# Patient Record
Sex: MALE | Race: WHITE | NOT HISPANIC OR LATINO | Employment: OTHER | URBAN - NONMETROPOLITAN AREA
[De-identification: names, ages, dates, MRNs, and addresses within clinical notes are randomized per-mention and may not be internally consistent; named-entity substitution may affect disease eponyms.]

---

## 2021-01-05 ENCOUNTER — OFFICE VISIT (OUTPATIENT)
Dept: FAMILY MEDICINE CLINIC | Facility: CLINIC | Age: 81
End: 2021-01-05

## 2021-01-05 VITALS
SYSTOLIC BLOOD PRESSURE: 132 MMHG | DIASTOLIC BLOOD PRESSURE: 76 MMHG | HEIGHT: 69 IN | RESPIRATION RATE: 16 BRPM | TEMPERATURE: 97.4 F | BODY MASS INDEX: 33.09 KG/M2 | OXYGEN SATURATION: 97 % | WEIGHT: 223.4 LBS | HEART RATE: 73 BPM

## 2021-01-05 DIAGNOSIS — Z11.59 NEED FOR HEPATITIS C SCREENING TEST: ICD-10-CM

## 2021-01-05 DIAGNOSIS — I25.810 CORONARY ARTERY DISEASE INVOLVING CORONARY BYPASS GRAFT OF NATIVE HEART WITHOUT ANGINA PECTORIS: Primary | ICD-10-CM

## 2021-01-05 DIAGNOSIS — K51.90 ULCERATIVE COLITIS WITHOUT COMPLICATIONS, UNSPECIFIED LOCATION (HCC): ICD-10-CM

## 2021-01-05 DIAGNOSIS — J45.20 MILD INTERMITTENT ASTHMA WITHOUT COMPLICATION: ICD-10-CM

## 2021-01-05 DIAGNOSIS — Z79.4 TYPE 2 DIABETES MELLITUS WITH HYPERGLYCEMIA, WITH LONG-TERM CURRENT USE OF INSULIN (HCC): ICD-10-CM

## 2021-01-05 DIAGNOSIS — E11.65 TYPE 2 DIABETES MELLITUS WITH HYPERGLYCEMIA, WITH LONG-TERM CURRENT USE OF INSULIN (HCC): ICD-10-CM

## 2021-01-05 DIAGNOSIS — L29.9 ITCHING: ICD-10-CM

## 2021-01-05 PROCEDURE — 99204 OFFICE O/P NEW MOD 45 MIN: CPT | Performed by: FAMILY MEDICINE

## 2021-01-05 RX ORDER — LISINOPRIL 10 MG/1
10 TABLET ORAL DAILY
COMMUNITY
Start: 2020-08-10 | End: 2021-01-29 | Stop reason: SDUPTHER

## 2021-01-05 RX ORDER — CLOPIDOGREL BISULFATE 75 MG/1
75 TABLET ORAL DAILY
COMMUNITY
Start: 2020-08-10 | End: 2021-04-20 | Stop reason: SDUPTHER

## 2021-01-05 RX ORDER — HYDROXYZINE PAMOATE 25 MG/1
25 CAPSULE ORAL NIGHTLY PRN
Qty: 30 CAPSULE | Refills: 0 | Status: SHIPPED | OUTPATIENT
Start: 2021-01-05 | End: 2021-03-03

## 2021-01-05 RX ORDER — BUDESONIDE AND FORMOTEROL FUMARATE DIHYDRATE 160; 4.5 UG/1; UG/1
2 AEROSOL RESPIRATORY (INHALATION) 2 TIMES DAILY
COMMUNITY
Start: 2020-11-13 | End: 2022-05-09 | Stop reason: SDUPTHER

## 2021-01-05 RX ORDER — TRIAMCINOLONE ACETONIDE 1 MG/G
CREAM TOPICAL 2 TIMES DAILY
COMMUNITY

## 2021-01-05 RX ORDER — ROSUVASTATIN CALCIUM 20 MG/1
20 TABLET, COATED ORAL DAILY
COMMUNITY
Start: 2020-08-10 | End: 2021-03-03 | Stop reason: SDUPTHER

## 2021-01-05 RX ORDER — GLIPIZIDE 10 MG/1
10 TABLET ORAL 2 TIMES DAILY
COMMUNITY
Start: 2020-08-10 | End: 2021-06-20 | Stop reason: HOSPADM

## 2021-01-05 RX ORDER — INSULIN GLARGINE 100 [IU]/ML
20 INJECTION, SOLUTION SUBCUTANEOUS AS NEEDED
COMMUNITY
Start: 2020-08-10 | End: 2023-02-28 | Stop reason: SDUPTHER

## 2021-01-05 RX ORDER — FLUTICASONE PROPIONATE 50 MCG
1 SPRAY, SUSPENSION (ML) NASAL DAILY
COMMUNITY
Start: 2020-11-13 | End: 2021-01-19 | Stop reason: SDUPTHER

## 2021-01-05 NOTE — PROGRESS NOTES
"Subjective cc: est care for DM   Zachariah Acosta is a 80 y.o. male who presents to est care for DM.     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   Rotator cuff shaheed on right shoulder   Right hip   Back surgery x 2 - lumbar spine   Cataract removal left eye - in [facility] - has follow up for right eye in 2 weeks   UC: on stelara - last colonoscopy about 6-8 months ago, controlled on current medication, was on remicade and activo but neither worked well, he just took his 8 week shot of stelara   DM: 5 years, last ha1c was about 7 within last 3 months - on glipizide BID consistently, only takes lantus PRN, eye exam UTD. No sores on feet.   C/o itching - went ot derm in [place] - given steroid cream but still itching.   C/o SOA \"all his life\" worsening, on symbicort, c/o not having stamina. He thinks last ECHO was last spring, denies orthopnea  BREANN on CPAP     colonoscopy UTD because of UC     Never smoker   No alcohol   No illicit drug use     History of Present Illness     The following portions of the patient's history were reviewed and updated as appropriate: allergies, current medications, past family history, past medical history, past social history, past surgical history and problem list.        Review of Systems   Constitutional: Negative for activity change and fatigue.   Respiratory: Positive for shortness of breath.    Musculoskeletal: Positive for arthralgias, back pain and myalgias.   Skin:        Itching    All other systems reviewed and are negative.      Objective   Blood pressure 132/76, pulse 73, temperature 97.4 °F (36.3 °C), temperature source Infrared, resp. rate 16, height 175.3 cm (69\"), weight 101 kg (223 lb 6.4 oz), SpO2 97 %.  Physical Exam  Vitals signs and nursing note reviewed.   Constitutional:       General: He is not in acute distress.     Appearance: He is " well-developed. He is obese. He is not diaphoretic.   HENT:      Head: Normocephalic and atraumatic.      Nose: Nose normal.   Eyes:      General:         Right eye: No discharge.         Left eye: No discharge.      Conjunctiva/sclera: Conjunctivae normal.   Neck:      Musculoskeletal: Normal range of motion.      Thyroid: No thyromegaly.      Trachea: No tracheal deviation.   Cardiovascular:      Rate and Rhythm: Normal rate and regular rhythm.      Heart sounds: Normal heart sounds.   Pulmonary:      Effort: Pulmonary effort is normal. No respiratory distress.      Breath sounds: Normal breath sounds. No stridor. No wheezing.   Chest:      Chest wall: No tenderness.   Abdominal:      General: Bowel sounds are normal. There is no distension.      Palpations: Abdomen is soft.      Tenderness: There is no abdominal tenderness.   Musculoskeletal: Normal range of motion.   Lymphadenopathy:      Cervical: No cervical adenopathy.   Skin:     General: Skin is warm and dry.   Neurological:      Mental Status: He is alert and oriented to person, place, and time.      Motor: No abnormal muscle tone.      Coordination: Coordination normal.   Psychiatric:         Behavior: Behavior normal.         Thought Content: Thought content normal.         Judgment: Judgment normal.         Assessment/Plan   Problems Addressed this Visit     None      Visit Diagnoses     Coronary artery disease involving coronary bypass graft of native heart without angina pectoris    -  Primary    Relevant Orders    Ambulatory Referral to Cardiology    Lipid Panel    Ulcerative colitis without complications, unspecified location (CMS/[facility])        Relevant Orders    Ambulatory Referral to Gastroenterology    Type 2 diabetes mellitus with hyperglycemia, with long-term current use of insulin (CMS/[facility])        Relevant Medications    glipizide (GLUCOTROL) 10 MG tablet    Insulin Glargine (Lantus SoloStar) 100 UNIT/ML injection pen    Other Relevant Orders     Comprehensive Metabolic Panel    Hemoglobin A1c    Microalbumin / Creatinine Urine Ratio - Urine, Clean Catch    CBC (No Diff)    Need for hepatitis C screening test        Relevant Orders    Hepatitis C Antibody    Itching        Relevant Medications    hydrOXYzine pamoate (Vistaril) 25 MG capsule    Mild intermittent asthma without complication        Relevant Medications    budesonide-formoterol (Symbicort) 160-4.5 MCG/ACT inhaler    Other Relevant Orders    Ambulatory Referral to Pulmonology      Diagnoses       Codes Comments    Coronary artery disease involving coronary bypass graft of native heart without angina pectoris    -  Primary ICD-10-CM: I25.810  ICD-9-CM: 414.05     Ulcerative colitis without complications, unspecified location (CMS/HCC)     ICD-10-CM: K51.90  ICD-9-CM: 556.9     Type 2 diabetes mellitus with hyperglycemia, with long-term current use of insulin (CMS/HCC)     ICD-10-CM: E11.65, Z79.4  ICD-9-CM: 250.00, 790.29, V58.67     Need for hepatitis C screening test     ICD-10-CM: Z11.59  ICD-9-CM: V73.89     Itching     ICD-10-CM: L29.9  ICD-9-CM: 698.9     Mild intermittent asthma without complication     ICD-10-CM: J45.20  ICD-9-CM: 493.90                 This document has been electronically signed by Monique Carlos MD on January 19, 2021 21:45 CST

## 2021-01-07 LAB
ALBUMIN SERPL-MCNC: 3.8 G/DL (ref 3.5–5.2)
ALBUMIN/CREAT UR: 6 MG/G CREAT (ref 0–29)
ALBUMIN/GLOB SERPL: 1.2 G/DL
ALP SERPL-CCNC: 109 U/L (ref 39–117)
ALT SERPL-CCNC: 16 U/L (ref 1–41)
AST SERPL-CCNC: 23 U/L (ref 1–40)
BILIRUB SERPL-MCNC: 0.3 MG/DL (ref 0–1.2)
BUN SERPL-MCNC: 16 MG/DL (ref 8–23)
BUN/CREAT SERPL: 15.1 (ref 7–25)
CALCIUM SERPL-MCNC: 9.1 MG/DL (ref 8.6–10.5)
CHLORIDE SERPL-SCNC: 103 MMOL/L (ref 98–107)
CHOLEST SERPL-MCNC: 141 MG/DL (ref 0–200)
CO2 SERPL-SCNC: 26 MMOL/L (ref 22–29)
CREAT SERPL-MCNC: 1.06 MG/DL (ref 0.76–1.27)
CREAT UR-MCNC: 143.6 MG/DL
ERYTHROCYTE [DISTWIDTH] IN BLOOD BY AUTOMATED COUNT: 14.9 % (ref 12.3–15.4)
GLOBULIN SER CALC-MCNC: 3.3 GM/DL
GLUCOSE SERPL-MCNC: 105 MG/DL (ref 65–99)
HBA1C MFR BLD: 6.6 % (ref 4.8–5.6)
HCT VFR BLD AUTO: 39.7 % (ref 37.5–51)
HCV AB S/CO SERPL IA: <0.1 S/CO RATIO (ref 0–0.9)
HDLC SERPL-MCNC: 34 MG/DL (ref 40–60)
HGB BLD-MCNC: 12.3 G/DL (ref 13–17.7)
LDLC SERPL CALC-MCNC: 86 MG/DL (ref 0–100)
MCH RBC QN AUTO: 24.5 PG (ref 26.6–33)
MCHC RBC AUTO-ENTMCNC: 31 G/DL (ref 31.5–35.7)
MCV RBC AUTO: 79.1 FL (ref 79–97)
MICROALBUMIN UR-MCNC: 8.1 UG/ML
PLATELET # BLD AUTO: 307 10*3/MM3 (ref 140–450)
POTASSIUM SERPL-SCNC: 4.6 MMOL/L (ref 3.5–5.2)
PROT SERPL-MCNC: 7.1 G/DL (ref 6–8.5)
RBC # BLD AUTO: 5.02 10*6/MM3 (ref 4.14–5.8)
SODIUM SERPL-SCNC: 136 MMOL/L (ref 136–145)
TRIGL SERPL-MCNC: 114 MG/DL (ref 0–150)
VLDLC SERPL CALC-MCNC: 21 MG/DL (ref 5–40)
WBC # BLD AUTO: 5.66 10*3/MM3 (ref 3.4–10.8)

## 2021-01-12 NOTE — PROGRESS NOTES
Chief Complaint   Patient presents with   • Ulcerative Colitis     has uc is doing ok has moved here from [facility]       PCP: Monique Carlos MD  REFER: Monique Carlos MD    Subjective     HPI    Zachariah Acosta presents to office to establish care with GI.   He has a known diagnosis of Ulcerative Colitis (apx 1997) currently maintained on stelara and mesalamine enema every other day.  He has failed remicade and entyvio, oral mesalamine,   .  Last colonoscopy 6-8 months ago at [facility] by Dr Oliveira.  Bowels described as moving one time per day.  No abdominal pain.  No bright red blood per rectum.  Weight stable.    S/p 6 cardiac stent placment April 2020.        Past Medical History:   Diagnosis Date   • Asthma    • Diabetes mellitus (CMS/HCC)    • Hyperlipidemia    • Hypertension        Past Surgical History:   Procedure Laterality Date   • BACK SURGERY     • CATARACT EXTRACTION     • CORONARY ANGIOPLASTY WITH STENT PLACEMENT     • PARTIAL HIP ARTHROPLASTY Right        Outpatient Medications Marked as Taking for the 1/14/21 encounter (Office Visit) with Salo Nascimento APRN   Medication Sig Dispense Refill   • budesonide-formoterol (Symbicort) 160-4.5 MCG/ACT inhaler Inhale 2 puffs 2 (two) times a day.     • clopidogrel (PLAVIX) 75 MG tablet Take 75 mg by mouth Daily.     • fluticasone (FLONASE) 50 MCG/ACT nasal spray 1 spray into the nostril(s) as directed by provider Daily.     • glipizide (GLUCOTROL) 10 MG tablet Take 10 mg by mouth 2 (Two) Times a Day.     • hydrOXYzine pamoate (Vistaril) 25 MG capsule Take 1 capsule by mouth At Night As Needed for Itching. 30 capsule 0   • Insulin Glargine (Lantus SoloStar) 100 UNIT/ML injection pen Inject 20 Units under the skin into the appropriate area as directed As Needed.     • lisinopril (PRINIVIL,ZESTRIL) 10 MG tablet Take 10 mg by mouth Daily.     • Mesalamine 4 GM/60ML SF enema Insert  into the rectum Daily.     •  "metoprolol tartrate (LOPRESSOR) 25 MG tablet Take 25 mg by mouth 2 (two) times a day.     • rosuvastatin (CRESTOR) 20 MG tablet Take 20 mg by mouth Daily.     • triamcinolone (KENALOG) 0.1 % cream Apply  topically to the appropriate area as directed 2 (Two) Times a Day.     • Ustekinumab (STELARA) 90 MG/ML solution prefilled syringe Injection Inject 90 mg under the skin into the appropriate area as directed Every 2 (Two) Months.         Allergies   Allergen Reactions   • Albuterol Other (See Comments)   • Adhesive Tape Rash   • Amoxicillin Rash   • Azithromycin Rash       Social History     Socioeconomic History   • Marital status:      Spouse name: Not on file   • Number of children: Not on file   • Years of education: Not on file   • Highest education level: Not on file   Tobacco Use   • Smoking status: Never Smoker   • Smokeless tobacco: Never Used   Substance and Sexual Activity   • Alcohol use: Never     Frequency: Never   • Drug use: Never       Review of Systems   Constitutional: Negative for unexpected weight change.   Respiratory: Negative for shortness of breath.    Cardiovascular: Negative for chest pain.   Gastrointestinal: Negative for abdominal pain and anal bleeding.       Objective     Vitals:    01/14/21 0913   BP: 130/80   Pulse: 70   Temp: 96.9 °F (36.1 °C)   SpO2: 98%   Weight: 99.8 kg (220 lb)   Height: 175.3 cm (69\")     Body mass index is 32.49 kg/m².    Physical Exam  Constitutional:       Appearance: Normal appearance. He is well-developed.   Eyes:      General: No scleral icterus.  Cardiovascular:      Rate and Rhythm: Regular rhythm.      Heart sounds: Normal heart sounds. No murmur.   Pulmonary:      Effort: Pulmonary effort is normal. No accessory muscle usage.      Breath sounds: Normal breath sounds.   Abdominal:      General: Bowel sounds are normal. There is no distension.      Palpations: Abdomen is soft. There is no mass.      Tenderness: There is no abdominal tenderness. " There is no guarding or rebound.   Skin:     General: Skin is warm and dry.      Coloration: Skin is not jaundiced.   Neurological:      Mental Status: He is alert.   Psychiatric:         Behavior: Behavior is cooperative.         Imaging Results (Most Recent)     None          Body mass index is 32.49 kg/m².    Assessment/Plan     Diagnoses and all orders for this visit:    1. Ulcerative pancolitis without complication (CMS/HCC) (Primary)        * Surgery not found *    Follow up June 2020 - discuss timing of colonoscopy   Ok to decrease enema to every 2 days if he wishes  [facility]  Call office with onset of GI related symptoms     Precautions are currently being put in place due to COVID-19.  I have explained to Zachariah Acosta they will be required to undergo COVID testing prior to their procedure.  Zachariah Acosta verbalized understanding and was willing to proceed.    Patient's Body mass index is 32.49 kg/m². BMI is above normal parameters. Recommendations include: no follow up.       Salo Nascimento, BORIS  01/14/21          There are no Patient Instructions on file for this visit.

## 2021-01-14 ENCOUNTER — OFFICE VISIT (OUTPATIENT)
Dept: GASTROENTEROLOGY | Facility: CLINIC | Age: 81
End: 2021-01-14

## 2021-01-14 VITALS
TEMPERATURE: 96.9 F | WEIGHT: 220 LBS | OXYGEN SATURATION: 98 % | SYSTOLIC BLOOD PRESSURE: 130 MMHG | DIASTOLIC BLOOD PRESSURE: 80 MMHG | HEART RATE: 70 BPM | HEIGHT: 69 IN | BODY MASS INDEX: 32.58 KG/M2

## 2021-01-14 DIAGNOSIS — K51.00 ULCERATIVE PANCOLITIS WITHOUT COMPLICATION (HCC): Primary | ICD-10-CM

## 2021-01-14 PROCEDURE — 99203 OFFICE O/P NEW LOW 30 MIN: CPT | Performed by: NURSE PRACTITIONER

## 2021-01-19 ENCOUNTER — OFFICE VISIT (OUTPATIENT)
Dept: PULMONOLOGY | Facility: CLINIC | Age: 81
End: 2021-01-19

## 2021-01-19 VITALS
OXYGEN SATURATION: 95 % | DIASTOLIC BLOOD PRESSURE: 80 MMHG | WEIGHT: 225.6 LBS | BODY MASS INDEX: 33.41 KG/M2 | HEART RATE: 54 BPM | TEMPERATURE: 96.6 F | HEIGHT: 69 IN | SYSTOLIC BLOOD PRESSURE: 140 MMHG

## 2021-01-19 DIAGNOSIS — J30.89 NON-SEASONAL ALLERGIC RHINITIS, UNSPECIFIED TRIGGER: ICD-10-CM

## 2021-01-19 DIAGNOSIS — R06.02 SOB (SHORTNESS OF BREATH): ICD-10-CM

## 2021-01-19 DIAGNOSIS — J98.4 PULMONARY SCARRING: ICD-10-CM

## 2021-01-19 DIAGNOSIS — G47.33 OBSTRUCTIVE SLEEP APNEA SYNDROME: ICD-10-CM

## 2021-01-19 DIAGNOSIS — J45.20 MILD INTERMITTENT ASTHMA WITHOUT COMPLICATION: ICD-10-CM

## 2021-01-19 DIAGNOSIS — R91.1 LUNG NODULE: ICD-10-CM

## 2021-01-19 DIAGNOSIS — E66.9 OBESITY (BMI 30-39.9): Primary | ICD-10-CM

## 2021-01-19 PROBLEM — E11.9 TYPE 2 DIABETES MELLITUS WITHOUT COMPLICATION: Status: ACTIVE | Noted: 2021-01-19

## 2021-01-19 PROBLEM — K51.90 ULCERATIVE COLITIS: Status: ACTIVE | Noted: 2020-07-16

## 2021-01-19 PROBLEM — Z95.1 S/P CABG (CORONARY ARTERY BYPASS GRAFT): Status: ACTIVE | Noted: 2017-04-14

## 2021-01-19 PROBLEM — I25.10 CAD (CORONARY ARTERY DISEASE): Status: ACTIVE | Noted: 2021-01-19

## 2021-01-19 PROBLEM — I10 ESSENTIAL HYPERTENSION: Status: ACTIVE | Noted: 2021-01-19

## 2021-01-19 PROBLEM — N18.30 CHRONIC KIDNEY DISEASE (CKD), STAGE III (MODERATE) (HCC): Status: ACTIVE | Noted: 2018-10-23

## 2021-01-19 PROCEDURE — 99204 OFFICE O/P NEW MOD 45 MIN: CPT | Performed by: INTERNAL MEDICINE

## 2021-01-19 RX ORDER — LORATADINE 10 MG/1
10 TABLET ORAL DAILY
Qty: 90 TABLET | Refills: 3 | Status: SHIPPED | OUTPATIENT
Start: 2021-01-19 | End: 2021-03-03 | Stop reason: SINTOL

## 2021-01-19 RX ORDER — FLUTICASONE PROPIONATE 50 MCG
2 SPRAY, SUSPENSION (ML) NASAL DAILY
Qty: 1 BOTTLE | Refills: 5 | Status: CANCELLED | OUTPATIENT
Start: 2021-01-19

## 2021-01-19 RX ORDER — FLUTICASONE PROPIONATE 50 MCG
1 SPRAY, SUSPENSION (ML) NASAL DAILY
Qty: 1 BOTTLE | Refills: 11 | Status: SHIPPED | OUTPATIENT
Start: 2021-01-19 | End: 2022-05-09 | Stop reason: SDUPTHER

## 2021-01-19 NOTE — PROGRESS NOTES
RESPIRATORY DISEASE CLINIC NEW CONSULT NOTE     Patient: Zachariah Acosta      :  1940   Age: 80 y.o.    Date of Service: 2021      Subjective:   Requesting Physician: Monique Carlos MD     Reason for Consultation:    Diagnosis Plan   1. Obesity (BMI 30-39.9)     2. Obstructive sleep apnea syndrome  Polysomnography 4 or More Parameters With CPAP   3. Non-seasonal allergic rhinitis, unspecified trigger     4. Mild intermittent asthma without complication  Pulmonary Function Test   5. SOB (shortness of breath)  CT CHEST WITHOUT CONTRAST DIAGNOSTIC   6. Pulmonary scarring     7. Lung nodule          Chief Complaint:     Chief Complaint   Patient presents with   • Asthma   • Sleep Apnea       History of present illness: Zachariah Acosta is a 80 y.o. male who presents to the office today to be seen for    Diagnosis Plan   1. Obesity (BMI 30-39.9)     2. Obstructive sleep apnea syndrome  Polysomnography 4 or More Parameters With CPAP   3. Non-seasonal allergic rhinitis, unspecified trigger     4. Mild intermittent asthma without complication  Pulmonary Function Test   5. SOB (shortness of breath)  CT CHEST WITHOUT CONTRAST DIAGNOSTIC   6. Pulmonary scarring     7. Lung nodule        Other problems per record.    Patient is a very pleasant elderly  gentleman who lives in [place] all his life with his wife and recently moved to [place] to stay close to his daughter.  He apparently has history of asthma and was seen and followed by a pulmonologist in [place] and he is on Symbicort for last several years.  He apparently was unable to tolerate any albuterol inhaler which can cause some shakiness and some palpitations.  However he had a pulmonary function test done in the [place] which showed a normal spirometry in 2019.    In addition to the asthma patient also has morbid obesity and obstructive sleep apnea for which she is using the CPAP at night and during sleep.  He has the same  CPAP patient for last 10 years and did not have any CPAP titration done.  Reported he is sleeping well with the CPAP but sometimes feels he is not getting adequate sleep and still feels tired and sleepy during the daytime.    Patient is a lifelong non-smoker.  He is a retired [employer].  The patient reportedly failing no shortness of breath early and gets tired easily.  He is not on home oxygen.  He lives at home with his wife.  In reviewing his old records from care everywhere it appears patient had a CT scan of the chest done in the past in [place] which showed he had some interstitial changes in the lung with some scarring and also the left lower lobe lung nodule which appeared stable but no follow-up CT has been done recently.    He has attended the pulmonary clinic today to establish the new pulmonary care in [place].  He did not have any exposure to any Covid patient any sick contact or any recent travel and he did not have any recent hospital admissions and ER visit or urgent care visit.  He has some difficulty in communicating due to hearing impairment.  He had history of ulcerative colitis and also reported having some allergy symptoms.  He has known diabetes which was diagnosed about 5 years ago.      Past History  Past Medical History:   Diagnosis Date   • Asthma    • Diabetes mellitus (CMS/[facility])    • Hyperlipidemia    • Hypertension      Past Surgical History:   Procedure Laterality Date   • BACK SURGERY     • CATARACT EXTRACTION     • CORONARY ANGIOPLASTY WITH STENT PLACEMENT     • PARTIAL HIP ARTHROPLASTY Right      Allergies   Allergen Reactions   • Albuterol Other (See Comments)   • Adhesive Tape Rash   • Azithromycin Rash     Current Outpatient Medications   Medication Sig Dispense Refill   • budesonide-formoterol (Symbicort) 160-4.5 MCG/ACT inhaler Inhale 2 puffs 2 (two) times a day.     • clopidogrel (PLAVIX) 75 MG tablet Take 75 mg by mouth Daily.     • fluticasone (FLONASE) 50 MCG/ACT nasal spray  1 spray into the nostril(s) as directed by provider Daily. 1 bottle 11   • glipizide (GLUCOTROL) 10 MG tablet Take 10 mg by mouth 2 (Two) Times a Day.     • glucose blood test strip 1 each by Other route Daily. Use as instructed  One Touch Ultra     • Insulin Glargine (Lantus SoloStar) 100 UNIT/ML injection pen Inject 20 Units under the skin into the appropriate area as directed As Needed.     • lisinopril (PRINIVIL,ZESTRIL) 10 MG tablet Take 10 mg by mouth Daily.     • Mesalamine 4 GM/60ML SF enema Insert  into the rectum Daily.     • metoprolol tartrate (LOPRESSOR) 25 MG tablet Take 25 mg by mouth 2 (two) times a day.     • rosuvastatin (CRESTOR) 20 MG tablet Take 20 mg by mouth Daily.     • triamcinolone (KENALOG) 0.1 % cream Apply  topically to the appropriate area as directed 2 (Two) Times a Day.     • Ustekinumab (STELARA) 90 MG/ML solution prefilled syringe Injection Inject 90 mg under the skin into the appropriate area as directed Every 2 (Two) Months.     • hydrOXYzine pamoate (Vistaril) 25 MG capsule Take 1 capsule by mouth At Night As Needed for Itching. 30 capsule 0   • loratadine (CLARITIN) 10 MG tablet Take 1 tablet by mouth Daily. 90 tablet 3     No current facility-administered medications for this visit.      Social History     Socioeconomic History   • Marital status:      Spouse name: Not on file   • Number of children: Not on file   • Years of education: Not on file   • Highest education level: Not on file   Tobacco Use   • Smoking status: Never Smoker   • Smokeless tobacco: Never Used   Substance and Sexual Activity   • Alcohol use: Never     Frequency: Never   • Drug use: Never     Family History   Problem Relation Age of Onset   • Colon polyps Neg Hx    • Esophageal cancer Neg Hx      Immunization History   Administered Date(s) Administered   • Flu Vaccine Quad PF >36MO 11/13/2017   • Fluzone High Dose =>65 Years ([employer] ONLY) 11/01/2020   • Influenza TIV (IM) 11/16/2016, 10/26/2018  "  • Pneumococcal Conjugate 13-Valent (PCV13) 11/11/2013, 04/01/2019   • Pneumococcal Polysaccharide (PPSV23) 08/10/2020       Review of Systems  A complete review of systems is performed and all other systems were reviewed and negative except as noted above in the HPI.  Review of Systems   Constitutional: Positive for fatigue.   HENT: Positive for congestion, postnasal drip, rhinorrhea and sinus pressure.    Eyes: Negative.    Respiratory: Positive for cough, chest tightness and shortness of breath.    Cardiovascular: Negative for chest pain, palpitations and leg swelling.   Gastrointestinal: Positive for constipation.        He has ulcerative colitis and irregular bowel habits at times.   Endocrine: Negative.    Genitourinary: Negative.    Musculoskeletal: Positive for back pain.   Skin: Negative.    Neurological: Negative.    Hematological: Negative.    Psychiatric/Behavioral: Negative.          Objective:     Vital Signs:  /80   Pulse 54   Temp 96.6 °F (35.9 °C)   Ht 175.3 cm (69\")   Wt 102 kg (225 lb 9.6 oz)   SpO2 95% Comment: ra  BMI 33.32 kg/m²     Pulmonary Functions Testing Results:    No results found for this or any previous visit.      Physical Exam  General:  Patient is a 80 y.o.  male. Looks states age. Appears to be in no acute distress.  Eyes:  EOMI.  PERRLA.  Vision intact.  No scleral icterus.    Ear, Nose, Mouth and Throat:  External inspection of the ears and nose appear to be normal.  No obvious scars or lesions.  Hearing is grossly intact.  No leukoplakia, pharyngitis, stomatitis or thrush. Swollen nasal mucosa with post nasal drip.   Neck:  Range of motion of neck normal.  No thyromegaly or masses. Malanpati Class 3, no jugular venous distention, no thyroid swelling  Respiratory:  Clear to auscultation bilaterally.  No use of accessory muscles. Decreased breath sounds.      Cardiovascular:  Regularly regular rhythm without S3, S4 or murmur.  No hepatojugular reflux nor " "carotid bruits.  Pulses intact in four extremities.  No obvious signs of edema.    Gastrointestinal:  Nontender, nondistended, soft.  Bowel sounds positive in all four quadrants.  No organomegaly or masses nor bruit.    Lymphatic:  No significant adenopathy appreciated in the neck, axilla or elsewhere.    Musculoskeletal:  Gait was grossly intact.  Range of motion, strength and sensitivity of all four extremities appear to be intact.  Distal tendon reflexes intact.   Skin:  No obvious rashes, lesions, ulcers or large amount of bruising.    Neurological:  Refer to Eye, Ear, Nose and Throat and musculoskeletal exams for additional details.  Distal tendon reflexes intact.  No clonus or Babinski.  Sensation to touch is grossly intact.    Psychiatric:  Patient is alert and oriented to person, place and time.  Recent and remote memory appear to be intact.  Patient does not show outward signs of depression.      Diagnostic Findings:   Pertinent findings noted and abnormal findings also noted. Reviewed.    Last PFT done in [place] showed    PULMONARY FUNCTION STUDY  Zachariah Acosta  [id number]  1940  78 y.oClaribel Lee    Service Date:  02/21/2019      Date of Interpretation2/23/2019                         Physician: ,\"DANILO Chi      INDICATION FOR PULMONARY FUNCTION TEST: Dyspnea    TOBACCO USE: No    TEST QUALITY: Adequate      Interpretation:     This patient had spirometry without a bronchodilator, lung volumes by   body box plethysmography, airway resistance, and diffusing capacity   measured    Spirometric values reviewed and demonstrate a normal forced vital   capacity, FEV1, FEV1/FVC ratio, and mid flows.     Lung volumes:  This demonstrates a normal slow vital capacity, total lung   capacity and residual volume.      DIFFUSION CAPACITY:  Diffusion capacity  Uncorrected for hemoglobin is   within normal limits at 74% predicted at 22.62 mL/minute/mm of mercury      Chest Imaging   Last Ct scan of " chest done in [facility] showed    Impression:   1.  Stable left base nodule measuring 6 mm.   2.  No other significant pulmonary nodule is identified.   3.  Hyperinflation.   4.  Some residual interstitial fibrosis along the medial aspect of the right lower lobe.   5.  Prior CABG.   6.  Large left thyroid nodule.    Assessment      1. Obesity (BMI 30-39.9)    2. Obstructive sleep apnea syndrome    3. Non-seasonal allergic rhinitis, unspecified trigger    4. Mild intermittent asthma without complication    5. SOB (shortness of breath)    6. Pulmonary scarring    7. Lung nodule        Plan/Recommendations     1.  He has shortness of breath which is multifactorial most likely from underlying asthma and allergic rhinitis with obstructive sleep apnea on CPAP.  However his CPAP titration may need to be repeated due to ongoing tiredness fatigue and sleep disturbances.  2.  I have advised him to continue on Symbicort and I ordered a pulmonary function test before his next clinic visit.  3.  For his allergic rhinitis he was advised to use Flonase and Claritin and prescription sent to the pharmacy.  Patient is unable to use albuterol for shortness of breath which cause some side effects.  4.  He will need a CT scan of the chest because his last CT scan showed he has pulmonary scarring left lower lobe lung nodule and needs a follow-up.  5.  He will continue follow-up with primary care provider.  He already had his influenza vaccine.  He did not have any CPAP titration done for many years and he is using the same device for 10 years and I advised him to get a repeat sleep study and CPAP titration.  6.  He was advised to the pulmonary clinic in 3 months time for a follow-up visit after the pulmonary function test and CT scan of the chest or earlier if needed.  He also had for the CPAP titration and may need to change the CPAP settings.    Follow Up    3 months    Time Spent   35 minutes      I  appreciate the opportunity of participating in this patients care. I would like to thank the PCP for the referral. Please feel free to contact me with any other questions.       Jacob Mejias MD   Pulmonologist/Intensivist     11:32 CST

## 2021-01-31 RX ORDER — LISINOPRIL 10 MG/1
10 TABLET ORAL DAILY
Qty: 90 TABLET | Refills: 1 | Status: SHIPPED | OUTPATIENT
Start: 2021-01-31 | End: 2021-07-06 | Stop reason: SDUPTHER

## 2021-02-01 NOTE — TELEPHONE ENCOUNTER
PATIENT CALLED STATING [facility] DOES NOT HAVE HIS TEST STRIPS THAT WERE SUPPOSED TO BE CALLED IN .    PLEASE CALL AND ADVISE  [phone]

## 2021-03-03 ENCOUNTER — OFFICE VISIT (OUTPATIENT)
Dept: CARDIOLOGY | Facility: CLINIC | Age: 81
End: 2021-03-03

## 2021-03-03 ENCOUNTER — TELEPHONE (OUTPATIENT)
Dept: FAMILY MEDICINE CLINIC | Facility: CLINIC | Age: 81
End: 2021-03-03

## 2021-03-03 VITALS
BODY MASS INDEX: 33.03 KG/M2 | DIASTOLIC BLOOD PRESSURE: 62 MMHG | HEART RATE: 78 BPM | HEIGHT: 69 IN | WEIGHT: 223 LBS | SYSTOLIC BLOOD PRESSURE: 132 MMHG | OXYGEN SATURATION: 100 %

## 2021-03-03 DIAGNOSIS — I10 ESSENTIAL HYPERTENSION: ICD-10-CM

## 2021-03-03 DIAGNOSIS — E78.2 MIXED HYPERLIPIDEMIA: ICD-10-CM

## 2021-03-03 DIAGNOSIS — I25.10 CORONARY ARTERY DISEASE INVOLVING NATIVE CORONARY ARTERY OF NATIVE HEART WITHOUT ANGINA PECTORIS: ICD-10-CM

## 2021-03-03 DIAGNOSIS — Z95.1 S/P CABG (CORONARY ARTERY BYPASS GRAFT): Primary | ICD-10-CM

## 2021-03-03 PROCEDURE — 93000 ELECTROCARDIOGRAM COMPLETE: CPT | Performed by: INTERNAL MEDICINE

## 2021-03-03 PROCEDURE — 99204 OFFICE O/P NEW MOD 45 MIN: CPT | Performed by: INTERNAL MEDICINE

## 2021-03-03 RX ORDER — ASPIRIN 81 MG/1
81 TABLET ORAL DAILY
Qty: 30 TABLET | Refills: 11 | Status: SHIPPED | OUTPATIENT
Start: 2021-03-03

## 2021-03-03 RX ORDER — ROSUVASTATIN CALCIUM 40 MG/1
40 TABLET, COATED ORAL DAILY
Qty: 90 TABLET | Refills: 3 | Status: SHIPPED | OUTPATIENT
Start: 2021-03-03 | End: 2021-11-27 | Stop reason: SDUPTHER

## 2021-03-03 NOTE — TELEPHONE ENCOUNTER
Caller: Zachariah Acosta    Relationship: Self    Best call back number: [phone]    What orders are you requesting (i.e. lab or imaging): REFERRAL FOR URIOLOGIST     In what timeframe would the patient need to come in:ASAP  Where will you receive your lab/imaging services: DR. GRAY   Additional notes: NEEDS REFERRAL FOR PASSING BLOOD, HAS BEEN PASSING BLOOD FOR A COUPLE WEEKS AND WENT TO WALK IN [facility],

## 2021-03-04 ENCOUNTER — OFFICE VISIT (OUTPATIENT)
Dept: FAMILY MEDICINE CLINIC | Facility: CLINIC | Age: 81
End: 2021-03-04

## 2021-03-04 VITALS
DIASTOLIC BLOOD PRESSURE: 68 MMHG | HEART RATE: 60 BPM | SYSTOLIC BLOOD PRESSURE: 122 MMHG | HEIGHT: 69 IN | OXYGEN SATURATION: 95 % | BODY MASS INDEX: 33.68 KG/M2 | RESPIRATION RATE: 18 BRPM | WEIGHT: 227.4 LBS | TEMPERATURE: 98 F

## 2021-03-04 DIAGNOSIS — R31.0 GROSS HEMATURIA: Primary | ICD-10-CM

## 2021-03-04 PROCEDURE — 99213 OFFICE O/P EST LOW 20 MIN: CPT | Performed by: FAMILY MEDICINE

## 2021-03-04 RX ORDER — DOXYCYCLINE HYCLATE 100 MG/1
100 CAPSULE ORAL DAILY
COMMUNITY
End: 2021-03-05

## 2021-03-04 NOTE — PROGRESS NOTES
"Subjective cc: hematuria   Zachariah Acosta is a 80 y.o. male who presents with complaint of painless hematuria.  He noticed blood in his urine stream, went to walk in clinic - was started on doxy 100MG once daily, started abx, cleared up urine for about 2 days then it came back (4 days ago). He is now having blood in urine throughout entire stream, no clots, no pain, no other complaints. Non smoker. No h/o kidney stones. Is on ASA and plavix for CAD.      Blood in Urine  This is a new problem. The current episode started in the past 7 days. The problem has been waxing and waning since onset. He describes the hematuria as gross hematuria. The hematuria occurs throughout his entire urinary stream. He reports no clotting in his urine stream. His pain is at a severity of 0/10. He is experiencing no pain. Irritative symptoms do not include frequency, nocturia or urgency. Obstructive symptoms do not include dribbling, incomplete emptying, an intermittent stream or a slower stream. Pertinent negatives include no abdominal pain, chills, dysuria, facial swelling, fever, flank pain, genital pain, hesitancy, inability to urinate, nausea or vomiting. There is no history of  trauma, kidney stones or tobacco use.        The following portions of the patient's history were reviewed and updated as appropriate: allergies, current medications, past family history, past medical history, past social history, past surgical history and problem list.        Review of Systems   Constitutional: Negative for chills and fever.   HENT: Negative for facial swelling.    Gastrointestinal: Negative for abdominal pain, nausea and vomiting.   Genitourinary: Positive for hematuria. Negative for dysuria, flank pain, frequency, hesitancy, incomplete emptying, nocturia and urgency.   All other systems reviewed and are negative.      Objective   Blood pressure 122/68, pulse 60, temperature 98 °F (36.7 °C), resp. rate 18, height 175.3 cm (69\"), " weight 103 kg (227 lb 6.4 oz), SpO2 95 %.  Physical Exam  Vitals signs and nursing note reviewed.   Constitutional:       General: He is not in acute distress.     Appearance: Normal appearance. He is obese. He is not toxic-appearing.   HENT:      Head: Normocephalic and atraumatic.   Cardiovascular:      Rate and Rhythm: Normal rate.      Pulses: Normal pulses.   Pulmonary:      Effort: Pulmonary effort is normal. No respiratory distress.   Musculoskeletal:      Right lower leg: No edema.      Left lower leg: No edema.   Skin:     General: Skin is warm and dry.   Neurological:      Mental Status: He is alert and oriented to person, place, and time. Mental status is at baseline.   Psychiatric:         Mood and Affect: Mood normal.         Behavior: Behavior normal.         Thought Content: Thought content normal.         Judgment: Judgment normal.         Assessment/Plan   Problems Addressed this Visit     None      Visit Diagnoses     Gross hematuria    -  Primary    Relevant Orders    Ambulatory Referral to Urology      Diagnoses       Codes Comments    Gross hematuria    -  Primary ICD-10-CM: R31.0  ICD-9-CM: 599.71         PLAN:     #1 hematuria: new, needs futher eval, advised to change doxy to BID, complete course, ref to urology - will need CT and cysto most likely           This document has been electronically signed by Monique Carlos MD on March 4, 2021 09:35 CST

## 2021-03-04 NOTE — PROGRESS NOTES
[facility] - CARDIOLOGY  New Patient Initial Outpatient Evaluation    Primary Care Physician: Monique Carlos MD    Subjective     Chief Complaint: Establish care for CAD    Zachariah Acosta was kindly referred to me by Dr. Monique Carlos, with whom he just established care on 01/05/2021. Her history, obtained from the note that day, suggests the patient has a history of MI in 04/2020 that required placement of 6 stents. He also had a history of CABG in 2012 previously following with a cardiologist and [place].    Mr. Acosta reports that he was experiencing some dyspnea in 2012 and subsequently underwent an angiogram that determined the need for triple coronary artery bypass grafting. The patient reports that he remained symptom-free until 04/12/2020 when he suffered an MI that required the placement of 6 stents. The patient is uncertain if any of the 6 stents were placed in native or bypassed arteries. He reports that he has been doing fine since this last cardiac catheterization. His dyspnea is stable and he takes Symbicort for that.  He reports no chest discomfort whatsoever.  His blood pressure looks well-controlled. He denies orthopnea, palpitations,  lower extremity edema, or cramping in the calves.    The patient reports that Dr. Cardoza, his previous cardiologist, placed him on Plavix for one year approximately 11 months ago. The patient reports that he discontinued his baby aspirin when he began taking Plavix. He denies any bleeding issues. He also reports no issues with taking his Crestor.     The patient just moved to the area approximately 2 months ago.        Review of Systems   HENT: Negative for nosebleeds.    Cardiovascular: Positive for dyspnea on exertion (Chronic, stable). Negative for chest pain, claudication, irregular heartbeat, leg swelling, near-syncope, orthopnea, palpitations, paroxysmal nocturnal dyspnea and syncope.   Respiratory: Negative for cough, shortness of breath and wheezing.     Hematologic/Lymphatic: Negative for bleeding problem. Does not bruise/bleed easily.   Gastrointestinal: Negative for dysphagia, hematemesis, hematochezia and melena.   Genitourinary: Negative for hematuria and non-menstrual bleeding.        Otherwise complete ROS reviewed and negative except as mentioned in the HPI.      Past Medical History:   Past Medical History:   Diagnosis Date   • Asthma    • Diabetes mellitus (CMS/HCC)    • Hyperlipidemia    • Hypertension        Past Surgical History:  Past Surgical History:   Procedure Laterality Date   • BACK SURGERY     • CATARACT EXTRACTION     • CORONARY ANGIOPLASTY WITH STENT PLACEMENT     • PARTIAL HIP ARTHROPLASTY Right        Family History: family history is not on file.    Social History:  reports that he has never smoked. He has never used smokeless tobacco. He reports that he does not drink alcohol or use drugs.    Medications:  Prior to Admission medications    Medication Sig Start Date End Date Taking? Authorizing Provider   budesonide-formoterol (Symbicort) 160-4.5 MCG/ACT inhaler Inhale 2 puffs 2 (two) times a day. 11/13/20  Yes Ce Foy MD   clopidogrel (PLAVIX) 75 MG tablet Take 75 mg by mouth Daily. 8/10/20  Yes Ce Foy MD   fluticasone (FLONASE) 50 MCG/ACT nasal spray 1 spray into the nostril(s) as directed by provider Daily. 1/19/21  Yes Jacob Mejias MD   glipizide (GLUCOTROL) 10 MG tablet Take 10 mg by mouth 2 (Two) Times a Day. 8/10/20  Yes Ce Foy MD   glucose blood test strip 1 each by Other route Daily. Use as instructed One Touch Ultra 2/1/21  Yes Monique Carlos MD   Insulin Glargine (Lantus SoloStar) 100 UNIT/ML injection pen Inject 20 Units under the skin into the appropriate area as directed As Needed. 8/10/20  Yes Ce Foy MD   lisinopril (PRINIVIL,ZESTRIL) 10 MG tablet Take 1 tablet by mouth Daily. 1/31/21  Yes Monique Carlos MD   Mesalamine 4 GM/60ML SF enema Insert   "into the rectum Daily.   Yes Ce Foy MD   metoprolol tartrate (LOPRESSOR) 25 MG tablet Take 25 mg by mouth 2 (two) times a day. 8/10/20  Yes Ce Foy MD   rosuvastatin (CRESTOR) 40 MG tablet Take 1 tablet by mouth Daily. 3/3/21  Yes Yung Polanco MD   triamcinolone (KENALOG) 0.1 % cream Apply  topically to the appropriate area as directed 2 (Two) Times a Day.   Yes Ce Foy MD   Ustekinumab (STELARA) 90 MG/ML solution prefilled syringe Injection Inject 90 mg under the skin into the appropriate area as directed Every 2 (Two) Months.   Yes Ce Foy MD   loratadine (CLARITIN) 10 MG tablet Take 1 tablet by mouth Daily. 1/19/21 3/3/21 Yes Jacob Mejias MD   rosuvastatin (CRESTOR) 20 MG tablet Take 20 mg by mouth Daily. 8/10/20 3/3/21 Yes Ce Foy MD   aspirin (aspirin) 81 MG EC tablet Take 1 tablet by mouth Daily. 3/3/21   Yung Polanco MD   hydrOXYzine pamoate (Vistaril) 25 MG capsule Take 1 capsule by mouth At Night As Needed for Itching. 1/5/21 3/3/21  Monique Carlos MD     Allergies:  Allergies   Allergen Reactions   • Albuterol Other (See Comments)   • Adhesive Tape Rash   • Azithromycin Rash       Objective     Vital Signs: /62   Pulse 78   Ht 175.3 cm (69\")   Wt 101 kg (223 lb)   SpO2 100%   BMI 32.93 kg/m²     Vitals signs and nursing note reviewed.   Constitutional:       General: Not in acute distress.     Appearance: Not in distress.   Neck:      Vascular: No JVD or JVR. JVD normal.   Pulmonary:      Effort: Pulmonary effort is normal.      Breath sounds: Normal breath sounds.   Cardiovascular:      Normal rate. Regular rhythm.      Murmurs: There is no murmur.      No gallop. No click.   Pulses:     Intact distal pulses.   Edema:     Peripheral edema absent.   Musculoskeletal:         General: No tenderness.   Skin:     General: Skin is warm and dry.   Neurological:      Mental Status: Alert, oriented to person, " place, and time and oriented to person, place and time.         Results Reviewed:  -Labs reviewed:    rom 01/2021, hemoglobin A1c was 6.6%, so that is right where it needs to be. A cholesterol panel revealed a total cholesterol of 141, HDL of 34, and LDL of 86.       ECG 12 Lead    Date/Time: 3/3/2021 2:17 PM  Performed by: Yung Polanco MD  Authorized by: Yung Polanco MD   Comparison: not compared with previous ECG   Previous ECG: no previous ECG available  Rhythm: sinus rhythm  BPM: 70  Conduction: 1st degree AV block  Q waves: III and aVF    Other findings comments: Cannot rule out inferior infarct, age undetermined    Clinical impression: abnormal EKG              Lab Results   Component Value Date    TRIG 114 01/06/2021    HDL 34 (L) 01/06/2021    VLDL 21 01/06/2021     Lab Results   Component Value Date    HGBA1C 6.60 (H) 01/06/2021     -Old records reviewed: I reviewed her progress note by a nurse practitioner Sarah Thompson with [facility] in [place] from 10/29/2019.  From this I learned that the three-vessel CABG was performed in December 2012, and his ejection fraction was reported as being preserved at that time.       Assessment / Plan        Problem List Items Addressed This Visit        Cardiac and Vasculature    CAD (coronary artery disease)    Overview     3v CABG in '12, reportedly also with PCIx6 for MI in April '20 (records not yet available)         Relevant Orders    ECG 12 Lead    Essential hypertension    Overview     Last Assessment & Plan:   Hypertension is controlled.  Dietary sodium restriction.  Weight loss.  Regular aerobic exercise.  Continue current medications.  Blood pressure will be reassessed at the next regular appointment.         S/P CABG (coronary artery bypass graft) - Primary    Overview     -LIMA to LAD, SVG to RCA, and SVG to diagonal (Dec '12)         Relevant Orders    ECG 12 Lead    Hyperlipidemia    Relevant Medications    rosuvastatin  (CRESTOR) 40 MG tablet        1. Coronary disease status post remote CABG with MI last year requiring stenting:   -The patient is stable and doing well at this time. No records are available regarding the recent intervention so we will attempt to obtain those.  -Continue Plavix but I advised the patient that he should also be on aspirin 81 mg daily, so he will resume this. Prescription for 81 mg aspirin was sent to the patient's pharmacy.  -Intensify statin regimen as per below.   -We will have patient sign release of information to obtain records from his prior cardiologist.  -Continue to optimize risk factor modification.    2. Mixed hyperlipidemia:   -LDL is not quite a goal based upon recent labs that I did review today as his LDL was 86 with goal less than 70.  -Encouraged to increase rosuvastatin from 20 mg up to 40 mg every night at bedtime with a new prescription provided.    3. Essential hypertension:    - Well-controlled. Continue current medications.    Follow up in 6 months, or sooner with any new or worsening symptoms.     Scribed for Yung Polanco MD by Mateo Hopper.  3/5/2021  19:04 CST    I Yung Polanco MD have personally performed the services described in this document as scribed by the above individual, and it is both accurate and complete.   I have edited each component as needed.    Yung Polanco MD  3/5/2021  14:24 CST

## 2021-03-05 ENCOUNTER — APPOINTMENT (OUTPATIENT)
Dept: CT IMAGING | Facility: HOSPITAL | Age: 81
End: 2021-03-05

## 2021-03-05 ENCOUNTER — HOSPITAL ENCOUNTER (EMERGENCY)
Facility: HOSPITAL | Age: 81
Discharge: HOME | End: 2021-03-05
Attending: EMERGENCY MEDICINE | Admitting: EMERGENCY MEDICINE

## 2021-03-05 VITALS
HEIGHT: 69 IN | OXYGEN SATURATION: 99 % | RESPIRATION RATE: 18 BRPM | HEART RATE: 62 BPM | WEIGHT: 226 LBS | SYSTOLIC BLOOD PRESSURE: 156 MMHG | BODY MASS INDEX: 33.47 KG/M2 | TEMPERATURE: 97.6 F | DIASTOLIC BLOOD PRESSURE: 74 MMHG

## 2021-03-05 DIAGNOSIS — K52.9 COLITIS: Primary | ICD-10-CM

## 2021-03-05 DIAGNOSIS — N39.0 ACUTE UTI: ICD-10-CM

## 2021-03-05 LAB
ALBUMIN SERPL-MCNC: 3.7 G/DL (ref 3.5–5.2)
ALBUMIN/GLOB SERPL: 1.1 G/DL
ALP SERPL-CCNC: 104 U/L (ref 39–117)
ALT SERPL W P-5'-P-CCNC: 13 U/L (ref 1–41)
ANION GAP SERPL CALCULATED.3IONS-SCNC: 7 MMOL/L (ref 5–15)
AST SERPL-CCNC: 21 U/L (ref 1–40)
BACTERIA UR QL AUTO: ABNORMAL /HPF
BASOPHILS # BLD AUTO: 0.05 10*3/MM3 (ref 0–0.2)
BASOPHILS NFR BLD AUTO: 0.8 % (ref 0–1.5)
BILIRUB SERPL-MCNC: 0.3 MG/DL (ref 0–1.2)
BILIRUB UR QL STRIP: ABNORMAL
BUN SERPL-MCNC: 15 MG/DL (ref 8–23)
BUN/CREAT SERPL: 17.6 (ref 7–25)
CALCIUM SPEC-SCNC: 8.6 MG/DL (ref 8.6–10.5)
CHLORIDE SERPL-SCNC: 103 MMOL/L (ref 98–107)
CLARITY UR: ABNORMAL
CO2 SERPL-SCNC: 27 MMOL/L (ref 22–29)
COLOR UR: YELLOW
CREAT SERPL-MCNC: 0.85 MG/DL (ref 0.76–1.27)
DEPRECATED RDW RBC AUTO: 47.7 FL (ref 37–54)
EOSINOPHIL # BLD AUTO: 0.33 10*3/MM3 (ref 0–0.4)
EOSINOPHIL NFR BLD AUTO: 5.3 % (ref 0.3–6.2)
ERYTHROCYTE [DISTWIDTH] IN BLOOD BY AUTOMATED COUNT: 16.7 % (ref 12.3–15.4)
GFR SERPL CREATININE-BSD FRML MDRD: 87 ML/MIN/1.73
GLOBULIN UR ELPH-MCNC: 3.3 GM/DL
GLUCOSE SERPL-MCNC: 126 MG/DL (ref 65–99)
GLUCOSE UR STRIP-MCNC: NEGATIVE MG/DL
HCT VFR BLD AUTO: 36.8 % (ref 37.5–51)
HGB BLD-MCNC: 11.2 G/DL (ref 13–17.7)
HGB UR QL STRIP.AUTO: ABNORMAL
HYALINE CASTS UR QL AUTO: ABNORMAL /LPF
IMM GRANULOCYTES # BLD AUTO: 0.02 10*3/MM3 (ref 0–0.05)
IMM GRANULOCYTES NFR BLD AUTO: 0.3 % (ref 0–0.5)
KETONES UR QL STRIP: NEGATIVE
LEUKOCYTE ESTERASE UR QL STRIP.AUTO: NEGATIVE
LYMPHOCYTES # BLD AUTO: 1.19 10*3/MM3 (ref 0.7–3.1)
LYMPHOCYTES NFR BLD AUTO: 19.1 % (ref 19.6–45.3)
MCH RBC QN AUTO: 23.7 PG (ref 26.6–33)
MCHC RBC AUTO-ENTMCNC: 30.4 G/DL (ref 31.5–35.7)
MCV RBC AUTO: 78 FL (ref 79–97)
MONOCYTES # BLD AUTO: 0.63 10*3/MM3 (ref 0.1–0.9)
MONOCYTES NFR BLD AUTO: 10.1 % (ref 5–12)
NEUTROPHILS NFR BLD AUTO: 4 10*3/MM3 (ref 1.7–7)
NEUTROPHILS NFR BLD AUTO: 64.4 % (ref 42.7–76)
NITRITE UR QL STRIP: POSITIVE
NRBC BLD AUTO-RTO: 0 /100 WBC (ref 0–0.2)
PH UR STRIP.AUTO: <=5 [PH] (ref 5–8)
PLATELET # BLD AUTO: 281 10*3/MM3 (ref 140–450)
PMV BLD AUTO: 9.4 FL (ref 6–12)
POTASSIUM SERPL-SCNC: 4.4 MMOL/L (ref 3.5–5.2)
PROT SERPL-MCNC: 7 G/DL (ref 6–8.5)
PROT UR QL STRIP: ABNORMAL
RBC # BLD AUTO: 4.72 10*6/MM3 (ref 4.14–5.8)
RBC # UR: ABNORMAL /HPF
REF LAB TEST METHOD: ABNORMAL
SODIUM SERPL-SCNC: 137 MMOL/L (ref 136–145)
SP GR UR STRIP: >=1.03 (ref 1–1.03)
SQUAMOUS #/AREA URNS HPF: ABNORMAL /HPF
UROBILINOGEN UR QL STRIP: ABNORMAL
WBC # BLD AUTO: 6.22 10*3/MM3 (ref 3.4–10.8)
WBC UR QL AUTO: ABNORMAL /HPF

## 2021-03-05 PROCEDURE — 25010000002 IOPAMIDOL 61 % SOLUTION: Performed by: EMERGENCY MEDICINE

## 2021-03-05 PROCEDURE — 85025 COMPLETE CBC W/AUTO DIFF WBC: CPT | Performed by: EMERGENCY MEDICINE

## 2021-03-05 PROCEDURE — 81001 URINALYSIS AUTO W/SCOPE: CPT | Performed by: EMERGENCY MEDICINE

## 2021-03-05 PROCEDURE — 99283 EMERGENCY DEPT VISIT LOW MDM: CPT

## 2021-03-05 PROCEDURE — 80053 COMPREHEN METABOLIC PANEL: CPT | Performed by: EMERGENCY MEDICINE

## 2021-03-05 PROCEDURE — 74177 CT ABD & PELVIS W/CONTRAST: CPT

## 2021-03-05 RX ORDER — METRONIDAZOLE 500 MG/1
500 TABLET ORAL 2 TIMES DAILY
Qty: 14 TABLET | Refills: 0 | Status: SHIPPED | OUTPATIENT
Start: 2021-03-05 | End: 2021-04-20

## 2021-03-05 RX ORDER — HYDROCODONE BITARTRATE AND ACETAMINOPHEN 7.5; 325 MG/1; MG/1
1 TABLET ORAL EVERY 4 HOURS PRN
Qty: 10 TABLET | Refills: 0 | Status: SHIPPED | OUTPATIENT
Start: 2021-03-05 | End: 2021-04-20

## 2021-03-05 RX ORDER — CIPROFLOXACIN 500 MG/1
500 TABLET, FILM COATED ORAL 2 TIMES DAILY
Qty: 20 TABLET | Refills: 0 | Status: SHIPPED | OUTPATIENT
Start: 2021-03-05 | End: 2021-04-20

## 2021-03-05 RX ORDER — SODIUM CHLORIDE 0.9 % (FLUSH) 0.9 %
10 SYRINGE (ML) INJECTION AS NEEDED
Status: DISCONTINUED | OUTPATIENT
Start: 2021-03-05 | End: 2021-03-05 | Stop reason: HOSPADM

## 2021-03-05 RX ORDER — PREDNISONE 20 MG/1
20 TABLET ORAL 2 TIMES DAILY
Qty: 14 TABLET | Refills: 0 | Status: SHIPPED | OUTPATIENT
Start: 2021-03-05 | End: 2021-04-20

## 2021-03-05 RX ADMIN — IOPAMIDOL 100 ML: 612 INJECTION, SOLUTION INTRAVENOUS at 08:05

## 2021-03-05 NOTE — ED PROVIDER NOTES
Subjective   Patient says that he has been having bloody urine for the past approximately 3 weeks.  He denies any pain with urination or change in frequency of urination or difficulty urinating but is just noticed this blood.  He is never had that before.  He did not clear so he was seen in the [address] [place] [address] clinic 11 days ago.  They did a urinalysis and told him there was some infection in the urine and put him on doxycycline.  He seemed to help initially but then has stopped helping.  This morning he woke up with pain that was very severe in his left flank.  That is the first time he has ever had that.  He had seen his regular doctor yesterday to get a referral to urology and was going to see Dr. Tellez on Monday but with this pain this morning he felt like he did not want to wait through the weekend without getting it checked out to make sure nothing bad was going on so he came to get checked out.  He is not had any fever or chills or nausea vomiting or diarrhea.      History provided by:  Patient   used: No    Flank Pain  Pain location:  L flank  Pain quality: aching    Pain radiates to:  Does not radiate  Pain severity:  Severe  Onset quality:  Sudden  Timing:  Constant  Progression:  Partially resolved  Chronicity:  New  Context: not alcohol use, not awakening from sleep, not diet changes, not eating, not laxative use, not medication withdrawal, not previous surgeries, not recent illness, not recent sexual activity, not recent travel, not retching, not sick contacts, not suspicious food intake and not trauma    Relieved by:  Nothing  Worsened by:  Nothing  Ineffective treatments:  None tried  Associated symptoms: hematuria    Associated symptoms: no anorexia, no belching, no chest pain, no chills, no constipation, no cough, no diarrhea, no dysuria, no fatigue, no fever, no flatus, no hematemesis, no hematochezia, no melena, no nausea, no shortness of breath, no sore throat, no vaginal bleeding,  no vaginal discharge and no vomiting        Review of Systems   Constitutional: Negative.  Negative for chills, fatigue and fever.   HENT: Negative.  Negative for sore throat.    Respiratory: Negative.  Negative for cough and shortness of breath.    Cardiovascular: Negative.  Negative for chest pain.   Gastrointestinal: Negative.  Negative for anorexia, constipation, diarrhea, flatus, hematemesis, hematochezia, melena, nausea and vomiting.   Genitourinary: Positive for flank pain and hematuria. Negative for dysuria, vaginal bleeding and vaginal discharge.   Skin: Negative.    Psychiatric/Behavioral: Negative.    All other systems reviewed and are negative.      Past Medical History:   Diagnosis Date   • Asthma    • Diabetes mellitus (CMS/HCC)    • Hyperlipidemia    • Hypertension        Allergies   Allergen Reactions   • Albuterol Other (See Comments)   • Adhesive Tape Rash   • Azithromycin Rash       Past Surgical History:   Procedure Laterality Date   • BACK SURGERY     • CATARACT EXTRACTION     • CORONARY ANGIOPLASTY WITH STENT PLACEMENT     • PARTIAL HIP ARTHROPLASTY Right        Family History   Problem Relation Age of Onset   • Colon polyps Neg Hx    • Esophageal cancer Neg Hx        Social History     Socioeconomic History   • Marital status:      Spouse name: Not on file   • Number of children: Not on file   • Years of education: Not on file   • Highest education level: Not on file   Tobacco Use   • Smoking status: Never Smoker   • Smokeless tobacco: Never Used   Substance and Sexual Activity   • Alcohol use: Never     Frequency: Never   • Drug use: Never       Prior to Admission medications    Medication Sig Start Date End Date Taking? Authorizing Provider   aspirin (aspirin) 81 MG EC tablet Take 1 tablet by mouth Daily. 3/3/21   Yung Polanco MD   budesonide-formoterol (Symbicort) 160-4.5 MCG/ACT inhaler Inhale 2 puffs 2 (two) times a day. 11/13/20   ProviderCe MD   clopidogrel  (PLAVIX) 75 MG tablet Take 75 mg by mouth Daily. 8/10/20   Ce Foy MD   doxycycline (VIBRAMYCIN) 100 MG capsule Take 100 mg by mouth Daily. Take 1 cap daily for 14 days    Ce Foy MD   fluticasone (FLONASE) 50 MCG/ACT nasal spray 1 spray into the nostril(s) as directed by provider Daily. 1/19/21   Jacob Mejias MD   glipizide (GLUCOTROL) 10 MG tablet Take 10 mg by mouth 2 (Two) Times a Day. 8/10/20   Ce Foy MD   glucose blood test strip 1 each by Other route Daily. Use as instructed One Touch Ultra 2/1/21   Monique Carlos MD   Insulin Glargine (Lantus SoloStar) 100 UNIT/ML injection pen Inject 20 Units under the skin into the appropriate area as directed As Needed. 8/10/20   Ce Foy MD   lisinopril (PRINIVIL,ZESTRIL) 10 MG tablet Take 1 tablet by mouth Daily. 1/31/21   Monique Carlos MD   Mesalamine 4 GM/60ML SF enema Insert  into the rectum Daily.    Ce Foy MD   metoprolol tartrate (LOPRESSOR) 25 MG tablet Take 25 mg by mouth 2 (two) times a day. 8/10/20   Ce Foy MD   rosuvastatin (CRESTOR) 40 MG tablet Take 1 tablet by mouth Daily. 3/3/21   Yung Polanco MD   triamcinolone (KENALOG) 0.1 % cream Apply  topically to the appropriate area as directed 2 (Two) Times a Day.    Ce Foy MD   Ustekinumab (STELARA) 90 MG/ML solution prefilled syringe Injection Inject 90 mg under the skin into the appropriate area as directed Every 2 (Two) Months.    Ce Foy MD       Medications   sodium chloride 0.9 % flush 10 mL (has no administration in time range)   iopamidol (ISOVUE-300) 61 % injection 100 mL (100 mL Intravenous Given 3/5/21 0805)       Vitals:    03/05/21 0815   BP: 172/84   Pulse: 57   Resp:    Temp:    SpO2: 100%         Objective   Physical Exam  Vitals signs and nursing note reviewed.   Constitutional:       Appearance: Normal appearance.   HENT:      Head: Normocephalic and  atraumatic.      Nose: Nose normal.   Eyes:      Pupils: Pupils are equal, round, and reactive to light.   Neck:      Musculoskeletal: Normal range of motion and neck supple.   Cardiovascular:      Rate and Rhythm: Normal rate and regular rhythm.   Pulmonary:      Effort: Pulmonary effort is normal.      Breath sounds: Normal breath sounds.   Abdominal:      General: Abdomen is flat.      Palpations: Abdomen is soft.   Musculoskeletal: Normal range of motion.   Skin:     General: Skin is warm and dry.   Neurological:      General: No focal deficit present.      Mental Status: He is alert and oriented to person, place, and time.   Psychiatric:         Mood and Affect: Mood normal.         Behavior: Behavior normal.         Procedures         Lab Results (last 24 hours)     Procedure Component Value Units Date/Time    Urinalysis With Culture If Indicated - Urine, Clean Catch [[id number]]  (Abnormal) Collected: 03/05/21 0710    Specimen: Urine, Clean Catch Updated: 03/05/21 0732     Color, UA Yellow     Appearance, UA Cloudy     pH, UA <=5.0     Specific Gravity, UA >=1.030     Glucose, UA Negative     Ketones, UA Negative     Bilirubin, UA Small (1+)     Blood, UA Large (3+)     Protein,  mg/dL (2+)     Leuk Esterase, UA Negative     Nitrite, UA Positive     Urobilinogen, UA 0.2 E.U./dL    Urinalysis, Microscopic Only - Urine, Clean Catch [[id number]]  (Abnormal) Collected: 03/05/21 0710    Specimen: Urine, Clean Catch Updated: 03/05/21 0732     RBC, UA Too Numerous to Count /HPF      WBC, UA 3-5 /HPF      Bacteria, UA 1+ /HPF      Squamous Epithelial Cells, UA 3-6 /HPF      Hyaline Casts, UA None Seen /LPF      Methodology Manual Light Microscopy    CBC & Differential [[id number]]  (Abnormal) Collected: 03/05/21 0729    Specimen: Blood Updated: 03/05/21 0739    Narrative:      The following orders were created for panel order CBC & Differential.  Procedure                               Abnormality         Status                      ---------                               -----------         ------                     CBC Auto Differential[[id number]]        Abnormal            Final result                 Please view results for these tests on the individual orders.    Comprehensive Metabolic Panel [[id number]]  (Abnormal) Collected: 03/05/21 0729    Specimen: Blood Updated: 03/05/21 0801     Glucose 126 mg/dL      BUN 15 mg/dL      Creatinine 0.85 mg/dL      Sodium 137 mmol/L      Potassium 4.4 mmol/L      Comment: Slight hemolysis detected by analyzer. Results may be affected.        Chloride 103 mmol/L      CO2 27.0 mmol/L      Calcium 8.6 mg/dL      Total Protein 7.0 g/dL      Albumin 3.70 g/dL      ALT (SGPT) 13 U/L      AST (SGOT) 21 U/L      Alkaline Phosphatase 104 U/L      Total Bilirubin 0.3 mg/dL      eGFR Non African Amer 87 mL/min/1.73      Globulin 3.3 gm/dL      A/G Ratio 1.1 g/dL      BUN/Creatinine Ratio 17.6     Anion Gap 7.0 mmol/L     Narrative:      GFR Normal >60  Chronic Kidney Disease <60  Kidney Failure <15      CBC Auto Differential [[id number]]  (Abnormal) Collected: 03/05/21 0729    Specimen: Blood Updated: 03/05/21 0739     WBC 6.22 10*3/mm3      RBC 4.72 10*6/mm3      Hemoglobin 11.2 g/dL      Hematocrit 36.8 %      MCV 78.0 fL      MCH 23.7 pg      MCHC 30.4 g/dL      RDW 16.7 %      RDW-SD 47.7 fl      MPV 9.4 fL      Platelets 281 10*3/mm3      Neutrophil % 64.4 %      Lymphocyte % 19.1 %      Monocyte % 10.1 %      Eosinophil % 5.3 %      Basophil % 0.8 %      Immature Grans % 0.3 %      Neutrophils, Absolute 4.00 10*3/mm3      Lymphocytes, Absolute 1.19 10*3/mm3      Monocytes, Absolute 0.63 10*3/mm3      Eosinophils, Absolute 0.33 10*3/mm3      Basophils, Absolute 0.05 10*3/mm3      Immature Grans, Absolute 0.02 10*3/mm3      nRBC 0.0 /100 WBC           CT Abdomen Pelvis With Contrast   Final Result   1. Abnormal appearance of the sigmoid colon with wall thickening and   surrounding  inflammatory change. This may reflect colitis. However, this   is also concerning for primary malignancy. Adjacent small lymph nodes   may be reactive or metastatic.       2. No evidence of pyelonephritis.   3. Hepatic steatosis.       4. Atherosclerotic disease.   5. 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 No evidence of active hemorrhage.               This report was finalized on 03/05/2021 08:21 by Dr. Salo Valverde MD.          ED Course  ED Course as of Mar 05 0905   Fri Mar 05, 2021   0904 I told the patient his lab work looks okay except his urine does have too numerous to count red blood cells and some evidence of remaining infection.  A review of the culture done as an outside source shows that should be sensitive to his doxycycline but apparently has not completed it on the job.  His CT scan does not show a kidney stone but instead does show some colitis in the descending colon.  There is no mention of the possible malignancy but the patient tells me he has a history of ulcerative colitis and has had a colonoscopy within the last 8 months.  We will treat him for the colitis and change his antibiotics to some that will take care of the urine and the colitis.  He has an appoint with Dr. Tellez on Monday and I told to keep that to check about his urine.  He is discharged in stable condition.    [TR]      ED Course User Index  [TR] Jonny Soni Jr., MD          MDM  Number of Diagnoses or Management Options  Acute UTI: new and requires workup  Colitis: new and requires workup     Amount and/or Complexity of Data Reviewed  Clinical lab tests: ordered and reviewed  Tests in the radiology section of CPT®: ordered and reviewed    Risk of Complications, Morbidity, and/or Mortality  Presenting problems: moderate  Diagnostic procedures: moderate  Management options: moderate    Patient Progress  Patient progress: stable      Final diagnoses:   Colitis   Acute UTI           Jonny Soni Jr., MD  03/05/21 0912

## 2021-03-05 NOTE — PROGRESS NOTES
Subjective    Mr. Acosta is 80 y.o. male    Chief Complaint: Gross Hematuria    History of Present Illness  80-year-old male new patient on aspirin and Plavix for coronary stenting approximately a year ago referred for painless total gross hematuria which began approximately 3 weeks ago.  He was seen in the ER Friday evening for left lower back pain and concern for kidney stone.  He had a CT abdomen pelvis with contrast showing no stone, no hydronephrosis, no renal mass.  His bladder is obscured from her right prosthetic hip.  He denies dysuria or current flank pain.  He was noted to have some colitis on his CT scan was started on antibiotics by the ER.    I independently visualized and reviewed the patient's prior imaging studies today in clinic and discussed the imaging findings with the patient.      The following portions of the patient's history were reviewed and updated as appropriate: allergies, current medications, past family history, past medical history, past social history, past surgical history and problem list.    Review of Systems   Constitutional: Negative for appetite change and fever.   HENT: Negative for hearing loss and sore throat.    Eyes: Negative for pain and redness.   Respiratory: Negative for cough and shortness of breath.    Cardiovascular: Negative for chest pain and leg swelling.   Gastrointestinal: Negative for anal bleeding, nausea and vomiting.   Endocrine: Negative for cold intolerance and heat intolerance.   Genitourinary: Positive for hematuria (gross). Negative for dysuria and flank pain.   Musculoskeletal: Negative for joint swelling and myalgias.   Skin: Negative for color change and rash.   Allergic/Immunologic: Negative for immunocompromised state.   Neurological: Negative for dizziness and speech difficulty.   Hematological: Negative for adenopathy. Does not bruise/bleed easily.   Psychiatric/Behavioral: Negative for dysphoric mood and suicidal ideas.         Current  Outpatient Medications:   •  aspirin (aspirin) 81 MG EC tablet, Take 1 tablet by mouth Daily., Disp: 30 tablet, Rfl: 11  •  budesonide-formoterol (Symbicort) 160-4.5 MCG/ACT inhaler, Inhale 2 puffs 2 (two) times a day., Disp: , Rfl:   •  ciprofloxacin (CIPRO) 500 MG tablet, Take 1 tablet by mouth 2 (Two) Times a Day., Disp: 20 tablet, Rfl: 0  •  clopidogrel (PLAVIX) 75 MG tablet, Take 75 mg by mouth Daily., Disp: , Rfl:   •  fluticasone (FLONASE) 50 MCG/ACT nasal spray, 1 spray into the nostril(s) as directed by provider Daily., Disp: 1 bottle, Rfl: 11  •  glipizide (GLUCOTROL) 10 MG tablet, Take 10 mg by mouth 2 (Two) Times a Day., Disp: , Rfl:   •  glucose blood test strip, 1 each by Other route Daily. Use as instructed One Touch Ultra, Disp: 100 each, Rfl: 11  •  HYDROcodone-acetaminophen (NORCO) 7.5-325 MG per tablet, Take 1 tablet by mouth Every 4 (Four) Hours As Needed for Moderate Pain ., Disp: 10 tablet, Rfl: 0  •  Insulin Glargine (Lantus SoloStar) 100 UNIT/ML injection pen, Inject 20 Units under the skin into the appropriate area as directed As Needed., Disp: , Rfl:   •  lisinopril (PRINIVIL,ZESTRIL) 10 MG tablet, Take 1 tablet by mouth Daily., Disp: 90 tablet, Rfl: 1  •  Mesalamine 4 GM/60ML SF enema, Insert  into the rectum Daily., Disp: , Rfl:   •  metoprolol tartrate (LOPRESSOR) 25 MG tablet, Take 25 mg by mouth 2 (two) times a day., Disp: , Rfl:   •  metroNIDAZOLE (FLAGYL) 500 MG tablet, Take 1 tablet by mouth 2 (Two) Times a Day., Disp: 14 tablet, Rfl: 0  •  predniSONE (DELTASONE) 20 MG tablet, Take 1 tablet by mouth 2 (Two) Times a Day., Disp: 14 tablet, Rfl: 0  •  rosuvastatin (CRESTOR) 40 MG tablet, Take 1 tablet by mouth Daily., Disp: 90 tablet, Rfl: 3  •  triamcinolone (KENALOG) 0.1 % cream, Apply  topically to the appropriate area as directed 2 (Two) Times a Day., Disp: , Rfl:   •  Ustekinumab (STELARA) 90 MG/ML solution prefilled syringe Injection, Inject 90 mg under the skin into the  "appropriate area as directed Every 2 (Two) Months., Disp: , Rfl:     Past Medical History:   Diagnosis Date   • Asthma    • Diabetes mellitus (CMS/HCC)    • Hyperlipidemia    • Hypertension        Past Surgical History:   Procedure Laterality Date   • BACK SURGERY     • CATARACT EXTRACTION     • CORONARY ANGIOPLASTY WITH STENT PLACEMENT     • PARTIAL HIP ARTHROPLASTY Right        Social History     Socioeconomic History   • Marital status:      Spouse name: Not on file   • Number of children: Not on file   • Years of education: Not on file   • Highest education level: Not on file   Tobacco Use   • Smoking status: Never Smoker   • Smokeless tobacco: Never Used   Substance and Sexual Activity   • Alcohol use: Never   • Drug use: Never       Family History   Problem Relation Age of Onset   • Colon polyps Neg Hx    • Esophageal cancer Neg Hx        Objective    Temp 97.1 °F (36.2 °C)   Ht 175.3 cm (69\")   Wt 102 kg (225 lb 9.6 oz)   BMI 33.32 kg/m²     Physical Exam  He is in no apparent distress.    I personally performed and interpreted microscopic urinalysis in the clinic today and discussed the findings with the patient.  Urine microscopy performed by me shows too numerous to count RBCs, 0-3 WBCs, no bacteria.    Results for orders placed or performed in visit on 03/08/21   POC Urinalysis Dipstick, Multipro    Specimen: Urine   Result Value Ref Range    Color Yellow Yellow, Straw, Dark Yellow, Vicky    Clarity, UA Clear Clear    Glucose, UA Negative Negative, 1000 mg/dL (3+) mg/dL    Bilirubin Small (1+) (A) Negative    Ketones, UA Trace (A) Negative    Specific Gravity  1.030 1.005 - 1.030    Blood, UA Large (A) Negative    pH, Urine 5.5 5.0 - 8.0    Protein,  mg/dL (A) Negative mg/dL    Urobilinogen, UA Normal Normal    Nitrite, UA Negative Negative    Leukocytes Negative Negative     Assessment and Plan    Diagnoses and all orders for this visit:    1. Gross hematuria (Primary)  -     POC " Urinalysis Dipstick, Multipro    2. Type 2 diabetes mellitus without complication, unspecified whether long term insulin use (CMS/[facility])    3. Essential hypertension      Gross hematuria on medical anticoagulation with normal appearing kidneys on CT scan last week.  I recommended holding aspirin and Plavix temporarily and scheduling for cystoscopy in office with me this Thursday to evaluate for bladder and/or prostate source.      This document has been signed by ANTHONY Tellez MD on March 8, 2021 17:47 CST

## 2021-03-08 ENCOUNTER — OFFICE VISIT (OUTPATIENT)
Dept: UROLOGY | Facility: CLINIC | Age: 81
End: 2021-03-08

## 2021-03-08 VITALS — WEIGHT: 225.6 LBS | TEMPERATURE: 97.1 F | HEIGHT: 69 IN | BODY MASS INDEX: 33.41 KG/M2

## 2021-03-08 DIAGNOSIS — E11.9 TYPE 2 DIABETES MELLITUS WITHOUT COMPLICATION, UNSPECIFIED WHETHER LONG TERM INSULIN USE (HCC): ICD-10-CM

## 2021-03-08 DIAGNOSIS — I10 ESSENTIAL HYPERTENSION: ICD-10-CM

## 2021-03-08 DIAGNOSIS — R31.0 GROSS HEMATURIA: Primary | ICD-10-CM

## 2021-03-08 LAB
BILIRUB BLD-MCNC: ABNORMAL MG/DL
CLARITY, POC: CLEAR
COLOR UR: YELLOW
GLUCOSE UR STRIP-MCNC: NEGATIVE MG/DL
KETONES UR QL: ABNORMAL
LEUKOCYTE EST, POC: NEGATIVE
NITRITE UR-MCNC: NEGATIVE MG/ML
PH UR: 5.5 [PH] (ref 5–8)
PROT UR STRIP-MCNC: ABNORMAL MG/DL
RBC # UR STRIP: ABNORMAL /UL
SP GR UR: 1.03 (ref 1–1.03)
UROBILINOGEN UR QL: NORMAL

## 2021-03-08 PROCEDURE — 81003 URINALYSIS AUTO W/O SCOPE: CPT | Performed by: UROLOGY

## 2021-03-08 PROCEDURE — 99204 OFFICE O/P NEW MOD 45 MIN: CPT | Performed by: UROLOGY

## 2021-03-08 NOTE — PATIENT INSTRUCTIONS
"BMI for Adults  What is BMI?  Body mass index (BMI) is a number that is calculated from a person's weight and height. BMI can help estimate how much of a person's weight is composed of fat. BMI does not measure body fat directly. Rather, it is an alternative to procedures that directly measure body fat, which can be difficult and expensive.  BMI can help identify people who may be at higher risk for certain medical problems.  What are BMI measurements used for?  BMI is used as a screening tool to identify possible weight problems. It helps determine whether a person is obese, overweight, a healthy weight, or underweight.  BMI is useful for:  · Identifying a weight problem that may be related to a medical condition or may increase the risk for medical problems.  · Promoting changes, such as changes in diet and exercise, to help reach a healthy weight. BMI screening can be repeated to see if these changes are working.  How is BMI calculated?  BMI involves measuring your weight in relation to your height. Both height and weight are measured, and the BMI is calculated from those numbers. This can be done either in English (U.S.) or metric measurements. Note that charts and online BMI calculators are available to help you find your BMI quickly and easily without having to do these calculations yourself.  To calculate your BMI in English (U.S.) measurements:    1. Measure your weight in pounds (lb).  2. Multiply the number of pounds by 703.  ? For example, for a person who weighs 180 lb, multiply that number by 703, which equals 126,540.  3. Measure your height in inches. Then multiply that number by itself to get a measurement called \"inches squared.\"  ? For example, for a person who is 70 inches tall, the \"inches squared\" measurement is 70 inches x 70 inches, which equals 4,900 inches squared.  4. Divide the total from step 2 (number of lb x 703) by the total from step 3 (inches squared): 126,540 ÷ 4,900 = 25.8. This is " "your BMI.  To calculate your BMI in metric measurements:  1. Measure your weight in kilograms (kg).  2. Measure your height in meters (m). Then multiply that number by itself to get a measurement called \"meters squared.\"  ? For example, for a person who is 1.75 m tall, the \"meters squared\" measurement is 1.75 m x 1.75 m, which is equal to 3.1 meters squared.  3. Divide the number of kilograms (your weight) by the meters squared number. In this example: 70 ÷ 3.1 = 22.6. This is your BMI.  What do the results mean?  BMI charts are used to identify whether you are underweight, normal weight, overweight, or obese. The following guidelines will be used:  · Underweight: BMI less than 18.5.  · Normal weight: BMI between 18.5 and 24.9.  · Overweight: BMI between 25 and 29.9.  · Obese: BMI of 30 or above.  Keep these notes in mind:  · Weight includes both fat and muscle, so someone with a muscular build, such as an athlete, may have a BMI that is higher than 24.9. In cases like these, BMI is not an accurate measure of body fat.  · To determine if excess body fat is the cause of a BMI of 25 or higher, further assessments may need to be done by a health care provider.  · BMI is usually interpreted in the same way for men and women.  Where to find more information  For more information about BMI, including tools to quickly calculate your BMI, go to these websites:  · Centers for Disease Control and Prevention: www.cdc.gov  · American Heart Association: www.heart.org  · National Heart, Lung, and Blood [place]: www.nhlbi.nih.gov  Summary  · Body mass index (BMI) is a number that is calculated from a person's weight and height.  · BMI may help estimate how much of a person's weight is composed of fat. BMI can help identify those who may be at higher risk for certain medical problems.  · BMI can be measured using English measurements or metric measurements.  · BMI charts are used to identify whether you are underweight, normal " weight, overweight, or obese.  This information is not intended to replace advice given to you by your health care provider. Make sure you discuss any questions you have with your health care provider.  Document Revised: 09/09/2020 Document Reviewed: 07/17/2020  Elsevier Patient Education © 2020 [employer]

## 2021-03-11 ENCOUNTER — PROCEDURE VISIT (OUTPATIENT)
Dept: UROLOGY | Facility: CLINIC | Age: 81
End: 2021-03-11

## 2021-03-11 DIAGNOSIS — R31.0 GROSS HEMATURIA: Primary | ICD-10-CM

## 2021-03-11 DIAGNOSIS — R39.16 BENIGN PROSTATIC HYPERPLASIA (BPH) WITH STRAINING ON URINATION: ICD-10-CM

## 2021-03-11 DIAGNOSIS — N40.1 BENIGN PROSTATIC HYPERPLASIA (BPH) WITH STRAINING ON URINATION: ICD-10-CM

## 2021-03-11 LAB
BILIRUB BLD-MCNC: NEGATIVE MG/DL
CLARITY, POC: ABNORMAL
COLOR UR: ABNORMAL
GLUCOSE UR STRIP-MCNC: NEGATIVE MG/DL
KETONES UR QL: NEGATIVE
LEUKOCYTE EST, POC: NEGATIVE
NITRITE UR-MCNC: NEGATIVE MG/ML
PH UR: 5.5 [PH] (ref 5–8)
PROT UR STRIP-MCNC: ABNORMAL MG/DL
RBC # UR STRIP: ABNORMAL /UL
SP GR UR: 1.03 (ref 1–1.03)
UROBILINOGEN UR QL: NORMAL

## 2021-03-11 PROCEDURE — 52000 CYSTOURETHROSCOPY: CPT | Performed by: UROLOGY

## 2021-03-11 PROCEDURE — 81003 URINALYSIS AUTO W/O SCOPE: CPT | Performed by: UROLOGY

## 2021-03-11 RX ORDER — FINASTERIDE 5 MG/1
5 TABLET, FILM COATED ORAL DAILY
Qty: 30 TABLET | Refills: 11 | Status: SHIPPED | OUTPATIENT
Start: 2021-03-11 | End: 2022-01-27 | Stop reason: SDUPTHER

## 2021-03-12 NOTE — PROGRESS NOTES
Pre- operative diagnosis:  Hematuria.  His hematuria is improved after holding aspirin and Plavix since his visit Monday.    Post operative diagnosis:  BPH    Procedure:  The patient was prepped and draped in a normal sterile fashion.  The urethra was anesthetized with 2% lidocaine jelly.  A flexible cystoscope was introduced per urethra.      Urethra:  Normal    Bladder:  Normal mucosa, No bladder tumor and moderate trabeculation    Ureteral orifices:  Normal position bilaterally and Clear efflux bilaterally    Prostate:  lateral lobe hypertrophy and elevated bladder neck-with visually obstructing kissing lateral lobes and elevated bladder neck.  There is prostatic oozing and prominent vasculature present on the prostate and bladder neck consistent with a prostatic source of hematuria    Patient tolerated the procedure well    Complications: none    Blood loss: minimal    Follow up:    We did discuss that it appears he has a prostatic source of his hematuria.  Routine follow up-with me next month.  I recommended starting finasteride for his prostatic bleeding.  He will discuss with his cardiologist whether he would be able to hold his aspirin and Plavix for a week prior to TURP if his hematuria continues.  Patient may benefit from TURP if his prostatic bleeding does not improve on finasteride.         This document has been signed by ANTHONY Tellez MD on March 11, 2021 18:54 CST

## 2021-04-07 NOTE — PROGRESS NOTES
Subjective    Mr. Acosta is 80 y.o. male    Chief Complaint: Gross hematuria    History of Present Illness    80-year-old male established patient follow-up for enlarged prostate and history of hematuria on aspirin and Plavix for coronary stenting approximately a year ago.  Cystoscopy last visit showed lateral lobe hypertrophy and elevated bladder neck with visually obstructing lateral lobes and prostatic oozing.  He was placed on finasteride.  Patient states his hematuria had resolved for approximately 3 weeks and then he began having some light intermittent blood in the urine, no clots.  Urine microscopy with microhematuria today.  He had a previous CT abdomen pelvis with contrast showing no stone, no hydronephrosis, no renal mass.      The following portions of the patient's history were reviewed and updated as appropriate: allergies, current medications, past family history, past medical history, past social history, past surgical history and problem list.    Review of Systems   Constitutional: Negative for chills and fever.   Gastrointestinal: Negative for abdominal pain, anal bleeding and blood in stool.   Genitourinary: Positive for hematuria (gross). Negative for dysuria.         Current Outpatient Medications:   •  aspirin (aspirin) 81 MG EC tablet, Take 1 tablet by mouth Daily., Disp: 30 tablet, Rfl: 11  •  budesonide-formoterol (Symbicort) 160-4.5 MCG/ACT inhaler, Inhale 2 puffs 2 (two) times a day., Disp: , Rfl:   •  ciprofloxacin (CIPRO) 500 MG tablet, Take 1 tablet by mouth 2 (Two) Times a Day., Disp: 20 tablet, Rfl: 0  •  clopidogrel (PLAVIX) 75 MG tablet, Take 75 mg by mouth Daily., Disp: , Rfl:   •  finasteride (PROSCAR) 5 MG tablet, Take 1 tablet by mouth Daily., Disp: 30 tablet, Rfl: 11  •  fluticasone (FLONASE) 50 MCG/ACT nasal spray, 1 spray into the nostril(s) as directed by provider Daily., Disp: 1 bottle, Rfl: 11  •  glipizide (GLUCOTROL) 10 MG tablet, Take 10 mg by mouth 2 (Two) Times a  Day., Disp: , Rfl:   •  glucose blood test strip, 1 each by Other route Daily. Use as instructed One Touch Ultra, Disp: 100 each, Rfl: 11  •  HYDROcodone-acetaminophen (NORCO) 7.5-325 MG per tablet, Take 1 tablet by mouth Every 4 (Four) Hours As Needed for Moderate Pain ., Disp: 10 tablet, Rfl: 0  •  Insulin Glargine (Lantus SoloStar) 100 UNIT/ML injection pen, Inject 20 Units under the skin into the appropriate area as directed As Needed., Disp: , Rfl:   •  lisinopril (PRINIVIL,ZESTRIL) 10 MG tablet, Take 1 tablet by mouth Daily., Disp: 90 tablet, Rfl: 1  •  Mesalamine 4 GM/60ML SF enema, Insert  into the rectum Daily., Disp: , Rfl:   •  metoprolol tartrate (LOPRESSOR) 25 MG tablet, Take 25 mg by mouth 2 (two) times a day., Disp: , Rfl:   •  metroNIDAZOLE (FLAGYL) 500 MG tablet, Take 1 tablet by mouth 2 (Two) Times a Day., Disp: 14 tablet, Rfl: 0  •  predniSONE (DELTASONE) 20 MG tablet, Take 1 tablet by mouth 2 (Two) Times a Day., Disp: 14 tablet, Rfl: 0  •  rosuvastatin (CRESTOR) 40 MG tablet, Take 1 tablet by mouth Daily., Disp: 90 tablet, Rfl: 3  •  triamcinolone (KENALOG) 0.1 % cream, Apply  topically to the appropriate area as directed 2 (Two) Times a Day., Disp: , Rfl:   •  Ustekinumab (STELARA) 90 MG/ML solution prefilled syringe Injection, Inject 90 mg under the skin into the appropriate area as directed Every 2 (Two) Months., Disp: , Rfl:     Past Medical History:   Diagnosis Date   • Asthma    • Diabetes mellitus (CMS/HCC)    • Hyperlipidemia    • Hypertension        Past Surgical History:   Procedure Laterality Date   • BACK SURGERY     • CATARACT EXTRACTION     • CORONARY ANGIOPLASTY WITH STENT PLACEMENT     • PARTIAL HIP ARTHROPLASTY Right        Social History     Socioeconomic History   • Marital status:      Spouse name: Not on file   • Number of children: Not on file   • Years of education: Not on file   • Highest education level: Not on file   Tobacco Use   • Smoking status: Never Smoker  "  • Smokeless tobacco: Never Used   Substance and Sexual Activity   • Alcohol use: Never   • Drug use: Never       Family History   Problem Relation Age of Onset   • Colon polyps Neg Hx    • Esophageal cancer Neg Hx        Objective    Temp 97.8 °F (36.6 °C)   Ht 175.3 cm (69\")   Wt 103 kg (226 lb 9.6 oz)   BMI 33.46 kg/m²     Physical Exam  He is in no apparent distress.    I personally performed and interpreted microscopic urinalysis in the clinic today and discussed the findings with the patient.  Urine microscopy performed by me shows 10-20 RBCs per high-power field, no bacteria, 0-3 WBCs.    Results for orders placed or performed in visit on 04/13/21   POC Urinalysis Dipstick, Multipro    Specimen: Urine   Result Value Ref Range    Color Straw (A) Yellow, Straw, Dark Yellow, Vicky    Clarity, UA Cloudy (A) Clear    Glucose, UA Negative Negative, 1000 mg/dL (3+) mg/dL    Bilirubin Negative Negative    Ketones, UA Negative Negative    Specific Gravity  1.030 1.005 - 1.030    Blood, UA 3+ (A) Negative    pH, Urine 5.5 5.0 - 8.0    Protein, POC 2+ (A) Negative mg/dL    Urobilinogen, UA Normal Normal    Nitrite, UA Negative Negative    Leukocytes Negative Negative     Assessment and Plan    Diagnoses and all orders for this visit:    1. Gross hematuria (Primary)  -     POC Urinalysis Dipstick, Multipro    2. Benign prostatic hyperplasia (BPH) with straining on urination    3. Type 2 diabetes mellitus without complication, unspecified whether long term insulin use (CMS/[facility])    4. Essential hypertension      Microhematuria on urinalysis today with history of gross hematuria, likely prostatic source with recent otherwise normal cystoscopy and CT scan.  It is just now been a year since his coronary stenting.  We discussed options for transurethral resection of the prostate as needed as needed recurrent hematuria but that he would need to stop his aspirin and Plavix a week prior to surgery in a week after.  He would " like to continue to monitor for now.  Continue finasteride.  He will follow-up in 3 months or sooner as needed.      This document has been signed by ANTHONY Tellez MD on April 13, 2021 15:19 CDT

## 2021-04-08 ENCOUNTER — TRANSCRIBE ORDERS (OUTPATIENT)
Dept: LAB | Facility: HOSPITAL | Age: 81
End: 2021-04-08

## 2021-04-08 DIAGNOSIS — Z01.818 PREOP TESTING: Primary | ICD-10-CM

## 2021-04-12 ENCOUNTER — LAB (OUTPATIENT)
Dept: LAB | Facility: HOSPITAL | Age: 81
End: 2021-04-12

## 2021-04-12 LAB — SARS-COV-2 ORF1AB RESP QL NAA+PROBE: NOT DETECTED

## 2021-04-12 PROCEDURE — U0005 INFEC AGEN DETEC AMPLI PROBE: HCPCS | Performed by: INTERNAL MEDICINE

## 2021-04-12 PROCEDURE — U0004 COV-19 TEST NON-CDC HGH THRU: HCPCS | Performed by: INTERNAL MEDICINE

## 2021-04-12 PROCEDURE — C9803 HOPD COVID-19 SPEC COLLECT: HCPCS | Performed by: INTERNAL MEDICINE

## 2021-04-13 ENCOUNTER — OFFICE VISIT (OUTPATIENT)
Dept: UROLOGY | Facility: CLINIC | Age: 81
End: 2021-04-13

## 2021-04-13 VITALS — TEMPERATURE: 97.8 F | BODY MASS INDEX: 33.56 KG/M2 | WEIGHT: 226.6 LBS | HEIGHT: 69 IN

## 2021-04-13 DIAGNOSIS — N40.1 BENIGN PROSTATIC HYPERPLASIA (BPH) WITH STRAINING ON URINATION: ICD-10-CM

## 2021-04-13 DIAGNOSIS — R39.16 BENIGN PROSTATIC HYPERPLASIA (BPH) WITH STRAINING ON URINATION: ICD-10-CM

## 2021-04-13 DIAGNOSIS — R31.0 GROSS HEMATURIA: Primary | ICD-10-CM

## 2021-04-13 DIAGNOSIS — E11.9 TYPE 2 DIABETES MELLITUS WITHOUT COMPLICATION, UNSPECIFIED WHETHER LONG TERM INSULIN USE (HCC): ICD-10-CM

## 2021-04-13 DIAGNOSIS — I10 ESSENTIAL HYPERTENSION: ICD-10-CM

## 2021-04-13 PROCEDURE — 99214 OFFICE O/P EST MOD 30 MIN: CPT | Performed by: UROLOGY

## 2021-04-13 PROCEDURE — 81003 URINALYSIS AUTO W/O SCOPE: CPT | Performed by: UROLOGY

## 2021-04-13 NOTE — PATIENT INSTRUCTIONS
"BMI for Adults  What is BMI?  Body mass index (BMI) is a number that is calculated from a person's weight and height. BMI can help estimate how much of a person's weight is composed of fat. BMI does not measure body fat directly. Rather, it is an alternative to procedures that directly measure body fat, which can be difficult and expensive.  BMI can help identify people who may be at higher risk for certain medical problems.  What are BMI measurements used for?  BMI is used as a screening tool to identify possible weight problems. It helps determine whether a person is obese, overweight, a healthy weight, or underweight.  BMI is useful for:  · Identifying a weight problem that may be related to a medical condition or may increase the risk for medical problems.  · Promoting changes, such as changes in diet and exercise, to help reach a healthy weight. BMI screening can be repeated to see if these changes are working.  How is BMI calculated?  BMI involves measuring your weight in relation to your height. Both height and weight are measured, and the BMI is calculated from those numbers. This can be done either in English (U.S.) or metric measurements. Note that charts and online BMI calculators are available to help you find your BMI quickly and easily without having to do these calculations yourself.  To calculate your BMI in English (U.S.) measurements:    1. Measure your weight in pounds (lb).  2. Multiply the number of pounds by 703.  ? For example, for a person who weighs 180 lb, multiply that number by 703, which equals 126,540.  3. Measure your height in inches. Then multiply that number by itself to get a measurement called \"inches squared.\"  ? For example, for a person who is 70 inches tall, the \"inches squared\" measurement is 70 inches x 70 inches, which equals 4,900 inches squared.  4. Divide the total from step 2 (number of lb x 703) by the total from step 3 (inches squared): 126,540 ÷ 4,900 = 25.8. This is " "your BMI.  To calculate your BMI in metric measurements:  1. Measure your weight in kilograms (kg).  2. Measure your height in meters (m). Then multiply that number by itself to get a measurement called \"meters squared.\"  ? For example, for a person who is 1.75 m tall, the \"meters squared\" measurement is 1.75 m x 1.75 m, which is equal to 3.1 meters squared.  3. Divide the number of kilograms (your weight) by the meters squared number. In this example: 70 ÷ 3.1 = 22.6. This is your BMI.  What do the results mean?  BMI charts are used to identify whether you are underweight, normal weight, overweight, or obese. The following guidelines will be used:  · Underweight: BMI less than 18.5.  · Normal weight: BMI between 18.5 and 24.9.  · Overweight: BMI between 25 and 29.9.  · Obese: BMI of 30 or above.  Keep these notes in mind:  · Weight includes both fat and muscle, so someone with a muscular build, such as an athlete, may have a BMI that is higher than 24.9. In cases like these, BMI is not an accurate measure of body fat.  · To determine if excess body fat is the cause of a BMI of 25 or higher, further assessments may need to be done by a health care provider.  · BMI is usually interpreted in the same way for men and women.  Where to find more information  For more information about BMI, including tools to quickly calculate your BMI, go to these websites:  · Centers for Disease Control and Prevention: www.cdc.gov  · American Heart Association: www.heart.org  · National Heart, Lung, and Blood [place]: www.nhlbi.nih.gov  Summary  · Body mass index (BMI) is a number that is calculated from a person's weight and height.  · BMI may help estimate how much of a person's weight is composed of fat. BMI can help identify those who may be at higher risk for certain medical problems.  · BMI can be measured using English measurements or metric measurements.  · BMI charts are used to identify whether you are underweight, normal " weight, overweight, or obese.  This information is not intended to replace advice given to you by your health care provider. Make sure you discuss any questions you have with your health care provider.  Document Revised: 09/09/2020 Document Reviewed: 07/17/2020  Elsevier Patient Education © 2021 [employer]

## 2021-04-15 ENCOUNTER — HOSPITAL ENCOUNTER (OUTPATIENT)
Dept: CT IMAGING | Facility: HOSPITAL | Age: 81
Discharge: HOME | End: 2021-04-15

## 2021-04-15 ENCOUNTER — HOSPITAL ENCOUNTER (OUTPATIENT)
Dept: PULMONOLOGY | Facility: HOSPITAL | Age: 81
Discharge: HOME | End: 2021-04-15

## 2021-04-15 DIAGNOSIS — R06.02 SOB (SHORTNESS OF BREATH): ICD-10-CM

## 2021-04-15 DIAGNOSIS — R91.1 LUNG NODULE: Primary | ICD-10-CM

## 2021-04-15 PROCEDURE — 94726 PLETHYSMOGRAPHY LUNG VOLUMES: CPT | Performed by: INTERNAL MEDICINE

## 2021-04-15 PROCEDURE — 94729 DIFFUSING CAPACITY: CPT

## 2021-04-15 PROCEDURE — 94010 BREATHING CAPACITY TEST: CPT | Performed by: INTERNAL MEDICINE

## 2021-04-15 PROCEDURE — 94729 DIFFUSING CAPACITY: CPT | Performed by: INTERNAL MEDICINE

## 2021-04-15 PROCEDURE — 94010 BREATHING CAPACITY TEST: CPT

## 2021-04-15 PROCEDURE — 94726 PLETHYSMOGRAPHY LUNG VOLUMES: CPT

## 2021-04-15 PROCEDURE — 71250 CT THORAX DX C-: CPT

## 2021-04-20 ENCOUNTER — OFFICE VISIT (OUTPATIENT)
Dept: FAMILY MEDICINE CLINIC | Facility: CLINIC | Age: 81
End: 2021-04-20

## 2021-04-20 ENCOUNTER — TRANSCRIBE ORDERS (OUTPATIENT)
Dept: LAB | Facility: HOSPITAL | Age: 81
End: 2021-04-20

## 2021-04-20 ENCOUNTER — OFFICE VISIT (OUTPATIENT)
Dept: PULMONOLOGY | Facility: CLINIC | Age: 81
End: 2021-04-20

## 2021-04-20 VITALS
OXYGEN SATURATION: 97 % | HEART RATE: 67 BPM | WEIGHT: 225.4 LBS | TEMPERATURE: 97.6 F | RESPIRATION RATE: 22 BRPM | DIASTOLIC BLOOD PRESSURE: 70 MMHG | SYSTOLIC BLOOD PRESSURE: 118 MMHG | HEIGHT: 69 IN | BODY MASS INDEX: 33.38 KG/M2

## 2021-04-20 VITALS
HEIGHT: 69 IN | DIASTOLIC BLOOD PRESSURE: 62 MMHG | HEART RATE: 61 BPM | SYSTOLIC BLOOD PRESSURE: 108 MMHG | BODY MASS INDEX: 33.33 KG/M2 | WEIGHT: 225 LBS | OXYGEN SATURATION: 98 %

## 2021-04-20 DIAGNOSIS — Z99.89 OSA ON CPAP: ICD-10-CM

## 2021-04-20 DIAGNOSIS — Z79.4 TYPE 2 DIABETES MELLITUS WITH HYPERGLYCEMIA, WITH LONG-TERM CURRENT USE OF INSULIN (HCC): ICD-10-CM

## 2021-04-20 DIAGNOSIS — R91.1 LUNG NODULE: ICD-10-CM

## 2021-04-20 DIAGNOSIS — R06.02 SOB (SHORTNESS OF BREATH): ICD-10-CM

## 2021-04-20 DIAGNOSIS — I25.810 CORONARY ARTERY DISEASE INVOLVING CORONARY BYPASS GRAFT OF NATIVE HEART WITHOUT ANGINA PECTORIS: Primary | ICD-10-CM

## 2021-04-20 DIAGNOSIS — I10 ESSENTIAL HYPERTENSION: ICD-10-CM

## 2021-04-20 DIAGNOSIS — E11.65 TYPE 2 DIABETES MELLITUS WITH HYPERGLYCEMIA, WITH LONG-TERM CURRENT USE OF INSULIN (HCC): ICD-10-CM

## 2021-04-20 DIAGNOSIS — E66.09 CLASS 1 OBESITY DUE TO EXCESS CALORIES WITH SERIOUS COMORBIDITY AND BODY MASS INDEX (BMI) OF 33.0 TO 33.9 IN ADULT: ICD-10-CM

## 2021-04-20 DIAGNOSIS — Z78.9 NONSMOKER: ICD-10-CM

## 2021-04-20 DIAGNOSIS — J45.20 MILD INTERMITTENT ASTHMA WITHOUT COMPLICATION: ICD-10-CM

## 2021-04-20 DIAGNOSIS — J98.4 PULMONARY SCARRING: ICD-10-CM

## 2021-04-20 DIAGNOSIS — J30.89 NON-SEASONAL ALLERGIC RHINITIS, UNSPECIFIED TRIGGER: Primary | ICD-10-CM

## 2021-04-20 DIAGNOSIS — Z01.818 PREOPERATIVE TESTING: Primary | ICD-10-CM

## 2021-04-20 DIAGNOSIS — E66.9 OBESITY (BMI 30-39.9): ICD-10-CM

## 2021-04-20 DIAGNOSIS — G47.33 OSA ON CPAP: ICD-10-CM

## 2021-04-20 DIAGNOSIS — E78.2 MIXED HYPERLIPIDEMIA: ICD-10-CM

## 2021-04-20 PROCEDURE — 99214 OFFICE O/P EST MOD 30 MIN: CPT | Performed by: FAMILY MEDICINE

## 2021-04-20 PROCEDURE — 99214 OFFICE O/P EST MOD 30 MIN: CPT | Performed by: INTERNAL MEDICINE

## 2021-04-20 RX ORDER — CLOPIDOGREL BISULFATE 75 MG/1
75 TABLET ORAL DAILY
Qty: 90 TABLET | Refills: 3 | Status: SHIPPED | OUTPATIENT
Start: 2021-04-20 | End: 2021-04-21 | Stop reason: SDUPTHER

## 2021-04-20 NOTE — PROGRESS NOTES
[facility]IC OUTPATIENT PROGRESS NOTE    Patient: Zachariah Acosta  : 1940  Age: 80 y.o.  Date of Service: 2021    REASON FOR CLINIC VISIT:  Chief Complaint   Patient presents with   • Shortness of Breath   • Lung Nodule   • Asthma       Subjective:    History of Present Illness:  Zachariah Acosta is a 80 y.o. male who presents to the office today to be seen for    Diagnosis Plan   1. Non-seasonal allergic rhinitis, unspecified trigger     2. Obesity (BMI 30-39.9)     3. Mild intermittent asthma without complication     4. Pulmonary scarring     5. Lung nodule     6. BREANN on CPAP     7. SOB (shortness of breath)     8. Nonsmoker     .  Other problems per record.    Patient is a 80 years old  gentleman who returns the pulmonary clinic for a follow-up visit after his initial visit with me a few months ago.    He has history of asthma and he is a non-smoker and he also has morbid obesity and he uses CPAP for his obstructive sleep apnea.  He moved from [place] to [place] a few years ago.  He got his last CPAP machine from his only few years ago and continues to use the same device but feels tired and exhausted using CPAP in the current settings and is waiting for sleep study which has been ordered from here and is scheduled for 2021.    During his last visit his CT scan has been ordered and the CT scan showed he had a left lingular lung nodule and a follow-up was recommended and follow-up CT scan is ordered for 3 months time.  Patient had some shortness of breath on exertion but overall he is stable and he apparently received his single dose of Ajmie & Jamie's Covid vaccination a few weeks ago.    Since his last visit with me he did not have any hospital admissions and ER visit urgent care visit.  He had a pulmonary function test done which showed normal spirometry and lung volumes are within normal limits and there is mild diffusion impairment.  Patient is  requesting some home CPAP supplies which she almost ran out and supportive last supplies from [place].    For his underlying possible reactive airway disease or asthma he is using Symbicort but is unable to tolerate albuterol rescue inhaler and uses Xopenex inhaler instead.  He does not use Xopenex much but he feels Symbicort is helping him breathe better.      PFT done today:  Not done today      Results for orders placed in visit on 01/19/21    Pulmonary Function Test    [facility] - Pulmonary Function Test    [address]  [place]  [address]  [phone]    Patient : Zachariah Acosta  MRN : [id number]  CSN : [id number]  Pulmonologist : Gilberto Bennett MD  Date : 4/15/2021    ______________________________________________________________________    Interpretation :  1.  Spirometry is within normal limits.  2.  Lung volumes are within normal limits.  3.  There is a mild diffusion impairment even when corrected for alveolar volume.      Gilberto Bennett MD         Bronchodilator therapy: Symbicort, Xopenox rescue inhaler.    Smoking Status:   Social History     Tobacco Use   Smoking Status Never Smoker   Smokeless Tobacco Never Used     Pulm Rehab: no  Sleep: yes    Support System: lives with their spouse    Code Status:   There are no questions and answers to display.        Review of Systems:  A complete review of systems is performed and all other systems were reviewed and negative as note above in the HPI.  Review of Systems   Constitutional: Negative.    HENT: Positive for congestion and postnasal drip.    Eyes: Negative.    Respiratory: Positive for shortness of breath.    Cardiovascular: Negative.    Gastrointestinal: Negative.    Endocrine: Negative.    Genitourinary: Negative.    Musculoskeletal: Positive for arthralgias.   Skin: Negative.    Allergic/Immunologic: Positive for environmental allergies.   Neurological: Negative.    Hematological: Negative.     Psychiatric/Behavioral: Negative.        CAT/ACT Score:  Not done today    Medications:  Outpatient Encounter Medications as of 4/20/2021   Medication Sig Dispense Refill   • aspirin (aspirin) 81 MG EC tablet Take 1 tablet by mouth Daily. 30 tablet 11   • budesonide-formoterol (Symbicort) 160-4.5 MCG/ACT inhaler Inhale 2 puffs 2 (two) times a day.     • clopidogrel (PLAVIX) 75 MG tablet Take 1 tablet by mouth Daily. 90 tablet 3   • Dupilumab 300 MG/2ML solution prefilled syringe Inject  under the skin into the appropriate area as directed Every 14 (Fourteen) Days.     • finasteride (PROSCAR) 5 MG tablet Take 1 tablet by mouth Daily. 30 tablet 11   • fluticasone (FLONASE) 50 MCG/ACT nasal spray 1 spray into the nostril(s) as directed by provider Daily. 1 bottle 11   • glipizide (GLUCOTROL) 10 MG tablet Take 10 mg by mouth 2 (Two) Times a Day.     • glucose blood test strip 1 each by Other route Daily. Use as instructed One Touch Ultra 100 each 11   • Insulin Glargine (Lantus SoloStar) 100 UNIT/ML injection pen Inject 20 Units under the skin into the appropriate area as directed As Needed.     • lisinopril (PRINIVIL,ZESTRIL) 10 MG tablet Take 1 tablet by mouth Daily. 90 tablet 1   • Mesalamine 4 GM/60ML SF enema Insert  into the rectum Daily.     • metoprolol tartrate (LOPRESSOR) 25 MG tablet Take 25 mg by mouth 2 (two) times a day.     • rosuvastatin (CRESTOR) 40 MG tablet Take 1 tablet by mouth Daily. 90 tablet 3   • triamcinolone (KENALOG) 0.1 % cream Apply  topically to the appropriate area as directed 2 (Two) Times a Day.     • Ustekinumab (STELARA) 90 MG/ML solution prefilled syringe Injection Inject 90 mg under the skin into the appropriate area as directed Every 2 (Two) Months.     • [DISCONTINUED] ciprofloxacin (CIPRO) 500 MG tablet Take 1 tablet by mouth 2 (Two) Times a Day. 20 tablet 0   • [DISCONTINUED] clopidogrel (PLAVIX) 75 MG tablet Take 75 mg by mouth Daily.     • [DISCONTINUED]  "HYDROcodone-acetaminophen (NORCO) 7.5-325 MG per tablet Take 1 tablet by mouth Every 4 (Four) Hours As Needed for Moderate Pain . 10 tablet 0   • [DISCONTINUED] metroNIDAZOLE (FLAGYL) 500 MG tablet Take 1 tablet by mouth 2 (Two) Times a Day. 14 tablet 0   • [DISCONTINUED] predniSONE (DELTASONE) 20 MG tablet Take 1 tablet by mouth 2 (Two) Times a Day. 14 tablet 0     No facility-administered encounter medications on file as of 4/20/2021.       Allergies:  Allergies   Allergen Reactions   • Albuterol Other (See Comments)   • Adhesive Tape Rash   • Azithromycin Rash       Immunizations:  Immunization History   Administered Date(s) Administered   • COVID-19 (CLIFTON) 04/12/2021   • Flu Vaccine Quad PF >36MO 11/13/2017   • Fluzone High Dose =>65 Years ([facility] ONLY) 11/01/2020   • Influenza TIV (IM) 11/16/2016, 10/26/2018   • Pneumococcal Conjugate 13-Valent (PCV13) 11/11/2013, 04/01/2019   • Pneumococcal Polysaccharide (PPSV23) 08/10/2020       Objective:    Vitals:  /62   Pulse 61   Ht 175.3 cm (69\")   Wt 102 kg (225 lb)   SpO2 98% Comment: ra  BMI 33.23 kg/m²     Physical Exam:  General: Patient is a 80 y.o. obese elderly  male. Looks stated age. Appears to be in no acute distress.  Eyes: EOMI. PERRLA. Vision intact. No scleral icterus.  Ear, Nose, Mouth and Throat: Hearing is grossly intact. No Leukoplakia, pharyngitis, stomatitis or thrush. Swollen nasal mucosa with post nasal drop.  Neck: Range of motion of neck normal. No thyromegaly or masses. Mallampati Class 3,   Respiratory: Clear to auscultation bilaterally. No use of accessory muscles. Decreased breath sounds.  Cardiovascular: Normal heart sounds. Regularly regular rhythm without murmur.  Gastrointestinal: Non tender, non distended, soft. Bowel sounds positive in all four quadrants. No organomegaly.  Skin: No obvious rashes, lesions, ulcers or large amount of bruising. No edema.   Neurological: No new motor deficits. Cranial nerves appear " intact.  Psychiatric: Patient is alert and oriented to person, place and time.    Chest Imaging:  Last CT scan of chest showed    IMPRESSION:     1. Coronary artery disease.     2. Nodule in the lingula measuring 9 mm may reflect an area of scarring  but should be followed with CT in 3 months per Fleischner Society  guidelines.     3. Enlarged left thyroid lobe which extends into the superior  mediastinum. Follow-up thyroid ultrasound is recommended.        This report was finalized on 04/15/2021 12:19 by Dr. Salo Valverde MD.         Assessment:  1. Non-seasonal allergic rhinitis, unspecified trigger    2. Obesity (BMI 30-39.9)    3. Mild intermittent asthma without complication    4. Pulmonary scarring    5. Lung nodule    6. BREANN on CPAP    7. SOB (shortness of breath)    8. Nonsmoker        Plan/Recommendations:    1.  From now on on pulmonary function test the patient appears to have a normal spirometry and lung volumes with mild decrease in diffusion capacity.  2.  Ideally should not need the Symbicort and may use only Xopenex inhaler if needed but apparently patient is feeling better after using the Symbicort regularly may help some small airways disease which is actually well controlled with Symbicort and advised him to continue Symbicort as before and his Xopenex inhaler as needed.  He did not need any medication refills.  3.  He has a lung nodule noted in the lingula which is 9 mm in size which is most likely pulmonary scarring but a follow-up CT scan has been ordered in 3 months interval per current guidelines to rule out any malignancy.  4.  I explained the results of the pulmonary function test to the patient.  He is waiting for a repeat sleep study and CPAP titration and will need a new CPAP machine and we have to find home health care company for him because [place].  5.  He was given a prescription for the new CPAP exercise supplies which we will send to different medical company for  getting the supplies.  Continue using Flonase and Claritin for his nasal allergy continue on any prescription at this time.  6.  Patient received a single dose of Jamie & Jamie's Covid vaccination and will not need any further vaccination at this moment.  He will definitely need to take the influenza and pneumonia vaccination.  He was advised to return to pulmonary clinic in 6 months time for follow-up visit.  We will review sleep study results in the meantime and will also review the follow-up CT scan results in 3 months time.  He may return to the clinic earlier if needed.    Follow up:  6 Months    Time Spent:  30 minutes    I appreciate the opportunity of participating in this patient's care. I would like to thank the PCP for the referral.  Please feel free to contact me with any other questions.    Jacob Mejias MD   Pulmonologist/Intensivist     Electronically signed by: Jacob Mejias MD, 4/20/2021 11:24 CDT

## 2021-04-21 DIAGNOSIS — I25.810 CORONARY ARTERY DISEASE INVOLVING CORONARY BYPASS GRAFT OF NATIVE HEART WITHOUT ANGINA PECTORIS: ICD-10-CM

## 2021-04-21 LAB — HBA1C MFR BLD: 6.7 % (ref 4.8–5.6)

## 2021-04-21 RX ORDER — CLOPIDOGREL BISULFATE 75 MG/1
75 TABLET ORAL DAILY
Qty: 90 TABLET | Refills: 3 | Status: SHIPPED | OUTPATIENT
Start: 2021-04-21 | End: 2021-06-20 | Stop reason: HOSPADM

## 2021-04-21 NOTE — TELEPHONE ENCOUNTER
Patient needs a refill on Plavix. Medication was ordered on previous date but a prescription was printed. Please review.

## 2021-04-24 ENCOUNTER — LAB (OUTPATIENT)
Dept: LAB | Facility: HOSPITAL | Age: 81
End: 2021-04-24

## 2021-04-24 LAB — SARS-COV-2 ORF1AB RESP QL NAA+PROBE: NOT DETECTED

## 2021-04-24 PROCEDURE — C9803 HOPD COVID-19 SPEC COLLECT: HCPCS | Performed by: INTERNAL MEDICINE

## 2021-04-24 PROCEDURE — U0004 COV-19 TEST NON-CDC HGH THRU: HCPCS | Performed by: INTERNAL MEDICINE

## 2021-04-24 PROCEDURE — U0005 INFEC AGEN DETEC AMPLI PROBE: HCPCS | Performed by: INTERNAL MEDICINE

## 2021-04-27 ENCOUNTER — HOSPITAL ENCOUNTER (OUTPATIENT)
Dept: SLEEP MEDICINE | Facility: HOSPITAL | Age: 81
Discharge: HOME | End: 2021-04-27
Admitting: INTERNAL MEDICINE

## 2021-04-27 DIAGNOSIS — G47.33 OBSTRUCTIVE SLEEP APNEA SYNDROME: ICD-10-CM

## 2021-04-27 PROCEDURE — 95811 POLYSOM 6/>YRS CPAP 4/> PARM: CPT | Performed by: PSYCHIATRY & NEUROLOGY

## 2021-04-27 PROCEDURE — 95811 POLYSOM 6/>YRS CPAP 4/> PARM: CPT

## 2021-04-28 NOTE — PROGRESS NOTES
Test results discussed with patient.  Patient voiced understanding.  Pt has a CPAP machine at home already.  Pt states it's about 2 yrs old.  He is aware that the sleep lab will be contacting him for f/u.  He will call our office back if he doesn't hear anything in the next week.

## 2021-05-03 ENCOUNTER — TELEPHONE (OUTPATIENT)
Dept: SLEEP MEDICINE | Facility: HOSPITAL | Age: 81
End: 2021-05-03

## 2021-05-03 NOTE — TELEPHONE ENCOUNTER
Spoke to Ms. Acosta and went over the results of his retitration study performed 04/27/2021.  Orders, documentation and insurance information was sent to [facility] on 04/29/2021.  Mr. Acosta has a compliance appointment with Gentry BURGOS in the sleep clinic located in the Neurology office on 07/21/2021 at 13:30 P.M.

## 2021-05-24 NOTE — PROGRESS NOTES
Chief Complaint   Patient presents with   • Ulcerative Colitis     having flare up abd. pain diarrhea on going 4 weeks       PCP: Monique Carlos MD  REFER: No ref. provider found      Subjective   HPI    Zachariah Acosta is a 80 y.o. male who presents with a history of Ulcerative Colitis (apx 1997).    Status of disease is active and fluctuating a bit.  The severity is described as Mild.  He reports diarrhea.  Bowels have increased in frequency to 4-5 times per day.   Denies weight loss or abdominal pain, BRBPR.    Previous diagnostic test include  colonoscopy 2020.  Currently maintained on Stelara, next dose due apx 6 weeks.  He has been maintained on Stelara apx 8 months with next dose due in apx 6 months.     Colonoscopy 2020 by Dr Recinos    Past Medical History:   Diagnosis Date   • Asthma    • Diabetes mellitus (CMS/[facility])    • Hyperlipidemia    • Hypertension      Outpatient Medications Marked as Taking for the 5/25/21 encounter (Office Visit) with Salo Nascimento APRN   Medication Sig Dispense Refill   • aspirin (aspirin) 81 MG EC tablet Take 1 tablet by mouth Daily. 30 tablet 11   • budesonide-formoterol (Symbicort) 160-4.5 MCG/ACT inhaler Inhale 2 puffs 2 (two) times a day.     • clopidogrel (PLAVIX) 75 MG tablet Take 1 tablet by mouth Daily. 90 tablet 3   • Dupilumab 300 MG/2ML solution prefilled syringe Inject  under the skin into the appropriate area as directed Every 14 (Fourteen) Days.     • finasteride (PROSCAR) 5 MG tablet Take 1 tablet by mouth Daily. 30 tablet 11   • fluticasone (FLONASE) 50 MCG/ACT nasal spray 1 spray into the nostril(s) as directed by provider Daily. 1 bottle 11   • glipizide (GLUCOTROL) 10 MG tablet Take 10 mg by mouth 2 (Two) Times a Day.     • Insulin Glargine (Lantus SoloStar) 100 UNIT/ML injection pen Inject 20 Units under the skin into the appropriate area as directed As Needed.     • lisinopril (PRINIVIL,ZESTRIL) 10 MG tablet Take 1 tablet by mouth  Daily. 90 tablet 1   • Mesalamine 4 GM/60ML SF enema Insert  into the rectum Daily.     • metoprolol tartrate (LOPRESSOR) 25 MG tablet Take 25 mg by mouth 2 (two) times a day.     • rosuvastatin (CRESTOR) 40 MG tablet Take 1 tablet by mouth Daily. 90 tablet 3   • triamcinolone (KENALOG) 0.1 % cream Apply  topically to the appropriate area as directed 2 (Two) Times a Day.     • Ustekinumab (STELARA) 90 MG/ML solution prefilled syringe Injection Inject 90 mg under the skin into the appropriate area as directed Every 2 (Two) Months.       Allergies   Allergen Reactions   • Albuterol Other (See Comments)   • Adhesive Tape Rash   • Azithromycin Rash     Social History     Socioeconomic History   • Marital status:      Spouse name: Not on file   • Number of children: Not on file   • Years of education: Not on file   • Highest education level: Not on file   Tobacco Use   • Smoking status: Never Smoker   • Smokeless tobacco: Never Used   Substance and Sexual Activity   • Alcohol use: Never   • Drug use: Never     Family History   Problem Relation Age of Onset   • Colon cancer Brother    • Colon polyps Neg Hx    • Esophageal cancer Neg Hx      Review of Systems   Constitutional: Negative for unexpected weight change.   Respiratory: Negative for shortness of breath.    Cardiovascular: Negative for chest pain.   Gastrointestinal: Positive for diarrhea. Negative for abdominal pain and anal bleeding.     Objective   Vitals:    05/25/21 0847   BP: 120/70   Pulse: 58   Temp: 97 °F (36.1 °C)   SpO2: 98%     Physical Exam  Constitutional:       Appearance: Normal appearance. He is well-developed.   Eyes:      General: No scleral icterus.  Cardiovascular:      Rate and Rhythm: Regular rhythm.      Heart sounds: Normal heart sounds. No murmur heard.     Pulmonary:      Effort: Pulmonary effort is normal. No accessory muscle usage.      Breath sounds: Normal breath sounds.   Abdominal:      General: Bowel sounds are normal.  There is no distension.      Palpations: Abdomen is soft. There is no mass.      Tenderness: There is no abdominal tenderness. There is no guarding or rebound.   Skin:     General: Skin is warm and dry.      Coloration: Skin is not jaundiced.   Neurological:      Mental Status: He is alert.   Psychiatric:         Behavior: Behavior is cooperative.       Imaging Results (Most Recent)     None        Body mass index is 33.08 kg/m².  Assessment/Plan   Diagnoses and all orders for this visit:    1. Ulcerative colitis with complication, unspecified location (CMS/[facility]) (Primary)  -     Clostridium Difficile Toxin - Stool, Per Rectum  -     Case Request; Standing  -     Implement Anesthesia Orders Day of Procedure; Standing  -     Obtain Informed Consent; Standing  -     Case Request    2. Anticoagulated    Other orders  -     predniSONE (DELTASONE) 10 MG tablet; Take 1 tablet by mouth Take As Directed.  Dispense: 30 tablet; Refill: 0          COLONOSCOPY WITH ANESTHESIA (N/A)    sutab sample  If cdiff negative will administer steroid  Dr Menjivar has never performed colonoscopy, recommend colonoscopy next week to assess level of disease as he has been on stelara for less than one year    Patient is to hold their anticoagulation medication per the direction of their prescribing physician, Dr Polanco. This is to prevent any risk or complication from bleeding intra and post procedure. If they develop bleeding post procedure they are to go the emergency department for further evaluation and treatment immediately--will need to hold plavix x 5 days    Recommend patient have baseline DEXA scan, then repeated every 1-2 years.  I have encouraged patient to discuss this with PCP  It has been advised patient avoid use of NSAIDs due to risk associated with a flare  Encourage females to stay up to date with PAP Smear due to risk of cervical dysplasia associated with use of biologics and immunomodulator therapy        Salo Nascimento,  APRN  05/26/21

## 2021-05-25 ENCOUNTER — TELEPHONE (OUTPATIENT)
Dept: GASTROENTEROLOGY | Facility: CLINIC | Age: 81
End: 2021-05-25

## 2021-05-25 ENCOUNTER — OFFICE VISIT (OUTPATIENT)
Dept: GASTROENTEROLOGY | Facility: CLINIC | Age: 81
End: 2021-05-25

## 2021-05-25 ENCOUNTER — LAB (OUTPATIENT)
Dept: LAB | Facility: HOSPITAL | Age: 81
End: 2021-05-25

## 2021-05-25 VITALS
SYSTOLIC BLOOD PRESSURE: 120 MMHG | HEART RATE: 58 BPM | HEIGHT: 69 IN | TEMPERATURE: 97 F | OXYGEN SATURATION: 98 % | WEIGHT: 224 LBS | BODY MASS INDEX: 33.18 KG/M2 | DIASTOLIC BLOOD PRESSURE: 70 MMHG

## 2021-05-25 DIAGNOSIS — Z79.01 ANTICOAGULATED: ICD-10-CM

## 2021-05-25 DIAGNOSIS — K51.919 ULCERATIVE COLITIS WITH COMPLICATION, UNSPECIFIED LOCATION (HCC): Primary | ICD-10-CM

## 2021-05-25 LAB — C DIFF TOX GENS STL QL NAA+PROBE: NEGATIVE

## 2021-05-25 PROCEDURE — 99214 OFFICE O/P EST MOD 30 MIN: CPT | Performed by: NURSE PRACTITIONER

## 2021-05-25 PROCEDURE — 87493 C DIFF AMPLIFIED PROBE: CPT | Performed by: NURSE PRACTITIONER

## 2021-05-25 RX ORDER — PREDNISONE 10 MG/1
10 TABLET ORAL TAKE AS DIRECTED
Qty: 30 TABLET | Refills: 0 | Status: ON HOLD | OUTPATIENT
Start: 2021-05-25 | End: 2021-06-04 | Stop reason: SDUPTHER

## 2021-05-25 NOTE — TELEPHONE ENCOUNTER
I discussed case with Dr Menjivar  He recommends colonoscopy next week    Can you please call patient to see if he is willing to move to next Tue (June 1) or Fri (June 4)    Thank you

## 2021-05-26 NOTE — TELEPHONE ENCOUNTER
Spoke with patient this morning.  Instructed him to be here Friday, June 4 at 8am     He is to hold plavix 5 days.

## 2021-06-01 ENCOUNTER — TELEPHONE (OUTPATIENT)
Dept: GASTROENTEROLOGY | Facility: CLINIC | Age: 81
End: 2021-06-01

## 2021-06-01 NOTE — TELEPHONE ENCOUNTER
----- Message from Yung Polanco MD sent at 5/28/2021  2:34 PM CDT -----  Ok to hold plavix so long as he's taking ASA 81mg daily and does not stop taking that    Isaura - will you please call patient to verify he's taking ASA with plavix, as I'd instructed at our initial visit appt with him? If so, he can hold plavix per GI's direction. Thanks!  ----- Message -----  From: Javier Espinoza MA  Sent: 5/25/2021   9:39 AM CDT  To: Yung Polanco MD

## 2021-06-04 ENCOUNTER — ANESTHESIA EVENT (OUTPATIENT)
Dept: GASTROENTEROLOGY | Facility: HOSPITAL | Age: 81
End: 2021-06-04

## 2021-06-04 ENCOUNTER — HOSPITAL ENCOUNTER (OUTPATIENT)
Facility: HOSPITAL | Age: 81
Setting detail: HOSPITAL OUTPATIENT SURGERY
Discharge: HOME | End: 2021-06-04
Attending: INTERNAL MEDICINE | Admitting: INTERNAL MEDICINE

## 2021-06-04 ENCOUNTER — ANESTHESIA (OUTPATIENT)
Dept: GASTROENTEROLOGY | Facility: HOSPITAL | Age: 81
End: 2021-06-04

## 2021-06-04 VITALS
HEART RATE: 76 BPM | DIASTOLIC BLOOD PRESSURE: 91 MMHG | HEIGHT: 69 IN | OXYGEN SATURATION: 99 % | SYSTOLIC BLOOD PRESSURE: 146 MMHG | WEIGHT: 218 LBS | BODY MASS INDEX: 32.29 KG/M2 | RESPIRATION RATE: 15 BRPM | TEMPERATURE: 97.1 F

## 2021-06-04 DIAGNOSIS — K51.919 ULCERATIVE COLITIS WITH COMPLICATION, UNSPECIFIED LOCATION (HCC): ICD-10-CM

## 2021-06-04 LAB — GLUCOSE BLDC GLUCOMTR-MCNC: 110 MG/DL (ref 70–130)

## 2021-06-04 PROCEDURE — 45380 COLONOSCOPY AND BIOPSY: CPT | Performed by: INTERNAL MEDICINE

## 2021-06-04 PROCEDURE — 88305 TISSUE EXAM BY PATHOLOGIST: CPT | Performed by: INTERNAL MEDICINE

## 2021-06-04 PROCEDURE — 82962 GLUCOSE BLOOD TEST: CPT

## 2021-06-04 PROCEDURE — 25010000002 PROPOFOL 10 MG/ML EMULSION: Performed by: NURSE ANESTHETIST, CERTIFIED REGISTERED

## 2021-06-04 RX ORDER — SODIUM CHLORIDE 0.9 % (FLUSH) 0.9 %
10 SYRINGE (ML) INJECTION EVERY 12 HOURS SCHEDULED
Status: CANCELLED | OUTPATIENT
Start: 2021-06-04

## 2021-06-04 RX ORDER — PROPOFOL 10 MG/ML
VIAL (ML) INTRAVENOUS AS NEEDED
Status: DISCONTINUED | OUTPATIENT
Start: 2021-06-04 | End: 2021-06-04 | Stop reason: SURG

## 2021-06-04 RX ORDER — PREDNISONE 10 MG/1
10 TABLET ORAL TAKE AS DIRECTED
Qty: 30 TABLET | Refills: 0 | Status: SHIPPED | OUTPATIENT
Start: 2021-06-04 | End: 2021-06-30

## 2021-06-04 RX ORDER — LIDOCAINE HYDROCHLORIDE 20 MG/ML
INJECTION, SOLUTION EPIDURAL; INFILTRATION; INTRACAUDAL; PERINEURAL AS NEEDED
Status: DISCONTINUED | OUTPATIENT
Start: 2021-06-04 | End: 2021-06-04 | Stop reason: SURG

## 2021-06-04 RX ORDER — SODIUM CHLORIDE 0.9 % (FLUSH) 0.9 %
10 SYRINGE (ML) INJECTION AS NEEDED
Status: DISCONTINUED | OUTPATIENT
Start: 2021-06-04 | End: 2021-06-04 | Stop reason: HOSPADM

## 2021-06-04 RX ORDER — SODIUM CHLORIDE 9 MG/ML
100 INJECTION, SOLUTION INTRAVENOUS CONTINUOUS
Status: DISCONTINUED | OUTPATIENT
Start: 2021-06-04 | End: 2021-06-04 | Stop reason: HOSPADM

## 2021-06-04 RX ORDER — MIDAZOLAM HYDROCHLORIDE 1 MG/ML
0.5 INJECTION INTRAMUSCULAR; INTRAVENOUS
Status: CANCELLED | OUTPATIENT
Start: 2021-06-04

## 2021-06-04 RX ADMIN — PROPOFOL 200 MG: 10 INJECTION, EMULSION INTRAVENOUS at 10:05

## 2021-06-04 RX ADMIN — SODIUM CHLORIDE 100 ML/HR: 9 INJECTION, SOLUTION INTRAVENOUS at 09:06

## 2021-06-04 RX ADMIN — LIDOCAINE HYDROCHLORIDE 50 MG: 20 INJECTION, SOLUTION EPIDURAL; INFILTRATION; INTRACAUDAL; PERINEURAL at 10:04

## 2021-06-04 NOTE — ANESTHESIA PREPROCEDURE EVALUATION
Anesthesia Evaluation     Patient summary reviewed   no history of anesthetic complications:  NPO Solid Status: > 6 hours  NPO Liquid Status: > 6 hours           Airway   Mallampati: II  TM distance: >3 FB  Neck ROM: full  No difficulty expected  Dental - normal exam     Pulmonary    (+) asthma,sleep apnea on CPAP,   Cardiovascular   Exercise tolerance: good (4-7 METS)    (+) hypertension, CAD, CABG >6 Months, cardiac stents more than 12 months ago hyperlipidemia,   (-) dysrhythmias      Neuro/Psych  (-) seizures, CVA  GI/Hepatic/Renal/Endo    (+) obesity,   diabetes mellitus type 2 well controlled,     ROS Comment: UC    Musculoskeletal     Abdominal   (+) obese,    Substance History      OB/GYN          Other                        Anesthesia Plan    ASA 3     MAC     intravenous induction     Anesthetic plan, all risks, benefits, and alternatives have been provided, discussed and informed consent has been obtained with: patient and spouse/significant other.

## 2021-06-04 NOTE — ANESTHESIA POSTPROCEDURE EVALUATION
Patient: Zachariah Acosta    Procedure Summary     Date: 06/04/21 Room / Location: [facility] ENDOSCOPY 6 / [facility] ENDOSCOPY    Anesthesia Start: 1000 Anesthesia Stop: 1023    Procedure: COLONOSCOPY WITH ANESTHESIA (N/A ) Diagnosis:       Ulcerative colitis with complication, unspecified location (CMS/HCC)      (Ulcerative colitis with complication, unspecified location (CMS/HCC) [K51.919])    Surgeons: Zachariah Menjivar DO Provider: Zen Ny CRNA    Anesthesia Type: MAC ASA Status: 3          Anesthesia Type: MAC    Vitals  No vitals data found for the desired time range.          Post Anesthesia Care and Evaluation    Patient location during evaluation: PACU  Patient participation: complete - patient participated  Level of consciousness: awake and awake and alert  Pain score: 0  Pain management: adequate  Airway patency: patent  Anesthetic complications: No anesthetic complications    Cardiovascular status: acceptable and stable  Respiratory status: acceptable and unassisted  Hydration status: acceptable

## 2021-06-07 ENCOUNTER — TELEPHONE (OUTPATIENT)
Dept: CARDIOLOGY | Facility: CLINIC | Age: 81
End: 2021-06-07

## 2021-06-07 ENCOUNTER — TELEPHONE (OUTPATIENT)
Dept: FAMILY MEDICINE CLINIC | Facility: CLINIC | Age: 81
End: 2021-06-07

## 2021-06-07 RX ORDER — NITROGLYCERIN 0.4 MG/1
TABLET SUBLINGUAL
Qty: 25 TABLET | Refills: 1 | Status: SHIPPED | OUTPATIENT
Start: 2021-06-07 | End: 2021-10-12 | Stop reason: SDUPTHER

## 2021-06-07 NOTE — TELEPHONE ENCOUNTER
Patient had colonoscopy on 6/4 and held plavix for the procedure. He states his former cardiologist, Dr Cardoza, in [place] told him he could stop taking plavix one year after stent placement in April 2020. Is he okay to discontinue plavix? He was not instructed by gastroenterology. Please advise.  Isaura Merchant MA       Patient is also asking for a new prescription for nitroglycerin to carry. The ones he now has are disintegrated. Isaura Merchant MA

## 2021-06-07 NOTE — TELEPHONE ENCOUNTER
Caller: Zachariah Acosta    Relationship: Self    Best call back number: [phone]    What medication are you requesting: NITROGLYCERIN       Have you had these symptoms before:    [x] Yes  [] No    Have you been treated for these symptoms before:   [x] Yes  [] No    If a prescription is needed, what is your preferred pharmacy and phone number: [facility] [address] [place] [address] [address] [facility] [phone] PH - [phone]      Additional notes:  PATIENT REQUEST A CALL BACK IF HE NEEDS TO CONTACT HIS NEW CARDIOLOGIST FOR THE MEDICATION  PATIENT STATES HE HAS HAD THE MEDICATION FOR SO LONG IT HAS DISSOLVED

## 2021-06-07 NOTE — TELEPHONE ENCOUNTER
If no signs of active or major bleeding, resume Plavix 75 mg daily when okay with gastroenterology. Continue aspirin 81 mg daily without interruption. I refilled the nitroglycerin. As he is now greater than 1 year from PCI, I will discuss discontinuing one of his antiplatelets with Dr. Polanco. However, given the fact that 6 drug-eluting stents were placed in April 2020, 1 of which was in his left main coronary artery, would continue both medications for now if no active or major bleeding.

## 2021-06-10 LAB
CYTO UR: NORMAL
LAB AP CASE REPORT: NORMAL
PATH REPORT.FINAL DX SPEC: NORMAL
PATH REPORT.GROSS SPEC: NORMAL

## 2021-06-12 ENCOUNTER — HOSPITAL ENCOUNTER (EMERGENCY)
Facility: HOSPITAL | Age: 81
Discharge: HOME | End: 2021-06-12
Attending: EMERGENCY MEDICINE | Admitting: EMERGENCY MEDICINE

## 2021-06-12 ENCOUNTER — APPOINTMENT (OUTPATIENT)
Dept: CT IMAGING | Facility: HOSPITAL | Age: 81
End: 2021-06-12

## 2021-06-12 VITALS
BODY MASS INDEX: 32.58 KG/M2 | OXYGEN SATURATION: 98 % | SYSTOLIC BLOOD PRESSURE: 157 MMHG | HEART RATE: 59 BPM | DIASTOLIC BLOOD PRESSURE: 79 MMHG | RESPIRATION RATE: 22 BRPM | TEMPERATURE: 97.9 F | HEIGHT: 69 IN | WEIGHT: 220 LBS

## 2021-06-12 DIAGNOSIS — R10.32 LLQ ABDOMINAL PAIN: Primary | ICD-10-CM

## 2021-06-12 DIAGNOSIS — R11.0 NAUSEA: ICD-10-CM

## 2021-06-12 DIAGNOSIS — K52.9 COLITIS: ICD-10-CM

## 2021-06-12 LAB
ALBUMIN SERPL-MCNC: 3.8 G/DL (ref 3.5–5.2)
ALBUMIN/GLOB SERPL: 1.3 G/DL
ALP SERPL-CCNC: 76 U/L (ref 39–117)
ALT SERPL W P-5'-P-CCNC: 22 U/L (ref 1–41)
ANION GAP SERPL CALCULATED.3IONS-SCNC: 9 MMOL/L (ref 5–15)
AST SERPL-CCNC: 18 U/L (ref 1–40)
BASOPHILS # BLD AUTO: 0.04 10*3/MM3 (ref 0–0.2)
BASOPHILS NFR BLD AUTO: 0.4 % (ref 0–1.5)
BILIRUB SERPL-MCNC: 0.3 MG/DL (ref 0–1.2)
BUN SERPL-MCNC: 18 MG/DL (ref 8–23)
BUN/CREAT SERPL: 17.8 (ref 7–25)
CALCIUM SPEC-SCNC: 8.9 MG/DL (ref 8.6–10.5)
CHLORIDE SERPL-SCNC: 103 MMOL/L (ref 98–107)
CO2 SERPL-SCNC: 24 MMOL/L (ref 22–29)
CREAT SERPL-MCNC: 1.01 MG/DL (ref 0.76–1.27)
D-LACTATE SERPL-SCNC: 1.7 MMOL/L (ref 0.5–2)
DEPRECATED RDW RBC AUTO: 48.8 FL (ref 37–54)
EOSINOPHIL # BLD AUTO: 0.14 10*3/MM3 (ref 0–0.4)
EOSINOPHIL NFR BLD AUTO: 1.3 % (ref 0.3–6.2)
ERYTHROCYTE [DISTWIDTH] IN BLOOD BY AUTOMATED COUNT: 17.7 % (ref 12.3–15.4)
GFR SERPL CREATININE-BSD FRML MDRD: 71 ML/MIN/1.73
GLOBULIN UR ELPH-MCNC: 3 GM/DL
GLUCOSE SERPL-MCNC: 145 MG/DL (ref 65–99)
HCT VFR BLD AUTO: 38.2 % (ref 37.5–51)
HGB BLD-MCNC: 11.7 G/DL (ref 13–17.7)
IMM GRANULOCYTES # BLD AUTO: 0.04 10*3/MM3 (ref 0–0.05)
IMM GRANULOCYTES NFR BLD AUTO: 0.4 % (ref 0–0.5)
LIPASE SERPL-CCNC: 27 U/L (ref 13–60)
LYMPHOCYTES # BLD AUTO: 1.22 10*3/MM3 (ref 0.7–3.1)
LYMPHOCYTES NFR BLD AUTO: 11.2 % (ref 19.6–45.3)
MCH RBC QN AUTO: 23.7 PG (ref 26.6–33)
MCHC RBC AUTO-ENTMCNC: 30.6 G/DL (ref 31.5–35.7)
MCV RBC AUTO: 77.5 FL (ref 79–97)
MONOCYTES # BLD AUTO: 0.52 10*3/MM3 (ref 0.1–0.9)
MONOCYTES NFR BLD AUTO: 4.8 % (ref 5–12)
NEUTROPHILS NFR BLD AUTO: 8.92 10*3/MM3 (ref 1.7–7)
NEUTROPHILS NFR BLD AUTO: 81.9 % (ref 42.7–76)
NRBC BLD AUTO-RTO: 0 /100 WBC (ref 0–0.2)
PLATELET # BLD AUTO: 245 10*3/MM3 (ref 140–450)
PMV BLD AUTO: 9.5 FL (ref 6–12)
POTASSIUM SERPL-SCNC: 4.2 MMOL/L (ref 3.5–5.2)
PROT SERPL-MCNC: 6.8 G/DL (ref 6–8.5)
RBC # BLD AUTO: 4.93 10*6/MM3 (ref 4.14–5.8)
SODIUM SERPL-SCNC: 136 MMOL/L (ref 136–145)
WBC # BLD AUTO: 10.88 10*3/MM3 (ref 3.4–10.8)

## 2021-06-12 PROCEDURE — 83690 ASSAY OF LIPASE: CPT | Performed by: EMERGENCY MEDICINE

## 2021-06-12 PROCEDURE — 25010000002 MORPHINE PER 10 MG: Performed by: EMERGENCY MEDICINE

## 2021-06-12 PROCEDURE — 25010000002 KETOROLAC TROMETHAMINE PER 15 MG: Performed by: EMERGENCY MEDICINE

## 2021-06-12 PROCEDURE — 96374 THER/PROPH/DIAG INJ IV PUSH: CPT

## 2021-06-12 PROCEDURE — 99283 EMERGENCY DEPT VISIT LOW MDM: CPT

## 2021-06-12 PROCEDURE — 25010000002 ONDANSETRON PER 1 MG: Performed by: EMERGENCY MEDICINE

## 2021-06-12 PROCEDURE — 25010000002 IOPAMIDOL 61 % SOLUTION: Performed by: EMERGENCY MEDICINE

## 2021-06-12 PROCEDURE — 74177 CT ABD & PELVIS W/CONTRAST: CPT

## 2021-06-12 PROCEDURE — 85025 COMPLETE CBC W/AUTO DIFF WBC: CPT | Performed by: EMERGENCY MEDICINE

## 2021-06-12 PROCEDURE — 96376 TX/PRO/DX INJ SAME DRUG ADON: CPT

## 2021-06-12 PROCEDURE — 80053 COMPREHEN METABOLIC PANEL: CPT | Performed by: EMERGENCY MEDICINE

## 2021-06-12 PROCEDURE — 83605 ASSAY OF LACTIC ACID: CPT | Performed by: EMERGENCY MEDICINE

## 2021-06-12 PROCEDURE — 96375 TX/PRO/DX INJ NEW DRUG ADDON: CPT

## 2021-06-12 RX ORDER — KETOROLAC TROMETHAMINE 15 MG/ML
15 INJECTION, SOLUTION INTRAMUSCULAR; INTRAVENOUS ONCE
Status: COMPLETED | OUTPATIENT
Start: 2021-06-12 | End: 2021-06-12

## 2021-06-12 RX ORDER — ONDANSETRON 2 MG/ML
4 INJECTION INTRAMUSCULAR; INTRAVENOUS ONCE
Status: COMPLETED | OUTPATIENT
Start: 2021-06-12 | End: 2021-06-12

## 2021-06-12 RX ORDER — AMOXICILLIN AND CLAVULANATE POTASSIUM 875; 125 MG/1; MG/1
1 TABLET, FILM COATED ORAL 2 TIMES DAILY
Qty: 14 TABLET | Refills: 0 | Status: SHIPPED | OUTPATIENT
Start: 2021-06-12 | End: 2021-06-20 | Stop reason: HOSPADM

## 2021-06-12 RX ORDER — SODIUM CHLORIDE 0.9 % (FLUSH) 0.9 %
10 SYRINGE (ML) INJECTION AS NEEDED
Status: DISCONTINUED | OUTPATIENT
Start: 2021-06-12 | End: 2021-06-12 | Stop reason: HOSPADM

## 2021-06-12 RX ADMIN — ONDANSETRON HYDROCHLORIDE 4 MG: 2 SOLUTION INTRAMUSCULAR; INTRAVENOUS at 06:22

## 2021-06-12 RX ADMIN — SODIUM CHLORIDE, POTASSIUM CHLORIDE, SODIUM LACTATE AND CALCIUM CHLORIDE 1000 ML: 600; 310; 30; 20 INJECTION, SOLUTION INTRAVENOUS at 06:21

## 2021-06-12 RX ADMIN — ONDANSETRON HYDROCHLORIDE 4 MG: 2 SOLUTION INTRAMUSCULAR; INTRAVENOUS at 09:02

## 2021-06-12 RX ADMIN — KETOROLAC TROMETHAMINE 15 MG: 15 INJECTION, SOLUTION INTRAMUSCULAR; INTRAVENOUS at 08:55

## 2021-06-12 RX ADMIN — IOPAMIDOL 100 ML: 612 INJECTION, SOLUTION INTRAVENOUS at 08:13

## 2021-06-12 RX ADMIN — MORPHINE SULFATE 4 MG: 4 INJECTION, SOLUTION INTRAMUSCULAR; INTRAVENOUS at 06:22

## 2021-06-12 NOTE — DISCHARGE INSTRUCTIONS
Please return immediately to the emergency department for any new or worsening symptoms. Please see your primary care provider in 2 days for re-evaluation of your symptoms.  As we discussed, your CT findings could be seen in malignancy, please speak with your primary care provider about this and compare with your colonoscopy results.

## 2021-06-12 NOTE — ED PROVIDER NOTES
Subjective   This is a very pleasant 80-year-old gentleman with a past medical history of coronary artery disease, diabetes, hypertension, hyperlipidemia, ulcerative colitis who presents to the emergency department with chief complaint of abdominal pain.  Patient complains of sudden onset abdominal pain at 2 AM today.  It is constant.  Sore in nature.  Nonradiating.  No exacerbating relieving factors.  Located in his left lower quadrant.  Associated with nausea but no vomiting.  It is 7 out of 10 in severity.  Associated with one episode of nonbloody, diarrhea.  He denies any chest pain, shortness of breath, fevers, chills, dysuria, hematuria, numbness, weakness, or paresthesias.  Patient is currently on Stelara as well as daily prednisone for his ulcerative colitis.    Past medical history: Coronary artery disease, hypertension, hyperlipidemia, diabetes, ulcerative colitis  Social history: No tobacco use      History provided by:  Patient      Review of Systems   All other systems reviewed and are negative.      Past Medical History:   Diagnosis Date   • Asthma    • Coronary artery disease    • Diabetes mellitus (CMS/HCC)    • Hyperlipidemia    • Hypertension    • Sleep apnea     cpap at        Allergies   Allergen Reactions   • Albuterol Other (See Comments)   • Adhesive Tape Rash   • Azithromycin Rash       Past Surgical History:   Procedure Laterality Date   • BACK SURGERY     • CARDIAC CATHETERIZATION      stents x 6   • CATARACT EXTRACTION     • COLONOSCOPY     • COLONOSCOPY N/A 6/4/2021    Procedure: COLONOSCOPY WITH ANESTHESIA;  Surgeon: Zachariah Menjivar DO;  Location: [facility] ENDOSCOPY;  Service: Gastroenterology;  Laterality: N/A;  pre hx ulcerative colitis  post ulcerative colitis  dr collins gusman   • CORONARY ANGIOPLASTY WITH STENT PLACEMENT     • HIP SURGERY Right     total hip       Family History   Problem Relation Age of Onset   • Colon cancer Brother    • Colon polyps Neg Hx    • Esophageal  cancer Neg Hx        Social History     Socioeconomic History   • Marital status:      Spouse name: Not on file   • Number of children: Not on file   • Years of education: Not on file   • Highest education level: Not on file   Tobacco Use   • Smoking status: Never Smoker   • Smokeless tobacco: Never Used   Vaping Use   • Vaping Use: Never used   Substance and Sexual Activity   • Alcohol use: Never   • Drug use: Never   • Sexual activity: Defer           Objective   Physical Exam  Constitutional:       Appearance: Normal appearance.   HENT:      Head: Normocephalic and atraumatic.      Nose: Nose normal.      Mouth/Throat:      Mouth: Mucous membranes are moist.   Eyes:      Extraocular Movements: Extraocular movements intact.   Cardiovascular:      Rate and Rhythm: Normal rate and regular rhythm.      Pulses: Normal pulses.      Heart sounds: Normal heart sounds. No murmur heard.   No friction rub. No gallop.    Pulmonary:      Effort: Pulmonary effort is normal. No respiratory distress.      Breath sounds: Normal breath sounds. No stridor. No wheezing, rhonchi or rales.   Chest:      Chest wall: No tenderness.   Abdominal:      General: Abdomen is flat. Bowel sounds are normal. There is no distension.      Palpations: Abdomen is soft. There is no mass.      Tenderness: There is abdominal tenderness. There is no guarding or rebound.      Comments: Tender to palpation in the left lower quadrant no rebound, no guarding, no tenderness elsewhere.   Musculoskeletal:         General: No swelling, tenderness, deformity or signs of injury. Normal range of motion.      Cervical back: Normal range of motion.   Skin:     General: Skin is warm and dry.      Capillary Refill: Capillary refill takes less than 2 seconds.      Coloration: Skin is not jaundiced or pale.      Findings: No bruising, erythema or rash.   Neurological:      General: No focal deficit present.      Mental Status: He is alert and oriented to person,  place, and time.   Psychiatric:         Mood and Affect: Mood normal.         Behavior: Behavior normal.         Thought Content: Thought content normal.         Judgment: Judgment normal.         Procedures           ED Course                                           MDM  Number of Diagnoses or Management Options  Colitis: new and requires workup  LLQ abdominal pain: new and requires workup  Nausea: new and requires workup  Diagnosis management comments: Patient presents with left lower quadrant abdominal pain.  Upon arrival he is in no acute distress.  His vital signs are reassuring.  IV access is obtained and labs are sent.  He is given a bolus of fluids, Zofran, morphine.  He is evaluated with a CT scan of the abdomen and pelvis with IV contrast.Patient's lab work is remarkable for a slight leukocytosis, anemia consistent with baseline, lactic acid within normal limits, lipase of 27, CMP with no gross electrolyte abnormalities, no signs of kidney injury, no elevation anion gap, CT scan of the abdomen pelvis with contrast shows circumferential wall thickening involving the proximal sigmoid colon with colonic induration with associated mesenteric lymph node prominence.  Focal area of colitis or diverticulitis is considered as well as malignancy.  I have made the patient aware of this.  With his leukocytosis and his symptoms I feel this is colitis.  However, I have instructed him to follow-up about the potential malignancy.  He is verbalized understanding.  He is prescribed Augmentin.  He is given Toradol while in the ED and has significant pain relief with this.  I do long discussion how this could get worse and he needs to return if it does, he and his wife verbalized understanding.  He has no signs of mesenteric ischemia on exam, imaging, or labs.  No signs of appendicitis or abscess on physical exam or labs.  No signs of biliary tract pathology with no right upper quadrant pain, reassuring labs.  No signs of  pancreatitis on CT or lab work.  He has no dysuria or hematuria, no flank pain or suprapubic pain to suggest urinary tract infection.  No upper abdominal pain to suggest acute coronary syndrome.  He is discharged in good condition with reassuring vitals and he is given commonsense return precautions which he verbalizes understanding of.       Amount and/or Complexity of Data Reviewed  Clinical lab tests: ordered and reviewed  Tests in the radiology section of CPT®: ordered and reviewed  Decide to obtain previous medical records or to obtain history from someone other than the patient: yes    Risk of Complications, Morbidity, and/or Mortality  Presenting problems: high  Diagnostic procedures: high  Management options: high    Patient Progress  Patient progress: improved      Final diagnoses:   LLQ abdominal pain   Nausea   Colitis       ED Disposition  ED Disposition     ED Disposition Condition Comment    Discharge Stable           Monique Carlos MD  [address] [address] [phone]    Schedule an appointment as soon as possible for a visit in 2 days           Medication List      New Prescriptions    amoxicillin-clavulanate 875-125 MG per tablet  Commonly known as: AUGMENTIN  Take 1 tablet by mouth 2 (Two) Times a Day for 7 days.           Where to Get Your Medications      You can get these medications from any pharmacy    Bring a paper prescription for each of these medications  · amoxicillin-clavulanate 875-125 MG per tablet          Everton Warner MD  06/12/21 3432

## 2021-06-14 ENCOUNTER — NURSE TRIAGE (OUTPATIENT)
Dept: CALL CENTER | Facility: HOSPITAL | Age: 81
End: 2021-06-14

## 2021-06-14 NOTE — TELEPHONE ENCOUNTER
"Forwarded message to office staff.     Reason for Disposition  • Requesting regular office appointment    Additional Information  • Negative: [1] Caller is not with the adult (patient) AND [2] reporting urgent symptoms  • Negative: Lab result questions  • Negative: Medication questions  • Negative: Caller can't be reached by phone  • Negative: Caller has already spoken to PCP or another triager  • Negative: RN needs further essential information from caller in order to complete triage    Answer Assessment - Initial Assessment Questions  1. REASON FOR CALL or QUESTION: \"What is your reason for calling today?\" or \"How can I best help you?\" or \"What question do you have that I can help answer?\"      Following up with PCP after ER visit    Protocols used: INFORMATION ONLY CALL - NO TRIAGE-ADULT-      "

## 2021-06-16 ENCOUNTER — HOSPITAL ENCOUNTER (EMERGENCY)
Facility: HOSPITAL | Age: 81
Discharge: HOME | End: 2021-06-17
Attending: EMERGENCY MEDICINE | Admitting: EMERGENCY MEDICINE

## 2021-06-16 ENCOUNTER — APPOINTMENT (OUTPATIENT)
Dept: CT IMAGING | Facility: HOSPITAL | Age: 81
End: 2021-06-16

## 2021-06-16 ENCOUNTER — OFFICE VISIT (OUTPATIENT)
Dept: FAMILY MEDICINE CLINIC | Facility: CLINIC | Age: 81
End: 2021-06-16

## 2021-06-16 VITALS
TEMPERATURE: 98.2 F | WEIGHT: 226 LBS | SYSTOLIC BLOOD PRESSURE: 148 MMHG | BODY MASS INDEX: 33.47 KG/M2 | DIASTOLIC BLOOD PRESSURE: 74 MMHG | HEIGHT: 69 IN | OXYGEN SATURATION: 99 % | RESPIRATION RATE: 18 BRPM | HEART RATE: 63 BPM

## 2021-06-16 DIAGNOSIS — R31.9 HEMATURIA, UNSPECIFIED TYPE: ICD-10-CM

## 2021-06-16 DIAGNOSIS — K52.9 COLITIS: Primary | ICD-10-CM

## 2021-06-16 DIAGNOSIS — R10.9 LEFT FLANK PAIN: ICD-10-CM

## 2021-06-16 DIAGNOSIS — K57.92 DIVERTICULITIS: Primary | ICD-10-CM

## 2021-06-16 DIAGNOSIS — R10.32 LEFT LOWER QUADRANT ABDOMINAL PAIN: ICD-10-CM

## 2021-06-16 LAB
ALBUMIN SERPL-MCNC: 3.9 G/DL (ref 3.5–5.2)
ALBUMIN/GLOB SERPL: 1.3 G/DL
ALP SERPL-CCNC: 80 U/L (ref 39–117)
ALT SERPL W P-5'-P-CCNC: 22 U/L (ref 1–41)
ANION GAP SERPL CALCULATED.3IONS-SCNC: 12 MMOL/L (ref 5–15)
AST SERPL-CCNC: 24 U/L (ref 1–40)
BASOPHILS # BLD AUTO: 0.03 10*3/MM3 (ref 0–0.2)
BASOPHILS NFR BLD AUTO: 0.3 % (ref 0–1.5)
BILIRUB SERPL-MCNC: 0.2 MG/DL (ref 0–1.2)
BUN SERPL-MCNC: 17 MG/DL (ref 8–23)
BUN/CREAT SERPL: 18.3 (ref 7–25)
CALCIUM SPEC-SCNC: 8.9 MG/DL (ref 8.6–10.5)
CHLORIDE SERPL-SCNC: 103 MMOL/L (ref 98–107)
CO2 SERPL-SCNC: 21 MMOL/L (ref 22–29)
CREAT SERPL-MCNC: 0.93 MG/DL (ref 0.76–1.27)
D-LACTATE SERPL-SCNC: 1.5 MMOL/L (ref 0.5–2)
DEPRECATED RDW RBC AUTO: 47.3 FL (ref 37–54)
EOSINOPHIL # BLD AUTO: 0.06 10*3/MM3 (ref 0–0.4)
EOSINOPHIL NFR BLD AUTO: 0.6 % (ref 0.3–6.2)
ERYTHROCYTE [DISTWIDTH] IN BLOOD BY AUTOMATED COUNT: 17.4 % (ref 12.3–15.4)
GFR SERPL CREATININE-BSD FRML MDRD: 79 ML/MIN/1.73
GLOBULIN UR ELPH-MCNC: 2.9 GM/DL
GLUCOSE SERPL-MCNC: 202 MG/DL (ref 65–99)
HCT VFR BLD AUTO: 37.6 % (ref 37.5–51)
HGB BLD-MCNC: 11.8 G/DL (ref 13–17.7)
HOLD SPECIMEN: NORMAL
HOLD SPECIMEN: NORMAL
IMM GRANULOCYTES # BLD AUTO: 0.04 10*3/MM3 (ref 0–0.05)
IMM GRANULOCYTES NFR BLD AUTO: 0.4 % (ref 0–0.5)
INR PPP: 1.08 (ref 0.91–1.09)
LIPASE SERPL-CCNC: 29 U/L (ref 13–60)
LYMPHOCYTES # BLD AUTO: 0.83 10*3/MM3 (ref 0.7–3.1)
LYMPHOCYTES NFR BLD AUTO: 8.3 % (ref 19.6–45.3)
MCH RBC QN AUTO: 23.8 PG (ref 26.6–33)
MCHC RBC AUTO-ENTMCNC: 31.4 G/DL (ref 31.5–35.7)
MCV RBC AUTO: 75.8 FL (ref 79–97)
MONOCYTES # BLD AUTO: 0.5 10*3/MM3 (ref 0.1–0.9)
MONOCYTES NFR BLD AUTO: 5 % (ref 5–12)
NEUTROPHILS NFR BLD AUTO: 8.58 10*3/MM3 (ref 1.7–7)
NEUTROPHILS NFR BLD AUTO: 85.4 % (ref 42.7–76)
NRBC BLD AUTO-RTO: 0 /100 WBC (ref 0–0.2)
PLATELET # BLD AUTO: 281 10*3/MM3 (ref 140–450)
PMV BLD AUTO: 9.5 FL (ref 6–12)
POTASSIUM SERPL-SCNC: 5 MMOL/L (ref 3.5–5.2)
PROT SERPL-MCNC: 6.8 G/DL (ref 6–8.5)
PROTHROMBIN TIME: 13.2 SECONDS (ref 11.5–13.4)
RBC # BLD AUTO: 4.96 10*6/MM3 (ref 4.14–5.8)
SODIUM SERPL-SCNC: 136 MMOL/L (ref 136–145)
WBC # BLD AUTO: 10.04 10*3/MM3 (ref 3.4–10.8)
WHOLE BLOOD HOLD SPECIMEN: NORMAL

## 2021-06-16 PROCEDURE — 25010000002 LORAZEPAM PER 2 MG: Performed by: EMERGENCY MEDICINE

## 2021-06-16 PROCEDURE — 25010000002 KETOROLAC TROMETHAMINE PER 15 MG: Performed by: EMERGENCY MEDICINE

## 2021-06-16 PROCEDURE — 96374 THER/PROPH/DIAG INJ IV PUSH: CPT

## 2021-06-16 PROCEDURE — 80053 COMPREHEN METABOLIC PANEL: CPT | Performed by: EMERGENCY MEDICINE

## 2021-06-16 PROCEDURE — 25010000002 HYDROMORPHONE PER 4 MG: Performed by: EMERGENCY MEDICINE

## 2021-06-16 PROCEDURE — 83690 ASSAY OF LIPASE: CPT | Performed by: EMERGENCY MEDICINE

## 2021-06-16 PROCEDURE — 85025 COMPLETE CBC W/AUTO DIFF WBC: CPT | Performed by: EMERGENCY MEDICINE

## 2021-06-16 PROCEDURE — 99284 EMERGENCY DEPT VISIT MOD MDM: CPT

## 2021-06-16 PROCEDURE — 25010000002 MORPHINE PER 10 MG

## 2021-06-16 PROCEDURE — 81001 URINALYSIS AUTO W/SCOPE: CPT | Performed by: EMERGENCY MEDICINE

## 2021-06-16 PROCEDURE — 85610 PROTHROMBIN TIME: CPT | Performed by: EMERGENCY MEDICINE

## 2021-06-16 PROCEDURE — 25010000002 IOPAMIDOL 61 % SOLUTION: Performed by: EMERGENCY MEDICINE

## 2021-06-16 PROCEDURE — 74177 CT ABD & PELVIS W/CONTRAST: CPT

## 2021-06-16 PROCEDURE — 25010000002 ONDANSETRON PER 1 MG

## 2021-06-16 PROCEDURE — 99283 EMERGENCY DEPT VISIT LOW MDM: CPT

## 2021-06-16 PROCEDURE — 87086 URINE CULTURE/COLONY COUNT: CPT | Performed by: EMERGENCY MEDICINE

## 2021-06-16 PROCEDURE — 96375 TX/PRO/DX INJ NEW DRUG ADDON: CPT

## 2021-06-16 PROCEDURE — 83605 ASSAY OF LACTIC ACID: CPT | Performed by: EMERGENCY MEDICINE

## 2021-06-16 PROCEDURE — 99213 OFFICE O/P EST LOW 20 MIN: CPT | Performed by: FAMILY MEDICINE

## 2021-06-16 RX ORDER — HYDROMORPHONE HYDROCHLORIDE 1 MG/ML
0.5 INJECTION, SOLUTION INTRAMUSCULAR; INTRAVENOUS; SUBCUTANEOUS ONCE
Status: COMPLETED | OUTPATIENT
Start: 2021-06-16 | End: 2021-06-16

## 2021-06-16 RX ORDER — KETOROLAC TROMETHAMINE 15 MG/ML
10 INJECTION, SOLUTION INTRAMUSCULAR; INTRAVENOUS ONCE
Status: COMPLETED | OUTPATIENT
Start: 2021-06-16 | End: 2021-06-16

## 2021-06-16 RX ORDER — ONDANSETRON 2 MG/ML
4 INJECTION INTRAMUSCULAR; INTRAVENOUS ONCE
Status: COMPLETED | OUTPATIENT
Start: 2021-06-16 | End: 2021-06-16

## 2021-06-16 RX ORDER — LORAZEPAM 2 MG/ML
0.5 INJECTION INTRAMUSCULAR ONCE
Status: COMPLETED | OUTPATIENT
Start: 2021-06-16 | End: 2021-06-16

## 2021-06-16 RX ADMIN — ONDANSETRON HYDROCHLORIDE 4 MG: 2 SOLUTION INTRAMUSCULAR; INTRAVENOUS at 17:29

## 2021-06-16 RX ADMIN — SODIUM CHLORIDE, POTASSIUM CHLORIDE, SODIUM LACTATE AND CALCIUM CHLORIDE 1000 ML: 600; 310; 30; 20 INJECTION, SOLUTION INTRAVENOUS at 20:04

## 2021-06-16 RX ADMIN — KETOROLAC TROMETHAMINE 10 MG: 15 INJECTION, SOLUTION INTRAMUSCULAR; INTRAVENOUS at 23:27

## 2021-06-16 RX ADMIN — MORPHINE SULFATE 4 MG: 4 INJECTION, SOLUTION INTRAMUSCULAR; INTRAVENOUS at 17:29

## 2021-06-16 RX ADMIN — LORAZEPAM 0.5 MG: 2 INJECTION INTRAMUSCULAR; INTRAVENOUS at 23:27

## 2021-06-16 RX ADMIN — HYDROMORPHONE HYDROCHLORIDE 0.5 MG: 1 INJECTION, SOLUTION INTRAMUSCULAR; INTRAVENOUS; SUBCUTANEOUS at 17:45

## 2021-06-16 RX ADMIN — IOPAMIDOL 100 ML: 612 INJECTION, SOLUTION INTRAVENOUS at 22:15

## 2021-06-16 NOTE — ED TRIAGE NOTES
PATIENT PRESENTS WITH LEFT FLANK/ABDOMINAL PAIN. PATENT HAS HX OF ULCERATIVE COLITIS. PATIENT STARTED HAVING PAIN PT TO HIS LEFT FLANK.

## 2021-06-16 NOTE — ED PROVIDER NOTES
"Subjective   80-year-old male presents to the emergency department complaint of left lower quadrant abdominal pain, left flank pain.  He has a history of ulcerative colitis, and recent ER visit that showed some inflammation of his proximal sigmoid colon and was started on Augmentin.  He also has a history of hypertension, hyperlipidemia, coronary disease, diabetes.  Patient reports several week history of intermittent abdominal pain and nausea.  He saw his GI specialist, Dr. Menjivar near the onset of symptoms, had a colonoscopy 6/4/2021 that showed \"diffuse area of moderately friable (with contact bleeding), inflamed, nodular, pseudopolypoid, scarred  and ulcerated mucosa was found in the entire colon\".  Patient was started on prednisone 10 mg daily.  Symptoms were mild to moderate intermittently following the colonoscopy became more severe 6/12/2021 which prompted visit to the emergency department.  Had a CT scan that showed \"Circumferential wall thickening involving the proximal sigmoid colon with colonic induration with associated mesenteric lymph gregory prominence, observed similarly on the 3/5/2021 examination. Focal area of colitis/diverticulitis as well as malignancy considered.\"  He had good pain relief in the ER was started on Augmentin.  Patient states he been doing okay following discharge until this afternoon when he developed worsening left side abdominal pain was associated with numerous episodes of nonbloody nonbilious emesis.  He has had normal formed stools with no diarrhea or hematochezia.  Pain became severe today which prompted visit to the emergency department.  He denies fever, chest pain, shortness of breath.  Does endorse some hematuria but does have history of renal cysts.        History provided by:  Patient      Review of Systems   All other systems reviewed and are negative.      Past Medical History:   Diagnosis Date   • Asthma    • Coronary artery disease    • Diabetes mellitus (CMS/HCC)  "   • Hyperlipidemia    • Hypertension    • Sleep apnea     cpap at [facility]       Allergies   Allergen Reactions   • Albuterol Other (See Comments)   • Adhesive Tape Rash   • Azithromycin Rash       Past Surgical History:   Procedure Laterality Date   • BACK SURGERY     • CARDIAC CATHETERIZATION      stents x 6   • CATARACT EXTRACTION     • COLONOSCOPY     • COLONOSCOPY N/A 6/4/2021    Procedure: COLONOSCOPY WITH ANESTHESIA;  Surgeon: Zachariah Menjivar DO;  Location: [facility] ENDOSCOPY;  Service: Gastroenterology;  Laterality: N/A;  pre hx ulcerative colitis  post ulcerative colitis  dr collins gusman   • CORONARY ANGIOPLASTY WITH STENT PLACEMENT     • HIP SURGERY Right     total hip       Family History   Problem Relation Age of Onset   • Colon cancer Brother    • Colon polyps Neg Hx    • Esophageal cancer Neg Hx        Social History     Socioeconomic History   • Marital status:      Spouse name: Not on file   • Number of children: Not on file   • Years of education: Not on file   • Highest education level: Not on file   Tobacco Use   • Smoking status: Never Smoker   • Smokeless tobacco: Never Used   Vaping Use   • Vaping Use: Never used   Substance and Sexual Activity   • Alcohol use: Never   • Drug use: Never   • Sexual activity: Defer           Objective   Physical Exam  Vitals and nursing note reviewed.   Constitutional:       General: He is not in acute distress.     Appearance: He is normal weight.      Comments: Uncomfortable appearing due to pain   HENT:      Head: Normocephalic and atraumatic.      Nose: Nose normal.      Mouth/Throat:      Mouth: Mucous membranes are moist.      Pharynx: Oropharynx is clear. No oropharyngeal exudate or posterior oropharyngeal erythema.   Eyes:      Extraocular Movements: Extraocular movements intact.      Conjunctiva/sclera: Conjunctivae normal.      Pupils: Pupils are equal, round, and reactive to light.   Cardiovascular:      Rate and Rhythm: Normal rate and regular  rhythm.      Pulses: Normal pulses.      Heart sounds: Normal heart sounds.   Pulmonary:      Effort: Pulmonary effort is normal.      Breath sounds: Normal breath sounds. No wheezing, rhonchi or rales.   Abdominal:      General: Abdomen is flat. Bowel sounds are normal. There is no distension.      Palpations: Abdomen is soft.      Tenderness: There is abdominal tenderness (Left lower quadrant).   Musculoskeletal:         General: No tenderness. Normal range of motion.      Cervical back: Normal range of motion and neck supple. No rigidity. No muscular tenderness.      Right lower leg: No edema.      Left lower leg: No edema.   Skin:     General: Skin is warm and dry.      Capillary Refill: Capillary refill takes less than 2 seconds.      Findings: No rash.   Neurological:      General: No focal deficit present.      Mental Status: He is alert and oriented to person, place, and time. Mental status is at baseline.      Cranial Nerves: No cranial nerve deficit.      Sensory: No sensory deficit.      Motor: No weakness.   Psychiatric:         Mood and Affect: Mood normal.         Behavior: Behavior normal.         Thought Content: Thought content normal.         Procedures         Lab Results (last 24 hours)     Procedure Component Value Units Date/Time    CBC & Differential [[id number]]  (Abnormal) Collected: 06/16/21 1732    Specimen: Blood Updated: 06/16/21 1901    Narrative:      The following orders were created for panel order CBC & Differential.  Procedure                               Abnormality         Status                     ---------                               -----------         ------                     CBC Auto Differential[[id number]]        Abnormal            Final result                 Please view results for these tests on the individual orders.    Comprehensive Metabolic Panel [[id number]]  (Abnormal) Collected: 06/16/21 1732    Specimen: Blood Updated: 06/16/21 1912     Glucose 202 mg/dL       BUN 17 mg/dL      Creatinine 0.93 mg/dL      Sodium 136 mmol/L      Potassium 5.0 mmol/L      Comment: Slight hemolysis detected by analyzer. Results may be affected.        Chloride 103 mmol/L      CO2 21.0 mmol/L      Calcium 8.9 mg/dL      Total Protein 6.8 g/dL      Albumin 3.90 g/dL      ALT (SGPT) 22 U/L      AST (SGOT) 24 U/L      Comment: Slight hemolysis detected by analyzer. Results may be affected.        Alkaline Phosphatase 80 U/L      Total Bilirubin 0.2 mg/dL      eGFR Non African Amer 79 mL/min/1.73      Globulin 2.9 gm/dL      A/G Ratio 1.3 g/dL      BUN/Creatinine Ratio 18.3     Anion Gap 12.0 mmol/L     Narrative:      GFR Normal >60  Chronic Kidney Disease <60  Kidney Failure <15      Lipase [[id number]]  (Normal) Collected: 06/16/21 1732    Specimen: Blood Updated: 06/16/21 1907     Lipase 29 U/L     CBC Auto Differential [[id number]]  (Abnormal) Collected: 06/16/21 1732    Specimen: Blood Updated: 06/16/21 1901     WBC 10.04 10*3/mm3      RBC 4.96 10*6/mm3      Hemoglobin 11.8 g/dL      Hematocrit 37.6 %      MCV 75.8 fL      MCH 23.8 pg      MCHC 31.4 g/dL      RDW 17.4 %      RDW-SD 47.3 fl      MPV 9.5 fL      Platelets 281 10*3/mm3      Neutrophil % 85.4 %      Lymphocyte % 8.3 %      Monocyte % 5.0 %      Eosinophil % 0.6 %      Basophil % 0.3 %      Immature Grans % 0.4 %      Neutrophils, Absolute 8.58 10*3/mm3      Lymphocytes, Absolute 0.83 10*3/mm3      Monocytes, Absolute 0.50 10*3/mm3      Eosinophils, Absolute 0.06 10*3/mm3      Basophils, Absolute 0.03 10*3/mm3      Immature Grans, Absolute 0.04 10*3/mm3      nRBC 0.0 /100 WBC     Urinalysis With Culture If Indicated - Urine, Clean Catch [[id number]]  (Abnormal) Collected: 06/16/21 2005    Specimen: Urine, Clean Catch Updated: 06/16/21 2024     Color, UA Yellow     Appearance, UA Clear     pH, UA 6.0     Specific Gravity, UA 1.021     Glucose,  mg/dL (2+)     Ketones, UA Negative     Bilirubin, UA Negative     Blood, UA Large  (3+)     Protein, UA 30 mg/dL (1+)     Leuk Esterase, UA Negative     Nitrite, UA Negative     Urobilinogen, UA 1.0 E.U./dL    Urinalysis, Microscopic Only - Urine, Clean Catch [[id number]]  (Abnormal) Collected: 06/16/21 2005    Specimen: Urine, Clean Catch Updated: 06/16/21 2047     RBC, UA Too Numerous to Count /HPF      WBC, UA 3-5 /HPF      Bacteria, UA 1+ /HPF      Squamous Epithelial Cells, UA 0-2 /HPF      Yeast, UA Small/1+ Budding Yeast /HPF      Hyaline Casts, UA 0-2 /LPF      Methodology Manual Light Microscopy    Lactic Acid, Plasma [[id number]]  (Normal) Collected: 06/16/21 2010    Specimen: Blood Updated: 06/16/21 2031     Lactate 1.5 mmol/L     Protime-INR [[id number]]  (Normal) Collected: 06/16/21 2010    Specimen: Blood Updated: 06/16/21 2028     Protime 13.2 Seconds      INR 1.08        ED Course  ED Course as of Jun 17 0041   Thu Jun 17, 2021   0032 Patient feeling much better after treatment in ED.  Does have questionable urinary tract infection, 3-5 WBCs, 1+ bacteria, 2 number to count RBCs, however leukocyte esterase and nitrite negative.  He is already on Augmentin.  We will send urine for culture before changing of his medications.    Given patient's degree of pain we repeated a CT scan to make sure there were not any complications from his previous diagnosed colitis, stat rad report shows improving sigmoid diverticulitis with no abscess, free air or obstruction.  Does have some renal cysts which were seen on previous CT scan.  His renal function is normal in the ED, WBC was normal as well.  Recommend he continue his antibiotics as prescribed, will add Percocet for pain and have him return to the ED for worsening symptoms.  He has had gross hematuria in the past and was seen by Dr. Tellez little less than 2 months ago.  He should also follow-up with urology regarding his hematuria and renal cysts.    [AW]      ED Course User Index  [AW] Hector Oliveira MD                                            MDM  Number of Diagnoses or Management Options  Diverticulitis: established and improving  Hematuria, unspecified type: established and improving  Left lower quadrant abdominal pain: established and improving     Amount and/or Complexity of Data Reviewed  Clinical lab tests: reviewed  Tests in the radiology section of CPT®: reviewed    Risk of Complications, Morbidity, and/or Mortality  Presenting problems: high  Diagnostic procedures: high  Management options: high    Patient Progress  Patient progress: improved      Final diagnoses:   Left lower quadrant abdominal pain   Diverticulitis   Hematuria, unspecified type       ED Disposition  ED Disposition     ED Disposition Condition Comment    Discharge Stable           Monique Carlos MD  [address]  [place] [id number] [phone]    In 2 days      Jose Tellez MD  [address]  [place] [address]  [phone]    In 5 days      Zachariah Menjivar DO  [address] [facility]  [address]  [place] [address]  [phone]    In 2 days           Medication List      New Prescriptions    ondansetron ODT 4 MG disintegrating tablet  Commonly known as: ZOFRAN-ODT  Place 1 tablet on the tongue Every 8 (Eight) Hours As Needed for Nausea.     oxyCODONE-acetaminophen 5-325 MG per tablet  Commonly known as: PERCOCET  Take 1 tablet by mouth Every 6 (Six) Hours As Needed for Severe Pain .           Where to Get Your Medications      These medications were sent to [facility]/pharmacy #[id number] - [place] - [address] AT [address] - [phone]  - [phone]   [address] [place] [address]    Hours: 24-hours Phone: [phone]   · ondansetron ODT 4 MG disintegrating tablet  · oxyCODONE-acetaminophen 5-325 MG per tablet          Hector Oliveira MD  06/17/21 0041

## 2021-06-16 NOTE — PROGRESS NOTES
"Subjective cc: left flank pain/colitis    Zachariah Acosta is a 80 y.o. male who presents for ED follow up on colitis.  Pt was seen in ED - reviewed labs and CT - pt dx with colitis - started with augmentin - he has been taking the medication BID as directed - he does feel like his LLQ pain is improved. However he is now having left flank pain - he has been taking a lot of ibuprofen and is concerned it could be hurting his kidneys. He has nto had any ibuprofen today but has had tylenol. Denies any injury to his back. He has chronic hematuria and doesn't feel like it is any worse. He is not currently taking plavix - has not restarted it after his recent colonoscopy. He is still taking ASA. He has been having BM - no blood in stool.        History of Present Illness     The following portions of the patient's history were reviewed and updated as appropriate: allergies, current medications, past family history, past medical history, past social history, past surgical history and problem list.        Review of Systems   Constitutional: Negative for activity change, appetite change, chills and fever.   Gastrointestinal: Positive for abdominal pain. Negative for abdominal distention, blood in stool, constipation, diarrhea, nausea and vomiting.   Genitourinary: Positive for hematuria (chronic).   Musculoskeletal: Positive for back pain.        Left flank pain   All other systems reviewed and are negative.      Objective   Blood pressure 148/74, pulse 63, temperature 98.2 °F (36.8 °C), temperature source Infrared, resp. rate 18, height 175.3 cm (69\"), weight 103 kg (226 lb), SpO2 99 %.  Physical Exam  Vitals and nursing note reviewed.   Constitutional:       General: He is in acute distress (uncomfortable).      Appearance: He is not toxic-appearing or diaphoretic.   HENT:      Head: Normocephalic and atraumatic.      Right Ear: External ear normal.      Left Ear: External ear normal.   Eyes:      General:         " Right eye: No discharge.         Left eye: No discharge.      Extraocular Movements: Extraocular movements intact.      Conjunctiva/sclera: Conjunctivae normal.   Cardiovascular:      Rate and Rhythm: Normal rate and regular rhythm.      Pulses: Normal pulses.   Pulmonary:      Effort: Pulmonary effort is normal. No respiratory distress.      Breath sounds: Normal breath sounds. No wheezing.   Abdominal:      General: Bowel sounds are normal.      Palpations: Abdomen is soft.      Tenderness: There is abdominal tenderness in the left lower quadrant. There is left CVA tenderness. There is no right CVA tenderness or guarding.   Skin:     General: Skin is warm and dry.   Neurological:      Mental Status: He is alert and oriented to person, place, and time. Mental status is at baseline.      Gait: Gait abnormal.   Psychiatric:         Mood and Affect: Mood normal.         Behavior: Behavior normal.         Thought Content: Thought content normal.         Judgment: Judgment normal.         Assessment/Plan   Problems Addressed this Visit     None      Visit Diagnoses     Colitis    -  Primary    Relevant Orders    CBC (No Diff)    Comprehensive metabolic panel    Left flank pain        Relevant Orders    US Renal Bilateral      Diagnoses       Codes Comments    Colitis    -  Primary ICD-10-CM: K52.9  ICD-9-CM: 558.9     Left flank pain     ICD-10-CM: R10.9  ICD-9-CM: 789.09           PLAN:     #1 colitis: new, reviewed ED note, advised to continue on current abx, no fever or chills, abd pain is improving, considered abscess but pt seems to be clinically improving - advised on warning signs and need to go to ED if not improving, keep appt with GI tomorrow     #2 left flank pain: new, needs further eval, will check renal funciton with labs in office, will schedule for renal US tomorrow for further eval, chronic hematuria, advised to stop taking NSAIDs, go to ED if not improving            This document has been  electronically signed by Monique Carlos MD on June 16, 2021 18:22 CDT

## 2021-06-17 ENCOUNTER — TELEPHONE (OUTPATIENT)
Dept: CARDIOLOGY | Facility: CLINIC | Age: 81
End: 2021-06-17

## 2021-06-17 ENCOUNTER — OFFICE VISIT (OUTPATIENT)
Dept: GASTROENTEROLOGY | Facility: CLINIC | Age: 81
End: 2021-06-17

## 2021-06-17 ENCOUNTER — TELEPHONE (OUTPATIENT)
Dept: GASTROENTEROLOGY | Facility: CLINIC | Age: 81
End: 2021-06-17

## 2021-06-17 ENCOUNTER — TELEPHONE (OUTPATIENT)
Dept: FAMILY MEDICINE CLINIC | Facility: CLINIC | Age: 81
End: 2021-06-17

## 2021-06-17 VITALS
HEIGHT: 69 IN | SYSTOLIC BLOOD PRESSURE: 135 MMHG | DIASTOLIC BLOOD PRESSURE: 72 MMHG | WEIGHT: 226 LBS | HEART RATE: 64 BPM | RESPIRATION RATE: 20 BRPM | TEMPERATURE: 98.1 F | BODY MASS INDEX: 33.47 KG/M2 | OXYGEN SATURATION: 97 %

## 2021-06-17 VITALS
HEART RATE: 68 BPM | TEMPERATURE: 98.4 F | OXYGEN SATURATION: 98 % | HEIGHT: 69 IN | WEIGHT: 226 LBS | BODY MASS INDEX: 33.47 KG/M2 | DIASTOLIC BLOOD PRESSURE: 76 MMHG | SYSTOLIC BLOOD PRESSURE: 140 MMHG

## 2021-06-17 DIAGNOSIS — K51.219 ULCERATIVE PROCTITIS WITH COMPLICATION (HCC): Primary | ICD-10-CM

## 2021-06-17 LAB
ALBUMIN SERPL-MCNC: 4.1 G/DL (ref 3.5–5.2)
ALBUMIN/GLOB SERPL: 1.5 G/DL
ALP SERPL-CCNC: 77 U/L (ref 39–117)
ALT SERPL-CCNC: 23 U/L (ref 1–41)
AST SERPL-CCNC: 24 U/L (ref 1–40)
BACTERIA UR QL AUTO: ABNORMAL /HPF
BILIRUB SERPL-MCNC: 0.3 MG/DL (ref 0–1.2)
BILIRUB UR QL STRIP: NEGATIVE
BUN SERPL-MCNC: 15 MG/DL (ref 8–23)
BUN/CREAT SERPL: 15.8 (ref 7–25)
CALCIUM SERPL-MCNC: 9 MG/DL (ref 8.6–10.5)
CHLORIDE SERPL-SCNC: 102 MMOL/L (ref 98–107)
CLARITY UR: CLEAR
CO2 SERPL-SCNC: 24.4 MMOL/L (ref 22–29)
COLOR UR: YELLOW
CREAT SERPL-MCNC: 0.95 MG/DL (ref 0.76–1.27)
ERYTHROCYTE [DISTWIDTH] IN BLOOD BY AUTOMATED COUNT: 16.6 % (ref 12.3–15.4)
GLOBULIN SER CALC-MCNC: 2.7 GM/DL
GLUCOSE SERPL-MCNC: 245 MG/DL (ref 65–99)
GLUCOSE UR STRIP-MCNC: ABNORMAL MG/DL
HCT VFR BLD AUTO: 38.4 % (ref 37.5–51)
HGB BLD-MCNC: 11.7 G/DL (ref 13–17.7)
HGB UR QL STRIP.AUTO: ABNORMAL
HYALINE CASTS UR QL AUTO: ABNORMAL /LPF
KETONES UR QL STRIP: NEGATIVE
LEUKOCYTE ESTERASE UR QL STRIP.AUTO: NEGATIVE
MCH RBC QN AUTO: 23.9 PG (ref 26.6–33)
MCHC RBC AUTO-ENTMCNC: 30.5 G/DL (ref 31.5–35.7)
MCV RBC AUTO: 78.4 FL (ref 79–97)
NITRITE UR QL STRIP: NEGATIVE
PH UR STRIP.AUTO: 6 [PH] (ref 5–8)
PLATELET # BLD AUTO: 280 10*3/MM3 (ref 140–450)
POTASSIUM SERPL-SCNC: 5.6 MMOL/L (ref 3.5–5.2)
PROT SERPL-MCNC: 6.8 G/DL (ref 6–8.5)
PROT UR QL STRIP: ABNORMAL
RBC # BLD AUTO: 4.9 10*6/MM3 (ref 4.14–5.8)
RBC # UR: ABNORMAL /HPF
REF LAB TEST METHOD: ABNORMAL
SODIUM SERPL-SCNC: 135 MMOL/L (ref 136–145)
SP GR UR STRIP: 1.02 (ref 1–1.03)
SQUAMOUS #/AREA URNS HPF: ABNORMAL /HPF
UROBILINOGEN UR QL STRIP: ABNORMAL
WBC # BLD AUTO: 9.68 10*3/MM3 (ref 3.4–10.8)
WBC UR QL AUTO: ABNORMAL /HPF
YEAST URNS QL MICRO: ABNORMAL /HPF

## 2021-06-17 PROCEDURE — 99214 OFFICE O/P EST MOD 30 MIN: CPT | Performed by: INTERNAL MEDICINE

## 2021-06-17 RX ORDER — OXYCODONE HYDROCHLORIDE AND ACETAMINOPHEN 5; 325 MG/1; MG/1
1 TABLET ORAL EVERY 6 HOURS PRN
Qty: 12 TABLET | Refills: 0 | Status: ON HOLD | OUTPATIENT
Start: 2021-06-17 | End: 2021-12-13

## 2021-06-17 RX ORDER — ONDANSETRON 4 MG/1
4 TABLET, ORALLY DISINTEGRATING ORAL EVERY 8 HOURS PRN
Qty: 10 TABLET | Refills: 0 | Status: ON HOLD | OUTPATIENT
Start: 2021-06-17 | End: 2021-12-13

## 2021-06-17 NOTE — PROGRESS NOTES
Chief Complaint   Patient presents with   • Colonoscopy     6-4-2021 had colon was in er last night pain left side       PCP: Monique Gusman MD  REFER: No ref. provider found    Subjective     HPI           Overall  Hanging in there   Denies gi bleeding  Has been in the er for abdominal /back pain      Past Medical History:   Diagnosis Date   • Asthma    • Coronary artery disease    • Diabetes mellitus (CMS/HCC)    • Hyperlipidemia    • Hypertension    • Sleep apnea     cpap at        Past Surgical History:   Procedure Laterality Date   • BACK SURGERY     • CARDIAC CATHETERIZATION      stents x 6   • CATARACT EXTRACTION     • COLONOSCOPY     • COLONOSCOPY N/A 6/4/2021    Procedure: COLONOSCOPY WITH ANESTHESIA;  Surgeon: Zachariah Menjivar DO;  Location: [facility] ENDOSCOPY;  Service: Gastroenterology;  Laterality: N/A;  pre hx ulcerative colitis  post ulcerative colitis  dr monique gusman   • CORONARY ANGIOPLASTY WITH STENT PLACEMENT     • HIP SURGERY Right     total hip       Outpatient Medications Marked as Taking for the 6/17/21 encounter (Office Visit) with Zachariah Menjivar DO   Medication Sig Dispense Refill   • amoxicillin-clavulanate (AUGMENTIN) 875-125 MG per tablet Take 1 tablet by mouth 2 (Two) Times a Day for 7 days. 14 tablet 0   • aspirin (aspirin) 81 MG EC tablet Take 1 tablet by mouth Daily. 30 tablet 11   • budesonide-formoterol (Symbicort) 160-4.5 MCG/ACT inhaler Inhale 2 puffs 2 (two) times a day.     • finasteride (PROSCAR) 5 MG tablet Take 1 tablet by mouth Daily. 30 tablet 11   • fluticasone (FLONASE) 50 MCG/ACT nasal spray 1 spray into the nostril(s) as directed by provider Daily. 1 bottle 11   • glipizide (GLUCOTROL) 10 MG tablet Take 10 mg by mouth 2 (Two) Times a Day.     • Insulin Glargine (Lantus SoloStar) 100 UNIT/ML injection pen Inject 20 Units under the skin into the appropriate area as directed As Needed.     • lisinopril (PRINIVIL,ZESTRIL) 10 MG tablet Take 1 tablet by mouth  "Daily. 90 tablet 1   • Mesalamine 4 GM/60ML SF enema Insert  into the rectum Daily.     • metoprolol tartrate (LOPRESSOR) 25 MG tablet Take 25 mg by mouth 2 (two) times a day.     • nitroglycerin (NITROSTAT) 0.4 MG SL tablet 1 under the tongue as needed for angina, may repeat q5mins for up three doses 25 tablet 1   • ondansetron ODT (ZOFRAN-ODT) 4 MG disintegrating tablet Place 1 tablet on the tongue Every 8 (Eight) Hours As Needed for Nausea. 10 tablet 0   • oxyCODONE-acetaminophen (PERCOCET) 5-325 MG per tablet Take 1 tablet by mouth Every 6 (Six) Hours As Needed for Severe Pain . 12 tablet 0   • predniSONE (DELTASONE) 10 MG tablet Take 1 tablet by mouth Take As Directed. 30 tablet 0   • triamcinolone (KENALOG) 0.1 % cream Apply  topically to the appropriate area as directed 2 (Two) Times a Day.     • Ustekinumab (STELARA) 90 MG/ML solution prefilled syringe Injection Inject 90 mg under the skin into the appropriate area as directed Every 2 (Two) Months.         Allergies   Allergen Reactions   • Albuterol Other (See Comments)   • Adhesive Tape Rash   • Azithromycin Rash       Social History     Socioeconomic History   • Marital status:      Spouse name: Not on file   • Number of children: Not on file   • Years of education: Not on file   • Highest education level: Not on file   Tobacco Use   • Smoking status: Never Smoker   • Smokeless tobacco: Never Used   Vaping Use   • Vaping Use: Never used   Substance and Sexual Activity   • Alcohol use: Never   • Drug use: Never   • Sexual activity: Defer       Review of Systems   Constitutional: Negative for unexpected weight change.   Respiratory: Negative for shortness of breath.    Cardiovascular: Negative for chest pain.   Gastrointestinal: Negative for abdominal pain and anal bleeding.       Objective     Vitals:    06/17/21 1401   BP: 140/76   Pulse: 68   Temp: 98.4 °F (36.9 °C)   SpO2: 98%   Weight: 103 kg (226 lb)   Height: 175.3 cm (69\")     Body mass index " is 33.37 kg/m².    Physical Exam  Constitutional:       Appearance: Normal appearance. He is well-developed.   Eyes:      General: No scleral icterus.  Neck:      Thyroid: No thyroid mass or thyromegaly.      Vascular: No JVD.   Pulmonary:      Effort: Pulmonary effort is normal. No accessory muscle usage.   Abdominal:      General: There is no distension.      Tenderness: There is no abdominal tenderness. There is no guarding.   Skin:     Coloration: Skin is not jaundiced.   Neurological:      Mental Status: He is alert.   Psychiatric:         Behavior: Behavior is cooperative.         Judgment: Judgment normal.         Imaging Results (Most Recent)     None          Body mass index is 33.37 kg/m².    Assessment/Plan     Diagnoses and all orders for this visit:    1. Ulcerative proctitis with complication (CMS/HCC) (Primary)  -     USTEKINUMAB LEVEL - Miscellaneous Test; Future    take qhs mesalamine enemas  Try to taper off his steroids  Avoid asa/NSAID's for now   Draw Stelara levels prior to next injection       * Surgery not found *        Advised pt to stop use of NSAIDs, Fish Oil, and MV 5 days prior to procedure, per Dr Menjivar protocol.  Tylenol based products are ok to take.  Pt verbalized understanding.    Precautions are currently being put in place due to COVID-19.  I have explained to Zachariah Acosta they will be required to undergo COVID testing prior to their procedure.  Zachariah Acosta verbalized understanding and was willing to proceed.        Zachariah Menjivar, DO  06/17/21          There are no Patient Instructions on file for this visit.

## 2021-06-17 NOTE — TELEPHONE ENCOUNTER
Caller: Zachariah Acosta    Relationship to patient: Self    Best call back number: [phone]     Patient is needing: PATIENT WANTED TO LET DR. ANN KNOW THAT HIS PAIN GOT WORSE YESTERDAY AND HE ENDED UP GOING TO THE ER.

## 2021-06-17 NOTE — TELEPHONE ENCOUNTER
"Patient left message stating he'd just come from seeing Dr Menjivar who advised him to see Dr Sherri GROSS \"to discuss blood thinners.\" He states he has \"lots of inflammation in the colon, and Dr Menjivar said maybe I could discontinue my blood thinners.\" Patient was seen in ED yesterday for LLQ pain.    Contacted Javier Espinoza MA for Dr Menjivar. She will get further details tomorrow and let me know exactly what medication he is requesting the patient stop, and for how long. Isaura Merchant MA    "

## 2021-06-17 NOTE — TELEPHONE ENCOUNTER
Isaura from Dr. Polanco office called stating patient left our office and went to their office stating he needed to stop his blood thinnners per your request? Isaura called to get some clarification on what we are needing this patient to do ?     TY

## 2021-06-18 ENCOUNTER — APPOINTMENT (OUTPATIENT)
Dept: GENERAL RADIOLOGY | Facility: HOSPITAL | Age: 81
End: 2021-06-18

## 2021-06-18 ENCOUNTER — HOSPITAL ENCOUNTER (OUTPATIENT)
Facility: HOSPITAL | Age: 81
Setting detail: OBSERVATION
LOS: 2 days | Discharge: HOME | End: 2021-06-20
Attending: INTERNAL MEDICINE | Admitting: INTERNAL MEDICINE

## 2021-06-18 ENCOUNTER — APPOINTMENT (OUTPATIENT)
Dept: CT IMAGING | Facility: HOSPITAL | Age: 81
End: 2021-06-18

## 2021-06-18 DIAGNOSIS — R10.32 LLQ PAIN: Primary | ICD-10-CM

## 2021-06-18 DIAGNOSIS — K59.00 CONSTIPATION, UNSPECIFIED CONSTIPATION TYPE: ICD-10-CM

## 2021-06-18 DIAGNOSIS — K51.919 ULCERATIVE COLITIS WITH COMPLICATION, UNSPECIFIED LOCATION (HCC): ICD-10-CM

## 2021-06-18 PROBLEM — N17.9 AKI (ACUTE KIDNEY INJURY): Status: ACTIVE | Noted: 2021-06-18

## 2021-06-18 PROBLEM — R31.9 HEMATURIA: Status: ACTIVE | Noted: 2021-06-18

## 2021-06-18 PROBLEM — R10.9 INTRACTABLE ABDOMINAL PAIN: Status: ACTIVE | Noted: 2021-06-18

## 2021-06-18 LAB
ALBUMIN SERPL-MCNC: 3.6 G/DL (ref 3.5–5.2)
ALBUMIN/GLOB SERPL: 1 G/DL
ALP SERPL-CCNC: 74 U/L (ref 39–117)
ALT SERPL W P-5'-P-CCNC: 19 U/L (ref 1–41)
ANION GAP SERPL CALCULATED.3IONS-SCNC: 10 MMOL/L (ref 5–15)
AST SERPL-CCNC: 17 U/L (ref 1–40)
BACTERIA SPEC AEROBE CULT: NO GROWTH
BASOPHILS # BLD AUTO: 0.02 10*3/MM3 (ref 0–0.2)
BASOPHILS NFR BLD AUTO: 0.2 % (ref 0–1.5)
BILIRUB SERPL-MCNC: 0.4 MG/DL (ref 0–1.2)
BILIRUB UR QL STRIP: NEGATIVE
BUN SERPL-MCNC: 21 MG/DL (ref 8–23)
BUN/CREAT SERPL: 13.3 (ref 7–25)
CALCIUM SPEC-SCNC: 9.2 MG/DL (ref 8.6–10.5)
CHLORIDE SERPL-SCNC: 101 MMOL/L (ref 98–107)
CLARITY UR: CLEAR
CO2 SERPL-SCNC: 27 MMOL/L (ref 22–29)
COLOR UR: YELLOW
CREAT SERPL-MCNC: 1.58 MG/DL (ref 0.76–1.27)
DEPRECATED RDW RBC AUTO: 49.2 FL (ref 37–54)
EOSINOPHIL # BLD AUTO: 0.15 10*3/MM3 (ref 0–0.4)
EOSINOPHIL NFR BLD AUTO: 1.4 % (ref 0.3–6.2)
ERYTHROCYTE [DISTWIDTH] IN BLOOD BY AUTOMATED COUNT: 17.5 % (ref 12.3–15.4)
GFR SERPL CREATININE-BSD FRML MDRD: 43 ML/MIN/1.73
GLOBULIN UR ELPH-MCNC: 3.5 GM/DL
GLUCOSE BLDC GLUCOMTR-MCNC: 104 MG/DL (ref 70–130)
GLUCOSE BLDC GLUCOMTR-MCNC: 92 MG/DL (ref 70–130)
GLUCOSE SERPL-MCNC: 138 MG/DL (ref 65–99)
GLUCOSE UR STRIP-MCNC: NEGATIVE MG/DL
HBA1C MFR BLD: 7.1 % (ref 4.8–5.6)
HCT VFR BLD AUTO: 38.7 % (ref 37.5–51)
HGB BLD-MCNC: 11.7 G/DL (ref 13–17.7)
HGB UR QL STRIP.AUTO: NEGATIVE
HOLD SPECIMEN: NORMAL
IMM GRANULOCYTES # BLD AUTO: 0.05 10*3/MM3 (ref 0–0.05)
IMM GRANULOCYTES NFR BLD AUTO: 0.5 % (ref 0–0.5)
KETONES UR QL STRIP: NEGATIVE
LEUKOCYTE ESTERASE UR QL STRIP.AUTO: NEGATIVE
LIPASE SERPL-CCNC: 18 U/L (ref 13–60)
LYMPHOCYTES # BLD AUTO: 1.02 10*3/MM3 (ref 0.7–3.1)
LYMPHOCYTES NFR BLD AUTO: 9.6 % (ref 19.6–45.3)
MCH RBC QN AUTO: 23.4 PG (ref 26.6–33)
MCHC RBC AUTO-ENTMCNC: 30.2 G/DL (ref 31.5–35.7)
MCV RBC AUTO: 77.6 FL (ref 79–97)
MONOCYTES # BLD AUTO: 0.82 10*3/MM3 (ref 0.1–0.9)
MONOCYTES NFR BLD AUTO: 7.7 % (ref 5–12)
NEUTROPHILS NFR BLD AUTO: 8.58 10*3/MM3 (ref 1.7–7)
NEUTROPHILS NFR BLD AUTO: 80.6 % (ref 42.7–76)
NITRITE UR QL STRIP: NEGATIVE
NRBC BLD AUTO-RTO: 0 /100 WBC (ref 0–0.2)
PH UR STRIP.AUTO: 6.5 [PH] (ref 5–8)
PLATELET # BLD AUTO: 248 10*3/MM3 (ref 140–450)
PMV BLD AUTO: 9.4 FL (ref 6–12)
POTASSIUM SERPL-SCNC: 4 MMOL/L (ref 3.5–5.2)
PROT SERPL-MCNC: 7.1 G/DL (ref 6–8.5)
PROT UR QL STRIP: NEGATIVE
RBC # BLD AUTO: 4.99 10*6/MM3 (ref 4.14–5.8)
SARS-COV-2 RNA PNL SPEC NAA+PROBE: NOT DETECTED
SODIUM SERPL-SCNC: 138 MMOL/L (ref 136–145)
SP GR UR STRIP: 1.01 (ref 1–1.03)
UROBILINOGEN UR QL STRIP: NORMAL
WBC # BLD AUTO: 10.64 10*3/MM3 (ref 3.4–10.8)
WHOLE BLOOD HOLD SPECIMEN: NORMAL

## 2021-06-18 PROCEDURE — 96375 TX/PRO/DX INJ NEW DRUG ADDON: CPT

## 2021-06-18 PROCEDURE — 96376 TX/PRO/DX INJ SAME DRUG ADON: CPT

## 2021-06-18 PROCEDURE — 74018 RADEX ABDOMEN 1 VIEW: CPT

## 2021-06-18 PROCEDURE — 80053 COMPREHEN METABOLIC PANEL: CPT | Performed by: PHYSICIAN ASSISTANT

## 2021-06-18 PROCEDURE — 25010000002 HYDROMORPHONE PER 4 MG: Performed by: EMERGENCY MEDICINE

## 2021-06-18 PROCEDURE — 25010000003 HYDROMORPHONE 1 MG/ML SOLUTION: Performed by: EMERGENCY MEDICINE

## 2021-06-18 PROCEDURE — 74176 CT ABD & PELVIS W/O CONTRAST: CPT

## 2021-06-18 PROCEDURE — 96361 HYDRATE IV INFUSION ADD-ON: CPT

## 2021-06-18 PROCEDURE — 82962 GLUCOSE BLOOD TEST: CPT

## 2021-06-18 PROCEDURE — 96374 THER/PROPH/DIAG INJ IV PUSH: CPT

## 2021-06-18 PROCEDURE — 99284 EMERGENCY DEPT VISIT MOD MDM: CPT

## 2021-06-18 PROCEDURE — 25010000002 ONDANSETRON PER 1 MG: Performed by: EMERGENCY MEDICINE

## 2021-06-18 PROCEDURE — 83690 ASSAY OF LIPASE: CPT | Performed by: PHYSICIAN ASSISTANT

## 2021-06-18 PROCEDURE — C9803 HOPD COVID-19 SPEC COLLECT: HCPCS

## 2021-06-18 PROCEDURE — 81003 URINALYSIS AUTO W/O SCOPE: CPT | Performed by: PHYSICIAN ASSISTANT

## 2021-06-18 PROCEDURE — 83036 HEMOGLOBIN GLYCOSYLATED A1C: CPT | Performed by: INTERNAL MEDICINE

## 2021-06-18 PROCEDURE — 85025 COMPLETE CBC W/AUTO DIFF WBC: CPT | Performed by: PHYSICIAN ASSISTANT

## 2021-06-18 PROCEDURE — 87635 SARS-COV-2 COVID-19 AMP PRB: CPT | Performed by: PHYSICIAN ASSISTANT

## 2021-06-18 RX ORDER — PREDNISONE 10 MG/1
10 TABLET ORAL DAILY
Status: DISCONTINUED | OUTPATIENT
Start: 2021-06-19 | End: 2021-06-20 | Stop reason: HOSPADM

## 2021-06-18 RX ORDER — MESALAMINE 4 G/60ML
4 ENEMA RECTAL DAILY
Status: DISCONTINUED | OUTPATIENT
Start: 2021-06-19 | End: 2021-06-20 | Stop reason: HOSPADM

## 2021-06-18 RX ORDER — POLYETHYLENE GLYCOL 3350 17 G/17G
17 POWDER, FOR SOLUTION ORAL DAILY
Status: DISCONTINUED | OUTPATIENT
Start: 2021-06-19 | End: 2021-06-20 | Stop reason: HOSPADM

## 2021-06-18 RX ORDER — SODIUM CHLORIDE 9 MG/ML
50 INJECTION, SOLUTION INTRAVENOUS CONTINUOUS
Status: DISCONTINUED | OUTPATIENT
Start: 2021-06-18 | End: 2021-06-20 | Stop reason: HOSPADM

## 2021-06-18 RX ORDER — POLYETHYLENE GLYCOL 3350 17 G/17G
17 POWDER, FOR SOLUTION ORAL 2 TIMES DAILY
Qty: 14 PACKET | Refills: 0 | Status: SHIPPED | OUTPATIENT
Start: 2021-06-18 | End: 2021-06-25

## 2021-06-18 RX ORDER — DEXTROSE MONOHYDRATE 25 G/50ML
25 INJECTION, SOLUTION INTRAVENOUS
Status: DISCONTINUED | OUTPATIENT
Start: 2021-06-18 | End: 2021-06-20 | Stop reason: HOSPADM

## 2021-06-18 RX ORDER — SODIUM CHLORIDE 0.9 % (FLUSH) 0.9 %
10 SYRINGE (ML) INJECTION AS NEEDED
Status: DISCONTINUED | OUTPATIENT
Start: 2021-06-18 | End: 2021-06-20 | Stop reason: HOSPADM

## 2021-06-18 RX ORDER — ONDANSETRON 2 MG/ML
4 INJECTION INTRAMUSCULAR; INTRAVENOUS EVERY 6 HOURS PRN
Status: DISCONTINUED | OUTPATIENT
Start: 2021-06-18 | End: 2021-06-20 | Stop reason: HOSPADM

## 2021-06-18 RX ORDER — DOCUSATE SODIUM 100 MG/1
100 CAPSULE, LIQUID FILLED ORAL 2 TIMES DAILY
Status: DISCONTINUED | OUTPATIENT
Start: 2021-06-18 | End: 2021-06-20 | Stop reason: HOSPADM

## 2021-06-18 RX ORDER — ACETAMINOPHEN 325 MG/1
650 TABLET ORAL EVERY 4 HOURS PRN
Status: DISCONTINUED | OUTPATIENT
Start: 2021-06-18 | End: 2021-06-20 | Stop reason: HOSPADM

## 2021-06-18 RX ORDER — ROSUVASTATIN CALCIUM 20 MG/1
40 TABLET, COATED ORAL DAILY
Status: DISCONTINUED | OUTPATIENT
Start: 2021-06-19 | End: 2021-06-20 | Stop reason: HOSPADM

## 2021-06-18 RX ORDER — OXYCODONE HYDROCHLORIDE AND ACETAMINOPHEN 5; 325 MG/1; MG/1
1 TABLET ORAL EVERY 6 HOURS PRN
Status: DISCONTINUED | OUTPATIENT
Start: 2021-06-18 | End: 2021-06-20 | Stop reason: HOSPADM

## 2021-06-18 RX ORDER — SODIUM CHLORIDE 0.9 % (FLUSH) 0.9 %
10 SYRINGE (ML) INJECTION EVERY 12 HOURS SCHEDULED
Status: DISCONTINUED | OUTPATIENT
Start: 2021-06-18 | End: 2021-06-20 | Stop reason: HOSPADM

## 2021-06-18 RX ORDER — HYDROMORPHONE HYDROCHLORIDE 1 MG/ML
0.5 INJECTION, SOLUTION INTRAMUSCULAR; INTRAVENOUS; SUBCUTANEOUS ONCE
Status: COMPLETED | OUTPATIENT
Start: 2021-06-18 | End: 2021-06-18

## 2021-06-18 RX ORDER — NICOTINE POLACRILEX 4 MG
15 LOZENGE BUCCAL
Status: DISCONTINUED | OUTPATIENT
Start: 2021-06-18 | End: 2021-06-20 | Stop reason: HOSPADM

## 2021-06-18 RX ORDER — ASPIRIN 81 MG/1
81 TABLET ORAL DAILY
Status: DISCONTINUED | OUTPATIENT
Start: 2021-06-19 | End: 2021-06-20 | Stop reason: HOSPADM

## 2021-06-18 RX ORDER — MORPHINE SULFATE 2 MG/ML
2 INJECTION, SOLUTION INTRAMUSCULAR; INTRAVENOUS EVERY 4 HOURS PRN
Status: DISCONTINUED | OUTPATIENT
Start: 2021-06-18 | End: 2021-06-20 | Stop reason: HOSPADM

## 2021-06-18 RX ORDER — ONDANSETRON 2 MG/ML
4 INJECTION INTRAMUSCULAR; INTRAVENOUS ONCE
Status: COMPLETED | OUTPATIENT
Start: 2021-06-18 | End: 2021-06-18

## 2021-06-18 RX ORDER — FINASTERIDE 5 MG/1
5 TABLET, FILM COATED ORAL DAILY
Status: DISCONTINUED | OUTPATIENT
Start: 2021-06-19 | End: 2021-06-20 | Stop reason: HOSPADM

## 2021-06-18 RX ADMIN — HYDROMORPHONE HYDROCHLORIDE 1 MG: 1 INJECTION, SOLUTION INTRAMUSCULAR; INTRAVENOUS; SUBCUTANEOUS at 14:47

## 2021-06-18 RX ADMIN — SODIUM CHLORIDE 100 ML/HR: 9 INJECTION, SOLUTION INTRAVENOUS at 19:10

## 2021-06-18 RX ADMIN — HYDROMORPHONE HYDROCHLORIDE 0.5 MG: 1 INJECTION, SOLUTION INTRAMUSCULAR; INTRAVENOUS; SUBCUTANEOUS at 12:03

## 2021-06-18 RX ADMIN — METOPROLOL TARTRATE 25 MG: 25 TABLET, FILM COATED ORAL at 21:03

## 2021-06-18 RX ADMIN — DOCUSATE SODIUM 100 MG: 100 CAPSULE ORAL at 21:03

## 2021-06-18 RX ADMIN — ONDANSETRON HYDROCHLORIDE 4 MG: 2 SOLUTION INTRAMUSCULAR; INTRAVENOUS at 12:03

## 2021-06-18 NOTE — H&P
[facility] Medicine Services  HISTORY AND PHYSICAL    Date of Admission: 6/18/2021  Primary Care Physician: Monique Carlos MD    Subjective     Chief Complaint: abdominal pain    History of Present Illness  Patient is a 80-year-old male with history of CAD and prior stents over a year ago on aspirin and Plavix (has been off Plavix for 2+ weeks), hypertension, diabetes, hyperlipidemia, sleep apnea.  Recently diagnosed ulcerative colitis and has been having ongoing issues with abdominal pain and ulcerative colitis flare.  He has been on Augmentin and subsequent CAT scans this month of actually showed improvement in stranding along the sigmoid colon.  He was recently seen by GI and started on mesalamine enemas and attempt to try to wean off steroids and improving.  He presents into the ER today for just ongoing abdominal pain with now decreased p.o. intake but no vomiting.  Some nausea.  No chest pain.  No shortness of breath.  No focal numbness tingling or weakness.  No fevers.  He does however state that yesterday he started having black specks in his urine.  He showed me a picture of his toilet bowl and it looked like little stones.  He has also been having on-and-off blood in his urine the last few days.  Interestingly enough a urine specimen cup at bedside is clear and his urinalysis has no blood in it this morning but his urinal has red Iftikhar-Aid looking urine in it and his urinalysis 2 days ago had large blood noted and too numerous to count RBCs.  KUB here done in the ER on this visit chest shows large stool burden.  He is in acute renal failure.  No white count elevation.  H&H stable from prior.  He was given Dilaudid in the ER and we been asked to admit.        Review of Systems   Otherwise complete ROS reviewed and negative except as mentioned in the HPI.    Past Medical History:   Past Medical History:   Diagnosis Date   • Asthma    • Coronary artery disease    •  Diabetes mellitus (CMS/HCC)    • Hyperlipidemia    • Hypertension    • Sleep apnea     cpap at      Past Surgical History:  Past Surgical History:   Procedure Laterality Date   • BACK SURGERY     • CARDIAC CATHETERIZATION      stents x 6   • CATARACT EXTRACTION     • COLONOSCOPY     • COLONOSCOPY N/A 6/4/2021    Procedure: COLONOSCOPY WITH ANESTHESIA;  Surgeon: Zachariah Menjivar DO;  Location: [facility] ENDOSCOPY;  Service: Gastroenterology;  Laterality: N/A;  pre hx ulcerative colitis  post ulcerative colitis  dr collins gusman   • CORONARY ANGIOPLASTY WITH STENT PLACEMENT     • HIP SURGERY Right     total hip     Social History:  reports that he has never smoked. He has never used smokeless tobacco. He reports that he does not drink alcohol and does not use drugs.    Family History: family history includes Colon cancer in his brother.      Allergies:  Allergies   Allergen Reactions   • Albuterol Other (See Comments)   • Adhesive Tape Rash   • Azithromycin Rash       Medications:  Prior to Admission medications    Medication Sig Start Date End Date Taking? Authorizing Provider   amoxicillin-clavulanate (AUGMENTIN) 875-125 MG per tablet Take 1 tablet by mouth 2 (Two) Times a Day for 7 days. 6/12/21 6/19/21  Everton Warner MD   aspirin (aspirin) 81 MG EC tablet Take 1 tablet by mouth Daily. 3/3/21   Yung Polanco MD   budesonide-formoterol (Symbicort) 160-4.5 MCG/ACT inhaler Inhale 2 puffs 2 (two) times a day. 11/13/20   ProviderCe MD   clopidogrel (PLAVIX) 75 MG tablet Take 1 tablet by mouth Daily. 4/21/21   Ortega Alba APRN   Dupilumab 300 MG/2ML solution prefilled syringe Inject  under the skin into the appropriate area as directed Every 14 (Fourteen) Days.    Provider, MD Ce   finasteride (PROSCAR) 5 MG tablet Take 1 tablet by mouth Daily. 3/11/21   Jose Tellez MD   fluticasone (FLONASE) 50 MCG/ACT nasal spray 1 spray into the nostril(s) as directed by provider  Daily. 1/19/21   Jacob Mejias MD   glipizide (GLUCOTROL) 10 MG tablet Take 10 mg by mouth 2 (Two) Times a Day. 8/10/20   Ce Foy MD   glucose blood test strip 1 each by Other route Daily. Use as instructed One Touch Ultra 2/1/21   Monique Carlos MD   Insulin Glargine (Lantus SoloStar) 100 UNIT/ML injection pen Inject 20 Units under the skin into the appropriate area as directed As Needed. 8/10/20   Ce Foy MD   lisinopril (PRINIVIL,ZESTRIL) 10 MG tablet Take 1 tablet by mouth Daily. 1/31/21   Monique Carlos MD   magnesium citrate solution Take 296 mL by mouth 1 (One) Time for 1 dose. 6/18/21 6/18/21  Jamari Barbour PA-C   Mesalamine 4 GM/60ML SF enema Insert  into the rectum Daily.    Ce Foy MD   metoprolol tartrate (LOPRESSOR) 25 MG tablet Take 25 mg by mouth 2 (two) times a day. 8/10/20   Ce Foy MD   nitroglycerin (NITROSTAT) 0.4 MG SL tablet 1 under the tongue as needed for angina, may repeat q5mins for up three doses 6/7/21   Mari Perdue APRN   ondansetron ODT (ZOFRAN-ODT) 4 MG disintegrating tablet Place 1 tablet on the tongue Every 8 (Eight) Hours As Needed for Nausea. 6/17/21   Hector Oliveira MD   oxyCODONE-acetaminophen (PERCOCET) 5-325 MG per tablet Take 1 tablet by mouth Every 6 (Six) Hours As Needed for Severe Pain . 6/17/21   Hector Oliveira MD   polyethylene glycol (MIRALAX) 17 g packet Take 17 g by mouth 2 (Two) Times a Day for 7 days. 6/18/21 6/25/21  Jamari Barbour PA-C   predniSONE (DELTASONE) 10 MG tablet Take 1 tablet by mouth Take As Directed. 6/4/21   Zachariah Menjivar DO   rosuvastatin (CRESTOR) 40 MG tablet Take 1 tablet by mouth Daily. 3/3/21   Livingston, Yung G, MD   triamcinolone (KENALOG) 0.1 % cream Apply  topically to the appropriate area as directed 2 (Two) Times a Day.    Provider, MD Ce   Ustekinumab (STELARA) 90 MG/ML solution prefilled syringe Injection Inject 90 mg under the skin  "into the appropriate area as directed Every 2 (Two) Months.    Provider, Ce, MD BARBOUR have utilized all available immediate resources to obtain, update, and review the patient's current medications.    Objective     Vital Signs: /75   Pulse 81   Temp 98 °F (36.7 °C) (Oral)   Resp 20   Ht 175.3 cm (69\")   Wt 102 kg (224 lb)   SpO2 95%   BMI 33.08 kg/m²   Physical Exam   GEN: Awake, alert, interactive, in NAD  HEENT:  PERRLA, EOMI, Anicteric, Trachea midline  Lungs: CTAB, no wheezing/rales/rhonchi  Heart: RRR, +S1/s2, no rub  ABD: obese/distended but soft, +BS, no guarding/rebound  Extremities: atraumatic, no cyanosis, no edema  Skin: no rashes or petchiae  Neuro: AAOx3, no focal deficits  Psych: normal mood & affect        Results Reviewed:  Lab Results (last 24 hours)     Procedure Component Value Units Date/Time    COVID-19,[facility] IN-HOUSE,Nasal Swab No Transport Media 3-4 HR TAT - Swab, Nasal Cavity [[id number]] Collected: 06/18/21 1556    Specimen: Swab from Nasal Cavity Updated: 06/18/21 1604    [place] Draw [[id number]] Collected: 06/18/21 1112    Specimen: Blood Updated: 06/18/21 1515    Narrative:      The following orders were created for panel order [place] Draw.  Procedure                               Abnormality         Status                     ---------                               -----------         ------                     Green Top (Gel)[[id number]]                                  Final result               Lavender Top[[id number]]                                     Final result               Red Top[[id number]]                                          Final result               [place] Blood Culture Cristhian...[[id number]]                      Final result               [place] Top[[id number]]                                         Final result                 Please view results for these tests on the individual orders.    [place] Top [[id number]] Collected: 06/18/21 1112    Specimen: " Blood Updated: 06/18/21 1515     Extra Tube Hold for add-ons.     Comment: Auto resulted.       Urinalysis With Culture If Indicated - Urine, Clean Catch [[id number]]  (Normal) Collected: 06/18/21 1321    Specimen: Urine, Clean Catch Updated: 06/18/21 1339     Color, UA Yellow     Appearance, UA Clear     pH, UA 6.5     Specific Gravity, UA 1.015     Glucose, UA Negative     Ketones, UA Negative     Bilirubin, UA Negative     Blood, UA Negative     Protein, UA Negative     Leuk Esterase, UA Negative     Nitrite, UA Negative     Urobilinogen, UA 0.2 E.U./dL    Narrative:      Urine microscopic not indicated.    Red Top [[id number]] Collected: 06/18/21 1112    Specimen: Blood Updated: 06/18/21 1215     Extra Tube Hold for add-ons.     Comment: Auto resulted.       [place] Blood Culture Bottle Set [[id number]] Collected: 06/18/21 1112    Specimen: Blood from Arm, Right Updated: 06/18/21 1215     Extra Tube Hold for add-ons.     Comment: Auto resulted.       Green Top (Gel) [[id number]] Collected: 06/18/21 1112    Specimen: Blood Updated: 06/18/21 1215     Extra Tube Hold for add-ons.     Comment: Auto resulted.       Lavender Top [[id number]] Collected: 06/18/21 1112    Specimen: Blood Updated: 06/18/21 1215     Extra Tube hold for add-on     Comment: Auto resulted       Comprehensive Metabolic Panel [[id number]]  (Abnormal) Collected: 06/18/21 1112    Specimen: Blood Updated: 06/18/21 1155     Glucose 138 mg/dL      BUN 21 mg/dL      Creatinine 1.58 mg/dL      Sodium 138 mmol/L      Potassium 4.0 mmol/L      Chloride 101 mmol/L      CO2 27.0 mmol/L      Calcium 9.2 mg/dL      Total Protein 7.1 g/dL      Albumin 3.60 g/dL      ALT (SGPT) 19 U/L      AST (SGOT) 17 U/L      Alkaline Phosphatase 74 U/L      Total Bilirubin 0.4 mg/dL      eGFR Non African Amer 43 mL/min/1.73      Globulin 3.5 gm/dL      A/G Ratio 1.0 g/dL      BUN/Creatinine Ratio 13.3     Anion Gap 10.0 mmol/L     Narrative:      GFR Normal >60  Chronic Kidney  Disease <60  Kidney Failure <15      Lipase [[id number]]  (Normal) Collected: 06/18/21 1112    Specimen: Blood Updated: 06/18/21 1150     Lipase 18 U/L     CBC & Differential [[id number]]  (Abnormal) Collected: 06/18/21 1112    Specimen: Blood Updated: 06/18/21 1140    Narrative:      The following orders were created for panel order CBC & Differential.  Procedure                               Abnormality         Status                     ---------                               -----------         ------                     CBC Auto Differential[[id number]]        Abnormal            Final result                 Please view results for these tests on the individual orders.    CBC Auto Differential [[id number]]  (Abnormal) Collected: 06/18/21 1112    Specimen: Blood Updated: 06/18/21 1140     WBC 10.64 10*3/mm3      RBC 4.99 10*6/mm3      Hemoglobin 11.7 g/dL      Hematocrit 38.7 %      MCV 77.6 fL      MCH 23.4 pg      MCHC 30.2 g/dL      RDW 17.5 %      RDW-SD 49.2 fl      MPV 9.4 fL      Platelets 248 10*3/mm3      Neutrophil % 80.6 %      Lymphocyte % 9.6 %      Monocyte % 7.7 %      Eosinophil % 1.4 %      Basophil % 0.2 %      Immature Grans % 0.5 %      Neutrophils, Absolute 8.58 10*3/mm3      Lymphocytes, Absolute 1.02 10*3/mm3      Monocytes, Absolute 0.82 10*3/mm3      Eosinophils, Absolute 0.15 10*3/mm3      Basophils, Absolute 0.02 10*3/mm3      Immature Grans, Absolute 0.05 10*3/mm3      nRBC 0.0 /100 WBC         Imaging Results (Last 24 Hours)     Procedure Component Value Units Date/Time    XR Abdomen KUB [[id number]] Collected: 06/18/21 1227     Updated: 06/18/21 1231    Narrative:      EXAM: XR ABDOMEN KUB- - 6/18/2021 12:20 PM CDT     HISTORY: constipation       COMPARISON: No existing relevant imaging studies available.      TECHNIQUE:  1 images.  Supine view the abdomen     FINDINGS:    Limited evaluation for free air on supine imaging. No evidence of  dilated loops of bowel. Large amount of right and  transverse colonic  stool. Pelvic phleboliths. No nephrolithiasis by radiograph. Right hip  replacement. Degenerative changes in the spine and pelvis.          Impression:      No acute abdominal finding by radiograph.  This report was finalized on 06/18/2021 12:28 by Dr Ashly Rizzo MD.        I have personally reviewed and interpreted the radiology studies and ECG obtained at time of admission.     Assessment / Plan     Assessment:   Active Hospital Problems    Diagnosis    • **Intractable abdominal pain    • Constipation    • KE (acute kidney injury) (CMS/HCC)    • Hematuria    • Hyperlipidemia    • Essential hypertension    • Type 2 diabetes mellitus without complication (CMS/HCC)    • CAD (coronary artery disease)    • Ulcerative colitis (CMS/HCC)      Plan:   #1 KE -in the setting of abdominal pain, UC, poor p.o. intake recently.  Could be dehydration.  However patient states he has been urinating dark specks and having intermittent hematuria.  Given this we will get a stat CT stone protocol to rule out any kidney stones or hydronephrosis.  Although his last 2 CAT scans did not show kidney stones they were both done with contrast which could obstruct view.  We will start normal saline at 100 an hour.  Recheck labs in the morning.    #2 intractable abdominal pain -differential includes constipation seen on KUB versus UC versus potential nephrolithiasis as mentioned above.  Supportive care with fluids at this time.  Nausea meds.  Would like to give the patient a dose of Relistor but will confirm with GI if this is okay.  Supportive care.    #3 constipation -patient has received a lot of pain medications this month with his UC flare.  Last CAT scan showed no signs of obstruction and improving stranding.  X-ray here shows no signs obstruction.  We will start Colace and MiraLAX p.o.  Mesalamine enemas as previously ordered by GI.  GI to evaluate.  Again as above would like to do a dose of Relistor if  florian.    #4 essential hypertension -continue metoprolol but hold lisinopril given KE    #5 CAD -previous stents 14 months ago.  Continue to hold Plavix.  On aspirin.  Continue statin.  Continue beta-blocker.    #6 DM2 -we will give Levemir at half dose 10 units.  Sliding scale insulin.  Hypoglycemia protocol.    Code Status/Advanced Care Plan: Full code    The patient's surrogate decision maker is his wife Nannette.     I discussed my findings and recommendations with the patient and wife at bedside.    Estimated length of stay is 2+ days.     The patient was seen and examined by me on 6/18/21 at 4:40 PM.    Electronically signed by Neeraj Blunt DO, 06/18/21, 17:06 CDT.

## 2021-06-18 NOTE — ED PROVIDER NOTES
"Subjective   History of Present Illness    Patient is a 80-year-old male presenting to ED with persistent left lower quadrant abdominal pain.  PMH significant for non-insulin-dependent diabetes, hyperlipidemia, hypertension, coronary artery disease, recent diagnosis of ulcerative colitis.  Patient states 6 days ago he developed a left lower quadrant pain for which she was seen in the ED\" they found nothing.\"  Patient reports that he continued to have worsening pain in this area which then started radiating to his back at which time 2 days ago he returned to the ED and was \"once again told there was nothing wrong.\"  Patient states that while in the ED his pain was controlled however every time he goes home the pain is worsened and unrelenting.  Patient denies any improvement with [place] today.  Patient states that at this time he is only having left lower quadrant pain and over the past 2 days has developed constipation.  Patient reports yesterday he did have an episode \"of little black seeds coming out in my urine.\"  Patient denies any change for better or worse of the pain with bowel movements, urination, or position.  Patient denies fevers, chills, diaphoresis, right-sided abdominal pain, testicular pain, scrotal swelling.  Patient reports that today he did develop some nausea but has not yet had any emesis.    Records reviewed show patient was admitted on 6/4/2021 for a colonoscopy due to ulcerative colitis with complication performed by Dr. Menjivar.  Colonoscopy showed: Diffuse area of moderately friable, with contact bleeding, inflamed, nodular, pseudopolypoid, scarred, and ulcerated mucosa in the entire colon.  Patient started on prednisone 10 mg.     Patient was in seen in the ED on 6/12/2021 at which time he was diagnosed with left lower quadrant abdominal pain, nausea, colitis.  Patient had a CT of the abdomen and pelvis which showed: Circumferential wall thickening involving proximal sigmoid colon with " colonic induration with associated mesenteric lymph nodule prominence, focal area colitis/diverticulitis as well as malignancy considered.  Mild linear atelectasis in lung bases, bilateral peripelvic cyst, with nephrogenic cyst, advanced degenerative changes in the thoracolumbar spine.  Patient was sent home with a prescription for Augmentin.    Patient was then seen in the ED on 6/16/21 for diverticulitis, left lower quadrant abdominal pain, hematuria.  Patient had an additional CT of the abdomen and pelvis which showed: Similar to mildly decreased mild stranding and possible mild wall thickening of sigmoid colon with numerous diverticuli, no free air or drainable collection, mild bianka mesentery which can be seen with mesenteric panniculitis, minimal dependent atelectasis in lung bases.  Patient was given additional prescriptions for Zofran and Norco.    Patient followed up with the GI office yesterday for management of ulcerative proctitis with complication.  Patient was started on mesalamine enemas at night, tapered off his steroids, advised to avoid aspirin and NSAIDs and had a stelara level ordered.     Review of Systems   Constitutional: Negative.  Negative for chills, diaphoresis and fever.   HENT: Negative.    Eyes: Negative.    Respiratory: Negative.    Cardiovascular: Negative.    Gastrointestinal: Positive for abdominal pain (LLQ), constipation (x2 days) and nausea. Negative for vomiting.   Genitourinary: Positive for hematuria (yesterday, resolved today). Negative for dysuria, flank pain, scrotal swelling and testicular pain.   Musculoskeletal: Negative.    Skin: Negative.    Neurological: Negative.    Psychiatric/Behavioral: Negative.    All other systems reviewed and are negative.      Past Medical History:   Diagnosis Date   • Asthma    • Coronary artery disease    • Diabetes mellitus (CMS/HCC)    • Hyperlipidemia    • Hypertension    • Sleep apnea     cpap at [facility]       Allergies   Allergen Reactions    • Albuterol Other (See Comments)   • Adhesive Tape Rash   • Azithromycin Rash       Past Surgical History:   Procedure Laterality Date   • BACK SURGERY     • CARDIAC CATHETERIZATION      stents x 6   • CATARACT EXTRACTION     • COLONOSCOPY     • COLONOSCOPY N/A 6/4/2021    Procedure: COLONOSCOPY WITH ANESTHESIA;  Surgeon: Zachariah Menjivar DO;  Location: [facility] ENDOSCOPY;  Service: Gastroenterology;  Laterality: N/A;  pre hx ulcerative colitis  post ulcerative colitis  dr collins gusman   • CORONARY ANGIOPLASTY WITH STENT PLACEMENT     • HIP SURGERY Right     total hip       Family History   Problem Relation Age of Onset   • Colon cancer Brother    • Colon polyps Neg Hx    • Esophageal cancer Neg Hx        Social History     Socioeconomic History   • Marital status:      Spouse name: Not on file   • Number of children: Not on file   • Years of education: Not on file   • Highest education level: Not on file   Tobacco Use   • Smoking status: Never Smoker   • Smokeless tobacco: Never Used   Vaping Use   • Vaping Use: Never used   Substance and Sexual Activity   • Alcohol use: Never   • Drug use: Never   • Sexual activity: Defer           Objective   Physical Exam  Vitals and nursing note reviewed.   Constitutional:       General: He is in acute distress (appears uncomfortable due to pain).      Appearance: Normal appearance. He is well-developed and well-groomed. He is obese. He is not toxic-appearing or diaphoretic.   HENT:      Head: Normocephalic.      Mouth/Throat:      Mouth: Mucous membranes are moist.      Pharynx: Oropharynx is clear.   Eyes:      Conjunctiva/sclera: Conjunctivae normal.      Pupils: Pupils are equal, round, and reactive to light.   Cardiovascular:      Rate and Rhythm: Normal rate and regular rhythm.   Pulmonary:      Effort: Pulmonary effort is normal.      Breath sounds: Normal breath sounds.   Abdominal:      General: Bowel sounds are normal.      Palpations: Abdomen is soft.       Tenderness: There is abdominal tenderness in the left lower quadrant. There is no right CVA tenderness or left CVA tenderness.   Musculoskeletal:         General: Normal range of motion.      Cervical back: Normal range of motion.      Right lower leg: No edema.      Left lower leg: No edema.   Skin:     General: Skin is warm and dry.      Coloration: Skin is not jaundiced or pale.   Neurological:      Mental Status: He is alert and oriented to person, place, and time.   Psychiatric:         Attention and Perception: Attention normal.         Mood and Affect: Mood and affect normal.         Speech: Speech normal.         Behavior: Behavior normal. Behavior is cooperative.         Procedures           ED Course  ED Course as of Jun 18 1950 Fri Jun 18, 2021   [address] - miralax bid for 7 days, mag citrate first  Back off once diarrhea  Clear liquid  Low residue for 7 days  Then high fiber diest     [JS]      ED Course User Index  [JS] Jamari Barbour PA-C                                           MDM  Number of Diagnoses or Management Options     Amount and/or Complexity of Data Reviewed  Clinical lab tests: reviewed and ordered  Tests in the radiology section of CPT®: reviewed and ordered  Tests in the medicine section of CPT®: ordered and reviewed  Decide to obtain previous medical records or to obtain history from someone other than the patient: yes  Review and summarize past medical records: yes  Discuss the patient with other providers: yes (Dr. Ashly Polanco (attending))        Patient is a 80-year-old male presenting to ED with persistent left lower quadrant abdominal pain.  PMH significant for non-insulin-dependent diabetes, hyperlipidemia, hypertension, coronary artery disease, recent diagnosis of ulcerative colitis.  CBC with no acute findings including normal white blood cell count at 10.64, stable H&H.  CMP with hyperglycemia 138, creatinine 1.58, GFR 43.  Urine studies with no evidence of infection  "or hematuria.  Lipase normal at 18.  Covid not detected. X-ray of the abdomen showed: No acute abdominal findings by radiograph, large amount of colonic stool.  Case was discussed with Dr. Ashly Azul who is in agreement with GI consult.  Case discussed with Dr. Brambila who recommended that patient be given magnesium citrate today at home followed by twice daily MiraLAX for 7 days.  Requested clear liquid diet for 48 hours followed by a week of low residue and a week of high-fiber with subsequent GI follow-up.  Patient had initial but short relief from pain medications.  Discussed with patient plan and initially he was amenable to this.  However patient reported on repeat evaluations that when the pain medicine wears off his pain returns stating \"underskin have to come back until I am admitted because I cannot handle this anymore.\"  Case was discussed with GI again who reported they would kindly consult on patient during admission but continued to recommend bowel cleanout.  Discussed with patient and patient is outpatient he continues to request admission.  Case discussed with Dr. Bansal who will kindly accept patient for admission under the services of Dr. Silver.    Final diagnoses:   LLQ pain   Constipation, unspecified constipation type   Ulcerative colitis with complication, unspecified location (CMS/[facility])       ED Disposition  ED Disposition     ED Disposition Condition Comment    Decision to Admit  Level of Care: Med/Surg [1]   Diagnosis: KE (acute kidney injury) (CMS/[facility]) [[id number]]   Admitting Physician: ROBIN SILVER [[id number]]   Attending Physician: ROBIN SILVER [[id number]]   Isolate for COVID?: No [0]   Certification: I Certify That Inpatient Hospital Services Are Medically Necessary For Greater Than 2 Midnights            Zachariah Menjivar W, DO  [address] [address]  [phone]    Schedule an appointment as soon as possible for a visit in 2 days      Monique Carlos, " MD  [address] [facility] [address]  [facility] [address]  [phone]    Schedule an appointment as soon as possible for a visit in 2 days      [facility] Emergency Department  [address] [place] [id number] [phone]    As needed         Medication List      New Prescriptions    magnesium citrate solution  Take 296 mL by mouth 1 (One) Time for 1 dose.     polyethylene glycol 17 g packet  Commonly known as: MIRALAX  Take 17 g by mouth 2 (Two) Times a Day for 7 days.           Where to Get Your Medications      These medications were sent to [facility] [address] [place] [address] [address] [facility] - [phone]  - [phone] [address] [address]    Phone: [phone]   · magnesium citrate solution  · polyethylene glycol 17 g packet          Jamari Barbour PA-C  06/18/21 1951

## 2021-06-18 NOTE — TELEPHONE ENCOUNTER
Notified patient that after Dr Polanco reviewed his heart cath images he concluded that Mr Acosta may stop plavix but needs to remain on aspirin. Patient voiced understanding. Isaura Merchant MA

## 2021-06-18 NOTE — DISCHARGE INSTRUCTIONS
As we discussed please drink the magnesium citrate today and then begin using MiraLAX twice a days for 7 days.  Should you develop diarrhea you may cut back on your MiraLAX to once a day.  For the next 48 hours please use a clear liquid diet.  After that use a low residue diet for 7 days.  Please then use a high-fiber diet for 7 days.  Please make sure to call Dr. Menjivar's office for a follow-up appointment this week for further evaluation and monitoring.  Please complete your antibiotics which were previously prescribed in their entirety.      Ulcerative Colitis, Adult    Ulcerative colitis is long-lasting (chronic) inflammation of the large intestine (colon) and rectum. Sores (ulcers) may also form in these areas.  Ulcerative colitis, along with a closely related condition called Crohn's disease, is often referred to as inflammatory bowel disease (IBD).  What are the causes?  This condition may be caused by increased activity of the immune system in the intestines. The immune system is the system that protects the body against harmful bacteria, viruses, fungi, and other things that can make you sick. The cause of the increased activity of the immune system is not known.  What increases the risk?  The following factors may make you more likely to develop this condition:  · Being 15-40 years old. The risk is also increased for people who are 50-70 years old.  · Having a family history of ulcerative colitis.  · Being of Buddhist descent.  What are the signs or symptoms?  Symptoms vary depending on how severe the condition is. Common symptoms include:  · Rectal bleeding.  · Diarrhea, often with blood or pus in the stool.  Other symptoms can include:  · Pain or cramping in the abdomen.  · Fever.  · Fatigue.  · Weight loss.  · Night sweats.  · Rectal pain.  · A strong and sudden need to have a bowel movement (bowel urgency).  · Nausea.  · Loss of appetite.  · Anemia.  · Yellowing of the skin (jaundice) from liver  dysfunction.  · Joint pain or soreness.  · Eye irritation.  · Skin rashes.  Symptoms can range from mild to severe. They may come and go.  How is this diagnosed?  This condition may be diagnosed based on:  · Your symptoms and medical history.  · A physical exam.  · Tests, including:  ? Blood tests and stool tests.  ? X-ray.  ? A CT scan.  ? An MRI.  ? Colonoscopy. For this test, a flexible tube is inserted into your anus, and your colon is examined.  ? Biopsy. In this test, a tissue sample is taken from your colon and examined under a microscope.  How is this treated?  Treatment for this condition may include medicines to:  · Decrease swelling and inflammation.  · Control your immune system.  · Treat infections.  · Relieve pain.  · Control diarrhea.  Severe flare-ups may need to be treated at a hospital. Treatment in a hospital may involve:  · Resting the bowel. This involves not eating or drinking for a period of time.  · Getting medicines through an IV.  · Getting fluids and nutrition through:  ? An IV.  ? A tube that is passed through the nose and into the stomach (nasogastric tube, or NG tube).  · Surgery to remove the affected part of the colon. This may be done if other treatments are not helping.  This condition increases the risk of colon cancer. Adults with this condition will need to be watched for colon cancer throughout life.  Follow these instructions at home:  Medicines and vitamins  · Take over-the-counter and prescription medicines only as told by your health care provider. Do not take aspirin.  · If you were prescribed an antibiotic medicine, take it as told by your health care provider. Do not stop taking the antibiotic even if you start to feel better.  · Ask your health care provider if you should take any vitamins or supplements. You may need to take:  ? Calcium and vitamin D for bone health.  ? Iron to help treat anemia.  Lifestyle  · Exercise regularly.  · Work with your health care provider  to manage your condition and educate yourself about your condition.  · Do not use any products that contain nicotine or tobacco, such as cigarettes, e-cigarettes, and chewing tobacco. If you need help quitting, ask your health care provider.  · If you drink alcohol:  ? Limit how much you use to:  § 0-1 drink a day for women.  § 0-2 drinks a day for men.  ? Be aware of how much alcohol is in your drink. In the [place] one drink equals one 12 oz bottle of beer (355 mL), one 5 oz glass of wine (148 mL), or one 1½ oz glass of hard liquor (44 mL).  Eating and drinking  · Drink enough fluid to keep your urine pale yellow.  · Ask your health care provider about the best diet for you. Follow the diet as told by your health care provider. This may include:  ? Avoiding carbonated drinks.  ? Avoiding popcorn, vegetable skins, nuts, and other high-fiber foods.  ? Avoiding high-fat foods.  ? Eating smaller meals more often.  ? Limiting your intake of sugary drinks.  ? Limiting your caffeine intake.  · Follow food safety recommendations as told by your health care provider. This may include making sure you:  ? Avoid eating raw or undercooked meat, fish, or eggs.  ? Do not eat or drink spoiled or  foods and drinks.  · Keep a food diary. This may help you identify and avoid any foods that trigger your symptoms.  General instructions  · Wash your hands often with soap and water. If soap and water are not available, use hand .  · Stay up to date on your vaccinations, including a yearly (annual) flu shot. Ask your health care provider which vaccines you should get.  · Follow recommendations from your health care provider for having cancer screening tests. Ulcerative colitis may place you at increased risk for colon cancer.  · Keep all follow-up visits as told by your health care provider. This is important.  Contact a health care provider if:  · Your symptoms do not improve or they get worse with treatment.  · You  continue to lose weight.  · You have constant cramps or loose stools.  · You develop a new skin rash, skin sores, or eye problems.  · You have a fever or chills.  Get help right away if:  · You have bloody diarrhea.  · You have severe bleeding from the rectum.  · You feel that your heart is racing (tachycardia).  · You have severe pain in your abdomen.  · Your abdomen swells (abdominal distension).  · Your abdomen is tender to the touch.  · You vomit.  Summary  · Ulcerative colitis is long-lasting (chronic) inflammation of the large intestine (colon) and rectum. Sores (ulcers) may also form in these areas.  · Follow instructions from your health care provider about medicines, lifestyle changes, and eating and drinking.  · Contact your health care provider if symptoms do not improve or they get worse with treatment.  · Get help right away if you have severe abdominal pain, abdominal swelling, or severe bleeding from the rectum.  · Keep all follow-up visits as told by your health care provider. This is important.  This information is not intended to replace advice given to you by your health care provider. Make sure you discuss any questions you have with your health care provider.  Document Revised: 10/07/2019 Document Reviewed: 10/09/2019  [employer] Patient Education © 2021 [employer]

## 2021-06-19 LAB
ALBUMIN SERPL-MCNC: 3.1 G/DL (ref 3.5–5.2)
ALBUMIN/GLOB SERPL: 1 G/DL
ALP SERPL-CCNC: 63 U/L (ref 39–117)
ALT SERPL W P-5'-P-CCNC: 13 U/L (ref 1–41)
ANION GAP SERPL CALCULATED.3IONS-SCNC: 9 MMOL/L (ref 5–15)
AST SERPL-CCNC: 14 U/L (ref 1–40)
BASOPHILS # BLD AUTO: 0.05 10*3/MM3 (ref 0–0.2)
BASOPHILS NFR BLD AUTO: 0.6 % (ref 0–1.5)
BILIRUB SERPL-MCNC: 0.5 MG/DL (ref 0–1.2)
BUN SERPL-MCNC: 19 MG/DL (ref 8–23)
BUN/CREAT SERPL: 19.6 (ref 7–25)
CALCIUM SPEC-SCNC: 8.3 MG/DL (ref 8.6–10.5)
CHLORIDE SERPL-SCNC: 104 MMOL/L (ref 98–107)
CO2 SERPL-SCNC: 25 MMOL/L (ref 22–29)
CREAT SERPL-MCNC: 0.97 MG/DL (ref 0.76–1.27)
DEPRECATED RDW RBC AUTO: 49.5 FL (ref 37–54)
EOSINOPHIL # BLD AUTO: 0.22 10*3/MM3 (ref 0–0.4)
EOSINOPHIL NFR BLD AUTO: 2.6 % (ref 0.3–6.2)
ERYTHROCYTE [DISTWIDTH] IN BLOOD BY AUTOMATED COUNT: 17.5 % (ref 12.3–15.4)
GFR SERPL CREATININE-BSD FRML MDRD: 75 ML/MIN/1.73
GLOBULIN UR ELPH-MCNC: 3 GM/DL
GLUCOSE BLDC GLUCOMTR-MCNC: 124 MG/DL (ref 70–130)
GLUCOSE BLDC GLUCOMTR-MCNC: 154 MG/DL (ref 70–130)
GLUCOSE BLDC GLUCOMTR-MCNC: 161 MG/DL (ref 70–130)
GLUCOSE BLDC GLUCOMTR-MCNC: 85 MG/DL (ref 70–130)
GLUCOSE SERPL-MCNC: 92 MG/DL (ref 65–99)
HCT VFR BLD AUTO: 33.7 % (ref 37.5–51)
HGB BLD-MCNC: 10.1 G/DL (ref 13–17.7)
IMM GRANULOCYTES # BLD AUTO: 0.03 10*3/MM3 (ref 0–0.05)
IMM GRANULOCYTES NFR BLD AUTO: 0.4 % (ref 0–0.5)
LYMPHOCYTES # BLD AUTO: 1.59 10*3/MM3 (ref 0.7–3.1)
LYMPHOCYTES NFR BLD AUTO: 19.1 % (ref 19.6–45.3)
MAGNESIUM SERPL-MCNC: 2.2 MG/DL (ref 1.6–2.4)
MCH RBC QN AUTO: 23.5 PG (ref 26.6–33)
MCHC RBC AUTO-ENTMCNC: 30 G/DL (ref 31.5–35.7)
MCV RBC AUTO: 78.4 FL (ref 79–97)
MONOCYTES # BLD AUTO: 0.94 10*3/MM3 (ref 0.1–0.9)
MONOCYTES NFR BLD AUTO: 11.3 % (ref 5–12)
NEUTROPHILS NFR BLD AUTO: 5.49 10*3/MM3 (ref 1.7–7)
NEUTROPHILS NFR BLD AUTO: 66 % (ref 42.7–76)
NRBC BLD AUTO-RTO: 0 /100 WBC (ref 0–0.2)
PLATELET # BLD AUTO: 210 10*3/MM3 (ref 140–450)
PMV BLD AUTO: 10.4 FL (ref 6–12)
POTASSIUM SERPL-SCNC: 4.1 MMOL/L (ref 3.5–5.2)
PROT SERPL-MCNC: 6.1 G/DL (ref 6–8.5)
RBC # BLD AUTO: 4.3 10*6/MM3 (ref 4.14–5.8)
SODIUM SERPL-SCNC: 138 MMOL/L (ref 136–145)
WBC # BLD AUTO: 8.32 10*3/MM3 (ref 3.4–10.8)

## 2021-06-19 PROCEDURE — 63710000001 INSULIN DETEMIR PER 5 UNITS: Performed by: INTERNAL MEDICINE

## 2021-06-19 PROCEDURE — 83735 ASSAY OF MAGNESIUM: CPT | Performed by: INTERNAL MEDICINE

## 2021-06-19 PROCEDURE — 85025 COMPLETE CBC W/AUTO DIFF WBC: CPT | Performed by: INTERNAL MEDICINE

## 2021-06-19 PROCEDURE — 25010000002 METHYLNALTREXONE 12 MG/0.6ML SOLUTION: Performed by: INTERNAL MEDICINE

## 2021-06-19 PROCEDURE — 96372 THER/PROPH/DIAG INJ SC/IM: CPT

## 2021-06-19 PROCEDURE — 63710000001 PREDNISONE PER 5 MG: Performed by: INTERNAL MEDICINE

## 2021-06-19 PROCEDURE — 96361 HYDRATE IV INFUSION ADD-ON: CPT

## 2021-06-19 PROCEDURE — 63710000001 INSULIN LISPRO (HUMAN) PER 5 UNITS: Performed by: INTERNAL MEDICINE

## 2021-06-19 PROCEDURE — 82962 GLUCOSE BLOOD TEST: CPT

## 2021-06-19 PROCEDURE — 80053 COMPREHEN METABOLIC PANEL: CPT | Performed by: INTERNAL MEDICINE

## 2021-06-19 RX ADMIN — METHYLNALTREXONE BROMIDE 12 MG: 12 INJECTION, SOLUTION SUBCUTANEOUS at 10:25

## 2021-06-19 RX ADMIN — FINASTERIDE 5 MG: 5 TABLET, FILM COATED ORAL at 08:32

## 2021-06-19 RX ADMIN — ASPIRIN 81 MG: 81 TABLET, COATED ORAL at 08:31

## 2021-06-19 RX ADMIN — SODIUM CHLORIDE 50 ML/HR: 9 INJECTION, SOLUTION INTRAVENOUS at 14:47

## 2021-06-19 RX ADMIN — ROSUVASTATIN CALCIUM 40 MG: 20 TABLET, FILM COATED ORAL at 08:32

## 2021-06-19 RX ADMIN — INSULIN DETEMIR 5 UNITS: 100 INJECTION, SOLUTION SUBCUTANEOUS at 20:15

## 2021-06-19 RX ADMIN — METOPROLOL TARTRATE 25 MG: 25 TABLET, FILM COATED ORAL at 08:32

## 2021-06-19 RX ADMIN — DOCUSATE SODIUM 100 MG: 100 CAPSULE ORAL at 08:32

## 2021-06-19 RX ADMIN — PREDNISONE 10 MG: 10 TABLET ORAL at 08:32

## 2021-06-19 RX ADMIN — METOPROLOL TARTRATE 25 MG: 25 TABLET, FILM COATED ORAL at 20:15

## 2021-06-19 RX ADMIN — SODIUM CHLORIDE 100 ML/HR: 9 INJECTION, SOLUTION INTRAVENOUS at 04:51

## 2021-06-19 RX ADMIN — INSULIN LISPRO 2 UNITS: 100 INJECTION, SOLUTION INTRAVENOUS; SUBCUTANEOUS at 12:21

## 2021-06-19 RX ADMIN — MESALAMINE 4 G: 4 ENEMA RECTAL at 12:24

## 2021-06-19 RX ADMIN — POLYETHYLENE GLYCOL (3350) 17 G: 17 POWDER, FOR SOLUTION ORAL at 08:31

## 2021-06-19 RX ADMIN — DOCUSATE SODIUM 100 MG: 100 CAPSULE ORAL at 20:15

## 2021-06-19 NOTE — PROGRESS NOTES
Patient stated he has home cpap. He stated he might go home today but if not his wife would bring his in. I put in worklist

## 2021-06-19 NOTE — PROGRESS NOTES
[facility] Medicine Services  INPATIENT PROGRESS NOTE    Length of Stay: 1  Date of Admission: 6/18/2021  Primary Care Physician: Monique Carlos MD    Subjective   Chief Complaint: Follow-up abdominal pain, constipation.  HPI   History of ulcerative colitis with ongoing issues of abdominal pain and ulcerative colitis flare.  Recently on Augmentin.  Recent CT scan showed improvement of stranding along sigmoid colon.  GI started mesalamine enemas and weaning prednisone down to 10 mg.  Patient presented to ER with abdominal pain and decreased oral intake and mild nausea.  Patient ported black specks in his urine.  KUB reported large stool burden.  Creatinine 1.58.    Sitting up in chair.  Wife in room.  Patient is still not had a bowel movement.  He does have active bowel sounds.  He reports his urine is very dark and bloody.  I looked at the urine in the urinal and it appears to be very dark old blood almost tea colored.  Per urology this can be consistent with recent stone passing.  Will arrange follow-up with urology for outpatient cystoscopy and will need CT urogram.  Patient denies chest pain or palpitations.  No oxygen in use.  Wears CPAP at night.    Review of Systems   Constitutional: Positive for appetite change. Negative for chills, fatigue and fever.   HENT: Negative for congestion and trouble swallowing.    Eyes: Negative for photophobia and visual disturbance.   Respiratory: Negative for cough, shortness of breath and wheezing.    Cardiovascular: Negative for chest pain, palpitations and leg swelling.   Gastrointestinal: Positive for abdominal pain and constipation.   Endocrine: Negative for cold intolerance, heat intolerance and polyuria.   Genitourinary: Positive for hematuria. Negative for dysuria and frequency.   Musculoskeletal: Negative for back pain and gait problem.   Skin: Negative for color change, pallor, rash and wound.   Allergic/Immunologic: Negative  for immunocompromised state.   Neurological: Positive for weakness. Negative for light-headedness.   Hematological: Negative for adenopathy. Does not bruise/bleed easily.   Psychiatric/Behavioral: Negative for agitation, behavioral problems and confusion.      All pertinent negatives and positives are as above. All other systems have been reviewed and are negative unless otherwise stated.     Objective    Temp:  [98 °F (36.7 °C)-99.9 °F (37.7 °C)] 98.1 °F (36.7 °C)  Heart Rate:  [58-81] 59  Resp:  [16-18] 16  BP: (106-160)/(53-78) 106/61  Physical Exam  Vitals and nursing note reviewed.   Constitutional:       Appearance: He is obese.      Comments: Sitting up in chair.  No oxygen in use.  Wife in room.   HENT:      Head: Normocephalic and atraumatic.      Nose: No congestion.      Mouth/Throat:      Pharynx: Oropharynx is clear. No oropharyngeal exudate.   Eyes:      Extraocular Movements: Extraocular movements intact.      Pupils: Pupils are equal, round, and reactive to light.   Cardiovascular:      Rate and Rhythm: Normal rate and regular rhythm.      Comments: Normal sinus rhythm  on telemetry.  Pulmonary:      Breath sounds: No wheezing, rhonchi or rales.      Comments: No oxygen in use.  Abdominal:      Tenderness: There is abdominal tenderness.      Comments: Abdomen distended.  Bowel sounds noted.  Hyperactive.   Genitourinary:     Comments: Voiding.  Urine very dark, old bloody colored.  Denies dysuria.  Musculoskeletal:         General: No swelling or tenderness.      Cervical back: Normal range of motion and neck supple.   Skin:     General: Skin is warm and dry.   Neurological:      General: No focal deficit present.      Mental Status: He is alert and oriented to person, place, and time.   Psychiatric:         Mood and Affect: Mood normal.         Behavior: Behavior normal.         Thought Content: Thought content normal.         Judgment: Judgment normal.       Results Review:  I have reviewed  the labs, radiology results, and diagnostic studies.    Laboratory Data:      Results from last 7 days   Lab Units 06/19/21  0518 06/18/21  1112 06/16/21  1732 06/16/21  1538   WBC 10*3/mm3 8.32 10.64 10.04 9.68   HEMOGLOBIN g/dL 10.1* 11.7* 11.8* 11.7*   HEMATOCRIT % 33.7* 38.7 37.6 38.4   PLATELETS 10*3/mm3 210 248 281 280        Results from last 7 days   Lab Units 06/19/21  0518 06/18/21  1112 06/16/21  1732 06/16/21  1538   SODIUM mmol/L 138 138 136 135*   POTASSIUM mmol/L 4.1 4.0 5.0 5.6*   CHLORIDE mmol/L 104 101 103 102   TOTAL CO2 mmol/L  --   --   --  24.4   CO2 mmol/L 25.0 27.0 21.0*  --    BUN mg/dL 19 21 17 15   CREATININE mg/dL 0.97 1.58* 0.93 0.95   GLUCOSE mg/dL 92 138* 202*  --    CALCIUM mg/dL 8.3* 9.2 8.9 9.0   ALT (SGPT) U/L 13 19 22 23     Culture Data:      Urine Culture   Date Value Ref Range Status   06/16/2021 No growth  Final     Radiology Data:   Imaging Results (Last 7 Days)     Procedure Component Value Units Date/Time    CT Abdomen Pelvis Stone Protocol [[id number]] Collected: 06/18/21 1819     Updated: 06/18/21 1830    Narrative:      CT ABDOMEN PELVIS STONE PROTOCOL- 6/18/2021 6:04 PM CDT     HISTORY: hematuria, neelima, r/o stone and or obstruction/hydro; R10.32-Left  lower quadrant pain; K59.00-Constipation, unspecified;  K51.919-Ulcerative colitis, unspecified with unspecified complications      COMPARISON: CT scan dated 6/16/2021      DOSE LENGTH PRODUCT: 848 mGy cm. Automated exposure control was also  utilized to decrease patient radiation dose.     TECHNIQUE: Noncontrast enhanced images of the abdomen and pelvis  obtained without oral contrast. Multiplanar reformatted images were  provided for review.      FINDINGS:   LOWER CHEST: Mild dependent atelectasis on the right. Coronary  atheromatous calcification.      LIVER: No focal liver lesion within limits of a noncontrast study.      BILIARY SYSTEM: The gallbladder is unremarkable. No intrahepatic or  extrahepatic ductal dilatation.       PANCREAS: No focal pancreatic lesion within limits of a noncontrast  study.      SPLEEN: Unremarkable.      KIDNEYS: There is a small amount of layering blood in the left renal  pelvis. Bilateral parapelvic cysts. No urolithiasis identified.  Bilateral mild perinephric stranding. The ureters are decompressed.      ADRENALS: Unremarkable.     RETROPERITONEUM: No mass, lymphadenopathy or hemorrhage.      GI TRACT: The stomach and small bowel are unremarkable. Descending and  sigmoid colon diverticulosis. Mild stool in the ascending colon.      OTHER: There is no mesenteric mass, lymphadenopathy or fluid collection.  Advanced lumbar spondylosis. Right hip arthroplasty. No acute bony  abnormality.      PELVIS: No mass lesion, fluid collection or significant lymphadenopathy  is seen in the pelvis. The urinary bladder is normal in appearance.       Impression:      1. There is a small amount of layering blood in the left renal pelvis.  Unsure as to the exact etiology. No urolithiasis is seen. Bilateral mild  perinephric stranding which seems likely chronic. Cannot exclude upper  tract infection.  2. Colonic diverticulosis. Thickening identified along the sigmoid colon  which is less well evaluated as compared with the recent  contrast-enhanced exam. This does not appear obviously worsened over the  short interval.  This report was finalized on 06/18/2021 18:25 by Dr Tato Smith, .    XR Abdomen KUB [[id number]] Collected: 06/18/21 1227     Updated: 06/18/21 1231    Narrative:      EXAM: XR ABDOMEN KUB- - 6/18/2021 12:20 PM CDT     HISTORY: constipation       COMPARISON: No existing relevant imaging studies available.      TECHNIQUE:  1 images.  Supine view the abdomen     FINDINGS:    Limited evaluation for free air on supine imaging. No evidence of  dilated loops of bowel. Large amount of right and transverse colonic  stool. Pelvic phleboliths. No nephrolithiasis by radiograph. Right hip  replacement. Degenerative  changes in the spine and pelvis.          Impression:      No acute abdominal finding by radiograph.  This report was finalized on 06/18/2021 12:28 by Dr Ashly Rizzo MD.        Intake/Output    Intake/Output Summary (Last 24 hours) at 6/19/2021 1325  Last data filed at 6/19/2021 1123  Gross per 24 hour   Intake 1515 ml   Output 1475 ml   Net 40 ml     Scheduled Meds  aspirin, 81 mg, Oral, Daily  docusate sodium, 100 mg, Oral, BID  finasteride, 5 mg, Oral, Daily  insulin detemir, 10 Units, Subcutaneous, Nightly  insulin lispro, 2-7 Units, Subcutaneous, TID AC  mesalamine, 4 g, Rectal, Daily  metoprolol tartrate, 25 mg, Oral, Q12H  polyethylene glycol, 17 g, Oral, Daily  predniSONE, 10 mg, Oral, Daily  rosuvastatin, 40 mg, Oral, Daily  sodium chloride, 10 mL, Intravenous, Q12H      I have reviewed the patient current medications.     Assessment/Plan     Active Hospital Problems    Diagnosis    • **KE (acute kidney injury) (CMS/[facility])    • Intractable abdominal pain    • Constipation    • Hematuria    • Hyperlipidemia    • Essential hypertension      Last Assessment & Plan:   Hypertension is controlled.  Dietary sodium restriction.  Weight loss.  Regular aerobic exercise.  Continue current medications.  Blood pressure will be reassessed at the next regular appointment.     • Type 2 diabetes mellitus without complication (CMS/[facility])      Currently on 20un lantus, glipizide.    Due to patient being on insulin would recommend he check his blood sugars 4 times per day    Recent eye exam.    Jardiance, farxiga too expensive, onglyza didn't work.  Did not tolerate metformin due to GI upset.    A1c     6.2% nov 2020. Continue glipizide 10mg bid lantus 10-20un. Bases on BS at night.  6.5% august 2020. Improved. Continue current regimen Says medicare didn't cover sue. He checks BS at bedtime.  6.9% jan 2020 he is having some elevated blood sugars due to high dose of steroids for ulcerative colitis.    Requesting to have  implantable device  7.6% Oct 2019  6.8% July   7.2% April 2019  7.1% December 2018  7.7% July 2018    Last Assessment & Plan:   History of type 2 diabetes.  Currently controlled with metformin 850 mg p.o. daily.  Will hold while inpatient instead place patient on moderate sliding scale NovoLog.  Will obtain Accu-Cheks a.c. HS.  60 g carbohydrate diet.  Most recent A1c 7.0 approximately 2 months ago.     • CAD (coronary artery disease)      3v CABG in '12  Non-STEMI April 2020 diagnostic cath 4/13/2020 showed LIMA to LAD atretic, SVG to diagonal patent with significant backflow from diagonal graft into the LAD and down the LAD, SVG to PDA patent but not providing flow to PL branch because of proximal and mid native vessel (R-PDA) disease.  Left main 60-70% diffuse disease compromising flow into ungrafted circumflex territory.  Subsequently referred for consideration of redo CABG versus complex PCI.    -Return to Cath Lab 4/14/2020 with 4.0 x 8 mm drug-eluting stent placement to the left main (for purpose of improving flow into ungrafted circumflex territory), as well as for overlapping Julianne drug-eluting stents from the ostium into mid-RCA (3.5 x 12, 3.5 x 15, 3.5 x 12, 3.5 x 8) along with 3.5 x 12 mm Julianne FRANKY at the takeoff of the right-PL branch.  80-90% stenosis at the ostium of the R-PDA not treated since vein graft to PDA open, but not providing any retrograde filling beyond this lesion into the PL system.  There was PTCA alone to area in the distal RCA between stented segments for reported inability to deliver a stent to the segment.  Balloon angioplasty was performed with a 3.5 mm balloon with reported residual stenosis of less than 10%.         • Ulcerative colitis (CMS/HCC)      Plan:  1.  To ER 6/18 with abdominal pain.  History of ulcerative colitis with recurrent colitis flare.  Has been on Augmentin.  CT scan showed improvement in stranding along sigmoid colon.  GI started mesalamine enemas and  weaning off steroids prednisone 10 mg daily.  Continued abdominal pain with decreased oral intake and nausea but no vomiting.  Reported black specks in urine the day before.  Creatinine 1.58.  2.  Acute kidney injury, creatinine 1.58.  IV fluids at 100 mL/h. Creatinine 0.97 today. BMP in a.m. Decrease IV fluids to 50 mL/h.  3.  CT scan of the abdomen 6/18 similar to mildly decreased mild stranding and possible mild wall thickening of the sigmoid colon which has numerous diverticuli.  Colitis versus diverticulitis considerations.  No free air or drainable fluid.  4.  CT abdomen stone protocol small amount of layering blood in the left renal pelvis.  Unsure etiology.  No nephrolithiasis.  Bilateral mild perinephric stranding seems likely chronic.  Cannot exclude upper urinary tract infection.  Colonic diverticulosis.  Thickening along the sigmoid colon less well evaluated compared to recent exam.  5.   Constipation.  Large amount of stool noted on KUB.  GI consulted.  Colace and MiraLAX given.  Relistor given today.  Mesalamine enema tonight.  No bowel movement yet.  6.  Urine with dark hematuria.  Dr. Blunt discussed with Dr. Huggins.  Could be consistent with recent stone passing.  Also,  need to rule out malignancy.  Recommend outpatient cystoscopy.  Will need CT urogram timing to be determined as patient has multiple recent CT scans.  7.  History of hypertension.  Blood pressure 106/61.  Hold lisinopril.  8.  History of stents 14 months ago.  Plavix on hold.  Continue aspirin, beta-blocker.  ACE on old due to elevated creatinine.  9.  Hemoglobin A1c 7.1.  Sliding scale insulin coverage.  Glucose 154, 92, 104.  Levemir 10 units nightly.  Hold glipizide.    Code Status/Advanced Care Plan: Full code  The patient's surrogate decision maker is his wife, Nannette.     The above documentation resulted from a face-to-face encounter by me Gale ESCALANTE, ACNP-BC.    Discharge Planning: I expect patient to be discharged to  home in 1-2 days.    Electronically signed by Gale Crump, BORIS, 6/19/2021, 13:25 CDT.

## 2021-06-19 NOTE — PLAN OF CARE
Goal Outcome Evaluation:  Plan of Care Reviewed With: patient        Progress: no change  Outcome Summary: No c/o pain today. Creat/BUN trending down. IVF. BM today following dose of relistor and mesalamine enema. Voiding dark tea colored urine. Tele on; sinus huy. Ambulated in hallsx2. Up in chair for most of day. Tolerating clears. VSS.

## 2021-06-20 ENCOUNTER — READMISSION MANAGEMENT (OUTPATIENT)
Dept: CALL CENTER | Facility: HOSPITAL | Age: 81
End: 2021-06-20

## 2021-06-20 VITALS
SYSTOLIC BLOOD PRESSURE: 126 MMHG | OXYGEN SATURATION: 94 % | RESPIRATION RATE: 16 BRPM | BODY MASS INDEX: 33.18 KG/M2 | WEIGHT: 224 LBS | HEART RATE: 84 BPM | HEIGHT: 69 IN | DIASTOLIC BLOOD PRESSURE: 66 MMHG | TEMPERATURE: 97.9 F

## 2021-06-20 PROBLEM — R10.32 LLQ PAIN: Status: ACTIVE | Noted: 2021-06-20

## 2021-06-20 LAB
ANION GAP SERPL CALCULATED.3IONS-SCNC: 7 MMOL/L (ref 5–15)
BUN SERPL-MCNC: 15 MG/DL (ref 8–23)
BUN/CREAT SERPL: 18.1 (ref 7–25)
CALCIUM SPEC-SCNC: 8.4 MG/DL (ref 8.6–10.5)
CHLORIDE SERPL-SCNC: 104 MMOL/L (ref 98–107)
CO2 SERPL-SCNC: 27 MMOL/L (ref 22–29)
CREAT SERPL-MCNC: 0.83 MG/DL (ref 0.76–1.27)
GFR SERPL CREATININE-BSD FRML MDRD: 90 ML/MIN/1.73
GLUCOSE BLDC GLUCOMTR-MCNC: 142 MG/DL (ref 70–130)
GLUCOSE BLDC GLUCOMTR-MCNC: 89 MG/DL (ref 70–130)
GLUCOSE SERPL-MCNC: 86 MG/DL (ref 65–99)
POTASSIUM SERPL-SCNC: 4.1 MMOL/L (ref 3.5–5.2)
SODIUM SERPL-SCNC: 138 MMOL/L (ref 136–145)

## 2021-06-20 PROCEDURE — 82962 GLUCOSE BLOOD TEST: CPT

## 2021-06-20 PROCEDURE — G0378 HOSPITAL OBSERVATION PER HR: HCPCS

## 2021-06-20 PROCEDURE — 80048 BASIC METABOLIC PNL TOTAL CA: CPT | Performed by: NURSE PRACTITIONER

## 2021-06-20 PROCEDURE — 63710000001 PREDNISONE PER 5 MG: Performed by: INTERNAL MEDICINE

## 2021-06-20 RX ADMIN — MESALAMINE 4 G: 4 ENEMA RECTAL at 09:32

## 2021-06-20 RX ADMIN — ASPIRIN 81 MG: 81 TABLET, COATED ORAL at 09:28

## 2021-06-20 RX ADMIN — POLYETHYLENE GLYCOL (3350) 17 G: 17 POWDER, FOR SOLUTION ORAL at 09:28

## 2021-06-20 RX ADMIN — FINASTERIDE 5 MG: 5 TABLET, FILM COATED ORAL at 09:28

## 2021-06-20 RX ADMIN — DOCUSATE SODIUM 100 MG: 100 CAPSULE ORAL at 09:28

## 2021-06-20 RX ADMIN — PREDNISONE 10 MG: 10 TABLET ORAL at 09:29

## 2021-06-20 RX ADMIN — METOPROLOL TARTRATE 25 MG: 25 TABLET, FILM COATED ORAL at 09:28

## 2021-06-20 RX ADMIN — ROSUVASTATIN CALCIUM 40 MG: 20 TABLET, FILM COATED ORAL at 09:28

## 2021-06-20 NOTE — DISCHARGE SUMMARY
[facility] Medicine Services  DISCHARGE SUMMARY     Date of Admission: 6/18/2021  Date of Discharge:  6/20/2021  Primary Care Physician: Monique Carlos MD    Presenting Problem/History of Present Illness:  Intractable abdominal pain [R10.9]  KE (acute kidney injury) (CMS/HCC) [N17.9]  LLQ pain [R10.32]     Discharge Diagnoses:  Active Hospital Problems    Diagnosis    • **KE (acute kidney injury) (CMS/[facility])    • LLQ pain    • Intractable abdominal pain    • Constipation    • Hematuria    • Hyperlipidemia    • Essential hypertension      Last Assessment & Plan:   Hypertension is controlled.  Dietary sodium restriction.  Weight loss.  Regular aerobic exercise.  Continue current medications.  Blood pressure will be reassessed at the next regular appointment.     • Type 2 diabetes mellitus without complication (CMS/[facility])      Currently on 20un lantus, glipizide.    Due to patient being on insulin would recommend he check his blood sugars 4 times per day    Recent eye exam.    Jardiance, farxiga too expensive, onglyza didn't work.  Did not tolerate metformin due to GI upset.    A1c     6.2% nov 2020. Continue glipizide 10mg bid lantus 10-20un. Bases on BS at night.  6.5% august 2020. Improved. Continue current regimen Says medicare didn't cover sue. He checks BS at bedtime.  6.9% jan 2020 he is having some elevated blood sugars due to high dose of steroids for ulcerative colitis.    Requesting to have implantable device  7.6% Oct 2019  6.8% July   7.2% April 2019  7.1% December 2018  7.7% July 2018    Last Assessment & Plan:   History of type 2 diabetes.  Currently controlled with metformin 850 mg p.o. daily.  Will hold while inpatient instead place patient on moderate sliding scale NovoLog.  Will obtain Accu-Cheks a.c. HS.  60 g carbohydrate diet.  Most recent A1c 7.0 approximately 2 months ago.     • CAD (coronary artery disease)      3v CABG in '12  Non-STEMI April 2020  diagnostic cath 4/13/2020 showed LIMA to LAD atretic, SVG to diagonal patent with significant backflow from diagonal graft into the LAD and down the LAD, SVG to PDA patent but not providing flow to PL branch because of proximal and mid native vessel (R-PDA) disease.  Left main 60-70% diffuse disease compromising flow into ungrafted circumflex territory.  Subsequently referred for consideration of redo CABG versus complex PCI.    -Return to Cath Lab 4/14/2020 with 4.0 x 8 mm drug-eluting stent placement to the left main (for purpose of improving flow into ungrafted circumflex territory), as well as for overlapping Julianne drug-eluting stents from the ostium into mid-RCA (3.5 x 12, 3.5 x 15, 3.5 x 12, 3.5 x 8) along with 3.5 x 12 mm Julianne FRANKY at the takeoff of the right-PL branch.  80-90% stenosis at the ostium of the R-PDA not treated since vein graft to PDA open, but not providing any retrograde filling beyond this lesion into the PL system.  There was PTCA alone to area in the distal RCA between stented segments for reported inability to deliver a stent to the segment.  Balloon angioplasty was performed with a 3.5 mm balloon with reported residual stenosis of less than 10%.         • Ulcerative colitis (CMS/HCC)      Chief Complaint on Day of Discharge: Denies abdominal pain.  Had a bowel movement.  History of Present Illness on Day of Discharge:   Sitting up in bed.  He feels better.  Abdomen is soft.  He had a bowel movement after mesalamine enema yesterday.  Denies chest pain or palpitations.  Tolerating diet.  Denies nausea or vomiting.  Reports urine is clear today.  Denies chest pain or palpitations.  No oxygen in use.    Consults: Dr. Brambila, gastroenterology    Procedures Performed: None    Pertinent Test Results:   Laboratory Data Last 7 Days:    Lab Results (last 7 days)     Procedure Component Value Units Date/Time    POC Glucose Once [[id number]]  (Normal) Collected: 06/20/21 0802    Specimen: Blood  Updated: 06/20/21 0817     Glucose 89 mg/dL      Comment: : [id number] Steve Khan ID: [id number]       Basic Metabolic Panel [[id number]]  (Abnormal) Collected: 06/20/21 0530    Specimen: Blood Updated: 06/20/21 0648     Glucose 86 mg/dL      BUN 15 mg/dL      Creatinine 0.83 mg/dL      Sodium 138 mmol/L      Potassium 4.1 mmol/L      Chloride 104 mmol/L      CO2 27.0 mmol/L      Calcium 8.4 mg/dL      eGFR Non African Amer 90 mL/min/1.73      BUN/Creatinine Ratio 18.1     Anion Gap 7.0 mmol/L     Narrative:      GFR Normal >60  Chronic Kidney Disease <60  Kidney Failure <15      POC Glucose Once [[id number]]  (Abnormal) Collected: 06/19/21 2012    Specimen: Blood Updated: 06/19/21 2038     Glucose 161 mg/dL      Comment: : [id number] Brandie BeckereMetio ID: [id number]       POC Glucose Once [[id number]]  (Normal) Collected: 06/19/21 1703    Specimen: Blood Updated: 06/19/21 1714     Glucose 124 mg/dL      Comment: : [id number] Barbara AsherleMeter ID: [id number]       POC Glucose Once [[id number]]  (Abnormal) Collected: 06/19/21 1122    Specimen: Blood Updated: 06/19/21 1136     Glucose 154 mg/dL      Comment: : [id number] Arvin AnnMeter ID: [id number]       POC Glucose Once [[id number]]  (Normal) Collected: 06/19/21 0802    Specimen: Blood Updated: 06/19/21 0833     Glucose 85 mg/dL      Comment: : [id number] Arvin AnnMeter ID: [id number]       Comprehensive Metabolic Panel [[id number]]  (Abnormal) Collected: 06/19/21 0518    Specimen: Blood Updated: 06/19/21 0701     Glucose 92 mg/dL      BUN 19 mg/dL      Creatinine 0.97 mg/dL      Sodium 138 mmol/L      Potassium 4.1 mmol/L      Chloride 104 mmol/L      CO2 25.0 mmol/L      Calcium 8.3 mg/dL      Total Protein 6.1 g/dL      Albumin 3.10 g/dL      ALT (SGPT) 13 U/L      AST (SGOT) 14 U/L      Alkaline Phosphatase 63 U/L      Total Bilirubin 0.5 mg/dL      eGFR Non African Amer 75 mL/min/1.73      Globulin 3.0 gm/dL      A/G Ratio 1.0 g/dL       BUN/Creatinine Ratio 19.6     Anion Gap 9.0 mmol/L     Narrative:      GFR Normal >60  Chronic Kidney Disease <60  Kidney Failure <15      Magnesium [[id number]]  (Normal) Collected: 06/19/21 0518    Specimen: Blood Updated: 06/19/21 0701     Magnesium 2.2 mg/dL     CBC Auto Differential [[id number]]  (Abnormal) Collected: 06/19/21 0518    Specimen: Blood Updated: 06/19/21 0640     WBC 8.32 10*3/mm3      RBC 4.30 10*6/mm3      Hemoglobin 10.1 g/dL      Hematocrit 33.7 %      MCV 78.4 fL      MCH 23.5 pg      MCHC 30.0 g/dL      RDW 17.5 %      RDW-SD 49.5 fl      MPV 10.4 fL      Platelets 210 10*3/mm3      Neutrophil % 66.0 %      Lymphocyte % 19.1 %      Monocyte % 11.3 %      Eosinophil % 2.6 %      Basophil % 0.6 %      Immature Grans % 0.4 %      Neutrophils, Absolute 5.49 10*3/mm3      Lymphocytes, Absolute 1.59 10*3/mm3      Monocytes, Absolute 0.94 10*3/mm3      Eosinophils, Absolute 0.22 10*3/mm3      Basophils, Absolute 0.05 10*3/mm3      Immature Grans, Absolute 0.03 10*3/mm3      nRBC 0.0 /100 WBC     POC Glucose Once [[id number]]  (Normal) Collected: 06/18/21 2339    Specimen: Blood Updated: 06/18/21 2350     Glucose 92 mg/dL      Comment: : [id number] Shae TeresaMeter ID: [id number]       POC Glucose Once [[id number]]  (Normal) Collected: 06/18/21 1921    Specimen: Blood Updated: 06/18/21 2004     Glucose 104 mg/dL      Comment: : [id number] Arvin AnnMeter ID: [id number]       Hemoglobin A1c [[id number]]  (Abnormal) Collected: 06/18/21 1112    Specimen: Blood Updated: 06/18/21 1724     Hemoglobin A1C 7.10 %     Narrative:      Hemoglobin A1C Ranges:    Increased Risk for Diabetes  5.7% to 6.4%  Diabetes                     >= 6.5%  Diabetic Goal                < 7.0%    COVID-19,[facility] IN-HOUSE,Nasal Swab No Transport Media 3-4 HR TAT - Swab, Nasal Cavity [[id number]]  (Normal) Collected: 06/18/21 1556    Specimen: Swab from Nasal Cavity Updated: 06/18/21 3439     COVID19 Not Detected     Narrative:      Fact sheet for providers: https://www.fda.gov/media/354366/download     Fact sheet for patients: https://www.fda.gov/media/216600/download    Test performed by PCR.    Consider negative results in combination with clinical observations, patient history, and epidemiological information.    [place] Draw [[id number]] Collected: 06/18/21 1112    Specimen: Blood Updated: 06/18/21 1515    Narrative:      The following orders were created for panel order [place] Draw.  Procedure                               Abnormality         Status                     ---------                               -----------         ------                     Green Top (Gel)[[id number]]                                  Final result               Lavender Top[[id number]]                                     Final result               Red Top[[id number]]                                          Final result               [place] Blood Culture Cristhian...[[id number]]                      Final result               L[place] Top[[id number]]                                         Final result                 Please view results for these tests on the individual orders.    [place] Top [[id number]] Collected: 06/18/21 1112    Specimen: Blood Updated: 06/18/21 1515     Extra Tube Hold for add-ons.     Comment: Auto resulted.       Urinalysis With Culture If Indicated - Urine, Clean Catch [[id number]]  (Normal) Collected: 06/18/21 1321    Specimen: Urine, Clean Catch Updated: 06/18/21 1339     Color, UA Yellow     Appearance, UA Clear     pH, UA 6.5     Specific Gravity, UA 1.015     Glucose, UA Negative     Ketones, UA Negative     Bilirubin, UA Negative     Blood, UA Negative     Protein, UA Negative     Leuk Esterase, UA Negative     Nitrite, UA Negative     Urobilinogen, UA 0.2 E.U./dL    Narrative:      Urine microscopic not indicated.    Red Top [[id number]] Collected: 06/18/21 1112    Specimen: Blood Updated: 06/18/21 1215     Extra Tube Hold for  add-ons.     Comment: Auto resulted.       [place] Blood Culture Bottle Set [[id number]] Collected: 06/18/21 1112    Specimen: Blood from Arm, Right Updated: 06/18/21 1215     Extra Tube Hold for add-ons.     Comment: Auto resulted.       Green Top (Gel) [[id number]] Collected: 06/18/21 1112    Specimen: Blood Updated: 06/18/21 1215     Extra Tube Hold for add-ons.     Comment: Auto resulted.       Lavender Top [[id number]] Collected: 06/18/21 1112    Specimen: Blood Updated: 06/18/21 1215     Extra Tube hold for add-on     Comment: Auto resulted       Comprehensive Metabolic Panel [[id number]]  (Abnormal) Collected: 06/18/21 1112    Specimen: Blood Updated: 06/18/21 1155     Glucose 138 mg/dL      BUN 21 mg/dL      Creatinine 1.58 mg/dL      Sodium 138 mmol/L      Potassium 4.0 mmol/L      Chloride 101 mmol/L      CO2 27.0 mmol/L      Calcium 9.2 mg/dL      Total Protein 7.1 g/dL      Albumin 3.60 g/dL      ALT (SGPT) 19 U/L      AST (SGOT) 17 U/L      Alkaline Phosphatase 74 U/L      Total Bilirubin 0.4 mg/dL      eGFR Non African Amer 43 mL/min/1.73      Globulin 3.5 gm/dL      A/G Ratio 1.0 g/dL      BUN/Creatinine Ratio 13.3     Anion Gap 10.0 mmol/L     Narrative:      GFR Normal >60  Chronic Kidney Disease <60  Kidney Failure <15      Lipase [[id number]]  (Normal) Collected: 06/18/21 1112    Specimen: Blood Updated: 06/18/21 1150     Lipase 18 U/L     CBC & Differential [[id number]]  (Abnormal) Collected: 06/18/21 1112    Specimen: Blood Updated: 06/18/21 1140    Narrative:      The following orders were created for panel order CBC & Differential.  Procedure                               Abnormality         Status                     ---------                               -----------         ------                     CBC Auto Differential[[id number]]        Abnormal            Final result                 Please view results for these tests on the individual orders.    CBC Auto Differential [[id number]]  (Abnormal)  Collected: 06/18/21 1112    Specimen: Blood Updated: 06/18/21 1140     WBC 10.64 10*3/mm3      RBC 4.99 10*6/mm3      Hemoglobin 11.7 g/dL      Hematocrit 38.7 %      MCV 77.6 fL      MCH 23.4 pg      MCHC 30.2 g/dL      RDW 17.5 %      RDW-SD 49.2 fl      MPV 9.4 fL      Platelets 248 10*3/mm3      Neutrophil % 80.6 %      Lymphocyte % 9.6 %      Monocyte % 7.7 %      Eosinophil % 1.4 %      Basophil % 0.2 %      Immature Grans % 0.5 %      Neutrophils, Absolute 8.58 10*3/mm3      Lymphocytes, Absolute 1.02 10*3/mm3      Monocytes, Absolute 0.82 10*3/mm3      Eosinophils, Absolute 0.15 10*3/mm3      Basophils, Absolute 0.02 10*3/mm3      Immature Grans, Absolute 0.05 10*3/mm3      nRBC 0.0 /100 WBC         Culture Data:   No results found for: BLOODCX   Urine Culture   Date Value Ref Range Status   06/16/2021 No growth  Final    No results found for: WOUNDCX No results found for: MRSACX No results found for: RESPCX No results found for: STOOLCX   Imaging Results (All)     Procedure Component Value Units Date/Time    CT Abdomen Pelvis Stone Protocol [[id number]] Collected: 06/18/21 1819     Updated: 06/18/21 1830    Narrative:      CT ABDOMEN PELVIS STONE PROTOCOL- 6/18/2021 6:04 PM CDT     HISTORY: hematuria, neelima, r/o stone and or obstruction/hydro; R10.32-Left  lower quadrant pain; K59.00-Constipation, unspecified;  K51.919-Ulcerative colitis, unspecified with unspecified complications      COMPARISON: CT scan dated 6/16/2021      DOSE LENGTH PRODUCT: 848 mGy cm. Automated exposure control was also  utilized to decrease patient radiation dose.     TECHNIQUE: Noncontrast enhanced images of the abdomen and pelvis  obtained without oral contrast. Multiplanar reformatted images were  provided for review.      FINDINGS:   LOWER CHEST: Mild dependent atelectasis on the right. Coronary  atheromatous calcification.      LIVER: No focal liver lesion within limits of a noncontrast study.      BILIARY SYSTEM: The gallbladder  is unremarkable. No intrahepatic or  extrahepatic ductal dilatation.      PANCREAS: No focal pancreatic lesion within limits of a noncontrast  study.      SPLEEN: Unremarkable.      KIDNEYS: There is a small amount of layering blood in the left renal  pelvis. Bilateral parapelvic cysts. No urolithiasis identified.  Bilateral mild perinephric stranding. The ureters are decompressed.      ADRENALS: Unremarkable.     RETROPERITONEUM: No mass, lymphadenopathy or hemorrhage.      GI TRACT: The stomach and small bowel are unremarkable. Descending and  sigmoid colon diverticulosis. Mild stool in the ascending colon.      OTHER: There is no mesenteric mass, lymphadenopathy or fluid collection.  Advanced lumbar spondylosis. Right hip arthroplasty. No acute bony  abnormality.      PELVIS: No mass lesion, fluid collection or significant lymphadenopathy  is seen in the pelvis. The urinary bladder is normal in appearance.       Impression:      1. There is a small amount of layering blood in the left renal pelvis.  Unsure as to the exact etiology. No urolithiasis is seen. Bilateral mild  perinephric stranding which seems likely chronic. Cannot exclude upper  tract infection.  2. Colonic diverticulosis. Thickening identified along the sigmoid colon  which is less well evaluated as compared with the recent  contrast-enhanced exam. This does not appear obviously worsened over the  short interval.  This report was finalized on 06/18/2021 18:25 by Dr Tato Smith, .    XR Abdomen KUB [[id number]] Collected: 06/18/21 1227     Updated: 06/18/21 1231    Narrative:      EXAM: XR ABDOMEN KUB- - 6/18/2021 12:20 PM CDT     HISTORY: constipation       COMPARISON: No existing relevant imaging studies available.      TECHNIQUE:  1 images.  Supine view the abdomen     FINDINGS:    Limited evaluation for free air on supine imaging. No evidence of  dilated loops of bowel. Large amount of right and transverse colonic  stool. Pelvic phleboliths. No  nephrolithiasis by radiograph. Right hip  replacement. Degenerative changes in the spine and pelvis.          Impression:      No acute abdominal finding by radiograph.  This report was finalized on 06/18/2021 12:28 by Dr Ashly Rizzo MD.        Hospital Course  Patient is a 80 y.o. male presented to [facility] emergency room 6/18/2021 with complaints of abdominal pain, decreased oral intake without vomiting, mild nausea.  Patient also reported black specks in his urine.  He has a history of coronary artery disease with previous stents over 1 year ago and had been on aspirin and Plavix.  Plavix held for the past 2 weeks.  He has been diagnosed with ulcerative colitis and has had ongoing issues with abdominal pain and ulcerative colitis flare.  Patient was treated with Augmentin and CT scan showed improvement in stranding along the sigmoid colon.  He was seen by Dr. Menjivar and started on mesalamine enemas and attempt to wean off steroids.  He presented with decreased oral intake with nausea but no vomiting.  He denied chest pain, palpitations, shortness of breath.  Again, patient reported some specks in his urine over the last few days.  Initially urine noted to be Iftikhar-Aid in color.  Recent urinalysis reported large blood and too numerous to count RBCs.  Creatinine 1.58, sodium 138, potassium 4.0, lipase 18, WBC 10, hemoglobin 11.7, MCV 77.6.  KUB of the abdomen no acute findings.  No evidence of dilated loops of bowel.  Large amount of right and transverse colonic stool.  No nephrolithiasis.  Dilaudid and Zofran given in ER.    He was admitted to the medical floor with acute kidney injury, abdominal pain, dehydration, and constipation.  IV fluids started at 100 mL/h.  Basic metabolic panel monitored.  Creatinine 1.58 on admission and trended down to 0.97 and 0.83 with IV fluid hydration.  Patient was initially started on clear liquids and diet advanced as tolerated.  Patient also complained of  abdominal pain in the setting of constipation.  Suspect recent passage of stone.  He was offered nausea medication. Patient was started on Colace, MiraLAX, and mesalamine enemas continued.  He was given a dose of Relistor.  Dr. Blunt was able to speak with Dr. Brambila, gastroenterology and he agreed with Relistor and mesalamine.  Patient was able to have a large bowel movement after mesalamine enema.  He was passing gas.  Bowel sounds active and no abdominal distention noted.  Continue prednisone and mesalamine enemas after discharge.  Follow-up with Dr. Menjivar in 2 weeks.  CT scan of the abdomen 6/18 similar to mildly decreased mild stranding and possible mild wall thickening of the sigmoid colon which has numerous diverticuli.  Colitis versus diverticulitis considered.  No free air or drainable fluid.      CT scan of the abdomen with stone protocol noted small amount of layering blood in the left renal pelvis.  Unsure of etiology.  No nephrolithiasis.  Bilateral mild perinephric stranding seems likely chronic.  Cannot exclude upper urinary tract infection.  Chronic diverticulosis.  Thickening along the sigmoid colon less well evaluated compared to recent exam.  Patient had reported urine dark with hematuria.  He also reported dark specks in his urine intermittently for the past few days.  It is suspected that patient recently passed renal stone.  Dr. Blunt discussed with Dr. Huggins as patient has been followed by Dr. Tellez in the past.  Dr. Huggins suspected recent stone passing.  He recommended following up with Dr. Tellez to rule out malignancy, schedule outpatient cystoscopy and will need CT urogram.  We will arrange follow-up with Dr. Huggins in the next 1 to 2 weeks.    Patient has a history of hypertension.  Lisinopril held on admission and resumed at discharge.    He has a history of previous stent placement 14 months ago.  Plavix has been on hold recently.  Dr. Azul to discontinue Plavix.  Aspirin, beta-blocker,  "continued on admission.  ACE inhibitor resumed at discharge his creatinine back at normal.    Hemoglobin A1c 7.1.  He was placed on sliding scale insulin coverage and decreased dose of Levemir.  Glipizide placed on hold.  Glucoses 89, 86, 161.  Recommend discontinuing glipizide.  Patient reports he takes Levemir dosage depending on blood sugar.  Recommend to continue same regimen after discharge.    On 6/20/2021 he is stable for discharge.  Patient had bowel movement after receiving Relistor and mesalamine enema.  Creatinine down to 0.83 at discharge.  Potassium 4.1.  Tolerating diet without nausea or vomiting.  Patient has been ambulating without difficulty.  Follow-up with Dr. Carlos in 1 week with basic metabolic panel.  Follow-up with Dr. Menjivar in 2 weeks.  Follow-up with Dr. Tellez 1 to 2 weeks.  Discussed with patient and wife.    Physical Exam on Discharge:  /66 (BP Location: Right arm, Patient Position: Lying)   Pulse 84   Temp 97.9 °F (36.6 °C)   Resp 16   Ht 175.3 cm (69.02\")   Wt 102 kg (224 lb)   SpO2 94%   BMI 33.06 kg/m²   Physical Exam  Vitals and nursing note reviewed.   Constitutional:       Appearance: He is obese.      Comments: Sitting up in bed.  No oxygen in use.   HENT:      Head: Normocephalic and atraumatic.      Nose: No congestion.      Mouth/Throat:      Pharynx: Oropharynx is clear. No oropharyngeal exudate.   Eyes:      Extraocular Movements: Extraocular movements intact.      Pupils: Pupils are equal, round, and reactive to light.   Cardiovascular:      Rate and Rhythm: Normal rate and regular rhythm.      Heart sounds: No murmur heard.        Comments: Normal sinus rhythm 57-62 on telemetry  Pulmonary:      Breath sounds: No wheezing, rhonchi or rales.      Comments: No oxygen in use.  Abdominal:      General: Bowel sounds are normal. There is no distension.      Palpations: Abdomen is soft.      Tenderness: There is no abdominal tenderness.   Genitourinary:     Comments: " Voiding.  Urine clear yellow today.  Musculoskeletal:         General: No swelling or tenderness.      Cervical back: Normal range of motion and neck supple.   Skin:     General: Skin is warm and dry.   Neurological:      General: No focal deficit present.      Mental Status: He is alert and oriented to person, place, and time.   Psychiatric:         Mood and Affect: Mood normal.         Behavior: Behavior normal.         Thought Content: Thought content normal.         Judgment: Judgment normal.       Condition on Discharge: Stable    Discharge Disposition: Home with wife    Discharge Diet:   Diet Instructions     Diet: Consistent Carbohydrate      Discharge Diet: Consistent Carbohydrate      Diabetic diet    Activity at Discharge:   Activity Instructions     Activity as Tolerated        As tolerated    Discharge Care Plan / Instructions:   1.  For worsening abdominal pain, constipation, decreased oral intake seek medical attention.  2.  Continue mesalamine enemas per Dr. Menjivar.  3.  Follow-up with Dr. Menjivar 2 weeks.  4.  Follow-up with Dr. Tellez 1 week.  Suspect recent stone passing.  May need outpatient cystoscopy, CT urogram.  5.  Follow-up with Dr. Carlos 1 week  6.  Continue to hold Plavix.  Recently discontinued by Dr. Azul.  7.  Discontinue glipizide.  8.  Continue Lantus for glucose reading.    Discharge Medications:     Discharge Medications      New Medications      Instructions Start Date   polyethylene glycol 17 g packet  Commonly known as: MIRALAX   17 g, Oral, 2 Times Daily         Continue These Medications      Instructions Start Date   aspirin 81 MG EC tablet   81 mg, Oral, Daily      Dupilumab 300 MG/2ML solution prefilled syringe   Subcutaneous, Every 14 Days      finasteride 5 MG tablet  Commonly known as: PROSCAR   5 mg, Oral, Daily      fluticasone 50 MCG/ACT nasal spray  Commonly known as: FLONASE   1 spray, Nasal, Daily      glucose blood test strip   1 each, Other, Daily, Use as  instructed One Touch Ultra      Lantus SoloStar 100 UNIT/ML injection pen  Generic drug: Insulin Glargine   20 Units, Subcutaneous, As Needed      lisinopril 10 MG tablet  Commonly known as: PRINIVIL,ZESTRIL   10 mg, Oral, Daily      Mesalamine 4 GM/60ML SF enema   Rectal, Daily      metoprolol tartrate 25 MG tablet  Commonly known as: LOPRESSOR   25 mg, Oral, 2 times daily      nitroglycerin 0.4 MG SL tablet  Commonly known as: NITROSTAT   1 under the tongue as needed for angina, may repeat q5mins for up three doses      ondansetron ODT 4 MG disintegrating tablet  Commonly known as: ZOFRAN-ODT   4 mg, Translingual, Every 8 Hours PRN      oxyCODONE-acetaminophen 5-325 MG per tablet  Commonly known as: PERCOCET   1 tablet, Oral, Every 6 Hours PRN      predniSONE 10 MG tablet  Commonly known as: DELTASONE   10 mg, Oral, Take As Directed      rosuvastatin 40 MG tablet  Commonly known as: CRESTOR   40 mg, Oral, Daily      Symbicort 160-4.5 MCG/ACT inhaler  Generic drug: budesonide-formoterol   2 puffs, Inhalation, 2 times daily      triamcinolone 0.1 % cream  Commonly known as: KENALOG   Topical, 2 Times Daily      Ustekinumab 90 MG/ML solution prefilled syringe Injection  Commonly known as: STELARA   90 mg, Subcutaneous, Every 2 Months         Stop These Medications    amoxicillin-clavulanate 875-125 MG per tablet  Commonly known as: AUGMENTIN     clopidogrel 75 MG tablet  Commonly known as: PLAVIX     glipizide 10 MG tablet  Commonly known as: GLUCOTROL        ASK your doctor about these medications      Instructions Start Date   magnesium citrate solution  Ask about: Should I take this medication?   296 mL, Oral, Once           Follow-up Appointments:   Follow-up Information     Zachariah Menjivar, DO. Schedule an appointment as soon as possible for a visit in 2 week(s).    Specialty: Gastroenterology  Why: 2 weeks  Contact information:  Formerly named [facility] [address] [id number] [phone]             Terrance  Monique Radford MD. Schedule an appointment as soon as possible for a visit in 1 week(s).    Specialties: Family Medicine, Emergency Medicine, Urgent Care  Why: 1 week with basic metabolic panel  Contact information:  [address]  [facility] [address] [phone]             [facility].    Specialty: Emergency Medicine  Why: As needed  Contact information:  [address] [facility] [id number] [phone]           Jose Tellez MD Follow up.    Specialty: Urology  Why: Follow-up hematuria.  Suspect recent stone passed.  Per Dr. Huggins will likely need cystoscopy.  Needs repeat urogram.  Contact information:  [address] [facility]  [address]  [facility] [id number] [phone]                 Future Appointments:  Future Appointments   Date Time Provider Department Center   6/23/2021  7:30 AM PAD BIC US 2 BH PAD US BI PAD   7/6/2021  8:30 AM Monique Carlos MD [facility] PC DOM PAD   7/13/2021  9:00 AM Jose Tellez MD [facility] U PAD PAD   7/20/2021  9:30 AM PAD BIC CT 1 BH PAD CT BI PAD   7/21/2021  1:30 PM Gopal Dickerson APRN [facility] N PAD PAD   9/9/2021  8:45 AM Yung Polanco MD [facility] CD PAD PAD   9/16/2021  1:00 PM Zachariah Menjivar DO [facility] GE PAD PAD   10/12/2021 10:30 AM Jacob Mejias MD [facility] RD PAD PAD     Test Results Pending at Discharge: None    The above documentation resulted from a face-to-face encounter by me Gale ESCALANTE, [facility].    Electronically signed by BORIS Amato, 6/20/2021, 10:38 CDT.    Time: This discharge process required greater than 35 minutes for completion.    Plan discussed with Dr. Blunt, Dr. Brambila, patient, and wife.    Time spent in face-to-face evaluation, chart review, planning and education greater than 35 minutes.

## 2021-06-20 NOTE — PLAN OF CARE
Goal Outcome Evaluation:              PT denies pain. Bedtime glucose 161, 5 units of levemir given instead of the ordered 10 per pt request. CPAP. IVF. Pt sleeping for much of shift. No BM this shift. No distress noted.

## 2021-06-20 NOTE — OUTREACH NOTE
Prep Survey      Responses   [facility] facility patient discharged from?  [facility]   Is LACE score < 7 ?  No   Emergency Room discharge w/ pulse ox?  No   Eligibility  [facility]   Date of Admission  06/18/21   Date of Discharge  06/20/21   Discharge Disposition  Home or Self Care   Discharge diagnosis  KE, ulcerative colitis, T2DM, HTN, CAD   Does the patient have one of the following disease processes/diagnoses(primary or secondary)?  Other   Does the patient have Home health ordered?  No   Is there a DME ordered?  No   Prep survey completed?  Yes          Tori Barnett RN

## 2021-06-21 ENCOUNTER — TRANSITIONAL CARE MANAGEMENT TELEPHONE ENCOUNTER (OUTPATIENT)
Dept: CALL CENTER | Facility: HOSPITAL | Age: 81
End: 2021-06-21

## 2021-06-21 DIAGNOSIS — R31.0 GROSS HEMATURIA: Primary | ICD-10-CM

## 2021-06-21 NOTE — OUTREACH NOTE
Call Center TCM Note      Responses   [facility] patient discharged from?  [place]   Does the patient have one of the following disease processes/diagnoses(primary or secondary)?  Other   TCM attempt successful?  Yes   Call start time  1458   Call end time  1500   Discharge diagnosis  KE, ulcerative colitis, T2DM, HTN, CAD   Meds reviewed with patient/caregiver?  Yes   Is the patient having any side effects they believe may be caused by any medication additions or changes?  No   Does the patient have all medications ordered at discharge?  Yes   Is the patient taking all medications as directed (includes completed medication regime)?  Yes   Does the patient have a primary care provider?   Yes   Does the patient have an appointment with their PCP within 7 days of discharge?  Greater than 7 days   Has the patient kept scheduled appointments due by today?  N/A   Comments  States he called office for a sooner appt and waiting to hear back from them   Psychosocial issues?  No   Did the patient receive a copy of their discharge instructions?  Yes   Nursing interventions  Reviewed instructions with patient   What is the patient's perception of their health status since discharge?  Improving   Is the patient/caregiver able to teach back signs and symptoms related to disease process for when to call PCP?  Yes   Is the patient/caregiver able to teach back signs and symptoms related to disease process for when to call 911?  Yes   Is the patient/caregiver able to teach back the hierarchy of who to call/visit for symptoms/problems? PCP, Specialist, Home health nurse, Urgent Care, ED, 911  Yes   If the patient is a current smoker, are they able to teach back resources for cessation?  Not a smoker   Additional teach back comments  States he is doing well    TCM call completed?  Yes   Wrap up additional comments  Denies questions or needs at this time.  Waiting for return call from Dr. Carlos's office           Della  ANNIE Norris    6/21/2021, 15:02 EDT

## 2021-06-23 ENCOUNTER — HOSPITAL ENCOUNTER (OUTPATIENT)
Dept: ULTRASOUND IMAGING | Facility: HOSPITAL | Age: 81
Discharge: HOME | End: 2021-06-23
Admitting: FAMILY MEDICINE

## 2021-06-23 DIAGNOSIS — R10.9 LEFT FLANK PAIN: ICD-10-CM

## 2021-06-23 PROCEDURE — 76775 US EXAM ABDO BACK WALL LIM: CPT

## 2021-06-26 ENCOUNTER — HOSPITAL ENCOUNTER (EMERGENCY)
Age: 81
Discharge: HOME | End: 2021-06-26
Payer: MEDICARE

## 2021-06-26 ENCOUNTER — APPOINTMENT (OUTPATIENT)
Dept: CT IMAGING | Age: 81
End: 2021-06-26
Payer: MEDICARE

## 2021-06-26 VITALS
WEIGHT: 216 LBS | RESPIRATION RATE: 22 BRPM | TEMPERATURE: 98 F | HEIGHT: 69 IN | HEART RATE: 72 BPM | OXYGEN SATURATION: 94 % | SYSTOLIC BLOOD PRESSURE: 153 MMHG | DIASTOLIC BLOOD PRESSURE: 76 MMHG | BODY MASS INDEX: 31.99 KG/M2

## 2021-06-26 DIAGNOSIS — K57.32 DIVERTICULITIS OF COLON: Primary | ICD-10-CM

## 2021-06-26 LAB
ALBUMIN SERPL-MCNC: 3.6 G/DL (ref 3.5–5.2)
ALP BLD-CCNC: 79 U/L (ref 40–130)
ALT SERPL-CCNC: 12 U/L (ref 5–41)
ANION GAP SERPL CALCULATED.3IONS-SCNC: 10 MMOL/L (ref 7–19)
AST SERPL-CCNC: 14 U/L (ref 5–40)
BACTERIA: NEGATIVE /HPF
BASOPHILS ABSOLUTE: 0 K/UL (ref 0–0.2)
BASOPHILS RELATIVE PERCENT: 0.2 % (ref 0–1)
BILIRUB SERPL-MCNC: <0.2 MG/DL (ref 0.2–1.2)
BILIRUBIN URINE: NEGATIVE
BLOOD, URINE: ABNORMAL
BUN BLDV-MCNC: 11 MG/DL (ref 8–23)
CALCIUM SERPL-MCNC: 8.7 MG/DL (ref 8.8–10.2)
CHLORIDE BLD-SCNC: 101 MMOL/L (ref 98–111)
CLARITY: ABNORMAL
CO2: 22 MMOL/L (ref 22–29)
COLOR: ABNORMAL
CREAT SERPL-MCNC: 0.9 MG/DL (ref 0.5–1.2)
CRYSTALS, UA: ABNORMAL /HPF
EOSINOPHILS ABSOLUTE: 0.1 K/UL (ref 0–0.6)
EOSINOPHILS RELATIVE PERCENT: 1.1 % (ref 0–5)
EPITHELIAL CELLS, UA: 0 /HPF (ref 0–5)
GFR AFRICAN AMERICAN: >59
GFR NON-AFRICAN AMERICAN: >60
GLUCOSE BLD-MCNC: 196 MG/DL (ref 74–109)
GLUCOSE URINE: 500 MG/DL
HCT VFR BLD CALC: 37 % (ref 42–52)
HEMOGLOBIN: 11.6 G/DL (ref 14–18)
HYALINE CASTS: 3 /HPF (ref 0–8)
IMMATURE GRANULOCYTES #: 0 K/UL
KETONES, URINE: ABNORMAL MG/DL
LEUKOCYTE ESTERASE, URINE: ABNORMAL
LIPASE: 20 U/L (ref 13–60)
LYMPHOCYTES ABSOLUTE: 0.9 K/UL (ref 1.1–4.5)
LYMPHOCYTES RELATIVE PERCENT: 10.1 % (ref 20–40)
MCH RBC QN AUTO: 24.4 PG (ref 27–31)
MCHC RBC AUTO-ENTMCNC: 31.4 G/DL (ref 33–37)
MCV RBC AUTO: 77.9 FL (ref 80–94)
MONOCYTES ABSOLUTE: 0.7 K/UL (ref 0–0.9)
MONOCYTES RELATIVE PERCENT: 7.6 % (ref 0–10)
NEUTROPHILS ABSOLUTE: 7.3 K/UL (ref 1.5–7.5)
NEUTROPHILS RELATIVE PERCENT: 80.8 % (ref 50–65)
NITRITE, URINE: NEGATIVE
PDW BLD-RTO: 17.8 % (ref 11.5–14.5)
PH UA: 5.5 (ref 5–8)
PLATELET # BLD: 264 K/UL (ref 130–400)
PMV BLD AUTO: 9.7 FL (ref 9.4–12.4)
POTASSIUM SERPL-SCNC: 4 MMOL/L (ref 3.5–5)
PROTEIN UA: 100 MG/DL
RBC # BLD: 4.75 M/UL (ref 4.7–6.1)
RBC UA: ABNORMAL /HPF (ref 0–2)
SODIUM BLD-SCNC: 133 MMOL/L (ref 136–145)
SPECIFIC GRAVITY UA: 1.02 (ref 1–1.03)
TOTAL PROTEIN: 7 G/DL (ref 6.6–8.7)
UROBILINOGEN, URINE: 1 E.U./DL
WBC # BLD: 9 K/UL (ref 4.8–10.8)
WBC UA: 2 /HPF (ref 0–5)
YEAST: PRESENT /HPF

## 2021-06-26 PROCEDURE — 6360000002 HC RX W HCPCS: Performed by: PHYSICIAN ASSISTANT

## 2021-06-26 PROCEDURE — 99283 EMERGENCY DEPT VISIT LOW MDM: CPT

## 2021-06-26 PROCEDURE — 81001 URINALYSIS AUTO W/SCOPE: CPT

## 2021-06-26 PROCEDURE — 80053 COMPREHEN METABOLIC PANEL: CPT

## 2021-06-26 PROCEDURE — 83690 ASSAY OF LIPASE: CPT

## 2021-06-26 PROCEDURE — 96374 THER/PROPH/DIAG INJ IV PUSH: CPT

## 2021-06-26 PROCEDURE — 85025 COMPLETE CBC W/AUTO DIFF WBC: CPT

## 2021-06-26 PROCEDURE — 6360000004 HC RX CONTRAST MEDICATION: Performed by: PHYSICIAN ASSISTANT

## 2021-06-26 PROCEDURE — 96375 TX/PRO/DX INJ NEW DRUG ADDON: CPT

## 2021-06-26 PROCEDURE — 36415 COLL VENOUS BLD VENIPUNCTURE: CPT

## 2021-06-26 PROCEDURE — 74177 CT ABD & PELVIS W/CONTRAST: CPT

## 2021-06-26 RX ORDER — HYDROCODONE BITARTRATE AND ACETAMINOPHEN 5; 325 MG/1; MG/1
1 TABLET ORAL EVERY 6 HOURS PRN
Qty: 12 TABLET | Refills: 0 | Status: SHIPPED | OUTPATIENT
Start: 2021-06-26 | End: 2021-06-29

## 2021-06-26 RX ORDER — FINASTERIDE 5 MG/1
5 TABLET, FILM COATED ORAL DAILY
COMMUNITY
Start: 2021-03-11

## 2021-06-26 RX ORDER — ASPIRIN 81 MG/1
81 TABLET ORAL DAILY
COMMUNITY
Start: 2021-03-03

## 2021-06-26 RX ORDER — METRONIDAZOLE 500 MG/1
500 TABLET ORAL 3 TIMES DAILY
Qty: 30 TABLET | Refills: 0 | Status: SHIPPED | OUTPATIENT
Start: 2021-06-26 | End: 2021-07-06

## 2021-06-26 RX ORDER — LISINOPRIL 10 MG/1
10 TABLET ORAL DAILY
COMMUNITY
Start: 2021-01-31

## 2021-06-26 RX ORDER — PREDNISONE 10 MG/1
10 TABLET ORAL
COMMUNITY
Start: 2021-06-04

## 2021-06-26 RX ORDER — CIPROFLOXACIN 500 MG/1
500 TABLET, FILM COATED ORAL 2 TIMES DAILY
Qty: 20 TABLET | Refills: 0 | Status: SHIPPED | OUTPATIENT
Start: 2021-06-26 | End: 2021-07-06

## 2021-06-26 RX ORDER — BUDESONIDE AND FORMOTEROL FUMARATE DIHYDRATE 160; 4.5 UG/1; UG/1
2 AEROSOL RESPIRATORY (INHALATION) 2 TIMES DAILY
COMMUNITY
Start: 2020-11-13

## 2021-06-26 RX ORDER — INSULIN GLARGINE 100 [IU]/ML
20 INJECTION, SOLUTION SUBCUTANEOUS PRN
COMMUNITY
Start: 2020-08-10

## 2021-06-26 RX ORDER — ONDANSETRON 4 MG/1
4 TABLET, ORALLY DISINTEGRATING ORAL EVERY 8 HOURS PRN
Qty: 20 TABLET | Refills: 0 | Status: SHIPPED | OUTPATIENT
Start: 2021-06-26

## 2021-06-26 RX ORDER — ONDANSETRON 2 MG/ML
4 INJECTION INTRAMUSCULAR; INTRAVENOUS ONCE
Status: COMPLETED | OUTPATIENT
Start: 2021-06-26 | End: 2021-06-26

## 2021-06-26 RX ORDER — NITROGLYCERIN 0.4 MG/1
TABLET SUBLINGUAL
COMMUNITY
Start: 2021-06-07

## 2021-06-26 RX ORDER — TRIAMCINOLONE ACETONIDE 1 MG/G
CREAM TOPICAL 2 TIMES DAILY
COMMUNITY

## 2021-06-26 RX ORDER — MORPHINE SULFATE 4 MG/ML
4 INJECTION, SOLUTION INTRAMUSCULAR; INTRAVENOUS ONCE
Status: COMPLETED | OUTPATIENT
Start: 2021-06-26 | End: 2021-06-26

## 2021-06-26 RX ORDER — ROSUVASTATIN CALCIUM 40 MG/1
40 TABLET, COATED ORAL DAILY
COMMUNITY
Start: 2021-03-03

## 2021-06-26 RX ORDER — ONDANSETRON 4 MG/1
4 TABLET, ORALLY DISINTEGRATING ORAL EVERY 8 HOURS PRN
COMMUNITY
Start: 2021-06-17 | End: 2021-06-26

## 2021-06-26 RX ORDER — OXYCODONE HYDROCHLORIDE AND ACETAMINOPHEN 5; 325 MG/1; MG/1
1 TABLET ORAL EVERY 6 HOURS PRN
COMMUNITY
Start: 2021-06-17 | End: 2021-06-26

## 2021-06-26 RX ORDER — MESALAMINE 4 G/60ML
ENEMA RECTAL DAILY
COMMUNITY

## 2021-06-26 RX ORDER — FLUTICASONE PROPIONATE 50 MCG
1 SPRAY, SUSPENSION (ML) NASAL DAILY
COMMUNITY
Start: 2021-01-19

## 2021-06-26 RX ADMIN — ONDANSETRON 4 MG: 2 INJECTION INTRAMUSCULAR; INTRAVENOUS at 19:10

## 2021-06-26 RX ADMIN — IOPAMIDOL 90 ML: 755 INJECTION, SOLUTION INTRAVENOUS at 19:48

## 2021-06-26 RX ADMIN — MORPHINE SULFATE 4 MG: 4 INJECTION, SOLUTION INTRAMUSCULAR; INTRAVENOUS at 19:11

## 2021-06-26 ASSESSMENT — PAIN SCALES - GENERAL
PAINLEVEL_OUTOF10: 8
PAINLEVEL_OUTOF10: 3

## 2021-06-26 ASSESSMENT — ENCOUNTER SYMPTOMS
ABDOMINAL PAIN: 1
SHORTNESS OF BREATH: 0
COUGH: 0
PHOTOPHOBIA: 0
APNEA: 0
EYE DISCHARGE: 0
BACK PAIN: 0
EYE ITCHING: 0
COLOR CHANGE: 0

## 2021-06-27 NOTE — ED PROVIDER NOTES
[facility] EMERGENCY DEPT  eMERGENCYdEPARTMENT eNCOUnter      Pt Name: Maciel Odell  MRN: [id number]  [place] 1940  Date of evaluation: 6/26/2021  Provider:BALDOMERO Concepcion    CHIEF COMPLAINT       Chief Complaint   Patient presents with    Abdominal Pain         HISTORY OF PRESENT ILLNESS  (Location/Symptom, Timing/Onset, Context/Setting, Quality, Duration, Modifying Factors, Severity.)   Maciel Odell is a 68 y.o. male who presents to the emergency department with complaints of abdominal pain very small clots of blood in his urine patient has history of kidney stones followed by Dr. Diane Zhao has had recent scope uroscopy done last month everything looked normal he was it seen at [facility] 3 times last week family admitted Saturday state over the night \"they did not really do much for me\" tells me that his pain comes and goes he has a history of ulcerative colitis denies any blood in his stool denies any fever chills his left lower quadrant specific nothing into his left flank. Rates pain as an 8 out of 10. No trouble voiding. Has a history of enlarged prostate. Has also had diverticulitis in the past which will need to whirl out today. Kumar Kanosh request #[id number]    0 active cumulative morphine equivalents    HPI    Nursing Notes were reviewed and I agree. REVIEW OF SYSTEMS    (2-9 systems for level 4, 10 or more for level 5)     Review of Systems   Constitutional: Negative for activity change, appetite change, chills and fever. HENT: Negative for congestion and dental problem. Eyes: Negative for photophobia, discharge and itching. Respiratory: Negative for apnea, cough and shortness of breath. Cardiovascular: Negative for chest pain. Gastrointestinal: Positive for abdominal pain. Genitourinary: Positive for hematuria. Musculoskeletal: Negative for arthralgias, back pain, gait problem, myalgias and neck pain. Skin: Negative for color change, pallor and rash.    Neurological: Negative for dizziness, seizures and syncope. Psychiatric/Behavioral: Negative for agitation. The patient is not nervous/anxious. Except as noted above the remainder of the review of systems was reviewed and negative.        PAST MEDICAL HISTORY     Past Medical History:   Diagnosis Date    CAD (coronary artery disease)     Constipation     Diabetes [facility]     Hematuria     Hypertension     NSTEMI (non-ST elevated myocardial infarction) ([facility]     Ulcerative colitis ([facility]          SURGICAL HISTORY       Past Surgical History:   Procedure Laterality Date    BACK SURGERY      CORONARY ANGIOPLASTY WITH STENT PLACEMENT      CORONARY ARTERY BYPASS GRAFT      HIP SURGERY      SHOULDER SURGERY           CURRENT MEDICATIONS       Discharge Medication List as of 6/26/2021  9:29 PM      CONTINUE these medications which have NOT CHANGED    Details   aspirin 81 MG EC tablet Take 81 mg by mouth dailyHistorical Med      budesonide-formoterol (SYMBICORT) 160-4.5 MCG/ACT AERO Inhale 2 puffs into the lungs 2 times dailyHistorical Med      finasteride (PROSCAR) 5 MG tablet Take 5 mg by mouth dailyHistorical Med      fluticasone (FLONASE) 50 MCG/ACT nasal spray 1 spray by Nasal route dailyHistorical Med      insulin glargine (LANTUS SOLOSTAR) 100 UNIT/ML injection pen Inject 20 Units into the skin as neededHistorical Med      lisinopril (PRINIVIL;ZESTRIL) 10 MG tablet Take 10 mg by mouth dailyHistorical Med      metoprolol tartrate (LOPRESSOR) 25 MG tablet Take 25 mg by mouth 2 times dailyHistorical Med      nitroGLYCERIN (NITROSTAT) 0.4 MG SL tablet 1 under the tongue as needed for angina, may repeat q5mins for up three dosesHistorical Med      predniSONE (DELTASONE) 10 MG tablet Take 10 mg by mouthHistorical Med      rosuvastatin (CRESTOR) 40 MG tablet Take 40 mg by mouth dailyHistorical Med      dupilumab (DUPIXENT) 300 MG/2ML SOSY injection Inject into the skin every 14 daysHistorical Med      mesalamine (ROWASA) 4 g enema Place rectally dailyHistorical Med      triamcinolone (KENALOG) 0.1 % cream Apply topically 2 times daily, Topical, 2 TIMES DAILY, Historical Med      ustekinumab (STELARA) 90 MG/ML SOSY prefilled syringe Inject 90 mg into the skinHistorical Med             ALLERGIES     Albuterol    FAMILY HISTORY     History reviewed. No pertinent family history. SOCIAL HISTORY       Social History     Socioeconomic History    Marital status:      Spouse name: None    Number of children: None    Years of education: None    Highest education level: None   Occupational History    None   Tobacco Use    Smoking status: Never Smoker    Smokeless tobacco: Never Used   Vaping Use    Vaping Use: Never used   Substance and Sexual Activity    Alcohol use: Never    Drug use: Never    Sexual activity: None   Other Topics Concern    None   Social History Narrative    None     Social Determinants of Health     Financial Resource Strain:     Difficulty of Paying Living Expenses:    Food Insecurity:     Worried About Running Out of Food in the Last Year:     Ran Out of Food in the Last Year:    Transportation Needs:     Lack of Transportation (Medical):      Lack of Transportation (Non-Medical):    Physical Activity:     Days of Exercise per Week:     Minutes of Exercise per Session:    Stress:     Feeling of Stress :    Social Connections:     Frequency of Communication with Friends and Family:     Frequency of Social Gatherings with Friends and Family:     Attends Yarsani Services:     Active Member of Clubs or Organizations:     Attends Club or Organization Meetings:     Marital Status:    Intimate Partner Violence:     Fear of Current or Ex-Partner:     Emotionally Abused:     Physically Abused:     Sexually Abused:        SCREENINGS           PHYSICAL EXAM    (up to 7 forlevel 4, 8 or more for level 5)     ED Triage Vitals [06/26/21 1810]   BP Temp Temp src Pulse Resp SpO2 This involves a segment of the colon approximately 8 to   10 cm in length. 2. Bilateral renal parapelvic cysts with no evidence of   hydronephrosis. Small benign-appearing 13 mm cortical cyst at the mid   left kidney. 3. Normal appendix. No free fluid or free air. Signed by Dr Guera Bonner. [facility]          LABS:  Labs Reviewed   CBC WITH AUTO DIFFERENTIAL - Abnormal; Notable for the following components:       Result Value    Hemoglobin 11.6 (*)     Hematocrit 37.0 (*)     MCV 77.9 (*)     MCH 24.4 (*)     MCHC 31.4 (*)     RDW 17.8 (*)     Neutrophils % 80.8 (*)     Lymphocytes % 10.1 (*)     Lymphocytes Absolute 0.9 (*)     All other components within normal limits   COMPREHENSIVE METABOLIC PANEL - Abnormal; Notable for the following components:    Sodium 133 (*)     Glucose 196 (*)     Calcium 8.7 (*)     All other components within normal limits   URINE RT REFLEX TO CULTURE - Abnormal; Notable for the following components:    Color, UA ORANGE (*)     Clarity, UA CLOUDY (*)     Glucose, Ur 500 (*)     Ketones, Urine TRACE (*)     Blood, Urine LARGE (*)     Protein,  (*)     Leukocyte Esterase, Urine TRACE (*)     All other components within normal limits   MICROSCOPIC URINALYSIS - Abnormal; Notable for the following components:    RBC, UA TNTC (*)     Bacteria, UA NEGATIVE (*)     Yeast, UA Present (*)     Crystals, UA NEG (*)     All other components within normal limits   LIPASE       All other labs were within normal range or notreturned as of this dictation.     RE-ASSESSMENT        EMERGENCY DEPARTMENT COURSE and DIFFERENTIAL DIAGNOSIS/MDM:   Vitals:    Vitals:    06/26/21 1810 06/26/21 1959   BP: (!) 146/81 (!) 153/76   Pulse: 72    Resp: 22    Temp: 98 °F (36.7 °C)    SpO2: 96% 94%   Weight: 216 lb (98 kg)    Height: 5' 9\" (1.753 m)        MDM  Admission is offered patient declines he is passing p.o. challenge afebrile unremarkable white blood cell count feel he is appropriate to go home he wants to have Zofran as needed but we will start treating with Cipro Flagyl he is to follow closely with his PCP on Monday to monitor resolve and make sure he takes medicines with something on the stomach plan will be for discharge. PROCEDURES:    Procedures      FINAL IMPRESSION      1. Diverticulitis of colon          DISPOSITION/PLAN   DISPOSITION Decision To Discharge 06/26/2021 09:12:00 PM      PATIENT REFERRED TO:  [facility] EMERGENCY DEPT  Mervin Saha  [phone]    If symptoms worsen    Lorene Lagos MD  [address] [place] [address]  [phone]    Call in 2 days        DISCHARGE MEDICATIONS:  Discharge Medication List as of 6/26/2021  9:29 PM      START taking these medications    Details   ciprofloxacin (CIPRO) 500 MG tablet Take 1 tablet by mouth 2 times daily for 10 days, Disp-20 tablet, R-0Normal      metroNIDAZOLE (FLAGYL) 500 MG tablet Take 1 tablet by mouth 3 times daily for 10 days, Disp-30 tablet, R-0Normal      HYDROcodone-acetaminophen (NORCO) 5-325 MG per tablet Take 1 tablet by mouth every 6 hours as needed for Pain for up to 3 days. May cause drowsiness.   Do not take and drive., PZLS-24 tablet, R-0Normal             (Please note that portions of this note were completed with a voice recognition program.  Efforts were made to edit the dictations but occasionallywords are mis-transcribed.)    Ruel Dill [address], [place]  06/26/21 9806

## 2021-06-29 ENCOUNTER — READMISSION MANAGEMENT (OUTPATIENT)
Dept: CALL CENTER | Facility: HOSPITAL | Age: 81
End: 2021-06-29

## 2021-06-29 NOTE — OUTREACH NOTE
Medical Week 2 Survey      Responses   [facility] patient discharged from?  [place]   Does the patient have one of the following disease processes/diagnoses(primary or secondary)?  Other   Week 2 attempt successful?  Yes   Call start time  0901   Discharge diagnosis  KE, ulcerative colitis, T2DM, HTN, CAD   Call end time  0904   Is patient permission given to speak with other caregiver?  No   Meds reviewed with patient/caregiver?  Yes   Is the patient having any side effects they believe may be caused by any medication additions or changes?  No   Does the patient have all medications ordered at discharge?  Yes   Is the patient taking all medications as directed (includes completed medication regime)?  Yes   Does the patient have a primary care provider?   Yes   Does the patient have an appointment with their PCP within 7 days of discharge?  Greater than 7 days   What is preventing the patient from scheduling follow up appointments within 7 days of discharge?  Earlier appointment not available   Nursing Interventions  Verified appointment date/time/provider [07/06/2021- Terrance 830 am ]   Has the patient kept scheduled appointments due by today?  N/A   Comments  Dr. Menjivar - 06/30/2021.    Did the patient receive a copy of their discharge instructions?  Yes   Nursing interventions  Reviewed instructions with patient   What is the patient's perception of their health status since discharge?  Improving [He thinks he is getting better.]   Is the patient/caregiver able to teach back signs and symptoms related to disease process for when to call PCP?  Yes   Is the patient/caregiver able to teach back signs and symptoms related to disease process for when to call 911?  Yes   Is the patient/caregiver able to teach back the hierarchy of who to call/visit for symptoms/problems? PCP, Specialist, Home health nurse, Urgent Care, ED, 911  Yes   If the patient is a current smoker, are they able to teach back resources for  cessation?  Not a smoker   Week 2 Call Completed?  Yes          Mari Valera RN

## 2021-06-30 ENCOUNTER — OFFICE VISIT (OUTPATIENT)
Dept: GASTROENTEROLOGY | Facility: CLINIC | Age: 81
End: 2021-06-30

## 2021-06-30 ENCOUNTER — LAB (OUTPATIENT)
Dept: LAB | Facility: HOSPITAL | Age: 81
End: 2021-06-30

## 2021-06-30 VITALS
BODY MASS INDEX: 31.7 KG/M2 | TEMPERATURE: 97.7 F | OXYGEN SATURATION: 98 % | SYSTOLIC BLOOD PRESSURE: 130 MMHG | HEIGHT: 69 IN | WEIGHT: 214 LBS | DIASTOLIC BLOOD PRESSURE: 74 MMHG | HEART RATE: 82 BPM

## 2021-06-30 DIAGNOSIS — K51.219 ULCERATIVE PROCTITIS WITH COMPLICATION (HCC): ICD-10-CM

## 2021-06-30 DIAGNOSIS — K51.919 ULCERATIVE COLITIS WITH COMPLICATION, UNSPECIFIED LOCATION (HCC): Primary | ICD-10-CM

## 2021-06-30 PROCEDURE — 82397 CHEMILUMINESCENT ASSAY: CPT

## 2021-06-30 PROCEDURE — 99214 OFFICE O/P EST MOD 30 MIN: CPT | Performed by: INTERNAL MEDICINE

## 2021-06-30 PROCEDURE — 80299 QUANTITATIVE ASSAY DRUG: CPT

## 2021-06-30 PROCEDURE — 36415 COLL VENOUS BLD VENIPUNCTURE: CPT

## 2021-06-30 RX ORDER — PREDNISONE 10 MG/1
10 TABLET ORAL 3 TIMES DAILY
Qty: 100 TABLET | Refills: 0 | Status: SHIPPED | OUTPATIENT
Start: 2021-06-30 | End: 2021-07-12 | Stop reason: SDUPTHER

## 2021-06-30 NOTE — PROGRESS NOTES
Chief Complaint   Patient presents with   • Colonoscopy     6-4-2021 colon path results       PCP: Monique Gusman MD  REFER: No ref. provider found    Subjective     HPI           Still with intermittent blood/dark urine  Less blood in the stool  BM's frequent since he has been off his steroid taper  Notes is very confused about his dx      Past Medical History:   Diagnosis Date   • Asthma    • Coronary artery disease    • Diabetes mellitus (CMS/HCC)    • Hyperlipidemia    • Hypertension    • Sleep apnea     cpap at        Past Surgical History:   Procedure Laterality Date   • BACK SURGERY     • CARDIAC CATHETERIZATION      stents x 6   • CATARACT EXTRACTION     • COLONOSCOPY     • COLONOSCOPY N/A 6/4/2021    Procedure: COLONOSCOPY WITH ANESTHESIA;  Surgeon: Zachariah Menjivar DO;  [facility] ENDOSCOPY;  Service: Gastroenterology;  Laterality: N/A;  pre hx ulcerative colitis  post ulcerative colitis  dr monique gusman   • CORONARY ANGIOPLASTY WITH STENT PLACEMENT     • HIP SURGERY Right     total hip       Outpatient Medications Marked as Taking for the 6/30/21 encounter (Office Visit) with Zachariah Menjivar DO   Medication Sig Dispense Refill   • aspirin (aspirin) 81 MG EC tablet Take 1 tablet by mouth Daily. 30 tablet 11   • budesonide-formoterol (Symbicort) 160-4.5 MCG/ACT inhaler Inhale 2 puffs 2 (two) times a day.     • Dupilumab 300 MG/2ML solution prefilled syringe Inject  under the skin into the appropriate area as directed Every 14 (Fourteen) Days.     • finasteride (PROSCAR) 5 MG tablet Take 1 tablet by mouth Daily. 30 tablet 11   • fluticasone (FLONASE) 50 MCG/ACT nasal spray 1 spray into the nostril(s) as directed by provider Daily. 1 bottle 11   • Insulin Glargine (Lantus SoloStar) 100 UNIT/ML injection pen Inject 20 Units under the skin into the appropriate area as directed As Needed.     • lisinopril (PRINIVIL,ZESTRIL) 10 MG tablet Take 1 tablet by mouth Daily. 90 tablet 1   • Mesalamine  4 GM/60ML SF enema Insert  into the rectum Daily.     • metoprolol tartrate (LOPRESSOR) 25 MG tablet Take 25 mg by mouth 2 (two) times a day.     • nitroglycerin (NITROSTAT) 0.4 MG SL tablet 1 under the tongue as needed for angina, may repeat q5mins for up three doses 25 tablet 1   • ondansetron ODT (ZOFRAN-ODT) 4 MG disintegrating tablet Place 1 tablet on the tongue Every 8 (Eight) Hours As Needed for Nausea. 10 tablet 0   • oxyCODONE-acetaminophen (PERCOCET) 5-325 MG per tablet Take 1 tablet by mouth Every 6 (Six) Hours As Needed for Severe Pain . 12 tablet 0   • predniSONE (DELTASONE) 10 MG tablet Take 1 tablet by mouth 3 (Three) Times a Day. 100 tablet 0   • rosuvastatin (CRESTOR) 40 MG tablet Take 1 tablet by mouth Daily. 90 tablet 3   • triamcinolone (KENALOG) 0.1 % cream Apply  topically to the appropriate area as directed 2 (Two) Times a Day.     • Ustekinumab (STELARA) 90 MG/ML solution prefilled syringe Injection Inject 90 mg under the skin into the appropriate area as directed Every 2 (Two) Months.     • [DISCONTINUED] predniSONE (DELTASONE) 10 MG tablet Take 1 tablet by mouth Take As Directed. 30 tablet 0       Allergies   Allergen Reactions   • Albuterol Other (See Comments)   • Adhesive Tape Rash   • Azithromycin Rash       Social History     Socioeconomic History   • Marital status:      Spouse name: Not on file   • Number of children: Not on file   • Years of education: Not on file   • Highest education level: Not on file   Tobacco Use   • Smoking status: Never Smoker   • Smokeless tobacco: Never Used   Vaping Use   • Vaping Use: Never used   Substance and Sexual Activity   • Alcohol use: Never   • Drug use: Never   • Sexual activity: Defer       Review of Systems   Constitutional: Negative for unexpected weight change.   Respiratory: Negative for shortness of breath.    Cardiovascular: Negative for chest pain.   Gastrointestinal: Negative for abdominal pain and anal bleeding.       Objective  "    Vitals:    06/30/21 1259   BP: 130/74   Pulse: 82   Temp: 97.7 °F (36.5 °C)   SpO2: 98%   Weight: 97.1 kg (214 lb)   Height: 175.3 cm (69\")     Body mass index is 31.6 kg/m².    Physical Exam  Constitutional:       Appearance: Normal appearance. He is well-developed.   Eyes:      General: No scleral icterus.  Neck:      Thyroid: No thyroid mass or thyromegaly.      Vascular: No JVD.   Pulmonary:      Effort: Pulmonary effort is normal. No accessory muscle usage.   Abdominal:      General: There is no distension.      Tenderness: There is no abdominal tenderness. There is no guarding.   Skin:     Coloration: Skin is not jaundiced.   Neurological:      Mental Status: He is alert.   Psychiatric:         Behavior: Behavior is cooperative.         Judgment: Judgment normal.         Imaging Results (Most Recent)     None          Body mass index is 31.6 kg/m².    Assessment/Plan     Diagnoses and all orders for this visit:    1. Ulcerative colitis with complication, unspecified location (CMS/HCC) (Primary)    Other orders  -     predniSONE (DELTASONE) 10 MG tablet; Take 1 tablet by mouth 3 (Three) Times a Day.  Dispense: 100 tablet; Refill: 0      Ov in 10 days  If not better than back up to [facility]  pred 3 10mg tabs qam  Re start enemas  sterlara levels today     * Surgery not found *        Advised pt to stop use of NSAIDs, Fish Oil, and MV 5 days prior to procedure, per Dr Menjivar protocol.  Tylenol based products are ok to take.  Pt verbalized understanding.    Precautions are currently being put in place due to COVID-19.  I have explained to Zachariah Acosta they will be required to undergo COVID testing prior to their procedure.  Zachariah Acosta verbalized understanding and was willing to proceed.        Zachariah Menjivar, DO  06/30/21          There are no Patient Instructions on file for this visit.    "

## 2021-07-01 ENCOUNTER — OFFICE VISIT (OUTPATIENT)
Dept: UROLOGY | Facility: CLINIC | Age: 81
End: 2021-07-01

## 2021-07-01 ENCOUNTER — HOSPITAL ENCOUNTER (OUTPATIENT)
Dept: CT IMAGING | Facility: HOSPITAL | Age: 81
Discharge: HOME | End: 2021-07-01
Admitting: UROLOGY

## 2021-07-01 VITALS — BODY MASS INDEX: 31.7 KG/M2 | HEIGHT: 69 IN | WEIGHT: 214 LBS | TEMPERATURE: 97.8 F

## 2021-07-01 DIAGNOSIS — I10 ESSENTIAL HYPERTENSION: ICD-10-CM

## 2021-07-01 DIAGNOSIS — E11.9 TYPE 2 DIABETES MELLITUS WITHOUT COMPLICATION, UNSPECIFIED WHETHER LONG TERM INSULIN USE (HCC): ICD-10-CM

## 2021-07-01 DIAGNOSIS — K51.919 ULCERATIVE COLITIS WITH COMPLICATION, UNSPECIFIED LOCATION (HCC): ICD-10-CM

## 2021-07-01 DIAGNOSIS — R31.0 GROSS HEMATURIA: ICD-10-CM

## 2021-07-01 DIAGNOSIS — N40.0 BENIGN PROSTATIC HYPERPLASIA WITHOUT LOWER URINARY TRACT SYMPTOMS: Primary | ICD-10-CM

## 2021-07-01 LAB
BILIRUB BLD-MCNC: NEGATIVE MG/DL
CLARITY, POC: CLEAR
COLOR UR: YELLOW
CREAT BLDA-MCNC: 1 MG/DL (ref 0.6–1.3)
GLUCOSE UR STRIP-MCNC: ABNORMAL MG/DL
KETONES UR QL: ABNORMAL
LEUKOCYTE EST, POC: NEGATIVE
NITRITE UR-MCNC: NEGATIVE MG/ML
PH UR: 5 [PH] (ref 5–8)
PROT UR STRIP-MCNC: ABNORMAL MG/DL
RBC # UR STRIP: ABNORMAL /UL
SP GR UR: 1 (ref 1–1.03)
UROBILINOGEN UR QL: NORMAL

## 2021-07-01 PROCEDURE — 81003 URINALYSIS AUTO W/O SCOPE: CPT | Performed by: UROLOGY

## 2021-07-01 PROCEDURE — 99213 OFFICE O/P EST LOW 20 MIN: CPT | Performed by: UROLOGY

## 2021-07-01 PROCEDURE — 25010000002 IOPAMIDOL 61 % SOLUTION: Performed by: UROLOGY

## 2021-07-01 PROCEDURE — 74178 CT ABD&PLV WO CNTR FLWD CNTR: CPT

## 2021-07-01 PROCEDURE — 82565 ASSAY OF CREATININE: CPT

## 2021-07-01 RX ADMIN — IOPAMIDOL 100 ML: 612 INJECTION, SOLUTION INTRAVENOUS at 09:10

## 2021-07-01 NOTE — PROGRESS NOTES
Subjective    Mr. Acosta is 80 y.o. male    Chief Complaint: Gross Hematuria    History of Present Illness    80-year-old male established patient follow-up for history of enlarged prostate and history of hematuria on aspirin and Plavix for coronary stenting approximately a year ago.  He was started on finasteride in March for prostatic oozing noted at time of cystoscopy.  His gross hematuria has resolved.  He is having some brown tea colored urine.  He was recently hospitalized for ulcerative colitis flare.  He had repeat CT urogram done today to reevaluate some hyperdense material on the left renal pelvis that was seen during the time of his hospitalization.  CT scan reviewed by me with the patient today shows excretory imaging with normal upper tracts on my review.  I do not see a clear filling defect.  He does have a small intravesical prostate lobe.  Radiology called me out of concern for possible  bowel fistula with inflammation of his sigmoid colon.  He is under active GI evaluation at this time and states he has seen a colorectal surgeon in [place] previously.  Cystoscopy in March showed lateral lobe hypertrophy and elevated bladder neck with visually obstructing lateral lobes and prostatic oozing.  He was placed on finasteride.      I independently visualized and reviewed the patient's prior imaging studies today in clinic and discussed the imaging findings with the patient.      The following portions of the patient's history were reviewed and updated as appropriate: allergies, current medications, past family history, past medical history, past social history, past surgical history and problem list.    Review of Systems      Current Outpatient Medications:   •  aspirin (aspirin) 81 MG EC tablet, Take 1 tablet by mouth Daily., Disp: 30 tablet, Rfl: 11  •  budesonide-formoterol (Symbicort) 160-4.5 MCG/ACT inhaler, Inhale 2 puffs 2 (two) times a day., Disp: , Rfl:   •  Dupilumab 300 MG/2ML solution  prefilled syringe, Inject  under the skin into the appropriate area as directed Every 14 (Fourteen) Days., Disp: , Rfl:   •  finasteride (PROSCAR) 5 MG tablet, Take 1 tablet by mouth Daily., Disp: 30 tablet, Rfl: 11  •  fluticasone (FLONASE) 50 MCG/ACT nasal spray, 1 spray into the nostril(s) as directed by provider Daily., Disp: 1 bottle, Rfl: 11  •  glucose blood test strip, 1 each by Other route Daily. Use as instructed One Touch Ultra, Disp: 100 each, Rfl: 11  •  Insulin Glargine (Lantus SoloStar) 100 UNIT/ML injection pen, Inject 20 Units under the skin into the appropriate area as directed As Needed., Disp: , Rfl:   •  lisinopril (PRINIVIL,ZESTRIL) 10 MG tablet, Take 1 tablet by mouth Daily., Disp: 90 tablet, Rfl: 1  •  Mesalamine 4 GM/60ML SF enema, Insert  into the rectum Daily., Disp: , Rfl:   •  metoprolol tartrate (LOPRESSOR) 25 MG tablet, Take 25 mg by mouth 2 (two) times a day., Disp: , Rfl:   •  nitroglycerin (NITROSTAT) 0.4 MG SL tablet, 1 under the tongue as needed for angina, may repeat q5mins for up three doses, Disp: 25 tablet, Rfl: 1  •  ondansetron ODT (ZOFRAN-ODT) 4 MG disintegrating tablet, Place 1 tablet on the tongue Every 8 (Eight) Hours As Needed for Nausea., Disp: 10 tablet, Rfl: 0  •  oxyCODONE-acetaminophen (PERCOCET) 5-325 MG per tablet, Take 1 tablet by mouth Every 6 (Six) Hours As Needed for Severe Pain ., Disp: 12 tablet, Rfl: 0  •  predniSONE (DELTASONE) 10 MG tablet, Take 1 tablet by mouth 3 (Three) Times a Day., Disp: 100 tablet, Rfl: 0  •  rosuvastatin (CRESTOR) 40 MG tablet, Take 1 tablet by mouth Daily., Disp: 90 tablet, Rfl: 3  •  triamcinolone (KENALOG) 0.1 % cream, Apply  topically to the appropriate area as directed 2 (Two) Times a Day., Disp: , Rfl:   •  Ustekinumab (STELARA) 90 MG/ML solution prefilled syringe Injection, Inject 90 mg under the skin into the appropriate area as directed Every 2 (Two) Months., Disp: , Rfl:   No current facility-administered medications for  "this visit.    Past Medical History:   Diagnosis Date   • Asthma    • Coronary artery disease    • Diabetes mellitus (CMS/HCC)    • Hyperlipidemia    • Hypertension    • Sleep apnea     cpap at hs       Past Surgical History:   Procedure Laterality Date   • BACK SURGERY     • CARDIAC CATHETERIZATION      stents x 6   • CATARACT EXTRACTION     • COLONOSCOPY     • COLONOSCOPY N/A 6/4/2021    Procedure: COLONOSCOPY WITH ANESTHESIA;  Surgeon: Zachariah Menjivar DO;  Location: [facility] ENDOSCOPY;  Service: Gastroenterology;  Laterality: N/A;  pre hx ulcerative colitis  post ulcerative colitis  dr collins gusman   • CORONARY ANGIOPLASTY WITH STENT PLACEMENT     • HIP SURGERY Right     total hip       Social History     Socioeconomic History   • Marital status:      Spouse name: Not on file   • Number of children: Not on file   • Years of education: Not on file   • Highest education level: Not on file   Tobacco Use   • Smoking status: Never Smoker   • Smokeless tobacco: Never Used   Vaping Use   • Vaping Use: Never used   Substance and Sexual Activity   • Alcohol use: Never   • Drug use: Never   • Sexual activity: Defer       Family History   Problem Relation Age of Onset   • Colon cancer Brother    • Colon polyps Neg Hx    • Esophageal cancer Neg Hx        Objective    Temp 97.8 °F (36.6 °C)   Ht 175.3 cm (69\")   Wt 97.1 kg (214 lb)   BMI 31.60 kg/m²     Physical Exam  He is in no apparent distress.      Results for orders placed or performed in visit on 07/01/21   POC Urinalysis Dipstick, Multipro    Specimen: Urine   Result Value Ref Range    Color Yellow Yellow, Straw, Dark Yellow, Vicky    Clarity, UA Clear Clear    Glucose, UA Trace (A) Negative, 1000 mg/dL (3+) mg/dL    Bilirubin Negative Negative    Ketones, UA Trace (A) Negative    Specific Gravity  1.005 1.005 - 1.030    Blood, UA 3+ (A) Negative    pH, Urine 5.0 5.0 - 8.0    Protein, POC 2+ (A) Negative mg/dL    Urobilinogen, UA Normal Normal    Nitrite, " UA Negative Negative    Leukocytes Negative Negative     Assessment and Plan    Diagnoses and all orders for this visit:    1. Benign prostatic hyperplasia without lower urinary tract symptoms (Primary)  -     POC Urinalysis Dipstick, Multipro    2. Ulcerative colitis with complication, unspecified location (CMS/[facility])    3. Type 2 diabetes mellitus without complication, unspecified whether long term insulin use (CMS/[facility])    4. Essential hypertension      Previous normal urological evaluation for microhematuria except for likely prostatic source.  Continue finasteride.  He is now off his Plavix which should help his hematuria.  He has follow-up scheduled with gastroenterology.  He will get a copy of his CT scan burden on a CD if he chooses to go see colorectal surgery.  He will otherwise follow-up with me in January or sooner as needed.          This document has been signed by ANTHONY Tellez MD on July 1, 2021 16:25 CDT

## 2021-07-01 NOTE — PATIENT INSTRUCTIONS
"BMI for Adults  What is BMI?  Body mass index (BMI) is a number that is calculated from a person's weight and height. BMI can help estimate how much of a person's weight is composed of fat. BMI does not measure body fat directly. Rather, it is an alternative to procedures that directly measure body fat, which can be difficult and expensive.  BMI can help identify people who may be at higher risk for certain medical problems.  What are BMI measurements used for?  BMI is used as a screening tool to identify possible weight problems. It helps determine whether a person is obese, overweight, a healthy weight, or underweight.  BMI is useful for:  · Identifying a weight problem that may be related to a medical condition or may increase the risk for medical problems.  · Promoting changes, such as changes in diet and exercise, to help reach a healthy weight. BMI screening can be repeated to see if these changes are working.  How is BMI calculated?  BMI involves measuring your weight in relation to your height. Both height and weight are measured, and the BMI is calculated from those numbers. This can be done either in English ([place] or metric measurements. Note that charts and online BMI calculators are available to help you find your BMI quickly and easily without having to do these calculations yourself.  To calculate your BMI in English ([place] measurements:    1. Measure your weight in pounds (lb).  2. Multiply the number of pounds by 703.  ? For example, for a person who weighs 180 lb, multiply that number by 703, which equals 126,540.  3. Measure your height in inches. Then multiply that number by itself to get a measurement called \"inches squared.\"  ? For example, for a person who is 70 inches tall, the \"inches squared\" measurement is 70 inches x 70 inches, which equals 4,900 inches squared.  4. Divide the total from step 2 (number of lb x 703) by the total from step 3 (inches squared): 126,540 ÷ 4,900 = 25.8. This is " "your BMI.  To calculate your BMI in metric measurements:  1. Measure your weight in kilograms (kg).  2. Measure your height in meters (m). Then multiply that number by itself to get a measurement called \"meters squared.\"  ? For example, for a person who is 1.75 m tall, the \"meters squared\" measurement is 1.75 m x 1.75 m, which is equal to 3.1 meters squared.  3. Divide the number of kilograms (your weight) by the meters squared number. In this example: 70 ÷ 3.1 = 22.6. This is your BMI.  What do the results mean?  BMI charts are used to identify whether you are underweight, normal weight, overweight, or obese. The following guidelines will be used:  · Underweight: BMI less than 18.5.  · Normal weight: BMI between 18.5 and 24.9.  · Overweight: BMI between 25 and 29.9.  · Obese: BMI of 30 or above.  Keep these notes in mind:  · Weight includes both fat and muscle, so someone with a muscular build, such as an athlete, may have a BMI that is higher than 24.9. In cases like these, BMI is not an accurate measure of body fat.  · To determine if excess body fat is the cause of a BMI of 25 or higher, further assessments may need to be done by a health care provider.  · BMI is usually interpreted in the same way for men and women.  Where to find more information  For more information about BMI, including tools to quickly calculate your BMI, go to these websites:  · Centers for Disease Control and Prevention: www.cdc.gov  · American Heart Association: www.heart.org  · National Heart, Lung, and [employer] [place]: www.nhlbi.nih.gov  Summary  · Body mass index (BMI) is a number that is calculated from a person's weight and height.  · BMI may help estimate how much of a person's weight is composed of fat. BMI can help identify those who may be at higher risk for certain medical problems.  · BMI can be measured using English measurements or metric measurements.  · BMI charts are used to identify whether you are underweight, normal " weight, overweight, or obese.  This information is not intended to replace advice given to you by your health care provider. Make sure you discuss any questions you have with your health care provider.  Document Revised: 09/09/2020 Document Reviewed: 07/17/2020  [employer] Patient Education © 2021 [employer]

## 2021-07-06 ENCOUNTER — OFFICE VISIT (OUTPATIENT)
Dept: FAMILY MEDICINE CLINIC | Facility: CLINIC | Age: 81
End: 2021-07-06

## 2021-07-06 ENCOUNTER — READMISSION MANAGEMENT (OUTPATIENT)
Dept: CALL CENTER | Facility: HOSPITAL | Age: 81
End: 2021-07-06

## 2021-07-06 VITALS
RESPIRATION RATE: 16 BRPM | WEIGHT: 216.8 LBS | SYSTOLIC BLOOD PRESSURE: 133 MMHG | HEIGHT: 69 IN | OXYGEN SATURATION: 99 % | HEART RATE: 80 BPM | TEMPERATURE: 97.7 F | BODY MASS INDEX: 32.11 KG/M2 | DIASTOLIC BLOOD PRESSURE: 82 MMHG

## 2021-07-06 DIAGNOSIS — Z95.1 S/P CABG (CORONARY ARTERY BYPASS GRAFT): ICD-10-CM

## 2021-07-06 DIAGNOSIS — Z79.4 TYPE 2 DIABETES MELLITUS WITH HYPERGLYCEMIA, WITH LONG-TERM CURRENT USE OF INSULIN (HCC): Primary | ICD-10-CM

## 2021-07-06 DIAGNOSIS — E11.65 TYPE 2 DIABETES MELLITUS WITH HYPERGLYCEMIA, WITH LONG-TERM CURRENT USE OF INSULIN (HCC): Primary | ICD-10-CM

## 2021-07-06 DIAGNOSIS — E78.2 MIXED HYPERLIPIDEMIA: ICD-10-CM

## 2021-07-06 DIAGNOSIS — K51.919 ULCERATIVE COLITIS WITH COMPLICATION, UNSPECIFIED LOCATION (HCC): ICD-10-CM

## 2021-07-06 DIAGNOSIS — E66.09 CLASS 1 OBESITY DUE TO EXCESS CALORIES WITH SERIOUS COMORBIDITY AND BODY MASS INDEX (BMI) OF 32.0 TO 32.9 IN ADULT: ICD-10-CM

## 2021-07-06 DIAGNOSIS — I10 ESSENTIAL HYPERTENSION: ICD-10-CM

## 2021-07-06 PROCEDURE — 99214 OFFICE O/P EST MOD 30 MIN: CPT | Performed by: FAMILY MEDICINE

## 2021-07-06 RX ORDER — LISINOPRIL 10 MG/1
10 TABLET ORAL DAILY
Qty: 90 TABLET | Refills: 1 | Status: SHIPPED | OUTPATIENT
Start: 2021-07-06 | End: 2022-05-09 | Stop reason: SDUPTHER

## 2021-07-06 RX ORDER — GLIPIZIDE 10 MG/1
10 TABLET ORAL
COMMUNITY
End: 2021-11-09 | Stop reason: SDUPTHER

## 2021-07-06 NOTE — PATIENT INSTRUCTIONS
Type 2 Diabetes Mellitus, Diagnosis, Adult  Type 2 diabetes (type 2 diabetes mellitus) is a long-term, or chronic, disease. In type 2 diabetes, one or both of these problems may be present:  · The pancreas does not make enough of a hormone called insulin.  · Cells in the body do not respond properly to insulin that the body makes (insulin resistance).  Normally, insulin allows blood sugar (glucose) to enter cells in the body. The cells use glucose for energy. Insulin resistance or lack of insulin causes excess glucose to build up in the blood instead of going into cells. This causes high blood glucose (hyperglycemia).   What are the causes?  The exact cause of type 2 diabetes is not known.  What increases the risk?  The following factors may make you more likely to develop this condition:  · Having a family member with type 2 diabetes.  · Being overweight or obese.  · Being inactive (sedentary).  · Having been diagnosed with insulin resistance.  · Having a history of prediabetes, diabetes when you were pregnant (gestational diabetes), or polycystic ovary syndrome (PCOS).  What are the signs or symptoms?  In the early stage of this condition, you may not have symptoms. Symptoms develop slowly and may include:  · Increased thirst or hunger.  · Increased urination.  · Unexplained weight loss.  · Tiredness (fatigue) or weakness.  · Vision changes, such as blurry vision.  · Dark patches on the skin.  How is this diagnosed?  This condition is diagnosed based on your symptoms, your medical history, a physical exam, and your blood glucose level. Your blood glucose may be checked with one or more of the following blood tests:  · A fasting blood glucose (FBG) test. You will not be allowed to eat (you will fast) for 8 hours or longer before a blood sample is taken.  · A random blood glucose test. This test checks blood glucose at any time of day regardless of when you ate.  · An A1C (hemoglobin A1C) blood test. This test  provides information about blood glucose levels over the previous 2-3 months.  · An oral glucose tolerance test (OGTT). This test measures your blood glucose at two times:  ? After fasting. This is your baseline blood glucose level.  ? Two hours after drinking a beverage that contains glucose.  You may be diagnosed with type 2 diabetes if:  · Your fasting blood glucose level is 126 mg/dL (7.0 mmol/L) or higher.  · Your random blood glucose level is 200 mg/dL (11.1 mmol/L) or higher.  · Your A1C level is 6.5% or higher.  · Your oral glucose tolerance test result is higher than 200 mg/dL (11.1 mmol/L).  These blood tests may be repeated to confirm your diagnosis.  How is this treated?  Your treatment may be managed by a specialist called an endocrinologist. Type 2 diabetes may be treated by following instructions from your health care provider about:  · Making dietary and lifestyle changes. These may include:  ? Following a personalized nutrition plan that is developed by a registered dietitian.  ? Exercising regularly.  ? Finding ways to manage stress.  · Checking your blood glucose level as often as told.  · Taking diabetes medicines or insulin daily. This helps to keep your blood glucose levels in the healthy range.  · Taking medicines to help prevent complications from diabetes. Medicines may include:  ? Aspirin.  ? Medicine to lower cholesterol.  ? Medicine to control blood pressure.  Your health care provider will set treatment goals for you. Your goals will be based on your age, other medical conditions you have, and how you respond to diabetes treatment. Generally, the goal of treatment is to maintain the following blood glucose levels:  · Before meals:  mg/dL (4.4-7.2 mmol/L).  · After meals: below 180 mg/dL (10 mmol/L).  · A1C level: less than 7%.  Follow these instructions at home:  Questions to ask your health care provider  Consider asking the following questions:  · Should I meet with a certified  diabetes care and ?  · What diabetes medicines do I need, and when should I take them?  · What equipment will I need to manage my diabetes at home?  · How often do I need to check my blood glucose?  · Where can I find a support group for people with diabetes?  · What number can I call if I have questions?  · When is my next appointment?  General instructions  · Take over-the-counter and prescription medicines only as told by your health care provider.  · Keep all follow-up visits as told by your health care provider. This is important.  Where to find more information  · American Diabetes Association (ADA): www.diabetes.org  · American Association of Diabetes Care and Education Specialists (ADCES): www.diabeteseducator.org  · International Diabetes Federation (IDF): www.idf.org  Contact a health care provider if:  · Your blood glucose is at or above 240 mg/dL (13.3 mmol/L) for 2 days in a row.  · You have been sick or have had a fever for 2 days or longer, and you are not getting better.  · You have any of the following problems for more than 6 hours:  ? You cannot eat or drink.  ? You have nausea and vomiting.  ? You have diarrhea.  Get help right away if:  · You have severe hypoglycemia. This means your blood glucose is lower than 54 mg/dL (3.0 mmol/L).  · You become confused or you have trouble thinking clearly.  · You have difficulty breathing.  · You have moderate or large ketone levels in your urine.  These symptoms may represent a serious problem that is an emergency. Do not wait to see if the symptoms will go away. Get medical help right away. Call your local emergency services (911 in the [place] Do not drive yourself to the hospital.  Summary  · Type 2 diabetes (type 2 diabetes mellitus) is a long-term, or chronic, disease. In type 2 diabetes, the pancreas does not make enough of a hormone called insulin, or cells in the body do not respond properly to insulin that the body makes (insulin  resistance).  · This condition is treated by making dietary and lifestyle changes and taking diabetes medicines or insulin.  · Your health care provider will set treatment goals for you. Your goals will be based on your age, other medical conditions you have, and how you respond to diabetes treatment.  · Keep all follow-up visits as told by your health care provider. This is important.  This information is not intended to replace advice given to you by your health care provider. Make sure you discuss any questions you have with your health care provider.  Document Revised: 02/01/2021 Document Reviewed: 02/01/2021  [employer] Patient Education © 2021 [employer]      Obesity, Adult  Obesity is the condition of having too much total body fat. Being overweight or obese means that your weight is greater than what is considered healthy for your body size. Obesity is determined by a measurement called BMI. BMI is an estimate of body fat and is calculated from height and weight. For adults, a BMI of 30 or higher is considered obese.  Obesity can lead to other health concerns and major illnesses, including:  · Stroke.  · Coronary artery disease (CAD).  · Type 2 diabetes.  · Some types of cancer, including cancers of the colon, breast, uterus, and gallbladder.  · Osteoarthritis.  · High blood pressure (hypertension).  · High cholesterol.  · Sleep apnea.  · Gallbladder stones.  · Infertility problems.  What are the causes?  Common causes of this condition include:  · Eating daily meals that are high in calories, sugar, and fat.  · Being born with genes that may make you more likely to become obese.  · Having a medical condition that causes obesity, including:  ? Hypothyroidism.  ? Polycystic ovarian syndrome (PCOS).  ? Binge-eating disorder.  ? Cushing syndrome.  · Taking certain medicines, such as steroids, antidepressants, and seizure medicines.  · Not being physically active (sedentary lifestyle).  · Not getting enough  sleep.  · Drinking high amounts of sugar-sweetened beverages, such as soft drinks.  What increases the risk?  The following factors may make you more likely to develop this condition:  · Having a family history of obesity.  · Being a woman of  descent.  · Being a man of  descent.  · Living in an area with limited access to:  ? [place], recreation centers, or sidewalks.  ? Healthy food choices, such as grocery stores and [employer] markets.  What are the signs or symptoms?  The main sign of this condition is having too much body fat.  How is this diagnosed?  This condition is diagnosed based on:  · Your BMI. If you are an adult with a BMI of 30 or higher, you are considered obese.  · Your waist circumference. This measures the distance around your waistline.  · Your skinfold thickness. Your health care provider may gently pinch a fold of your skin and measure it.  You may have other tests to check for underlying conditions.  How is this treated?  Treatment for this condition often includes changing your lifestyle. Treatment may include some or all of the following:  · Dietary changes. This may include developing a healthy meal plan.  · Regular physical activity. This may include activity that causes your heart to beat faster (aerobic exercise) and strength training. Work with your health care provider to design an exercise program that works for you.  · Medicine to help you lose weight if you are unable to lose 1 pound a week after 6 weeks of healthy eating and more physical activity.  · Treating conditions that cause the obesity (underlying conditions).  · Surgery. Surgical options may include gastric banding and gastric bypass. Surgery may be done if:  ? Other treatments have not helped to improve your condition.  ? You have a BMI of 40 or higher.  ? You have life-threatening health problems related to obesity.  Follow these instructions at home:  Eating and drinking    · Follow recommendations  from your health care provider about what you eat and drink. Your health care provider may advise you to:  ? Limit fast food, sweets, and processed snack foods.  ? Choose low-fat options, such as low-fat milk instead of whole milk.  ? Eat 5 or more servings of fruits or vegetables every day.  ? Eat at home more often. This gives you more control over what you eat.  ? Choose healthy foods when you eat out.  ? Learn to read food labels. This will help you understand how much food is considered 1 serving.  ? Learn what a healthy serving size is.  ? Keep low-fat snacks available.  ? Limit sugary drinks, such as soda, fruit juice, sweetened iced tea, and flavored milk.  · Drink enough water to keep your urine pale yellow.  · Do not follow a fad diet. Fad diets can be unhealthy and even dangerous.  Physical activity  · Exercise regularly, as told by your health care provider.  ? Most adults should get up to 150 minutes of moderate-intensity exercise every week.  ? Ask your health care provider what types of exercise are safe for you and how often you should exercise.  · Warm up and stretch before being active.  · Cool down and stretch after being active.  · Rest between periods of activity.  Lifestyle  · Work with your health care provider and a dietitian to set a weight-loss goal that is healthy and reasonable for you.  · Limit your screen time.  · Find ways to reward yourself that do not involve food.  · Do not drink alcohol if:  ? Your health care provider tells you not to drink.  ? You are pregnant, may be pregnant, or are planning to become pregnant.  · If you drink alcohol:  ? Limit how much you use to:  § 0-1 drink a day for women.  § 0-2 drinks a day for men.  ? Be aware of how much alcohol is in your drink. In the [place] one drink equals one 12 oz bottle of beer (355 mL), one 5 oz glass of wine (148 mL), or one 1½ oz glass of hard liquor (44 mL).  General instructions  · Keep a weight-loss journal to keep track of  the food you eat and how much exercise you get.  · Take over-the-counter and prescription medicines only as told by your health care provider.  · Take vitamins and supplements only as told by your health care provider.  · Consider joining a support group. Your health care provider may be able to recommend a support group.  · Keep all follow-up visits as told by your health care provider. This is important.  Contact a health care provider if:  · You are unable to meet your weight loss goal after 6 weeks of dietary and lifestyle changes.  Get help right away if you are having:  · Trouble breathing.  · Suicidal thoughts or behaviors.  Summary  · Obesity is the condition of having too much total body fat.  · Being overweight or obese means that your weight is greater than what is considered healthy for your body size.  · Work with your health care provider and a dietitian to set a weight-loss goal that is healthy and reasonable for you.  · Exercise regularly, as told by your health care provider. Ask your health care provider what types of exercise are safe for you and how often you should exercise.  This information is not intended to replace advice given to you by your health care provider. Make sure you discuss any questions you have with your health care provider.  Document Revised: 08/22/2019 Document Reviewed: 08/22/2019  [employer] Patient Education © 2021 [employer]

## 2021-07-06 NOTE — OUTREACH NOTE
Medical Week 3 Survey      Responses   [facility] patient discharged from?  [place]   Does the patient have one of the following disease processes/diagnoses(primary or secondary)?  Other   Week 3 attempt successful?  Yes   Call start time  0850   Unsuccessful attempts  Attempt 1   Call end time  0851   Meds reviewed with patient/caregiver?  Yes   Is the patient taking all medications as directed (includes completed medication regime)?  Yes   Comments regarding PCP  Patient was heading to apt with PCP this morning    Has the patient kept scheduled appointments due by today?  Yes   Psychosocial issues?  No   What is the patient's perception of their health status since discharge?  Improving   Week 3 Call Completed?  Yes          Vicky Vasques RN

## 2021-07-06 NOTE — PROGRESS NOTES
Subjective cc: HTN and DM   Zachariah Acosta is a 80 y.o. male with HTN, DM, obesity, CAD status post CABG, hyperlipidemia, ulcerative colitis, and obstructive sleep apnea who presents for follow-up on his hypertension and diabetes.    Hypertension: Chronic, well controlled.  Patient compliant with blood pressure medications.  Diabetes: Chronic, uncontrolled.  Patient is currently on prednisone.  Reports his glucose has been running high.  He is monitoring it daily.  When his readings are elevated he has adjusted his insulin.  Typically taking about 20 units at night when his glucose is over 200.  If it is less than 150 he does not take any insulin as he is concerned about hypoglycemia while he sleeps.    Patient was seen multiple times in the emergency department for lower abdominal pain with flareup of his colitis.  Patient was admitted on June 18 through June 20 for evaluation of the colitis inpatient.  Patient was discharged with follow-up with gastroenterology.  Patient has been seeing Dr. Menjivar.  Plan for colonoscopy.  His Stelara levels were checked.  He was also placed on prednisone.  Advised if it did not start to improve would refer out.  Patient does feel like his lower abdominal pain is improving.    Obesity: Chronic, uncontrolled.  Patient is overall trending down.  Not currently having any chest pain, compliant with his medical management for heart disease.    He also had a CT scan of his abdomen pelvis with urology.  He saw Dr. Tellez who advised no issues with stones and 6 months follow-up scheduled.       History of Present Illness     The following portions of the patient's history were reviewed and updated as appropriate: allergies, current medications, past family history, past medical history, past social history, past surgical history and problem list.        Review of Systems   Constitutional: Negative for activity change, appetite change and unexpected weight change.   Respiratory:  "Negative for cough and shortness of breath.    Cardiovascular: Negative for chest pain and palpitations.   Gastrointestinal: Negative for abdominal pain and blood in stool.   Musculoskeletal: Positive for arthralgias.   Psychiatric/Behavioral: Negative for dysphoric mood. The patient is not nervous/anxious.    All other systems reviewed and are negative.      Objective   Blood pressure 133/82, pulse 80, temperature 97.7 °F (36.5 °C), temperature source Infrared, resp. rate 16, height 175.3 cm (69\"), weight 98.3 kg (216 lb 12.8 oz), SpO2 99 %.  Physical Exam  Vitals and nursing note reviewed.   Constitutional:       General: He is not in acute distress.     Appearance: Normal appearance. He is obese. He is not toxic-appearing.   HENT:      Head: Normocephalic and atraumatic.      Right Ear: External ear normal.      Left Ear: External ear normal.      Nose: Nose normal.   Eyes:      General:         Right eye: No discharge.         Left eye: No discharge.      Extraocular Movements: Extraocular movements intact.      Conjunctiva/sclera: Conjunctivae normal.   Cardiovascular:      Rate and Rhythm: Normal rate and regular rhythm.      Pulses: Normal pulses.   Pulmonary:      Effort: Pulmonary effort is normal. No respiratory distress.      Breath sounds: Normal breath sounds. No wheezing.   Abdominal:      General: There is no distension.      Palpations: Abdomen is soft. There is no mass.      Tenderness: There is no abdominal tenderness.   Musculoskeletal:      Cervical back: Normal range of motion and neck supple. No rigidity.      Right lower leg: No edema.      Left lower leg: No edema.   Lymphadenopathy:      Cervical: No cervical adenopathy.   Skin:     General: Skin is warm and dry.   Neurological:      Mental Status: He is alert and oriented to person, place, and time. Mental status is at baseline.   Psychiatric:         Mood and Affect: Mood normal.         Behavior: Behavior normal.         Thought Content: " Thought content normal.         Judgment: Judgment normal.         Assessment/Plan   Problems Addressed this Visit        Cardiac and Vasculature    Essential hypertension    Relevant Medications    lisinopril (PRINIVIL,ZESTRIL) 10 MG tablet    S/P CABG (coronary artery bypass graft)    Hyperlipidemia       Gastrointestinal Abdominal     Ulcerative colitis (CMS/[facility])      Other Visit Diagnoses     Type 2 diabetes mellitus with hyperglycemia, with long-term current use of insulin (CMS/HCC)    -  Primary    Relevant Medications    glipizide (GLUCOTROL) 10 MG tablet    Other Relevant Orders    Ambulatory Referral for Diabetic Eye Exam-Optometry    Class 1 obesity due to excess calories with serious comorbidity and body mass index (BMI) of 32.0 to 32.9 in adult          Diagnoses       Codes Comments    Type 2 diabetes mellitus with hyperglycemia, with long-term current use of insulin (CMS/[facility])    -  Primary ICD-10-CM: E11.65, Z79.4  ICD-9-CM: 250.00, 790.29, V58.67     Essential hypertension     ICD-10-CM: I10  ICD-9-CM: 401.9     S/P CABG (coronary artery bypass graft)     ICD-10-CM: Z95.1  ICD-9-CM: V45.81     Mixed hyperlipidemia     ICD-10-CM: E78.2  ICD-9-CM: 272.2     Ulcerative colitis with complication, unspecified location (CMS/[facility])     ICD-10-CM: K51.919  ICD-9-CM: 556.9     Class 1 obesity due to excess calories with serious comorbidity and body mass index (BMI) of 32.0 to 32.9 in adult     ICD-10-CM: E66.09, Z68.32  ICD-9-CM: 278.00, V85.32           Plan:    1.  Hypertension: Chronic, controlled.  Continue on current medications.  Refill sent.  DASH diet.    2.  Diabetes mellitus: Chronic, uncontrolled with hyperglycemia.  Reviewed most recent hemoglobin A1c.  Agree with medication adjustments that patient has been making.  Referral for eye exam.    3.  Hyperlipidemia: Chronic, stable.  Continue on statin.  Advised on diet lifestyle changes.    4.  Ulcerative colitis: Chronic, uncontrolled.  Patient  currently following with GI and managing his Stelara prescription.  Plan for repeat colonoscopy for further evaluation.    5.  Obesity: Patient's Body mass index is 32.02 kg/m². indicating that he is obese (BMI >30). Obesity-related health conditions include the following: obstructive sleep apnea, hypertension, coronary heart disease, diabetes mellitus and dyslipidemias. Obesity is improving with lifestyle modifications. BMI is is above average; BMI management plan is completed. We discussed portion control and increasing exercise.          This document has been electronically signed by Monique Carlos MD on July 6, 2021 13:05 CDT

## 2021-07-12 ENCOUNTER — OFFICE VISIT (OUTPATIENT)
Dept: GASTROENTEROLOGY | Facility: CLINIC | Age: 81
End: 2021-07-12

## 2021-07-12 VITALS
BODY MASS INDEX: 31.99 KG/M2 | WEIGHT: 216 LBS | SYSTOLIC BLOOD PRESSURE: 126 MMHG | TEMPERATURE: 98 F | OXYGEN SATURATION: 99 % | DIASTOLIC BLOOD PRESSURE: 80 MMHG | HEIGHT: 69 IN | HEART RATE: 68 BPM

## 2021-07-12 DIAGNOSIS — K51.919 ULCERATIVE COLITIS WITH COMPLICATION, UNSPECIFIED LOCATION (HCC): Primary | ICD-10-CM

## 2021-07-12 DIAGNOSIS — K62.5 RECTAL BLEEDING: ICD-10-CM

## 2021-07-12 PROCEDURE — 99213 OFFICE O/P EST LOW 20 MIN: CPT | Performed by: INTERNAL MEDICINE

## 2021-07-12 RX ORDER — PREDNISONE 10 MG/1
10 TABLET ORAL 2 TIMES DAILY
Qty: 60 TABLET | Refills: 0 | Status: SHIPPED | OUTPATIENT
Start: 2021-07-12 | End: 2021-10-20 | Stop reason: SDUPTHER

## 2021-07-12 NOTE — PROGRESS NOTES
Chief Complaint   Patient presents with   • Ulcerative Colitis     was seen 6- doing good on steriods need lab results       PCP: Monique Carlos MD  REFER: No ref. provider found      Subjective   HPI    Zachariah Acosta is a 80 y.o. male who presents with a history of Ulcerative Colitis.    Status of disease is active and fluctuating a bit. He had stelara level drawn on 6/30/21, those results have not returned.  He is maintained on mesalamine enema as well as 20 mg steroid in addition to Stelara.  Abdominal pain, diarrhea,  and bleeding has improved with initiation of prednisone.   Bowels currently moving 1-2 per day with addition of prednisone.  Colonoscopy 6/4/21    Past Medical History:   Diagnosis Date   • Asthma    • Coronary artery disease    • Diabetes mellitus (CMS/[facility])    • Hyperlipidemia    • Hypertension    • Sleep apnea     cpap at      Outpatient Medications Marked as Taking for the 7/12/21 encounter (Office Visit) with Zachariah Menjivar, DO   Medication Sig Dispense Refill   • aspirin (aspirin) 81 MG EC tablet Take 1 tablet by mouth Daily. 30 tablet 11   • budesonide-formoterol (Symbicort) 160-4.5 MCG/ACT inhaler Inhale 2 puffs 2 (two) times a day.     • Dupilumab 300 MG/2ML solution prefilled syringe Inject  under the skin into the appropriate area as directed Every 14 (Fourteen) Days.     • finasteride (PROSCAR) 5 MG tablet Take 1 tablet by mouth Daily. 30 tablet 11   • fluticasone (FLONASE) 50 MCG/ACT nasal spray 1 spray into the nostril(s) as directed by provider Daily. 1 bottle 11   • glipizide (GLUCOTROL) 10 MG tablet Take 10 mg by mouth 2 (Two) Times a Day Before Meals.     • Insulin Glargine (Lantus SoloStar) 100 UNIT/ML injection pen Inject 20 Units under the skin into the appropriate area as directed As Needed.     • lisinopril (PRINIVIL,ZESTRIL) 10 MG tablet Take 1 tablet by mouth Daily. 90 tablet 1   • Mesalamine 4 GM/60ML SF enema Insert  into the rectum Daily.     •  metoprolol tartrate (LOPRESSOR) 25 MG tablet Take 25 mg by mouth 2 (two) times a day.     • nitroglycerin (NITROSTAT) 0.4 MG SL tablet 1 under the tongue as needed for angina, may repeat q5mins for up three doses 25 tablet 1   • predniSONE (DELTASONE) 10 MG tablet Take 1 tablet by mouth 2 (two) times a day. 60 tablet 0   • rosuvastatin (CRESTOR) 40 MG tablet Take 1 tablet by mouth Daily. 90 tablet 3   • triamcinolone (KENALOG) 0.1 % cream Apply  topically to the appropriate area as directed 2 (Two) Times a Day.     • Ustekinumab (STELARA) 90 MG/ML solution prefilled syringe Injection Inject 90 mg under the skin into the appropriate area as directed Every 2 (Two) Months.     • [DISCONTINUED] predniSONE (DELTASONE) 10 MG tablet Take 1 tablet by mouth 3 (Three) Times a Day. (Patient taking differently: Take 10 mg by mouth 2 (two) times a day.) 100 tablet 0     Allergies   Allergen Reactions   • Albuterol Other (See Comments)   • Adhesive Tape Rash   • Azithromycin Rash     Social History     Socioeconomic History   • Marital status:      Spouse name: Not on file   • Number of children: Not on file   • Years of education: Not on file   • Highest education level: Not on file   Tobacco Use   • Smoking status: Never Smoker   • Smokeless tobacco: Never Used   Vaping Use   • Vaping Use: Never used   Substance and Sexual Activity   • Alcohol use: Never   • Drug use: Never   • Sexual activity: Defer     Family History   Problem Relation Age of Onset   • Colon cancer Brother    • Colon polyps Neg Hx    • Esophageal cancer Neg Hx      Review of Systems   Constitutional: Negative for unexpected weight change.   Respiratory: Negative for shortness of breath.    Cardiovascular: Negative for chest pain.   Gastrointestinal: Negative for abdominal pain and anal bleeding.     Objective   Vitals:    07/12/21 1237   BP: 126/80   Pulse: 68   Temp: 98 °F (36.7 °C)   SpO2: 99%     Physical Exam  Constitutional:       Appearance:  Normal appearance. He is well-developed.   Eyes:      General: No scleral icterus.  Cardiovascular:      Rate and Rhythm: Regular rhythm.      Heart sounds: Normal heart sounds. No murmur heard.     Pulmonary:      Effort: Pulmonary effort is normal. No accessory muscle usage.      Breath sounds: Normal breath sounds.   Abdominal:      General: Bowel sounds are normal. There is no distension.      Palpations: Abdomen is soft. There is no mass.      Tenderness: There is no abdominal tenderness. There is no guarding or rebound.   Skin:     General: Skin is warm and dry.      Coloration: Skin is not jaundiced.   Neurological:      Mental Status: He is alert.   Psychiatric:         Behavior: Behavior is cooperative.       Imaging Results (Most Recent)     None        Body mass index is 31.9 kg/m².  Assessment/Plan   Diagnoses and all orders for this visit:    1. Ulcerative colitis with complication, unspecified location (CMS/HCC) (Primary)    2. Rectal bleeding    Other orders  -     predniSONE (DELTASONE) 10 MG tablet; Take 1 tablet by mouth 2 (two) times a day.  Dispense: 60 tablet; Refill: 0          * Surgery not found *    I will call to inquire about delay in Stelara results and notify patient by phone  Continue 20 mg prednisone for now, explained prednisone not long term solution  If he needs to be referred to larger center he would prefer to go to [place] due to proximity  Follow up one month    Recommend patient have baseline DEXA scan, then repeated every 1-2 years.  I have encouraged patient to discuss this with PCP  It has been advised patient avoid use of NSAIDs due to risk associated with a flare  Encourage females to stay up to date with PAP Smear due to risk of cervical dysplasia associated with use of biologics and immunomodulator therapy            Zachariah Menjivar,   07/12/21

## 2021-07-14 ENCOUNTER — READMISSION MANAGEMENT (OUTPATIENT)
Dept: CALL CENTER | Facility: HOSPITAL | Age: 81
End: 2021-07-14

## 2021-07-14 NOTE — OUTREACH NOTE
Medical Week 4 Survey      Responses   [facility] patient discharged from?  [facility]   Does the patient have one of the following disease processes/diagnoses(primary or secondary)?  Other   Week 4 attempt successful?  Yes   Call start time  1555   Call end time  1557   Discharge diagnosis  KE, ulcerative colitis, T2DM, HTN, CAD   Meds reviewed with patient/caregiver?  Yes   Is the patient having any side effects they believe may be caused by any medication additions or changes?  No   Is the patient taking all medications as directed (includes completed medication regime)?  Yes   Has the patient kept scheduled appointments due by today?  Yes   Is the patient still receiving Home Health Services?  N/A   Psychosocial issues?  No   What is the patient's perception of their health status since discharge?  Improving   Is the patient/caregiver able to teach back signs and symptoms related to disease process for when to call PCP?  Yes   Is the patient/caregiver able to teach back signs and symptoms related to disease process for when to call 911?  Yes   If the patient is a current smoker, are they able to teach back resources for cessation?  Not a smoker   Additional teach back comments  BS up after steroids, no issues or questions, improving slowly   Week 4 Call Completed?  Yes   Would the patient like one additional call?  No   Graduated  Yes   Is the patient interested in additional calls from an ambulatory ?  NOTE:  applies to high risk patients requiring additional follow-up.  No   Did the patient feel the follow up calls were helpful during their recovery period?  Yes   Was the number of calls appropriate?  Yes   Wrap up additional comments  Improving.          Nasima Tellez RN

## 2021-07-15 ENCOUNTER — TELEPHONE (OUTPATIENT)
Dept: GASTROENTEROLOGY | Facility: CLINIC | Age: 81
End: 2021-07-15

## 2021-07-15 LAB
USTEKINUMAB AB SERPL IA-MCNC: <40 NG/ML
USTEKINUMAB SERPL IA-MCNC: 0.6 UG/ML

## 2021-07-15 NOTE — TELEPHONE ENCOUNTER
Malina could you change his Stelara script from q 8 wks to q 4wks(he is [facility])  Salo will make the epic changes  I talked to him just now about his low levels

## 2021-07-20 ENCOUNTER — HOSPITAL ENCOUNTER (OUTPATIENT)
Dept: CT IMAGING | Facility: HOSPITAL | Age: 81
Discharge: HOME | End: 2021-07-20
Admitting: INTERNAL MEDICINE

## 2021-07-20 DIAGNOSIS — R91.1 LUNG NODULE: ICD-10-CM

## 2021-07-20 PROCEDURE — 71250 CT THORAX DX C-: CPT

## 2021-07-21 ENCOUNTER — OFFICE VISIT (OUTPATIENT)
Dept: NEUROLOGY | Facility: CLINIC | Age: 81
End: 2021-07-21

## 2021-07-21 VITALS
SYSTOLIC BLOOD PRESSURE: 110 MMHG | OXYGEN SATURATION: 98 % | HEIGHT: 69 IN | BODY MASS INDEX: 32.14 KG/M2 | DIASTOLIC BLOOD PRESSURE: 62 MMHG | HEART RATE: 67 BPM | WEIGHT: 217 LBS | RESPIRATION RATE: 17 BRPM

## 2021-07-21 DIAGNOSIS — Z99.89 OBSTRUCTIVE SLEEP APNEA ON CPAP: Primary | ICD-10-CM

## 2021-07-21 DIAGNOSIS — E66.9 CLASS 1 OBESITY WITH BODY MASS INDEX (BMI) OF 32.0 TO 32.9 IN ADULT, UNSPECIFIED OBESITY TYPE, UNSPECIFIED WHETHER SERIOUS COMORBIDITY PRESENT: ICD-10-CM

## 2021-07-21 DIAGNOSIS — G47.33 OBSTRUCTIVE SLEEP APNEA ON CPAP: Primary | ICD-10-CM

## 2021-07-21 PROCEDURE — 99214 OFFICE O/P EST MOD 30 MIN: CPT | Performed by: NURSE PRACTITIONER

## 2021-07-21 NOTE — PATIENT INSTRUCTIONS
Living With Sleep Apnea  Sleep apnea is a condition in which breathing pauses or becomes shallow during sleep. Sleep apnea is most commonly caused by a collapsed or blocked airway. People with sleep apnea snore loudly and have times when they gasp and stop breathing for 10 seconds or more during sleep. This happens over and over during the night. This disrupts your sleep and keeps your body from getting the rest that it needs, which can cause tiredness and lack of energy (fatigue) during the day.  The breaks in breathing also interrupt the deep sleep that you need to feel rested. Even if you do not completely wake up from the gaps in breathing, your sleep may not be restful. You may also have a headache in the morning and low energy during the day, and you may feel anxious or depressed.  How can sleep apnea affect me?  Sleep apnea increases your chances of extreme tiredness during the day (daytime fatigue). It can also increase your risk for health conditions, such as:  · Heart attack.  · Stroke.  · Diabetes.  · Heart failure.  · Irregular heartbeat.  · High blood pressure.  If you have daytime fatigue as a result of sleep apnea, you may be more likely to:  · Perform poorly at school or work.  · Fall asleep while driving.  · Have difficulty with attention.  · Develop depression or anxiety.  · Become severely overweight (obese).  · Have sexual dysfunction.  What actions can I take to manage sleep apnea?  Sleep apnea treatment    · If you were given a device to open your airway while you sleep, use it only as told by your health care provider. You may be given:  ? An oral appliance. This is a custom-made mouthpiece that shifts your lower jaw forward.  ? A continuous positive airway pressure (CPAP) device. This device blows air through a mask when you breathe out (exhale).  ? A nasal expiratory positive airway pressure (EPAP) device. This device has valves that you put into each nostril.  ? A bi-level positive airway  pressure (BPAP) device. This device blows air through a mask when you breathe in (inhale) and breathe out (exhale).  · You may need surgery if other treatments do not work for you.  Sleep habits  · Go to sleep and wake up at the same time every day. This helps set your internal clock (circadian rhythm) for sleeping.  ? If you stay up later than usual, such as on weekends, try to get up in the morning within 2 hours of your normal wake time.  · Try to get at least 7-9 hours of sleep each night.  · Stop computer, tablet, and mobile phone use a few hours before bedtime.  · Do not take long naps during the day. If you nap, limit it to 30 minutes.  · Have a relaxing bedtime routine. Reading or listening to music may relax you and help you sleep.  · Use your bedroom only for sleep.  ? Keep your television and computer out of your bedroom.  ? Keep your bedroom cool, dark, and quiet.  ? Use a supportive mattress and pillows.  · Follow your health care provider's instructions for other changes to sleep habits.  Nutrition  · Do not eat heavy meals in the evening.  · Do not have caffeine in the later part of the day. The effects of caffeine can last for more than 5 hours.  · Follow your health care provider's or dietitian's instructions for any diet changes.  Lifestyle         · Do not drink alcohol before bedtime. Alcohol can cause you to fall asleep at first, but then it can cause you to wake up in the middle of the night and have trouble getting back to sleep.  · Do not use any products that contain nicotine or tobacco, such as cigarettes and e-cigarettes. If you need help quitting, ask your health care provider.  Medicines  · Take over-the-counter and prescription medicines only as told by your health care provider.  · Do not use over-the-counter sleep medicine. You can become dependent on this medicine, and it can make sleep apnea worse.  · Do not use medicines, such as sedatives and narcotics, unless told by your health  care provider.  Activity  · Exercise on most days, but avoid exercising in the evening. Exercising near bedtime can interfere with sleeping.  · If possible, spend time outside every day. Natural light helps regulate your circadian rhythm.  General information  · Lose weight if you need to, and maintain a healthy weight.  · Keep all follow-up visits as told by your health care provider. This is important.  · If you are having surgery, make sure to tell your health care provider that you have sleep apnea. You may need to bring your device with you.  Where to find more information  Learn more about sleep apnea and daytime fatigue from:  · American Sleep Association: sleepassociation.org  · National Sleep Foundation: sleepfoundation.org  · National Heart, Lung, and Blood [place]: nhlbi.nih.gov  Summary  · Sleep apnea can cause daytime fatigue and other serious health conditions.  · Both sleep apnea and daytime fatigue can be bad for your health and well-being.  · You may need to wear a device while sleeping to help keep your airway open.  · If you are having surgery, make sure to tell your health care provider that you have sleep apnea. You may need to bring your device with you.  · Making changes to sleep habits, diet, lifestyle, and activity can help you manage sleep apnea.  This information is not intended to replace advice given to you by your health care provider. Make sure you discuss any questions you have with your health care provider.  Document Revised: 04/10/2020 Document Reviewed: 03/14/2019  [employer] Patient Education © 2021 [employer]

## 2021-07-21 NOTE — PROGRESS NOTES
Neurology Progress Note      Chief Complaint:    Obstructive sleep apnea     Subjective     Subjective:  Zachariah Acosta is a 80 y.o. male who presents to the office today for obstructive sleep apnea.      He presents today doing well with CPAP therapy.  He has been using a CPAP for around 12 years and Due to time of use, Dr. Mejias with pulmonology recommended titration to asses for appropriate pressures.  The patient denies any new symptoms at the time of titration.  He states compliance and uses his CPAP every night.  He states he feels well rested in the mornings.  He denies sudden awakenings at night gasping for air, daytime hypersomnolence, or snoring.  He overall feels well and has no complaints today.     Past Medical History:   Diagnosis Date   • Asthma    • Coronary artery disease    • Diabetes mellitus (CMS/HCC)    • Hyperlipidemia    • Hypertension    • Sleep apnea     cpap at      Past Surgical History:   Procedure Laterality Date   • BACK SURGERY     • CARDIAC CATHETERIZATION      stents x 6   • CATARACT EXTRACTION     • COLONOSCOPY     • COLONOSCOPY N/A 6/4/2021    Procedure: COLONOSCOPY WITH ANESTHESIA;  Surgeon: Zachariah Menjivar DO;  Location: [facility] ENDOSCOPY;  Service: Gastroenterology;  Laterality: N/A;  pre hx ulcerative colitis  post ulcerative colitis  dr collins gusman   • CORONARY ANGIOPLASTY WITH STENT PLACEMENT     • HIP SURGERY Right     total hip     Family History   Problem Relation Age of Onset   • Colon cancer Brother    • Colon polyps Neg Hx    • Esophageal cancer Neg Hx      Social History     Tobacco Use   • Smoking status: Never Smoker   • Smokeless tobacco: Never Used   Vaping Use   • Vaping Use: Never used   Substance Use Topics   • Alcohol use: Never   • Drug use: Never     Medications:  Current Outpatient Medications   Medication Sig Dispense Refill   • aspirin (aspirin) 81 MG EC tablet Take 1 tablet by mouth Daily. 30 tablet 11   • budesonide-formoterol  (Symbicort) 160-4.5 MCG/ACT inhaler Inhale 2 puffs 2 (two) times a day.     • Dupilumab 300 MG/2ML solution prefilled syringe Inject  under the skin into the appropriate area as directed Every 14 (Fourteen) Days.     • finasteride (PROSCAR) 5 MG tablet Take 1 tablet by mouth Daily. 30 tablet 11   • fluticasone (FLONASE) 50 MCG/ACT nasal spray 1 spray into the nostril(s) as directed by provider Daily. 1 bottle 11   • glipizide (GLUCOTROL) 10 MG tablet Take 10 mg by mouth 2 (Two) Times a Day Before Meals.     • glucose blood test strip 1 each by Other route Daily. Use as instructed One Touch Ultra 100 each 11   • Insulin Glargine (Lantus SoloStar) 100 UNIT/ML injection pen Inject 20 Units under the skin into the appropriate area as directed As Needed.     • lisinopril (PRINIVIL,ZESTRIL) 10 MG tablet Take 1 tablet by mouth Daily. 90 tablet 1   • Mesalamine 4 GM/60ML SF enema Insert  into the rectum Daily.     • metoprolol tartrate (LOPRESSOR) 25 MG tablet Take 25 mg by mouth 2 (two) times a day.     • nitroglycerin (NITROSTAT) 0.4 MG SL tablet 1 under the tongue as needed for angina, may repeat q5mins for up three doses 25 tablet 1   • ondansetron ODT (ZOFRAN-ODT) 4 MG disintegrating tablet Place 1 tablet on the tongue Every 8 (Eight) Hours As Needed for Nausea. 10 tablet 0   • oxyCODONE-acetaminophen (PERCOCET) 5-325 MG per tablet Take 1 tablet by mouth Every 6 (Six) Hours As Needed for Severe Pain . 12 tablet 0   • predniSONE (DELTASONE) 10 MG tablet Take 1 tablet by mouth 2 (two) times a day. 60 tablet 0   • rosuvastatin (CRESTOR) 40 MG tablet Take 1 tablet by mouth Daily. 90 tablet 3   • triamcinolone (KENALOG) 0.1 % cream Apply  topically to the appropriate area as directed 2 (Two) Times a Day.     • Ustekinumab (STELARA) 90 MG/ML solution prefilled syringe Injection Inject 90 mg under the skin into the appropriate area as directed Every 28 (Twenty-Eight) Days. 1 mL 6     No current facility-administered  medications for this visit.     Current outpatient and discharge medications have been reconciled for the patient.  Reviewed by: BORIS Ross      Allergies:    Albuterol, Adhesive tape, and Azithromycin    Review of Systems:   Review of Systems   Psychiatric/Behavioral: Positive for sleep disturbance.   All other systems reviewed and are negative.      Objective      Vital Signs  Heart Rate:  [67] 67  Resp:  [17] 17  BP: (110)/(62) 110/62    Physical Exam:  Physical Exam  Constitutional:       Appearance: Normal appearance.   HENT:      Head: Normocephalic.   Eyes:      Extraocular Movements: Extraocular movements intact.      Pupils: Pupils are equal, round, and reactive to light.   Cardiovascular:      Rate and Rhythm: Normal rate and regular rhythm.      Pulses: Normal pulses.   Pulmonary:      Effort: Pulmonary effort is normal.   Musculoskeletal:         General: Normal range of motion.      Cervical back: Normal range of motion and neck supple.   Skin:     General: Skin is warm and dry.      Capillary Refill: Capillary refill takes less than 2 seconds.   Neurological:      General: No focal deficit present.      Mental Status: He is alert and oriented to person, place, and time. Mental status is at baseline.      Cranial Nerves: Cranial nerves are intact.      Sensory: Sensation is intact.      Motor: Motor function is intact.      Coordination: Coordination is intact.      Gait: Gait is intact.      Deep Tendon Reflexes: Reflexes are normal and symmetric.   Psychiatric:         Mood and Affect: Mood normal.         Neck Circumference: 19 inches  Mallampati Classification: II (hard and soft palate, upper portion of tonsils anduvula visible)       Results Review:      [place] Sleepiness Scale: 10    STOP-BANG: High risk BREANN    Compliance Report:   This compliance report is for the dates of 6/13/2021-7/12/2021.  The patient used the PAP device for 29/30 days for a 96.7% compliance.  Of those days, the  device was used for greater than 4 hours for 29 days for a 96.7% compliance.  The patient is set on APAP with a minimum pressure of 9 cmH2O and a maximum pressure of 88quS9S.  AHI is currently 4.4    Discussion     I have reviewed the current compliance download with the patient in detail.  He  understands that a certain level of compliance allows for continued insurance coverage as well as adequate treatment of underlying apnea.  He also understands the impact this has upon their overall health status and other medical comorbidities.  Risks of untreated sleep apnea were discussed to include but not limited to HTN, heart disease, stroke, cardiac arrhythmia such as AFIB, and dementia.    Assessment/Plan     Impression:  • Obstructive sleep apnea  • Appropriate compliance with CPAP  • BMI 32      Plan:  · Continue with CPAP therapy. The patient recognizes the need for adherence to the prescribed therapy.    · I have recommended regular cardiovascular exercise in the form of walking, biking or swimming 30-40 minutes at a time at least 3-4 times per week.  · Counseled on multimodal approach to treatment of sleep apnea to include but not limited to diet, exercise, sleep hygiene, compliance with pap therapy.   · Encouraged lateral sleeping position and to avoid sedatives or alcohol close to bedtime.     The plan of care was fully discussed with the patient and they are in full agreement at this time.     Follow-Up:  • Return in about 1 year (around 7/21/2022) for Obstructive sleep apnea follow-up, Annual compliance review.      Gopal Dickerson, BORIS  07/21/21  14:00 CDT

## 2021-08-07 ENCOUNTER — HOSPITAL ENCOUNTER (EMERGENCY)
Facility: HOSPITAL | Age: 81
Discharge: LEFT BEFORE DISCHARGE | End: 2021-08-08

## 2021-08-07 PROCEDURE — 99211 OFF/OP EST MAY X REQ PHY/QHP: CPT

## 2021-08-19 ENCOUNTER — OFFICE VISIT (OUTPATIENT)
Dept: GASTROENTEROLOGY | Facility: CLINIC | Age: 81
End: 2021-08-19

## 2021-08-19 VITALS
TEMPERATURE: 98.6 F | HEART RATE: 77 BPM | WEIGHT: 217 LBS | OXYGEN SATURATION: 99 % | BODY MASS INDEX: 32.14 KG/M2 | HEIGHT: 69 IN | SYSTOLIC BLOOD PRESSURE: 120 MMHG | DIASTOLIC BLOOD PRESSURE: 68 MMHG

## 2021-08-19 DIAGNOSIS — K51.919 ULCERATIVE COLITIS WITH COMPLICATION, UNSPECIFIED LOCATION (HCC): Primary | ICD-10-CM

## 2021-08-19 PROCEDURE — 99214 OFFICE O/P EST MOD 30 MIN: CPT | Performed by: INTERNAL MEDICINE

## 2021-08-19 NOTE — PROGRESS NOTES
Chief Complaint   Patient presents with   • Ulcerative Colitis     changed stelara to 4 weeks doing good       PCP: Monique Gusman MD  REFER: No ref. provider found    Subjective     HPI      Overall he continues to improve /he is down to 10 mg qaa  Denies gi bleeding  Less abdominal pain  Less bleeding  When he avoids grease type foods he improves        Past Medical History:   Diagnosis Date   • Asthma    • Coronary artery disease    • Diabetes mellitus (CMS/HCC)    • Hyperlipidemia    • Hypertension    • Sleep apnea     cpap at        Past Surgical History:   Procedure Laterality Date   • BACK SURGERY     • CARDIAC CATHETERIZATION      stents x 6   • CATARACT EXTRACTION     • COLONOSCOPY     • COLONOSCOPY N/A 6/4/2021    Procedure: COLONOSCOPY WITH ANESTHESIA;  Surgeon: Zachariah Menjivar DO;  Location: [facility] ENDOSCOPY;  Service: Gastroenterology;  Laterality: N/A;  pre hx ulcerative colitis  post ulcerative colitis  dr monique gusman   • CORONARY ANGIOPLASTY WITH STENT PLACEMENT     • HIP SURGERY Right     total hip       Outpatient Medications Marked as Taking for the 8/19/21 encounter (Office Visit) with Zachariah Menjivar DO   Medication Sig Dispense Refill   • aspirin (aspirin) 81 MG EC tablet Take 1 tablet by mouth Daily. 30 tablet 11   • budesonide-formoterol (Symbicort) 160-4.5 MCG/ACT inhaler Inhale 2 puffs 2 (two) times a day.     • Dupilumab 300 MG/2ML solution prefilled syringe Inject  under the skin into the appropriate area as directed Every 14 (Fourteen) Days.     • finasteride (PROSCAR) 5 MG tablet Take 1 tablet by mouth Daily. 30 tablet 11   • fluticasone (FLONASE) 50 MCG/ACT nasal spray 1 spray into the nostril(s) as directed by provider Daily. 1 bottle 11   • glipizide (GLUCOTROL) 10 MG tablet Take 10 mg by mouth 2 (Two) Times a Day Before Meals.     • Insulin Glargine (Lantus SoloStar) 100 UNIT/ML injection pen Inject 20 Units under the skin into the appropriate area as directed As  Needed.     • lisinopril (PRINIVIL,ZESTRIL) 10 MG tablet Take 1 tablet by mouth Daily. 90 tablet 1   • Mesalamine 4 GM/60ML SF enema Insert  into the rectum Daily.     • metoprolol tartrate (LOPRESSOR) 25 MG tablet Take 25 mg by mouth 2 (two) times a day.     • nitroglycerin (NITROSTAT) 0.4 MG SL tablet 1 under the tongue as needed for angina, may repeat q5mins for up three doses 25 tablet 1   • ondansetron ODT (ZOFRAN-ODT) 4 MG disintegrating tablet Place 1 tablet on the tongue Every 8 (Eight) Hours As Needed for Nausea. 10 tablet 0   • oxyCODONE-acetaminophen (PERCOCET) 5-325 MG per tablet Take 1 tablet by mouth Every 6 (Six) Hours As Needed for Severe Pain . 12 tablet 0   • predniSONE (DELTASONE) 10 MG tablet Take 1 tablet by mouth 2 (two) times a day. 60 tablet 0   • rosuvastatin (CRESTOR) 40 MG tablet Take 1 tablet by mouth Daily. 90 tablet 3   • triamcinolone (KENALOG) 0.1 % cream Apply  topically to the appropriate area as directed 2 (Two) Times a Day.     • Ustekinumab (STELARA) 90 MG/ML solution prefilled syringe Injection Inject 90 mg under the skin into the appropriate area as directed Every 28 (Twenty-Eight) Days. 1 mL 6       Allergies   Allergen Reactions   • Albuterol Other (See Comments)   • Adhesive Tape Rash   • Azithromycin Rash       Social History     Socioeconomic History   • Marital status:      Spouse name: Not on file   • Number of children: Not on file   • Years of education: Not on file   • Highest education level: Not on file   Tobacco Use   • Smoking status: Never Smoker   • Smokeless tobacco: Never Used   Vaping Use   • Vaping Use: Never used   Substance and Sexual Activity   • Alcohol use: Never   • Drug use: Never   • Sexual activity: Defer       Review of Systems   Constitutional: Negative for unexpected weight change.   Respiratory: Negative for shortness of breath.    Cardiovascular: Negative for chest pain.   Gastrointestinal: Negative for abdominal pain and anal bleeding.  "      Objective     Vitals:    08/19/21 1251   BP: 120/68   Pulse: 77   Temp: 98.6 °F (37 °C)   SpO2: 99%   Weight: 98.4 kg (217 lb)   Height: 175.3 cm (69\")     Body mass index is 32.05 kg/m².    Physical Exam  Constitutional:       Appearance: Normal appearance. He is well-developed.   Eyes:      General: No scleral icterus.  Neck:      Thyroid: No thyroid mass or thyromegaly.      Vascular: No JVD.   Pulmonary:      Effort: Pulmonary effort is normal. No accessory muscle usage.   Abdominal:      General: There is no distension.      Tenderness: There is no abdominal tenderness. There is no guarding.   Skin:     Coloration: Skin is not jaundiced.   Neurological:      Mental Status: He is alert.   Psychiatric:         Behavior: Behavior is cooperative.         Judgment: Judgment normal.         Imaging Results (Most Recent)     None          Body mass index is 32.05 kg/m².    Assessment/Plan     Diagnoses and all orders for this visit:    1. Ulcerative colitis with complication, unspecified location (CMS/HCC) (Primary)    continue his stelara q wks  Pt will try to wean himself off the pred further  Ov in 2 months    * Surgery not found *        Advised pt to stop use of NSAIDs, Fish Oil, and MV 5 days prior to procedure, per Dr Menjivar protocol.  Tylenol based products are ok to take.  Pt verbalized understanding.    Precautions are currently being put in place due to COVID-19.  I have explained to Zachariah Acosta they will be required to undergo COVID testing prior to their procedure.  Zachariah Acosta verbalized understanding and was willing to proceed.        Zachariah Menjivar, DO  08/19/21          There are no Patient Instructions on file for this visit.    "

## 2021-09-15 ENCOUNTER — DOCUMENTATION (OUTPATIENT)
Dept: CT IMAGING | Facility: HOSPITAL | Age: 81
End: 2021-09-15

## 2021-09-15 NOTE — PROGRESS NOTES
I spoke with the patient regarding an incidental finding seen on a recent imaging exam.The patient verbalized understanding the Radiologist's recommendations for follow-up. Per the patient request, the report was routed to the PCP.

## 2021-10-07 ENCOUNTER — LAB (OUTPATIENT)
Dept: LAB | Facility: HOSPITAL | Age: 81
End: 2021-10-07

## 2021-10-07 ENCOUNTER — OFFICE VISIT (OUTPATIENT)
Dept: CARDIOLOGY | Facility: CLINIC | Age: 81
End: 2021-10-07

## 2021-10-07 VITALS
SYSTOLIC BLOOD PRESSURE: 122 MMHG | OXYGEN SATURATION: 98 % | WEIGHT: 224 LBS | HEIGHT: 68 IN | HEART RATE: 76 BPM | BODY MASS INDEX: 33.95 KG/M2 | DIASTOLIC BLOOD PRESSURE: 70 MMHG

## 2021-10-07 DIAGNOSIS — I10 ESSENTIAL HYPERTENSION: ICD-10-CM

## 2021-10-07 DIAGNOSIS — R06.02 SHORTNESS OF BREATH: ICD-10-CM

## 2021-10-07 DIAGNOSIS — I25.810 CORONARY ARTERY DISEASE INVOLVING CORONARY BYPASS GRAFT OF NATIVE HEART, UNSPECIFIED WHETHER ANGINA PRESENT: Primary | ICD-10-CM

## 2021-10-07 DIAGNOSIS — Z95.1 S/P CABG (CORONARY ARTERY BYPASS GRAFT): ICD-10-CM

## 2021-10-07 DIAGNOSIS — E78.2 MIXED HYPERLIPIDEMIA: ICD-10-CM

## 2021-10-07 LAB
ALBUMIN SERPL-MCNC: 3.9 G/DL (ref 3.5–5.2)
ALBUMIN/GLOB SERPL: 1.1 G/DL
ALP SERPL-CCNC: 75 U/L (ref 39–117)
ALT SERPL W P-5'-P-CCNC: 11 U/L (ref 1–41)
ANION GAP SERPL CALCULATED.3IONS-SCNC: 8 MMOL/L (ref 5–15)
AST SERPL-CCNC: 18 U/L (ref 1–40)
BASOPHILS # BLD AUTO: 0.11 10*3/MM3 (ref 0–0.2)
BASOPHILS NFR BLD AUTO: 0.7 % (ref 0–1.5)
BILIRUB SERPL-MCNC: 0.2 MG/DL (ref 0–1.2)
BUN SERPL-MCNC: 19 MG/DL (ref 8–23)
BUN/CREAT SERPL: 19 (ref 7–25)
CALCIUM SPEC-SCNC: 8.8 MG/DL (ref 8.6–10.5)
CHLORIDE SERPL-SCNC: 105 MMOL/L (ref 98–107)
CO2 SERPL-SCNC: 24 MMOL/L (ref 22–29)
CREAT SERPL-MCNC: 1 MG/DL (ref 0.76–1.27)
DEPRECATED RDW RBC AUTO: 49.5 FL (ref 37–54)
EOSINOPHIL # BLD AUTO: 2.21 10*3/MM3 (ref 0–0.4)
EOSINOPHIL NFR BLD AUTO: 13.6 % (ref 0.3–6.2)
ERYTHROCYTE [DISTWIDTH] IN BLOOD BY AUTOMATED COUNT: 16.7 % (ref 12.3–15.4)
GFR SERPL CREATININE-BSD FRML MDRD: 72 ML/MIN/1.73
GLOBULIN UR ELPH-MCNC: 3.6 GM/DL
GLUCOSE SERPL-MCNC: 209 MG/DL (ref 65–99)
HCT VFR BLD AUTO: 37.9 % (ref 37.5–51)
HGB BLD-MCNC: 11.3 G/DL (ref 13–17.7)
IMM GRANULOCYTES # BLD AUTO: 0.17 10*3/MM3 (ref 0–0.05)
IMM GRANULOCYTES NFR BLD AUTO: 1 % (ref 0–0.5)
LYMPHOCYTES # BLD AUTO: 0.9 10*3/MM3 (ref 0.7–3.1)
LYMPHOCYTES NFR BLD AUTO: 5.6 % (ref 19.6–45.3)
MCH RBC QN AUTO: 24 PG (ref 26.6–33)
MCHC RBC AUTO-ENTMCNC: 29.8 G/DL (ref 31.5–35.7)
MCV RBC AUTO: 80.6 FL (ref 79–97)
MONOCYTES # BLD AUTO: 0.52 10*3/MM3 (ref 0.1–0.9)
MONOCYTES NFR BLD AUTO: 3.2 % (ref 5–12)
NEUTROPHILS NFR BLD AUTO: 12.3 10*3/MM3 (ref 1.7–7)
NEUTROPHILS NFR BLD AUTO: 75.9 % (ref 42.7–76)
NRBC BLD AUTO-RTO: 0 /100 WBC (ref 0–0.2)
NT-PROBNP SERPL-MCNC: 95.9 PG/ML (ref 0–1800)
PLATELET # BLD AUTO: 356 10*3/MM3 (ref 140–450)
PMV BLD AUTO: 9.2 FL (ref 6–12)
POTASSIUM SERPL-SCNC: 4.6 MMOL/L (ref 3.5–5.2)
PROT SERPL-MCNC: 7.5 G/DL (ref 6–8.5)
RBC # BLD AUTO: 4.7 10*6/MM3 (ref 4.14–5.8)
SODIUM SERPL-SCNC: 137 MMOL/L (ref 136–145)
WBC # BLD AUTO: 16.21 10*3/MM3 (ref 3.4–10.8)

## 2021-10-07 PROCEDURE — 36415 COLL VENOUS BLD VENIPUNCTURE: CPT | Performed by: NURSE PRACTITIONER

## 2021-10-07 PROCEDURE — 99214 OFFICE O/P EST MOD 30 MIN: CPT | Performed by: NURSE PRACTITIONER

## 2021-10-07 PROCEDURE — 80053 COMPREHEN METABOLIC PANEL: CPT | Performed by: NURSE PRACTITIONER

## 2021-10-07 PROCEDURE — 83880 ASSAY OF NATRIURETIC PEPTIDE: CPT | Performed by: NURSE PRACTITIONER

## 2021-10-07 PROCEDURE — 85025 COMPLETE CBC W/AUTO DIFF WBC: CPT | Performed by: NURSE PRACTITIONER

## 2021-10-07 RX ORDER — ISOSORBIDE MONONITRATE 30 MG/1
30 TABLET, EXTENDED RELEASE ORAL DAILY
Qty: 30 TABLET | Refills: 11 | Status: SHIPPED | OUTPATIENT
Start: 2021-10-07 | End: 2021-11-11 | Stop reason: ALTCHOICE

## 2021-10-08 PROCEDURE — 93000 ELECTROCARDIOGRAM COMPLETE: CPT | Performed by: NURSE PRACTITIONER

## 2021-10-08 NOTE — PROGRESS NOTES
Subjective:     Encounter Date:10/07/2021      Patient ID: Zachariah Acosta is a 80 y.o. male.    Chief Complaint: shortness of breath     History of Present Illness     Patient was referred to Dr. Polanco by Dr. Carlos.  The patient established care with her in January 2021.  It was noted in the history that day that he had had a prior MI in April 2020 that required 6 stents, and he also has a history of CABG in 2012 previously following with a cardiologist in [place].    The patient reported to Dr. Polanco, when he established care with him in March that he was experiencing some shortness of breath in 2012 and subsequently underwent angiogram that determine the need for three-vessel CABG.  The patient was symptom-free until April 2020 when he suffered an MI that required 6 stents.  He reported he was doing fine after cardiac catheterization.  He reported his shortness of breath was stable and he was taking Symbicort for that.  He denied any chest discomfort whatsoever.  Blood pressure was controlled.  He reported his previous cardiologist Dr. Cardoza placed him on Plavix for 1 year, approximately 11 months prior to that visit.  The patient reported he had discontinued his baby aspirin when he began taking Plavix.  He denied bleeding issues.  He had just moved to the area 2 months prior.  At that visit, aspirin was resumed and Plavix was continued.  Rosuvastatin was increased to 40 mg from 20 mg due to LDL of 86.    Since that time, the patient has had some issues with both bright red blood per rectum with ulcerative colitis and hematuria followed by gastroenterology and urology, respectively.  He had a colonoscopy on 6/4 which revealed a friable ulcerated mucosa with contact bleeding in the entire examined colon.  Our office was contacted regarding these issues, requesting that he be taken off of Plavix.  Dr. Polanco did review the cardiac catheterization films from April 2020 and did recommend discontinuation  of Plavix given the circumstances, and the fact that he was greater than 1 year from PCI.  He was instructed to continue aspirin 81 mg daily without interruption.    The patient presents today for follow-up and states he has noticed a drastic decline in his stamina and worsening shortness of breath with exertion over the past 2 months (he admits dyspnea on exertion is a chronic problem for him, but again has worsened significantly in recent weeks).  Sometimes he has associated wheezing, but not always.  Alleviating factors are inhalers, CPAP, and cough drops.  Has not taken nitroglycerin.  He denies any fever, chills, chest pain, orthopnea, PND, productive cough.  He reports mild lower extremity edema.  He states he follows with pulmonology for asthma and has a follow-up with Dr. Mejias next week.  He states his oxygen saturations are always good-around 98% despite significant shortness of breath.  He reports the symptoms (decline in stamina and worsening shortness of breath with exertion) are similar to what he experienced prior to having bypass surgery.  He states symptoms are different compared to his more recent MI-at the time of the MI he had severe chest pain.      The following portions of the patient's history were reviewed and updated as appropriate: allergies, current medications, past family history, past medical history, past social history, past surgical history and problem list.    Review of Systems   Constitutional: Positive for malaise/fatigue.   Cardiovascular: Positive for dyspnea on exertion and leg swelling. Negative for chest pain, claudication, near-syncope, orthopnea, palpitations, paroxysmal nocturnal dyspnea and syncope.   Respiratory: Positive for shortness of breath and wheezing (occasional ). Negative for cough.    Hematologic/Lymphatic: Does not bruise/bleed easily.   Musculoskeletal: Negative for falls.   Gastrointestinal: Negative for bloating.   Neurological: Negative for dizziness,  light-headedness and weakness.       Allergies   Allergen Reactions   • Albuterol Other (See Comments)   • Adhesive Tape Rash   • Azithromycin Rash       Current Outpatient Medications:   •  aspirin (aspirin) 81 MG EC tablet, Take 1 tablet by mouth Daily., Disp: 30 tablet, Rfl: 11  •  budesonide-formoterol (Symbicort) 160-4.5 MCG/ACT inhaler, Inhale 2 puffs 2 (two) times a day., Disp: , Rfl:   •  Dupilumab 300 MG/2ML solution prefilled syringe, Inject  under the skin into the appropriate area as directed Every 14 (Fourteen) Days., Disp: , Rfl:   •  finasteride (PROSCAR) 5 MG tablet, Take 1 tablet by mouth Daily., Disp: 30 tablet, Rfl: 11  •  fluticasone (FLONASE) 50 MCG/ACT nasal spray, 1 spray into the nostril(s) as directed by provider Daily., Disp: 1 bottle, Rfl: 11  •  glipizide (GLUCOTROL) 10 MG tablet, Take 10 mg by mouth 2 (Two) Times a Day Before Meals., Disp: , Rfl:   •  Insulin Glargine (Lantus SoloStar) 100 UNIT/ML injection pen, Inject 20 Units under the skin into the appropriate area as directed As Needed., Disp: , Rfl:   •  lisinopril (PRINIVIL,ZESTRIL) 10 MG tablet, Take 1 tablet by mouth Daily., Disp: 90 tablet, Rfl: 1  •  Mesalamine 4 GM/60ML SF enema, Insert  into the rectum Daily., Disp: , Rfl:   •  metoprolol tartrate (LOPRESSOR) 25 MG tablet, Take 25 mg by mouth 2 (two) times a day., Disp: , Rfl:   •  nitroglycerin (NITROSTAT) 0.4 MG SL tablet, 1 under the tongue as needed for angina, may repeat q5mins for up three doses, Disp: 25 tablet, Rfl: 1  •  ondansetron ODT (ZOFRAN-ODT) 4 MG disintegrating tablet, Place 1 tablet on the tongue Every 8 (Eight) Hours As Needed for Nausea., Disp: 10 tablet, Rfl: 0  •  oxyCODONE-acetaminophen (PERCOCET) 5-325 MG per tablet, Take 1 tablet by mouth Every 6 (Six) Hours As Needed for Severe Pain ., Disp: 12 tablet, Rfl: 0  •  predniSONE (DELTASONE) 10 MG tablet, Take 1 tablet by mouth 2 (two) times a day., Disp: 60 tablet, Rfl: 0  •  rosuvastatin (CRESTOR) 40 MG  "tablet, Take 1 tablet by mouth Daily., Disp: 90 tablet, Rfl: 3  •  triamcinolone (KENALOG) 0.1 % cream, Apply  topically to the appropriate area as directed 2 (Two) Times a Day., Disp: , Rfl:   •  Ustekinumab (STELARA) 90 MG/ML solution prefilled syringe Injection, Inject 90 mg under the skin into the appropriate area as directed Every 28 (Twenty-Eight) Days., Disp: 1 mL, Rfl: 6  •  glucose blood test strip, 1 each by Other route Daily. Use as instructed One Touch Ultra, Disp: 100 each, Rfl: 11  •  isosorbide mononitrate (IMDUR) 30 MG 24 hr tablet, Take 1 tablet by mouth Daily., Disp: 30 tablet, Rfl: 11         Objective:    /70   Pulse 76   Ht 172.7 cm (68\")   Wt 102 kg (224 lb)   SpO2 98%   BMI 34.06 kg/m²        Vitals and nursing note reviewed.   Constitutional:       General: Not in acute distress.     Appearance: Well-developed and not in distress. Not diaphoretic.   Neck:      Vascular: No JVD.   Pulmonary:      Effort: Pulmonary effort is normal. No respiratory distress.      Breath sounds: Normal breath sounds.   Cardiovascular:      Normal rate. Regular rhythm.      Murmurs: There is no murmur.   Edema:     Peripheral edema (trace BLE edema ) present.  Abdominal:      Tenderness: There is no abdominal tenderness.   Skin:     General: Skin is warm and dry.   Neurological:      Mental Status: Alert and oriented to person, place, and time.         Lab Review:   Lab Results   Component Value Date    GLUCOSE 209 (H) 10/07/2021    BUN 19 10/07/2021    CREATININE 1.00 10/07/2021    EGFRIFNONA 72 10/07/2021    EGFRIFAFRI >59 06/26/2021    BCR 19.0 10/07/2021    K 4.6 10/07/2021    CO2 24.0 10/07/2021    CALCIUM 8.8 10/07/2021    PROTENTOTREF 6.8 06/16/2021    ALBUMIN 3.90 10/07/2021    LABIL2 1.5 06/16/2021    AST 18 10/07/2021    ALT 11 10/07/2021     Lab Results   Component Value Date    WBC 16.21 (H) 10/07/2021    HGB 11.3 (L) 10/07/2021    HCT 37.9 10/07/2021    MCV 80.6 10/07/2021     " 10/07/2021     Lab Results   Component Value Date    CHLPL 141 01/06/2021    TRIG 114 01/06/2021    HDL 34 (L) 01/06/2021    LDL 86 01/06/2021     10/7/2021 [id number] normal limits          ECG 12 Lead    Date/Time: 10/8/2021 1:19 PM  Performed by: Mari Perdue APRN  Authorized by: Mari Perdue APRN   Comparison: compared with previous ECG from 3/3/2021  Similar to previous ECG  Rhythm: sinus rhythm  BPM: 77  Conduction: 1st degree AV block                Assessment:      Problem List Items Addressed This Visit        Cardiac and Vasculature    CAD (coronary artery disease) - Primary    Overview     3v CABG in '12  Non-STEMI April 2020 diagnostic cath 4/13/2020 showed LIMA to LAD atretic, SVG to diagonal patent with significant backflow from diagonal graft into the LAD and down the LAD, SVG to PDA patent but not providing flow to PL branch because of proximal and mid native vessel (R-PDA) disease.  Left main 60-70% diffuse disease compromising flow into ungrafted circumflex territory.  Subsequently referred for consideration of redo CABG versus complex PCI.    -Return to Cath Lab 4/14/2020 with 4.0 x 8 mm drug-eluting stent placement to the left main (for purpose of improving flow into ungrafted circumflex territory), as well as for overlapping Julianne drug-eluting stents from the ostium into mid-RCA (3.5 x 12, 3.5 x 15, 3.5 x 12, 3.5 x 8) along with 3.5 x 12 mm Julianne FRANKY at the takeoff of the right-PL branch.  80-90% stenosis at the ostium of the R-PDA not treated since vein graft to PDA open, but not providing any retrograde filling beyond this lesion into the PL system.  There was PTCA alone to area in the distal RCA between stented segments for reported inability to deliver a stent to the segment.  Balloon angioplasty was performed with a 3.5 mm balloon with reported residual stenosis of less than 10%.             Relevant Medications    isosorbide mononitrate (IMDUR) 30 MG 24 hr tablet    Other  Relevant Orders    Stress Test With Myocardial Perfusion (1 Day)    CBC & Differential (Completed)    Comprehensive Metabolic Panel (Completed)    proBNP (Completed)    Essential hypertension    Overview     Last Assessment & Plan:   Hypertension is controlled.  Dietary sodium restriction.  Weight loss.  Regular aerobic exercise.  Continue current medications.  Blood pressure will be reassessed at the next regular appointment.         S/P CABG (coronary artery bypass graft)    Overview     -LIMA to LAD, SVG to RCA, and SVG to diagonal (Dec '12)         Hyperlipidemia      Other Visit Diagnoses     Shortness of breath        Relevant Orders    Stress Test With Myocardial Perfusion (1 Day)    CBC & Differential (Completed)    Comprehensive Metabolic Panel (Completed)    proBNP (Completed)          Plan:     1. Coronary disease status post remote CABG with MI in April 2020 requiring stenting (total of 6-1 to left main, 4 to RCA, 1 to R-PL): See notes above.  He does report a recent drastic decline in stamina and sudden worsening in dyspnea on exertion, potential anginal equivalents given the fact that he states he had similar symptoms prior to CABG in the past.  However, he denies any chest pain, which is what he had with his more recent MI.    -Check CBC, CMP, BNP-based on symptoms and exam, do not suspect CHF  -will investigate further with a Lexiscan  -Add Imdur 30 mg daily to see if he gets symptomatic improvement with this  -Pulmonary source is considered as well as he does follow with Dr. Mejias for asthma and he reports some alleviating factors are inhalers, CPAP, cough drops.  He admits wheezing at times, but not consistently.  He reports good oxygen saturations despite his symptoms.  Keep follow-up with pulmonology this week.  -Continue aspirin 81 mg daily indefinitely  -No longer on Plavix due to being greater than 1 year out from PCI and recent issues with hematuria and GI bleeding due to diverticulitis  and ulcerative colitis (do not see that these were transfusion requiring)  -Continue high intensity statin, beta-blocker, ACE inhibitor, as needed sublingual nitroglycerin    2. Mixed hyperlipidemia: Continue rosuvastatin 40 mg nightly.  Dose was increased by Dr. Polanco in March due to LDL of 86/not at goal of less than 70.  I do not see that there has been a recheck since then.  Recommend repeat lipid panel per PCP.  If not at goal, consider addition of Zetia or PCSK9 inhibitor depending on the result.     3. Essential hypertension:    - Well-controlled. Continue current medications.     Follow up in 1 month, sooner with new or worsening symptoms     Addendum: See lab results above.  BNP normal.  Normal renal function and electrolytes.  Anemia stable.  WBCs are elevated but he denies any signs or symptoms of infection and is on prednisone for his ulcerative colitis (likely the reason for his elevated WBCs)- will defer further work-up of this to his PCP.  Patient notified of results.  Continue with plan as outlined above.

## 2021-10-12 ENCOUNTER — OFFICE VISIT (OUTPATIENT)
Dept: FAMILY MEDICINE CLINIC | Facility: CLINIC | Age: 81
End: 2021-10-12

## 2021-10-12 VITALS
HEART RATE: 68 BPM | BODY MASS INDEX: 34.04 KG/M2 | SYSTOLIC BLOOD PRESSURE: 122 MMHG | WEIGHT: 224.6 LBS | OXYGEN SATURATION: 98 % | TEMPERATURE: 96.5 F | RESPIRATION RATE: 16 BRPM | HEIGHT: 68 IN | DIASTOLIC BLOOD PRESSURE: 74 MMHG

## 2021-10-12 DIAGNOSIS — E11.65 TYPE 2 DIABETES MELLITUS WITH HYPERGLYCEMIA, WITH LONG-TERM CURRENT USE OF INSULIN (HCC): Primary | ICD-10-CM

## 2021-10-12 DIAGNOSIS — Z00.00 MEDICARE ANNUAL WELLNESS VISIT, SUBSEQUENT: ICD-10-CM

## 2021-10-12 DIAGNOSIS — J45.20 MILD INTERMITTENT ASTHMA WITHOUT COMPLICATION: ICD-10-CM

## 2021-10-12 DIAGNOSIS — K51.919 ULCERATIVE COLITIS WITH COMPLICATION, UNSPECIFIED LOCATION (HCC): ICD-10-CM

## 2021-10-12 DIAGNOSIS — E66.09 CLASS 1 OBESITY DUE TO EXCESS CALORIES WITH SERIOUS COMORBIDITY AND BODY MASS INDEX (BMI) OF 34.0 TO 34.9 IN ADULT: ICD-10-CM

## 2021-10-12 DIAGNOSIS — H69.81 DYSFUNCTION OF RIGHT EUSTACHIAN TUBE: ICD-10-CM

## 2021-10-12 DIAGNOSIS — Z79.4 TYPE 2 DIABETES MELLITUS WITH HYPERGLYCEMIA, WITH LONG-TERM CURRENT USE OF INSULIN (HCC): Primary | ICD-10-CM

## 2021-10-12 DIAGNOSIS — I10 ESSENTIAL HYPERTENSION: ICD-10-CM

## 2021-10-12 DIAGNOSIS — I25.10 CORONARY ARTERY DISEASE INVOLVING NATIVE CORONARY ARTERY OF NATIVE HEART WITHOUT ANGINA PECTORIS: ICD-10-CM

## 2021-10-12 LAB
EXPIRATION DATE: ABNORMAL
HBA1C MFR BLD: 7.4 %
Lab: ABNORMAL

## 2021-10-12 PROCEDURE — 99214 OFFICE O/P EST MOD 30 MIN: CPT | Performed by: FAMILY MEDICINE

## 2021-10-12 PROCEDURE — 83036 HEMOGLOBIN GLYCOSYLATED A1C: CPT | Performed by: FAMILY MEDICINE

## 2021-10-12 PROCEDURE — G0439 PPPS, SUBSEQ VISIT: HCPCS | Performed by: FAMILY MEDICINE

## 2021-10-12 RX ORDER — INSULIN ASPART 100 [IU]/ML
5 INJECTION, SOLUTION INTRAVENOUS; SUBCUTANEOUS
Qty: 5 PEN | Refills: 2 | Status: SHIPPED | OUTPATIENT
Start: 2021-10-12 | End: 2021-10-21 | Stop reason: SDUPTHER

## 2021-10-12 RX ORDER — AZELASTINE 1 MG/ML
2 SPRAY, METERED NASAL 2 TIMES DAILY
Qty: 30 ML | Refills: 2 | Status: SHIPPED | OUTPATIENT
Start: 2021-10-12 | End: 2022-05-02

## 2021-10-12 RX ORDER — LEVALBUTEROL TARTRATE 45 UG/1
1-2 AEROSOL, METERED ORAL EVERY 4 HOURS PRN
Qty: 15 G | Refills: 11 | Status: SHIPPED | OUTPATIENT
Start: 2021-10-12 | End: 2022-10-24 | Stop reason: SDUPTHER

## 2021-10-12 RX ORDER — NITROGLYCERIN 0.4 MG/1
TABLET SUBLINGUAL
Qty: 25 TABLET | Refills: 1 | Status: SHIPPED | OUTPATIENT
Start: 2021-10-12 | End: 2022-10-24 | Stop reason: SDUPTHER

## 2021-10-12 NOTE — PROGRESS NOTES
The ABCs of the Annual Wellness Visit  Subsequent Medicare Wellness Visit    Chief Complaint   Patient presents with   • Diabetes     Follow up      Subjective    History of Present Illness:  Zachariah Acosta is a 80 y.o. male who presents for a Subsequent Medicare Wellness Visit.    The following portions of the patient's history were reviewed and   updated as appropriate: allergies, current medications, past family history, past medical history, past social history, past surgical history and problem list.    Compared to one year ago, the patient feels his physical   health is worse.    Compared to one year ago, the patient feels his mental   health is the same.    Recent Hospitalizations:  This patient has had a [facility] admission record on file within the last 365 days.    Current Medical Providers:  Patient Care Team:  Monique Carlos MD as PCP - General (Family Medicine)  Yung Polanco MD as Cardiologist (Cardiology)  Monique Carlos MD as Referring Physician (Family Medicine)  Zachariah Menjivar DO as Consulting Physician (Gastroenterology)  Oren Brambila MD as Consulting Physician (Internal Medicine)    Outpatient Medications Prior to Visit   Medication Sig Dispense Refill   • aspirin (aspirin) 81 MG EC tablet Take 1 tablet by mouth Daily. 30 tablet 11   • budesonide-formoterol (Symbicort) 160-4.5 MCG/ACT inhaler Inhale 2 puffs 2 (two) times a day.     • Dupilumab 300 MG/2ML solution prefilled syringe Inject  under the skin into the appropriate area as directed Every 14 (Fourteen) Days.     • finasteride (PROSCAR) 5 MG tablet Take 1 tablet by mouth Daily. 30 tablet 11   • fluticasone (FLONASE) 50 MCG/ACT nasal spray 1 spray into the nostril(s) as directed by provider Daily. 1 bottle 11   • glipizide (GLUCOTROL) 10 MG tablet Take 10 mg by mouth 2 (Two) Times a Day Before Meals.     • Insulin Glargine (Lantus SoloStar) 100 UNIT/ML injection pen Inject 20 Units under the skin into  the appropriate area as directed As Needed.     • isosorbide mononitrate (IMDUR) 30 MG 24 hr tablet Take 1 tablet by mouth Daily. 30 tablet 11   • lisinopril (PRINIVIL,ZESTRIL) 10 MG tablet Take 1 tablet by mouth Daily. 90 tablet 1   • Mesalamine 4 GM/60ML SF enema Insert  into the rectum Daily.     • metoprolol tartrate (LOPRESSOR) 25 MG tablet Take 25 mg by mouth 2 (two) times a day.     • ondansetron ODT (ZOFRAN-ODT) 4 MG disintegrating tablet Place 1 tablet on the tongue Every 8 (Eight) Hours As Needed for Nausea. 10 tablet 0   • oxyCODONE-acetaminophen (PERCOCET) 5-325 MG per tablet Take 1 tablet by mouth Every 6 (Six) Hours As Needed for Severe Pain . 12 tablet 0   • predniSONE (DELTASONE) 10 MG tablet Take 1 tablet by mouth 2 (two) times a day. 60 tablet 0   • rosuvastatin (CRESTOR) 40 MG tablet Take 1 tablet by mouth Daily. 90 tablet 3   • triamcinolone (KENALOG) 0.1 % cream Apply  topically to the appropriate area as directed 2 (Two) Times a Day.     • Ustekinumab (STELARA) 90 MG/ML solution prefilled syringe Injection Inject 90 mg under the skin into the appropriate area as directed Every 28 (Twenty-Eight) Days. 1 mL 6   • glucose blood test strip 1 each by Other route Daily. Use as instructed One Touch Ultra 100 each 11   • nitroglycerin (NITROSTAT) 0.4 MG SL tablet 1 under the tongue as needed for angina, may repeat q5mins for up three doses 25 tablet 1     No facility-administered medications prior to visit.       Opioid medication/s are on active medication list.  and I have evaluated his active treatment plan and pain score trends (see table).  Vitals:    10/12/21 0923   PainSc: 0-No pain     I have reviewed the chart for potential of high risk medication and harmful drug interactions in the elderly.            Aspirin is on active medication list. Aspirin use is indicated based on review of current medical condition/s. Pros and cons of this therapy have been discussed today. Benefits of this  "medication outweigh potential harm.  Patient has been encouraged to continue taking this medication.  .      Patient Active Problem List   Diagnosis   • BREANN on CPAP   • CAD (coronary artery disease)   • Chronic kidney disease (CKD), stage III (moderate) ([facility])   • Disorder of lung   • Essential hypertension   • Mild intermittent asthma without complication   • S/P CABG (coronary artery bypass graft)   • Type 2 diabetes mellitus without complication ([facility])   • Ulcerative colitis ([facility])   • Obesity (BMI 30-39.9)   • Non-seasonal allergic rhinitis   • SOB (shortness of breath)   • Pulmonary scarring   • Lung nodule   • Hyperlipidemia   • Nonsmoker   • Ulcerative colitis with complication ([facility])   • Intractable abdominal pain   • Constipation   • KE (acute kidney injury) ([facility])   • Hematuria   • LLQ pain     Advance Care Planning  Advance Directive is not on file.  ACP discussion was held with the patient during this visit. Patient has an advance directive (not in EMR), copy requested.          Objective    Vitals:    10/12/21 0923   BP: 122/74   BP Location: Left arm   Patient Position: Sitting   Cuff Size: Adult   Pulse: 68   Resp: 16   Temp: 96.5 °F (35.8 °C)   TempSrc: Infrared   SpO2: 98%   Weight: 102 kg (224 lb 9.6 oz)   Height: 172.7 cm (68\")  Comment: Per patient   PainSc: 0-No pain     BMI Readings from Last 1 Encounters:   10/12/21 34.15 kg/m²   BMI is above normal parameters. Recommendations include: educational material, exercise counseling and nutrition counseling    Does the patient have evidence of cognitive impairment? No    Physical Exam  Lab Results   Component Value Date    HGBA1C 7.4 10/12/2021            HEALTH RISK ASSESSMENT    Smoking Status:  Social History     Tobacco Use   Smoking Status Never Smoker   Smokeless Tobacco Never Used     Alcohol Consumption:  Social History     Substance and Sexual Activity   Alcohol Use Never     Fall Risk Screen:    STEADI Fall Risk Assessment was completed, and " patient is at LOW risk for falls.Assessment completed on:10/12/2021    Depression Screening:  PHQ-2/PHQ-9 Depression Screening 10/12/2021   Little interest or pleasure in doing things 0   Feeling down, depressed, or hopeless 0   Total Score 0       Health Habits and Functional and Cognitive Screening:  Functional & Cognitive Status 10/12/2021   Do you have difficulty preparing food and eating? No   Do you have difficulty bathing yourself, getting dressed or grooming yourself? No   Do you have difficulty using the toilet? No   Do you have difficulty moving around from place to place? No   Do you have trouble with steps or getting out of a bed or a chair? No   Current Diet Well Balanced Diet   Dental Exam Up to date   Eye Exam Up to date   Exercise (times per week) 2 times per week   Current Exercises Include Yard Work   Do you need help using the phone?  No   Are you deaf or do you have serious difficulty hearing?  No   Do you need help with transportation? No   Do you need help shopping? No   Do you need help preparing meals?  No   Do you need help with housework?  No   Do you need help with laundry? No   Do you need help taking your medications? No   Do you need help managing money? No   Do you ever drive or ride in a car without wearing a seat belt? No   Have you felt unusual stress, anger or loneliness in the last month? No   Who do you live with? Spouse   If you need help, do you have trouble finding someone available to you? No   Have you been bothered in the last four weeks by sexual problems? No   Do you have difficulty concentrating, remembering or making decisions? No       Age-appropriate Screening Schedule:  Refer to the list below for future screening recommendations based on patient's age, sex and/or medical conditions. Orders for these recommended tests are listed in the plan section. The patient has been provided with a written plan.    Health Maintenance   Topic Date Due   • TDAP/TD VACCINES (1 -  Tdap) Never done   • ZOSTER VACCINE (1 of 2) Never done   • DIABETIC EYE EXAM  Never done   • INFLUENZA VACCINE  08/01/2021   • LIPID PANEL  01/06/2022   • URINE MICROALBUMIN  01/06/2022   • HEMOGLOBIN A1C  04/12/2022              Assessment/Plan   CMS Preventative Services Quick Reference  Risk Factors Identified During Encounter  Cardiovascular Disease  Hearing Problem  Immunizations Discussed/Encouraged (specific Immunizations; Tdap, Influenza and Shingrix  Obesity/Overweight   Polypharmacy  The above risks/problems have been discussed with the patient.  Follow up actions/plans if indicated are seen below in the Assessment/Plan Section.  Pertinent information has been shared with the patient in the After Visit Summary.    Diagnoses and all orders for this visit:    1. Type 2 diabetes mellitus with hyperglycemia, with long-term current use of insulin (HCC) (Primary)  -     POC Glycosylated Hemoglobin (Hb A1C)  -     glucose blood test strip; 1 each by Other route 3 (Three) Times a Day As Needed (hypoglycemia). Use as instructed One Touch Ultra  Dispense: 100 each; Refill: 11  -     insulin aspart (NovoLOG FlexPen) 100 UNIT/ML solution pen-injector sc pen; Inject 5 Units under the skin into the appropriate area as directed 3 (Three) Times a Day With Meals.  Dispense: 5 pen; Refill: 2    2. Mild intermittent asthma without complication  -     levalbuterol (Xopenex HFA) 45 MCG/ACT inhaler; Inhale 1-2 puffs Every 4 (Four) Hours As Needed for Wheezing or Shortness of Air.  Dispense: 15 g; Refill: 11    3. Dysfunction of right eustachian tube  -     azelastine (ASTELIN) 0.1 % nasal spray; 2 sprays into the nostril(s) as directed by provider 2 (Two) Times a Day. Use in each nostril as directed  Dispense: 30 mL; Refill: 2    4. Medicare annual wellness visit, subsequent    5. Ulcerative colitis with complication, unspecified location ([facility])    6. Essential hypertension    7. Coronary artery disease involving native  coronary artery of native heart without angina pectoris    8. Class 1 obesity due to excess calories with serious comorbidity and body mass index (BMI) of 34.0 to 34.9 in adult    Other orders  -     nitroglycerin (NITROSTAT) 0.4 MG SL tablet; 1 under the tongue as needed for angina, may repeat q5mins for up three doses  Dispense: 25 tablet; Refill: 1        Follow Up:   Return in about 3 months (around 1/12/2022), or if symptoms worsen or fail to improve, for Recheck DM, 1 year AWV.     An After Visit Summary and PPPS were made available to the patient.        I spent 25 minutes caring for Zachariah on this date of service. This time includes time spent by me in the following activities:preparing for the visit, reviewing tests, performing a medically appropriate examination and/or evaluation , counseling and educating the patient/family/caregiver, ordering medications, tests, or procedures, documenting information in the medical record and independently interpreting results and communicating that information with the patient/family/caregiver               This document has been electronically signed by Monique Carlos MD on October 12, 2021 10:47 CDT

## 2021-10-12 NOTE — PATIENT INSTRUCTIONS
Medicare Wellness  Personal Prevention Plan of Service     Date of Office Visit:  10/12/2021  Encounter Provider:  Monique Carlos MD  Place of Service:  [facility]  Patient Name: Zachariah Acosta  :  1940    As part of the Medicare Wellness portion of your visit today, we are providing you with this personalized preventive plan of services (PPPS). This plan is based upon recommendations of the [place] Preventive Services Task Force (USPSTF) and the Advisory Committee on Immunization Practices (ACIP).    This lists the preventive care services that should be considered, and provides dates of when you are due. Items listed as completed are up-to-date and do not require any further intervention.    Health Maintenance   Topic Date Due   • TDAP/TD VACCINES (1 - Tdap) Never done   • ZOSTER VACCINE (1 of 2) Never done   • DIABETIC EYE EXAM  Never done   • INFLUENZA VACCINE  2021   • LIPID PANEL  2022   • URINE MICROALBUMIN  2022   • HEMOGLOBIN A1C  2022   • ANNUAL WELLNESS VISIT  10/12/2022   • HEPATITIS C SCREENING  Completed   • COVID-19 Vaccine  Completed   • Pneumococcal Vaccine 65+  Completed       Orders Placed This Encounter   Procedures   • POC Glycosylated Hemoglobin (Hb A1C)     Order Specific Question:   Release to patient     Answer:   Immediate       Return in about 3 months (around 2022), or if symptoms worsen or fail to improve, for Recheck DM, 1 year AWV.

## 2021-10-12 NOTE — PROGRESS NOTES
Subjective cc: HTN and DM   Zachariah Acosta is a 80 y.o. male with HTN, DM, obesity, CAD status post CABG, hyperlipidemia, ulcerative colitis, and obstructive sleep apnea who presents for follow-up on his hypertension and diabetes.    He is having some pain in his right ear  He is seeing his pulmonologist next week - wants a rx for his xopenex   He is still taking prednisone - they have him cutting back to 5 mg daily, today he took 10 mg so he could tolerate coming up here - he is seeing dr rivera next week for follow up on his UC   DM: has been uncontrolled due to being on steroids recently - hopefully will start to improve as he tapers off steroids -he has been monitoring his glucose.  It does get high some during the day.  He recently had a reading at 295.  He checks his glucose prior to going to bed.  If it is more than 200 he will take 20 units of Lantus.  If it is less than 200 he takes 10 units of Lantus.  He has had some episodes where he wakes up sweaty with current signs of hypoglycemia and will eat a piece of candy.  He has not actually checked his glucose when this occurs so is not sure what actual numbers he is seeing.  Obesity: gradually worsening - again likely due to increased appetite with steroid use   Has follow up with cardio and urology     ===========================================================  Hypertension: Chronic, well controlled.  Patient compliant with blood pressure medications.  Diabetes: Chronic, uncontrolled.  Patient is currently on prednisone.  Reports his glucose has been running high.  He is monitoring it daily.  When his readings are elevated he has adjusted his insulin.  Typically taking about 20 units at night when his glucose is over 200.  If it is less than 150 he does not take any insulin as he is concerned about hypoglycemia while he sleeps.    Patient was seen multiple times in the emergency department for lower abdominal pain with flareup of his colitis.   Patient was admitted on June 18 through June 20 for evaluation of the colitis inpatient.  Patient was discharged with follow-up with gastroenterology.  Patient has been seeing Dr. Menjivar.  Plan for colonoscopy.  His Stelara levels were checked.  He was also placed on prednisone.  Advised if it did not start to improve would refer out.  Patient does feel like his lower abdominal pain is improving.    Obesity: Chronic, uncontrolled.  Patient is overall trending down.  Not currently having any chest pain, compliant with his medical management for heart disease.    He also had a CT scan of his abdomen pelvis with urology.  He saw Dr. Tellez who advised no issues with stones and 6 months follow-up scheduled.       Diabetes  He presents for his follow-up diabetic visit. He has type 2 diabetes mellitus. His disease course has been worsening. Hypoglycemia symptoms include sweats. Pertinent negatives for hypoglycemia include no nervousness/anxiousness. Pertinent negatives for diabetes include no chest pain. There are no hypoglycemic complications. Symptoms are stable. Diabetic complications include heart disease and nephropathy. Risk factors for coronary artery disease include diabetes mellitus, dyslipidemia, hypertension, male sex and obesity. Current diabetic treatment includes insulin injections and oral agent (monotherapy). He is compliant with treatment all of the time. His weight is increasing steadily. He is following a generally unhealthy diet. Meal planning includes avoidance of concentrated sweets. An ACE inhibitor/angiotensin II receptor blocker is being taken. Eye exam is not current.   Hypertension  This is a chronic problem. The current episode started more than 1 year ago. The problem is unchanged. The problem is controlled. Associated symptoms include sweats. Pertinent negatives include no chest pain, palpitations or shortness of breath. Risk factors for coronary artery disease include diabetes mellitus,  "dyslipidemia, obesity, male gender and sedentary lifestyle. Current antihypertension treatment includes ACE inhibitors and beta blockers. The current treatment provides significant improvement. Compliance problems include exercise.  Hypertensive end-organ damage includes CAD/MI. Identifiable causes of hypertension include sleep apnea.        The following portions of the patient's history were reviewed and updated as appropriate: allergies, current medications, past family history, past medical history, past social history, past surgical history and problem list.        Review of Systems   Constitutional: Positive for appetite change and unexpected weight change. Negative for activity change.   HENT: Positive for ear pain.    Respiratory: Negative for cough and shortness of breath.    Cardiovascular: Negative for chest pain and palpitations.   Gastrointestinal: Negative for abdominal pain and blood in stool.   Musculoskeletal: Positive for arthralgias.   Psychiatric/Behavioral: Negative for dysphoric mood. The patient is not nervous/anxious.    All other systems reviewed and are negative.      Objective   Blood pressure 122/74, pulse 68, temperature 96.5 °F (35.8 °C), temperature source Infrared, resp. rate 16, height 172.7 cm (68\"), weight 102 kg (224 lb 9.6 oz), SpO2 98 %.  Physical Exam  Vitals and nursing note reviewed.   Constitutional:       General: He is not in acute distress.     Appearance: Normal appearance. He is obese. He is not toxic-appearing.   HENT:      Head: Normocephalic and atraumatic.      Right Ear: Ear canal and external ear normal. A middle ear effusion is present.      Left Ear: External ear normal.      Nose: Nose normal.   Eyes:      General:         Right eye: No discharge.         Left eye: No discharge.      Extraocular Movements: Extraocular movements intact.      Conjunctiva/sclera: Conjunctivae normal.   Cardiovascular:      Rate and Rhythm: Normal rate and regular rhythm.      " Pulses: Normal pulses.   Pulmonary:      Effort: Pulmonary effort is normal. No respiratory distress.      Breath sounds: Normal breath sounds. No wheezing.   Abdominal:      General: There is no distension.      Palpations: Abdomen is soft. There is no mass.      Tenderness: There is no abdominal tenderness.   Musculoskeletal:      Cervical back: Normal range of motion and neck supple. No rigidity.      Right lower leg: No edema.      Left lower leg: No edema.   Lymphadenopathy:      Cervical: No cervical adenopathy.   Skin:     General: Skin is warm and dry.   Neurological:      Mental Status: He is alert and oriented to person, place, and time. Mental status is at baseline.   Psychiatric:         Mood and Affect: Mood normal.         Behavior: Behavior normal.         Thought Content: Thought content normal.         Judgment: Judgment normal.       Lab Results (last 24 hours)     Procedure Component Value Units Date/Time    POC Glycosylated Hemoglobin (Hb A1C) [[id number]]  (Abnormal) Collected: 10/12/21 0952    Specimen: Blood Updated: 10/12/21 0953     Hemoglobin A1C 7.4 %      Lot Number [id number]     Expiration Date 05/11/2023            Assessment/Plan   Problems Addressed this Visit        Cardiac and Vasculature    CAD (coronary artery disease)    Relevant Medications    nitroglycerin (NITROSTAT) 0.4 MG SL tablet    Essential hypertension       Gastrointestinal Abdominal     Ulcerative colitis (HCC)       Pulmonary and Pneumonias    Mild intermittent asthma without complication    Relevant Medications    levalbuterol (Xopenex HFA) 45 MCG/ACT inhaler      Other Visit Diagnoses     Type 2 diabetes mellitus with hyperglycemia, with long-term current use of insulin (HCC)    -  Primary    Relevant Medications    glucose blood test strip    insulin aspart (NovoLOG FlexPen) 100 UNIT/ML solution pen-injector sc pen    Other Relevant Orders    POC Glycosylated Hemoglobin (Hb A1C) (Completed)    Dysfunction of right  eustachian tube        Relevant Medications    azelastine (ASTELIN) 0.1 % nasal spray    Medicare annual wellness visit, subsequent        Class 1 obesity due to excess calories with serious comorbidity and body mass index (BMI) of 34.0 to 34.9 in adult          Diagnoses       Codes Comments    Type 2 diabetes mellitus with hyperglycemia, with long-term current use of insulin (HCC)    -  Primary ICD-10-CM: E11.65, Z79.4  ICD-9-CM: 250.00, 790.29, V58.67     Mild intermittent asthma without complication     ICD-10-CM: J45.20  ICD-9-CM: 493.90     Dysfunction of right eustachian tube     ICD-10-CM: H69.81  ICD-9-CM: 381.81     Medicare annual wellness visit, subsequent     ICD-10-CM: Z00.00  ICD-9-CM: V70.0     Ulcerative colitis with complication, unspecified location (HCC)     ICD-10-CM: K51.919  ICD-9-CM: 556.9     Essential hypertension     ICD-10-CM: I10  ICD-9-CM: 401.9     Coronary artery disease involving native coronary artery of native heart without angina pectoris     ICD-10-CM: I25.10  ICD-9-CM: 414.01     Class 1 obesity due to excess calories with serious comorbidity and body mass index (BMI) of 34.0 to 34.9 in adult     ICD-10-CM: E66.09, Z68.34  ICD-9-CM: 278.00, V85.34           Plan:    #1 hypertension: Chronic, controlled.  Continue on current medications.  DASH diet.  Advised on diet lifestyle changes.    2.  Diabetes mellitus: Chronic, uncontrolled with hyperglycemia.  Reviewed hemoglobin A1c in office today.  It has increased from 7.1-7.4.  Advised patient this is most likely due to his steroid use for his ulcerative colitis.  Discussed various medication options with patient and his wife.  He is currently taking glipizide p.o. and using 10 to 20 units of Lantus at bedtime.  Given option of adding oral Januvia versus mealtime insulin.  Patient prefers mealtime insulin.  I feel this is actually a better option due to the severity of his hyperglycemia at times as well as his use of steroids.   Advised on risk and benefits regarding insulin.  We will increase his prescription to get more test strips so that he can test around his mealtime.  Advised to start at 5 units 3 times daily as needed depending on his glucose level.  Advised on what to do in the case of hypoglycemia.  Reevaluate in 3 months.  Advised that as he is tapered off of prednisone his glucose should start to trend down and we may be able to take him off of some of the diabetes medications.  [facility]    3.  Eustachian tube dysfunction: New, will start on nasal spray.  Return if not improving.    4.  CAD: Chronic, stable.  Patient on medication management with aspirin, beta-blocker, statin, Imdur, ACE, following with cardiology.    5.  Ulcerative colitis: Chronic, stable.  Currently on steroids.  Has follow-up scheduled with GI.  Continue on current medications.    6.  Obesity: Patient's Body mass index is 34.15 kg/m². indicating that he is obese (BMI >30). Obesity-related health conditions include the following: obstructive sleep apnea, hypertension, coronary heart disease and diabetes mellitus. Obesity is worsening. BMI is is above average; BMI management plan is completed. We discussed portion control and increasing exercise.            This document has been electronically signed by Monique Carlos MD on October 12, 2021 10:49 CDT

## 2021-10-19 ENCOUNTER — OFFICE VISIT (OUTPATIENT)
Dept: PULMONOLOGY | Facility: CLINIC | Age: 81
End: 2021-10-19

## 2021-10-19 VITALS
DIASTOLIC BLOOD PRESSURE: 82 MMHG | SYSTOLIC BLOOD PRESSURE: 136 MMHG | HEIGHT: 68 IN | WEIGHT: 222 LBS | OXYGEN SATURATION: 99 % | HEART RATE: 81 BPM | BODY MASS INDEX: 33.65 KG/M2

## 2021-10-19 DIAGNOSIS — E66.9 OBESITY (BMI 30-39.9): ICD-10-CM

## 2021-10-19 DIAGNOSIS — G47.33 OSA ON CPAP: Primary | ICD-10-CM

## 2021-10-19 DIAGNOSIS — J98.4 PULMONARY SCARRING: ICD-10-CM

## 2021-10-19 DIAGNOSIS — Z99.89 OSA ON CPAP: Primary | ICD-10-CM

## 2021-10-19 DIAGNOSIS — R91.1 LUNG NODULE: ICD-10-CM

## 2021-10-19 DIAGNOSIS — J30.89 NON-SEASONAL ALLERGIC RHINITIS, UNSPECIFIED TRIGGER: ICD-10-CM

## 2021-10-19 DIAGNOSIS — Z78.9 NONSMOKER: ICD-10-CM

## 2021-10-19 DIAGNOSIS — J45.20 MILD INTERMITTENT ASTHMA WITHOUT COMPLICATION: ICD-10-CM

## 2021-10-19 PROCEDURE — 99214 OFFICE O/P EST MOD 30 MIN: CPT | Performed by: INTERNAL MEDICINE

## 2021-10-19 NOTE — PROGRESS NOTES
RESPIRATORY DISEASE CLINIC OUTPATIENT PROGRESS NOTE    Patient: Zachariah Acosta  : 1940  Age: 80 y.o.  Date of Service: 2021    REASON FOR CLINIC VISIT:  Chief Complaint   Patient presents with   • Non-seasonal allergic rhinitis, unspecified trig     CT done ; no new issues per pt   • Obesity (BMI 30-39.9)       Subjective:    History of Present Illness:  Zachariah Acosta is a 80 y.o. male who presents to the office today to be seen for    Diagnosis Plan   1. BREANN on CPAP     2. Lung nodule     3. Pulmonary scarring     4. Mild intermittent asthma without complication     5. Non-seasonal allergic rhinitis, unspecified trigger     6. Nonsmoker     7. Obesity (BMI 30-39.9)     .  Other problems per record.  Patient is a very pleasant elderly  gentleman who was seen in the pulmonary clinic for a follow-up visit after his last visit with me in 2021.    He has mild asthma with allergic rhinosinusitis although he has normal spirometry.  He is currently using Symbicort and also uses Xopenex inhaler as needed but not requiring it much.  He gets his treatment from the [facility].  He also has known diabetes mellitus and also suffers from ulcerative colitis for which she is following up with gastroenterologist.  He is already vaccinated for COVID-19 with Jamie & Jamie single dose vaccine and is waiting for the booster dose.  Since his last visit with me he did not have any respiratory issues and actually doing little better.  He also has obstructive sleep apnea obesity for which he uses CPAP and is compliant with the CPAP and is feeling better.  He gained some weight in the last few months.    No other new complaints and is waiting for pneumonia and influenza vaccine.  He gets his medications through the [facility] and also using Astelin nasal spray and fluticasone nasal spray but did not use any oral antihistamine.  No other new complaints.  He lives at home with his  wife.        PFT done today:  Not done today      Results for orders placed in visit on 01/19/21    Pulmonary Function Test    [facility] - Pulmonary Function Test    [facility]  [id number]  [phone]    Patient : Zachariah Acosta  MRN : [id number]  CSN : [id number]  Pulmonologist : Gilberto Bennett MD  Date : 4/15/2021    ______________________________________________________________________    Interpretation :  1.  Spirometry is within normal limits.  2.  Lung volumes are within normal limits.  3.  There is a mild diffusion impairment even when corrected for alveolar volume.      Gilberto Bennett MD         Bronchodilator therapy: Symbicort and Xopenox rescue inhaler.     Smoking Status:   Social History     Tobacco Use   Smoking Status Never Smoker   Smokeless Tobacco Never Used     Pulm Rehab: no  Sleep: yes    Support System: lives with their spouse    Code Status:   There are no questions and answers to display.        Review of Systems:  A complete review of systems is performed and all other systems were reviewed and negative as note above in the HPI.  Review of Systems   Constitutional: Positive for fatigue and unexpected weight gain.   HENT: Positive for congestion and postnasal drip.    Eyes: Negative.    Respiratory: Positive for cough and shortness of breath.    Gastrointestinal: Negative.    Endocrine: Negative.    Musculoskeletal: Negative.    Skin: Negative.    Allergic/Immunologic: Positive for environmental allergies.   Neurological: Negative.    Hematological: Negative.    Psychiatric/Behavioral: Negative.        CAT/ACT Score:  Not done today    Medications:  Outpatient Encounter Medications as of 10/19/2021   Medication Sig Dispense Refill   • aspirin (aspirin) 81 MG EC tablet Take 1 tablet by mouth Daily. 30 tablet 11   • azelastine (ASTELIN) 0.1 % nasal spray 2 sprays into the nostril(s) as directed by provider 2 (Two) Times a Day. Use in each  nostril as directed 30 mL 2   • budesonide-formoterol (Symbicort) 160-4.5 MCG/ACT inhaler Inhale 2 puffs 2 (two) times a day.     • Dupilumab 300 MG/2ML solution prefilled syringe Inject  under the skin into the appropriate area as directed Every 14 (Fourteen) Days.     • finasteride (PROSCAR) 5 MG tablet Take 1 tablet by mouth Daily. 30 tablet 11   • fluticasone (FLONASE) 50 MCG/ACT nasal spray 1 spray into the nostril(s) as directed by provider Daily. 1 bottle 11   • glipizide (GLUCOTROL) 10 MG tablet Take 10 mg by mouth 2 (Two) Times a Day Before Meals.     • glucose blood test strip 1 each by Other route 3 (Three) Times a Day As Needed (hypoglycemia). Use as instructed One Touch Ultra 100 each 11   • insulin aspart (NovoLOG FlexPen) 100 UNIT/ML solution pen-injector sc pen Inject 5 Units under the skin into the appropriate area as directed 3 (Three) Times a Day With Meals. 5 pen 2   • Insulin Glargine (Lantus SoloStar) 100 UNIT/ML injection pen Inject 20 Units under the skin into the appropriate area as directed As Needed.     • isosorbide mononitrate (IMDUR) 30 MG 24 hr tablet Take 1 tablet by mouth Daily. 30 tablet 11   • levalbuterol (Xopenex HFA) 45 MCG/ACT inhaler Inhale 1-2 puffs Every 4 (Four) Hours As Needed for Wheezing or Shortness of Air. 15 g 11   • lisinopril (PRINIVIL,ZESTRIL) 10 MG tablet Take 1 tablet by mouth Daily. 90 tablet 1   • Mesalamine 4 GM/60ML SF enema Insert  into the rectum Daily.     • metoprolol tartrate (LOPRESSOR) 25 MG tablet Take 25 mg by mouth 2 (two) times a day.     • nitroglycerin (NITROSTAT) 0.4 MG SL tablet 1 under the tongue as needed for angina, may repeat q5mins for up three doses 25 tablet 1   • ondansetron ODT (ZOFRAN-ODT) 4 MG disintegrating tablet Place 1 tablet on the tongue Every 8 (Eight) Hours As Needed for Nausea. 10 tablet 0   • oxyCODONE-acetaminophen (PERCOCET) 5-325 MG per tablet Take 1 tablet by mouth Every 6 (Six) Hours As Needed for Severe Pain . 12  "tablet 0   • predniSONE (DELTASONE) 10 MG tablet Take 1 tablet by mouth 2 (two) times a day. 60 tablet 0   • rosuvastatin (CRESTOR) 40 MG tablet Take 1 tablet by mouth Daily. 90 tablet 3   • triamcinolone (KENALOG) 0.1 % cream Apply  topically to the appropriate area as directed 2 (Two) Times a Day.     • Ustekinumab (STELARA) 90 MG/ML solution prefilled syringe Injection Inject 90 mg under the skin into the appropriate area as directed Every 28 (Twenty-Eight) Days. 1 mL 6     No facility-administered encounter medications on file as of 10/19/2021.       Allergies:  Allergies   Allergen Reactions   • Albuterol Other (See Comments)   • Adhesive Tape Rash   • Azithromycin Rash       Immunizations:  Immunization History   Administered Date(s) Administered   • COVID-19 (Dubizzle) 04/12/2021   • Flu Vaccine Quad PF >36MO 11/13/2017   • Fluzone High Dose =>65 Years ([employer] ONLY) 11/01/2020   • Influenza TIV (IM) 11/16/2016, 10/26/2018   • Pneumococcal Conjugate 13-Valent (PCV13) 11/11/2013, 04/01/2019   • Pneumococcal Polysaccharide (PPSV23) 08/10/2020       Objective:    Vitals:  /82   Pulse 81   Ht 172.7 cm (68\")   Wt 101 kg (222 lb)   SpO2 99%   BMI 33.75 kg/m²     Physical Exam:  General: Patient is a 80 y.o. pleasant elderly obese  male. Looks stated age. Appears to be in no acute distress.  Eyes: EOMI. PERRLA. Vision intact. No scleral icterus.  Ear, Nose, Mouth and Throat: Hearing is grossly intact. No Leukoplakia, pharyngitis, stomatitis or thrush. Swollen nasal mucosa with post nasal drop.  Neck: Range of motion of neck normal. No thyromegaly or masses. Mallampati Class 3, No JVD  Respiratory: Clear to auscultation bilaterally. No use of accessory muscles. Decreased breath sounds.  Cardiovascular: Normal heart sounds. Regularly regular rhythm without murmur.  Gastrointestinal: Non tender, non distended, soft. Bowel sounds positive in all four quadrants. No organomegaly.  Skin: No obvious " rashes, lesions, ulcers or large amount of bruising. No edema.   Neurological: No new motor deficits. Cranial nerves appear intact.  Psychiatric: Patient is alert and oriented to person, place and time.    Chest Imaging:      CT chest done in July 2021 showed     IMPRESSION:  1. Stable 9 mm lingula pulmonary nodule versus scarring/atelectasis  compared to most remote available prior 3/5/2021.  2. Large left thyroid nodule measuring 3.8 cm. If not previously  performed an outside facility, recommend thyroid ultrasound per ACR  incidental findings criteria.     This report was finalized on 07/20/2021 09:54 by Dr Ashly Rizzo MD.    Assessment:  1. BREANN on CPAP    2. Lung nodule    3. Pulmonary scarring    4. Mild intermittent asthma without complication    5. Non-seasonal allergic rhinitis, unspecified trigger    6. Nonsmoker    7. Obesity (BMI 30-39.9)        Plan/Recommendations:    1.  Continue Symbicort and Xopenex HFA for the asthma and asthma symptoms well controlled  2.  I explained the results of the CT scan to the patient left lung lingular nodule or scarring is stable and a follow-up CT is ordered in a year time before his next clinic visit.  3.  For his nasal allergy his symptoms are well controlled with Astelin nasal spray and Flonase nasal spray but he can use over-the-counter Claritin or loratadine if needed.  4.  He is already using CPAP for his sleep apnea and tolerating it well and will keep the CPAP on at night as before.  He can use CPAP during sleep as well.  5.  Patient will need a booster dose of Covid vaccination with Pfizer or moderna and he was already vaccinated with a Jamie & Jamie single dose Covid vaccination a few months ago.  He did not have any Covid infection so far.  6.  Wait for pneumonia and influenza vaccination soon and also follow-up with the gastroenterologist for his ulcerative colitis.  [facility] and return to  pulmonary clinic in a year time for follow-up visit or earlier if needed.    Follow up:  12 Months    Time Spent:  30 minutes    I appreciate the opportunity of participating in this patient's care. I would like to thank the PCP for the referral.  Please feel free to contact me with any other questions.    Jacob Mejias MD   Pulmonologist/Intensivist     Electronically signed by: Jacob Mejias MD, 10/19/2021 10:01 CDT

## 2021-10-20 ENCOUNTER — OFFICE VISIT (OUTPATIENT)
Dept: GASTROENTEROLOGY | Facility: CLINIC | Age: 81
End: 2021-10-20

## 2021-10-20 VITALS
OXYGEN SATURATION: 98 % | SYSTOLIC BLOOD PRESSURE: 124 MMHG | DIASTOLIC BLOOD PRESSURE: 78 MMHG | WEIGHT: 226 LBS | TEMPERATURE: 97.6 F | BODY MASS INDEX: 34.25 KG/M2 | HEIGHT: 68 IN | HEART RATE: 74 BPM

## 2021-10-20 DIAGNOSIS — K51.019 ULCERATIVE PANCOLITIS WITH COMPLICATION (HCC): Primary | ICD-10-CM

## 2021-10-20 PROCEDURE — 99214 OFFICE O/P EST MOD 30 MIN: CPT | Performed by: INTERNAL MEDICINE

## 2021-10-20 RX ORDER — PREDNISONE 10 MG/1
10 TABLET ORAL 2 TIMES DAILY
Qty: 60 TABLET | Refills: 0 | Status: ON HOLD | OUTPATIENT
Start: 2021-10-20 | End: 2021-12-13

## 2021-10-20 NOTE — PROGRESS NOTES
Chief Complaint   Patient presents with   • Ulcerative Colitis     8-3-2021 was seen no change       PCP: Monique Gusman MD  REFER: No ref. provider found    Subjective     HPI     Has has worsening diarrhea blanche in the am   Denies blood   Denies abdominal pain   Denies weight loss        Past Medical History:   Diagnosis Date   • Asthma    • Coronary artery disease    • Diabetes mellitus (HCC)    • Hyperlipidemia    • Hypertension    • Sleep apnea     cpap at hs       Past Surgical History:   Procedure Laterality Date   • BACK SURGERY     • CARDIAC CATHETERIZATION      stents x 6   • CATARACT EXTRACTION     • COLONOSCOPY     • COLONOSCOPY N/A 6/4/2021    Procedure: COLONOSCOPY WITH ANESTHESIA;  Surgeon: Zachariah Menjivar DO;  Location: [facility] ENDOSCOPY;  Service: Gastroenterology;  Laterality: N/A;  pre hx ulcerative colitis  post ulcerative colitis  dr monique gusman   • CORONARY ANGIOPLASTY WITH STENT PLACEMENT     • HIP SURGERY Right     total hip       Outpatient Medications Marked as Taking for the 10/20/21 encounter (Office Visit) with Zachariah Menjivar DO   Medication Sig Dispense Refill   • aspirin (aspirin) 81 MG EC tablet Take 1 tablet by mouth Daily. 30 tablet 11   • azelastine (ASTELIN) 0.1 % nasal spray 2 sprays into the nostril(s) as directed by provider 2 (Two) Times a Day. Use in each nostril as directed 30 mL 2   • budesonide-formoterol (Symbicort) 160-4.5 MCG/ACT inhaler Inhale 2 puffs 2 (two) times a day.     • Dupilumab 300 MG/2ML solution prefilled syringe Inject  under the skin into the appropriate area as directed Every 14 (Fourteen) Days.     • finasteride (PROSCAR) 5 MG tablet Take 1 tablet by mouth Daily. 30 tablet 11   • fluticasone (FLONASE) 50 MCG/ACT nasal spray 1 spray into the nostril(s) as directed by provider Daily. 1 bottle 11   • glipizide (GLUCOTROL) 10 MG tablet Take 10 mg by mouth 2 (Two) Times a Day Before Meals.     • insulin aspart (NovoLOG FlexPen) 100 UNIT/ML  solution pen-injector sc pen Inject 5 Units under the skin into the appropriate area as directed 3 (Three) Times a Day With Meals. 5 pen 2   • Insulin Glargine (Lantus SoloStar) 100 UNIT/ML injection pen Inject 20 Units under the skin into the appropriate area as directed As Needed.     • isosorbide mononitrate (IMDUR) 30 MG 24 hr tablet Take 1 tablet by mouth Daily. 30 tablet 11   • levalbuterol (Xopenex HFA) 45 MCG/ACT inhaler Inhale 1-2 puffs Every 4 (Four) Hours As Needed for Wheezing or Shortness of Air. 15 g 11   • lisinopril (PRINIVIL,ZESTRIL) 10 MG tablet Take 1 tablet by mouth Daily. 90 tablet 1   • Mesalamine 4 GM/60ML SF enema Insert  into the rectum Daily.     • metoprolol tartrate (LOPRESSOR) 25 MG tablet Take 25 mg by mouth 2 (two) times a day.     • nitroglycerin (NITROSTAT) 0.4 MG SL tablet 1 under the tongue as needed for angina, may repeat q5mins for up three doses 25 tablet 1   • ondansetron ODT (ZOFRAN-ODT) 4 MG disintegrating tablet Place 1 tablet on the tongue Every 8 (Eight) Hours As Needed for Nausea. 10 tablet 0   • oxyCODONE-acetaminophen (PERCOCET) 5-325 MG per tablet Take 1 tablet by mouth Every 6 (Six) Hours As Needed for Severe Pain . 12 tablet 0   • predniSONE (DELTASONE) 10 MG tablet Take 1 tablet by mouth 2 (two) times a day. 60 tablet 0   • rosuvastatin (CRESTOR) 40 MG tablet Take 1 tablet by mouth Daily. 90 tablet 3   • triamcinolone (KENALOG) 0.1 % cream Apply  topically to the appropriate area as directed 2 (Two) Times a Day.     • Ustekinumab (STELARA) 90 MG/ML solution prefilled syringe Injection Inject 90 mg under the skin into the appropriate area as directed Every 28 (Twenty-Eight) Days. 1 mL 6       Allergies   Allergen Reactions   • Albuterol Other (See Comments)   • Adhesive Tape Rash   • Azithromycin Rash       Social History     Socioeconomic History   • Marital status:    Tobacco Use   • Smoking status: Never Smoker   • Smokeless tobacco: Never Used   Vaping Use  "  • Vaping Use: Never used   Substance and Sexual Activity   • Alcohol use: Never   • Drug use: Never   • Sexual activity: Defer       Review of Systems   Constitutional: Negative for unexpected weight change.   Respiratory: Negative for shortness of breath.    Cardiovascular: Negative for chest pain.   Gastrointestinal: Negative for abdominal pain and anal bleeding.       Objective     Vitals:    10/20/21 1253   BP: 124/78   Pulse: 74   Temp: 97.6 °F (36.4 °C)   SpO2: 98%   Weight: 103 kg (226 lb)   Height: 172.7 cm (68\")     Body mass index is 34.36 kg/m².    Physical Exam  Constitutional:       Appearance: Normal appearance. He is well-developed.   Eyes:      General: No scleral icterus.  Neck:      Thyroid: No thyroid mass or thyromegaly.      Vascular: No JVD.   Pulmonary:      Effort: Pulmonary effort is normal. No accessory muscle usage.   Abdominal:      General: There is no distension.      Tenderness: There is no abdominal tenderness. There is no guarding.   Skin:     Coloration: Skin is not jaundiced.   Neurological:      Mental Status: He is alert.   Psychiatric:         Behavior: Behavior is cooperative.         Judgment: Judgment normal.         Imaging Results (Most Recent)     None          Body mass index is 34.36 kg/m².    Assessment/Plan     Diagnoses and all orders for this visit:    1. Ulcerative pancolitis with complication (HCC) (Primary)    worsening symptoms despite \"biologics\" and steroid dependence  Suggest he gets back to an IBD Clinic /stephanie    * Surgery not found *        Advised pt to stop use of NSAIDs, Fish Oil, and MV 5 days prior to procedure, per Dr Menjivar protocol.  Tylenol based products are ok to take.  Pt verbalized understanding.    Precautions are currently being put in place due to COVID-19.  I have explained to Zachariah Acosta they will be required to undergo COVID testing prior to their procedure.  Zachariah Acosta verbalized understanding and was willing to " proceed.        Zachariah Menjivar, DO  10/20/21          There are no Patient Instructions on file for this visit.

## 2021-10-21 ENCOUNTER — TELEPHONE (OUTPATIENT)
Dept: FAMILY MEDICINE CLINIC | Facility: CLINIC | Age: 81
End: 2021-10-21

## 2021-10-21 ENCOUNTER — TELEPHONE (OUTPATIENT)
Dept: GASTROENTEROLOGY | Facility: CLINIC | Age: 81
End: 2021-10-21

## 2021-10-21 DIAGNOSIS — Z79.4 TYPE 2 DIABETES MELLITUS WITH HYPERGLYCEMIA, WITH LONG-TERM CURRENT USE OF INSULIN (HCC): ICD-10-CM

## 2021-10-21 DIAGNOSIS — E04.1 THYROID NODULE: Primary | ICD-10-CM

## 2021-10-21 DIAGNOSIS — E11.65 TYPE 2 DIABETES MELLITUS WITH HYPERGLYCEMIA, WITH LONG-TERM CURRENT USE OF INSULIN (HCC): ICD-10-CM

## 2021-10-21 NOTE — TELEPHONE ENCOUNTER
Caller: Zachariah Acosta    Relationship: Self    Best call back number: [phone]    What is the best time to reach you: ANY    Who are you requesting to speak with (clinical staff, provider,  specific staff member): CLINICAL        What was the call regarding: PATIENT STATES insulin aspart (NovoLOG FlexPen) 100 UNIT/ML solution pen-injector sc pen NEEDS TO BE RESENT TO [facility]. THE [place] [facility] TOLD HIM TO CALL BACK AND HAVE IT SENT TO THE [facility]    Do you require a callback: YES

## 2021-10-21 NOTE — TELEPHONE ENCOUNTER
Spoke with IBD - Referral office - had to fax referral to reviewer - they will contact patient with appointment date and time.     TY

## 2021-10-25 ENCOUNTER — HOSPITAL ENCOUNTER (OUTPATIENT)
Dept: CARDIOLOGY | Facility: HOSPITAL | Age: 81
Discharge: HOME | End: 2021-10-25

## 2021-10-25 VITALS
DIASTOLIC BLOOD PRESSURE: 78 MMHG | WEIGHT: 227.07 LBS | SYSTOLIC BLOOD PRESSURE: 152 MMHG | HEART RATE: 75 BPM | BODY MASS INDEX: 34.41 KG/M2 | HEIGHT: 68 IN

## 2021-10-25 DIAGNOSIS — R06.02 SHORTNESS OF BREATH: ICD-10-CM

## 2021-10-25 DIAGNOSIS — I25.810 CORONARY ARTERY DISEASE INVOLVING CORONARY BYPASS GRAFT OF NATIVE HEART, UNSPECIFIED WHETHER ANGINA PRESENT: ICD-10-CM

## 2021-10-25 PROCEDURE — A9500 TC99M SESTAMIBI: HCPCS | Performed by: NURSE PRACTITIONER

## 2021-10-25 PROCEDURE — 78452 HT MUSCLE IMAGE SPECT MULT: CPT

## 2021-10-25 PROCEDURE — 0 TECHNETIUM SESTAMIBI: Performed by: NURSE PRACTITIONER

## 2021-10-25 PROCEDURE — 93018 CV STRESS TEST I&R ONLY: CPT | Performed by: INTERNAL MEDICINE

## 2021-10-25 PROCEDURE — 93017 CV STRESS TEST TRACING ONLY: CPT

## 2021-10-25 PROCEDURE — 25010000002 REGADENOSON 0.4 MG/5ML SOLUTION: Performed by: INTERNAL MEDICINE

## 2021-10-25 PROCEDURE — 78452 HT MUSCLE IMAGE SPECT MULT: CPT | Performed by: INTERNAL MEDICINE

## 2021-10-25 RX ORDER — INSULIN ASPART 100 [IU]/ML
5 INJECTION, SOLUTION INTRAVENOUS; SUBCUTANEOUS
Qty: 5 PEN | Refills: 2 | Status: SHIPPED | OUTPATIENT
Start: 2021-10-25

## 2021-10-25 RX ADMIN — TECHNETIUM TC 99M SESTAMIBI 1 DOSE: 1 INJECTION INTRAVENOUS at 08:40

## 2021-10-25 RX ADMIN — REGADENOSON 0.4 MG: 0.08 INJECTION, SOLUTION INTRAVENOUS at 08:44

## 2021-10-25 RX ADMIN — TECHNETIUM TC 99M SESTAMIBI 1 DOSE: 1 INJECTION INTRAVENOUS at 07:41

## 2021-10-26 LAB
BH CV NUCLEAR PRIOR STUDY: 2
BH CV REST NUCLEAR ISOTOPE DOSE: 10.3 MCI
BH CV STRESS BP STAGE 1: NORMAL
BH CV STRESS COMMENTS STAGE 1: NORMAL
BH CV STRESS DOSE REGADENOSON STAGE 1: 0.4
BH CV STRESS DURATION MIN STAGE 1: 0
BH CV STRESS DURATION SEC STAGE 1: 10
BH CV STRESS HR STAGE 1: 78
BH CV STRESS NUCLEAR ISOTOPE DOSE: 33 MCI
BH CV STRESS PROTOCOL 1: NORMAL
BH CV STRESS RECOVERY BP: NORMAL MMHG
BH CV STRESS RECOVERY HR: 70 BPM
BH CV STRESS STAGE 1: 1
MAXIMAL PREDICTED HEART RATE: 142 BPM
PERCENT MAX PREDICTED HR: 54.93 %
STRESS BASELINE BP: NORMAL MMHG
STRESS BASELINE HR: 75 BPM
STRESS PERCENT HR: 65 %
STRESS POST EXERCISE DUR SEC: 10 SEC
STRESS POST PEAK BP: NORMAL MMHG
STRESS POST PEAK HR: 78 BPM
STRESS TARGET HR: 121 BPM

## 2021-10-27 ENCOUNTER — TELEPHONE (OUTPATIENT)
Dept: CARDIOLOGY | Facility: CLINIC | Age: 81
End: 2021-10-27

## 2021-10-28 ENCOUNTER — HOSPITAL ENCOUNTER (OUTPATIENT)
Dept: CT IMAGING | Facility: HOSPITAL | Age: 81
Discharge: HOME | End: 2021-10-28
Admitting: INTERNAL MEDICINE

## 2021-10-28 DIAGNOSIS — R91.1 LUNG NODULE: ICD-10-CM

## 2021-10-28 DIAGNOSIS — J98.4 PULMONARY SCARRING: ICD-10-CM

## 2021-10-28 PROCEDURE — 71250 CT THORAX DX C-: CPT

## 2021-11-09 DIAGNOSIS — E04.1 THYROID NODULE: Primary | ICD-10-CM

## 2021-11-09 NOTE — TELEPHONE ENCOUNTER
Caller: Zachariah Acotsa    Relationship: Self    Best call back number: [phone]      Requested Prescriptions     Pending Prescriptions Disp Refills   • glipizide (GLUCOTROL) 10 MG tablet       Sig: Take 1 tablet by mouth 2 (Two) Times a Day Before Meals.      metoprolol tartrate (LOPRESSOR) 25 MG tablet  25 mg, 2 times daily       NEEDING PA    Pharmacy where request should be sent:       [facility] [place] [place] - [address] AT [address] [facility] - [phone]  - [phone]     Additional details provided by patient:    90 DAYS    Does the patient have less than a 3 day supply:  [] Yes  [x] No    Horacio Marsh Rep   11/09/21 15:45 CST

## 2021-11-11 ENCOUNTER — TELEPHONE (OUTPATIENT)
Dept: FAMILY MEDICINE CLINIC | Facility: CLINIC | Age: 81
End: 2021-11-11

## 2021-11-11 ENCOUNTER — OFFICE VISIT (OUTPATIENT)
Dept: CARDIOLOGY | Facility: CLINIC | Age: 81
End: 2021-11-11

## 2021-11-11 VITALS
DIASTOLIC BLOOD PRESSURE: 78 MMHG | BODY MASS INDEX: 34.37 KG/M2 | HEIGHT: 68 IN | HEART RATE: 81 BPM | OXYGEN SATURATION: 100 % | WEIGHT: 226.8 LBS | SYSTOLIC BLOOD PRESSURE: 128 MMHG

## 2021-11-11 DIAGNOSIS — R06.02 SOB (SHORTNESS OF BREATH): ICD-10-CM

## 2021-11-11 DIAGNOSIS — E78.2 MIXED HYPERLIPIDEMIA: ICD-10-CM

## 2021-11-11 DIAGNOSIS — I25.10 CORONARY ARTERY DISEASE INVOLVING NATIVE CORONARY ARTERY OF NATIVE HEART WITHOUT ANGINA PECTORIS: Primary | ICD-10-CM

## 2021-11-11 DIAGNOSIS — Z95.1 S/P CABG (CORONARY ARTERY BYPASS GRAFT): ICD-10-CM

## 2021-11-11 DIAGNOSIS — I10 ESSENTIAL HYPERTENSION: ICD-10-CM

## 2021-11-11 PROCEDURE — 99214 OFFICE O/P EST MOD 30 MIN: CPT | Performed by: NURSE PRACTITIONER

## 2021-11-11 RX ORDER — GLIPIZIDE 10 MG/1
10 TABLET ORAL
Qty: 180 TABLET | Refills: 0 | Status: SHIPPED | OUTPATIENT
Start: 2021-11-11 | End: 2022-05-09 | Stop reason: SDUPTHER

## 2021-11-11 NOTE — PROGRESS NOTES
Subjective:     Encounter Date:11/11/2021      Patient ID: Zachariah Acosta is a 81 y.o. male.    Chief Complaint: shortness of breath     History of Present Illness     Patient was referred to Dr. Polanco by Dr. Carlos.  The patient established care with her in January 2021.  It was noted in the history that day that he had had a prior MI in April 2020 that required 6 stents, and he also has a history of CABG in 2012 previously following with a cardiologist in [place].    The patient reported to Dr. Polanco, when he established care with him in March that he was experiencing some shortness of breath in 2012 and subsequently underwent angiogram that determine the need for three-vessel CABG.  The patient was symptom-free until April 2020 when he suffered an MI that required 6 stents.  He reported he was doing fine after cardiac catheterization.  He reported his shortness of breath was stable and he was taking Symbicort for that.  He denied any chest discomfort whatsoever.  Blood pressure was controlled.  He reported his previous cardiologist Dr. Cardoza placed him on Plavix for 1 year, approximately 11 months prior to that visit.  The patient reported he had discontinued his baby aspirin when he began taking Plavix.  He denied bleeding issues.  He had just moved to the area 2 months prior.  At that visit, aspirin was resumed and Plavix was continued.  Rosuvastatin was increased to 40 mg from 20 mg due to LDL of 86.    Since that time, the patient has had some issues with both bright red blood per rectum with ulcerative colitis and hematuria followed by gastroenterology and urology, respectively.  He had a colonoscopy on 6/4 which revealed a friable ulcerated mucosa with contact bleeding in the entire examined colon.  Our office was contacted regarding these issues, requesting that he be taken off of Plavix.  Dr. Polanco did review the cardiac catheterization films from April 2020 and did recommend discontinuation  of Plavix given the circumstances, and the fact that he was greater than 1 year from PCI.  He was instructed to continue aspirin 81 mg daily without interruption.    I saw the patient on 10/7/2021 and he reported a significant decline in stamina and worsening shortness of breath over the preceding 2 months: He admitted dyspnea on exertion was chronic for him but had worsened significantly in that time period).  Sometimes he would have associated wheezing with it.  Alleviating factors were inhalers, CPAP and cough drops.  He had not taken nitroglycerin.  He denied any chest pain, fever, chills, orthopnea, PND, productive cough.  He reported mild lower extremity edema.  He was following with pulmonology for his asthma and had a follow-up with them the next week.  He reported good oxygen saturations despite his shortness of breath.  He reported his symptoms were similar to what he had experienced prior to bypass surgery, but different compared to the severe chest pain he had leading up to his MI.  At that visit, CMP, CBC and BNP, as well as a Lexiscan, were ordered.  Additionally, Imdur 30 mg daily was added to see if he would get symptomatic improvement with that.  See results below.  Labs were unremarkable and Lexiscan was low risk.    Today the patient states the Imdur made him dizzy, so he cut it back to half a tablet and did a little bit better with this, and then got back on the full tablet and the dizziness eventually went away.  When questioned about whether or not this improved his dyspnea or stamina, he states he may have had very slight improvement if any at all.  He denies any significant changes since his last visit-dyspnea on exertion and stamina have not necessarily worsened.  Although he did not mention this last visit, he tells me that he was short of breath prior to his CABG, but in retrospect his bypass surgery never really improved his chronic dyspnea.  He continues to deny any chest pain.  He tells  me he is going to see ENT about a thyroid nodule in the near future.     The following portions of the patient's history were reviewed and updated as appropriate: allergies, current medications, past family history, past medical history, past social history, past surgical history and problem list.    Review of Systems   Constitutional: Positive for malaise/fatigue.   Cardiovascular: Positive for dyspnea on exertion. Negative for claudication, near-syncope, orthopnea, paroxysmal nocturnal dyspnea and syncope.   Respiratory: Positive for wheezing (occasional ). Negative for cough.    Hematologic/Lymphatic: Does not bruise/bleed easily.   Musculoskeletal: Negative for falls.   Gastrointestinal: Negative for bloating.   Neurological: Negative for light-headedness and weakness.       Allergies   Allergen Reactions   • Albuterol Other (See Comments)   • Adhesive Tape Rash   • Azithromycin Rash       Current Outpatient Medications:   •  aspirin (aspirin) 81 MG EC tablet, Take 1 tablet by mouth Daily., Disp: 30 tablet, Rfl: 11  •  azelastine (ASTELIN) 0.1 % nasal spray, 2 sprays into the nostril(s) as directed by provider 2 (Two) Times a Day. Use in each nostril as directed, Disp: 30 mL, Rfl: 2  •  budesonide-formoterol (Symbicort) 160-4.5 MCG/ACT inhaler, Inhale 2 puffs 2 (two) times a day., Disp: , Rfl:   •  Dupilumab 300 MG/2ML solution prefilled syringe, Inject  under the skin into the appropriate area as directed Every 14 (Fourteen) Days., Disp: , Rfl:   •  finasteride (PROSCAR) 5 MG tablet, Take 1 tablet by mouth Daily., Disp: 30 tablet, Rfl: 11  •  fluticasone (FLONASE) 50 MCG/ACT nasal spray, 1 spray into the nostril(s) as directed by provider Daily., Disp: 1 bottle, Rfl: 11  •  glucose blood test strip, 1 each by Other route 3 (Three) Times a Day As Needed (hypoglycemia). Use as instructed One Touch Ultra, Disp: 100 each, Rfl: 11  •  insulin aspart (NovoLOG FlexPen) 100 UNIT/ML solution pen-injector sc pen, Inject  "5 Units under the skin into the appropriate area as directed 3 (Three) Times a Day With Meals., Disp: 5 pen, Rfl: 2  •  Insulin Glargine (Lantus SoloStar) 100 UNIT/ML injection pen, Inject 20 Units under the skin into the appropriate area as directed As Needed., Disp: , Rfl:   •  levalbuterol (Xopenex HFA) 45 MCG/ACT inhaler, Inhale 1-2 puffs Every 4 (Four) Hours As Needed for Wheezing or Shortness of Air., Disp: 15 g, Rfl: 11  •  lisinopril (PRINIVIL,ZESTRIL) 10 MG tablet, Take 1 tablet by mouth Daily., Disp: 90 tablet, Rfl: 1  •  Mesalamine 4 GM/60ML SF enema, Insert  into the rectum Daily., Disp: , Rfl:   •  nitroglycerin (NITROSTAT) 0.4 MG SL tablet, 1 under the tongue as needed for angina, may repeat q5mins for up three doses, Disp: 25 tablet, Rfl: 1  •  ondansetron ODT (ZOFRAN-ODT) 4 MG disintegrating tablet, Place 1 tablet on the tongue Every 8 (Eight) Hours As Needed for Nausea., Disp: 10 tablet, Rfl: 0  •  oxyCODONE-acetaminophen (PERCOCET) 5-325 MG per tablet, Take 1 tablet by mouth Every 6 (Six) Hours As Needed for Severe Pain ., Disp: 12 tablet, Rfl: 0  •  predniSONE (DELTASONE) 10 MG tablet, Take 1 tablet by mouth 2 (two) times a day., Disp: 60 tablet, Rfl: 0  •  rosuvastatin (CRESTOR) 40 MG tablet, Take 1 tablet by mouth Daily., Disp: 90 tablet, Rfl: 3  •  triamcinolone (KENALOG) 0.1 % cream, Apply  topically to the appropriate area as directed 2 (Two) Times a Day., Disp: , Rfl:   •  Ustekinumab (STELARA) 90 MG/ML solution prefilled syringe Injection, Inject 90 mg under the skin into the appropriate area as directed Every 28 (Twenty-Eight) Days., Disp: 1 mL, Rfl: 6  •  glipizide (GLUCOTROL) 10 MG tablet, Take 1 tablet by mouth 2 (Two) Times a Day Before Meals., Disp: 180 tablet, Rfl: 0  •  metoprolol tartrate (LOPRESSOR) 25 MG tablet, Take 1 tablet by mouth 2 (Two) Times a Day., Disp: 180 tablet, Rfl: 0         Objective:    /78   Pulse 81   Ht 172.7 cm (68\")   Wt 103 kg (226 lb 12.8 oz)   " SpO2 100%   BMI 34.48 kg/m²        Vitals and nursing note reviewed.   Constitutional:       General: Not in acute distress.     Appearance: Well-developed and not in distress. Not diaphoretic.   Neck:      Vascular: No JVD.   Pulmonary:      Effort: Pulmonary effort is normal. No respiratory distress.      Breath sounds: Normal breath sounds.   Cardiovascular:      Normal rate. Regular rhythm.      Murmurs: There is no murmur.   Edema:     Peripheral edema (trace BLE edema ) present.  Abdominal:      Tenderness: There is no abdominal tenderness.   Skin:     General: Skin is warm and dry.   Neurological:      Mental Status: Alert and oriented to person, place, and time.         Lab Review:   Lab Results   Component Value Date    GLUCOSE 209 (H) 10/07/2021    BUN 19 10/07/2021    CREATININE 1.00 10/07/2021    EGFRIFNONA 72 10/07/2021    EGFRIFAFRI >59 06/26/2021    BCR 19.0 10/07/2021    K 4.6 10/07/2021    CO2 24.0 10/07/2021    CALCIUM 8.8 10/07/2021    PROTENTOTREF 6.8 06/16/2021    ALBUMIN 3.90 10/07/2021    LABIL2 1.5 06/16/2021    AST 18 10/07/2021    ALT 11 10/07/2021     WBCs up while on prednisone for UC   Lab Results   Component Value Date    WBC 16.21 (H) 10/07/2021    HGB 11.3 (L) 10/07/2021    HCT 37.9 10/07/2021    MCV 80.6 10/07/2021     10/07/2021     Lab Results   Component Value Date    CHLPL 141 01/06/2021    TRIG 114 01/06/2021    HDL 34 (L) 01/06/2021    LDL 86 01/06/2021             Procedures    10/25/2021 Lexiscan:    · Impressions are consistent with a low risk study.  · Myocardial perfusion imaging indicates a normal myocardial perfusion study with no evidence of ischemia.  · Left ventricular ejection fraction is normal.  · There is no prior study available for comparison.  · Findings consistent with a normal ECG stress test.        Assessment:      Problem List Items Addressed This Visit        Cardiac and Vasculature    CAD (coronary artery disease) - Primary    Overview     3v  CABG in '12  Non-STEMI April 2020 diagnostic cath 4/13/2020 showed LIMA to LAD atretic, SVG to diagonal patent with significant backflow from diagonal graft into the LAD and down the LAD, SVG to PDA patent but not providing flow to PL branch because of proximal and mid native vessel (R-PDA) disease.  Left main 60-70% diffuse disease compromising flow into ungrafted circumflex territory.  Subsequently referred for consideration of redo CABG versus complex PCI.    -Return to Cath Lab 4/14/2020 with 4.0 x 8 mm drug-eluting stent placement to the left main (for purpose of improving flow into ungrafted circumflex territory), as well as for overlapping Julianne drug-eluting stents from the ostium into mid-RCA (3.5 x 12, 3.5 x 15, 3.5 x 12, 3.5 x 8) along with 3.5 x 12 mm Julianne FRANKY at the takeoff of the right-PL branch.  80-90% stenosis at the ostium of the R-PDA not treated since vein graft to PDA open, but not providing any retrograde filling beyond this lesion into the PL system.  There was PTCA alone to area in the distal RCA between stented segments for reported inability to deliver a stent to the segment.  Balloon angioplasty was performed with a 3.5 mm balloon with reported residual stenosis of less than 10%.             Essential hypertension    Overview     Last Assessment & Plan:   Hypertension is controlled.  Dietary sodium restriction.  Weight loss.  Regular aerobic exercise.  Continue current medications.  Blood pressure will be reassessed at the next regular appointment.         S/P CABG (coronary artery bypass graft)    Overview     -LIMA to LAD, SVG to RCA, and SVG to diagonal (Dec '12)         Hyperlipidemia       Pulmonary and Pneumonias    SOB (shortness of breath)          Plan:     1. Coronary disease status post remote CABG with MI in April 2020 requiring stenting (total of 6-1 to left main, 4 to RCA, 1 to R-PL): See notes above regarding his fatigue and dyspnea on exertion.  Lexiscan was low risk for  ischemia with a normal LVEF.  BNP was lower end of normal.  He is not exhibiting any signs of volume overload.  He is not having any chest pain or symptoms similar to what led up to his recent MI and PCI.  He tells me he did have dyspnea on exertion leading up to his CABG, but in retrospect CABG did not improve his breathing.  -Okay to discontinue Imdur as he did not have any significant improvement with this  -Based on work-up thus far, I favor noncardiac source of his gradually worsening dyspnea on exertion  -No longer on Plavix due to being greater than 1 year out from PCI and issues with hematuria and GI bleeding due to diverticulitis and ulcerative colitis  -Continue aspirin, high intensity statin, beta-blocker, ACE inhibitor, as needed sublingual nitroglycerin (has not had to use)  -No further work-up at this time, but he will call prior to his next appointment with new or worsening symptoms -> may consider 2D echocardiogram at that time.  -The patient voices understanding and is in agreement with the plan of care.    2. Mixed hyperlipidemia: Continue rosuvastatin 40 mg nightly.  Dose was increased by Dr. Polanco in March 2021 due to LDL of 86/not at goal of less than 70.  I do not see that there has been a recheck since then.  Recommend repeat lipid panel per PCP.  If not at goal, consider addition of Zetia or PCSK9 inhibitor depending on the result.     3. Essential hypertension:    - Well-controlled. Continue current medications.     Follow-up in 6 months with Dr. Polanco, sooner with new or worsening symptoms.

## 2021-11-11 NOTE — TELEPHONE ENCOUNTER
Rx Refill Note  Requested Prescriptions     Pending Prescriptions Disp Refills   • glipizide (GLUCOTROL) 10 MG tablet       Sig: Take 1 tablet by mouth 2 (Two) Times a Day Before Meals.   • metoprolol tartrate (LOPRESSOR) 25 MG tablet       Sig: Take 1 tablet by mouth.      Last office visit with prescribing clinician: 10/12/2021      Next office visit with prescribing clinician: 1/7/2022     LRX:glipizide-hx provider   LRX:8/10/2020-hx provider       No PA has been received for either medication, no prior PA approvals/denials for meds in chart.    Consuelo Ocasio MA  11/11/21, 12:25 CST

## 2021-11-16 ENCOUNTER — TELEPHONE (OUTPATIENT)
Dept: OTOLARYNGOLOGY | Facility: CLINIC | Age: 81
End: 2021-11-16

## 2021-11-16 NOTE — PROGRESS NOTES
Subjective    Mr. Acosta is 81 y.o. male    Chief Complaint: Erectile Dysfunction    History of Present Illness    81-year-old male established patient with history of coronary artery disease, diabetes, hyperlipidemia, hypertension and history of enlarged prostate found to have prostatic oozing is likely source of prior hematuria here for new problem of worsening erectile dysfunction refractory to all PDE inhibitor therapy, vacuum pump, etc.  He is now off his Plavix.  Previous cystoscopy showed lateral lobe hypertrophy and elevated bladder neck with visually obstructing lateral lobes and prostatic oozing.  He was placed on finasteride.   He has watched my webinar's and erectile dysfunction and decided he would like to proceed with Coloplast Titan touch three-piece inflatable penile implant.        The following portions of the patient's history were reviewed and updated as appropriate: allergies, current medications, past family history, past medical history, past social history, past surgical history and problem list.    Review of Systems      Current Outpatient Medications:   •  aspirin (aspirin) 81 MG EC tablet, Take 1 tablet by mouth Daily., Disp: 30 tablet, Rfl: 11  •  azelastine (ASTELIN) 0.1 % nasal spray, 2 sprays into the nostril(s) as directed by provider 2 (Two) Times a Day. Use in each nostril as directed, Disp: 30 mL, Rfl: 2  •  budesonide-formoterol (Symbicort) 160-4.5 MCG/ACT inhaler, Inhale 2 puffs 2 (two) times a day., Disp: , Rfl:   •  Dupilumab 300 MG/2ML solution prefilled syringe, Inject  under the skin into the appropriate area as directed Every 14 (Fourteen) Days., Disp: , Rfl:   •  finasteride (PROSCAR) 5 MG tablet, Take 1 tablet by mouth Daily., Disp: 30 tablet, Rfl: 11  •  fluticasone (FLONASE) 50 MCG/ACT nasal spray, 1 spray into the nostril(s) as directed by provider Daily., Disp: 1 bottle, Rfl: 11  •  glipizide (GLUCOTROL) 10 MG tablet, Take 1 tablet by mouth 2 (Two) Times a Day  Before Meals., Disp: 180 tablet, Rfl: 0  •  glucose blood test strip, 1 each by Other route 3 (Three) Times a Day As Needed (hypoglycemia). Use as instructed One Touch Ultra, Disp: 100 each, Rfl: 11  •  insulin aspart (NovoLOG FlexPen) 100 UNIT/ML solution pen-injector sc pen, Inject 5 Units under the skin into the appropriate area as directed 3 (Three) Times a Day With Meals., Disp: 5 pen, Rfl: 2  •  Insulin Glargine (Lantus SoloStar) 100 UNIT/ML injection pen, Inject 20 Units under the skin into the appropriate area as directed As Needed., Disp: , Rfl:   •  levalbuterol (Xopenex HFA) 45 MCG/ACT inhaler, Inhale 1-2 puffs Every 4 (Four) Hours As Needed for Wheezing or Shortness of Air., Disp: 15 g, Rfl: 11  •  lisinopril (PRINIVIL,ZESTRIL) 10 MG tablet, Take 1 tablet by mouth Daily., Disp: 90 tablet, Rfl: 1  •  Mesalamine 4 GM/60ML SF enema, Insert  into the rectum Daily., Disp: , Rfl:   •  metoprolol tartrate (LOPRESSOR) 25 MG tablet, Take 1 tablet by mouth 2 (Two) Times a Day., Disp: 180 tablet, Rfl: 0  •  nitroglycerin (NITROSTAT) 0.4 MG SL tablet, 1 under the tongue as needed for angina, may repeat q5mins for up three doses, Disp: 25 tablet, Rfl: 1  •  ondansetron ODT (ZOFRAN-ODT) 4 MG disintegrating tablet, Place 1 tablet on the tongue Every 8 (Eight) Hours As Needed for Nausea., Disp: 10 tablet, Rfl: 0  •  oxyCODONE-acetaminophen (PERCOCET) 5-325 MG per tablet, Take 1 tablet by mouth Every 6 (Six) Hours As Needed for Severe Pain ., Disp: 12 tablet, Rfl: 0  •  predniSONE (DELTASONE) 10 MG tablet, Take 1 tablet by mouth 2 (two) times a day., Disp: 60 tablet, Rfl: 0  •  rosuvastatin (CRESTOR) 40 MG tablet, Take 1 tablet by mouth Daily., Disp: 90 tablet, Rfl: 3  •  triamcinolone (KENALOG) 0.1 % cream, Apply  topically to the appropriate area as directed 2 (Two) Times a Day., Disp: , Rfl:   •  Ustekinumab (STELARA) 90 MG/ML solution prefilled syringe Injection, Inject 90 mg under the skin into the appropriate area  "as directed Every 28 (Twenty-Eight) Days., Disp: 1 mL, Rfl: 6    Past Medical History:   Diagnosis Date   • Asthma    • Coronary artery disease    • Diabetes mellitus (HCC)    • Hyperlipidemia    • Hypertension    • Sleep apnea     cpap at hs       Past Surgical History:   Procedure Laterality Date   • BACK SURGERY     • CARDIAC CATHETERIZATION      stents x 6   • CATARACT EXTRACTION     • COLONOSCOPY     • COLONOSCOPY N/A 6/4/2021    Procedure: COLONOSCOPY WITH ANESTHESIA;  Surgeon: Zachariah Menjivar DO;  Location: [facility] ENDOSCOPY;  Service: Gastroenterology;  Laterality: N/A;  pre hx ulcerative colitis  post ulcerative colitis  dr collins gusman   • CORONARY ANGIOPLASTY WITH STENT PLACEMENT     • HIP SURGERY Right     total hip       Social History     Socioeconomic History   • Marital status:    Tobacco Use   • Smoking status: Never Smoker   • Smokeless tobacco: Never Used   Vaping Use   • Vaping Use: Never used   Substance and Sexual Activity   • Alcohol use: Never   • Drug use: Never   • Sexual activity: Defer       Family History   Problem Relation Age of Onset   • Colon cancer Brother    • Colon polyps Neg Hx    • Esophageal cancer Neg Hx        Objective    Temp 97.6 °F (36.4 °C)   Ht 172.7 cm (68\")   Wt 102 kg (225 lb 9.6 oz)   BMI 34.30 kg/m²     Physical Exam  Penis and testicles are normal.      Results for orders placed or performed in visit on 11/22/21   POC Urinalysis Dipstick, Multipro    Specimen: Urine   Result Value Ref Range    Color Yellow Yellow, Straw, Dark Yellow, Vicky    Clarity, UA Clear Clear    Glucose,  mg/dL (A) Negative, 1000 mg/dL (3+) mg/dL    Bilirubin Small (1+) (A) Negative    Ketones, UA 15 mg/dL (A) Negative    Specific Gravity  1.030 1.005 - 1.030    Blood, UA Large (A) Negative    pH, Urine 5.0 5.0 - 8.0    Protein,  mg/dL (A) Negative mg/dL    Urobilinogen, UA Normal Normal    Nitrite, UA Negative Negative    Leukocytes Negative Negative "     Assessment and Plan    Diagnoses and all orders for this visit:    1. Erectile dysfunction, unspecified erectile dysfunction type (Primary)  -     POC Urinalysis Dipstick, Multipro  -     Case Request; Standing  -     COVID PRE-OP / PRE-PROCEDURE SCREENING ORDER (NO ISOLATION) - Swab, Nasopharynx; Future  -     CBC (No Diff); Future  -     Basic Metabolic Panel; Future  -     Case Request    2. Gross hematuria    3. Benign prostatic hyperplasia (BPH) with straining on urination    4. Essential hypertension    5. Type 2 diabetes mellitus without complication, unspecified whether long term insulin use (HCC)    Other orders  -     Follow Anesthesia Guidelines / Standing Orders; Future  -     Obtain Informed Consent; Future  -     Provide NPO Instructions to Patient; Future  -     Chlorhexidine Skin Prep; Future      Worsening organic erectile dysfunction refractory to oral PDE inhibitor therapy.  We discussed other treatment options including penile injections, intraurethral medications, and penile implant.  Patient does not want to do penile injections.  He would like to schedule three-piece inflatable.  He will check with his cardiologist to see if he can hold his aspirin preoperatively.  We discussed risk of penile implant including but not limited to infection, bleeding, need for additional procedures, injury to urethra and/or adjacent structures, chronic pain, device malfunction and/or device migration, complications of anesthesia.  Patient voices understanding and provided informed consent to proceed with three-piece inflatable penile implant 12/13/2021.      This document has been signed by ANTHONY Tellez MD on November 22, 2021 18:26 CST

## 2021-11-19 ENCOUNTER — TELEPHONE (OUTPATIENT)
Dept: GASTROENTEROLOGY | Facility: CLINIC | Age: 81
End: 2021-11-19

## 2021-11-19 NOTE — TELEPHONE ENCOUNTER
Office visit dated 11/16/21-Dr Jeremiah Acosta diagnosis of Ulcerative Colitis 1997        Current Assessment & Plan   1. IBD Management: He is doing okay with mild complaints of diarrhea and hematochezia when off of Prednisone. I suspect that his level is still low given that dose adjustment was not possible earlier this year. Continue Stelara q8 weeks for now, will plan to repeat Stelara infusion pending insurance approval and will attempt to increase interval to q6 weeks. Will also change to budesonide to see if we can get him on a bowel-specific steroid. If insurance will not approve stelara dose optimization, consider restarting Entyvio. If insurance will not approve budesonide, ok to stay on 5mg prednisone as this is very low dose. He is not an ideal candidate for Xeljanz given age and cardiac disease.  2. Objective monitoring:  a. Endoscopy: Last 7/2021, repeat pending clinical course. If >8y of disease then repeat q2 years. If has concurrent PSC, then repeat every year.   b. Labs/stool testing: CBC, CMP, CRP, Ferritin, Hepatitis B Pnl, Quant Gold TB, Sed rate, TIBC, Vit B12, and Vit D. Stelara level.   i. Imaging: None  3. Diet: Per dieticians   4. PMH/other issues: Diabetes, Cardiac MI 4/2020.       Initiation of budesonide

## 2021-11-22 ENCOUNTER — OFFICE VISIT (OUTPATIENT)
Dept: UROLOGY | Facility: CLINIC | Age: 81
End: 2021-11-22

## 2021-11-22 VITALS — HEIGHT: 68 IN | TEMPERATURE: 97.6 F | BODY MASS INDEX: 34.19 KG/M2 | WEIGHT: 225.6 LBS

## 2021-11-22 DIAGNOSIS — E11.9 TYPE 2 DIABETES MELLITUS WITHOUT COMPLICATION, UNSPECIFIED WHETHER LONG TERM INSULIN USE (HCC): ICD-10-CM

## 2021-11-22 DIAGNOSIS — N52.9 ERECTILE DYSFUNCTION, UNSPECIFIED ERECTILE DYSFUNCTION TYPE: Primary | ICD-10-CM

## 2021-11-22 DIAGNOSIS — I10 ESSENTIAL HYPERTENSION: ICD-10-CM

## 2021-11-22 DIAGNOSIS — N40.1 BENIGN PROSTATIC HYPERPLASIA (BPH) WITH STRAINING ON URINATION: ICD-10-CM

## 2021-11-22 DIAGNOSIS — R39.16 BENIGN PROSTATIC HYPERPLASIA (BPH) WITH STRAINING ON URINATION: ICD-10-CM

## 2021-11-22 DIAGNOSIS — R31.0 GROSS HEMATURIA: ICD-10-CM

## 2021-11-22 LAB
BILIRUB BLD-MCNC: ABNORMAL MG/DL
CLARITY, POC: CLEAR
COLOR UR: YELLOW
GLUCOSE UR STRIP-MCNC: ABNORMAL MG/DL
KETONES UR QL: ABNORMAL
LEUKOCYTE EST, POC: NEGATIVE
NITRITE UR-MCNC: NEGATIVE MG/ML
PH UR: 5 [PH] (ref 5–8)
PROT UR STRIP-MCNC: ABNORMAL MG/DL
RBC # UR STRIP: ABNORMAL /UL
SP GR UR: 1.03 (ref 1–1.03)
UROBILINOGEN UR QL: NORMAL

## 2021-11-22 PROCEDURE — 99214 OFFICE O/P EST MOD 30 MIN: CPT | Performed by: UROLOGY

## 2021-11-22 PROCEDURE — 81001 URINALYSIS AUTO W/SCOPE: CPT | Performed by: UROLOGY

## 2021-11-22 NOTE — PATIENT INSTRUCTIONS
"BMI for Adults  What is BMI?  Body mass index (BMI) is a number that is calculated from a person's weight and height. BMI can help estimate how much of a person's weight is composed of fat. BMI does not measure body fat directly. Rather, it is an alternative to procedures that directly measure body fat, which can be difficult and expensive.  BMI can help identify people who may be at higher risk for certain medical problems.  What are BMI measurements used for?  BMI is used as a screening tool to identify possible weight problems. It helps determine whether a person is obese, overweight, a healthy weight, or underweight.  BMI is useful for:  · Identifying a weight problem that may be related to a medical condition or may increase the risk for medical problems.  · Promoting changes, such as changes in diet and exercise, to help reach a healthy weight. BMI screening can be repeated to see if these changes are working.  How is BMI calculated?  BMI involves measuring your weight in relation to your height. Both height and weight are measured, and the BMI is calculated from those numbers. This can be done either in English ([place] or metric measurements. Note that charts and online BMI calculators are available to help you find your BMI quickly and easily without having to do these calculations yourself.  To calculate your BMI in English ([place] measurements:    1. Measure your weight in pounds (lb).  2. Multiply the number of pounds by 703.  ? For example, for a person who weighs 180 lb, multiply that number by 703, which equals 126,540.  3. Measure your height in inches. Then multiply that number by itself to get a measurement called \"inches squared.\"  ? For example, for a person who is 70 inches tall, the \"inches squared\" measurement is 70 inches x 70 inches, which equals 4,900 inches squared.  4. Divide the total from step 2 (number of lb x 703) by the total from step 3 (inches squared): 126,540 ÷ 4,900 = 25.8. This is " "your BMI.    To calculate your BMI in metric measurements:  1. Measure your weight in kilograms (kg).  2. Measure your height in meters (m). Then multiply that number by itself to get a measurement called \"meters squared.\"  ? For example, for a person who is 1.75 m tall, the \"meters squared\" measurement is 1.75 m x 1.75 m, which is equal to 3.1 meters squared.  3. Divide the number of kilograms (your weight) by the meters squared number. In this example: 70 ÷ 3.1 = 22.6. This is your BMI.  What do the results mean?  BMI charts are used to identify whether you are underweight, normal weight, overweight, or obese. The following guidelines will be used:  · Underweight: BMI less than 18.5.  · Normal weight: BMI between 18.5 and 24.9.  · Overweight: BMI between 25 and 29.9.  · Obese: BMI of 30 or above.  Keep these notes in mind:  · Weight includes both fat and muscle, so someone with a muscular build, such as an athlete, may have a BMI that is higher than 24.9. In cases like these, BMI is not an accurate measure of body fat.  · To determine if excess body fat is the cause of a BMI of 25 or higher, further assessments may need to be done by a health care provider.  · BMI is usually interpreted in the same way for men and women.  Where to find more information  For more information about BMI, including tools to quickly calculate your BMI, go to these websites:  · Centers for Disease Control and Prevention: www.cdc.gov  · American Heart Association: www.heart.org  · National Heart, Lung, and Blood [place]: www.nhlbi.nih.gov  Summary  · Body mass index (BMI) is a number that is calculated from a person's weight and height.  · BMI may help estimate how much of a person's weight is composed of fat. BMI can help identify those who may be at higher risk for certain medical problems.  · BMI can be measured using English measurements or metric measurements.  · BMI charts are used to identify whether you are underweight, normal " weight, overweight, or obese.  This information is not intended to replace advice given to you by your health care provider. Make sure you discuss any questions you have with your health care provider.  Document Revised: 09/09/2020 Document Reviewed: 07/17/2020  Elsevier Patient Education © 2021 [employer]

## 2021-11-23 ENCOUNTER — TELEPHONE (OUTPATIENT)
Dept: CARDIOLOGY | Facility: CLINIC | Age: 81
End: 2021-11-23

## 2021-11-29 RX ORDER — ROSUVASTATIN CALCIUM 40 MG/1
40 TABLET, COATED ORAL DAILY
Qty: 90 TABLET | Refills: 3 | Status: SHIPPED | OUTPATIENT
Start: 2021-11-29 | End: 2022-10-24 | Stop reason: SDUPTHER

## 2021-12-10 ENCOUNTER — LAB (OUTPATIENT)
Dept: LAB | Facility: HOSPITAL | Age: 81
End: 2021-12-10

## 2021-12-10 ENCOUNTER — PRE-ADMISSION TESTING (OUTPATIENT)
Dept: PREADMISSION TESTING | Facility: HOSPITAL | Age: 81
End: 2021-12-10

## 2021-12-10 VITALS
HEART RATE: 79 BPM | RESPIRATION RATE: 20 BRPM | HEIGHT: 69 IN | WEIGHT: 230.16 LBS | OXYGEN SATURATION: 98 % | DIASTOLIC BLOOD PRESSURE: 65 MMHG | SYSTOLIC BLOOD PRESSURE: 140 MMHG | BODY MASS INDEX: 34.09 KG/M2

## 2021-12-10 DIAGNOSIS — N52.9 ERECTILE DYSFUNCTION, UNSPECIFIED ERECTILE DYSFUNCTION TYPE: ICD-10-CM

## 2021-12-10 LAB — SARS-COV-2 ORF1AB RESP QL NAA+PROBE: NOT DETECTED

## 2021-12-10 PROCEDURE — 93005 ELECTROCARDIOGRAM TRACING: CPT | Performed by: UROLOGY

## 2021-12-10 PROCEDURE — U0005 INFEC AGEN DETEC AMPLI PROBE: HCPCS

## 2021-12-10 PROCEDURE — 80048 BASIC METABOLIC PNL TOTAL CA: CPT | Performed by: UROLOGY

## 2021-12-10 PROCEDURE — C9803 HOPD COVID-19 SPEC COLLECT: HCPCS

## 2021-12-10 PROCEDURE — U0004 COV-19 TEST NON-CDC HGH THRU: HCPCS

## 2021-12-10 PROCEDURE — 85027 COMPLETE CBC AUTOMATED: CPT | Performed by: UROLOGY

## 2021-12-10 PROCEDURE — 93010 ELECTROCARDIOGRAM REPORT: CPT | Performed by: INTERNAL MEDICINE

## 2021-12-10 NOTE — DISCHARGE INSTRUCTIONS
DAY OF SURGERY INSTRUCTIONS          ARRIVAL TIME: AS DIRECTED BY OFFICE    YOU MAY TAKE THE FOLLOWING MEDICATION(S) THE MORNING OF SURGERY WITH A SIP OF WATER: METOPROLOL, PERCOCET    DO NOT TAKE LISINOPRIL 24 HOURS PRIOR TO SURGERY    ALL OTHER HOME MEDICATIONS CHECK WITH YOUR DOCTOR    DO NOT TAKE ANY ERECTILE DYSFUNCTION MEDICATIONS (EX:  CIALIS, VIAGRA) 24 HOURS PRIOR TO SURGERY              MANAGING PAIN AFTER SURGERY    We know you are probably wondering what your pain will be like after surgery.  Following surgery it is unrealistic to expect you will not have pain.   Pain is how our bodies let us know that something is wrong or cautions us to be careful.  That said, our goal is to make your pain tolerable.    Methods we may use to treat your pain include (oral or IV medications, PCAs, epidurals, nerve blocks, etc.)   While some procedures require IV pain medications for a short time after surgery, transitioning to pain medications by mouth allows for better management of pain.   Your nurse will encourage you to take oral pain medications whenever possible.  IV medications work almost immediately, but only last a short while.  Taking medications by mouth allows for a more constant level of medication in your blood stream for a longer period of time.      Once your pain is out of control it is harder to get back under control.  It is important you are aware when your next dose of pain medication is due.  If you are admitted, your nurse may write the time of your next dose on the white board in your room to help you remember.      We are interested in your pain and encourage you to inform us about aggravating factors during your visit.   Many times a simple repositioning every few hours can make a big difference.    If your physician says it is okay, do not let your pain prevent you from getting out of bed. Be sure to call your nurse for assistance prior to getting up so you do not fall.      Before surgery,  please decide your tolerable pain goal.  These faces help describe the pain ratings we use on a 0-10 scale.   Be prepared to tell us your goal and whether or not you take pain or anxiety medications at home.      BEFORE YOU COME TO THE HOSPITAL  (Pre-op instructions)  • Do not eat, drink, smoke or chew gum after midnight the night before surgery.  This also includes no mints.  • Morning of surgery take only the medicines you have been instructed with a sip of water unless otherwise instructed  by your physician.  • Do not shave, wear makeup or dark nail polish.  • Remove all jewelry including rings.  • Leave anything you consider valuable at home.  • Leave your suitcase in the car until after your surgery.  • Bring the following with you if applicable:  o Picture ID and insurance, Medicare or Medicaid cards  o Co-pay/deductible required by insurance (cash, check, credit card)  o Copy of advance directive, living will or power-of- documents if not brought to Pre-work  o CPAP or BIPAP mask and tubing  o Relaxation aids ( book, magazine), etc.  o Hearing aids                                 ON THE DAY OF SURGERY  · On the day of surgery check in at registration located at the main entrance of the hospital. Only one family member or friend are allowed per patient.  ? You will be registered and given a beeper with instructions where to wait in the main lobby.  ? When your beeper lights up and vibrates a member of the Outpatient Surgery staff will meet you at the double doors under the stair steps and escort you to your preoperative room.   · You may have cloth compression devices placed on your legs. These help to prevent blood clots and reduce swelling in your legs.  · An IV may be inserted into one of your veins.  · In the operating room, you may be given one or more of the following:  ? A medicine to help you relax (sedative).  ? A medicine to numb the area (local anesthetic).  ? A medicine to make you fall  "asleep (general anesthetic).  ? A medicine that is injected into an area of your body to numb everything below the injection site (regional anesthetic).  · Your surgical site will be marked or identified.  · You may be given an antibiotic through your IV to help prevent infection.  Contact a health care provider if you:  · Develop a fever of more than 100.4°F (38°C) or other feelings of illness during the 48 hours before your surgery.  · Have symptoms that get worse.  Have questions or concerns about your surgery    General Anesthesia/Surgery, Adult  General anesthesia is the use of medicines to make a person \"go to sleep\" (unconscious) for a medical procedure. General anesthesia must be used for certain procedures, and is often recommended for procedures that:  · Last a long time.  · Require you to be still or in an unusual position.  · Are major and can cause blood loss.  The medicines used for general anesthesia are called general anesthetics. As well as making you unconscious for a certain amount of time, these medicines:  · Prevent pain.  · Control your blood pressure.  · Relax your muscles.  Tell a health care provider about:  · Any allergies you have.  · All medicines you are taking, including vitamins, herbs, eye drops, creams, and over-the-counter medicines.  · Any problems you or family members have had with anesthetic medicines.  · Types of anesthetics you have had in the past.  · Any blood disorders you have.  · Any surgeries you have had.  · Any medical conditions you have.  · Any recent upper respiratory, chest, or ear infections.  · Any history of:  ? Heart or lung conditions, such as heart failure, sleep apnea, asthma, or chronic obstructive pulmonary disease (COPD).  ?  service.  ? Depression or anxiety.  · Any tobacco or drug use, including marijuana or alcohol use.  · Whether you are pregnant or may be pregnant.  What are the risks?  Generally, this is a safe procedure. However, problems " may occur, including:  · Allergic reaction.  · Lung and heart problems.  · Inhaling food or liquid from the stomach into the lungs (aspiration).  · Nerve injury.  · Air in the bloodstream, which can lead to stroke.  · Extreme agitation or confusion (delirium) when you wake up from the anesthetic.  · Waking up during your procedure and being unable to move. This is rare.  These problems are more likely to develop if you are having a major surgery or if you have an advanced or serious medical condition. You can prevent some of these complications by answering all of your health care provider's questions thoroughly and by following all instructions before your procedure.  General anesthesia can cause side effects, including:  · Nausea or vomiting.  · A sore throat from the breathing tube.  · Hoarseness.  · Wheezing or coughing.  · Shaking chills.  · Tiredness.  · Body aches.  · Anxiety.  · Sleepiness or drowsiness.  · Confusion or agitation.  RISKS AND COMPLICATIONS OF SURGERY  Your health care provider will discuss possible risks and complications with you before surgery. Common risks and complications include:    · Problems due to the use of anesthetics.  · Blood loss and replacement (does not apply to minor surgical procedures).  · Temporary increase in pain due to surgery.  · Uncorrected pain or problems that the surgery was meant to correct.  · Infection.  · New damage.    What happens before the procedure?    Medicines  Ask your health care provider about:  · Changing or stopping your regular medicines. This is especially important if you are taking diabetes medicines or blood thinners.  · Taking medicines such as aspirin and ibuprofen. These medicines can thin your blood. Do not take these medicines unless your health care provider tells you to take them.  · Taking over-the-counter medicines, vitamins, herbs, and supplements. Do not take these during the week before your procedure unless your health care  provider approves them.  General instructions  · Starting 3-6 weeks before the procedure, do not use any products that contain nicotine or tobacco, such as cigarettes and e-cigarettes. If you need help quitting, ask your health care provider.  · If you brush your teeth on the morning of the procedure, make sure to spit out all of the toothpaste.  · Tell your health care provider if you become ill or develop a cold, cough, or fever.  · If instructed by your health care provider, bring your sleep apnea device with you on the day of your surgery (if applicable).  · Ask your health care provider if you will be going home the same day, the following day, or after a longer hospital stay.  ? Plan to have someone take you home from the hospital or clinic.  ? Plan to have a responsible adult care for you for at least 24 hours after you leave the hospital or clinic. This is important.  What happens during the procedure?  · You will be given anesthetics through both of the following:  ? A mask placed over your nose and mouth.  ? An IV in one of your veins.  · You may receive a medicine to help you relax (sedative).  · After you are unconscious, a breathing tube may be inserted down your throat to help you breathe. This will be removed before you wake up.  · An anesthesia specialist will stay with you throughout your procedure. He or she will:  ? Keep you comfortable and safe by continuing to give you medicines and adjusting the amount of medicine that you get.  ? Monitor your blood pressure, pulse, and oxygen levels to make sure that the anesthetics do not cause any problems.  The procedure may vary among health care providers and hospitals.  What happens after the procedure?  · Your blood pressure, temperature, heart rate, breathing rate, and blood oxygen level will be monitored until the medicines you were given have worn off.  · You will wake up in a recovery area. You may wake up slowly.  · If you feel anxious or agitated,  you may be given medicine to help you calm down.  · If you will be going home the same day, your health care provider may check to make sure you can walk, drink, and urinate.  · Your health care provider will treat any pain or side effects you have before you go home.  · Do not drive for 24 hours if you were given a sedative.  Summary  · General anesthesia is used to keep you still and prevent pain during a procedure.  · It is important to tell your healthcare provider about your medical history and any surgeries you have had, and previous experience with anesthesia.  · Follow your healthcare provider’s instructions about when to stop eating, drinking, or taking certain medicines before your procedure.  · Plan to have someone take you home from the hospital or clinic.  This information is not intended to replace advice given to you by your health care provider. Make sure you discuss any questions you have with your health care provider.  Document Released: 03/26/2009 Document Revised: 08/03/2018 Document Reviewed: 08/03/2018  [employer] Interactive Patient Education © 2019 [employer]      Fall Prevention in Hospitals, Adult  As a hospital patient, your condition and the treatments you receive can increase your risk for falls. Some additional risk factors for falls in a hospital include:  · Being in an unfamiliar environment.  · Being on bed rest.  · Your surgery.  · Taking certain medicines.  · Your tubing requirements, such as intravenous (IV) therapy or catheters.  It is important that you learn how to decrease fall risks while at the hospital. Below are important tips that can help prevent falls.  SAFETY TIPS FOR PREVENTING FALLS  Talk about your risk of falling.  · Ask your health care provider why you are at risk for falling. Is it your medicine, illness, tubing placement, or something else?  · Make a plan with your health care provider to keep you safe from falls.  · Ask your health care provider or  pharmacist about side effects of your medicines. Some medicines can make you dizzy or affect your coordination.  Ask for help.  · Ask for help before getting out of bed. You may need to press your call button.  · Ask for assistance in getting safely to the toilet.  · Ask for a walker or cane to be put at your bedside. Ask that most of the side rails on your bed be placed up before your health care provider leaves the room.  · Ask family or friends to sit with you.  · Ask for things that are out of your reach, such as your glasses, hearing aids, telephone, bedside table, or call button.  Follow these tips to avoid falling:  · Stay lying or seated, rather than standing, while waiting for help.  · Wear rubber-soled slippers or shoes whenever you walk in the hospital.  · Avoid quick, sudden movements.  ¨ Change positions slowly.  ¨ Sit on the side of your bed before standing.  ¨ Stand up slowly and wait before you start to walk.  · Let your health care provider know if there is a spill on the floor.  · Pay careful attention to the medical equipment, electrical cords, and tubes around you.  · When you need help, use your call button by your bed or in the bathroom. Wait for one of your health care providers to help you.  · If you feel dizzy or unsure of your footing, return to bed and wait for assistance.  · Avoid being distracted by the TV, telephone, or another person in your room.  · Do not lean or support yourself on rolling objects, such as IV poles or bedside tables.     This information is not intended to replace advice given to you by your health care provider. Make sure you discuss any questions you have with your health care provider.     Document Released: 12/15/2001 Document Revised: 01/08/2016 Document Reviewed: 08/25/2013  [employer] Interactive Patient Education ©2016 [employer]   PATIENT/FAMILY/RESPONSIBLE PARTY VERBALIZES UNDERSTANDING OF ABOVE EDUCATION.  COPY OF PAIN SCALE GIVEN AND REVIEWED WITH  VERBALIZED UNDERSTANDING.

## 2021-12-11 LAB
QT INTERVAL: 412 MS
QTC INTERVAL: 428 MS

## 2021-12-13 ENCOUNTER — HOSPITAL ENCOUNTER (OUTPATIENT)
Facility: HOSPITAL | Age: 81
Setting detail: HOSPITAL OUTPATIENT SURGERY
Discharge: HOME | End: 2021-12-13
Attending: UROLOGY | Admitting: UROLOGY

## 2021-12-13 ENCOUNTER — ANESTHESIA (OUTPATIENT)
Dept: PERIOP | Facility: HOSPITAL | Age: 81
End: 2021-12-13

## 2021-12-13 ENCOUNTER — ANESTHESIA EVENT (OUTPATIENT)
Dept: PERIOP | Facility: HOSPITAL | Age: 81
End: 2021-12-13

## 2021-12-13 VITALS
DIASTOLIC BLOOD PRESSURE: 84 MMHG | HEART RATE: 55 BPM | RESPIRATION RATE: 16 BRPM | SYSTOLIC BLOOD PRESSURE: 150 MMHG | TEMPERATURE: 97 F | OXYGEN SATURATION: 99 %

## 2021-12-13 DIAGNOSIS — N52.9 ERECTILE DYSFUNCTION, UNSPECIFIED ERECTILE DYSFUNCTION TYPE: ICD-10-CM

## 2021-12-13 LAB
GLUCOSE BLDC GLUCOMTR-MCNC: 79 MG/DL (ref 70–130)
GLUCOSE BLDC GLUCOMTR-MCNC: 91 MG/DL (ref 70–130)

## 2021-12-13 PROCEDURE — 25010000002 FENTANYL CITRATE (PF) 100 MCG/2ML SOLUTION: Performed by: NURSE ANESTHETIST, CERTIFIED REGISTERED

## 2021-12-13 PROCEDURE — C1813 PROSTHESIS, PENILE, INFLATAB: HCPCS | Performed by: UROLOGY

## 2021-12-13 PROCEDURE — 25010000002 GENTAMICIN PER 80 MG: Performed by: UROLOGY

## 2021-12-13 PROCEDURE — 25010000002 ONDANSETRON PER 1 MG: Performed by: NURSE ANESTHETIST, CERTIFIED REGISTERED

## 2021-12-13 PROCEDURE — 54405 INSERT MULTI-COMP PENIS PROS: CPT | Performed by: UROLOGY

## 2021-12-13 PROCEDURE — C1889 IMPLANT/INSERT DEVICE, NOC: HCPCS | Performed by: UROLOGY

## 2021-12-13 PROCEDURE — 0 CEFAZOLIN PER 500 MG: Performed by: NURSE ANESTHETIST, CERTIFIED REGISTERED

## 2021-12-13 PROCEDURE — 25010000002 VANCOMYCIN 1 G RECONSTITUTED SOLUTION 1 EACH VIAL: Performed by: UROLOGY

## 2021-12-13 PROCEDURE — 82962 GLUCOSE BLOOD TEST: CPT

## 2021-12-13 PROCEDURE — 25010000002 PROPOFOL 10 MG/ML EMULSION: Performed by: NURSE ANESTHETIST, CERTIFIED REGISTERED

## 2021-12-13 DEVICE — CL RESERVOIR
Type: IMPLANTABLE DEVICE | Site: PENIS | Status: FUNCTIONAL
Brand: TITAN

## 2021-12-13 DEVICE — TITAN® TOUCH SCROTAL ZERO DEGREE ANGLE CYLINDER SET WITH PUMP
Type: IMPLANTABLE DEVICE | Site: PENIS | Status: FUNCTIONAL
Brand: TITAN

## 2021-12-13 DEVICE — ASSEMBLY KIT
Type: IMPLANTABLE DEVICE | Site: PENIS | Status: FUNCTIONAL
Brand: TITAN

## 2021-12-13 RX ORDER — FENTANYL CITRATE 50 UG/ML
25 INJECTION, SOLUTION INTRAMUSCULAR; INTRAVENOUS
Status: DISCONTINUED | OUTPATIENT
Start: 2021-12-13 | End: 2021-12-13 | Stop reason: HOSPADM

## 2021-12-13 RX ORDER — ONDANSETRON 4 MG/1
4 TABLET, ORALLY DISINTEGRATING ORAL EVERY 6 HOURS PRN
Qty: 6 TABLET | Refills: 1 | Status: SHIPPED | OUTPATIENT
Start: 2021-12-13 | End: 2021-12-27

## 2021-12-13 RX ORDER — FENTANYL CITRATE 50 UG/ML
INJECTION, SOLUTION INTRAMUSCULAR; INTRAVENOUS AS NEEDED
Status: DISCONTINUED | OUTPATIENT
Start: 2021-12-13 | End: 2021-12-13 | Stop reason: SURG

## 2021-12-13 RX ORDER — NALOXONE HCL 0.4 MG/ML
0.4 VIAL (ML) INJECTION AS NEEDED
Status: DISCONTINUED | OUTPATIENT
Start: 2021-12-13 | End: 2021-12-13 | Stop reason: HOSPADM

## 2021-12-13 RX ORDER — LIDOCAINE HYDROCHLORIDE 10 MG/ML
0.5 INJECTION, SOLUTION EPIDURAL; INFILTRATION; INTRACAUDAL; PERINEURAL ONCE AS NEEDED
Status: DISCONTINUED | OUTPATIENT
Start: 2021-12-13 | End: 2021-12-13 | Stop reason: HOSPADM

## 2021-12-13 RX ORDER — OXYCODONE AND ACETAMINOPHEN 10; 325 MG/1; MG/1
1 TABLET ORAL ONCE AS NEEDED
Status: COMPLETED | OUTPATIENT
Start: 2021-12-13 | End: 2021-12-13

## 2021-12-13 RX ORDER — OXYCODONE AND ACETAMINOPHEN 7.5; 325 MG/1; MG/1
2 TABLET ORAL EVERY 4 HOURS PRN
Status: DISCONTINUED | OUTPATIENT
Start: 2021-12-13 | End: 2021-12-13 | Stop reason: HOSPADM

## 2021-12-13 RX ORDER — SODIUM CHLORIDE 0.9 % (FLUSH) 0.9 %
3 SYRINGE (ML) INJECTION AS NEEDED
Status: DISCONTINUED | OUTPATIENT
Start: 2021-12-13 | End: 2021-12-13 | Stop reason: HOSPADM

## 2021-12-13 RX ORDER — SODIUM CHLORIDE 0.9 % (FLUSH) 0.9 %
3-10 SYRINGE (ML) INJECTION AS NEEDED
Status: DISCONTINUED | OUTPATIENT
Start: 2021-12-13 | End: 2021-12-13 | Stop reason: HOSPADM

## 2021-12-13 RX ORDER — BUDESONIDE 9 MG/1
9 TABLET, FILM COATED, EXTENDED RELEASE ORAL DAILY
COMMUNITY
End: 2022-01-07 | Stop reason: SDUPTHER

## 2021-12-13 RX ORDER — MAGNESIUM HYDROXIDE 1200 MG/15ML
LIQUID ORAL AS NEEDED
Status: DISCONTINUED | OUTPATIENT
Start: 2021-12-13 | End: 2021-12-13 | Stop reason: HOSPADM

## 2021-12-13 RX ORDER — DOCUSATE SODIUM 100 MG/1
100 CAPSULE, LIQUID FILLED ORAL 2 TIMES DAILY
Qty: 60 CAPSULE | Refills: 0 | Status: SHIPPED | OUTPATIENT
Start: 2021-12-13 | End: 2022-05-09

## 2021-12-13 RX ORDER — HYDROCODONE BITARTRATE AND ACETAMINOPHEN 7.5; 325 MG/1; MG/1
1 TABLET ORAL EVERY 4 HOURS PRN
Qty: 18 TABLET | Refills: 0 | Status: SHIPPED | OUTPATIENT
Start: 2021-12-13 | End: 2022-05-09

## 2021-12-13 RX ORDER — ACETAMINOPHEN 500 MG
1000 TABLET ORAL ONCE
Status: COMPLETED | OUTPATIENT
Start: 2021-12-13 | End: 2021-12-13

## 2021-12-13 RX ORDER — SODIUM CHLORIDE, SODIUM LACTATE, POTASSIUM CHLORIDE, CALCIUM CHLORIDE 600; 310; 30; 20 MG/100ML; MG/100ML; MG/100ML; MG/100ML
100 INJECTION, SOLUTION INTRAVENOUS CONTINUOUS
Status: DISCONTINUED | OUTPATIENT
Start: 2021-12-13 | End: 2021-12-13 | Stop reason: HOSPADM

## 2021-12-13 RX ORDER — LIDOCAINE HYDROCHLORIDE 20 MG/ML
INJECTION, SOLUTION EPIDURAL; INFILTRATION; INTRACAUDAL; PERINEURAL AS NEEDED
Status: DISCONTINUED | OUTPATIENT
Start: 2021-12-13 | End: 2021-12-13 | Stop reason: SURG

## 2021-12-13 RX ORDER — NAPROXEN 250 MG/1
250 TABLET ORAL 2 TIMES DAILY WITH MEALS
Qty: 60 TABLET | Refills: 0 | Status: SHIPPED | OUTPATIENT
Start: 2021-12-13 | End: 2022-11-09 | Stop reason: ALTCHOICE

## 2021-12-13 RX ORDER — HYDROCODONE BITARTRATE AND ACETAMINOPHEN 7.5; 325 MG/1; MG/1
1 TABLET ORAL ONCE AS NEEDED
Status: DISCONTINUED | OUTPATIENT
Start: 2021-12-13 | End: 2021-12-13 | Stop reason: HOSPADM

## 2021-12-13 RX ORDER — SUCCINYLCHOLINE/SOD CL,ISO/PF 200MG/10ML
SYRINGE (ML) INTRAVENOUS AS NEEDED
Status: DISCONTINUED | OUTPATIENT
Start: 2021-12-13 | End: 2021-12-13 | Stop reason: SURG

## 2021-12-13 RX ORDER — LABETALOL HYDROCHLORIDE 5 MG/ML
5 INJECTION, SOLUTION INTRAVENOUS
Status: DISCONTINUED | OUTPATIENT
Start: 2021-12-13 | End: 2021-12-13 | Stop reason: HOSPADM

## 2021-12-13 RX ORDER — SODIUM CHLORIDE 0.9 % (FLUSH) 0.9 %
3 SYRINGE (ML) INJECTION EVERY 12 HOURS SCHEDULED
Status: DISCONTINUED | OUTPATIENT
Start: 2021-12-13 | End: 2021-12-13 | Stop reason: HOSPADM

## 2021-12-13 RX ORDER — BUPIVACAINE HYDROCHLORIDE 5 MG/ML
INJECTION, SOLUTION PERINEURAL AS NEEDED
Status: DISCONTINUED | OUTPATIENT
Start: 2021-12-13 | End: 2021-12-13 | Stop reason: HOSPADM

## 2021-12-13 RX ORDER — FLUMAZENIL 0.1 MG/ML
0.2 INJECTION INTRAVENOUS AS NEEDED
Status: DISCONTINUED | OUTPATIENT
Start: 2021-12-13 | End: 2021-12-13 | Stop reason: HOSPADM

## 2021-12-13 RX ORDER — CEFAZOLIN SODIUM 1 G/3ML
INJECTION, POWDER, FOR SOLUTION INTRAMUSCULAR; INTRAVENOUS AS NEEDED
Status: DISCONTINUED | OUTPATIENT
Start: 2021-12-13 | End: 2021-12-13 | Stop reason: SURG

## 2021-12-13 RX ORDER — ONDANSETRON 2 MG/ML
4 INJECTION INTRAMUSCULAR; INTRAVENOUS ONCE AS NEEDED
Status: DISCONTINUED | OUTPATIENT
Start: 2021-12-13 | End: 2021-12-13 | Stop reason: HOSPADM

## 2021-12-13 RX ORDER — BACITRACIN ZINC 500 [USP'U]/G
OINTMENT TOPICAL AS NEEDED
Status: DISCONTINUED | OUTPATIENT
Start: 2021-12-13 | End: 2021-12-13 | Stop reason: HOSPADM

## 2021-12-13 RX ORDER — PROPOFOL 10 MG/ML
VIAL (ML) INTRAVENOUS AS NEEDED
Status: DISCONTINUED | OUTPATIENT
Start: 2021-12-13 | End: 2021-12-13 | Stop reason: SURG

## 2021-12-13 RX ORDER — SODIUM CHLORIDE, SODIUM LACTATE, POTASSIUM CHLORIDE, CALCIUM CHLORIDE 600; 310; 30; 20 MG/100ML; MG/100ML; MG/100ML; MG/100ML
1000 INJECTION, SOLUTION INTRAVENOUS CONTINUOUS
Status: DISCONTINUED | OUTPATIENT
Start: 2021-12-13 | End: 2021-12-13 | Stop reason: HOSPADM

## 2021-12-13 RX ORDER — SULFAMETHOXAZOLE AND TRIMETHOPRIM 800; 160 MG/1; MG/1
1 TABLET ORAL 2 TIMES DAILY
Qty: 28 TABLET | Refills: 0 | Status: SHIPPED | OUTPATIENT
Start: 2021-12-13 | End: 2021-12-27

## 2021-12-13 RX ORDER — ROCURONIUM BROMIDE 10 MG/ML
INJECTION, SOLUTION INTRAVENOUS AS NEEDED
Status: DISCONTINUED | OUTPATIENT
Start: 2021-12-13 | End: 2021-12-13 | Stop reason: SURG

## 2021-12-13 RX ORDER — ONDANSETRON 2 MG/ML
INJECTION INTRAMUSCULAR; INTRAVENOUS AS NEEDED
Status: DISCONTINUED | OUTPATIENT
Start: 2021-12-13 | End: 2021-12-13 | Stop reason: SURG

## 2021-12-13 RX ORDER — PHENYLEPHRINE HCL IN 0.9% NACL 1 MG/10 ML
SYRINGE (ML) INTRAVENOUS AS NEEDED
Status: DISCONTINUED | OUTPATIENT
Start: 2021-12-13 | End: 2021-12-13 | Stop reason: SURG

## 2021-12-13 RX ADMIN — PROPOFOL 170 MG: 10 INJECTION, EMULSION INTRAVENOUS at 12:45

## 2021-12-13 RX ADMIN — Medication 200 MCG: at 13:26

## 2021-12-13 RX ADMIN — LIDOCAINE HYDROCHLORIDE 100 MG: 20 INJECTION, SOLUTION EPIDURAL; INFILTRATION; INTRACAUDAL; PERINEURAL at 12:45

## 2021-12-13 RX ADMIN — SODIUM CHLORIDE, POTASSIUM CHLORIDE, SODIUM LACTATE AND CALCIUM CHLORIDE 1000 ML: 600; 310; 30; 20 INJECTION, SOLUTION INTRAVENOUS at 10:44

## 2021-12-13 RX ADMIN — ONDANSETRON 4 MG: 2 INJECTION INTRAMUSCULAR; INTRAVENOUS at 13:53

## 2021-12-13 RX ADMIN — ROCURONIUM BROMIDE 5 MG: 10 INJECTION INTRAVENOUS at 12:45

## 2021-12-13 RX ADMIN — ACETAMINOPHEN 1000 MG: 500 TABLET, FILM COATED ORAL at 12:23

## 2021-12-13 RX ADMIN — SODIUM CHLORIDE, POTASSIUM CHLORIDE, SODIUM LACTATE AND CALCIUM CHLORIDE: 600; 310; 30; 20 INJECTION, SOLUTION INTRAVENOUS at 12:29

## 2021-12-13 RX ADMIN — PROPOFOL 30 MG: 10 INJECTION, EMULSION INTRAVENOUS at 13:07

## 2021-12-13 RX ADMIN — FENTANYL CITRATE 100 MCG: 50 INJECTION, SOLUTION INTRAMUSCULAR; INTRAVENOUS at 13:07

## 2021-12-13 RX ADMIN — GENTAMICIN SULFATE 480 MG: 40 INJECTION, SOLUTION INTRAMUSCULAR; INTRAVENOUS at 12:09

## 2021-12-13 RX ADMIN — CEFAZOLIN 2 G: 330 INJECTION, POWDER, FOR SOLUTION INTRAMUSCULAR; INTRAVENOUS at 12:50

## 2021-12-13 RX ADMIN — Medication 200 MCG: at 13:36

## 2021-12-13 RX ADMIN — Medication 200 MCG: at 13:47

## 2021-12-13 RX ADMIN — SODIUM CHLORIDE, POTASSIUM CHLORIDE, SODIUM LACTATE AND CALCIUM CHLORIDE: 600; 310; 30; 20 INJECTION, SOLUTION INTRAVENOUS at 13:50

## 2021-12-13 RX ADMIN — OXYCODONE AND ACETAMINOPHEN 1 TABLET: 325; 10 TABLET ORAL at 14:25

## 2021-12-13 RX ADMIN — Medication 160 MG: at 12:45

## 2021-12-13 NOTE — DISCHARGE INSTRUCTIONS
PATIENT/FAMILY/RESPONSIBLE PARTY VERBALIZES UNDERSTANDING OF ABOVE EDUCATION.  COPY OF PAIN SCALE GIVEN AND REVIEWED WITH VERBALIZED UNDERSTANDING.

## 2021-12-13 NOTE — ANESTHESIA PREPROCEDURE EVALUATION
Anesthesia Evaluation     Patient summary reviewed   no history of anesthetic complications:  NPO Solid Status: > 6 hours  NPO Liquid Status: > 6 hours           Airway   Mallampati: II  TM distance: >3 FB  Neck ROM: full  No difficulty expected  Dental - normal exam     Pulmonary    (+) asthma,sleep apnea on CPAP,   Cardiovascular   Exercise tolerance: good (4-7 METS)    (+) hypertension, past MI , CAD, CABG >6 Months, cardiac stents more than 12 months ago hyperlipidemia,   (-) dysrhythmias      Neuro/Psych  (-) seizures, CVA  GI/Hepatic/Renal/Endo    (+) obesity,   diabetes mellitus type 2 well controlled,     ROS Comment: UC    Musculoskeletal     Abdominal   (+) obese,    Substance History      OB/GYN          Other                          Anesthesia Plan    ASA 3     general     intravenous induction     Anesthetic plan, all risks, benefits, and alternatives have been provided, discussed and informed consent has been obtained with: patient.

## 2021-12-13 NOTE — ANESTHESIA PROCEDURE NOTES
Airway  Urgency: elective    Date/Time: 12/13/2021 12:46 PM  Airway not difficult    General Information and Staff    Patient location during procedure: OR  CRNA: Isaías Dye CRNA    Indications and Patient Condition  Indications for airway management: airway protection    Preoxygenated: yes  Mask difficulty assessment: 1 - vent by mask    Final Airway Details  Final airway type: endotracheal airway      Successful airway: ETT  Cuffed: yes   Successful intubation technique: direct laryngoscopy  Endotracheal tube insertion site: oral  Blade: Maximiliano  Blade size: 3.5  ETT size (mm): 7.5  Cormack-Lehane Classification: grade I - full view of glottis  Placement verified by: chest auscultation, capnometry and palpation of cuff   Cuff volume (mL): 7  Measured from: lips  ETT/EBT  to lips (cm): 21  Number of attempts at approach: 1  Assessment: lips, teeth, and gum same as pre-op and atraumatic intubation

## 2021-12-13 NOTE — OP NOTE
PENILE PROSTHESIS PLACEMENT  Procedure Note    Zachariah Acosta  12/13/2021    Pre-op Diagnosis:   Erectile dysfunction, unspecified erectile dysfunction type [N52.9]    Post-op Diagnosis:     Post-Op Diagnosis Codes:     * Erectile dysfunction, unspecified erectile dysfunction type [N52.9]    Procedure/CPT® Codes:  3 piece inflatable penile prosthesis placement    Procedure(s):  3-PIECE INFLATABLE PENILE PROSTHESIS PLACEMENT (COLOPLAST TITAN TOUCH)    Surgeon(s):  Jose Tellez MD    Anesthesia: General    Staff:   Circulator: Julieth Lopez RN  Scrub Person: Breanna Guadarrama  Assistant: Gerald Escudero    Indications for procedure:  81-year-old male with erectile dysfunction refractory medical therapy and history of Peyronie's disease presenting for three-piece inflatable penile implant    Procedure details:  After the patient was correct identified, he was given IV antibiotics and brought to the operating placed on the operative table.  After adequate induction of general anesthesia, he is placed into the supine frog-leg position and his genitalia were sterilely prepped and draped.  A final timeout was performed.  I placed a Haines catheter.  I made a high transverse scrotal incision and a retractor was placed.  Corpora cavernosa were identified bilaterally.  Stay sutures of 2-0 PDS were placed vertically between which corporotomies were made with a knife and sequentially dilated starting with Metzenbaum scissors followed by Alfredo dilators 8 mm to 12 mm.  Measurements were taken both in the left and right side.  The left corpora measured 19 cm and the right corpora measured 20 cm.  Corpora were irrigated and there was no evidence of urethral injury.  I then used the 125 cc reservoir.  This was prepared in the standard fashion dipped in antibiotic solution.  I placed the reservoir in the left retropubic space via the penoscrotal incision on the left.  Transversalis fascia was pierced and a space  behind the pubic bone was created bluntly.  The reservoir was placed in the space and filled with 100 cc of filling solution.  The Coloplast Titan touch penoscrotal preconnect 18 cm cylinders were opened on the field.  A 1 cm rear-tip extender was used on the left and a 2 cm rear-tip extender on the right.  Components were prepared in the standard fashion all air bubbles were removed.  The components were dipped in antibiotic solution.  I placed the cylinders in the left corpora followed by the right corpora in the standard fashion using the Charles insertion tool.  A dartos pouch was created for the scrotal pump in the anterior scrotum.  The cylinders were seated properly and a test inflation was performed showing excellent positioning and good straight rigidity.  Cylinders were deflated and corporotomies were closed using the previously placed stay sutures of 2-0 PDS.  Tubing connections were made using the tubing connectors.  The pump was placed in the scrotal dartos pouch.  The wound was copiously irrigated.  Dartos was closed in 2 layers using running 3-0 Vicryl.  Skin was closed using interrupted 3-0 Monocryl.  The bladder is drained.  Sterile dressing consisting of bacitracin, Telfa, and Kerlix mummy wrap dressing with compressive tape was applied.  All sponge and needle counts were correct.  The Haines catheter was removed and the patient was taken to the recovery room in stable condition.      Estimated Blood Loss: 100ml    Specimens:                None      Drains: * No LDAs found *    Complications: none

## 2021-12-14 NOTE — ANESTHESIA POSTPROCEDURE EVALUATION
Patient: Zachariah Acosta    Procedure Summary     Date: 12/13/21 Room / Location:  [facility] OR  /  [facility]    Anesthesia Start: 1241 Anesthesia Stop: 1404    Procedure: 3-PIECE INFLATABLE PENILE PROSTHESIS PLACEMENT (N/A Penis) Diagnosis:       Erectile dysfunction, unspecified erectile dysfunction type      (Erectile dysfunction, unspecified erectile dysfunction type [N52.9])    Surgeons: Jose Tellez MD Provider: Isaías Dye CRNA    Anesthesia Type: general ASA Status: 3          Anesthesia Type: general    Vitals  Vitals Value Taken Time   /83 12/13/21 1434   Temp 97 °F (36.1 °C) 12/13/21 1402   Pulse 60 12/13/21 1433   Resp 16 12/13/21 1433   SpO2 97 % 12/13/21 1433   Vitals shown include unvalidated device data.        Post Anesthesia Care and Evaluation    Patient location during evaluation: PACU  Patient participation: complete - patient participated  Level of consciousness: awake and alert  Pain management: adequate  Airway patency: patent  Anesthetic complications: No anesthetic complications    Cardiovascular status: acceptable  Respiratory status: acceptable  Hydration status: acceptable    Comments: Blood pressure 150/84, pulse 55, temperature 97 °F (36.1 °C), temperature source Temporal, resp. rate 16, SpO2 99 %.    Pt discharged from PACU based on tiffanie score >8

## 2021-12-16 NOTE — PROGRESS NOTES
Subjective    Mr. Acosta is 81 y.o. male    Chief Complaint: Erectile Dysfunction    History of Present Illness    81-year-old male established patient with history of coronary artery disease, diabetes, hyperlipidemia, hypertension and history of enlarged prostate found to have prostatic oozing is likely source of prior hematuria follow-up status post Titan touch 3 piece inflatable penile implant 12/13/2021.  He has completed a course of antibiotics and is feeling better.      The following portions of the patient's history were reviewed and updated as appropriate: allergies, current medications, past family history, past medical history, past social history, past surgical history and problem list.    Review of Systems      Current Outpatient Medications:   •  aspirin (aspirin) 81 MG EC tablet, Take 1 tablet by mouth Daily., Disp: 30 tablet, Rfl: 11  •  azelastine (ASTELIN) 0.1 % nasal spray, 2 sprays into the nostril(s) as directed by provider 2 (Two) Times a Day. Use in each nostril as directed, Disp: 30 mL, Rfl: 2  •  Budesonide ER 9 MG tablet sustained-release 24 hour, Take 9 mg by mouth Daily., Disp: , Rfl:   •  budesonide-formoterol (Symbicort) 160-4.5 MCG/ACT inhaler, Inhale 2 puffs 2 (two) times a day., Disp: , Rfl:   •  docusate sodium (Colace) 100 MG capsule, Take 1 capsule by mouth 2 (Two) Times a Day., Disp: 60 capsule, Rfl: 0  •  Dupilumab 300 MG/2ML solution prefilled syringe, Inject  under the skin into the appropriate area as directed Every 14 (Fourteen) Days., Disp: , Rfl:   •  finasteride (PROSCAR) 5 MG tablet, Take 1 tablet by mouth Daily., Disp: 30 tablet, Rfl: 11  •  fluticasone (CUTIVATE) 0.005 % ointment, Apply 1 application topically to the appropriate area as directed 2 (Two) Times a Day., Disp: 60 g, Rfl: 2  •  fluticasone (FLONASE) 50 MCG/ACT nasal spray, 1 spray into the nostril(s) as directed by provider Daily., Disp: 1 bottle, Rfl: 11  •  glipizide (GLUCOTROL) 10 MG tablet, Take 1  tablet by mouth 2 (Two) Times a Day Before Meals., Disp: 180 tablet, Rfl: 0  •  glucose blood test strip, 1 each by Other route 3 (Three) Times a Day As Needed (hypoglycemia). Use as instructed One Touch Ultra, Disp: 100 each, Rfl: 11  •  HYDROcodone-acetaminophen (NORCO) 7.5-325 MG per tablet, Take 1 tablet by mouth Every 4 (Four) Hours As Needed for Severe Pain  (postop pain)., Disp: 18 tablet, Rfl: 0  •  insulin aspart (NovoLOG FlexPen) 100 UNIT/ML solution pen-injector sc pen, Inject 5 Units under the skin into the appropriate area as directed 3 (Three) Times a Day With Meals., Disp: 5 pen, Rfl: 2  •  Insulin Glargine (Lantus SoloStar) 100 UNIT/ML injection pen, Inject 20 Units under the skin into the appropriate area as directed As Needed., Disp: , Rfl:   •  levalbuterol (Xopenex HFA) 45 MCG/ACT inhaler, Inhale 1-2 puffs Every 4 (Four) Hours As Needed for Wheezing or Shortness of Air., Disp: 15 g, Rfl: 11  •  lisinopril (PRINIVIL,ZESTRIL) 10 MG tablet, Take 1 tablet by mouth Daily., Disp: 90 tablet, Rfl: 1  •  Mesalamine 4 GM/60ML SF enema, Insert  into the rectum Daily., Disp: , Rfl:   •  metoprolol tartrate (LOPRESSOR) 25 MG tablet, Take 1 tablet by mouth 2 (Two) Times a Day., Disp: 180 tablet, Rfl: 0  •  naproxen (NAPROSYN) 250 MG tablet, Take 1 tablet by mouth 2 (Two) Times a Day With Meals., Disp: 60 tablet, Rfl: 0  •  nitroglycerin (NITROSTAT) 0.4 MG SL tablet, 1 under the tongue as needed for angina, may repeat q5mins for up three doses, Disp: 25 tablet, Rfl: 1  •  rosuvastatin (CRESTOR) 40 MG tablet, Take 1 tablet by mouth Daily., Disp: 90 tablet, Rfl: 3  •  triamcinolone (KENALOG) 0.1 % cream, Apply  topically to the appropriate area as directed 2 (Two) Times a Day., Disp: , Rfl:   •  Ustekinumab (STELARA) 90 MG/ML solution prefilled syringe Injection, Inject 90 mg under the skin into the appropriate area as directed Every 28 (Twenty-Eight) Days. (Patient taking differently: Inject 90 mg under the skin  "into the appropriate area as directed Take As Directed. EVERY 6 WEEKS), Disp: 1 mL, Rfl: 6    Past Medical History:   Diagnosis Date   • Asthma    • Coronary artery disease    • Diabetes mellitus (HCC)    • Elevated cholesterol    • Hyperlipidemia    • Hypertension    • Myocardial infarct (HCC)     E[facility]ER 2020   • Sleep apnea     cpap at hs       Past Surgical History:   Procedure Laterality Date   • BACK SURGERY     • CARDIAC CATHETERIZATION      stents x 6   • CARDIAC SURGERY      CABG TRIPLE BYPASS 2012   • CATARACT EXTRACTION     • COLONOSCOPY     • COLONOSCOPY N/A 6/4/2021    Procedure: COLONOSCOPY WITH ANESTHESIA;  Surgeon: Zachariah Menjivar DO;  Location:  [facility];  Service: Gastroenterology;  Laterality: N/A;  pre hx ulcerative colitis  post ulcerative colitis  dr collins gusman   • CORONARY ANGIOPLASTY WITH STENT PLACEMENT     • HIP SURGERY Right     total hip   • PENILE PROSTHESIS IMPLANT N/A 12/13/2021    Procedure: 3-PIECE INFLATABLE PENILE PROSTHESIS PLACEMENT;  Surgeon: Jose Tellez MD;  Location: [facility] OR;  Service: Urology;  Laterality: N/A;       Social History     Socioeconomic History   • Marital status:    Tobacco Use   • Smoking status: Never Smoker   • Smokeless tobacco: Never Used   Vaping Use   • Vaping Use: Never used   Substance and Sexual Activity   • Alcohol use: Never   • Drug use: Never   • Sexual activity: Defer       Family History   Problem Relation Age of Onset   • Colon cancer Brother    • Colon polyps Neg Hx    • Esophageal cancer Neg Hx        Objective    Temp 97.5 °F (36.4 °C)   Ht 172.7 cm (68\")   Wt 102 kg (224 lb 9.6 oz)   BMI 34.15 kg/m²     Physical Exam  Penoscrotal incision clean dry and intact, no signs of infection.  His implant is in appropriate position with cylinders at mid glans.  His implant was completely deflated today.      Results for orders placed or performed in visit on 12/27/21   POC Urinalysis Dipstick, Multipro    Specimen: Urine "   Result Value Ref Range    Color Yellow Yellow, Straw, Dark Yellow, Vicky    Clarity, UA Clear Clear    Glucose,  mg/dL (A) Negative, 1000 mg/dL (3+) mg/dL    Bilirubin Small (1+) (A) Negative    Ketones, UA Trace (A) Negative    Specific Gravity  1.030 1.005 - 1.030    Blood, UA Large (A) Negative    pH, Urine 5.0 5.0 - 8.0    Protein,  mg/dL (A) Negative mg/dL    Urobilinogen, UA Normal Normal    Nitrite, UA Negative Negative    Leukocytes Negative Negative     Assessment and Plan    Diagnoses and all orders for this visit:    1. Erectile dysfunction, unspecified erectile dysfunction type (Primary)  -     POC Urinalysis Dipstick, Multipro        Healing well after 3 piece inflatable penile implant.  He will follow-up with me next month for device teaching.  Regarding his history of hematuria, we will recheck his urine at next visit.

## 2021-12-17 DIAGNOSIS — K51.019 ULCERATIVE COLITIS, UNIVERSAL, UNSPECIFIED COMPLICATION (HCC): ICD-10-CM

## 2021-12-17 RX ORDER — ACETAMINOPHEN 500 MG
500 TABLET ORAL ONCE
Status: CANCELLED
Start: 2021-12-17 | End: 2021-12-17

## 2021-12-17 RX ORDER — DIPHENHYDRAMINE HYDROCHLORIDE 50 MG/ML
25 INJECTION INTRAMUSCULAR; INTRAVENOUS PRN
Status: CANCELLED
Start: 2021-12-17

## 2021-12-17 RX ORDER — SODIUM CHLORIDE 9 MG/ML
INJECTION, SOLUTION INTRAVENOUS CONTINUOUS
Status: CANCELLED | OUTPATIENT
Start: 2021-12-17

## 2021-12-17 RX ORDER — ONDANSETRON 2 MG/ML
4 INJECTION INTRAMUSCULAR; INTRAVENOUS PRN
Status: CANCELLED
Start: 2021-12-17

## 2021-12-17 RX ORDER — SODIUM CHLORIDE 9 MG/ML
INJECTION, SOLUTION INTRAVENOUS CONTINUOUS
Status: CANCELLED
Start: 2021-12-17

## 2021-12-17 RX ORDER — HEPARIN SODIUM (PORCINE) LOCK FLUSH IV SOLN 100 UNIT/ML 100 UNIT/ML
500 SOLUTION INTRAVENOUS PRN
Status: CANCELLED | OUTPATIENT
Start: 2021-12-17

## 2021-12-21 ENCOUNTER — OFFICE VISIT (OUTPATIENT)
Dept: UROLOGY | Facility: CLINIC | Age: 81
End: 2021-12-21

## 2021-12-21 VITALS — BODY MASS INDEX: 33.47 KG/M2 | HEIGHT: 69 IN | WEIGHT: 226 LBS | TEMPERATURE: 97.9 F

## 2021-12-21 DIAGNOSIS — T83.61XA: Primary | ICD-10-CM

## 2021-12-21 DIAGNOSIS — L29.9 ITCHING: ICD-10-CM

## 2021-12-21 LAB
BILIRUB BLD-MCNC: ABNORMAL MG/DL
CLARITY, POC: ABNORMAL
COLOR UR: ABNORMAL
GLUCOSE UR STRIP-MCNC: NEGATIVE MG/DL
KETONES UR QL: ABNORMAL
LEUKOCYTE EST, POC: NEGATIVE
NITRITE UR-MCNC: POSITIVE MG/ML
PH UR: 5 [PH] (ref 5–8)
PROT UR STRIP-MCNC: ABNORMAL MG/DL
RBC # UR STRIP: ABNORMAL /UL
SP GR UR: 1.03 (ref 1–1.03)
UROBILINOGEN UR QL: NORMAL

## 2021-12-21 PROCEDURE — 81001 URINALYSIS AUTO W/SCOPE: CPT | Performed by: PHYSICIAN ASSISTANT

## 2021-12-21 PROCEDURE — 99024 POSTOP FOLLOW-UP VISIT: CPT | Performed by: PHYSICIAN ASSISTANT

## 2021-12-21 RX ORDER — HYDROXYZINE 50 MG/1
50 TABLET, FILM COATED ORAL EVERY 8 HOURS PRN
Qty: 15 TABLET | Refills: 0 | Status: SHIPPED | OUTPATIENT
Start: 2021-12-21 | End: 2021-12-26

## 2021-12-21 RX ORDER — FLUTICASONE PROPIONATE 0.05 MG/G
1 OINTMENT TOPICAL 2 TIMES DAILY
Qty: 60 G | Refills: 2 | Status: SHIPPED | OUTPATIENT
Start: 2021-12-21

## 2021-12-21 NOTE — PROGRESS NOTES
Subjective    Mr. Acosta is 81 y.o. male    Chief Complaint: Post op itching and burning.    History of Present Illness  Patient presents for follow-up he is a 81-year-old gentleman who underwent penile prosthesis placement by Dr. Tellez 12/13/2021.  He had surgery to Coloplast Titan touch.  Postoperatively he has developed itching involving the scrotum and perineum and into his anus.  Denies any fever or chills.  Has pain but not described as acute.  There is been no pus or drainage or any redness or swelling that is abnormal.  He does have bruising involving the scrotum and perineum.    The following portions of the patient's history were reviewed and updated as appropriate: allergies, current medications, past family history, past medical history, past social history, past surgical history and problem list.    Review of Systems   Constitutional: Negative for chills and fever.   Gastrointestinal: Negative for abdominal pain, anal bleeding and blood in stool.   Genitourinary: Positive for hematuria. Negative for decreased urine volume, difficulty urinating, dysuria, enuresis, flank pain, frequency, genital sores, penile discharge, penile pain, penile swelling, scrotal swelling, testicular pain and urgency.         Current Outpatient Medications:   •  aspirin (aspirin) 81 MG EC tablet, Take 1 tablet by mouth Daily., Disp: 30 tablet, Rfl: 11  •  azelastine (ASTELIN) 0.1 % nasal spray, 2 sprays into the nostril(s) as directed by provider 2 (Two) Times a Day. Use in each nostril as directed, Disp: 30 mL, Rfl: 2  •  Budesonide ER 9 MG tablet sustained-release 24 hour, Take 9 mg by mouth Daily., Disp: , Rfl:   •  budesonide-formoterol (Symbicort) 160-4.5 MCG/ACT inhaler, Inhale 2 puffs 2 (two) times a day., Disp: , Rfl:   •  Dupilumab 300 MG/2ML solution prefilled syringe, Inject  under the skin into the appropriate area as directed Every 14 (Fourteen) Days., Disp: , Rfl:   •  finasteride (PROSCAR) 5 MG tablet, Take 1  tablet by mouth Daily., Disp: 30 tablet, Rfl: 11  •  fluticasone (CUTIVATE) 0.005 % ointment, Apply 1 application topically to the appropriate area as directed 2 (Two) Times a Day., Disp: 60 g, Rfl: 2  •  fluticasone (FLONASE) 50 MCG/ACT nasal spray, 1 spray into the nostril(s) as directed by provider Daily., Disp: 1 bottle, Rfl: 11  •  glipizide (GLUCOTROL) 10 MG tablet, Take 1 tablet by mouth 2 (Two) Times a Day Before Meals., Disp: 180 tablet, Rfl: 0  •  glucose blood test strip, 1 each by Other route 3 (Three) Times a Day As Needed (hypoglycemia). Use as instructed One Touch Ultra, Disp: 100 each, Rfl: 11  •  insulin aspart (NovoLOG FlexPen) 100 UNIT/ML solution pen-injector sc pen, Inject 5 Units under the skin into the appropriate area as directed 3 (Three) Times a Day With Meals., Disp: 5 pen, Rfl: 2  •  Insulin Glargine (Lantus SoloStar) 100 UNIT/ML injection pen, Inject 20 Units under the skin into the appropriate area as directed As Needed., Disp: , Rfl:   •  levalbuterol (Xopenex HFA) 45 MCG/ACT inhaler, Inhale 1-2 puffs Every 4 (Four) Hours As Needed for Wheezing or Shortness of Air., Disp: 15 g, Rfl: 11  •  lisinopril (PRINIVIL,ZESTRIL) 10 MG tablet, Take 1 tablet by mouth Daily., Disp: 90 tablet, Rfl: 1  •  Mesalamine 4 GM/60ML SF enema, Insert  into the rectum Daily., Disp: , Rfl:   •  metoprolol tartrate (LOPRESSOR) 25 MG tablet, Take 1 tablet by mouth 2 (Two) Times a Day., Disp: 180 tablet, Rfl: 0  •  nitroglycerin (NITROSTAT) 0.4 MG SL tablet, 1 under the tongue as needed for angina, may repeat q5mins for up three doses, Disp: 25 tablet, Rfl: 1  •  ondansetron ODT (Zofran ODT) 4 MG disintegrating tablet, Place 1 tablet on the tongue Every 6 (Six) Hours As Needed for Nausea., Disp: 6 tablet, Rfl: 1  •  rosuvastatin (CRESTOR) 40 MG tablet, Take 1 tablet by mouth Daily., Disp: 90 tablet, Rfl: 3  •  sulfamethoxazole-trimethoprim (BACTRIM DS,SEPTRA DS) 800-160 MG per tablet, Take 1 tablet by mouth 2  (Two) Times a Day for 14 days., Disp: 28 tablet, Rfl: 0  •  triamcinolone (KENALOG) 0.1 % cream, Apply  topically to the appropriate area as directed 2 (Two) Times a Day., Disp: , Rfl:   •  Ustekinumab (STELARA) 90 MG/ML solution prefilled syringe Injection, Inject 90 mg under the skin into the appropriate area as directed Every 28 (Twenty-Eight) Days. (Patient taking differently: Inject 90 mg under the skin into the appropriate area as directed Take As Directed. EVERY 6 WEEKS), Disp: 1 mL, Rfl: 6  •  docusate sodium (Colace) 100 MG capsule, Take 1 capsule by mouth 2 (Two) Times a Day., Disp: 60 capsule, Rfl: 0  •  HYDROcodone-acetaminophen (NORCO) 7.5-325 MG per tablet, Take 1 tablet by mouth Every 4 (Four) Hours As Needed for Severe Pain  (postop pain)., Disp: 18 tablet, Rfl: 0  •  hydrOXYzine (ATARAX) 50 MG tablet, Take 1 tablet by mouth Every 8 (Eight) Hours As Needed for Itching for up to 5 days., Disp: 15 tablet, Rfl: 0  •  naproxen (NAPROSYN) 250 MG tablet, Take 1 tablet by mouth 2 (Two) Times a Day With Meals., Disp: 60 tablet, Rfl: 0    Past Medical History:   Diagnosis Date   • Asthma    • Coronary artery disease    • Diabetes mellitus (HCC)    • Elevated cholesterol    • Hyperlipidemia    • Hypertension    • Myocardial infarct (HCC)     EASTER 2020   • Sleep apnea     cpap at hs       Past Surgical History:   Procedure Laterality Date   • BACK SURGERY     • CARDIAC CATHETERIZATION      stents x 6   • CARDIAC SURGERY      CABG TRIPLE BYPASS 2012   • CATARACT EXTRACTION     • COLONOSCOPY     • COLONOSCOPY N/A 6/4/2021    Procedure: COLONOSCOPY WITH ANESTHESIA;  Surgeon: Zachariah Menjivar DO;  Location: [facility] ENDOSCOPY;  Service: Gastroenterology;  Laterality: N/A;  pre hx ulcerative colitis  post ulcerative colitis  dr collins gusman   • CORONARY ANGIOPLASTY WITH STENT PLACEMENT     • HIP SURGERY Right     total hip   • PENILE PROSTHESIS IMPLANT N/A 12/13/2021    Procedure: 3-PIECE INFLATABLE PENILE  "PROSTHESIS PLACEMENT;  Surgeon: Jose Tellez MD;  Location: [facility] OR;  Service: Urology;  Laterality: N/A;       Social History     Socioeconomic History   • Marital status:    Tobacco Use   • Smoking status: Never Smoker   • Smokeless tobacco: Never Used   Vaping Use   • Vaping Use: Never used   Substance and Sexual Activity   • Alcohol use: Never   • Drug use: Never   • Sexual activity: Defer       Family History   Problem Relation Age of Onset   • Colon cancer Brother    • Colon polyps Neg Hx    • Esophageal cancer Neg Hx        Objective    Temp 97.9 °F (36.6 °C)   Ht 175 cm (68.9\")   Wt 103 kg (226 lb)   BMI 33.47 kg/m²     Physical Exam  Vitals reviewed.   Constitutional:       Appearance: Normal appearance.   HENT:      Head: Normocephalic and atraumatic.   Pulmonary:      Effort: Pulmonary effort is normal.   Genitourinary:     Comments: The penis shaft itself is nonswollen unremarkable it is semirigid due to the recent prosthesis surgery.  No cellulitis or abnormal swelling.  He has bruising involving the scrotum and perineum.  Again I do not see anything that looks or feels like any fluctuance cellulitis or any infection.    Neurological:      Mental Status: He is alert and oriented to person, place, and time.   Psychiatric:         Mood and Affect: Mood normal.         Behavior: Behavior normal.             Results for orders placed or performed in visit on 12/21/21   POC Urinalysis Dipstick, Multipro    Specimen: Urine   Result Value Ref Range    Color Red (A) Yellow, Straw, Dark Yellow, Vicky    Clarity, UA Cloudy (A) Clear    Glucose, UA Negative Negative, 1000 mg/dL (3+) mg/dL    Bilirubin Small (1+) (A) Negative    Ketones, UA Trace (A) Negative    Specific Gravity  1.030 1.005 - 1.030    Blood, UA Large (A) Negative    pH, Urine 5.0 5.0 - 8.0    Protein,  mg/dL (A) Negative mg/dL    Urobilinogen, UA Normal Normal    Nitrite, UA Positive (A) Negative    Leukocytes Negative " Negative     Assessment and Plan    Diagnoses and all orders for this visit:    1. Inflammatory reaction due to implanted penile prosthesis, initial encounter ([facility]) (Primary)  -     POC Urinalysis Dipstick, Multipro  -     hydrOXYzine (ATARAX) 50 MG tablet; Take 1 tablet by mouth Every 8 (Eight) Hours As Needed for Itching for up to 5 days.  Dispense: 15 tablet; Refill: 0    2. Itching  -     hydrOXYzine (ATARAX) 50 MG tablet; Take 1 tablet by mouth Every 8 (Eight) Hours As Needed for Itching for up to 5 days.  Dispense: 15 tablet; Refill: 0    Overall his incision and genitalia look good this close to surgery and I see no evidence of cellulitis infection.  There is no abnormal swelling he does have bruising involving the scrotum and perineum.  Nothing that looks like a allergic rash.  I think this could be some inflammatory response after the surgery I also explained that sometimes bruising under the skin can cause an itching sensation however I will treat his symptoms with Atarax but I see no indication for antibiotic or any emergent treatment at this time he does have a follow-up coming up with Dr. Tellez.

## 2021-12-27 ENCOUNTER — OFFICE VISIT (OUTPATIENT)
Dept: UROLOGY | Facility: CLINIC | Age: 81
End: 2021-12-27

## 2021-12-27 VITALS — HEIGHT: 68 IN | TEMPERATURE: 97.5 F | BODY MASS INDEX: 34.04 KG/M2 | WEIGHT: 224.6 LBS

## 2021-12-27 DIAGNOSIS — N52.9 ERECTILE DYSFUNCTION, UNSPECIFIED ERECTILE DYSFUNCTION TYPE: Primary | ICD-10-CM

## 2021-12-27 PROCEDURE — 81001 URINALYSIS AUTO W/SCOPE: CPT | Performed by: UROLOGY

## 2021-12-27 PROCEDURE — 99024 POSTOP FOLLOW-UP VISIT: CPT | Performed by: UROLOGY

## 2021-12-27 NOTE — PATIENT INSTRUCTIONS
"BMI for Adults  What is BMI?  Body mass index (BMI) is a number that is calculated from a person's weight and height. BMI can help estimate how much of a person's weight is composed of fat. BMI does not measure body fat directly. Rather, it is an alternative to procedures that directly measure body fat, which can be difficult and expensive.  BMI can help identify people who may be at higher risk for certain medical problems.  What are BMI measurements used for?  BMI is used as a screening tool to identify possible weight problems. It helps determine whether a person is obese, overweight, a healthy weight, or underweight.  BMI is useful for:  · Identifying a weight problem that may be related to a medical condition or may increase the risk for medical problems.  · Promoting changes, such as changes in diet and exercise, to help reach a healthy weight. BMI screening can be repeated to see if these changes are working.  How is BMI calculated?  BMI involves measuring your weight in relation to your height. Both height and weight are measured, and the BMI is calculated from those numbers. This can be done either in English (U[place] or metric measurements. Note that charts and online BMI calculators are available to help you find your BMI quickly and easily without having to do these calculations yourself.  To calculate your BMI in English (U.S[place] measurements:    1. Measure your weight in pounds (lb).  2. Multiply the number of pounds by 703.  ? For example, for a person who weighs 180 lb, multiply that number by 703, which equals 126,540.  3. Measure your height in inches. Then multiply that number by itself to get a measurement called \"inches squared.\"  ? For example, for a person who is 70 inches tall, the \"inches squared\" measurement is 70 inches x 70 inches, which equals 4,900 inches squared.  4. Divide the total from step 2 (number of lb x 703) by the total from step 3 (inches squared): 126,540 ÷ 4,900 = 25.8. This is " "your BMI.    To calculate your BMI in metric measurements:  1. Measure your weight in kilograms (kg).  2. Measure your height in meters (m). Then multiply that number by itself to get a measurement called \"meters squared.\"  ? For example, for a person who is 1.75 m tall, the \"meters squared\" measurement is 1.75 m x 1.75 m, which is equal to 3.1 meters squared.  3. Divide the number of kilograms (your weight) by the meters squared number. In this example: 70 ÷ 3.1 = 22.6. This is your BMI.  What do the results mean?  BMI charts are used to identify whether you are underweight, normal weight, overweight, or obese. The following guidelines will be used:  · Underweight: BMI less than 18.5.  · Normal weight: BMI between 18.5 and 24.9.  · Overweight: BMI between 25 and 29.9.  · Obese: BMI of 30 or above.  Keep these notes in mind:  · Weight includes both fat and muscle, so someone with a muscular build, such as an athlete, may have a BMI that is higher than 24.9. In cases like these, BMI is not an accurate measure of body fat.  · To determine if excess body fat is the cause of a BMI of 25 or higher, further assessments may need to be done by a health care provider.  · BMI is usually interpreted in the same way for men and women.  Where to find more information  For more information about BMI, including tools to quickly calculate your BMI, go to these websites:  · Centers for Disease Control and Prevention: www.cdc.gov  · American Heart Association: www.heart.org  · National Heart, Lung, and Blood [place]: www.nhlbi.nih.gov  Summary  · Body mass index (BMI) is a number that is calculated from a person's weight and height.  · BMI may help estimate how much of a person's weight is composed of fat. BMI can help identify those who may be at higher risk for certain medical problems.  · BMI can be measured using English measurements or metric measurements.  · BMI charts are used to identify whether you are underweight, normal " weight, overweight, or obese.  This information is not intended to replace advice given to you by your health care provider. Make sure you discuss any questions you have with your health care provider.  Document Revised: 09/09/2020 Document Reviewed: 07/17/2020  [employer]vier Patient Education © 2021 [employer]

## 2021-12-28 ENCOUNTER — OFFICE VISIT (OUTPATIENT)
Dept: OTOLARYNGOLOGY | Facility: CLINIC | Age: 81
End: 2021-12-28

## 2021-12-28 VITALS
HEIGHT: 68 IN | BODY MASS INDEX: 34.4 KG/M2 | HEART RATE: 78 BPM | DIASTOLIC BLOOD PRESSURE: 75 MMHG | WEIGHT: 227 LBS | SYSTOLIC BLOOD PRESSURE: 140 MMHG

## 2021-12-28 DIAGNOSIS — G47.33 OSA ON CPAP: ICD-10-CM

## 2021-12-28 DIAGNOSIS — Q38.2 MACROGLOSSIA: ICD-10-CM

## 2021-12-28 DIAGNOSIS — E04.1 THYROID NODULE: Primary | ICD-10-CM

## 2021-12-28 DIAGNOSIS — Z99.89 OSA ON CPAP: ICD-10-CM

## 2021-12-28 DIAGNOSIS — E07.89 THYROID MASS OF UNCLEAR ETIOLOGY: ICD-10-CM

## 2021-12-28 DIAGNOSIS — K11.7 XEROSTOMIA: ICD-10-CM

## 2021-12-28 PROCEDURE — 99204 OFFICE O/P NEW MOD 45 MIN: CPT | Performed by: OTOLARYNGOLOGY

## 2021-12-28 NOTE — PATIENT INSTRUCTIONS
PREOPERATIVE SURGERY/PROCEDURE INSTRUCTIONS:  • Do not eat or drink ANYTHING after midnight, unless instructed   • Clean the operative site by showering with an antibacterial soap (like Dial, Dove, Ivory, etc) and shampooing hair  • Preoperative scrub for Surgery:   Skin: Antibacterial soap (Dial, Ivory, Dove) shower daily, including hair.  Be careful not to get into eyes  Do this daily for 5 days  • Mouth: Betadine solution 3 times daily for 5 days  • Do NOT pluck, shave hair on skin the night prior to operation  • If you are diabetic, take your blood sugar the night before and in the morning prior to coming to hospital and give results to nurse and the anesthesiologist    ENT PREOPERATIVE PROTOCOL FOR SURGERY: Begin after COVID test has been performed  After test performed, PLEASE self-quarantine to prevent possible infection!! And start below  Betadine rinses:  • Use Betadine rinse in the nose  • Spray twice in each nostril 3 times a day beginning 3 days before surgery  • Spray nose the morning of surgery, bring with you to the operating room  • Use Betadine rinse in the mouth  • Gargle, swish and spit 15 ml in mouth and rinse around teeth 3 times daily beginning 3 days prior to surgery  • Repeat morning of surgery before arriving at hospital  Betadine rinse can be prescribed at the [facility]    Betadine Iodine mixture Recipe:  • Neilmed bottle from pharmacy  • Use Manish Med packet that comes with the bottle  • Add Betadine/Povidone 10 ml (2 tsp) to the bottle  • Add Jamie baby shampoo 2.5 ml (½ tsp) to the bottle  • Fill bottle with distilled water (from grocery store)    DO NOT USE IF IODINE/BETADINE ALLERGIC, OR HISTORY OF THYROID PROBLEMS/THYROID CANCER    Non Betadine rinses:  • Nasal rinses:  • Use rinse in the nose  • Spray twice in each nostril 3 times a day beginning 3 days before surgery  • Spray nose the morning of surgery, bring with you to the operating room  • Use Same rinse in  the mouth  • Gargle, swish and spit 15 ml in mouth and rinse around teeth 3 times daily beginning 3 days prior to surgery  • Repeat morning of surgery before arriving at hospital    Nasal mixture Recipe:  • Neilmed bottle from pharmacy  • Use Manish Med packet that comes with the bottle  • Add Jamie baby shampoo 2.5 ml (½ tsp) to the bottle  • Fill bottle with distilled water (from grocery store)    Remove any metallic piercings prior to surgery. You may wear plastic spacers if needed.    Do NOT apply eye makeup Morning of surgery    Please remove fingernail polish prior to surgery    STOP:  -   All natural/homeopathic medications 2 weeks prior to surgery, Ask about over the counter medications  -   Smoking 2 weeks prior to surgery  -   Blood thinners- 3-5 days prior to surgery (or as instructed by doctor)  Bring with you the morning of surgery:  -   Preoperative paperwork  -   Insurance card  -   Identification with photo  -   Home medications or up to date list      Isaías Galvez Jr, MD has explained the risks, benefits and alternatives to the patient/patient’s representative, in clear and simple language.  Time was allowed for questions.  Risks of procedure include but are not limited to:    As a result of this procedure being performed, the material risks generally recognized are INFECTION, ALLERGIC REACTION, SEVERE LOSS OF BLOOD, LOSS OR LOSS OF FUNCTION OF ANY LIMB OR ORGAN, PARALYSIS OR PARTIAL PARALYSIS, PARAPLEGIA OR QUADRIPLEGIA, DISFIGURING SCAR, BRAIN DAMAGE, CARDIAC ARREST OR DEATH, BLOOD LOSS NECESSITATING TRANSFUSION WHICH CARRIES THE RISK OF EXPOSURE TO AIDS, HEPATITIS OR OTHER INFECTIOUS DISEASES.      Procedure: Thyroid surgery Lobectomy possible total  Left    Risks specific for procedure: bleeding, infection, skin scarring, persistent and/or recurrent disease, risks of the general anesthesia, pain, recurrent/superior laryngeal nerve injury with hoarseness, loss of singing voice, vocal cord  paralysis (One or both sides) and airway loss, tracheal injury, esophageal injury, neck abscess parathyroid injury and immediate or prolonged hypocalcemia. Operative possibilities including hemithyroidectomy, total thyroidectomy and gregory dissections were discussed. Possibilities for delayed need for total thyroidectomy pending pathologic diagnosis    No guarantees of outcome given or implied  Patient demonstrate understanding    Patient does wish to  consider proposed procedure      CONTACT INFORMATION:  The main office phone number is [phone]. For emergencies after hours and on weekends, this number will convert over to our answering service and the on call provider will answer. Please try to keep non emergent phone calls/ questions to office hours 9am-5pm Monday through Friday.     [employer]  As an alternative, you can sign up and use the Epic MyChart system for more direct and quicker access for non emergent questions/ problems.  [employer] allows you to send messages to your doctor, view your test results, renew your prescriptions, schedule appointments, and more. To sign up, go to [employer] and click on the Sign Up Now link in the New User? box. Enter your [employer] Activation Code exactly as it appears below along with the last four digits of your Social Security Number and your Date of Birth () to complete the sign-up process. If you do not sign up before the expiration date, you must request a new code.    [employer] Activation Code: Activation code not generated  Current [employer] Status: Active    If you have questions, you can email [email or web address] or call [phone] to talk to our [employer] staff. Remember, [employer] is NOT to be used for urgent needs. For medical emergencies, dial 911.

## 2021-12-28 NOTE — PROGRESS NOTES
Isaías Galvez Jr, MD  [facility] [place] [facility]  [address], [address]  [place] [address]  Fax [phone]  Phone [phone]      Visit Type: NEW PATIENT   Chief Complaint   Patient presents with   • Thyroid Nodule     left        HPI   Accompanied by:  No one    No surgery found, No surgery found   He complains of L thyroid nodule. He has known nodule. He has had biopsy 6-7 yrs ago, reportedly negative.  No problems breathing or swallowing. Short of breath and uses Symbicort.  Smoke- none  Drink- none.   Radiation exposure- none  Family history- none  Voice-no real issues with the voice  Breathing-the patient does have breathing issues and is on multiple inhalers.  Swallowing-the patient is not have any major swallowing difficulties at this point in time.    History     Last Reviewed by Isaías Galvez Jr., MD on 12/28/2021 at  1:59 PM    Sections Reviewed    Medical, Family, Tobacco, Surgical      Problem list reviewed by Isaías Galvez Jr., MD on 12/28/2021 at  1:59 PM  Medicines reviewed by Isaías Galvez Jr., MD on 12/28/2021 at  1:59 PM  Allergies reviewed by Isaías Galvez Jr., MD on 12/28/2021 at  1:59 PM        Vital Signs:   Heart Rate:  [78] 78  BP: (140)/(75) 140/75  ENT Physical Exam  Constitutional  Appearance: patient appears well-developed and well-nourished,  Communication/Voice: communication appropriate for developmental age; vocal quality normal;  Head and Face  Appearance: head appears normal, face appears normal and face appears atraumatic;  Palpation: facial palpation normal;  Salivary: glands normal;  Ear  Hearing: intact;  Auricles: right auricle normal; left auricle normal;  External Mastoids: right external mastoid normal; left external mastoid normal;  Nose  External Nose: nares patent bilaterally; external nose normal;  Oral Cavity/Oropharynx  Lips: normal;  Neck  Neck: neck  normal;  Respiratory  Inspection: breathing unlabored; normal breathing rate;  Cardiovascular  Inspection: extremities are warm and well perfused; no peripheral edema present;  Neurovestibular  Mental Status: alert and oriented;  Psychiatric: mood normal; affect is appropriate;         Result Review    RESULTS REVIEW    I have reviewed the patients old records in the chart.   I have reviewed the patients old records in the chart.  The following results/records were reviewed:  I reviewed the patient's new imaging results and agree with the interpretation.   Referral to Otolaryngology for Thyroid nodule (11/09/2021)  US Thyroid (11/08/2021 09:41)  CT Chest Without Contrast Diagnostic (10/28/2021 15:43)          Assessment/Plan    Diagnoses and all orders for this visit:    1. Thyroid nodule (Primary)  Comments:  Left, large    2. Thyroid mass of unclear etiology  Comments:  TIRADS 2    3. Xerostomia  Comments:  possible medication etiology    4. BREANN on CPAP  Comments:  No workuo required    5. 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 Risks, benefits and alternatives were discussed and questions were answered. After considering the options, the patient decided to proceed with surgery.     -----SURGERY SCHEDULING:-----  Schedule * Surgery not found *    ---INFORMED CONSENT DISCUSSION:---  THYROIDECTOMY: A thyroidectomy was recommended. The risks and benefits were explained including but not limited to bleeding, infection, persistent and/or recurrent disease, risks of the general anesthesia, pain, recurrent laryngeal nerve injury with hoarseness and airway loss, parathyroid injury and hypocalcemia. Operative possibilities including hemithyroidectomy, total thyroidectomy and gregory dissections were discussed. Possibilities for delayed need for total thyroidectomy pending pathologic diagnosis were also discussed.  Alternatives were discussed. No guarantees were made or implied. Questions were asked appropriately answered.      ---PREOPERATIVE WORKUP:---  labs/ workup per anesthesia  Preoperative Medicine evaluation for clearance- Dr Carlos       Patient has several considerations at this point for his thyroid nodule.  He does have a large thyroid nodule.  He is not sure this is causing swallowing or breathing issues.  He has had multiple other comorbidities to consider for thyroid surgery, making him slightly increased risk for general anesthesia.  Patient has had a prior biopsy that is reportedly negative.  He has been followed by ENT in the past.  He has not had evaluation in 6 to 7 years.  I have no way of knowing if this mass is any larger.  I have discussed several options with the patient.  I have discussed doing nothing.  I have also discussed fine-needle aspiration to reevaluate this left thyroid mass.  I have also discussed removing this mass so that no further evaluation is required.  I have discussed risk, benefits, alternative therapies and options.  He is to consider surgery versus fine-needle aspiration at this point time.  He will call back once he has discussed this with his wife and family.  Consider thyroid surgery to remove left lobe    Patient is using My Chart    Patient understand(s) and agree(s) with the treatment plan as described.        Return Call to schedule surgery, for Recheck Thyroid.      Isaías Galvez Jr, MD  12/28/21  14:02 CST

## 2022-01-03 ENCOUNTER — HOSPITAL ENCOUNTER (OUTPATIENT)
Dept: INFUSION THERAPY | Age: 82
Setting detail: INFUSION SERIES
Discharge: HOME | End: 2022-01-03
Payer: MEDICARE

## 2022-01-03 VITALS
DIASTOLIC BLOOD PRESSURE: 68 MMHG | SYSTOLIC BLOOD PRESSURE: 126 MMHG | TEMPERATURE: 97.7 F | HEART RATE: 76 BPM | RESPIRATION RATE: 18 BRPM

## 2022-01-03 DIAGNOSIS — K51.019 ULCERATIVE COLITIS, UNIVERSAL, UNSPECIFIED COMPLICATION (HCC): Primary | ICD-10-CM

## 2022-01-03 LAB
ALBUMIN SERPL-MCNC: 3.7 G/DL (ref 3.5–5.2)
ALP BLD-CCNC: 77 U/L (ref 40–130)
ALT SERPL-CCNC: 28 U/L (ref 5–41)
ANION GAP SERPL CALCULATED.3IONS-SCNC: 12 MMOL/L (ref 7–19)
ANISOCYTOSIS: ABNORMAL
AST SERPL-CCNC: 24 U/L (ref 5–40)
BASOPHILS ABSOLUTE: 0.1 K/UL (ref 0–0.2)
BASOPHILS RELATIVE PERCENT: 0.9 % (ref 0–1)
BILIRUB SERPL-MCNC: <0.2 MG/DL (ref 0.2–1.2)
BUN BLDV-MCNC: 20 MG/DL (ref 8–23)
C-REACTIVE PROTEIN: <0.3 MG/DL (ref 0–0.5)
CALCIUM SERPL-MCNC: 8.7 MG/DL (ref 8.8–10.2)
CHLORIDE BLD-SCNC: 105 MMOL/L (ref 98–111)
CO2: 24 MMOL/L (ref 22–29)
CREAT SERPL-MCNC: 1 MG/DL (ref 0.5–1.2)
EOSINOPHILS ABSOLUTE: 0.6 K/UL (ref 0–0.6)
EOSINOPHILS RELATIVE PERCENT: 6.4 % (ref 0–5)
GFR AFRICAN AMERICAN: >59
GFR NON-AFRICAN AMERICAN: >60
GLUCOSE BLD-MCNC: 137 MG/DL (ref 74–109)
HCT VFR BLD CALC: 38.1 % (ref 42–52)
HEMOGLOBIN: 11 G/DL (ref 14–18)
HYPOCHROMIA: ABNORMAL
IMMATURE GRANULOCYTES #: 0 K/UL
LYMPHOCYTES ABSOLUTE: 1.5 K/UL (ref 1.1–4.5)
LYMPHOCYTES RELATIVE PERCENT: 15.3 % (ref 20–40)
MCH RBC QN AUTO: 22.7 PG (ref 27–31)
MCHC RBC AUTO-ENTMCNC: 28.9 G/DL (ref 33–37)
MCV RBC AUTO: 78.7 FL (ref 80–94)
MICROCYTES: ABNORMAL
MONOCYTES ABSOLUTE: 0.7 K/UL (ref 0–0.9)
MONOCYTES RELATIVE PERCENT: 7.3 % (ref 0–10)
NEUTROPHILS ABSOLUTE: 6.7 K/UL (ref 1.5–7.5)
NEUTROPHILS RELATIVE PERCENT: 69.8 % (ref 50–65)
OVALOCYTES: ABNORMAL
PDW BLD-RTO: 17.5 % (ref 11.5–14.5)
PLATELET # BLD: 340 K/UL (ref 130–400)
PLATELET SLIDE REVIEW: ADEQUATE
PMV BLD AUTO: 9.1 FL (ref 9.4–12.4)
POLYCHROMASIA: ABNORMAL
POTASSIUM SERPL-SCNC: 3.9 MMOL/L (ref 3.5–5)
RBC # BLD: 4.84 M/UL (ref 4.7–6.1)
SEDIMENTATION RATE, ERYTHROCYTE: 28 MM/HR (ref 0–15)
SODIUM BLD-SCNC: 141 MMOL/L (ref 136–145)
TEAR DROP CELLS: ABNORMAL
TOTAL PROTEIN: 7.1 G/DL (ref 6.6–8.7)
WBC # BLD: 9.7 K/UL (ref 4.8–10.8)

## 2022-01-03 PROCEDURE — 85652 RBC SED RATE AUTOMATED: CPT

## 2022-01-03 PROCEDURE — 86140 C-REACTIVE PROTEIN: CPT

## 2022-01-03 PROCEDURE — 85025 COMPLETE CBC W/AUTO DIFF WBC: CPT

## 2022-01-03 PROCEDURE — 80053 COMPREHEN METABOLIC PANEL: CPT

## 2022-01-03 PROCEDURE — 6360000002 HC RX W HCPCS: Performed by: INTERNAL MEDICINE

## 2022-01-03 PROCEDURE — 96365 THER/PROPH/DIAG IV INF INIT: CPT

## 2022-01-03 PROCEDURE — 2580000003 HC RX 258: Performed by: INTERNAL MEDICINE

## 2022-01-03 RX ORDER — SODIUM CHLORIDE 9 MG/ML
INJECTION, SOLUTION INTRAVENOUS CONTINUOUS
Status: DISCONTINUED | OUTPATIENT
Start: 2022-01-03 | End: 2022-01-05 | Stop reason: HOSPADM

## 2022-01-03 RX ORDER — ACETAMINOPHEN 500 MG
500 TABLET ORAL ONCE
Status: CANCELLED
Start: 2022-01-03 | End: 2022-01-03

## 2022-01-03 RX ORDER — DIPHENHYDRAMINE HYDROCHLORIDE 50 MG/ML
25 INJECTION INTRAMUSCULAR; INTRAVENOUS PRN
Status: CANCELLED
Start: 2022-01-03

## 2022-01-03 RX ORDER — SODIUM CHLORIDE 9 MG/ML
INJECTION, SOLUTION INTRAVENOUS CONTINUOUS
Status: CANCELLED
Start: 2022-01-03

## 2022-01-03 RX ORDER — SODIUM CHLORIDE 9 MG/ML
INJECTION, SOLUTION INTRAVENOUS CONTINUOUS
Status: CANCELLED | OUTPATIENT
Start: 2022-01-03

## 2022-01-03 RX ORDER — HEPARIN SODIUM (PORCINE) LOCK FLUSH IV SOLN 100 UNIT/ML 100 UNIT/ML
500 SOLUTION INTRAVENOUS PRN
Status: CANCELLED | OUTPATIENT
Start: 2022-01-03

## 2022-01-03 RX ORDER — ONDANSETRON 2 MG/ML
4 INJECTION INTRAMUSCULAR; INTRAVENOUS PRN
Status: CANCELLED
Start: 2022-01-03

## 2022-01-03 RX ORDER — SODIUM CHLORIDE 9 MG/ML
INJECTION, SOLUTION INTRAVENOUS CONTINUOUS
Status: ACTIVE | OUTPATIENT
Start: 2022-01-03 | End: 2022-01-03

## 2022-01-03 RX ADMIN — HYDROCORTISONE SODIUM SUCCINATE 100 MG: 100 INJECTION, POWDER, FOR SOLUTION INTRAMUSCULAR; INTRAVENOUS at 13:53

## 2022-01-03 RX ADMIN — USTEKINUMAB 520 MG: 130 SOLUTION INTRAVENOUS at 14:10

## 2022-01-03 RX ADMIN — SODIUM CHLORIDE: 9 INJECTION, SOLUTION INTRAVENOUS at 14:09

## 2022-01-07 ENCOUNTER — OFFICE VISIT (OUTPATIENT)
Dept: FAMILY MEDICINE CLINIC | Facility: CLINIC | Age: 82
End: 2022-01-07

## 2022-01-07 VITALS
DIASTOLIC BLOOD PRESSURE: 70 MMHG | HEART RATE: 59 BPM | OXYGEN SATURATION: 97 % | WEIGHT: 229.4 LBS | BODY MASS INDEX: 34.77 KG/M2 | TEMPERATURE: 97.9 F | RESPIRATION RATE: 16 BRPM | SYSTOLIC BLOOD PRESSURE: 142 MMHG | HEIGHT: 68 IN

## 2022-01-07 DIAGNOSIS — Z95.1 S/P CABG (CORONARY ARTERY BYPASS GRAFT): ICD-10-CM

## 2022-01-07 DIAGNOSIS — I10 ESSENTIAL HYPERTENSION: ICD-10-CM

## 2022-01-07 DIAGNOSIS — Z99.89 OSA ON CPAP: ICD-10-CM

## 2022-01-07 DIAGNOSIS — D50.9 MICROCYTIC ANEMIA: ICD-10-CM

## 2022-01-07 DIAGNOSIS — E66.09 CLASS 1 OBESITY DUE TO EXCESS CALORIES WITH SERIOUS COMORBIDITY AND BODY MASS INDEX (BMI) OF 34.0 TO 34.9 IN ADULT: ICD-10-CM

## 2022-01-07 DIAGNOSIS — I25.10 CORONARY ARTERY DISEASE INVOLVING NATIVE CORONARY ARTERY OF NATIVE HEART WITHOUT ANGINA PECTORIS: ICD-10-CM

## 2022-01-07 DIAGNOSIS — J45.20 MILD INTERMITTENT ASTHMA WITHOUT COMPLICATION: ICD-10-CM

## 2022-01-07 DIAGNOSIS — R42 VERTIGO: ICD-10-CM

## 2022-01-07 DIAGNOSIS — Z79.4 TYPE 2 DIABETES MELLITUS WITH HYPERGLYCEMIA, WITH LONG-TERM CURRENT USE OF INSULIN: Primary | ICD-10-CM

## 2022-01-07 DIAGNOSIS — Z01.818 PRE-OPERATIVE CLEARANCE: ICD-10-CM

## 2022-01-07 DIAGNOSIS — K51.019 ULCERATIVE PANCOLITIS WITH COMPLICATION: ICD-10-CM

## 2022-01-07 DIAGNOSIS — E11.65 TYPE 2 DIABETES MELLITUS WITH HYPERGLYCEMIA, WITH LONG-TERM CURRENT USE OF INSULIN: Primary | ICD-10-CM

## 2022-01-07 DIAGNOSIS — G47.33 OSA ON CPAP: ICD-10-CM

## 2022-01-07 DIAGNOSIS — E04.1 THYROID NODULE: ICD-10-CM

## 2022-01-07 DIAGNOSIS — N52.9 ERECTILE DYSFUNCTION, UNSPECIFIED ERECTILE DYSFUNCTION TYPE: ICD-10-CM

## 2022-01-07 DIAGNOSIS — E78.2 MIXED HYPERLIPIDEMIA: ICD-10-CM

## 2022-01-07 PROBLEM — E66.811 CLASS 1 OBESITY DUE TO EXCESS CALORIES WITH SERIOUS COMORBIDITY AND BODY MASS INDEX (BMI) OF 34.0 TO 34.9 IN ADULT: Status: ACTIVE | Noted: 2022-01-07

## 2022-01-07 PROCEDURE — 99215 OFFICE O/P EST HI 40 MIN: CPT | Performed by: FAMILY MEDICINE

## 2022-01-07 RX ORDER — MECLIZINE HCL 12.5 MG/1
12.5 TABLET ORAL 3 TIMES DAILY PRN
Qty: 30 TABLET | Refills: 0 | Status: SHIPPED | OUTPATIENT
Start: 2022-01-07

## 2022-01-07 NOTE — PROGRESS NOTES
Subjective Chief complaint: Diabetes  Zachariah Acosta is a 81 y.o. male with CAD status post CABG, hypertension, hyperlipidemia, diabetes, obesity, ulcerative colitis, CKD, ED, obstructive sleep apnea and asthma who presents for follow-up.     ED: Patient has been following with urology for history of enlarged prostate.  Patient underwent penile implant in December 2021.  Patient has had follow-up scheduled with urology.    Thyroid nodule: Patient has seen ENT -discussion was had at that time in regards to his thyroid nodule.  Patient reportedly had a negative thyroid biopsy in the past.  However due to lack of records we are unable to assess if there have been any changes.  Per ENT note recommendation was made for biopsy versus thyroidectomy. Pt wanted to discuss further today - he thinks he would like to pursue thyroidectomy     Ulcerative colitis: Following with gastroenterology at [place] - on stelara, stopped prednisone, on budesonide for 2 months, have now weaned to every other day, taking b12 inj, it is controlling his symptoms     CAD status post CABG: Patient is on aspirin, ACE, beta-blocker, statin.  Has follow-up scheduled with cardiology.  Review of past cardiology note from November 2021.  It appears patient had been complaining of some worsening symptoms including dyspnea on exertion and shortness of breath.  A Lexiscan was performed in October 2021 which showed low risk.  Patient was trialed on Imdur without significant improvement.  At this time it appears cardiology is monitoring his symptoms as they appear to be noncardiac in origin.  He does have follow-up scheduled and at his upcoming follow-up they are going to consider repeating echocardiogram if his symptoms are unchanged.  He was taken off Plavix due to bleeding risk.  It has been recommended to continue on aspirin without cessation.    Hypertension: Patient is currently on lisinopril and metoprolol.  Blood pressure is trending up.  "He has not been taking his lisinopril consistently - taking it PRN based on daily BP readings. Advised against this     Hyperlipidemia: Patient is on statin.  Reviewed lipid panel.    Diabetes mellitus: Patient is currently taking Lantus 20U, NovoLog 5U, glipizide for diabetic control. Due for repeat labs     Obesity: Unchanged    Obstructive sleep apnea on CPAP    Asthma: Following with pulmonology    Reports when he turns his head quickly or when watching TV sometimes he will get vertigo - would like something to take PRN     History of Present Illness     The following portions of the patient's history were reviewed and updated as appropriate: allergies, current medications, past family history, past medical history, past social history, past surgical history and problem list.        Review of Systems   HENT: Negative for trouble swallowing and voice change.    Respiratory: Positive for cough, shortness of breath and wheezing. Negative for choking.    Genitourinary:        ED   Musculoskeletal: Positive for arthralgias, back pain, gait problem and myalgias.   Neurological: Positive for dizziness.   All other systems reviewed and are negative.      Objective   Blood pressure 142/70, pulse 59, temperature 97.9 °F (36.6 °C), temperature source Infrared, resp. rate 16, height 172.7 cm (68\"), weight 104 kg (229 lb 6.4 oz), SpO2 97 %.  Physical Exam  Vitals and nursing note reviewed.   Constitutional:       General: He is not in acute distress.     Appearance: He is well-developed. He is obese. He is not diaphoretic.   HENT:      Head: Normocephalic and atraumatic.      Right Ear: External ear normal.      Left Ear: External ear normal.      Nose: Nose normal.   Eyes:      General:         Right eye: No discharge.         Left eye: No discharge.      Conjunctiva/sclera: Conjunctivae normal.   Neck:      Thyroid: No thyromegaly.      Trachea: No tracheal deviation.   Cardiovascular:      Rate and Rhythm: Normal rate and " regular rhythm.      Heart sounds: Normal heart sounds.   Pulmonary:      Effort: Pulmonary effort is normal. No respiratory distress.      Breath sounds: Normal breath sounds. No stridor. No wheezing.   Chest:      Chest wall: No tenderness.   Abdominal:      General: There is no distension.      Palpations: Abdomen is soft.      Tenderness: There is no abdominal tenderness.   Musculoskeletal:      Cervical back: Normal range of motion.      Right lower leg: No edema.      Left lower leg: No edema.   Lymphadenopathy:      Cervical: No cervical adenopathy.   Skin:     General: Skin is warm and dry.   Neurological:      Mental Status: He is alert and oriented to person, place, and time.      Motor: No abnormal muscle tone.      Coordination: Coordination normal.   Psychiatric:         Behavior: Behavior normal.         Thought Content: Thought content normal.         Judgment: Judgment normal.         Assessment/Plan   Problems Addressed this Visit        Cardiac and Vasculature    CAD (coronary artery disease)    Relevant Orders    Ambulatory Referral to Cardiology    Essential hypertension    Relevant Orders    CBC (No Diff)    Comprehensive Metabolic Panel    S/P CABG (coronary artery bypass graft)    Hyperlipidemia    Relevant Orders    Lipid Panel       Endocrine and Metabolic    Class 1 obesity due to excess calories with serious comorbidity and body mass index (BMI) of 34.0 to 34.9 in adult    Thyroid nodule       Gastrointestinal Abdominal     Ulcerative colitis (HCC)       Genitourinary and Reproductive     Erectile dysfunction       Pulmonary and Pneumonias    Mild intermittent asthma without complication       Sleep    BREANN on CPAP      Other Visit Diagnoses     Type 2 diabetes mellitus with hyperglycemia, with long-term current use of insulin ([facility])    -  Primary    Relevant Medications    Continuous Blood Gluc Sensor    Continuous Blood Gluc Transmit misc    Continuous Blood Gluc  device    Other  Relevant Orders    Hemoglobin A1c    Microalbumin / Creatinine Urine Ratio - Urine, Clean Catch    Microcytic anemia        Relevant Orders    Iron Profile    Vertigo        Relevant Medications    meclizine (ANTIVERT) 12.5 MG tablet    Pre-operative clearance        Relevant Orders    Ambulatory Referral to Cardiology      Diagnoses       Codes Comments    Type 2 diabetes mellitus with hyperglycemia, with long-term current use of insulin (HCC)    -  Primary ICD-10-CM: E11.65, Z79.4  ICD-9-CM: 250.00, 790.29, V58.67     Coronary artery disease involving native coronary artery of native heart without angina pectoris     ICD-10-CM: I25.10  ICD-9-CM: 414.01     S/P CABG (coronary artery bypass graft)     ICD-10-CM: Z95.1  ICD-9-CM: V45.81     Essential hypertension     ICD-10-CM: I10  ICD-9-CM: 401.9     Mixed hyperlipidemia     ICD-10-CM: E78.2  ICD-9-CM: 272.2     Ulcerative pancolitis with complication (HCC)     ICD-10-CM: K51.019  ICD-9-CM: 556.6     Erectile dysfunction, unspecified erectile dysfunction type     ICD-10-CM: N52.9  ICD-9-CM: 607.84     Mild intermittent asthma without complication     ICD-10-CM: J45.20  ICD-9-CM: 493.90     BREANN on CPAP     ICD-10-CM: G47.33, Z99.89  ICD-9-CM: 327.23, V46.8     Class 1 obesity due to excess calories with serious comorbidity and body mass index (BMI) of 34.0 to 34.9 in adult     ICD-10-CM: E66.09, Z68.34  ICD-9-CM: 278.00, V85.34     Thyroid nodule     ICD-10-CM: E04.1  ICD-9-CM: 241.0     Microcytic anemia     ICD-10-CM: D50.9  ICD-9-CM: 280.9     Vertigo     ICD-10-CM: R42  ICD-9-CM: 780.4     Pre-operative clearance     ICD-10-CM: Z01.818  ICD-9-CM: V72.84         Plan:    1.  Hypertension: Chronic, uncontrolled.  Worsening. Advised not to take lisinopril PRN - take 5mg daily and work up to 10mg daily if still above goal - pt will moniotr     2.  Hyperlipidemia: Chronic, stable.  Reviewed lipid panel.  Continue on statin.    3.  Diabetes mellitus: Chronic,  uncontrolled with hyperglycemia, will check labs and adjust dosing of short acting insulin if still uncontrolled     4.  Obesity: Patient's Body mass index is 34.88 kg/m². indicating that he is obese (BMI >30). Obesity-related health conditions include the following: obstructive sleep apnea, hypertension, coronary heart disease, diabetes mellitus and dyslipidemias. Obesity is worsening. BMI is is above average; BMI management plan is completed. We discussed portion control and increasing exercise..    5.  Obstructive sleep apnea: Chronic, stable.  Continue on CPAP.    6.  Asthma: Chronic, stable.  Continue on current inhalers.  Keep follow-up as scheduled with pulmonology.    7.  Thyroid nodule: Following with ENT. Pt would like to move forward with thyroidectomy - message sent to dr Galvez, ref to cardio for clearance     8.  ED: Chronic, stable.  Following with urology.  Had penile pump implanted.    #9 CAD status post CABG: Chronic, stable.  Following with cardiology.  Currently on aspirin, statin, beta-blocker, ACE.  Advised on risk factor reduction.  Keep follow-up as scheduled.    10.  Ulcerative colitis: improving, continue with GI as scheduled     Greater than 60 minutes spent in review of records, discussion with pt, care coordination and documentation on day of procedure         This document has been electronically signed by Monique Carlos MD on January 7, 2022 11:42 CST

## 2022-01-07 NOTE — PATIENT INSTRUCTIONS
Type 2 Diabetes Mellitus, Diagnosis, Adult  Type 2 diabetes (type 2 diabetes mellitus) is a long-term, or chronic, disease. In type 2 diabetes, one or both of these problems may be present:  · The pancreas does not make enough of a hormone called insulin.  · Cells in the body do not respond properly to insulin that the body makes (insulin resistance).  Normally, insulin allows blood sugar (glucose) to enter cells in the body. The cells use glucose for energy. Insulin resistance or lack of insulin causes excess glucose to build up in the blood instead of going into cells. This causes high blood glucose (hyperglycemia).   What are the causes?  The exact cause of type 2 diabetes is not known.  What increases the risk?  The following factors may make you more likely to develop this condition:  · Having a family member with type 2 diabetes.  · Being overweight or obese.  · Being inactive (sedentary).  · Having been diagnosed with insulin resistance.  · Having a history of prediabetes, diabetes when you were pregnant (gestational diabetes), or polycystic ovary syndrome (PCOS).  What are the signs or symptoms?  In the early stage of this condition, you may not have symptoms. Symptoms develop slowly and may include:  · Increased thirst or hunger.  · Increased urination.  · Unexplained weight loss.  · Tiredness (fatigue) or weakness.  · Vision changes, such as blurry vision.  · Dark patches on the skin.  How is this diagnosed?  This condition is diagnosed based on your symptoms, your medical history, a physical exam, and your blood glucose level. Your blood glucose may be checked with one or more of the following blood tests:  · A fasting blood glucose (FBG) test. You will not be allowed to eat (you will fast) for 8 hours or longer before a blood sample is taken.  · A random blood glucose test. This test checks blood glucose at any time of day regardless of when you ate.  · An A1C (hemoglobin A1C) blood test. This test  provides information about blood glucose levels over the previous 2-3 months.  · An oral glucose tolerance test (OGTT). This test measures your blood glucose at two times:  ? After fasting. This is your baseline blood glucose level.  ? Two hours after drinking a beverage that contains glucose.  You may be diagnosed with type 2 diabetes if:  · Your fasting blood glucose level is 126 mg/dL (7.0 mmol/L) or higher.  · Your random blood glucose level is 200 mg/dL (11.1 mmol/L) or higher.  · Your A1C level is 6.5% or higher.  · Your oral glucose tolerance test result is higher than 200 mg/dL (11.1 mmol/L).  These blood tests may be repeated to confirm your diagnosis.  How is this treated?  Your treatment may be managed by a specialist called an endocrinologist. Type 2 diabetes may be treated by following instructions from your health care provider about:  · Making dietary and lifestyle changes. These may include:  ? Following a personalized nutrition plan that is developed by a registered dietitian.  ? Exercising regularly.  ? Finding ways to manage stress.  · Checking your blood glucose level as often as told.  · Taking diabetes medicines or insulin daily. This helps to keep your blood glucose levels in the healthy range.  · Taking medicines to help prevent complications from diabetes. Medicines may include:  ? Aspirin.  ? Medicine to lower cholesterol.  ? Medicine to control blood pressure.  Your health care provider will set treatment goals for you. Your goals will be based on your age, other medical conditions you have, and how you respond to diabetes treatment. Generally, the goal of treatment is to maintain the following blood glucose levels:  · Before meals:  mg/dL (4.4-7.2 mmol/L).  · After meals: below 180 mg/dL (10 mmol/L).  · A1C level: less than 7%.  Follow these instructions at home:  Questions to ask your health care provider  Consider asking the following questions:  · Should I meet with a certified  diabetes care and ?  · What diabetes medicines do I need, and when should I take them?  · What equipment will I need to manage my diabetes at home?  · How often do I need to check my blood glucose?  · Where can I find a support group for people with diabetes?  · What number can I call if I have questions?  · When is my next appointment?  General instructions  · Take over-the-counter and prescription medicines only as told by your health care provider.  · Keep all follow-up visits as told by your health care provider. This is important.  Where to find more information  · American Diabetes Association (ADA): www.diabetes.org  · American Association of Diabetes Care and Education Specialists (ADCES): www.diabeteseducator.org  · International Diabetes Federation (IDF): www.idf.org  Contact a health care provider if:  · Your blood glucose is at or above 240 mg/dL (13.3 mmol/L) for 2 days in a row.  · You have been sick or have had a fever for 2 days or longer, and you are not getting better.  · You have any of the following problems for more than 6 hours:  ? You cannot eat or drink.  ? You have nausea and vomiting.  ? You have diarrhea.  Get help right away if:  · You have severe hypoglycemia. This means your blood glucose is lower than 54 mg/dL (3.0 mmol/L).  · You become confused or you have trouble thinking clearly.  · You have difficulty breathing.  · You have moderate or large ketone levels in your urine.  These symptoms may represent a serious problem that is an emergency. Do not wait to see if the symptoms will go away. Get medical help right away. Call your local emergency services (911 in the [place] Do not drive yourself to the hospital.  Summary  · Type 2 diabetes (type 2 diabetes mellitus) is a long-term, or chronic, disease. In type 2 diabetes, the pancreas does not make enough of a hormone called insulin, or cells in the body do not respond properly to insulin that the body makes (insulin  resistance).  · This condition is treated by making dietary and lifestyle changes and taking diabetes medicines or insulin.  · Your health care provider will set treatment goals for you. Your goals will be based on your age, other medical conditions you have, and how you respond to diabetes treatment.  · Keep all follow-up visits as told by your health care provider. This is important.  This information is not intended to replace advice given to you by your health care provider. Make sure you discuss any questions you have with your health care provider.  Document Revised: 07/14/2021 Document Reviewed: 07/14/2021  [employer] Patient Education © 2021 [employer]      Obesity, Adult  Obesity is the condition of having too much total body fat. Being overweight or obese means that your weight is greater than what is considered healthy for your body size. Obesity is determined by a measurement called BMI. BMI is an estimate of body fat and is calculated from height and weight. For adults, a BMI of 30 or higher is considered obese.  Obesity can lead to other health concerns and major illnesses, including:  · Stroke.  · Coronary artery disease (CAD).  · Type 2 diabetes.  · Some types of cancer, including cancers of the colon, breast, uterus, and gallbladder.  · Osteoarthritis.  · High blood pressure (hypertension).  · High cholesterol.  · Sleep apnea.  · Gallbladder stones.  · Infertility problems.  What are the causes?  Common causes of this condition include:  · Eating daily meals that are high in calories, sugar, and fat.  · Being born with genes that may make you more likely to become obese.  · Having a medical condition that causes obesity, including:  ? Hypothyroidism.  ? Polycystic ovarian syndrome (PCOS).  ? Binge-eating disorder.  ? Cushing syndrome.  · Taking certain medicines, such as steroids, antidepressants, and seizure medicines.  · Not being physically active (sedentary lifestyle).  · Not getting enough  sleep.  · Drinking high amounts of sugar-sweetened beverages, such as soft drinks.  What increases the risk?  The following factors may make you more likely to develop this condition:  · Having a family history of obesity.  · Being a woman of  descent.  · Being a man of  descent.  · Living in an area with limited access to:  ? [place], recreation centers, or sidewalks.  ? Healthy food choices, such as grocery stores and [employer] markets.  What are the signs or symptoms?  The main sign of this condition is having too much body fat.  How is this diagnosed?  This condition is diagnosed based on:  · Your BMI. If you are an adult with a BMI of 30 or higher, you are considered obese.  · Your waist circumference. This measures the distance around your waistline.  · Your skinfold thickness. Your health care provider may gently pinch a fold of your skin and measure it.  You may have other tests to check for underlying conditions.  How is this treated?  Treatment for this condition often includes changing your lifestyle. Treatment may include some or all of the following:  · Dietary changes. This may include developing a healthy meal plan.  · Regular physical activity. This may include activity that causes your heart to beat faster (aerobic exercise) and strength training. Work with your health care provider to design an exercise program that works for you.  · Medicine to help you lose weight if you are unable to lose 1 pound a week after 6 weeks of healthy eating and more physical activity.  · Treating conditions that cause the obesity (underlying conditions).  · Surgery. Surgical options may include gastric banding and gastric bypass. Surgery may be done if:  ? Other treatments have not helped to improve your condition.  ? You have a BMI of 40 or higher.  ? You have life-threatening health problems related to obesity.  Follow these instructions at home:  Eating and drinking    · Follow recommendations  from your health care provider about what you eat and drink. Your health care provider may advise you to:  ? Limit fast food, sweets, and processed snack foods.  ? Choose low-fat options, such as low-fat milk instead of whole milk.  ? Eat 5 or more servings of fruits or vegetables every day.  ? Eat at home more often. This gives you more control over what you eat.  ? Choose healthy foods when you eat out.  ? Learn to read food labels. This will help you understand how much food is considered 1 serving.  ? Learn what a healthy serving size is.  ? Keep low-fat snacks available.  ? Limit sugary drinks, such as soda, fruit juice, sweetened iced tea, and flavored milk.  · Drink enough water to keep your urine pale yellow.  · Do not follow a fad diet. Fad diets can be unhealthy and even dangerous.    Physical activity  · Exercise regularly, as told by your health care provider.  ? Most adults should get up to 150 minutes of moderate-intensity exercise every week.  ? Ask your health care provider what types of exercise are safe for you and how often you should exercise.  · Warm up and stretch before being active.  · Cool down and stretch after being active.  · Rest between periods of activity.  Lifestyle  · Work with your health care provider and a dietitian to set a weight-loss goal that is healthy and reasonable for you.  · Limit your screen time.  · Find ways to reward yourself that do not involve food.  · Do not drink alcohol if:  ? Your health care provider tells you not to drink.  ? You are pregnant, may be pregnant, or are planning to become pregnant.  · If you drink alcohol:  ? Limit how much you use to:  § 0-1 drink a day for women.  § 0-2 drinks a day for men.  ? Be aware of how much alcohol is in your drink. In the [place] one drink equals one 12 oz bottle of beer (355 mL), one 5 oz glass of wine (148 mL), or one 1½ oz glass of hard liquor (44 mL).  General instructions  · Keep a weight-loss journal to keep track  "of the food you eat and how much exercise you get.  · Take over-the-counter and prescription medicines only as told by your health care provider.  · Take vitamins and supplements only as told by your health care provider.  · Consider joining a support group. Your health care provider may be able to recommend a support group.  · Keep all follow-up visits as told by your health care provider. This is important.  Contact a health care provider if:  · You are unable to meet your weight loss goal after 6 weeks of dietary and lifestyle changes.  Get help right away if you are having:  · Trouble breathing.  · Suicidal thoughts or behaviors.  Summary  · Obesity is the condition of having too much total body fat.  · Being overweight or obese means that your weight is greater than what is considered healthy for your body size.  · Work with your health care provider and a dietitian to set a weight-loss goal that is healthy and reasonable for you.  · Exercise regularly, as told by your health care provider. Ask your health care provider what types of exercise are safe for you and how often you should exercise.  This information is not intended to replace advice given to you by your health care provider. Make sure you discuss any questions you have with your health care provider.  Document Revised: 08/22/2019 Document Reviewed: 08/22/2019  [employer] Patient Education © 2021 [employer]      https://www.nhlbi.nih.gov/files/docs/public/heart/dash_brief.pdf\">   DASH Eating Plan  DASH stands for Dietary Approaches to Stop Hypertension. The DASH eating plan is a healthy eating plan that has been shown to:  · Reduce high blood pressure (hypertension).  · Reduce your risk for type 2 diabetes, heart disease, and stroke.  · Help with weight loss.  What are tips for following this plan?  Reading food labels  · Check food labels for the amount of salt (sodium) per serving. Choose foods with less than 5 percent of the Daily Value of " "sodium. Generally, foods with less than 300 milligrams (mg) of sodium per serving fit into this eating plan.  · To find whole grains, look for the word \"whole\" as the first word in the ingredient list.  Shopping  · Buy products labeled as \"low-sodium\" or \"no salt added.\"  · Buy fresh foods. Avoid canned foods and pre-made or frozen meals.  Cooking  · Avoid adding salt when cooking. Use salt-free seasonings or herbs instead of table salt or sea salt. Check with your health care provider or pharmacist before using salt substitutes.  · Do not morfin foods. Cook foods using healthy methods such as baking, boiling, grilling, roasting, and broiling instead.  · Cook with heart-healthy oils, such as olive, canola, avocado, soybean, or sunflower oil.  Meal planning    · Eat a balanced diet that includes:  ? 4 or more servings of fruits and 4 or more servings of vegetables each day. Try to fill one-half of your plate with fruits and vegetables.  ? 6-8 servings of whole grains each day.  ? Less than 6 oz (170 g) of lean meat, poultry, or fish each day. A 3-oz (85-g) serving of meat is about the same size as a deck of cards. One egg equals 1 oz (28 g).  ? 2-3 servings of low-fat dairy each day. One serving is 1 cup (237 mL).  ? 1 serving of nuts, seeds, or beans 5 times each week.  ? 2-3 servings of heart-healthy fats. Healthy fats called omega-3 fatty acids are found in foods such as walnuts, flaxseeds, fortified milks, and eggs. These fats are also found in cold-water fish, such as sardines, salmon, and mackerel.  · Limit how much you eat of:  ? Canned or prepackaged foods.  ? Food that is high in trans fat, such as some fried foods.  ? Food that is high in saturated fat, such as fatty meat.  ? Desserts and other sweets, sugary drinks, and other foods with added sugar.  ? Full-fat dairy products.  · Do not salt foods before eating.  · Do not eat more than 4 egg yolks a week.  · Try to eat at least 2 vegetarian meals a " week.  · Eat more home-cooked food and less restaurant, buffet, and fast food.    Lifestyle  · When eating at a restaurant, ask that your food be prepared with less salt or no salt, if possible.  · If you drink alcohol:  ? Limit how much you use to:  § 0-1 drink a day for women who are not pregnant.  § 0-2 drinks a day for men.  ? Be aware of how much alcohol is in your drink. In the [place] one drink equals one 12 oz bottle of beer (355 mL), one 5 oz glass of wine (148 mL), or one 1½ oz glass of hard liquor (44 mL).  General information  · Avoid eating more than 2,300 mg of salt a day. If you have hypertension, you may need to reduce your sodium intake to 1,500 mg a day.  · Work with your health care provider to maintain a healthy body weight or to lose weight. Ask what an ideal weight is for you.  · Get at least 30 minutes of exercise that causes your heart to beat faster (aerobic exercise) most days of the week. Activities may include walking, swimming, or biking.  · Work with your health care provider or dietitian to adjust your eating plan to your individual calorie needs.  What foods should I eat?  Fruits  All fresh, dried, or frozen fruit. Canned fruit in natural juice (without added sugar).  Vegetables  Fresh or frozen vegetables (raw, steamed, roasted, or grilled). Low-sodium or reduced-sodium tomato and vegetable juice. Low-sodium or reduced-sodium tomato sauce and tomato paste. Low-sodium or reduced-sodium canned vegetables.  Grains  Whole-grain or whole-wheat bread. Whole-grain or whole-wheat pasta. Brown rice. Oatmeal. Quinoa. Bulgur. Whole-grain and low-sodium cereals. Sarah bread. Low-fat, low-sodium crackers. Whole-wheat flour tortillas.  Meats and other proteins  Skinless chicken or turkey. Ground chicken or turkey. Pork with fat trimmed off. Fish and seafood. Egg whites. Dried beans, peas, or lentils. Unsalted nuts, nut butters, and seeds. Unsalted canned beans. Lean cuts of beef with fat trimmed off.  Low-sodium, lean precooked or cured meat, such as sausages or meat loaves.  Dairy  Low-fat (1%) or fat-free (skim) milk. Reduced-fat, low-fat, or fat-free cheeses. Nonfat, low-sodium ricotta or cottage cheese. Low-fat or nonfat yogurt. Low-fat, low-sodium cheese.  Fats and oils  Soft margarine without trans fats. Vegetable oil. Reduced-fat, low-fat, or light mayonnaise and salad dressings (reduced-sodium). Canola, safflower, olive, avocado, soybean, and sunflower oils. Avocado.  Seasonings and condiments  Herbs. Spices. Seasoning mixes without salt.  Other foods  Unsalted popcorn and pretzels. Fat-free sweets.  The items listed above may not be a complete list of foods and beverages you can eat. Contact a dietitian for more information.  What foods should I avoid?  Fruits  Canned fruit in a light or heavy syrup. Fried fruit. Fruit in cream or butter sauce.  Vegetables  Creamed or fried vegetables. Vegetables in a cheese sauce. Regular canned vegetables (not low-sodium or reduced-sodium). Regular canned tomato sauce and paste (not low-sodium or reduced-sodium). Regular tomato and vegetable juice (not low-sodium or reduced-sodium). Pickles. Olives.  Grains  Baked goods made with fat, such as croissants, muffins, or some breads. Dry pasta or rice meal packs.  Meats and other proteins  Fatty cuts of meat. Ribs. Fried meat. Ricks. Bologna, salami, and other precooked or cured meats, such as sausages or meat loaves. Fat from the back of a pig (fatback). Bratwurst. Salted nuts and seeds. Canned beans with added salt. Canned or smoked fish. Whole eggs or egg yolks. Chicken or turkey with skin.  Dairy  Whole or 2% milk, cream, and half-and-half. Whole or full-fat cream cheese. Whole-fat or sweetened yogurt. Full-fat cheese. Nondairy creamers. Whipped toppings. Processed cheese and cheese spreads.  Fats and oils  Butter. Stick margarine. Lard. Shortening. Ghee. Ricks fat. Tropical oils, such as coconut, palm kernel, or palm  oil.  Seasonings and condiments  Onion salt, garlic salt, seasoned salt, table salt, and sea salt. Worcestershire sauce. Tartar sauce. Barbecue sauce. Teriyaki sauce. Soy sauce, including reduced-sodium. Steak sauce. Canned and packaged gravies. Fish sauce. Oyster sauce. Cocktail sauce. Store-bought horseradish. Ketchup. Mustard. Meat flavorings and tenderizers. Bouillon cubes. Hot sauces. Pre-made or packaged marinades. Pre-made or packaged taco seasonings. Relishes. Regular salad dressings.  Other foods  Salted popcorn and pretzels.  The items listed above may not be a complete list of foods and beverages you should avoid. Contact a dietitian for more information.  Where to find more information  · National Heart, Lung, and Blood [place]: www.nhlbi.nih.gov  · American Heart Association: www.heart.org  · Academy of Nutrition and Dietetics: www.eatright.org  · National Kidney Foundation: www.kidney.org  Summary  · The DASH eating plan is a healthy eating plan that has been shown to reduce high blood pressure (hypertension). It may also reduce your risk for type 2 diabetes, heart disease, and stroke.  · When on the DASH eating plan, aim to eat more fresh fruits and vegetables, whole grains, lean proteins, low-fat dairy, and heart-healthy fats.  · With the DASH eating plan, you should limit salt (sodium) intake to 2,300 mg a day. If you have hypertension, you may need to reduce your sodium intake to 1,500 mg a day.  · Work with your health care provider or dietitian to adjust your eating plan to your individual calorie needs.  This information is not intended to replace advice given to you by your health care provider. Make sure you discuss any questions you have with your health care provider.  Document Revised: 11/20/2020 Document Reviewed: 11/20/2020  [employer] Patient Education © 2021 [employer]      Hypertension, Adult  High blood pressure (hypertension) is when the force of blood pumping through the arteries  "is too strong. The arteries are the blood vessels that carry blood from the heart throughout the body. Hypertension forces the heart to work harder to pump blood and may cause arteries to become narrow or stiff. Untreated or uncontrolled hypertension can cause a heart attack, heart failure, a stroke, kidney disease, and other problems.  A blood pressure reading consists of a higher number over a lower number. Ideally, your blood pressure should be below 120/80. The first (\"top\") number is called the systolic pressure. It is a measure of the pressure in your arteries as your heart beats. The second (\"bottom\") number is called the diastolic pressure. It is a measure of the pressure in your arteries as the heart relaxes.  What are the causes?  The exact cause of this condition is not known. There are some conditions that result in or are related to high blood pressure.  What increases the risk?  Some risk factors for high blood pressure are under your control. The following factors may make you more likely to develop this condition:  · Smoking.  · Having type 2 diabetes mellitus, high cholesterol, or both.  · Not getting enough exercise or physical activity.  · Being overweight.  · Having too much fat, sugar, calories, or salt (sodium) in your diet.  · Drinking too much alcohol.  Some risk factors for high blood pressure may be difficult or impossible to change. Some of these factors include:  · Having chronic kidney disease.  · Having a family history of high blood pressure.  · Age. Risk increases with age.  · Race. You may be at higher risk if you are .  · Gender. Men are at higher risk than women before age 45. After age 65, women are at higher risk than men.  · Having obstructive sleep apnea.  · Stress.  What are the signs or symptoms?  High blood pressure may not cause symptoms. Very high blood pressure (hypertensive crisis) may cause:  · Headache.  · Anxiety.  · Shortness of " breath.  · Nosebleed.  · Nausea and vomiting.  · Vision changes.  · Severe chest pain.  · Seizures.  How is this diagnosed?  This condition is diagnosed by measuring your blood pressure while you are seated, with your arm resting on a flat surface, your legs uncrossed, and your feet flat on the floor. The cuff of the blood pressure monitor will be placed directly against the skin of your upper arm at the level of your heart. It should be measured at least twice using the same arm. Certain conditions can cause a difference in blood pressure between your right and left arms.  Certain factors can cause blood pressure readings to be lower or higher than normal for a short period of time:  · When your blood pressure is higher when you are in a health care provider's office than when you are at home, this is called white coat hypertension. Most people with this condition do not need medicines.  · When your blood pressure is higher at home than when you are in a health care provider's office, this is called masked hypertension. Most people with this condition may need medicines to control blood pressure.  If you have a high blood pressure reading during one visit or you have normal blood pressure with other risk factors, you may be asked to:  · Return on a different day to have your blood pressure checked again.  · Monitor your blood pressure at home for 1 week or longer.  If you are diagnosed with hypertension, you may have other blood or imaging tests to help your health care provider understand your overall risk for other conditions.  How is this treated?  This condition is treated by making healthy lifestyle changes, such as eating healthy foods, exercising more, and reducing your alcohol intake. Your health care provider may prescribe medicine if lifestyle changes are not enough to get your blood pressure under control, and if:  · Your systolic blood pressure is above 130.  · Your diastolic blood pressure is above  80.  Your personal target blood pressure may vary depending on your medical conditions, your age, and other factors.  Follow these instructions at home:  Eating and drinking    · Eat a diet that is high in fiber and potassium, and low in sodium, added sugar, and fat. An example eating plan is called the DASH (Dietary Approaches to Stop Hypertension) diet. To eat this way:  ? Eat plenty of fresh fruits and vegetables. Try to fill one half of your plate at each meal with fruits and vegetables.  ? Eat whole grains, such as whole-wheat pasta, brown rice, or whole-grain bread. Fill about one fourth of your plate with whole grains.  ? Eat or drink low-fat dairy products, such as skim milk or low-fat yogurt.  ? Avoid fatty cuts of meat, processed or cured meats, and poultry with skin. Fill about one fourth of your plate with lean proteins, such as fish, chicken without skin, beans, eggs, or tofu.  ? Avoid pre-made and processed foods. These tend to be higher in sodium, added sugar, and fat.  · Reduce your daily sodium intake. Most people with hypertension should eat less than 1,500 mg of sodium a day.  · Do not drink alcohol if:  ? Your health care provider tells you not to drink.  ? You are pregnant, may be pregnant, or are planning to become pregnant.  · If you drink alcohol:  ? Limit how much you use to:  § 0-1 drink a day for women.  § 0-2 drinks a day for men.  ? Be aware of how much alcohol is in your drink. In the [place] one drink equals one 12 oz bottle of beer (355 mL), one 5 oz glass of wine (148 mL), or one 1½ oz glass of hard liquor (44 mL).    Lifestyle    · Work with your health care provider to maintain a healthy body weight or to lose weight. Ask what an ideal weight is for you.  · Get at least 30 minutes of exercise most days of the week. Activities may include walking, swimming, or biking.  · Include exercise to strengthen your muscles (resistance exercise), such as Pilates or lifting weights, as part of  your weekly exercise routine. Try to do these types of exercises for 30 minutes at least 3 days a week.  · Do not use any products that contain nicotine or tobacco, such as cigarettes, e-cigarettes, and chewing tobacco. If you need help quitting, ask your health care provider.  · Monitor your blood pressure at home as told by your health care provider.  · Keep all follow-up visits as told by your health care provider. This is important.    Medicines  · Take over-the-counter and prescription medicines only as told by your health care provider. Follow directions carefully. Blood pressure medicines must be taken as prescribed.  · Do not skip doses of blood pressure medicine. Doing this puts you at risk for problems and can make the medicine less effective.  · Ask your health care provider about side effects or reactions to medicines that you should watch for.  Contact a health care provider if you:  · Think you are having a reaction to a medicine you are taking.  · Have headaches that keep coming back (recurring).  · Feel dizzy.  · Have swelling in your ankles.  · Have trouble with your vision.  Get help right away if you:  · Develop a severe headache or confusion.  · Have unusual weakness or numbness.  · Feel faint.  · Have severe pain in your chest or abdomen.  · Vomit repeatedly.  · Have trouble breathing.  Summary  · Hypertension is when the force of blood pumping through your arteries is too strong. If this condition is not controlled, it may put you at risk for serious complications.  · Your personal target blood pressure may vary depending on your medical conditions, your age, and other factors. For most people, a normal blood pressure is less than 120/80.  · Hypertension is treated with lifestyle changes, medicines, or a combination of both. Lifestyle changes include losing weight, eating a healthy, low-sodium diet, exercising more, and limiting alcohol.  This information is not intended to replace advice given  to you by your health care provider. Make sure you discuss any questions you have with your health care provider.  Document Revised: 08/28/2019 Document Reviewed: 08/28/2019  Elsevier Patient Education © 2021 [employer]

## 2022-01-14 ENCOUNTER — LAB (OUTPATIENT)
Dept: LAB | Facility: HOSPITAL | Age: 82
End: 2022-01-14

## 2022-01-14 LAB
ALBUMIN SERPL-MCNC: 3.9 G/DL (ref 3.5–5.2)
ALBUMIN UR-MCNC: 4.7 MG/DL
ALBUMIN/GLOB SERPL: 1.1 G/DL
ALP SERPL-CCNC: 75 U/L (ref 39–117)
ALT SERPL W P-5'-P-CCNC: 17 U/L (ref 1–41)
ANION GAP SERPL CALCULATED.3IONS-SCNC: 8 MMOL/L (ref 5–15)
AST SERPL-CCNC: 31 U/L (ref 1–40)
BILIRUB SERPL-MCNC: 0.2 MG/DL (ref 0–1.2)
BUN SERPL-MCNC: 19 MG/DL (ref 8–23)
BUN/CREAT SERPL: 20.2 (ref 7–25)
CALCIUM SPEC-SCNC: 8.7 MG/DL (ref 8.6–10.5)
CHLORIDE SERPL-SCNC: 105 MMOL/L (ref 98–107)
CHOLEST SERPL-MCNC: 111 MG/DL (ref 0–200)
CO2 SERPL-SCNC: 27 MMOL/L (ref 22–29)
CREAT SERPL-MCNC: 0.94 MG/DL (ref 0.76–1.27)
CREAT UR-MCNC: 202.3 MG/DL
DEPRECATED RDW RBC AUTO: 51.2 FL (ref 37–54)
ERYTHROCYTE [DISTWIDTH] IN BLOOD BY AUTOMATED COUNT: 18.1 % (ref 12.3–15.4)
GFR SERPL CREATININE-BSD FRML MDRD: 77 ML/MIN/1.73
GLOBULIN UR ELPH-MCNC: 3.7 GM/DL
GLUCOSE SERPL-MCNC: 124 MG/DL (ref 65–99)
HBA1C MFR BLD: 7 % (ref 4.8–5.6)
HCT VFR BLD AUTO: 37.9 % (ref 37.5–51)
HDLC SERPL-MCNC: 52 MG/DL (ref 40–60)
HGB BLD-MCNC: 10.8 G/DL (ref 13–17.7)
IRON 24H UR-MRATE: 22 MCG/DL (ref 59–158)
IRON SATN MFR SERPL: 5 % (ref 20–50)
LDLC SERPL CALC-MCNC: 43 MG/DL (ref 0–100)
LDLC/HDLC SERPL: 0.82 {RATIO}
MCH RBC QN AUTO: 22.5 PG (ref 26.6–33)
MCHC RBC AUTO-ENTMCNC: 28.5 G/DL (ref 31.5–35.7)
MCV RBC AUTO: 78.8 FL (ref 79–97)
MICROALBUMIN/CREAT UR: 23.2 MG/G
PLATELET # BLD AUTO: 300 10*3/MM3 (ref 140–450)
PMV BLD AUTO: 9.9 FL (ref 6–12)
POTASSIUM SERPL-SCNC: 4.3 MMOL/L (ref 3.5–5.2)
PROT SERPL-MCNC: 7.6 G/DL (ref 6–8.5)
RBC # BLD AUTO: 4.81 10*6/MM3 (ref 4.14–5.8)
SODIUM SERPL-SCNC: 140 MMOL/L (ref 136–145)
TIBC SERPL-MCNC: 450 MCG/DL (ref 298–536)
TRANSFERRIN SERPL-MCNC: 302 MG/DL (ref 200–360)
TRIGL SERPL-MCNC: 82 MG/DL (ref 0–150)
VLDLC SERPL-MCNC: 16 MG/DL (ref 5–40)
WBC NRBC COR # BLD: 9.64 10*3/MM3 (ref 3.4–10.8)

## 2022-01-14 PROCEDURE — 80061 LIPID PANEL: CPT | Performed by: FAMILY MEDICINE

## 2022-01-14 PROCEDURE — 83036 HEMOGLOBIN GLYCOSYLATED A1C: CPT | Performed by: FAMILY MEDICINE

## 2022-01-14 PROCEDURE — 80053 COMPREHEN METABOLIC PANEL: CPT | Performed by: FAMILY MEDICINE

## 2022-01-14 PROCEDURE — 82043 UR ALBUMIN QUANTITATIVE: CPT | Performed by: FAMILY MEDICINE

## 2022-01-14 PROCEDURE — 83540 ASSAY OF IRON: CPT | Performed by: FAMILY MEDICINE

## 2022-01-14 PROCEDURE — 84466 ASSAY OF TRANSFERRIN: CPT | Performed by: FAMILY MEDICINE

## 2022-01-14 PROCEDURE — 82570 ASSAY OF URINE CREATININE: CPT | Performed by: FAMILY MEDICINE

## 2022-01-14 PROCEDURE — 36415 COLL VENOUS BLD VENIPUNCTURE: CPT | Performed by: FAMILY MEDICINE

## 2022-01-14 PROCEDURE — 85027 COMPLETE CBC AUTOMATED: CPT | Performed by: FAMILY MEDICINE

## 2022-01-20 NOTE — PROGRESS NOTES
Subjective    Mr. Acosta is 81 y.o. male    Chief Complaint: Erectile Dysfunction    History of Present Illness    81-year-old male established patient with history of coronary artery disease, diabetes, hyperlipidemia, hypertension and history of enlarged prostate found to have prostatic oozing is likely source of prior hematuria follow-up status post Titan touch 3 piece inflatable penile implant 12/13/2021.      The following portions of the patient's history were reviewed and updated as appropriate: allergies, current medications, past family history, past medical history, past social history, past surgical history and problem list.    Review of Systems      Current Outpatient Medications:   •  aspirin (aspirin) 81 MG EC tablet, Take 1 tablet by mouth Daily., Disp: 30 tablet, Rfl: 11  •  azelastine (ASTELIN) 0.1 % nasal spray, 2 sprays into the nostril(s) as directed by provider 2 (Two) Times a Day. Use in each nostril as directed, Disp: 30 mL, Rfl: 2  •  budesonide-formoterol (Symbicort) 160-4.5 MCG/ACT inhaler, Inhale 2 puffs 2 (two) times a day., Disp: , Rfl:   •  Continuous Blood Gluc  device, 1 application Continuous., Disp: 1 each, Rfl: 0  •  Continuous Blood Gluc Sensor, Every 10 (Ten) Days., Disp: 3 each, Rfl: 11  •  Continuous Blood Gluc Transmit misc, 1 application Continuous., Disp: 1 each, Rfl: 0  •  docusate sodium (Colace) 100 MG capsule, Take 1 capsule by mouth 2 (Two) Times a Day., Disp: 60 capsule, Rfl: 0  •  Dupilumab 300 MG/2ML solution prefilled syringe, Inject  under the skin into the appropriate area as directed Every 14 (Fourteen) Days., Disp: , Rfl:   •  finasteride (PROSCAR) 5 MG tablet, Take 1 tablet by mouth Daily., Disp: 30 tablet, Rfl: 11  •  fluticasone (CUTIVATE) 0.005 % ointment, Apply 1 application topically to the appropriate area as directed 2 (Two) Times a Day., Disp: 60 g, Rfl: 2  •  fluticasone (FLONASE) 50 MCG/ACT nasal spray, 1 spray into the nostril(s) as  directed by provider Daily., Disp: 1 bottle, Rfl: 11  •  glipizide (GLUCOTROL) 10 MG tablet, Take 1 tablet by mouth 2 (Two) Times a Day Before Meals., Disp: 180 tablet, Rfl: 0  •  glucose blood test strip, 1 each by Other route 3 (Three) Times a Day As Needed (hypoglycemia). Use as instructed One Touch Ultra, Disp: 100 each, Rfl: 11  •  HYDROcodone-acetaminophen (NORCO) 7.5-325 MG per tablet, Take 1 tablet by mouth Every 4 (Four) Hours As Needed for Severe Pain  (postop pain)., Disp: 18 tablet, Rfl: 0  •  insulin aspart (NovoLOG FlexPen) 100 UNIT/ML solution pen-injector sc pen, Inject 5 Units under the skin into the appropriate area as directed 3 (Three) Times a Day With Meals., Disp: 5 pen, Rfl: 2  •  Insulin Glargine (Lantus SoloStar) 100 UNIT/ML injection pen, Inject 20 Units under the skin into the appropriate area as directed As Needed., Disp: , Rfl:   •  levalbuterol (Xopenex HFA) 45 MCG/ACT inhaler, Inhale 1-2 puffs Every 4 (Four) Hours As Needed for Wheezing or Shortness of Air., Disp: 15 g, Rfl: 11  •  lisinopril (PRINIVIL,ZESTRIL) 10 MG tablet, Take 1 tablet by mouth Daily., Disp: 90 tablet, Rfl: 1  •  meclizine (ANTIVERT) 12.5 MG tablet, Take 1 tablet by mouth 3 (Three) Times a Day As Needed for Dizziness., Disp: 30 tablet, Rfl: 0  •  Mesalamine 4 GM/60ML SF enema, Insert  into the rectum Daily., Disp: , Rfl:   •  metoprolol tartrate (LOPRESSOR) 25 MG tablet, Take 1 tablet by mouth 2 (Two) Times a Day., Disp: 180 tablet, Rfl: 0  •  naproxen (NAPROSYN) 250 MG tablet, Take 1 tablet by mouth 2 (Two) Times a Day With Meals., Disp: 60 tablet, Rfl: 0  •  nitroglycerin (NITROSTAT) 0.4 MG SL tablet, 1 under the tongue as needed for angina, may repeat q5mins for up three doses, Disp: 25 tablet, Rfl: 1  •  rosuvastatin (CRESTOR) 40 MG tablet, Take 1 tablet by mouth Daily., Disp: 90 tablet, Rfl: 3  •  triamcinolone (KENALOG) 0.1 % cream, Apply  topically to the appropriate area as directed 2 (Two) Times a Day.,  Disp: , Rfl:   •  Ustekinumab (STELARA) 90 MG/ML solution prefilled syringe Injection, Inject 90 mg under the skin into the appropriate area as directed Every 28 (Twenty-Eight) Days. (Patient taking differently: Inject 90 mg under the skin into the appropriate area as directed Take As Directed. EVERY 6 WEEKS), Disp: 1 mL, Rfl: 6    Past Medical History:   Diagnosis Date   • Asthma    • Coronary artery disease    • Diabetes mellitus (HCC)    • Elevated cholesterol    • Hyperlipidemia    • Hypertension    • Myocardial infarct (HCC)     [facility] 2020   • Sleep apnea     cpap at        Past Surgical History:   Procedure Laterality Date   • BACK SURGERY     • CARDIAC CATHETERIZATION      stents x 6   • CARDIAC SURGERY      CABG TRIPLE BYPASS 2012   • CATARACT EXTRACTION     • COLONOSCOPY     • COLONOSCOPY N/A 6/4/2021    Procedure: COLONOSCOPY WITH ANESTHESIA;  Surgeon: Zachariah Menjivar DO;  Location: [facility] ENDOSCOPY;  Service: Gastroenterology;  Laterality: N/A;  pre hx ulcerative colitis  post ulcerative colitis  dr collins gusman   • CORONARY ANGIOPLASTY WITH STENT PLACEMENT     • HIP SURGERY Right     total hip   • PENILE PROSTHESIS IMPLANT N/A 12/13/2021    Procedure: 3-PIECE INFLATABLE PENILE PROSTHESIS PLACEMENT;  Surgeon: Jose Tellez MD;  Location: [facility] OR;  Service: Urology;  Laterality: N/A;       Social History     Socioeconomic History   • Marital status:    Tobacco Use   • Smoking status: Never Smoker   • Smokeless tobacco: Never Used   Vaping Use   • Vaping Use: Never used   Substance and Sexual Activity   • Alcohol use: Never   • Drug use: Never   • Sexual activity: Defer       Family History   Problem Relation Age of Onset   • Colon cancer Brother    • Colon polyps Neg Hx    • Esophageal cancer Neg Hx        Objective    There were no vitals taken for this visit.    Physical Exam  Scrotal incision well-healed, no signs of infection.  Penile implant is in appropriate position with  cylinder tips at mid glans.  His implant inflates and deflates normally.  He was instructed on device use today.      Results for orders placed or performed in visit on 01/07/22   Iron Profile    Specimen: Blood   Result Value Ref Range    Iron 22 (L) 59 - 158 mcg/dL    Iron Saturation 5 (L) 20 - 50 %    Transferrin 302 200 - 360 mg/dL    TIBC 450 298 - 536 mcg/dL   CBC (No Diff)    Specimen: Blood   Result Value Ref Range    WBC 9.64 3.40 - 10.80 10*3/mm3    RBC 4.81 4.14 - 5.80 10*6/mm3    Hemoglobin 10.8 (L) 13.0 - 17.7 g/dL    Hematocrit 37.9 37.5 - 51.0 %    MCV 78.8 (L) 79.0 - 97.0 fL    MCH 22.5 (L) 26.6 - 33.0 pg    MCHC 28.5 (L) 31.5 - 35.7 g/dL    RDW 18.1 (H) 12.3 - 15.4 %    RDW-SD 51.2 37.0 - 54.0 fl    MPV 9.9 6.0 - 12.0 fL    Platelets 300 140 - 450 10*3/mm3   Hemoglobin A1c    Specimen: Blood   Result Value Ref Range    Hemoglobin A1C 7.00 (H) 4.80 - 5.60 %   Comprehensive Metabolic Panel    Specimen: Blood   Result Value Ref Range    Glucose 124 (H) 65 - 99 mg/dL    BUN 19 8 - 23 mg/dL    Creatinine 0.94 0.76 - 1.27 mg/dL    Sodium 140 136 - 145 mmol/L    Potassium 4.3 3.5 - 5.2 mmol/L    Chloride 105 98 - 107 mmol/L    CO2 27.0 22.0 - 29.0 mmol/L    Calcium 8.7 8.6 - 10.5 mg/dL    Total Protein 7.6 6.0 - 8.5 g/dL    Albumin 3.90 3.50 - 5.20 g/dL    ALT (SGPT) 17 1 - 41 U/L    AST (SGOT) 31 1 - 40 U/L    Alkaline Phosphatase 75 39 - 117 U/L    Total Bilirubin 0.2 0.0 - 1.2 mg/dL    eGFR Non African Amer 77 >60 mL/min/1.73    Globulin 3.7 gm/dL    A/G Ratio 1.1 g/dL    BUN/Creatinine Ratio 20.2 7.0 - 25.0    Anion Gap 8.0 5.0 - 15.0 mmol/L   Lipid Panel    Specimen: Blood   Result Value Ref Range    Total Cholesterol 111 0 - 200 mg/dL    Triglycerides 82 0 - 150 mg/dL    HDL Cholesterol 52 40 - 60 mg/dL    LDL Cholesterol  43 0 - 100 mg/dL    VLDL Cholesterol 16 5 - 40 mg/dL    LDL/HDL Ratio 0.82    Microalbumin / Creatinine Urine Ratio - Urine, Clean Catch    Specimen: Urine, Clean Catch   Result  Value Ref Range    Microalbumin/Creatinine Ratio 23.2 mg/g    Creatinine, Urine 202.3 mg/dL    Microalbumin, Urine 4.7 mg/dL     Assessment and Plan    Diagnoses and all orders for this visit:    1. Erectile dysfunction, unspecified erectile dysfunction type (Primary)  -     Cancel: POC Urinalysis Dipstick, Multipro    2. Gross hematuria  -     finasteride (PROSCAR) 5 MG tablet; Take 1 tablet by mouth Daily.  Dispense: 90 tablet; Refill: 3      Well-healed after three-piece inflatable penile implant.  Continue finasteride for his history of prostate bleeding.  Follow-up with me in 1 year or sooner as needed.      This document has been signed by ANTHONY Tellez MD on January 28, 2022 15:28 CST

## 2022-01-24 DIAGNOSIS — D50.9 MICROCYTIC ANEMIA: Primary | ICD-10-CM

## 2022-01-27 ENCOUNTER — OFFICE VISIT (OUTPATIENT)
Dept: UROLOGY | Facility: CLINIC | Age: 82
End: 2022-01-27

## 2022-01-27 DIAGNOSIS — R31.0 GROSS HEMATURIA: ICD-10-CM

## 2022-01-27 DIAGNOSIS — N52.9 ERECTILE DYSFUNCTION, UNSPECIFIED ERECTILE DYSFUNCTION TYPE: Primary | ICD-10-CM

## 2022-01-27 PROCEDURE — 99024 POSTOP FOLLOW-UP VISIT: CPT | Performed by: UROLOGY

## 2022-01-27 RX ORDER — FINASTERIDE 5 MG/1
5 TABLET, FILM COATED ORAL DAILY
Qty: 90 TABLET | Refills: 3 | Status: SHIPPED | OUTPATIENT
Start: 2022-01-27 | End: 2023-01-09

## 2022-03-21 DIAGNOSIS — D50.0 IRON DEFICIENCY ANEMIA DUE TO CHRONIC BLOOD LOSS: ICD-10-CM

## 2022-03-21 RX ORDER — SODIUM CHLORIDE 9 MG/ML
INJECTION, SOLUTION INTRAVENOUS CONTINUOUS
Status: CANCELLED | OUTPATIENT
Start: 2022-03-21

## 2022-03-21 RX ORDER — ACETAMINOPHEN 325 MG/1
650 TABLET ORAL
Status: CANCELLED | OUTPATIENT
Start: 2022-03-21

## 2022-03-21 RX ORDER — SODIUM CHLORIDE 0.9 % (FLUSH) 0.9 %
10 SYRINGE (ML) INJECTION PRN
Status: CANCELLED | OUTPATIENT
Start: 2022-03-21

## 2022-03-21 RX ORDER — DIPHENHYDRAMINE HYDROCHLORIDE 50 MG/ML
50 INJECTION INTRAMUSCULAR; INTRAVENOUS
Status: CANCELLED | OUTPATIENT
Start: 2022-03-21

## 2022-03-21 RX ORDER — HEPARIN SODIUM (PORCINE) LOCK FLUSH IV SOLN 100 UNIT/ML 100 UNIT/ML
500 SOLUTION INTRAVENOUS PRN
Status: CANCELLED | OUTPATIENT
Start: 2022-03-21

## 2022-03-21 RX ORDER — ONDANSETRON 2 MG/ML
8 INJECTION INTRAMUSCULAR; INTRAVENOUS
Status: CANCELLED | OUTPATIENT
Start: 2022-03-21

## 2022-03-24 ENCOUNTER — PATIENT MESSAGE (OUTPATIENT)
Dept: FAMILY MEDICINE CLINIC | Facility: CLINIC | Age: 82
End: 2022-03-24

## 2022-03-24 NOTE — TELEPHONE ENCOUNTER
From: Zachariah Acosta  To: Monique Carlos MD  Sent: 3/24/2022 1:53 PM CDT  Subject: Hematologist     Does [facility] have a Hematologist that you can refer Nannette and me to?  You said you wanted me to see a Hematologist.  Nannette would like to see one at [facility] also.  Zachariah Acosta

## 2022-03-24 NOTE — TELEPHONE ENCOUNTER
Yes they have had issues with scheduling at [facility] he wanted to wait until April and be seen at [facility].

## 2022-03-25 ENCOUNTER — TELEPHONE (OUTPATIENT)
Dept: FAMILY MEDICINE CLINIC | Facility: CLINIC | Age: 82
End: 2022-03-25

## 2022-03-25 NOTE — TELEPHONE ENCOUNTER
Regarding: Hematologist   ----- Message from Consuelo Ocasio MA sent at 3/24/2022  2:25 PM CDT -----  Pt was referred in Jan 2022- referral states that he wanted to wait until he could be seen at [facility]- any update on this?     ----- Message from Zachariah Acosta to Monique Carlos MD sent at 3/24/2022  2:53 PM -----   Does [facility] have a Hematologist that you can refer Nannette and me to?  You said you wanted me to see a Hematologist.  Nannette would like to see one at [facility] also.  Zachariah Acosta

## 2022-03-25 NOTE — TELEPHONE ENCOUNTER
I have spoke with patient regarding this message he will be waiting for referral to hematology at [facility].

## 2022-03-25 NOTE — TELEPHONE ENCOUNTER
Yes they had a provider leave and the new one will be starting in April this is per the  she advised that they will be calling to schedule these patients starting in April. I had reached out to this patient back a few weeks ago and provided update to see if he wanted to be referred elsewhere he declined.

## 2022-04-07 ENCOUNTER — PATIENT MESSAGE (OUTPATIENT)
Dept: FAMILY MEDICINE CLINIC | Facility: CLINIC | Age: 82
End: 2022-04-07

## 2022-04-07 ENCOUNTER — HOSPITAL ENCOUNTER (OUTPATIENT)
Dept: INFUSION THERAPY | Age: 82
Setting detail: INFUSION SERIES
Discharge: HOME | End: 2022-04-07
Payer: MEDICARE

## 2022-04-07 VITALS
SYSTOLIC BLOOD PRESSURE: 123 MMHG | TEMPERATURE: 98 F | RESPIRATION RATE: 18 BRPM | DIASTOLIC BLOOD PRESSURE: 69 MMHG | OXYGEN SATURATION: 98 % | HEART RATE: 66 BPM

## 2022-04-07 DIAGNOSIS — D50.0 IRON DEFICIENCY ANEMIA DUE TO CHRONIC BLOOD LOSS: Primary | ICD-10-CM

## 2022-04-07 DIAGNOSIS — K51.019 ULCERATIVE COLITIS, UNIVERSAL, UNSPECIFIED COMPLICATION (HCC): ICD-10-CM

## 2022-04-07 PROCEDURE — 96365 THER/PROPH/DIAG IV INF INIT: CPT

## 2022-04-07 PROCEDURE — 6360000002 HC RX W HCPCS: Performed by: NURSE PRACTITIONER

## 2022-04-07 PROCEDURE — 2580000003 HC RX 258: Performed by: NURSE PRACTITIONER

## 2022-04-07 RX ORDER — SODIUM CHLORIDE 9 MG/ML
INJECTION, SOLUTION INTRAVENOUS CONTINUOUS
Status: CANCELLED | OUTPATIENT
Start: 2022-04-14

## 2022-04-07 RX ORDER — ACETAMINOPHEN 325 MG/1
650 TABLET ORAL
Status: CANCELLED | OUTPATIENT
Start: 2022-04-14

## 2022-04-07 RX ORDER — DIPHENHYDRAMINE HYDROCHLORIDE 50 MG/ML
50 INJECTION INTRAMUSCULAR; INTRAVENOUS
Status: CANCELLED | OUTPATIENT
Start: 2022-04-14

## 2022-04-07 RX ORDER — ONDANSETRON 2 MG/ML
8 INJECTION INTRAMUSCULAR; INTRAVENOUS
Status: CANCELLED | OUTPATIENT
Start: 2022-04-14

## 2022-04-07 RX ORDER — SODIUM CHLORIDE 9 MG/ML
INJECTION, SOLUTION INTRAVENOUS CONTINUOUS
Status: ACTIVE | OUTPATIENT
Start: 2022-04-07 | End: 2022-04-07

## 2022-04-07 RX ORDER — SODIUM CHLORIDE 0.9 % (FLUSH) 0.9 %
10 SYRINGE (ML) INJECTION PRN
Status: DISCONTINUED | OUTPATIENT
Start: 2022-04-07 | End: 2022-04-09 | Stop reason: HOSPADM

## 2022-04-07 RX ORDER — HEPARIN SODIUM (PORCINE) LOCK FLUSH IV SOLN 100 UNIT/ML 100 UNIT/ML
500 SOLUTION INTRAVENOUS PRN
Status: CANCELLED | OUTPATIENT
Start: 2022-04-14

## 2022-04-07 RX ORDER — SODIUM CHLORIDE 0.9 % (FLUSH) 0.9 %
10 SYRINGE (ML) INJECTION PRN
Status: CANCELLED | OUTPATIENT
Start: 2022-04-14

## 2022-04-07 RX ADMIN — SODIUM CHLORIDE: 9 INJECTION, SOLUTION INTRAVENOUS at 09:56

## 2022-04-07 RX ADMIN — FERRIC CARBOXYMALTOSE INJECTION 750 MG: 50 INJECTION, SOLUTION INTRAVENOUS at 09:58

## 2022-04-07 NOTE — PROGRESS NOTES
FIRST INJECTAFER INFUSION COMPLETED WITH NO DIFFICULTIES. VSS.  DISCHARGE PAPERWORK WITH EDUCATION AND NEXT WEEKS APPOINTMENT GIVEN TO PT. PT VERBALIZED UNDERSTANDING

## 2022-04-14 ENCOUNTER — HOSPITAL ENCOUNTER (OUTPATIENT)
Dept: INFUSION THERAPY | Age: 82
Setting detail: INFUSION SERIES
Discharge: HOME | End: 2022-04-14
Payer: MEDICARE

## 2022-04-14 VITALS
RESPIRATION RATE: 16 BRPM | SYSTOLIC BLOOD PRESSURE: 124 MMHG | DIASTOLIC BLOOD PRESSURE: 63 MMHG | OXYGEN SATURATION: 95 % | TEMPERATURE: 97.9 F | HEART RATE: 68 BPM

## 2022-04-14 DIAGNOSIS — D50.0 IRON DEFICIENCY ANEMIA DUE TO CHRONIC BLOOD LOSS: Primary | ICD-10-CM

## 2022-04-14 DIAGNOSIS — K51.019 ULCERATIVE COLITIS, UNIVERSAL, UNSPECIFIED COMPLICATION (HCC): ICD-10-CM

## 2022-04-14 PROCEDURE — 96365 THER/PROPH/DIAG IV INF INIT: CPT

## 2022-04-14 PROCEDURE — 2580000003 HC RX 258: Performed by: NURSE PRACTITIONER

## 2022-04-14 PROCEDURE — 6360000002 HC RX W HCPCS: Performed by: NURSE PRACTITIONER

## 2022-04-14 RX ORDER — SODIUM CHLORIDE 9 MG/ML
INJECTION, SOLUTION INTRAVENOUS CONTINUOUS
Status: CANCELLED | OUTPATIENT
Start: 2022-04-14

## 2022-04-14 RX ORDER — ONDANSETRON 2 MG/ML
8 INJECTION INTRAMUSCULAR; INTRAVENOUS
Status: CANCELLED | OUTPATIENT
Start: 2022-04-14

## 2022-04-14 RX ORDER — DIPHENHYDRAMINE HYDROCHLORIDE 50 MG/ML
50 INJECTION INTRAMUSCULAR; INTRAVENOUS
Status: CANCELLED | OUTPATIENT
Start: 2022-04-14

## 2022-04-14 RX ORDER — SODIUM CHLORIDE 0.9 % (FLUSH) 0.9 %
10 SYRINGE (ML) INJECTION PRN
Status: CANCELLED | OUTPATIENT
Start: 2022-04-14

## 2022-04-14 RX ORDER — ACETAMINOPHEN 325 MG/1
650 TABLET ORAL
Status: CANCELLED | OUTPATIENT
Start: 2022-04-14

## 2022-04-14 RX ORDER — HEPARIN SODIUM (PORCINE) LOCK FLUSH IV SOLN 100 UNIT/ML 100 UNIT/ML
500 SOLUTION INTRAVENOUS PRN
Status: CANCELLED | OUTPATIENT
Start: 2022-04-14

## 2022-04-14 RX ORDER — SODIUM CHLORIDE 0.9 % (FLUSH) 0.9 %
10 SYRINGE (ML) INJECTION PRN
Status: DISCONTINUED | OUTPATIENT
Start: 2022-04-14 | End: 2022-04-16 | Stop reason: HOSPADM

## 2022-04-14 RX ORDER — SODIUM CHLORIDE 9 MG/ML
INJECTION, SOLUTION INTRAVENOUS CONTINUOUS
Status: ACTIVE | OUTPATIENT
Start: 2022-04-14 | End: 2022-04-14

## 2022-04-14 RX ADMIN — Medication 10 ML: at 10:10

## 2022-04-14 RX ADMIN — FERRIC CARBOXYMALTOSE INJECTION 750 MG: 50 INJECTION, SOLUTION INTRAVENOUS at 09:15

## 2022-04-14 NOTE — TELEPHONE ENCOUNTER
From: Zachariah Acosta  To: Monique Carlos MD  Sent: 4/7/2022 11:34 AM CDT  Subject: Iron infusion     Dr Carlos  For my record:  My GI doctor in [place] wanted me to have 2 iron infusions.  [facility] and they scheduled my second one for next Thursday morning at 9 am.

## 2022-04-18 ENCOUNTER — CONSULT (OUTPATIENT)
Dept: ONCOLOGY | Facility: CLINIC | Age: 82
End: 2022-04-18

## 2022-04-18 ENCOUNTER — LAB (OUTPATIENT)
Dept: LAB | Facility: HOSPITAL | Age: 82
End: 2022-04-18

## 2022-04-18 VITALS
BODY MASS INDEX: 34.63 KG/M2 | HEIGHT: 68 IN | OXYGEN SATURATION: 98 % | RESPIRATION RATE: 16 BRPM | DIASTOLIC BLOOD PRESSURE: 78 MMHG | SYSTOLIC BLOOD PRESSURE: 116 MMHG | TEMPERATURE: 97.8 F | WEIGHT: 228.5 LBS | HEART RATE: 68 BPM

## 2022-04-18 DIAGNOSIS — D50.9 IRON DEFICIENCY ANEMIA, UNSPECIFIED IRON DEFICIENCY ANEMIA TYPE: Primary | ICD-10-CM

## 2022-04-18 DIAGNOSIS — D50.9 IRON DEFICIENCY ANEMIA, UNSPECIFIED IRON DEFICIENCY ANEMIA TYPE: ICD-10-CM

## 2022-04-18 LAB
ALBUMIN SERPL-MCNC: 4.2 G/DL (ref 3.5–5.2)
ALBUMIN/GLOB SERPL: 1.4 G/DL
ALP SERPL-CCNC: 90 U/L (ref 39–117)
ALT SERPL W P-5'-P-CCNC: 20 U/L (ref 1–41)
ANION GAP SERPL CALCULATED.3IONS-SCNC: 10 MMOL/L (ref 5–15)
AST SERPL-CCNC: 25 U/L (ref 1–40)
BASOPHILS # BLD AUTO: 0.11 10*3/MM3 (ref 0–0.2)
BASOPHILS NFR BLD AUTO: 1 % (ref 0–1.5)
BILIRUB SERPL-MCNC: 0.3 MG/DL (ref 0–1.2)
BUN SERPL-MCNC: 15 MG/DL (ref 8–23)
BUN/CREAT SERPL: 14.6 (ref 7–25)
CALCIUM SPEC-SCNC: 8.9 MG/DL (ref 8.6–10.5)
CHLORIDE SERPL-SCNC: 108 MMOL/L (ref 98–107)
CO2 SERPL-SCNC: 25 MMOL/L (ref 22–29)
CREAT SERPL-MCNC: 1.03 MG/DL (ref 0.76–1.27)
DEPRECATED RDW RBC AUTO: 72.5 FL (ref 37–54)
EGFRCR SERPLBLD CKD-EPI 2021: 73.9 ML/MIN/1.73
EOSINOPHIL # BLD AUTO: 2.49 10*3/MM3 (ref 0–0.4)
EOSINOPHIL NFR BLD AUTO: 23.2 % (ref 0.3–6.2)
ERYTHROCYTE [DISTWIDTH] IN BLOOD BY AUTOMATED COUNT: 23 % (ref 12.3–15.4)
FERRITIN SERPL-MCNC: 1322 NG/ML (ref 30–400)
FOLATE SERPL-MCNC: 15.2 NG/ML (ref 4.78–24.2)
GLOBULIN UR ELPH-MCNC: 3 GM/DL
GLUCOSE SERPL-MCNC: 99 MG/DL (ref 65–99)
HCT VFR BLD AUTO: 45.1 % (ref 37.5–51)
HGB BLD-MCNC: 14 G/DL (ref 13–17.7)
IMM GRANULOCYTES # BLD AUTO: 0.1 10*3/MM3 (ref 0–0.05)
IMM GRANULOCYTES NFR BLD AUTO: 0.9 % (ref 0–0.5)
IRON 24H UR-MRATE: 177 MCG/DL (ref 59–158)
IRON SATN MFR SERPL: 53 % (ref 20–50)
LYMPHOCYTES # BLD AUTO: 1.22 10*3/MM3 (ref 0.7–3.1)
LYMPHOCYTES NFR BLD AUTO: 11.3 % (ref 19.6–45.3)
MCH RBC QN AUTO: 27.1 PG (ref 26.6–33)
MCHC RBC AUTO-ENTMCNC: 31 G/DL (ref 31.5–35.7)
MCV RBC AUTO: 87.4 FL (ref 79–97)
MONOCYTES # BLD AUTO: 0.78 10*3/MM3 (ref 0.1–0.9)
MONOCYTES NFR BLD AUTO: 7.3 % (ref 5–12)
NEUTROPHILS NFR BLD AUTO: 56.3 % (ref 42.7–76)
NEUTROPHILS NFR BLD AUTO: 6.05 10*3/MM3 (ref 1.7–7)
NRBC BLD AUTO-RTO: 0 /100 WBC (ref 0–0.2)
PLATELET # BLD AUTO: 295 10*3/MM3 (ref 140–450)
PMV BLD AUTO: 9.7 FL (ref 6–12)
POTASSIUM SERPL-SCNC: 4.3 MMOL/L (ref 3.5–5.2)
PROT SERPL-MCNC: 7.2 G/DL (ref 6–8.5)
RBC # BLD AUTO: 5.16 10*6/MM3 (ref 4.14–5.8)
SODIUM SERPL-SCNC: 143 MMOL/L (ref 136–145)
TIBC SERPL-MCNC: 332 MCG/DL (ref 298–536)
TRANSFERRIN SERPL-MCNC: 223 MG/DL (ref 200–360)
VIT B12 BLD-MCNC: 333 PG/ML (ref 211–946)
WBC NRBC COR # BLD: 10.75 10*3/MM3 (ref 3.4–10.8)

## 2022-04-18 PROCEDURE — 82746 ASSAY OF FOLIC ACID SERUM: CPT

## 2022-04-18 PROCEDURE — 85025 COMPLETE CBC W/AUTO DIFF WBC: CPT

## 2022-04-18 PROCEDURE — 84466 ASSAY OF TRANSFERRIN: CPT

## 2022-04-18 PROCEDURE — 82728 ASSAY OF FERRITIN: CPT

## 2022-04-18 PROCEDURE — 82607 VITAMIN B-12: CPT

## 2022-04-18 PROCEDURE — 99215 OFFICE O/P EST HI 40 MIN: CPT | Performed by: NURSE PRACTITIONER

## 2022-04-18 PROCEDURE — 36415 COLL VENOUS BLD VENIPUNCTURE: CPT

## 2022-04-18 PROCEDURE — 83540 ASSAY OF IRON: CPT

## 2022-04-18 PROCEDURE — 80053 COMPREHEN METABOLIC PANEL: CPT

## 2022-04-18 NOTE — PROGRESS NOTES
"[facility] GROUP HEMATOLOGY & ONCOLOGY  [address]  [place] [address] [phone]    Patient Name: Zachariah Acosta  Encounter Date: 04/18/2022  YOB: 1940  Patient Number: [id number]    Initial Note    REASON FOR CONSULTATION: Patient states \" My iron is low \".    HISTORY OF PRESENT ILLNESS: Zachariah Acosta is a 81 y.o. male referred by Monique Cralos MD for diagnostic and management recommendations for iron deficiency anemia.  History is obtained from patient and spouse. History is considered to be accurate.    He has history of  IBS and sees GI at [place] where he was found to have iron deficiency.  He was taking oral iron x 2 months without any improvement.  He was started on IV iron.  He has had them weekly for the past two weeks at [facility] here in [place]. Pt would like to have his anemia followed here.     He reports colonoscopy 2 week  ago in [place] and states it was normal.       LABS    Lab Results - Last 18 Months   Lab Units 01/14/22  0816 12/10/21  1145 10/07/21  1007 06/26/21  1900 06/19/21  0518 06/18/21  1112 06/16/21  1732 06/16/21  1538 06/12/21  0621 03/05/21  0729   HEMOGLOBIN g/dL 10.8* 10.0* 11.3* 11.6* 10.1* 11.7* 11.8*   < > 11.7* 11.2*   HEMATOCRIT % 37.9 35.7* 37.9 37.0* 33.7* 38.7 37.6   < > 38.2 36.8*   MCV fL 78.8* 80.0 80.6 77.9* 78.4* 77.6* 75.8*   < > 77.5* 78.0*   WBC 10*3/mm3 9.64 10.52 16.21* 9.0 8.32 10.64 10.04   < > 10.88* 6.22   RDW % 18.1* 17.1* 16.7* 17.8* 17.5* 17.5* 17.4*   < > 17.7* 16.7*   MPV fL 9.9 9.8 9.2 9.7 10.4 9.4 9.5  --  9.5 9.4   PLATELETS 10*3/mm3 300 344 356 264 210 248 281   < > 245 281   IMM GRAN % %  --   --  1.0*  --  0.4 0.5 0.4  --  0.4 0.3   NEUTROS ABS 10*3/mm3  --   --  12.30* 7.3 5.49 8.58* 8.58*  --  8.92* 4.00   LYMPHS ABS 10*3/mm3  --   --  0.90 0.9* 1.59 1.02 0.83  --  1.22 1.19   MONOS ABS 10*3/mm3  --   --  0.52 0.70 0.94* 0.82 0.50  " --  0.52 0.63   EOS ABS 10*3/mm3  --   --  2.21* 0.10 0.22 0.15 0.06  --  0.14 0.33   BASOS ABS 10*3/mm3  --   --  0.11 0.00 0.05 0.02 0.03  --  0.04 0.05   IMMATURE GRANS (ABS) 10*3/mm3  --   --  0.17* 0.0 0.03 0.05 0.04  --  0.04 0.02   NRBC /100 WBC  --   --  0.0  --  0.0 0.0 0.0  --  0.0 0.0    < > = values in this interval not displayed.       Lab Results - Last 18 Months   Lab Units 01/14/22  0816 01/03/22  1350 12/10/21  1145 10/07/21  1007 07/01/21  0837 06/26/21  1900 06/20/21  0530 06/19/21  0518 06/18/21  1112 06/16/21  1732 06/16/21  1538 06/12/21  0621   GLUCOSE mg/dL 124* 137* 115* 209*  --  196* 86 92 138* 202*   < > 145*   SODIUM mmol/L 140 141 139 137  --  133* 138 138 138 136   < > 136   POTASSIUM mmol/L 4.3 3.9 4.1 4.6  --  4.0 4.1 4.1 4.0 5.0   < > 4.2   TOTAL CO2 mmol/L  --  24  --   --   --  22  --   --   --   --    < >  --    CO2 mmol/L 27.0  --  24.0 24.0  --   --  27.0 25.0 27.0 21.0*  --  24.0   CHLORIDE mmol/L 105 105 104 105  --  101 104 104 101 103   < > 103   ANION GAP mmol/L 8.0 12 11.0 8.0  --  10 7.0 9.0 10.0 12.0  --  9.0   CREATININE mg/dL 0.94 1 0.92 1.00 1.00 0.9 0.83 0.97 1.58* 0.93   < > 1.01   BUN mg/dL 19 20 22 19  --  11 15 19 21 17   < > 18   BUN / CREAT RATIO  20.2  --  23.9 19.0  --   --  18.1 19.6 13.3 18.3   < > 17.8   CALCIUM mg/dL 8.7 8.7* 8.9 8.8  --  8.7* 8.4* 8.3* 9.2 8.9   < > 8.9   EGFR IF NONAFRICN AM mL/min/1.73 77 >60 79 72  --  >60 90 75 43* 79   < > 71   ALK PHOS U/L 75 77  --  75  --  79  --  63 74 80   < > 76   TOTAL PROTEIN g/dL 7.6 7.1  --  7.5  --  7.0  --  6.1 7.1 6.8  --  6.8   ALT (SGPT) U/L 17 28  --  11  --  12  --  13 19 22   < > 22   AST (SGOT) U/L 31 24  --  18  --  14  --  14 17 24   < > 18   BILIRUBIN mg/dL 0.2 <0.2  --  0.2  --  <0.2  --  0.5 0.4 0.2   < > 0.3   ALBUMIN g/dL 3.90 3.7  --  3.90  --  3.6  --  3.10* 3.60 3.90   < > 3.80   GLOBULIN gm/dL 3.7  --   --  3.6  --   --   --  3.0 3.5 2.9  --  3.0    < > = values in this interval not  displayed.       No results for input(s): MSPIKE, KAPPALAMB, IGLFLC, URICACID, FREEKAPPAL, CEA, LDH, REFLABREPO in the last 86489 hours.    Lab Results - Last 18 Months   Lab Units 01/14/22  0816 11/16/21  1316 11/11/21  1315   IRON mcg/dL 22* 18*  --    TIBC mcg/dL 450 347  --    IRON SATURATION % 5* 5  --    FERRITIN ng/mL  --  11*  --    TSH uIU/mL  --   --  1.500         PAST MEDICAL HISTORY:  ALLERGIES:  Allergies   Allergen Reactions   • Albuterol Other (See Comments)   • Adhesive Tape Rash   • Azithromycin Rash     CURRENT MEDICATIONS:  Outpatient Encounter Medications as of 4/18/2022   Medication Sig Dispense Refill   • aspirin (aspirin) 81 MG EC tablet Take 1 tablet by mouth Daily. 30 tablet 11   • budesonide-formoterol (SYMBICORT) 160-4.5 MCG/ACT inhaler Inhale 2 puffs 2 (two) times a day.     • docusate sodium (Colace) 100 MG capsule Take 1 capsule by mouth 2 (Two) Times a Day. 60 capsule 0   • Dupilumab 300 MG/2ML solution prefilled syringe Inject  under the skin into the appropriate area as directed Every 14 (Fourteen) Days.     • finasteride (PROSCAR) 5 MG tablet Take 1 tablet by mouth Daily. 90 tablet 3   • fluticasone (CUTIVATE) 0.005 % ointment Apply 1 application topically to the appropriate area as directed 2 (Two) Times a Day. 60 g 2   • fluticasone (FLONASE) 50 MCG/ACT nasal spray 1 spray into the nostril(s) as directed by provider Daily. 1 bottle 11   • glipizide (GLUCOTROL) 10 MG tablet Take 1 tablet by mouth 2 (Two) Times a Day Before Meals. 180 tablet 0   • glucose blood test strip 1 each by Other route 3 (Three) Times a Day As Needed (hypoglycemia). Use as instructed One Touch Ultra 100 each 11   • HYDROcodone-acetaminophen (NORCO) 7.5-325 MG per tablet Take 1 tablet by mouth Every 4 (Four) Hours As Needed for Severe Pain  (postop pain). 18 tablet 0   • insulin aspart (NovoLOG FlexPen) 100 UNIT/ML solution pen-injector sc pen Inject 5 Units under the skin into the appropriate area as  directed 3 (Three) Times a Day With Meals. 5 pen 2   • Insulin Glargine (Lantus SoloStar) 100 UNIT/ML injection pen Inject 20 Units under the skin into the appropriate area as directed As Needed.     • levalbuterol (Xopenex HFA) 45 MCG/ACT inhaler Inhale 1-2 puffs Every 4 (Four) Hours As Needed for Wheezing or Shortness of Air. 15 g 11   • lisinopril (PRINIVIL,ZESTRIL) 10 MG tablet Take 1 tablet by mouth Daily. 90 tablet 1   • meclizine (ANTIVERT) 12.5 MG tablet Take 1 tablet by mouth 3 (Three) Times a Day As Needed for Dizziness. 30 tablet 0   • Mesalamine 4 GM/60ML SF enema Insert  into the rectum Daily.     • metoprolol tartrate (LOPRESSOR) 25 MG tablet Take 1 tablet by mouth 2 (Two) Times a Day. 180 tablet 0   • naproxen (NAPROSYN) 250 MG tablet Take 1 tablet by mouth 2 (Two) Times a Day With Meals. 60 tablet 0   • nitroglycerin (NITROSTAT) 0.4 MG SL tablet 1 under the tongue as needed for angina, may repeat q5mins for up three doses 25 tablet 1   • rosuvastatin (CRESTOR) 40 MG tablet Take 1 tablet by mouth Daily. 90 tablet 3   • triamcinolone (KENALOG) 0.1 % cream Apply  topically to the appropriate area as directed 2 (Two) Times a Day.     • Ustekinumab (STELARA) 90 MG/ML solution prefilled syringe Injection Inject 90 mg under the skin into the appropriate area as directed Every 28 (Twenty-Eight) Days. (Patient taking differently: Inject 90 mg under the skin into the appropriate area as directed Take As Directed. EVERY 6 WEEKS) 1 mL 6   • azelastine (ASTELIN) 0.1 % nasal spray 2 sprays into the nostril(s) as directed by provider 2 (Two) Times a Day. Use in each nostril as directed 30 mL 2   • Continuous Blood Gluc  device 1 application Continuous. 1 each 0   • Continuous Blood Gluc Sensor Every 10 (Ten) Days. 3 each 11   • Continuous Blood Gluc Transmit misc 1 application Continuous. 1 each 0     No facility-administered encounter medications on file as of 4/18/2022.     ADULT ILLNESSES:  Patient  Active Problem List   Diagnosis Code   • BREANN on CPAP G47.33, Z99.89   • CAD (coronary artery disease) I25.10   • Chronic kidney disease (CKD), stage III (moderate) ([facility]) N18.30   • Disorder of lung J98.4   • Essential hypertension I10   • Mild intermittent asthma without complication J45.20   • S/P CABG (coronary artery bypass graft) Z95.1   • Type 2 diabetes mellitus without complication ([facility]) E11.9   • Ulcerative colitis ([facility]) K51.90   • Obesity (BMI 30-39.9) E66.9   • Non-seasonal allergic rhinitis J30.89   • SOB (shortness of breath) R06.02   • 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   • Lung nodule R91.1   • Hyperlipidemia E78.5   • Nonsmoker Z78.9   • Ulcerative colitis with complication ([facility]) K51.919   • Intractable abdominal pain R10.9   • Constipation K59.00   • KE (acute kidney injury) ([facility]) N17.9   • Hematuria R31.9   • LLQ pain R10.32   • Erectile dysfunction N52.9   • Class 1 obesity due to excess calories with serious comorbidity and body mass index (BMI) of 34.0 to 34.9 in adult E66.09, Z68.34   • Thyroid nodule E04.1     SURGERIES:  Past Surgical History:   Procedure Laterality Date   • BACK SURGERY     • CARDIAC CATHETERIZATION      stents x 6   • CARDIAC SURGERY      CABG TRIPLE BYPASS 2012   • CATARACT EXTRACTION     • COLONOSCOPY     • COLONOSCOPY N/A 6/4/2021    Procedure: COLONOSCOPY WITH ANESTHESIA;  Surgeon: Zachariah Menjivar DO;  Location: [facility] ENDOSCOPY;  Service: Gastroenterology;  Laterality: N/A;  pre hx ulcerative colitis  post ulcerative colitis  dr collins gusman   • CORONARY ANGIOPLASTY WITH STENT PLACEMENT     • HIP SURGERY Right     total hip   • PENILE PROSTHESIS IMPLANT N/A 12/13/2021    Procedure: 3-PIECE INFLATABLE PENILE PROSTHESIS PLACEMENT;  Surgeon: Jose Tellez MD;  Location: [facility] OR;  Service: Urology;  Laterality: N/A;     HEALTH MAINTENANCE ITEMS:  Health Maintenance Due   Topic Date Due   • TDAP/TD VACCINES (1 - Tdap) Never done   • ZOSTER VACCINE (1 of 2) Never done   •  "DIABETIC EYE EXAM  Never done   • COVID-19 Vaccine (2 - Booster for [place] series) 06/07/2021       <no information>  Last Completed Colonoscopy     This patient has no relevant Health Maintenance data.        Immunization History   Administered Date(s) Administered   • COVID-19 ([facility]) 04/12/2021   • Flu Vaccine Quad PF >36MO 11/13/2017   • Fluzone High Dose =>65 Years ([employer] ONLY) 11/01/2020   • Influenza TIV (IM) 11/16/2016, 10/26/2018   • Pneumococcal Conjugate 13-Valent (PCV13) 11/11/2013, 04/01/2019   • Pneumococcal Polysaccharide (PPSV23) 08/10/2020     Last Completed Mammogram     This patient has no relevant Health Maintenance data.            FAMILY HISTORY:  Family History   Problem Relation Age of Onset   • Colon cancer Brother    • Colon polyps Neg Hx    • Esophageal cancer Neg Hx      SOCIAL HISTORY:  Social History     Socioeconomic History   • Marital status:    Tobacco Use   • Smoking status: Never Smoker   • Smokeless tobacco: Never Used   Vaping Use   • Vaping Use: Never used   Substance and Sexual Activity   • Alcohol use: Never   • Drug use: Never   • Sexual activity: Defer     H  REVIEW OF SYSTEMS:  Review of Systems    /78   Pulse 68   Temp 97.8 °F (36.6 °C) (Temporal)   Resp 16   Ht 172.7 cm (68\")   Wt 104 kg (228 lb 8 oz)   SpO2 98%   BMI 34.74 kg/m²  Body surface area is 2.17 meters squared.  Pain Score    04/18/22 0945   PainSc: 0-No pain       Physical Exam:  Physical Exam    Zachariah Garcia Dave reports a pain score of 0.  No intervention indicated.    Diagnoses and all orders for this visit:    1. Iron deficiency anemia, unspecified iron deficiency anemia type (Primary)  -     CBC & Differential; Future  -     Comprehensive Metabolic Panel; Future  -     Ferritin; Future  -     Iron Profile; Future  -     Vitamin B12; Future  -     Folate; Future         ASSESSMENT / PLAN:    ANEMIA CKD    ASSESSMENT:  1.  Iron Deficiency Anemia                  PLAN:   1.  "  Re: Apprise of why she is here to see this examiner.   2.   Re: Anemia and the other differential considerations.   3.  Blood as previously charted    4.  Blood for CMP, ferritin, TIBC, % saturation, and iron every 4 weeks.   5.  Continue currently identified medications.   6.  Continue ongoing management per primary care physician and other specialists.   7. Pt has received iron infusions for the past two weeks at [facility].  Advised that anemia profile may be falsely elevated because of this.  We will recheck labs in 4 weeks and proceed with infusions as indicated.  He will RTC next week so that we can discuss current status of anemia.  Plan of care discussed with patient.  Understanding expressed.  Patient agreeable to proceed.      Thank you for the referral.    I spent 40 minutes caring for Zachariah on this date of service. This time includes time spent by me in the following activities: reviewing tests, obtaining and/or reviewing a separately obtained history, performing a medically appropriate examination and/or evaluation, counseling and educating the patient/family/caregiver, ordering medications, tests, or procedures and documenting information in the medical record.

## 2022-04-21 ENCOUNTER — PATIENT ROUNDING (BHMG ONLY) (OUTPATIENT)
Dept: ONCOLOGY | Facility: CLINIC | Age: 82
End: 2022-04-21

## 2022-04-21 NOTE — PROGRESS NOTES
April 21, 2022    Hello, may I speak with Zachariah Acosta?    My name is BALDO    I am  with [facility] HEMATOLOGY & ONCOLOGY  [address]  [place] [facility] [address] [phone].    Before we get started may I verify your date of birth? 1940    I am calling to officially welcome you to our practice and ask about your recent visit. Is this a good time to talk? YES     Tell me about your visit with us. What things went well?  EVERYTHING WAS GREAT, WE REALLY LIKED NOREEN       We're always looking for ways to make our patients' experiences even better. Do you have recommendations on ways we may improve? NO I DON'T, EVERYTHING WAS VERY GOOD    Overall were you satisfied with your first visit to our practice? YES I WAS       I appreciate you taking the time to speak with me today. Is there anything else I can do for you? NO CHEPE      Thank you, and have a great day.

## 2022-04-22 ENCOUNTER — TELEPHONE (OUTPATIENT)
Dept: ONCOLOGY | Facility: CLINIC | Age: 82
End: 2022-04-22

## 2022-04-22 NOTE — TELEPHONE ENCOUNTER
CALLER: RUT LINARES    RELATIONSHIP:[facility]    CALL REGARDING: RETURNING MISSED CALL FROM OFFICE VOICEMAIL STATED ABOUT APPT WITH NOREEN CALHOUN.     WARM TRANSFERRED TO BALDO AT THE  TO FURTHER ASSIST.

## 2022-04-28 ENCOUNTER — OFFICE VISIT (OUTPATIENT)
Dept: ONCOLOGY | Facility: CLINIC | Age: 82
End: 2022-04-28

## 2022-04-28 VITALS
HEIGHT: 68 IN | OXYGEN SATURATION: 98 % | DIASTOLIC BLOOD PRESSURE: 62 MMHG | WEIGHT: 231 LBS | RESPIRATION RATE: 16 BRPM | SYSTOLIC BLOOD PRESSURE: 118 MMHG | HEART RATE: 78 BPM | TEMPERATURE: 97.7 F | BODY MASS INDEX: 35.01 KG/M2

## 2022-04-28 DIAGNOSIS — D50.9 IRON DEFICIENCY ANEMIA, UNSPECIFIED IRON DEFICIENCY ANEMIA TYPE: Primary | ICD-10-CM

## 2022-04-28 PROCEDURE — 99213 OFFICE O/P EST LOW 20 MIN: CPT | Performed by: NURSE PRACTITIONER

## 2022-05-09 ENCOUNTER — OFFICE VISIT (OUTPATIENT)
Dept: FAMILY MEDICINE CLINIC | Facility: CLINIC | Age: 82
End: 2022-05-09

## 2022-05-09 VITALS
BODY MASS INDEX: 34.65 KG/M2 | SYSTOLIC BLOOD PRESSURE: 132 MMHG | TEMPERATURE: 97.5 F | HEIGHT: 68 IN | HEART RATE: 62 BPM | OXYGEN SATURATION: 98 % | DIASTOLIC BLOOD PRESSURE: 76 MMHG | WEIGHT: 228.6 LBS | RESPIRATION RATE: 20 BRPM

## 2022-05-09 DIAGNOSIS — G47.33 OSA ON CPAP: ICD-10-CM

## 2022-05-09 DIAGNOSIS — I10 ESSENTIAL HYPERTENSION: ICD-10-CM

## 2022-05-09 DIAGNOSIS — Z99.89 OSA ON CPAP: ICD-10-CM

## 2022-05-09 DIAGNOSIS — Z79.4 TYPE 2 DIABETES MELLITUS WITH HYPERGLYCEMIA, WITH LONG-TERM CURRENT USE OF INSULIN: Primary | ICD-10-CM

## 2022-05-09 DIAGNOSIS — E04.1 THYROID NODULE: ICD-10-CM

## 2022-05-09 DIAGNOSIS — E11.65 TYPE 2 DIABETES MELLITUS WITH HYPERGLYCEMIA, WITH LONG-TERM CURRENT USE OF INSULIN: Primary | ICD-10-CM

## 2022-05-09 DIAGNOSIS — J45.20 MILD INTERMITTENT ASTHMA WITHOUT COMPLICATION: ICD-10-CM

## 2022-05-09 DIAGNOSIS — J30.1 NON-SEASONAL ALLERGIC RHINITIS DUE TO POLLEN: ICD-10-CM

## 2022-05-09 LAB
EXPIRATION DATE: NORMAL
HBA1C MFR BLD: 5.9 %
Lab: NORMAL

## 2022-05-09 PROCEDURE — 3044F HG A1C LEVEL LT 7.0%: CPT | Performed by: FAMILY MEDICINE

## 2022-05-09 PROCEDURE — 83036 HEMOGLOBIN GLYCOSYLATED A1C: CPT | Performed by: FAMILY MEDICINE

## 2022-05-09 PROCEDURE — 99214 OFFICE O/P EST MOD 30 MIN: CPT | Performed by: FAMILY MEDICINE

## 2022-05-09 RX ORDER — LISINOPRIL 10 MG/1
10 TABLET ORAL DAILY
Qty: 90 TABLET | Refills: 3 | Status: SHIPPED | OUTPATIENT
Start: 2022-05-09 | End: 2022-12-14

## 2022-05-09 RX ORDER — GLIPIZIDE 10 MG/1
10 TABLET ORAL
Qty: 180 TABLET | Refills: 3 | Status: SHIPPED | OUTPATIENT
Start: 2022-05-09 | End: 2023-02-28

## 2022-05-09 RX ORDER — FLUTICASONE PROPIONATE 50 MCG
1 SPRAY, SUSPENSION (ML) NASAL DAILY
Qty: 16 G | Refills: 5 | Status: SHIPPED | OUTPATIENT
Start: 2022-05-09 | End: 2022-11-09 | Stop reason: SDUPTHER

## 2022-05-09 RX ORDER — BUDESONIDE AND FORMOTEROL FUMARATE DIHYDRATE 160; 4.5 UG/1; UG/1
2 AEROSOL RESPIRATORY (INHALATION)
Qty: 10.2 G | Refills: 5 | Status: SHIPPED | OUTPATIENT
Start: 2022-05-09 | End: 2022-06-07

## 2022-05-09 NOTE — PROGRESS NOTES
"Subjective cc: DM   Zachariah Acosta is a 81 y.o. male.     History of Present Illness     The following portions of the patient's history were reviewed and updated as appropriate: allergies, current medications, past family history, past medical history, past social history, past surgical history and problem list.    The patient is taking metoprolol twice daily and lisinopril 10 mg daily for hypertension. He is taking glipizide for diabetes. The patient [facility] He reports he has been using Symbicort for about  years, he has tolerated this medication well, however, [facility] Wixela, they made this switch   2 months ago. He complains the Wixela irritates his throat and causes soreness in his throat. He states he discontinued the medication 3 days ago and his throat has improved. The patient was told he needs a \"non formulary prescription\" from his doctor to resume the Symbicort.     The patient reports he has been sleeping well with his CPAP machine. He notes he has started using melatonin every night and it has been helpful.     The patient's A1c in 01/2022 was 7.0. He is using glipizide as well as NovoLog 8 units three times a day, the patient increased the units on his own and states this has been helpful. The patient uses the Lantus pen 20 units once daily. He reports his blood glucose was 154 last night so he ate some candy and only used 10 units of the Lantus.       The patient was recently evaluated by the hematologist, he had his lab work drawn. He has a follow up on 06/28/2022. He states he has had 2 iron infusions. The patient states he has a history of low iron and B12. He notes he has been taking B12 injections and his B12 is now back in a normal range.     The patient has a nodule on his thyroid, he states he was never contacted for an appointment, it has been 4 months. He denies any current problems.     Review of Systems  A " "review of systems was performed, and the pertinent positives are noted in the HPI.    Objective   Blood pressure 132/76, pulse 62, temperature 97.5 °F (36.4 °C), temperature source Infrared, resp. rate 20, height 172.7 cm (68\"), weight 104 kg (228 lb 9.6 oz), SpO2 98 %.  Physical Exam  Vitals reviewed.   Constitutional:       Appearance: Normal appearance. He is well-developed. He is obese.   HENT:      Head: Normocephalic and atraumatic.      Right Ear: External ear normal. No tenderness.      Left Ear: External ear normal. No tenderness.      Nose: Nose normal. No congestion.      Mouth/Throat:      Mouth: No oral lesions.   Eyes:      General: No scleral icterus.     Conjunctiva/sclera: Conjunctivae normal.      Pupils: Pupils are equal, round, and reactive to light.   Neck:      Thyroid: No thyromegaly.      Vascular: No carotid bruit.   Cardiovascular:      Rate and Rhythm: Normal rate and regular rhythm.      Pulses: Normal pulses.           Radial pulses are 2+ on the right side and 2+ on the left side.        Dorsalis pedis pulses are 2+ on the right side and 2+ on the left side.      Heart sounds: Normal heart sounds. No murmur heard.    No gallop.   Pulmonary:      Effort: Pulmonary effort is normal.      Breath sounds: Normal breath sounds. No wheezing or rales.   Chest:      Chest wall: No deformity.   Abdominal:      Palpations: Abdomen is soft. There is no mass.      Tenderness: There is no abdominal tenderness. Negative signs include Lopes's sign.   Genitourinary:     Penis: No discharge.    Musculoskeletal:         General: No tenderness or deformity. Normal range of motion.      Cervical back: Normal range of motion and neck supple.   Lymphadenopathy:      Cervical: No cervical adenopathy.   Skin:     General: Skin is warm and dry.      Findings: No erythema or rash.      Comments: No atypical nevi.    Neurological:      Mental Status: He is alert and oriented to person, place, and time. "   Psychiatric:         Mood and Affect: Mood normal.         Behavior: Behavior normal.         Thought Content: Thought content normal.         Judgment: Judgment normal.         Assessment/Plan      Answers for HPI/ROS submitted by the patient on 5/2/2022  What is the primary reason for your visit?: Other  Please describe your symptoms.: Regular 3 month appointment.  Have you had these symptoms before?: Yes  How long have you been having these symptoms?: Greater than 2 weeks      Problems Addressed this Visit        Allergies and Adverse Reactions    Non-seasonal allergic rhinitis    Relevant Medications    fluticasone (FLONASE) 50 MCG/ACT nasal spray       Cardiac and Vasculature    Essential hypertension    Relevant Medications    metoprolol tartrate (LOPRESSOR) 25 MG tablet    lisinopril (PRINIVIL,ZESTRIL) 10 MG tablet       Endocrine and Metabolic    Thyroid nodule    Relevant Medications    metoprolol tartrate (LOPRESSOR) 25 MG tablet    Other Relevant Orders    US Thyroid       Pulmonary and Pneumonias    Mild intermittent asthma without complication    Relevant Medications    budesonide-formoterol (SYMBICORT) 160-4.5 MCG/ACT inhaler       Sleep    BREANN on CPAP      Other Visit Diagnoses     Type 2 diabetes mellitus with hyperglycemia, with long-term current use of insulin (HCC)    -  Primary    Relevant Medications    glipizide (GLUCOTROL) 10 MG tablet    Other Relevant Orders    POC Glycosylated Hemoglobin (Hb A1C)      Diagnoses       Codes Comments    Type 2 diabetes mellitus with hyperglycemia, with long-term current use of insulin (HCC)    -  Primary ICD-10-CM: E11.65, Z79.4  ICD-9-CM: 250.00, 790.29, V58.67     Essential hypertension     ICD-10-CM: I10  ICD-9-CM: 401.9     Non-seasonal allergic rhinitis due to pollen     ICD-10-CM: J30.1  ICD-9-CM: 477.0     BREANN on CPAP     ICD-10-CM: G47.33, Z99.89  ICD-9-CM: 327.23, V46.8     Mild intermittent asthma without complication     ICD-10-CM:  J45.20  ICD-9-CM: 493.90     Thyroid nodule     ICD-10-CM: E04.1  ICD-9-CM: 241.0         1. Thyroid nodule: Chronic, stable  - Reviewed lab testing. We will monitor with ultrasound. We will plan to repeat ultrasound in 3 months when patient presents for reevaluation.     2. Hypertension: Chronic, controlled  - Continue on current medications    3. Diabetes mellitus: Chronic, stable  - Continue on current medications. Refill given. We will check hemoglobin A1c today.     4. Obstructive sleep apnea: Chronic, stable  - Continue use of CPAP    5. Obesity:   Body mass index is 34.76 kg/m².     6. Hyperlipidemia: Chronic, stable  - Reviewed lipid panel. Continue on cholesterol medication    7. Asthma: Chronic, uncontrolled  - Patient does not like the Wixela that he is o currently. We will discontinue Wixela and change back to Symbicort which he tolerated better in the past.         Transcribed from ambient dictation for Monique Carlos MD by Hanna Lechuga.  05/09/22   10:48 CDT    Patient verbalized consent to the visit recording.          This document has been electronically signed by Monique Carlos MD on May 9, 2022 13:25 CDT

## 2022-05-11 ENCOUNTER — PATIENT MESSAGE (OUTPATIENT)
Dept: FAMILY MEDICINE CLINIC | Facility: CLINIC | Age: 82
End: 2022-05-11

## 2022-05-11 NOTE — TELEPHONE ENCOUNTER
From: Zachariah Acosta  To: Monique Carlos MD  Sent: 5/11/2022 10:23 AM CDT  Subject: Symbicort    Dr Carlos  I talked to the pharmacist at the [facility] in [facility] this morning and they said they didn't get your request for the Symbicort inhaler instead of the Wixela they have switched me to.  They told me to have you send it again.  Can you resend that request for me?  Thank you.  Zachariah Acosta

## 2022-06-01 ENCOUNTER — OFFICE VISIT (OUTPATIENT)
Dept: CARDIOLOGY | Facility: CLINIC | Age: 82
End: 2022-06-01

## 2022-06-01 VITALS
HEIGHT: 68 IN | DIASTOLIC BLOOD PRESSURE: 70 MMHG | SYSTOLIC BLOOD PRESSURE: 125 MMHG | WEIGHT: 228 LBS | HEART RATE: 63 BPM | OXYGEN SATURATION: 96 % | BODY MASS INDEX: 34.56 KG/M2

## 2022-06-01 DIAGNOSIS — I10 ESSENTIAL HYPERTENSION: ICD-10-CM

## 2022-06-01 DIAGNOSIS — E78.2 MIXED HYPERLIPIDEMIA: ICD-10-CM

## 2022-06-01 DIAGNOSIS — I25.810 CORONARY ARTERY DISEASE INVOLVING CORONARY BYPASS GRAFT OF NATIVE HEART WITHOUT ANGINA PECTORIS: Primary | ICD-10-CM

## 2022-06-01 PROCEDURE — 99214 OFFICE O/P EST MOD 30 MIN: CPT | Performed by: INTERNAL MEDICINE

## 2022-06-01 PROCEDURE — 93000 ELECTROCARDIOGRAM COMPLETE: CPT | Performed by: INTERNAL MEDICINE

## 2022-06-01 NOTE — PROGRESS NOTES
Subjective:     Encounter Date: 06/01/22      Patient ID: Zachariah Acosta is a 81 y.o. male.    Chief Complaint: shortness of breath , follow-up cad    History of Present Illness     81-year-old returns today for 6-month follow-up of coronary artery disease.  He had a CABG in 2012 and followed with a cardiologist in [place] thereafter, then had MI in April 2020 requiring PCI x6.  After establishing care with us, there was some concern regarding gastrointestinal bleeding, so I reviewed the cardiac catheterization films from April 2020 and recommended discontinuation of Plavix given the circumstances.  He was instructed to continue aspirin 81 mg daily without interruption.    In 10/7/2021 he reported a significant decline in stamina and worsening shortness of breath over the preceding 2 months.  Though he reported his symptoms before CABG in 2012 were those of exertional dyspnea, he also admitted his breathing never really improved after CABG.  He was empirically tried on Imdur, but this only made him dizzy and did not necessarily improve his symptoms.  Lexiscan was low risk for ischemia.  At his last visit here, it was concluded his symptoms were likely noncardiac in nature, and it was recommended that he stop taking Imdur.  Returns today for routine follow-up.    Today he says he is still having SOA - no change since [facility] issues resulted in not having his symbicort.  Uses his rescue inhaler with relief. Reports he gets SOA with moderate activity (uses a motorized cart at [employer], but can walk into our office from parking garage without stopping or getting soa).  Has not needed SL NTG wince last visit here.  No associated orthopnea, PND, leg swelling, or rapid weight changes.    The following portions of the patient's history were reviewed and updated as appropriate: allergies, current medications, past family history, past medical history, past social history, past surgical history and problem  list.    Review of Systems   Constitutional: Positive for malaise/fatigue.   Cardiovascular: Positive for dyspnea on exertion. Negative for claudication, near-syncope, orthopnea, paroxysmal nocturnal dyspnea and syncope.   Respiratory: Positive for wheezing (occasional ). Negative for cough.    Hematologic/Lymphatic: Does not bruise/bleed easily.   Musculoskeletal: Negative for falls.   Gastrointestinal: Negative for bloating.   Neurological: Negative for light-headedness and weakness.       Allergies   Allergen Reactions   • Albuterol Other (See Comments)   • Adhesive Tape Rash   • Azithromycin Rash   • Fentanyl Nausea And Vomiting       Current Outpatient Medications:   •  aspirin (aspirin) 81 MG EC tablet, Take 1 tablet by mouth Daily., Disp: 30 tablet, Rfl: 11  •  Dupilumab 300 MG/2ML solution prefilled syringe, Inject  under the skin into the appropriate area as directed Every 14 (Fourteen) Days., Disp: , Rfl:   •  finasteride (PROSCAR) 5 MG tablet, Take 1 tablet by mouth Daily., Disp: 90 tablet, Rfl: 3  •  fluticasone (CUTIVATE) 0.005 % ointment, Apply 1 application topically to the appropriate area as directed 2 (Two) Times a Day., Disp: 60 g, Rfl: 2  •  fluticasone (FLONASE) 50 MCG/ACT nasal spray, 1 spray into the nostril(s) as directed by provider Daily., Disp: 16 g, Rfl: 5  •  glipizide (GLUCOTROL) 10 MG tablet, Take 1 tablet by mouth 2 (Two) Times a Day Before Meals., Disp: 180 tablet, Rfl: 3  •  glucose blood test strip, 1 each by Other route 3 (Three) Times a Day As Needed (hypoglycemia). Use as instructed One Touch Ultra, Disp: 100 each, Rfl: 11  •  insulin aspart (NovoLOG FlexPen) 100 UNIT/ML solution pen-injector sc pen, Inject 5 Units under the skin into the appropriate area as directed 3 (Three) Times a Day With Meals., Disp: 5 pen, Rfl: 2  •  Insulin Glargine (Lantus SoloStar) 100 UNIT/ML injection pen, Inject 20 Units under the skin into the appropriate area as directed As Needed., Disp: , Rfl:  "  •  levalbuterol (Xopenex HFA) 45 MCG/ACT inhaler, Inhale 1-2 puffs Every 4 (Four) Hours As Needed for Wheezing or Shortness of Air., Disp: 15 g, Rfl: 11  •  lisinopril (PRINIVIL,ZESTRIL) 10 MG tablet, Take 1 tablet by mouth Daily., Disp: 90 tablet, Rfl: 3  •  Mesalamine 4 GM/60ML SF enema, Insert  into the rectum Daily., Disp: , Rfl:   •  metoprolol tartrate (LOPRESSOR) 25 MG tablet, Take 1 tablet by mouth 2 (Two) Times a Day., Disp: 180 tablet, Rfl: 3  •  nitroglycerin (NITROSTAT) 0.4 MG SL tablet, 1 under the tongue as needed for angina, may repeat q5mins for up three doses, Disp: 25 tablet, Rfl: 1  •  rosuvastatin (CRESTOR) 40 MG tablet, Take 1 tablet by mouth Daily., Disp: 90 tablet, Rfl: 3  •  Ustekinumab (STELARA) 90 MG/ML solution prefilled syringe Injection, Inject 90 mg under the skin into the appropriate area as directed Every 28 (Twenty-Eight) Days. (Patient taking differently: Inject 90 mg under the skin into the appropriate area as directed Take As Directed. EVERY 4 WEEKS), Disp: 1 mL, Rfl: 6  •  budesonide-formoterol (SYMBICORT) 160-4.5 MCG/ACT inhaler, Inhale 2 puffs 2 (Two) Times a Day., Disp: 10.2 g, Rfl: 5  •  Continuous Blood Gluc Sensor, Every 10 (Ten) Days., Disp: 3 each, Rfl: 11  •  meclizine (ANTIVERT) 12.5 MG tablet, Take 1 tablet by mouth 3 (Three) Times a Day As Needed for Dizziness., Disp: 30 tablet, Rfl: 0  •  naproxen (NAPROSYN) 250 MG tablet, Take 1 tablet by mouth 2 (Two) Times a Day With Meals., Disp: 60 tablet, Rfl: 0  •  triamcinolone (KENALOG) 0.1 % cream, Apply  topically to the appropriate area as directed 2 (Two) Times a Day., Disp: , Rfl:          Objective:    /70   Pulse 63   Ht 172.7 cm (68\")   Wt 103 kg (228 lb)   SpO2 96%   BMI 34.67 kg/m²        Vitals and nursing note reviewed.   Constitutional:       General: Not in acute distress.     Appearance: Well-developed and not in distress. Not diaphoretic.   Neck:      Vascular: No JVD.   Pulmonary:      Effort: " Pulmonary effort is normal. No respiratory distress.      Breath sounds: Normal breath sounds.   Cardiovascular:      Normal rate. Regular rhythm.      Murmurs: There is no murmur.   Edema:     Peripheral edema (trace BLE edema ) present.  Abdominal:      Tenderness: There is no abdominal tenderness.   Skin:     General: Skin is warm and dry.   Neurological:      Mental Status: Alert and oriented to person, place, and time.         Lab Review:   Lab Results   Component Value Date    GLUCOSE 99 04/18/2022    BUN 15 04/18/2022    CREATININE 1.03 04/18/2022    EGFRIFNONA 77 01/14/2022    EGFRIFAFRI >59 01/03/2022    BCR 14.6 04/18/2022    K 4.3 04/18/2022    CO2 25.0 04/18/2022    CALCIUM 8.9 04/18/2022    PROTENTOTREF 6.8 06/16/2021    ALBUMIN 4.20 04/18/2022    LABIL2 1.5 06/16/2021    AST 25 04/18/2022    ALT 20 04/18/2022     Lab Results   Component Value Date    WBC 10.75 04/18/2022    HGB 14.0 04/18/2022    HCT 45.1 04/18/2022    MCV 87.4 04/18/2022     04/18/2022     Lab Results   Component Value Date    CHOL 111 01/14/2022    CHLPL 141 01/06/2021    TRIG 82 01/14/2022    HDL 52 01/14/2022    LDL 43 01/14/2022               ECG 12 Lead    Date/Time: 6/1/2022 2:10 PM  Performed by: Yung Polanco MD  Authorized by: Yung Polanco MD   Comparison: compared with previous ECG from 12/10/2021  Similar to previous ECG  Rhythm: sinus rhythm  Rate: normal  Conduction: 1st degree AV block  Conduction comments: rSR or qR in V1 suggests RV conduction delay  QRS axis: normal  Clinical impression comment: borderline ecg              10/25/2021 Lexiscan:    · Impressions are consistent with a low risk study.  · Myocardial perfusion imaging indicates a normal myocardial perfusion study with no evidence of ischemia.  · Left ventricular ejection fraction is normal.  · There is no prior study available for comparison.  · Findings consistent with a normal ECG stress test.        Assessment:      Problem List Items  Addressed This Visit        Cardiac and Vasculature    CAD (coronary artery disease) - Primary    Overview     3v CABG in '12  Non-STEMI April 2020 diagnostic cath 4/13/2020 showed LIMA to LAD atretic, SVG to diagonal patent with significant backflow from diagonal graft into the LAD and down the LAD, SVG to PDA patent but not providing flow to PL branch because of proximal and mid native vessel (R-PDA) disease.  Left main 60-70% diffuse disease compromising flow into ungrafted circumflex territory.  Subsequently referred for consideration of redo CABG versus complex PCI.    -Return to Cath Lab 4/14/2020 with 4.0 x 8 mm drug-eluting stent placement to the left main (for purpose of improving flow into ungrafted circumflex territory), as well as for overlapping Julianne drug-eluting stents from the ostium into mid-RCA (3.5 x 12, 3.5 x 15, 3.5 x 12, 3.5 x 8) along with 3.5 x 12 mm Julianne FRANKY at the takeoff of the right-PL branch.  80-90% stenosis at the ostium of the R-PDA not treated since vein graft to PDA open, but not providing any retrograde filling beyond this lesion into the PL system.  There was PTCA alone to area in the distal RCA between stented segments for reported inability to deliver a stent to the segment.  Balloon angioplasty was performed with a 3.5 mm balloon with reported residual stenosis of less than 10%.               Essential hypertension    Overview     Last Assessment & Plan:   Hypertension is controlled.  Dietary sodium restriction.  Weight loss.  Regular aerobic exercise.  Continue current medications.  Blood pressure will be reassessed at the next regular appointment.           Hyperlipidemia          Plan:     1. Coronary disease status post remote CABG with MI in April 2020 requiring stenting (total of 6; one to left main, 4 to RCA, one to R-PL): stable. Lexiscan was low risk for ischemia with a normal LVEF.  His dyspnea is suspected to be of noncardiac origin.  -Continue aspirin, high  intensity statin, beta-blocker, ACE inhibitor, as needed sublingual nitroglycerin (has not had to use)    2. Mixed hyperlipidemia: most recent LDL at goal after increasing statin dose (cut it by 50%)  - Continue rosuvastatin 40 mg nightly.       3. Essential hypertension:    - Well-controlled. Continue current medications.    F/u 6 months, or sooner if new/worsening symptoms     Yung Polanco MD  14:33 CDT  06/01/22

## 2022-06-07 ENCOUNTER — TELEPHONE (OUTPATIENT)
Dept: FAMILY MEDICINE CLINIC | Facility: CLINIC | Age: 82
End: 2022-06-07

## 2022-06-07 DIAGNOSIS — J45.20 MILD INTERMITTENT ASTHMA WITHOUT COMPLICATION: Primary | ICD-10-CM

## 2022-06-07 RX ORDER — BUDESONIDE, GLYCOPYRROLATE, AND FORMOTEROL FUMARATE 160; 9; 4.8 UG/1; UG/1; UG/1
2 AEROSOL, METERED RESPIRATORY (INHALATION) 2 TIMES DAILY
Qty: 10.7 G | Refills: 2 | Status: SHIPPED | OUTPATIENT
Start: 2022-06-07 | End: 2022-10-24 | Stop reason: SDUPTHER

## 2022-06-09 ENCOUNTER — OFFICE VISIT (OUTPATIENT)
Dept: ONCOLOGY | Facility: CLINIC | Age: 82
End: 2022-06-09

## 2022-06-09 ENCOUNTER — LAB (OUTPATIENT)
Dept: LAB | Facility: HOSPITAL | Age: 82
End: 2022-06-09

## 2022-06-09 VITALS
BODY MASS INDEX: 34.68 KG/M2 | TEMPERATURE: 98 F | HEART RATE: 68 BPM | DIASTOLIC BLOOD PRESSURE: 68 MMHG | RESPIRATION RATE: 16 BRPM | WEIGHT: 228.8 LBS | SYSTOLIC BLOOD PRESSURE: 136 MMHG | OXYGEN SATURATION: 98 % | HEIGHT: 68 IN

## 2022-06-09 DIAGNOSIS — D50.9 IRON DEFICIENCY ANEMIA, UNSPECIFIED IRON DEFICIENCY ANEMIA TYPE: Primary | ICD-10-CM

## 2022-06-09 LAB
ALBUMIN SERPL-MCNC: 3.9 G/DL (ref 3.5–5.2)
ALBUMIN/GLOB SERPL: 1.1 G/DL
ALP SERPL-CCNC: 104 U/L (ref 39–117)
ALT SERPL W P-5'-P-CCNC: 18 U/L (ref 1–41)
ANION GAP SERPL CALCULATED.3IONS-SCNC: 4 MMOL/L (ref 5–15)
AST SERPL-CCNC: 22 U/L (ref 1–40)
BILIRUB SERPL-MCNC: 0.4 MG/DL (ref 0–1.2)
BUN SERPL-MCNC: 11 MG/DL (ref 8–23)
BUN/CREAT SERPL: 10.6 (ref 7–25)
CALCIUM SPEC-SCNC: 8.9 MG/DL (ref 8.6–10.5)
CHLORIDE SERPL-SCNC: 108 MMOL/L (ref 98–107)
CO2 SERPL-SCNC: 29 MMOL/L (ref 22–29)
CREAT SERPL-MCNC: 1.04 MG/DL (ref 0.76–1.27)
CYTOLOGIST CVX/VAG CYTO: NORMAL
DEPRECATED RDW RBC AUTO: 60 FL (ref 37–54)
EGFRCR SERPLBLD CKD-EPI 2021: 73 ML/MIN/1.73
EOSINOPHIL # BLD MANUAL: 3.75 10*3/MM3 (ref 0–0.4)
EOSINOPHIL NFR BLD MANUAL: 39.6 % (ref 0.3–6.2)
ERYTHROCYTE [DISTWIDTH] IN BLOOD BY AUTOMATED COUNT: 17.3 % (ref 12.3–15.4)
FERRITIN SERPL-MCNC: 307.9 NG/ML (ref 30–400)
GLOBULIN UR ELPH-MCNC: 3.6 GM/DL
GLUCOSE SERPL-MCNC: 137 MG/DL (ref 65–99)
HCT VFR BLD AUTO: 47.3 % (ref 37.5–51)
HGB BLD-MCNC: 15.7 G/DL (ref 13–17.7)
HOLD SPECIMEN: NORMAL
IRON 24H UR-MRATE: 113 MCG/DL (ref 59–158)
IRON SATN MFR SERPL: 34 % (ref 20–50)
LYMPHOCYTES # BLD MANUAL: 0.99 10*3/MM3 (ref 0.7–3.1)
LYMPHOCYTES NFR BLD MANUAL: 3.1 % (ref 5–12)
MCH RBC QN AUTO: 31 PG (ref 26.6–33)
MCHC RBC AUTO-ENTMCNC: 33.2 G/DL (ref 31.5–35.7)
MCV RBC AUTO: 93.5 FL (ref 79–97)
MONOCYTES # BLD: 0.29 10*3/MM3 (ref 0.1–0.9)
NEUTROPHILS # BLD AUTO: 4.45 10*3/MM3 (ref 1.7–7)
NEUTROPHILS NFR BLD MANUAL: 46.9 % (ref 42.7–76)
PATH INTERP BLD-IMP: NORMAL
PLAT MORPH BLD: NORMAL
PLATELET # BLD AUTO: 220 10*3/MM3 (ref 140–450)
PMV BLD AUTO: 9.7 FL (ref 6–12)
POLYCHROMASIA BLD QL SMEAR: ABNORMAL
POTASSIUM SERPL-SCNC: 4.6 MMOL/L (ref 3.5–5.2)
PROT SERPL-MCNC: 7.5 G/DL (ref 6–8.5)
RBC # BLD AUTO: 5.06 10*6/MM3 (ref 4.14–5.8)
SODIUM SERPL-SCNC: 141 MMOL/L (ref 136–145)
TIBC SERPL-MCNC: 328 MCG/DL (ref 298–536)
TRANSFERRIN SERPL-MCNC: 220 MG/DL (ref 200–360)
VARIANT LYMPHS NFR BLD MANUAL: 2.1 % (ref 0–5)
VARIANT LYMPHS NFR BLD MANUAL: 8.3 % (ref 19.6–45.3)
WBC MORPH BLD: NORMAL
WBC NRBC COR # BLD: 9.48 10*3/MM3 (ref 3.4–10.8)

## 2022-06-09 PROCEDURE — 85060 BLOOD SMEAR INTERPRETATION: CPT

## 2022-06-09 PROCEDURE — 99213 OFFICE O/P EST LOW 20 MIN: CPT | Performed by: NURSE PRACTITIONER

## 2022-06-09 PROCEDURE — 36415 COLL VENOUS BLD VENIPUNCTURE: CPT

## 2022-06-09 PROCEDURE — 80053 COMPREHEN METABOLIC PANEL: CPT

## 2022-06-09 PROCEDURE — 85025 COMPLETE CBC W/AUTO DIFF WBC: CPT

## 2022-06-09 PROCEDURE — 83540 ASSAY OF IRON: CPT

## 2022-06-09 PROCEDURE — 84466 ASSAY OF TRANSFERRIN: CPT

## 2022-06-09 PROCEDURE — 82728 ASSAY OF FERRITIN: CPT

## 2022-06-09 PROCEDURE — 85007 BL SMEAR W/DIFF WBC COUNT: CPT

## 2022-06-09 NOTE — PROGRESS NOTES
[facility] GROUP HEMATOLOGY & ONCOLOGY  [address]  [place] [address] [phone]    Patient Name: Zachariah Acosta  Encounter Date: 06/09/2022  YOB: 1940  Patient Number: [id number]    Hematology / Oncology Progress Note    CHIEF COMPLAINT:   Review Labs    HPI / REASON FOR VISIT: Zachariah Acosta is a 81 y.o. male who is followed by this office for iron deficiency    He has history of  IBS and sees GI at [facility] where he was found to have iron deficiency.  He was taking oral iron x 2 months without any improvement.  He was started on IV iron.  He has had them weekly for the past two weeks at [facility] here in [place]. Pt would like to have his anemia followed here.      He reports colonoscopy 2 week  ago in [place] and states it was normal.     He was seen in our office on 04/18 for consultation.  Labs were drawn and he presents to day with his wife to discuss results. Anemia profile has improved.  Serum iron 177, Ferritin 1322, Iron saturation 52, TIBC 332.   Pt has recently had iron infusions.  He states he is already feeling better.  He has been working out in the yard and is tolerating exertion better.     06/09/22  Pt presents to clinic for continued follow up.  He has no acute complaint today.  He is taking one iron pill daily and is tolerating well. He has no acute complaint today.     Labs were drawn and reviewed with patient.   CBC with WBC 9.48, Hgb 15.7, Hct 47.3, Plt 220  Anemia profile with Iron 113, Ferritin 307, Iron Sat 34, TIBC 328     LABS    Lab Results - Last 18 Months   Lab Units 06/09/22  1016 04/18/22  1023 01/14/22  0816 12/10/21  1145 10/07/21  1007 06/26/21  1900 06/19/21  0518 06/18/21  1112 06/16/21  1732 06/16/21  1538 06/12/21  0621   HEMOGLOBIN g/dL 15.7 14.0 10.8* 10.0* 11.3* 11.6* 10.1* 11.7* 11.8*   < > 11.7*   HEMATOCRIT % 47.3 45.1 37.9 35.7* 37.9 37.0* 33.7* 38.7 37.6   < >  38.2   MCV fL 93.5 87.4 78.8* 80.0 80.6 77.9* 78.4* 77.6* 75.8*   < > 77.5*   WBC 10*3/mm3 9.48 10.75 9.64 10.52 16.21* 9.0 8.32 10.64 10.04   < > 10.88*   RDW % 17.3* 23.0* 18.1* 17.1* 16.7* 17.8* 17.5* 17.5* 17.4*   < > 17.7*   MPV fL 9.7 9.7 9.9 9.8 9.2 9.7 10.4 9.4 9.5  --  9.5   PLATELETS 10*3/mm3 220 295 300 344 356 264 210 248 281   < > 245   IMM GRAN % %  --  0.9*  --   --  1.0*  --  0.4 0.5 0.4  --  0.4   NEUTROS ABS 10*3/mm3 4.45 6.05  --   --  12.30* 7.3 5.49 8.58* 8.58*  --  8.92*   LYMPHS ABS 10*3/mm3  --  1.22  --   --  0.90 0.9* 1.59 1.02 0.83  --  1.22   MONOS ABS 10*3/mm3  --  0.78  --   --  0.52 0.70 0.94* 0.82 0.50  --  0.52   EOS ABS 10*3/mm3 3.75* 2.49*  --   --  2.21* 0.10 0.22 0.15 0.06  --  0.14   BASOS ABS 10*3/mm3  --  0.11  --   --  0.11 0.00 0.05 0.02 0.03  --  0.04   IMMATURE GRANS (ABS) 10*3/mm3  --  0.10*  --   --  0.17* 0.0 0.03 0.05 0.04  --  0.04   NRBC /100 WBC  --  0.0  --   --  0.0  --  0.0 0.0 0.0  --  0.0   NEUTROPHIL % % 46.9  --   --   --   --   --   --   --   --   --   --    MONOCYTES % % 3.1*  --   --   --   --   --   --   --   --   --   --    ATYP LYMPH % % 2.1  --   --   --   --   --   --   --   --   --   --     < > = values in this interval not displayed.       Lab Results - Last 18 Months   Lab Units 06/09/22  1016 04/18/22  1023 01/14/22  0816 01/03/22  1350 12/10/21  1145 10/07/21  1007 07/01/21  0837 06/26/21  1900 06/20/21  0530 06/19/21  0518 06/18/21  1112   GLUCOSE mg/dL 137* 99 124* 137* 115* 209*  --  196* 86 92 138*   SODIUM mmol/L 141 143 140 141 139 137  --  133* 138 138 138   POTASSIUM mmol/L 4.6 4.3 4.3 3.9 4.1 4.6  --  4.0 4.1 4.1 4.0   TOTAL CO2 mmol/L  --   --   --  24  --   --   --  22  --   --   --    CO2 mmol/L 29.0 25.0 27.0  --  24.0 24.0  --   --  27.0 25.0 27.0   CHLORIDE mmol/L 108* 108* 105 105 104 105  --  101 104 104 101   ANION GAP mmol/L 4.0* 10.0 8.0 12 11.0 8.0  --  10 7.0 9.0 10.0   CREATININE mg/dL 1.04 1.03 0.94 1 0.92 1.00   < > 0.9  0.83 0.97 1.58*   BUN mg/dL 11 15 19 20 22 19  --  11 15 19 21   BUN / CREAT RATIO  10.6 14.6 20.2  --  23.9 19.0  --   --  18.1 19.6 13.3   CALCIUM mg/dL 8.9 8.9 8.7 8.7* 8.9 8.8  --  8.7* 8.4* 8.3* 9.2   EGFR IF NONAFRICN AM mL/min/1.73  --   --  77 >60 79 72  --  >60 90 75 43*   ALK PHOS U/L 104 90 75 77  --  75  --  79  --  63 74   TOTAL PROTEIN g/dL 7.5 7.2 7.6 7.1  --  7.5  --  7.0  --  6.1 7.1   ALT (SGPT) U/L 18 20 17 28  --  11  --  12  --  13 19   AST (SGOT) U/L 22 25 31 24  --  18  --  14  --  14 17   BILIRUBIN mg/dL 0.4 0.3 0.2 <0.2  --  0.2  --  <0.2  --  0.5 0.4   ALBUMIN g/dL 3.90 4.20 3.90 3.7  --  3.90  --  3.6  --  3.10* 3.60   GLOBULIN gm/dL 3.6 3.0 3.7  --   --  3.6  --   --   --  3.0 3.5    < > = values in this interval not displayed.       No results for input(s): MSPIKE, KAPPALAMB, IGLFLC, URICACID, FREEKAPPAL, CEA, LDH, REFLABREPO in the last 29526 hours.    Lab Results - Last 18 Months   Lab Units 06/09/22  1016 04/18/22  1023 01/14/22  0816 11/16/21  1316 11/11/21  1315   IRON mcg/dL 113 177* 22* 18*  --    TIBC mcg/dL 328 332 450 347  --    IRON SATURATION % 34 53* 5* 5  --    FERRITIN ng/mL 307.90 1,322.00*  --  11*  --    TSH uIU/mL  --   --   --   --  1.500   FOLATE ng/mL  --  15.20  --   --   --          PAST MEDICAL HISTORY:  ALLERGIES:  Allergies   Allergen Reactions   • Albuterol Other (See Comments)   • Adhesive Tape Rash   • Azithromycin Rash   • Fentanyl Nausea And Vomiting     CURRENT MEDICATIONS:  Outpatient Encounter Medications as of 6/9/2022   Medication Sig Dispense Refill   • aspirin (aspirin) 81 MG EC tablet Take 1 tablet by mouth Daily. 30 tablet 11   • Budeson-Glycopyrrol-Formoterol (Breztri Aerosphere) 160-9-4.8 MCG/ACT aerosol inhaler Inhale 2 puffs 2 (Two) Times a Day. 10.7 g 2   • Dupilumab 300 MG/2ML solution prefilled syringe Inject  under the skin into the appropriate area as directed Every 14 (Fourteen) Days.     • finasteride (PROSCAR) 5 MG tablet Take 1 tablet  by mouth Daily. 90 tablet 3   • fluticasone (CUTIVATE) 0.005 % ointment Apply 1 application topically to the appropriate area as directed 2 (Two) Times a Day. 60 g 2   • fluticasone (FLONASE) 50 MCG/ACT nasal spray 1 spray into the nostril(s) as directed by provider Daily. 16 g 5   • glipizide (GLUCOTROL) 10 MG tablet Take 1 tablet by mouth 2 (Two) Times a Day Before Meals. 180 tablet 3   • glucose blood test strip 1 each by Other route 3 (Three) Times a Day As Needed (hypoglycemia). Use as instructed One Touch Ultra 100 each 11   • insulin aspart (NovoLOG FlexPen) 100 UNIT/ML solution pen-injector sc pen Inject 5 Units under the skin into the appropriate area as directed 3 (Three) Times a Day With Meals. 5 pen 2   • Insulin Glargine (Lantus SoloStar) 100 UNIT/ML injection pen Inject 20 Units under the skin into the appropriate area as directed As Needed.     • levalbuterol (Xopenex HFA) 45 MCG/ACT inhaler Inhale 1-2 puffs Every 4 (Four) Hours As Needed for Wheezing or Shortness of Air. 15 g 11   • lisinopril (PRINIVIL,ZESTRIL) 10 MG tablet Take 1 tablet by mouth Daily. 90 tablet 3   • meclizine (ANTIVERT) 12.5 MG tablet Take 1 tablet by mouth 3 (Three) Times a Day As Needed for Dizziness. 30 tablet 0   • Mesalamine 4 GM/60ML SF enema Insert  into the rectum Daily.     • metoprolol tartrate (LOPRESSOR) 25 MG tablet Take 1 tablet by mouth 2 (Two) Times a Day. 180 tablet 3   • naproxen (NAPROSYN) 250 MG tablet Take 1 tablet by mouth 2 (Two) Times a Day With Meals. 60 tablet 0   • nitroglycerin (NITROSTAT) 0.4 MG SL tablet 1 under the tongue as needed for angina, may repeat q5mins for up three doses 25 tablet 1   • rosuvastatin (CRESTOR) 40 MG tablet Take 1 tablet by mouth Daily. 90 tablet 3   • triamcinolone (KENALOG) 0.1 % cream Apply  topically to the appropriate area as directed 2 (Two) Times a Day.     • Ustekinumab (STELARA) 90 MG/ML solution prefilled syringe Injection Inject 90 mg under the skin into the  appropriate area as directed Every 28 (Twenty-Eight) Days. (Patient taking differently: Inject 90 mg under the skin into the appropriate area as directed Take As Directed. EVERY 4 WEEKS) 1 mL 6     No facility-administered encounter medications on file as of 6/9/2022.     ADULT ILLNESSES:  Patient Active Problem List   Diagnosis Code   • BREANN on CPAP G47.33, Z99.89   • CAD (coronary artery disease) I25.10   • Chronic kidney disease (CKD), stage III (moderate) ([facility]) N18.30   • Disorder of lung J98.4   • Essential hypertension I10   • Mild intermittent asthma without complication J45.20   • S/P CABG (coronary artery bypass graft) Z95.1   • Type 2 diabetes mellitus without complication ([facility]) E11.9   • Ulcerative colitis ([facility]) K51.90   • Obesity (BMI 30-39.9) E66.9   • Non-seasonal allergic rhinitis J30.89   • SOB (shortness of breath) R06.02   • 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   • Lung nodule R91.1   • Hyperlipidemia E78.5   • Nonsmoker Z78.9   • Ulcerative colitis with complication ([facility]) K51.919   • Intractable abdominal pain R10.9   • Constipation K59.00   • KE (acute kidney injury) ([facility]) N17.9   • Hematuria R31.9   • LLQ pain R10.32   • Erectile dysfunction N52.9   • Class 1 obesity due to excess calories with serious comorbidity and body mass index (BMI) of 34.0 to 34.9 in adult E66.09, Z68.34   • Thyroid nodule E04.1     SURGERIES:  Past Surgical History:   Procedure Laterality Date   • ADENOIDECTOMY  1947   • BACK SURGERY     • CARDIAC CATHETERIZATION      stents x 6   • CARDIAC SURGERY      CABG TRIPLE BYPASS 2012   • CATARACT EXTRACTION     • COLONOSCOPY     • COLONOSCOPY N/A 06/04/2021    Procedure: COLONOSCOPY WITH ANESTHESIA;  Surgeon: Zachariah Menjivar DO;  Location: [facility] ENDOSCOPY;  Service: Gastroenterology;  Laterality: N/A;  pre hx ulcerative colitis  post ulcerative colitis  dr collins gusman   • CORONARY ANGIOPLASTY WITH STENT PLACEMENT     • HIP SURGERY Right     total hip   • PENILE PROSTHESIS IMPLANT  N/A 12/13/2021    Procedure: 3-PIECE INFLATABLE PENILE PROSTHESIS PLACEMENT;  Surgeon: Jose Tellez MD;  Location: [facility] OR;  Service: Urology;  Laterality: N/A;   • TONSILLECTOMY  1947     HEALTH MAINTENANCE ITEMS:  Health Maintenance Due   Topic Date Due   • TDAP/TD VACCINES (1 - Tdap) Never done   • DIABETIC EYE EXAM  Never done   • COVID-19 Vaccine (2 - Booster for [place] series) 06/07/2021       <no information>  Last Completed Colonoscopy     This patient has no relevant Health Maintenance data.        Immunization History   Administered Date(s) Administered   • COVID-19 ([place]) 04/12/2021   • Flu Vaccine Quad PF >36MO 11/13/2017   • Fluzone High Dose =>65 Years ([employer] ONLY) 11/01/2020   • Influenza TIV (IM) 11/16/2016, 10/26/2018   • Pneumococcal Conjugate 13-Valent (PCV13) 11/11/2013, 04/01/2019   • Pneumococcal Polysaccharide (PPSV23) 08/10/2020     Last Completed Mammogram     This patient has no relevant Health Maintenance data.            FAMILY HISTORY:  Family History   Problem Relation Age of Onset   • Colon cancer Brother    • Colon polyps Neg Hx    • Esophageal cancer Neg Hx      SOCIAL HISTORY:  Social History     Socioeconomic History   • Marital status:    Tobacco Use   • Smoking status: Never Smoker   • Smokeless tobacco: Never Used   Vaping Use   • Vaping Use: Never used   Substance and Sexual Activity   • Alcohol use: Never   • Drug use: Never   • Sexual activity: Defer       REVIEW OF SYSTEMS:  Review of Systems   Constitutional: Negative for fatigue and unexpected weight loss.   HENT: Positive for trouble swallowing.    Respiratory: Negative for shortness of breath.    Gastrointestinal: Negative for abdominal pain, blood in stool, nausea and vomiting.   Genitourinary: Negative for hematuria.   Musculoskeletal: Positive for arthralgias and back pain.   Neurological: Negative for headache and confusion.   Hematological: Does not bruise/bleed easily.       /68   Pulse  "68   Temp 98 °F (36.7 °C) (Temporal)   Resp 16   Ht 172.7 cm (68\")   Wt 104 kg (228 lb 12.8 oz)   SpO2 98%   BMI 34.79 kg/m²  Body surface area is 2.17 meters squared.    Pain Score    06/09/22 1028   PainSc: 0-No pain       Physical Exam:  Physical Exam  Vitals reviewed.   Constitutional:       Appearance: Normal appearance.   HENT:      Head: Normocephalic and atraumatic.   Eyes:      Extraocular Movements: Extraocular movements intact.   Cardiovascular:      Rate and Rhythm: Normal rate and regular rhythm.   Pulmonary:      Effort: Pulmonary effort is normal.   Neurological:      General: No focal deficit present.      Mental Status: He is alert and oriented to person, place, and time.   Psychiatric:         Mood and Affect: Mood normal.         Behavior: Behavior normal.         Zachariah Acosta reports a pain score of 0.  No intervention is indicated.         ASSESSMENT / PLAN    1. Iron deficiency anemia, unspecified iron deficiency anemia type       Iron Deficiency   -Pt was found to have iron deficiency and was given two iron infusions at a different facility.    -He is currently taking oral iron once daily   - Iron has improved and patient is feeling better.    -Labs today Iron 113, Ferritin 307, Iron Sat 34, TIBC 328     PLAN:  1.  Pt will continue oral iron as prescribed.    2.  He will RTC in 3 months for labs and continued follow up.    3.  Patient will have preoffice labs for CBC, CMP, iron profile   4. Continue current medications, treatment plans and follow up with PCP and any other providers.   5.  Care discussed with patient.  Understanding expressed.  Patient agreeable with plan.    I spent 20 minutes caring for Zachariah on this date of service. This time includes time spent by me in the following activities: preparing for the visit, reviewing tests, performing a medically appropriate examination and/or evaluation, counseling and educating the patient/family/caregiver, ordering " medications, tests, or procedures and documenting information in the medical record.     Akanksha Quinones, APRN  June 9, 2022

## 2022-07-20 ENCOUNTER — OFFICE VISIT (OUTPATIENT)
Dept: FAMILY MEDICINE CLINIC | Facility: CLINIC | Age: 82
End: 2022-07-20

## 2022-07-20 VITALS
HEART RATE: 63 BPM | SYSTOLIC BLOOD PRESSURE: 122 MMHG | HEIGHT: 68 IN | OXYGEN SATURATION: 98 % | TEMPERATURE: 97.2 F | BODY MASS INDEX: 34.95 KG/M2 | DIASTOLIC BLOOD PRESSURE: 80 MMHG | WEIGHT: 230.6 LBS

## 2022-07-20 DIAGNOSIS — K51.919 ULCERATIVE COLITIS WITH COMPLICATION, UNSPECIFIED LOCATION: ICD-10-CM

## 2022-07-20 DIAGNOSIS — I25.10 CORONARY ARTERY DISEASE INVOLVING NATIVE CORONARY ARTERY OF NATIVE HEART WITHOUT ANGINA PECTORIS: ICD-10-CM

## 2022-07-20 DIAGNOSIS — E11.9 TYPE 2 DIABETES MELLITUS TREATED WITH INSULIN: Primary | ICD-10-CM

## 2022-07-20 DIAGNOSIS — E53.8 LOW SERUM VITAMIN B12: ICD-10-CM

## 2022-07-20 DIAGNOSIS — E04.1 THYROID NODULE: ICD-10-CM

## 2022-07-20 DIAGNOSIS — I10 ESSENTIAL HYPERTENSION: ICD-10-CM

## 2022-07-20 DIAGNOSIS — H53.2 DOUBLE VISION: ICD-10-CM

## 2022-07-20 DIAGNOSIS — Z79.4 TYPE 2 DIABETES MELLITUS TREATED WITH INSULIN: Primary | ICD-10-CM

## 2022-07-20 PROCEDURE — 99204 OFFICE O/P NEW MOD 45 MIN: CPT | Performed by: FAMILY MEDICINE

## 2022-07-20 PROCEDURE — 96372 THER/PROPH/DIAG INJ SC/IM: CPT | Performed by: FAMILY MEDICINE

## 2022-07-20 RX ORDER — CYANOCOBALAMIN 1000 UG/ML
1000 INJECTION, SOLUTION INTRAMUSCULAR; SUBCUTANEOUS
Status: DISCONTINUED | OUTPATIENT
Start: 2022-07-20 | End: 2022-11-09 | Stop reason: ALTCHOICE

## 2022-07-20 RX ORDER — LANOLIN ALCOHOL/MO/W.PET/CERES
1000 CREAM (GRAM) TOPICAL DAILY
COMMUNITY

## 2022-07-20 RX ORDER — FAMOTIDINE 20 MG
TABLET ORAL
COMMUNITY

## 2022-07-20 RX ADMIN — CYANOCOBALAMIN 1000 MCG: 1000 INJECTION, SOLUTION INTRAMUSCULAR; SUBCUTANEOUS at 16:57

## 2022-07-21 ENCOUNTER — TELEPHONE (OUTPATIENT)
Dept: FAMILY MEDICINE CLINIC | Facility: CLINIC | Age: 82
End: 2022-07-21

## 2022-07-21 NOTE — TELEPHONE ENCOUNTER
Caller: Zachariah Acosta    Relationship to patient: Self    Best call back number:  [phone]    Patient is needing:  Pt is asking for the order for Thyroid US to be sent to  imaging in [place]

## 2022-07-21 NOTE — TELEPHONE ENCOUNTER
Infant (no name yet) meeting expected outcomes, VSS. Bonding well with mother, FOB in room, sleeping.  Feeding: Breast and bottle feeding with some assistance from staff.    I&O:  Voiding adequately for age; awaiting first stool in life (<24 hours).  Concerns:   -Mother positive for Spiritism(b) antibody  -Mother needs encouragement to keep track of feeds and wake infant for feeds.     appt in [place] cancelled and referral updated. Scheduling will call pt to schedule ultrasound at the [facility]

## 2022-07-22 ENCOUNTER — TELEPHONE (OUTPATIENT)
Dept: FAMILY MEDICINE CLINIC | Facility: CLINIC | Age: 82
End: 2022-07-22

## 2022-07-22 ENCOUNTER — DOCUMENTATION (OUTPATIENT)
Dept: FAMILY MEDICINE CLINIC | Facility: CLINIC | Age: 82
End: 2022-07-22

## 2022-07-22 DIAGNOSIS — E04.1 THYROID NODULE: Primary | ICD-10-CM

## 2022-08-14 DIAGNOSIS — E11.9 TYPE 2 DIABETES MELLITUS WITHOUT COMPLICATION, UNSPECIFIED WHETHER LONG TERM INSULIN USE: Primary | ICD-10-CM

## 2022-08-15 ENCOUNTER — HOSPITAL ENCOUNTER (OUTPATIENT)
Dept: ULTRASOUND IMAGING | Facility: HOSPITAL | Age: 82
Discharge: HOME | End: 2022-08-15
Admitting: FAMILY MEDICINE

## 2022-08-15 DIAGNOSIS — E04.1 THYROID NODULE: ICD-10-CM

## 2022-08-15 PROCEDURE — 76536 US EXAM OF HEAD AND NECK: CPT

## 2022-09-09 ENCOUNTER — OFFICE VISIT (OUTPATIENT)
Dept: ONCOLOGY | Facility: CLINIC | Age: 82
End: 2022-09-09

## 2022-09-09 ENCOUNTER — LAB (OUTPATIENT)
Dept: LAB | Facility: HOSPITAL | Age: 82
End: 2022-09-09

## 2022-09-09 VITALS
WEIGHT: 230.8 LBS | SYSTOLIC BLOOD PRESSURE: 128 MMHG | OXYGEN SATURATION: 98 % | DIASTOLIC BLOOD PRESSURE: 84 MMHG | BODY MASS INDEX: 34.98 KG/M2 | HEIGHT: 68 IN | RESPIRATION RATE: 16 BRPM | HEART RATE: 61 BPM | TEMPERATURE: 97.3 F

## 2022-09-09 DIAGNOSIS — E11.9 TYPE 2 DIABETES MELLITUS TREATED WITH INSULIN: ICD-10-CM

## 2022-09-09 DIAGNOSIS — D50.9 IRON DEFICIENCY ANEMIA, UNSPECIFIED IRON DEFICIENCY ANEMIA TYPE: Primary | ICD-10-CM

## 2022-09-09 DIAGNOSIS — Z79.4 TYPE 2 DIABETES MELLITUS TREATED WITH INSULIN: ICD-10-CM

## 2022-09-09 DIAGNOSIS — D50.9 IRON DEFICIENCY ANEMIA, UNSPECIFIED IRON DEFICIENCY ANEMIA TYPE: ICD-10-CM

## 2022-09-09 DIAGNOSIS — E11.9 TYPE 2 DIABETES MELLITUS WITHOUT COMPLICATION, UNSPECIFIED WHETHER LONG TERM INSULIN USE: ICD-10-CM

## 2022-09-09 DIAGNOSIS — E53.8 LOW SERUM VITAMIN B12: ICD-10-CM

## 2022-09-09 LAB
ALBUMIN SERPL-MCNC: 3.9 G/DL (ref 3.5–5.2)
ALBUMIN/GLOB SERPL: 1.2 G/DL
ALP SERPL-CCNC: 87 U/L (ref 39–117)
ALT SERPL W P-5'-P-CCNC: 17 U/L (ref 1–41)
ANION GAP SERPL CALCULATED.3IONS-SCNC: 6 MMOL/L (ref 5–15)
AST SERPL-CCNC: 19 U/L (ref 1–40)
BASOPHILS # BLD AUTO: 0.08 10*3/MM3 (ref 0–0.2)
BASOPHILS NFR BLD AUTO: 1 % (ref 0–1.5)
BILIRUB SERPL-MCNC: 0.4 MG/DL (ref 0–1.2)
BUN SERPL-MCNC: 13 MG/DL (ref 8–23)
BUN/CREAT SERPL: 12.6 (ref 7–25)
CALCIUM SPEC-SCNC: 8.9 MG/DL (ref 8.6–10.5)
CHLORIDE SERPL-SCNC: 104 MMOL/L (ref 98–107)
CO2 SERPL-SCNC: 29 MMOL/L (ref 22–29)
CREAT SERPL-MCNC: 1.03 MG/DL (ref 0.76–1.27)
DEPRECATED RDW RBC AUTO: 45.9 FL (ref 37–54)
EGFRCR SERPLBLD CKD-EPI 2021: 73.9 ML/MIN/1.73
EOSINOPHIL # BLD AUTO: 1.58 10*3/MM3 (ref 0–0.4)
EOSINOPHIL NFR BLD AUTO: 18.8 % (ref 0.3–6.2)
ERYTHROCYTE [DISTWIDTH] IN BLOOD BY AUTOMATED COUNT: 12.9 % (ref 12.3–15.4)
FERRITIN SERPL-MCNC: 272.2 NG/ML (ref 30–400)
GLOBULIN UR ELPH-MCNC: 3.3 GM/DL
GLUCOSE SERPL-MCNC: 156 MG/DL (ref 65–99)
HBA1C MFR BLD: 6.1 % (ref 4.8–5.6)
HCT VFR BLD AUTO: 44.4 % (ref 37.5–51)
HGB BLD-MCNC: 14.4 G/DL (ref 13–17.7)
IMM GRANULOCYTES # BLD AUTO: 0.03 10*3/MM3 (ref 0–0.05)
IMM GRANULOCYTES NFR BLD AUTO: 0.4 % (ref 0–0.5)
IRON 24H UR-MRATE: 103 MCG/DL (ref 59–158)
IRON SATN MFR SERPL: 34 % (ref 20–50)
LYMPHOCYTES # BLD AUTO: 1.22 10*3/MM3 (ref 0.7–3.1)
LYMPHOCYTES NFR BLD AUTO: 14.5 % (ref 19.6–45.3)
MCH RBC QN AUTO: 31.5 PG (ref 26.6–33)
MCHC RBC AUTO-ENTMCNC: 32.4 G/DL (ref 31.5–35.7)
MCV RBC AUTO: 97.2 FL (ref 79–97)
MONOCYTES # BLD AUTO: 0.6 10*3/MM3 (ref 0.1–0.9)
MONOCYTES NFR BLD AUTO: 7.2 % (ref 5–12)
NEUTROPHILS NFR BLD AUTO: 4.88 10*3/MM3 (ref 1.7–7)
NEUTROPHILS NFR BLD AUTO: 58.1 % (ref 42.7–76)
NRBC BLD AUTO-RTO: 0 /100 WBC (ref 0–0.2)
PLATELET # BLD AUTO: 229 10*3/MM3 (ref 140–450)
PMV BLD AUTO: 9.6 FL (ref 6–12)
POTASSIUM SERPL-SCNC: 4 MMOL/L (ref 3.5–5.2)
PROT SERPL-MCNC: 7.2 G/DL (ref 6–8.5)
RBC # BLD AUTO: 4.57 10*6/MM3 (ref 4.14–5.8)
SODIUM SERPL-SCNC: 139 MMOL/L (ref 136–145)
TIBC SERPL-MCNC: 305 MCG/DL (ref 298–536)
TRANSFERRIN SERPL-MCNC: 205 MG/DL (ref 200–360)
VIT B12 BLD-MCNC: 830 PG/ML (ref 211–946)
WBC NRBC COR # BLD: 8.39 10*3/MM3 (ref 3.4–10.8)

## 2022-09-09 PROCEDURE — 82607 VITAMIN B-12: CPT

## 2022-09-09 PROCEDURE — 83036 HEMOGLOBIN GLYCOSYLATED A1C: CPT

## 2022-09-09 PROCEDURE — 99213 OFFICE O/P EST LOW 20 MIN: CPT | Performed by: NURSE PRACTITIONER

## 2022-09-09 PROCEDURE — 85025 COMPLETE CBC W/AUTO DIFF WBC: CPT

## 2022-09-09 PROCEDURE — 83540 ASSAY OF IRON: CPT

## 2022-09-09 PROCEDURE — 84466 ASSAY OF TRANSFERRIN: CPT

## 2022-09-09 PROCEDURE — 36415 COLL VENOUS BLD VENIPUNCTURE: CPT

## 2022-09-09 PROCEDURE — 80053 COMPREHEN METABOLIC PANEL: CPT

## 2022-09-09 PROCEDURE — 82728 ASSAY OF FERRITIN: CPT

## 2022-09-09 NOTE — PROGRESS NOTES
[facility] HEMATOLOGY & ONCOLOGY  [address]  [place] [address] [phone]    Patient Name: Zachariah Acosta  Encounter Date: 09/09/2022  YOB: 1940  Patient Number: [id number]    Hematology / Oncology Progress Note    HPI / REASON FOR VISIT: Zachariah Acosta is a 81 y.o. male who is followed by this office for iron deficiency    He has history of  IBS and sees GI at [place] where he was found to have iron deficiency.  He was taking oral iron x 2 months without any improvement.  He was started on IV iron at [facility] in [place].  He is currently on oral iron once daily.     He presents to clinic today for continued follow-up.  He has no acute complaints.  Labs were drawn today and reviewed with patient.   CBC with WBC 9.48, Hgb 14.4, Hct 44.4, Plt 220  Anemia profile with Iron 103, Ferritin 272, Iron Sat 34, TIBC 305.      LABS    Lab Results - Last 18 Months   Lab Units 09/09/22  1039 06/09/22  1016 04/18/22  1023 01/14/22  0816 12/10/21  1145 10/07/21  1007 06/26/21  1900 06/19/21  0518 06/18/21  1112 06/16/21  1732   HEMOGLOBIN g/dL 14.4 15.7 14.0 10.8* 10.0* 11.3* 11.6* 10.1* 11.7* 11.8*   HEMATOCRIT % 44.4 47.3 45.1 37.9 35.7* 37.9 37.0* 33.7* 38.7 37.6   MCV fL 97.2* 93.5 87.4 78.8* 80.0 80.6 77.9* 78.4* 77.6* 75.8*   WBC 10*3/mm3 8.39 9.48 10.75 9.64 10.52 16.21* 9.0 8.32 10.64 10.04   RDW % 12.9 17.3* 23.0* 18.1* 17.1* 16.7* 17.8* 17.5* 17.5* 17.4*   MPV fL 9.6 9.7 9.7 9.9 9.8 9.2 9.7 10.4 9.4 9.5   PLATELETS 10*3/mm3 229 220 295 300 344 356 264 210 248 281   IMM GRAN % % 0.4  --  0.9*  --   --  1.0*  --  0.4 0.5 0.4   NEUTROS ABS 10*3/mm3 4.88 4.45 6.05  --   --  12.30* 7.3 5.49 8.58* 8.58*   LYMPHS ABS 10*3/mm3 1.22  --  1.22  --   --  0.90 0.9* 1.59 1.02 0.83   MONOS ABS 10*3/mm3 0.60  --  0.78  --   --  0.52 0.70 0.94* 0.82 0.50   EOS ABS 10*3/mm3 1.58* 3.75* 2.49*  --   --  2.21* 0.10 0.22  0.15 0.06   BASOS ABS 10*3/mm3 0.08  --  0.11  --   --  0.11 0.00 0.05 0.02 0.03   IMMATURE GRANS (ABS) 10*3/mm3 0.03  --  0.10*  --   --  0.17* 0.0 0.03 0.05 0.04   NRBC /100 WBC 0.0  --  0.0  --   --  0.0  --  0.0 0.0 0.0   NEUTROPHIL % %  --  46.9  --   --   --   --   --   --   --   --    MONOCYTES % %  --  3.1*  --   --   --   --   --   --   --   --    ATYP LYMPH % %  --  2.1  --   --   --   --   --   --   --   --        Lab Results - Last 18 Months   Lab Units 09/09/22  1039 06/09/22  1016 04/18/22  1023 01/14/22  0816 01/03/22  1350 12/10/21  1145 10/07/21  1007 07/01/21  0837 06/26/21  1900 06/20/21  0530 06/19/21  0518   GLUCOSE mg/dL 156* 137* 99 124* 137* 115* 209*  --  196* 86 92   SODIUM mmol/L 139 141 143 140 141 139 137  --  133* 138 138   POTASSIUM mmol/L 4.0 4.6 4.3 4.3 3.9 4.1 4.6  --  4.0 4.1 4.1   TOTAL CO2 mmol/L  --   --   --   --  24  --   --   --  22  --   --    CO2 mmol/L 29.0 29.0 25.0 27.0  --  24.0 24.0  --   --  27.0 25.0   CHLORIDE mmol/L 104 108* 108* 105 105 104 105  --  101 104 104   ANION GAP mmol/L 6.0 4.0* 10.0 8.0 12 11.0 8.0  --  10 7.0 9.0   CREATININE mg/dL 1.03 1.04 1.03 0.94 1 0.92 1.00   < > 0.9 0.83 0.97   BUN mg/dL 13 11 15 19 20 22 19  --  11 15 19   BUN / CREAT RATIO  12.6 10.6 14.6 20.2  --  23.9 19.0  --   --  18.1 19.6   CALCIUM mg/dL 8.9 8.9 8.9 8.7 8.7* 8.9 8.8  --  8.7* 8.4* 8.3*   EGFR IF NONAFRICN AM mL/min/1.73  --   --   --  77 >60 79 72  --  >60 90 75   ALK PHOS U/L 87 104 90 75 77  --  75  --  79  --  63   TOTAL PROTEIN g/dL 7.2 7.5 7.2 7.6 7.1  --  7.5  --  7.0  --  6.1   ALT (SGPT) U/L 17 18 20 17 28  --  11  --  12  --  13   AST (SGOT) U/L 19 22 25 31 24  --  18  --  14  --  14   BILIRUBIN mg/dL 0.4 0.4 0.3 0.2 <0.2  --  0.2  --  <0.2  --  0.5   ALBUMIN g/dL 3.90 3.90 4.20 3.90 3.7  --  3.90  --  3.6  --  3.10*   GLOBULIN gm/dL 3.3 3.6 3.0 3.7  --   --  3.6  --   --   --  3.0    < > = values in this interval not displayed.       No results for input(s):  MSPIKE, KAPPALAMB, IGLFLC, URICACID, FREEKAPPAL, CEA, LDH, REFLABREPO in the last 82428 hours.    Lab Results - Last 18 Months   Lab Units 09/09/22  1039 06/09/22  1016 04/18/22  1023 01/14/22  0816 11/16/21  1316 11/11/21  1315   IRON mcg/dL 103 113 177* 22* 18*  --    TIBC mcg/dL 305 328 332 450 347  --    IRON SATURATION % 34 34 53* 5* 5  --    FERRITIN ng/mL 272.20 307.90 1,322.00*  --  11*  --    TSH uIU/mL  --   --   --   --   --  1.500   FOLATE ng/mL  --   --  15.20  --   --   --          PAST MEDICAL HISTORY:  ALLERGIES:  Allergies   Allergen Reactions   • Albuterol Other (See Comments)   • Adhesive Tape Rash   • Azithromycin Rash   • Fentanyl Nausea And Vomiting     CURRENT MEDICATIONS:  Outpatient Encounter Medications as of 9/9/2022   Medication Sig Dispense Refill   • aspirin (aspirin) 81 MG EC tablet Take 1 tablet by mouth Daily. 30 tablet 11   • Budeson-Glycopyrrol-Formoterol (Breztri Aerosphere) 160-9-4.8 MCG/ACT aerosol inhaler Inhale 2 puffs 2 (Two) Times a Day. 10.7 g 2   • Dupilumab 300 MG/2ML solution prefilled syringe Inject  under the skin into the appropriate area as directed Every 14 (Fourteen) Days.     • Ferrous Sulfate (IRON PO) Take 65 mg by mouth Daily.     • finasteride (PROSCAR) 5 MG tablet Take 1 tablet by mouth Daily. 90 tablet 3   • fluticasone (CUTIVATE) 0.005 % ointment Apply 1 application topically to the appropriate area as directed 2 (Two) Times a Day. 60 g 2   • fluticasone (FLONASE) 50 MCG/ACT nasal spray 1 spray into the nostril(s) as directed by provider Daily. 16 g 5   • Garlic 10 MG capsule Take 3,600 mg by mouth.     • glipizide (GLUCOTROL) 10 MG tablet Take 1 tablet by mouth 2 (Two) Times a Day Before Meals. 180 tablet 3   • glucose blood test strip 1 each by Other route 3 (Three) Times a Day As Needed (hypoglycemia). Use as instructed One Touch Ultra 100 each 11   • insulin aspart (NovoLOG FlexPen) 100 UNIT/ML solution pen-injector sc pen Inject 5 Units under the skin  into the appropriate area as directed 3 (Three) Times a Day With Meals. 5 pen 2   • Insulin Glargine (Lantus SoloStar) 100 UNIT/ML injection pen Inject 20 Units under the skin into the appropriate area as directed As Needed.     • levalbuterol (Xopenex HFA) 45 MCG/ACT inhaler Inhale 1-2 puffs Every 4 (Four) Hours As Needed for Wheezing or Shortness of Air. 15 g 11   • lisinopril (PRINIVIL,ZESTRIL) 10 MG tablet Take 1 tablet by mouth Daily. 90 tablet 3   • meclizine (ANTIVERT) 12.5 MG tablet Take 1 tablet by mouth 3 (Three) Times a Day As Needed for Dizziness. 30 tablet 0   • Mesalamine 4 GM/60ML SF enema Insert  into the rectum Daily.     • metoprolol tartrate (LOPRESSOR) 25 MG tablet Take 1 tablet by mouth 2 (Two) Times a Day. 180 tablet 3   • naproxen (NAPROSYN) 250 MG tablet Take 1 tablet by mouth 2 (Two) Times a Day With Meals. 60 tablet 0   • nitroglycerin (NITROSTAT) 0.4 MG SL tablet 1 under the tongue as needed for angina, may repeat q5mins for up three doses 25 tablet 1   • Pediatric Multiple Vit-C-FA (CHILDRENS CHEWABLE MULTI VITS PO) Take  by mouth.     • rosuvastatin (CRESTOR) 40 MG tablet Take 1 tablet by mouth Daily. 90 tablet 3   • triamcinolone (KENALOG) 0.1 % cream Apply  topically to the appropriate area as directed 2 (Two) Times a Day.     • Ustekinumab (STELARA) 90 MG/ML solution prefilled syringe Injection Inject 90 mg under the skin into the appropriate area as directed Every 28 (Twenty-Eight) Days. (Patient taking differently: Inject 90 mg under the skin into the appropriate area as directed Take As Directed. EVERY 4 WEEKS) 1 mL 6   • vitamin B-12 (CYANOCOBALAMIN) 1000 MCG tablet Take 1,000 mcg by mouth Daily.     • Vitamin D, Cholecalciferol, 25 MCG (1000 UT) capsule Take  by mouth.       Facility-Administered Encounter Medications as of 9/9/2022   Medication Dose Route Frequency Provider Last Rate Last Admin   • cyanocobalamin injection 1,000 mcg  1,000 mcg Intramuscular Q28 Days Glass,  Rozina ANAYA MD   1,000 mcg at 07/20/22 1657     ADULT ILLNESSES:  Patient Active Problem List   Diagnosis Code   • BREANN on CPAP G47.33, Z99.89   • CAD (coronary artery disease) I25.10   • Chronic kidney disease (CKD), stage III (moderate) ([facility]) N18.30   • Disorder of lung J98.4   • Essential hypertension I10   • Mild intermittent asthma without complication J45.20   • S/P CABG (coronary artery bypass graft) Z95.1   • Type 2 diabetes mellitus without complication ([facility]) E11.9   • Ulcerative colitis ([facility]) K51.90   • Obesity (BMI 30-39.9) E66.9   • Non-seasonal allergic rhinitis J30.89   • SOB (shortness of breath) R06.02   • 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   • Lung nodule R91.1   • Hyperlipidemia E78.5   • Nonsmoker Z78.9   • Ulcerative colitis with complication ([facility]) K51.919   • Intractable abdominal pain R10.9   • Constipation K59.00   • KE (acute kidney injury) ([facility]) N17.9   • Hematuria R31.9   • LLQ pain R10.32   • Erectile dysfunction N52.9   • Class 1 obesity due to excess calories with serious comorbidity and body mass index (BMI) of 34.0 to 34.9 in adult E66.09, Z68.34   • Thyroid nodule E04.1     SURGERIES:  Past Surgical History:   Procedure Laterality Date   • ADENOIDECTOMY  1947   • BACK SURGERY     • CARDIAC CATHETERIZATION      stents x 6   • CARDIAC SURGERY      CABG TRIPLE BYPASS 2012   • CATARACT EXTRACTION     • COLONOSCOPY     • COLONOSCOPY N/A 06/04/2021    Procedure: COLONOSCOPY WITH ANESTHESIA;  Surgeon: Zachariah Menjivar DO;  Location: [facility] ENDOSCOPY;  Service: Gastroenterology;  Laterality: N/A;  pre hx ulcerative colitis  post ulcerative colitis  dr collins gusman   • CORONARY ANGIOPLASTY WITH STENT PLACEMENT     • HIP SURGERY Right     total hip   • PENILE PROSTHESIS IMPLANT N/A 12/13/2021    Procedure: 3-PIECE INFLATABLE PENILE PROSTHESIS PLACEMENT;  Surgeon: Jose Tellez MD;  Location: [facility] OR;  Service: Urology;  Laterality: N/A;   • TONSILLECTOMY  1947     HEALTH MAINTENANCE  "ITEMS:  Health Maintenance Due   Topic Date Due   • TDAP/TD VACCINES (1 - Tdap) Never done   • DIABETIC EYE EXAM  Never done   • COVID-19 Vaccine (2 - Booster for [place] series) 06/07/2021       <no information>  Last Completed Colonoscopy     This patient has no relevant Health Maintenance data.        Immunization History   Administered Date(s) Administered   • COVID-19 ([facility]) 04/12/2021   • Flu Vaccine Quad PF >36MO 11/13/2017   • Fluzone High Dose =>65 Years ([employer] ONLY) 11/01/2020   • Influenza TIV (IM) 11/16/2016, 10/26/2018   • Influenza, Unspecified 10/21/2021   • Pneumococcal Conjugate 13-Valent (PCV13) 11/11/2013, 04/01/2019   • Pneumococcal Polysaccharide (PPSV23) 08/10/2020     Last Completed Mammogram     This patient has no relevant Health Maintenance data.            FAMILY HISTORY:  Family History   Problem Relation Age of Onset   • Colon cancer Brother    • Colon polyps Neg Hx    • Esophageal cancer Neg Hx      SOCIAL HISTORY:  Social History     Socioeconomic History   • Marital status:    Tobacco Use   • Smoking status: Never Smoker   • Smokeless tobacco: Never Used   Vaping Use   • Vaping Use: Never used   Substance and Sexual Activity   • Alcohol use: Never   • Drug use: Never   • Sexual activity: Defer       REVIEW OF SYSTEMS:  Review of Systems   Constitutional: Negative for fatigue and unexpected weight loss.   HENT: Positive for trouble swallowing.    Respiratory: Negative for shortness of breath.    Gastrointestinal: Negative for abdominal pain, blood in stool, nausea and vomiting.   Genitourinary: Negative for hematuria.   Musculoskeletal: Positive for arthralgias and back pain.   Neurological: Negative for headache and confusion.   Hematological: Does not bruise/bleed easily.       /84   Pulse 61   Temp 97.3 °F (36.3 °C) (Temporal)   Resp 16   Ht 172.7 cm (68\")   Wt 105 kg (230 lb 12.8 oz)   SpO2 98%   BMI 35.09 kg/m²  Body surface area is 2.17 meters " squared.    Pain Score    09/09/22 1051   PainSc: 0-No pain       Physical Exam:  Physical Exam  Vitals reviewed.   Constitutional:       Appearance: Normal appearance.   HENT:      Head: Normocephalic and atraumatic.   Eyes:      Extraocular Movements: Extraocular movements intact.   Cardiovascular:      Rate and Rhythm: Normal rate and regular rhythm.   Pulmonary:      Effort: Pulmonary effort is normal.   Neurological:      General: No focal deficit present.      Mental Status: He is alert and oriented to person, place, and time.   Psychiatric:         Mood and Affect: Mood normal.         Behavior: Behavior normal.         Zachariah Acosta reports a pain score of 0.  No intervention is indicated.         ASSESSMENT / PLAN    1. Iron deficiency anemia, unspecified iron deficiency anemia type       Iron Deficiency   -Pt was found to have iron deficiency and was given two iron infusions at a different facility.    -He is currently taking oral iron once daily   - Iron has improved and patient is feeling better.    CBC with WBC 9.48, Hgb 14.4, Hct 44.4, Plt 220  Anemia profile with Iron 103, Ferritin 272, Iron Sat 34, TIBC 305.    PLAN:  1.  Pt will continue oral iron once daily as prescribed.    2.  He will RTC in 3 months for labs and continued follow up.    3.  Patient will have preoffice labs for CBC, CMP, iron profile   4. Continue current medications, treatment plans and follow up with PCP and any other providers.   5.  Care discussed with patient.  Understanding expressed.  Patient agreeable with plan.        Akanksha Quinones, BORIS  09/09/2022

## 2022-09-14 ENCOUNTER — OFFICE VISIT (OUTPATIENT)
Dept: NEUROLOGY | Facility: CLINIC | Age: 82
End: 2022-09-14

## 2022-09-14 VITALS
BODY MASS INDEX: 35.09 KG/M2 | HEART RATE: 62 BPM | HEIGHT: 68 IN | SYSTOLIC BLOOD PRESSURE: 150 MMHG | OXYGEN SATURATION: 98 % | DIASTOLIC BLOOD PRESSURE: 78 MMHG

## 2022-09-14 DIAGNOSIS — Z99.89 OBSTRUCTIVE SLEEP APNEA ON CPAP: Primary | ICD-10-CM

## 2022-09-14 DIAGNOSIS — G47.33 OBSTRUCTIVE SLEEP APNEA ON CPAP: Primary | ICD-10-CM

## 2022-09-14 PROCEDURE — 99213 OFFICE O/P EST LOW 20 MIN: CPT | Performed by: NURSE PRACTITIONER

## 2022-09-14 NOTE — PROGRESS NOTES
"  Neurology Progress Note    Chief Complaint:    Obstructive Sleep Apnea    Subjective   History of Present Illness:  Zachariah Acosta is a 81 y.o. male who presents today for obstructive sleep apnea.  He is routinely followed by Rozina Hanson MD for primary care.     Obstructive Sleep Apnea  He continues to report that he is doing well with the use of his CPAP.  He states he cannot \"sleep without it\".  He states he gets restorative sleep and denies any snoring, multiple wakings at night, or apneic episodes.  He reports use of his CPAP every night with no questions or concerns today.  He does have complete understanding of risks associated to untreated underlying sleep apnea.     Allergies:    Albuterol, Adhesive tape, Azithromycin, and Fentanyl    Medications:  Current Outpatient Medications   Medication Sig Dispense Refill   • aspirin (aspirin) 81 MG EC tablet Take 1 tablet by mouth Daily. 30 tablet 11   • Budeson-Glycopyrrol-Formoterol (Breztri Aerosphere) 160-9-4.8 MCG/ACT aerosol inhaler Inhale 2 puffs 2 (Two) Times a Day. 10.7 g 2   • Dupilumab 300 MG/2ML solution prefilled syringe Inject  under the skin into the appropriate area as directed Every 14 (Fourteen) Days.     • Ferrous Sulfate (IRON PO) Take 65 mg by mouth Daily.     • finasteride (PROSCAR) 5 MG tablet Take 1 tablet by mouth Daily. 90 tablet 3   • fluticasone (CUTIVATE) 0.005 % ointment Apply 1 application topically to the appropriate area as directed 2 (Two) Times a Day. 60 g 2   • fluticasone (FLONASE) 50 MCG/ACT nasal spray 1 spray into the nostril(s) as directed by provider Daily. 16 g 5   • Garlic 10 MG capsule Take 3,600 mg by mouth.     • glipizide (GLUCOTROL) 10 MG tablet Take 1 tablet by mouth 2 (Two) Times a Day Before Meals. 180 tablet 3   • glucose blood test strip 1 each by Other route 3 (Three) Times a Day As Needed (hypoglycemia). Use as instructed One Touch Ultra 100 each 11   • insulin aspart (NovoLOG FlexPen) 100 UNIT/ML " solution pen-injector sc pen Inject 5 Units under the skin into the appropriate area as directed 3 (Three) Times a Day With Meals. 5 pen 2   • Insulin Glargine (Lantus SoloStar) 100 UNIT/ML injection pen Inject 20 Units under the skin into the appropriate area as directed As Needed.     • levalbuterol (Xopenex HFA) 45 MCG/ACT inhaler Inhale 1-2 puffs Every 4 (Four) Hours As Needed for Wheezing or Shortness of Air. 15 g 11   • lisinopril (PRINIVIL,ZESTRIL) 10 MG tablet Take 1 tablet by mouth Daily. 90 tablet 3   • meclizine (ANTIVERT) 12.5 MG tablet Take 1 tablet by mouth 3 (Three) Times a Day As Needed for Dizziness. 30 tablet 0   • Mesalamine 4 GM/60ML SF enema Insert  into the rectum Daily.     • metoprolol tartrate (LOPRESSOR) 25 MG tablet Take 1 tablet by mouth 2 (Two) Times a Day. 180 tablet 3   • naproxen (NAPROSYN) 250 MG tablet Take 1 tablet by mouth 2 (Two) Times a Day With Meals. 60 tablet 0   • nitroglycerin (NITROSTAT) 0.4 MG SL tablet 1 under the tongue as needed for angina, may repeat q5mins for up three doses 25 tablet 1   • Pediatric Multiple Vit-C-FA (CHILDRENS CHEWABLE MULTI VITS PO) Take  by mouth.     • rosuvastatin (CRESTOR) 40 MG tablet Take 1 tablet by mouth Daily. 90 tablet 3   • triamcinolone (KENALOG) 0.1 % cream Apply  topically to the appropriate area as directed 2 (Two) Times a Day.     • Ustekinumab (STELARA) 90 MG/ML solution prefilled syringe Injection Inject 90 mg under the skin into the appropriate area as directed Every 28 (Twenty-Eight) Days. (Patient taking differently: Inject 90 mg under the skin into the appropriate area as directed Take As Directed. EVERY 4 WEEKS) 1 mL 6   • vitamin B-12 (CYANOCOBALAMIN) 1000 MCG tablet Take 1,000 mcg by mouth Daily.     • Vitamin D, Cholecalciferol, 25 MCG (1000 UT) capsule Take  by mouth.       Current Facility-Administered Medications   Medication Dose Route Frequency Provider Last Rate Last Admin   • cyanocobalamin injection 1,000 mcg   "1,000 mcg Intramuscular Q28 Days Rozina Hanson MD   1,000 mcg at 07/20/22 1657     Current outpatient and discharge medications have been reconciled for the patient.  Reviewed by: BORIS Ross    Past Medical History:  Past Medical History:   Diagnosis Date   • Asthma    • Coronary artery disease    • Diabetes mellitus (HCC)    • Elevated cholesterol    • HL (hearing loss) 2012   • Hyperlipidemia    • Hypertension    • Myocardial infarct (HCC)     [facility] 2020   • Sleep apnea     cpap at      Past Surgical History:   Procedure Laterality Date   • ADENOIDECTOMY  1947   • BACK SURGERY     • CARDIAC CATHETERIZATION      stents x 6   • CARDIAC SURGERY      CABG TRIPLE BYPASS 2012   • CATARACT EXTRACTION     • COLONOSCOPY     • COLONOSCOPY N/A 06/04/2021    Procedure: COLONOSCOPY WITH ANESTHESIA;  Surgeon: Zachariah Menjivar DO;  Location: [facility] ENDOSCOPY;  Service: Gastroenterology;  Laterality: N/A;  pre hx ulcerative colitis  post ulcerative colitis  dr collins gusman   • CORONARY ANGIOPLASTY WITH STENT PLACEMENT     • HIP SURGERY Right     total hip   • PENILE PROSTHESIS IMPLANT N/A 12/13/2021    Procedure: 3-PIECE INFLATABLE PENILE PROSTHESIS PLACEMENT;  Surgeon: Jose Tellez MD;  Location: [facility] OR;  Service: Urology;  Laterality: N/A;   • TONSILLECTOMY  1947     Family History   Problem Relation Age of Onset   • Colon cancer Brother    • Colon polyps Neg Hx    • Esophageal cancer Neg Hx      Social History     Tobacco Use   • Smoking status: Never Smoker   • Smokeless tobacco: Never Used   Vaping Use   • Vaping Use: Never used   Substance Use Topics   • Alcohol use: Never   • Drug use: Never     Review of Systems   Psychiatric/Behavioral: Positive for sleep disturbance.   All other systems reviewed and are negative.        Objective   Vital Signs:  Heart Rate:  [62] 62  BP: (150)/(78) 150/78  There were no vitals filed for this visit.  172.7 cm (68\")  Body mass index is 35.09 " kg/m².    Physical Exam  Constitutional:       Appearance: Normal appearance.   HENT:      Head: Normocephalic.   Eyes:      Extraocular Movements: Extraocular movements intact.      Pupils: Pupils are equal, round, and reactive to light.   Cardiovascular:      Rate and Rhythm: Normal rate and regular rhythm.      Pulses: Normal pulses.   Pulmonary:      Effort: Pulmonary effort is normal.   Musculoskeletal:         General: Normal range of motion.      Cervical back: Normal range of motion and neck supple.   Skin:     General: Skin is warm and dry.      Capillary Refill: Capillary refill takes less than 2 seconds.   Neurological:      Gait: Gait is intact.   Psychiatric:         Mood and Affect: Mood normal.       Neurologic Exam:  Mental Status:    -Awake. Alert. Oriented to person, place & time.  -No word finding difficulties.  -No aphasia.  -No dysarthria.  -Follows simple commands.     CN II:  Full visual fields with confrontation.  Pupils equally reactive to light.  CN III, IV, VI:  Extraocular muscles function intact with no nystagmus.  CN V:  Facial sensory is symmetric.  CN VII:  Facial motor symmetric.  CN VIII:  Gross hearing intact bilaterally.  CN IX/X:  Palate elevates symmetrically.  CN XI:  Shoulder shrug symmetric.  CN XII:  Tongue is midline on protrusion.     Motor: (strength out of 5:  1= minimal movement, 2 = movement in plane of gravity, 3 = movement against gravity, 4 = movement against some resistance, 5 = full strength)     -5/5 in bilateral biceps, triceps, brachioradialis, wrist extensors and intrinsic muscles of the hand.    -5/5 in bilateral hip flexors, quadriceps, hamstrings, gastrocsoleus complex, anterior tibialis and extensor hallucis longus.       Deep Tendon Reflexes:  -Right              Biceps: 2+         Triceps: 2+      Brachioradialis: 2+              Patella: 2+       Ankle: 2+         Babinski:  negative  -Left              Biceps: 2+         Triceps: 2+       Brachioradialis: 2+              Patella: 2+       Ankle: 2+         Babinski:  negative    Tone (Modified Sonam Scale):  No appreciable increase in tone or rigidity noted.     Sensory:  -Intact to light touch, pinprick BUE (C5-T1) and BLE (L2-S1).     Coordination:  -Finger to nose intact BUEs  -Heel to shin intact BLEs  -No ataxia     Gait  -No signs of ataxia  -ambulates unassisted     Neck Circumference: 19 inches  Mallampati Score: 3    [place] Sleepiness Scale: 9  STOP-BANG: Intermediate risk BREANN    Compliance Report:  No compliance report available as he has to take his card in for reading.     Results Review:    Lab Results   Component Value Date    GLUCOSE 156 (H) 09/09/2022    BUN 13 09/09/2022    CREATININE 1.03 09/09/2022    EGFRIFNONA 77 01/14/2022    EGFRIFAFRI >59 01/03/2022    BCR 12.6 09/09/2022    K 4.0 09/09/2022    CO2 29.0 09/09/2022    CALCIUM 8.9 09/09/2022    PROTENTOTREF 6.8 06/16/2021    ALBUMIN 3.90 09/09/2022    LABIL2 1.5 06/16/2021    AST 19 09/09/2022    ALT 17 09/09/2022     Lab Results   Component Value Date    WBC 8.39 09/09/2022    HGB 14.4 09/09/2022    HCT 44.4 09/09/2022    MCV 97.2 (H) 09/09/2022     09/09/2022     Lab Results   Component Value Date    CHOL 111 01/14/2022    CHLPL 141 01/06/2021    TRIG 82 01/14/2022    HDL 52 01/14/2022    LDL 43 01/14/2022     Lab Results   Component Value Date    TSH 1.500 11/11/2021     Lab Results   Component Value Date    HGBA1C 6.10 (H) 09/09/2022     Lab Results   Component Value Date    FOLATE 15.20 04/18/2022     Lab Results   Component Value Date    CTITKLDS84 830 09/09/2022     Polysomnography 4 or More Parameters With CPAP (04/27/2021 20:00)  SCANNED - SLEEP STUDY EXTERNAL (03/16/2010 00:00)     Plan .  Assessment:  Zachariah Acosta is a 81 y.o. male who presents with obstructive sleep apnea.  He presents today with reported compliance related to have compliance port available for verification at this time.  He is  DME company is located in [facility] and he now lives in [facility].  He states it would be very cumbersome for him to go to [place] to simply get his heart rate.  He is interested in changing his DME companies to med care here in [place].  Otherwise he is compliant and continues to do well with the use of his CPAP.    Plan:  • Continue CPAP.  Encouraged Compliance  • Send orders to Medicare to establish care here in [place] with [employer].  • Recommend a regular sleep schedule and sleep hygiene  • Discussed risks of untreated sleep apnea  • Recommend regular physical activity and a balanced diet  • Call me with any questions or concerns.    The patient and I have discussed the plan of care and he is in full agreement at this time.     Follow-Up:  Return in about 1 year (around 9/14/2023) for Annual compliance review, Obstructive sleep apnea.       Gopal Dickerson, BORIS  09/14/22  16:04 CDT

## 2022-10-24 ENCOUNTER — OFFICE VISIT (OUTPATIENT)
Dept: FAMILY MEDICINE CLINIC | Facility: CLINIC | Age: 82
End: 2022-10-24

## 2022-10-24 VITALS
OXYGEN SATURATION: 98 % | HEIGHT: 68 IN | SYSTOLIC BLOOD PRESSURE: 120 MMHG | DIASTOLIC BLOOD PRESSURE: 74 MMHG | HEART RATE: 65 BPM | BODY MASS INDEX: 35.46 KG/M2 | WEIGHT: 234 LBS

## 2022-10-24 DIAGNOSIS — Z95.1 S/P CABG (CORONARY ARTERY BYPASS GRAFT): ICD-10-CM

## 2022-10-24 DIAGNOSIS — Z00.00 ENCOUNTER FOR SUBSEQUENT ANNUAL WELLNESS VISIT (AWV) IN MEDICARE PATIENT: Primary | ICD-10-CM

## 2022-10-24 DIAGNOSIS — I25.10 CORONARY ARTERY DISEASE DUE TO CALCIFIED CORONARY LESION: ICD-10-CM

## 2022-10-24 DIAGNOSIS — I25.84 CORONARY ARTERY DISEASE DUE TO CALCIFIED CORONARY LESION: ICD-10-CM

## 2022-10-24 DIAGNOSIS — E11.9 TYPE 2 DIABETES MELLITUS TREATED WITH INSULIN: ICD-10-CM

## 2022-10-24 DIAGNOSIS — J45.20 MILD INTERMITTENT ASTHMA WITHOUT COMPLICATION: ICD-10-CM

## 2022-10-24 DIAGNOSIS — Z23 NEED FOR INFLUENZA VACCINATION: ICD-10-CM

## 2022-10-24 DIAGNOSIS — E04.1 THYROID NODULE: ICD-10-CM

## 2022-10-24 DIAGNOSIS — I10 ESSENTIAL HYPERTENSION: ICD-10-CM

## 2022-10-24 DIAGNOSIS — E66.9 OBESITY (BMI 30-39.9): ICD-10-CM

## 2022-10-24 DIAGNOSIS — Z79.4 TYPE 2 DIABETES MELLITUS TREATED WITH INSULIN: ICD-10-CM

## 2022-10-24 PROBLEM — H53.2 DOUBLE VISION: Status: ACTIVE | Noted: 2019-04-01

## 2022-10-24 PROBLEM — Z79.60 LONG-TERM USE OF IMMUNOSUPPRESSANT MEDICATION: Status: ACTIVE | Noted: 2022-06-08

## 2022-10-24 PROBLEM — R97.20 ELEVATED PSA: Status: ACTIVE | Noted: 2017-08-16

## 2022-10-24 PROBLEM — R91.1 PULMONARY NODULE SEEN ON IMAGING STUDY: Status: ACTIVE | Noted: 2018-10-26

## 2022-10-24 PROBLEM — M75.122 NONTRAUMATIC COMPLETE TEAR OF LEFT ROTATOR CUFF: Status: ACTIVE | Noted: 2019-09-13

## 2022-10-24 PROBLEM — D50.0 IRON DEFICIENCY ANEMIA DUE TO CHRONIC BLOOD LOSS: Status: ACTIVE | Noted: 2021-11-17

## 2022-10-24 PROBLEM — L71.9 ROSACEA: Status: ACTIVE | Noted: 2017-05-16

## 2022-10-24 PROCEDURE — 90662 IIV NO PRSV INCREASED AG IM: CPT | Performed by: FAMILY MEDICINE

## 2022-10-24 PROCEDURE — 1170F FXNL STATUS ASSESSED: CPT | Performed by: FAMILY MEDICINE

## 2022-10-24 PROCEDURE — 1160F RVW MEDS BY RX/DR IN RCRD: CPT | Performed by: FAMILY MEDICINE

## 2022-10-24 PROCEDURE — G0008 ADMIN INFLUENZA VIRUS VAC: HCPCS | Performed by: FAMILY MEDICINE

## 2022-10-24 PROCEDURE — G0439 PPPS, SUBSEQ VISIT: HCPCS | Performed by: FAMILY MEDICINE

## 2022-10-24 PROCEDURE — 99214 OFFICE O/P EST MOD 30 MIN: CPT | Performed by: FAMILY MEDICINE

## 2022-10-24 PROCEDURE — 1159F MED LIST DOCD IN RCRD: CPT | Performed by: FAMILY MEDICINE

## 2022-10-24 PROCEDURE — 1126F AMNT PAIN NOTED NONE PRSNT: CPT | Performed by: FAMILY MEDICINE

## 2022-10-24 RX ORDER — NITROGLYCERIN 0.4 MG/1
TABLET SUBLINGUAL
Qty: 25 TABLET | Refills: 1 | Status: SHIPPED | OUTPATIENT
Start: 2022-10-24 | End: 2023-01-12 | Stop reason: SDUPTHER

## 2022-10-24 RX ORDER — BUDESONIDE, GLYCOPYRROLATE, AND FORMOTEROL FUMARATE 160; 9; 4.8 UG/1; UG/1; UG/1
2 AEROSOL, METERED RESPIRATORY (INHALATION) 2 TIMES DAILY
Qty: 10.7 G | Refills: 11 | Status: SHIPPED | OUTPATIENT
Start: 2022-10-24 | End: 2022-11-09

## 2022-10-24 RX ORDER — LEVALBUTEROL TARTRATE 45 UG/1
1-2 AEROSOL, METERED ORAL EVERY 4 HOURS PRN
Qty: 15 G | Refills: 11 | Status: SHIPPED | OUTPATIENT
Start: 2022-10-24

## 2022-10-24 RX ORDER — PROCHLORPERAZINE 25 MG/1
SUPPOSITORY RECTAL
Qty: 3 EACH | Refills: 11 | Status: SHIPPED | OUTPATIENT
Start: 2022-10-24

## 2022-10-24 RX ORDER — ROSUVASTATIN CALCIUM 40 MG/1
20 TABLET, COATED ORAL DAILY
Qty: 90 TABLET | Refills: 3 | Status: SHIPPED | OUTPATIENT
Start: 2022-10-24 | End: 2023-01-12 | Stop reason: DRUGHIGH

## 2022-10-24 RX ORDER — PROCHLORPERAZINE 25 MG/1
1 SUPPOSITORY RECTAL
Qty: 1 EACH | Refills: 3 | Status: SHIPPED | OUTPATIENT
Start: 2022-10-24

## 2022-10-24 NOTE — PROGRESS NOTES
The ABCs of the Annual Wellness Visit  Subsequent Medicare Wellness Visit    Chief Complaint   Patient presents with   • Medicare Wellness-subsequent      Subjective    History of Present Illness:  Zachariah Acosta is a 81 y.o. male who presents for a Subsequent Medicare Wellness Visit.    The following portions of the patient's history were reviewed and   updated as appropriate: allergies, current medications, past family history, past medical history, past social history, past surgical history and problem list.    Compared to one year ago, the patient feels his physical   health is worse.    Compared to one year ago, the patient feels his mental   health is the same.    Recent Hospitalizations:  He was not admitted to the hospital during the last year.         Lipid trends reviewed with pt, no check since Jan 2022, will need check soon.  Component Ref Range & Units 9 mo ago 1 yr ago   Total Cholesterol 0 - 200 mg/dL 111  141 CM    Triglycerides 0 - 150 mg/dL 82  114    HDL Cholesterol 40 - 60 mg/dL 52  34 Low     LDL Cholesterol  0 - 100 mg/dL 43  86    VLDL Cholesterol 5 - 40 mg/dL 16     LDL/HDL Ratio  0.82     Resulting Agency  [facility]          Has not had to use any NTG.  Saw Dr. Polanco in cardiology earlier this year.  Next check is with APRN.      Current Medical Providers:  Patient Care Team:  Rozina Hanson MD as PCP - General (Family Medicine)  Yung Polanco MD as Cardiologist (Cardiology)  Monique Carlos MD as Referring Physician (Family Medicine)  Zachariah Menjivar DO as Consulting Physician (Gastroenterology)  Oren Brambila MD as Consulting Physician (Internal Medicine)    Outpatient Medications Prior to Visit   Medication Sig Dispense Refill   • aspirin (aspirin) 81 MG EC tablet Take 1 tablet by mouth Daily. 30 tablet 11   • Dupilumab 300 MG/2ML solution prefilled syringe Inject  under the skin into the appropriate area as directed Every 14 (Fourteen) Days.     •  Ferrous Sulfate (IRON PO) Take 65 mg by mouth Daily.     • finasteride (PROSCAR) 5 MG tablet Take 1 tablet by mouth Daily. 90 tablet 3   • fluticasone (CUTIVATE) 0.005 % ointment Apply 1 application topically to the appropriate area as directed 2 (Two) Times a Day. 60 g 2   • fluticasone (FLONASE) 50 MCG/ACT nasal spray 1 spray into the nostril(s) as directed by provider Daily. 16 g 5   • Garlic 10 MG capsule Take 3,600 mg by mouth.     • glipizide (GLUCOTROL) 10 MG tablet Take 1 tablet by mouth 2 (Two) Times a Day Before Meals. 180 tablet 3   • glucose blood test strip 1 each by Other route 3 (Three) Times a Day As Needed (hypoglycemia). Use as instructed One Touch Ultra 100 each 11   • insulin aspart (NovoLOG FlexPen) 100 UNIT/ML solution pen-injector sc pen Inject 5 Units under the skin into the appropriate area as directed 3 (Three) Times a Day With Meals. 5 pen 2   • Insulin Glargine (Lantus SoloStar) 100 UNIT/ML injection pen Inject 20 Units under the skin into the appropriate area as directed As Needed.     • lisinopril (PRINIVIL,ZESTRIL) 10 MG tablet Take 1 tablet by mouth Daily. 90 tablet 3   • meclizine (ANTIVERT) 12.5 MG tablet Take 1 tablet by mouth 3 (Three) Times a Day As Needed for Dizziness. 30 tablet 0   • Mesalamine 4 GM/60ML SF enema Insert  into the rectum Daily.     • metoprolol tartrate (LOPRESSOR) 25 MG tablet Take 1 tablet by mouth 2 (Two) Times a Day. 180 tablet 3   • naproxen (NAPROSYN) 250 MG tablet Take 1 tablet by mouth 2 (Two) Times a Day With Meals. 60 tablet 0   • Pediatric Multiple Vit-C-FA (CHILDRENS CHEWABLE MULTI VITS PO) Take  by mouth.     • triamcinolone (KENALOG) 0.1 % cream Apply  topically to the appropriate area as directed 2 (Two) Times a Day.     • Ustekinumab (STELARA) 90 MG/ML solution prefilled syringe Injection Inject 90 mg under the skin into the appropriate area as directed Every 28 (Twenty-Eight) Days. (Patient taking differently: Inject 90 mg under the skin into  the appropriate area as directed Take As Directed. EVERY 4 WEEKS) 1 mL 6   • vitamin B-12 (CYANOCOBALAMIN) 1000 MCG tablet Take 1,000 mcg by mouth Daily.     • Vitamin D, Cholecalciferol, 25 MCG (1000 UT) capsule Take  by mouth.     • Budeson-Glycopyrrol-Formoterol (Breztri Aerosphere) 160-9-4.8 MCG/ACT aerosol inhaler Inhale 2 puffs 2 (Two) Times a Day. 10.7 g 2   • levalbuterol (Xopenex HFA) 45 MCG/ACT inhaler Inhale 1-2 puffs Every 4 (Four) Hours As Needed for Wheezing or Shortness of Air. 15 g 11   • nitroglycerin (NITROSTAT) 0.4 MG SL tablet 1 under the tongue as needed for angina, may repeat q5mins for up three doses 25 tablet 1   • rosuvastatin (CRESTOR) 40 MG tablet Take 1 tablet by mouth Daily. 90 tablet 3     Facility-Administered Medications Prior to Visit   Medication Dose Route Frequency Provider Last Rate Last Admin   • cyanocobalamin injection 1,000 mcg  1,000 mcg Intramuscular Q28 Days Rozina Hanson MD   1,000 mcg at 07/20/22 1657       No opioid medication identified on active medication list. I have reviewed chart for other potential  high risk medication/s and harmful drug interactions in the elderly.          Aspirin is on active medication list. Aspirin use is indicated based on review of current medical condition/s. Pros and cons of this therapy have been discussed today. Benefits of this medication outweigh potential harm.  Patient has been encouraged to continue taking this medication.  .      Patient Active Problem List   Diagnosis   • BREANN on CPAP   • CAD (coronary artery disease)   • Chronic kidney disease (CKD), stage III (moderate) ([facility])   • Disorder of lung   • Essential hypertension   • Mild intermittent asthma without complication   • S/P CABG (coronary artery bypass graft)   • Type 2 diabetes mellitus without complication ([facility])   • Ulcerative colitis ([facility])   • Obesity (BMI 30-39.9)   • Non-seasonal allergic rhinitis   • SOB (shortness of breath)   • Pulmonary scarring   •  "Pulmonary nodule seen on imaging study   • Mixed hyperlipidemia   • Nonsmoker   • Ulcerative colitis with complication (HCC)   • Intractable abdominal pain   • Constipation   • KE (acute kidney injury) (HCC)   • Hematuria   • LLQ pain   • Erectile dysfunction   • Class 1 obesity due to excess calories with serious comorbidity and body mass index (BMI) of 34.0 to 34.9 in adult   • Thyroid nodule   • Double vision   • Elevated PSA   • Iron deficiency anemia due to chronic blood loss   • Long-term use of immunosuppressant medication   • Nontraumatic complete tear of left rotator cuff   • Rosacea     Advance Care Planning  Advance Directive is not on file.  ACP discussion was held with the patient during this visit. Patient does not have an advance directive, information provided.          Objective    Vitals:    10/24/22 1535   BP: 120/74   BP Location: Left arm   Patient Position: Sitting   Cuff Size: Adult   Pulse: 65   SpO2: 98%   Weight: 106 kg (234 lb)   Height: 172.7 cm (68\")   PainSc: 0-No pain     Estimated body mass index is 35.58 kg/m² as calculated from the following:    Height as of this encounter: 172.7 cm (68\").    Weight as of this encounter: 106 kg (234 lb).    Class 2 Severe Obesity (BMI >=35 and <=39.9). Obesity-related health conditions include the following: obstructive sleep apnea, hypertension, coronary heart disease, diabetes mellitus, dyslipidemias and osteoarthritis. Obesity is unchanged. BMI is is above average; BMI management plan is completed. We discussed low calorie, low carb based diet program.  He is eating ice cream some nights.      Does the patient have evidence of cognitive impairment? No    Physical Exam  Vitals reviewed.   Constitutional:       General: He is not in acute distress.     Appearance: He is obese. He is not ill-appearing.   Cardiovascular:      Rate and Rhythm: Normal rate.   Pulmonary:      Effort: Pulmonary effort is normal.   Musculoskeletal:      Comments: " Ambulatory   Neurological:      Mental Status: He is alert and oriented to person, place, and time.      Comments: Normal speech   Psychiatric:         Behavior: Behavior normal.                Lab Results   Component Value Date    HGBA1C 6.10 (H) 09/09/2022            HEALTH RISK ASSESSMENT    Smoking Status:  Social History     Tobacco Use   Smoking Status Never   Smokeless Tobacco Never     Alcohol Consumption:  Social History     Substance and Sexual Activity   Alcohol Use Never     Fall Risk Screen:    LUX Fall Risk Assessment was completed, and patient is at LOW risk for falls.Assessment completed on:10/24/2022    Depression Screening:  PHQ-2/PHQ-9 Depression Screening 10/24/2022   Retired PHQ-9 Total Score -   Retired Total Score -   Little Interest or Pleasure in Doing Things 0-->not at all   Feeling Down, Depressed or Hopeless 0-->not at all   PHQ-9: Brief Depression Severity Measure Score 0       Health Habits and Functional and Cognitive Screening:  Functional & Cognitive Status 10/24/2022   Do you have difficulty preparing food and eating? No   Do you have difficulty bathing yourself, getting dressed or grooming yourself? No   Do you have difficulty using the toilet? No   Do you have difficulty moving around from place to place? No   Do you have trouble with steps or getting out of a bed or a chair? No   Current Diet Well Balanced Diet   Dental Exam Up to date   Eye Exam Up to date   Exercise (times per week) 3 times per week   Current Exercises Include Light Weights   Do you need help using the phone?  No   Are you deaf or do you have serious difficulty hearing?  Yes   Do you need help with transportation? No   Do you need help shopping? No   Do you need help preparing meals?  No   Do you need help with housework?  No   Do you need help with laundry? No   Do you need help taking your medications? No   Do you need help managing money? No   Do you ever drive or ride in a car without wearing a seat  belt? No   Have you felt unusual stress, anger or loneliness in the last month? No   Who do you live with? Spouse   If you need help, do you have trouble finding someone available to you? No   Have you been bothered in the last four weeks by sexual problems? No   Do you have difficulty concentrating, remembering or making decisions? No       Age-appropriate Screening Schedule:  Refer to the list below for future screening recommendations based on patient's age, sex and/or medical conditions. Orders for these recommended tests are listed in the plan section. The patient has been provided with a written plan.    Health Maintenance   Topic Date Due   • TDAP/TD VACCINES (1 - Tdap) Never done   • DIABETIC EYE EXAM  Never done   • ZOSTER VACCINE (1 of 2) 05/09/2023 (Originally 11/1/1990)   • LIPID PANEL  01/14/2023   • URINE MICROALBUMIN  01/14/2023   • HEMOGLOBIN A1C  03/09/2023   • INFLUENZA VACCINE  Completed              Assessment & Plan   CMS Preventative Services Quick Reference  Risk Factors Identified During Encounter  Immunizations Discussed/Encouraged (specific Immunizations; Influenza, Prevnar 20 (Pneumococcal 20-valent conjugate) and COVID19  Obesity/Overweight   The above risks/problems have been discussed with the patient.  Follow up actions/plans if indicated are seen below in the Assessment/Plan Section.  Pertinent information has been shared with the patient in the After Visit Summary.    Diagnoses and all orders for this visit:    1. Encounter for subsequent annual wellness visit (AWV) in Medicare patient (Primary)    2. Need for influenza vaccination  -     Fluzone High-Dose 65+yrs (6022-2833)            Follow Up:   Return in about 7 weeks (around 12/14/2022) for lab results, recheck BP and DM.     An After Visit Summary and PPPS were made available to the patient.                  Rozina Hanson M.D.  Family Physician  [facility]

## 2022-10-24 NOTE — PROGRESS NOTES
Answers for HPI/ROS submitted by the patient on 10/24/2022  What is the primary reason for your visit?: Other  Please describe your symptoms.: Follow up  Have you had these symptoms before?: No  How long have you been having these symptoms?: Greater than 2 weeks  Please list any medications you are currently taking for this condition.: Medications are listed in my chart.  Please describe any probable cause for these symptoms. : Don't know.    Chief Complaint  Variable BP and BG management at home  Subjective        History of Present Illness  Zachariah Acosta is a 81 y.o. male who presents to [facility]     Discussed his chronic med conditions and updated problem list.  Pt is adjusting his BP meds on his own at home and occ holds the lisinopril if he feels his AM BP reading on his wrist cuff is too low or even well-controlled.  He is consistent with the metoprolol.  Sometimes he takes a 1/2 of the lisinopril.  I explained this could be affecting his control ( up or down) into the next day and the reason it may be higher one AM is that he may have held the dose the previous day.    He has apparently done this in the past too when under Dr. Carlos's care and she advised him not to; notes reviewed:    Office Visit with Monique Carlos MD (01/07/2022)    Pt also is varying his Lantus dose and if his BG at hs is around 175 he will only take 10 units of Lantus and if it's over 200 he will take 20 units.  He is fearful that his BG will drop overnight if his hs BG is only 175 and he takes 20 units.  I explained Lantus is a 24 hour insulin and that it doesn't cause rapid drops and he should dose it regularly at the same dose and if he adjusts it down one day, that may be why his sugar runs high sometime the next day even.  He has to take humalog occ and will take 5 units of rapid acting with lunch at times.  Hasn't had a working One Touch monitor and insurance apparently told him  it was too early to get another one the last time he checked so he bought the [employer] one and has been using that checking sugar multiple times in the day but not at HS, mostly toward later in the day and always at hs.  Eating ice cream some nights and is more likely to take the 20 units of Lantus those nights.    Even after our discussion today and his V/U he reiterated that he planned to adjust the medications himself as he judged fit and I reinforced management at home shouldn't be varying the dose but finding a dose that works and sticking with it so we can reassess it accurately.  Wife V/U and I wrote out directions for them.    Result Review :   The following data was reviewed by: Rozina Hanson MD on 10/24/2022:  CMP    CMP 4/18/22 6/9/22 9/9/22   Glucose 99 137 (A) 156 (A)   BUN 15 11 13   Creatinine 1.03 1.04 1.03   Sodium 143 141 139   Potassium 4.3 4.6 4.0   Chloride 108 (A) 108 (A) 104   Calcium 8.9 8.9 8.9   Albumin 4.20 3.90 3.90   Total Bilirubin 0.3 0.4 0.4   Alkaline Phosphatase 90 104 87   AST (SGOT) 25 22 19   ALT (SGPT) 20 18 17   (A) Abnormal value            CBC    CBC 4/18/22 6/9/22 9/9/22   WBC 10.75 9.48 8.39   RBC 5.16 5.06 4.57   Hemoglobin 14.0 15.7 14.4   Hematocrit 45.1 47.3 44.4   MCV 87.4 93.5 97.2 (A)   MCH 27.1 31.0 31.5   MCHC 31.0 (A) 33.2 32.4   RDW 23.0 (A) 17.3 (A) 12.9   Platelets 295 220 229   (A) Abnormal value            Lipid Panel    Lipid Panel 1/14/22   Total Cholesterol 111   Triglycerides 82   HDL Cholesterol 52   VLDL Cholesterol 16   LDL Cholesterol  43   LDL/HDL Ratio 0.82           TSH    TSH 11/11/21   TSH 1.500           A1C Last 3 Results    HGBA1C Last 3 Results 1/14/22 5/9/22 9/9/22   Hemoglobin A1C 7.00 (A) 5.9 6.10 (A)   (A) Abnormal value            Microalbumin    Microalbumin 1/14/22   Microalbumin, Urine 4.7           UA    Urinalysis 11/22/21 12/21/21 12/27/21   Ketones, UA 15 mg/dL (A) Trace (A) Trace (A)   Leukocytes, UA Negative Negative Negative    (A) Abnormal value              Vitamin B12 (09/09/2022 10:39)  Component Ref Range & Units 1 mo ago   Pinky/Hecker 6 mo ago   Pinky/[place]cker 11 mo ago   Pinky/[place] 4 yr ago   Pinky/Hecker   Vitamin B-12 211 - 946 pg/mL 830  333  165 Low  R  161 Low  R    Resulting Agency   [facility]  [facility] . [facility]          Iron Profile (09/09/2022 10:39)  Component Ref Range & Units 1 mo ago   Pinky/Hecker 4 mo ago   Pinky/Hecker 6 mo ago   Pinky/Hecker 9 mo ago   Pinky/Hecker 11 mo ago   Pinky/[place]   Iron 59 - 158 mcg/dL 103  113  177 High   22 Low   18 Low  R    Iron Saturation 20 - 50 % 34  34  53 High   5 Low   5 R    Transferrin 200 - 360 mg/dL 205  220  223  302          Ferritin (09/09/2022 10:39)  Component Ref Range & Units 1 mo ago   Pinky/Hecker 4 mo ago   Pinky/Hecker 6 mo ago   Pinky/Hecker 11 mo ago   Pinky/[place]   Ferritin 30.00 - 400.00 ng/mL 272.20  307.90  1,322.00 High   11 Low  R    Resulting Agency   [facility]  [facility]         He has been seeing Akanksha in Hem/Onc but feels he is doing better and would just like his labs checked and f/u here rather than having to have separate appt with hematology.  His next labs are due in Dec and he wants to cancel his hem Dec 9th appt.           US Thyroid (08/15/2022 09:17)      IMPRESSION:  1. There are 2 small TR 4 nodules not previously demonstrated one within  each lobe of the thyroid. These are not of sufficient size at this time  for FNA. The larger mixed cystic and solid nodule in the left lobe has  shown some interval increase in size and has irregular margins. Based on  morphology this is a TR 2 lesions but FNA could be considered given the  increase in size and irregularity of the margins. Otherwise,  surveillance imaging is suggested.       He is seeing specialists for derm and GI too.        [facility] [place]; has no double  vision when looking up.  Then has double vision and tries to keep his R eye open and leave the L eye closed so he can see only monovision.  He does try to alternate to keep L eye strong.  Fast moving things on screen make him dizzy and nauseated.    No DR on eye exam per pt report.    Review of Systems     Past Medical History:   Diagnosis Date   • Asthma    • Coronary artery disease    • Diabetes mellitus (HCC)    • Elevated cholesterol    • HL (hearing loss) 2012   • Hyperlipidemia    • Hypertension    • Myocardial infarct (HCC)     [facility] 2020   • Sleep apnea     cpap at        Outpatient Medications Prior to Visit   Medication Sig Dispense Refill   • aspirin (aspirin) 81 MG EC tablet Take 1 tablet by mouth Daily. 30 tablet 11   • Dupilumab 300 MG/2ML solution prefilled syringe Inject  under the skin into the appropriate area as directed Every 14 (Fourteen) Days.     • Ferrous Sulfate (IRON PO) Take 65 mg by mouth Daily.     • finasteride (PROSCAR) 5 MG tablet Take 1 tablet by mouth Daily. 90 tablet 3   • fluticasone (CUTIVATE) 0.005 % ointment Apply 1 application topically to the appropriate area as directed 2 (Two) Times a Day. 60 g 2   • fluticasone (FLONASE) 50 MCG/ACT nasal spray 1 spray into the nostril(s) as directed by provider Daily. 16 g 5   • Garlic 10 MG capsule Take 3,600 mg by mouth.     • glipizide (GLUCOTROL) 10 MG tablet Take 1 tablet by mouth 2 (Two) Times a Day Before Meals. 180 tablet 3   • glucose blood test strip 1 each by Other route 3 (Three) Times a Day As Needed (hypoglycemia). Use as instructed One Touch Ultra 100 each 11   • insulin aspart (NovoLOG FlexPen) 100 UNIT/ML solution pen-injector sc pen Inject 5 Units under the skin into the appropriate area as directed 3 (Three) Times a Day With Meals. 5 pen 2   • Insulin Glargine (Lantus SoloStar) 100 UNIT/ML injection pen Inject 20 Units under the skin into the appropriate area as directed As Needed.     • lisinopril (PRINIVIL,ZESTRIL)  10 MG tablet Take 1 tablet by mouth Daily. 90 tablet 3   • meclizine (ANTIVERT) 12.5 MG tablet Take 1 tablet by mouth 3 (Three) Times a Day As Needed for Dizziness. 30 tablet 0   • Mesalamine 4 GM/60ML SF enema Insert  into the rectum Daily.     • metoprolol tartrate (LOPRESSOR) 25 MG tablet Take 1 tablet by mouth 2 (Two) Times a Day. 180 tablet 3   • naproxen (NAPROSYN) 250 MG tablet Take 1 tablet by mouth 2 (Two) Times a Day With Meals. 60 tablet 0   • Pediatric Multiple Vit-C-FA (CHILDRENS CHEWABLE MULTI VITS PO) Take  by mouth.     • triamcinolone (KENALOG) 0.1 % cream Apply  topically to the appropriate area as directed 2 (Two) Times a Day.     • Ustekinumab (STELARA) 90 MG/ML solution prefilled syringe Injection Inject 90 mg under the skin into the appropriate area as directed Every 28 (Twenty-Eight) Days. (Patient taking differently: Inject 90 mg under the skin into the appropriate area as directed Take As Directed. EVERY 4 WEEKS) 1 mL 6   • vitamin B-12 (CYANOCOBALAMIN) 1000 MCG tablet Take 1,000 mcg by mouth Daily.     • Vitamin D, Cholecalciferol, 25 MCG (1000 UT) capsule Take  by mouth.     • Budeson-Glycopyrrol-Formoterol (Breztri Aerosphere) 160-9-4.8 MCG/ACT aerosol inhaler Inhale 2 puffs 2 (Two) Times a Day. 10.7 g 2   • levalbuterol (Xopenex HFA) 45 MCG/ACT inhaler Inhale 1-2 puffs Every 4 (Four) Hours As Needed for Wheezing or Shortness of Air. 15 g 11   • nitroglycerin (NITROSTAT) 0.4 MG SL tablet 1 under the tongue as needed for angina, may repeat q5mins for up three doses 25 tablet 1   • rosuvastatin (CRESTOR) 40 MG tablet Take 1 tablet by mouth Daily. 90 tablet 3     Facility-Administered Medications Prior to Visit   Medication Dose Route Frequency Provider Last Rate Last Admin   • cyanocobalamin injection 1,000 mcg  1,000 mcg Intramuscular Q28 Days Rozina Hanson MD   1,000 mcg at 07/20/22 1657        Objective   Vital Signs:   /74 (BP Location: Left arm, Patient Position: Sitting,  "Cuff Size: Adult)   Pulse 65   Ht 172.7 cm (68\")   Wt 106 kg (234 lb)   SpO2 98%   BMI 35.58 kg/m²       Physical Exam  HENT:      Ears:      Comments: [place]     Mouth/Throat:      Mouth: Mucous membranes are moist.      Pharynx: Oropharynx is clear.   Eyes:      Comments: Squints one eye shut alternatively to compensate for his double vision.  Double vision goes away as soon as he looks up but not if he looks down.    Cardiovascular:      Rate and Rhythm: Regular rhythm.      Heart sounds: No murmur heard.  Abdominal:      General: Bowel sounds are normal.      Comments: Densely adipose/protuberant   Musculoskeletal:      Right lower leg: No edema.      Left lower leg: No edema.   Skin:     General: Skin is warm.      Findings: Rash present.      Comments: Pink papular rash on back and upper arms, scattered.   Neurological:      Mental Status: He is alert.      Comments: Not obviously dizzy appearing while in the office today   Psychiatric:         Mood and Affect: Mood normal.         Behavior: Behavior normal.      Comments: ? Impaired processing/reasoning v. Limitations on understanding                 Assessment and Plan    Diagnoses and all orders for this visit:                    1. Type 2 diabetes mellitus treated with insulin (HCC)  -     Continuous Blood Gluc Sensor (Dexcom G6 Sensor); Every 10 (Ten) Days. This should be changed out  Dispense: 3 each; Refill: 11  -     Continuous Blood Gluc Transmit (Dexcom G6 Transmitter) misc; 1 Device Every 3 (Three) Months. As directed  Dispense: 1 each; Refill: 3      2. Essential hypertension    3. Mild intermittent asthma without complication  -     Budeson-Glycopyrrol-Formoterol (Breztri Aerosphere) 160-9-4.8 MCG/ACT aerosol inhaler; Inhale 2 puffs 2 (Two) Times a Day. For breathing  Dispense: 10.7 g; Refill: 11  -     levalbuterol (Xopenex HFA) 45 MCG/ACT inhaler; Inhale 1-2 puffs Every 4 (Four) Hours As Needed for Wheezing or Shortness of Air.  Dispense: 15 " g; Refill: 11    4. S/P CABG (coronary artery bypass graft)  -     rosuvastatin (CRESTOR) 40 MG tablet; Take 0.5 tablets by mouth Daily. To prevent heart attack and stroke  Dispense: 90 tablet; Refill: 3  -     Lipid Panel; Future    5. Thyroid nodule    6. Coronary artery disease due to calcified coronary lesion  -     rosuvastatin (CRESTOR) 40 MG tablet; Take 0.5 tablets by mouth Daily. To prevent heart attack and stroke  Dispense: 90 tablet; Refill: 3  -     nitroglycerin (NITROSTAT) 0.4 MG SL tablet; 1 under the tongue as needed for angina, may repeat q5mins for up three doses  Dispense: 25 tablet; Refill: 1  -     Lipid Panel; Future          Will request eye doctor records for review and update his Care Gaps to know when he is next due.    Pt declined to consider updated booster on COVID.  Follow Up   Return in about 7 weeks (around 12/14/2022) for lab results, recheck BP and DM.    Patient was given instructions and counseling regarding his condition or for health maintenance advice.   Patient Instructions   - Take 10 units of lantus every night at 9 PM for the next week and then everyday check the sugar before you eat.  The goal is 100 to 120 on the AM fasting sugar.  - Blood pressure goal is under 130/80.   Too low is 110/65 or less.    - Start with the half of a lisinopril 10 mg daily for a week and see how the blood pressure checks go.  If not low enough, then increase to a full 10 mg on the lisinopril and continue the same dose on the metoprolol.    - Continue specialty follow ups with cardiology, GI, ENT, eye doctor and derm  - Plan to see Dr. Hanson back in office in Dec rather than hematology and review those labs at that visit along with the DM and HTN management    - Transition to the Dex Com monitor for the diabetes so you can have more frequent checks on the blood sugar and better ability to manage the insulin at meals and maximize the dosing on the lantus.         Please see specific  information/handouts pulled into the AVS if appropriate.       Rozina Hanson M.D.  [facility]

## 2022-10-24 NOTE — PATIENT INSTRUCTIONS
- Take 10 units of lantus every night at 9 PM for the next week and then everyday check the sugar before you eat.  The goal is 100 to 120 on the AM fasting sugar.  - Blood pressure goal is under 130/80.   Too low is 110/65 or less.    - Start with the half of a lisinopril 10 mg daily for a week and see how the blood pressure checks go.  If not low enough, then increase to a full 10 mg on the lisinopril and continue the same dose on the metoprolol.

## 2022-10-25 DIAGNOSIS — E04.2 MULTIPLE THYROID NODULES: Primary | ICD-10-CM

## 2022-11-09 ENCOUNTER — OFFICE VISIT (OUTPATIENT)
Dept: PULMONOLOGY | Facility: CLINIC | Age: 82
End: 2022-11-09

## 2022-11-09 VITALS
SYSTOLIC BLOOD PRESSURE: 130 MMHG | BODY MASS INDEX: 35.28 KG/M2 | DIASTOLIC BLOOD PRESSURE: 74 MMHG | WEIGHT: 232.8 LBS | HEIGHT: 68 IN | OXYGEN SATURATION: 98 % | HEART RATE: 82 BPM

## 2022-11-09 DIAGNOSIS — J98.4 PULMONARY SCARRING: ICD-10-CM

## 2022-11-09 DIAGNOSIS — G47.33 OSA ON CPAP: ICD-10-CM

## 2022-11-09 DIAGNOSIS — J30.1 NON-SEASONAL ALLERGIC RHINITIS DUE TO POLLEN: ICD-10-CM

## 2022-11-09 DIAGNOSIS — Z99.89 OSA ON CPAP: ICD-10-CM

## 2022-11-09 DIAGNOSIS — R06.02 SOB (SHORTNESS OF BREATH): ICD-10-CM

## 2022-11-09 DIAGNOSIS — E66.09 CLASS 1 OBESITY DUE TO EXCESS CALORIES WITH SERIOUS COMORBIDITY AND BODY MASS INDEX (BMI) OF 34.0 TO 34.9 IN ADULT: ICD-10-CM

## 2022-11-09 DIAGNOSIS — R91.1 PULMONARY NODULE SEEN ON IMAGING STUDY: ICD-10-CM

## 2022-11-09 DIAGNOSIS — Z78.9 NONSMOKER: ICD-10-CM

## 2022-11-09 DIAGNOSIS — J45.20 MILD INTERMITTENT ASTHMA WITHOUT COMPLICATION: Primary | ICD-10-CM

## 2022-11-09 DIAGNOSIS — J30.89 NON-SEASONAL ALLERGIC RHINITIS, UNSPECIFIED TRIGGER: ICD-10-CM

## 2022-11-09 PROCEDURE — 99214 OFFICE O/P EST MOD 30 MIN: CPT | Performed by: INTERNAL MEDICINE

## 2022-11-09 RX ORDER — FLUTICASONE PROPIONATE 50 MCG
1 SPRAY, SUSPENSION (ML) NASAL DAILY
Qty: 16 G | Refills: 5 | Status: SHIPPED | OUTPATIENT
Start: 2022-11-09

## 2022-11-09 RX ORDER — FLUTICASONE PROPIONATE 50 MCG
1 SPRAY, SUSPENSION (ML) NASAL DAILY
Qty: 16 G | Refills: 5 | Status: SHIPPED | OUTPATIENT
Start: 2022-11-09 | End: 2022-11-09

## 2022-11-09 NOTE — PROGRESS NOTES
[facility] OUTPATIENT PROGRESS NOTE    Patient: Zachariah Acosta  : 1940  Age: 82 y.o.  Date of Service: 2022    REASON FOR CLINIC VISIT:  Chief Complaint   Patient presents with   • BREANN on CPAP       Subjective:    History of Present Illness:  Zachariah Acosta is a 82 y.o. male who presents to the office today to be seen for    Diagnosis Plan   1. Mild intermittent asthma without complication        2. SOB (shortness of breath)        3. Pulmonary scarring  CT Chest Without Contrast Diagnostic      4. Pulmonary nodule seen on imaging study        5. BREANN on CPAP        6. Class 1 obesity due to excess calories with serious comorbidity and body mass index (BMI) of 34.0 to 34.9 in adult        7. Non-seasonal allergic rhinitis, unspecified trigger        8. Non-seasonal allergic rhinitis due to pollen  fluticasone (FLONASE) 50 MCG/ACT nasal spray      9. Nonsmoker        .  Other problems per record.  Patient is a very pleasant elderly  female who was seen in the pulmonary clinic for a follow-up visit.  He attended a clinic with his wife.    Patient is a non-smoker and has history of asthma and is currently using only Xopenex rescue inhaler and used Breztri in the past which has been discontinued.  His lung function shows normal spirometry.  He has diabetes which is difficult to control and he is on multiple diabetic medications.  He also has nasal allergy and uses fluticasone nasal spray and loratadine.  He gets his medications when he hospital and is requesting a refill for the fluticasone.  He is overall doing well.  He has sleep apnea and he is on CPAP which he is using every night and sleeps well with the CPAP on.    He did not have any COVID infection and he did not have any recent hospitalizations and ER visit or urgent care visit.  He is vaccinated for COVID but will need a booster dose.  He had no other new complaints.  Already had his influenza vaccine  from the primary care provider.      PFT done today:  Not done today      Results for orders placed in visit on 01/19/21    Pulmonary Function Test    [facility] - Pulmonary Function Test    [facility]  [id number]  [phone]    Patient : Zachariah Acosta  MRN : [id number]  CSN : [id number]  Pulmonologist : Gilberto Bennett MD  Date : 4/15/2021    ______________________________________________________________________    Interpretation :  1.  Spirometry is within normal limits.  2.  Lung volumes are within normal limits.  3.  There is a mild diffusion impairment even when corrected for alveolar volume.      Gilberto Bennett MD         Bronchodilator therapy: Using only Xopenox HFA rescue inhaler. Stopped Breztri.     Smoking Status:   Social History     Tobacco Use   Smoking Status Never   Smokeless Tobacco Never     Pulm Rehab: no  Sleep: yes    Support System: lives with their spouse    Code Status:   There are no questions and answers to display.        Review of Systems:  A complete review of systems is performed and all other systems were reviewed and negative as note above in the HPI.  Review of Systems   Constitutional: Negative.    HENT: Positive for congestion, postnasal drip and sinus pressure.    Respiratory: Positive for cough, chest tightness and shortness of breath.    Cardiovascular: Negative.    Gastrointestinal: Negative.    Endocrine: Negative.    Genitourinary: Negative.    Musculoskeletal: Positive for arthralgias and back pain.   Skin: Negative.    Allergic/Immunologic: Positive for environmental allergies.   Neurological: Negative.    Hematological: Negative.    Psychiatric/Behavioral: Negative.        CAT/ACT Score:  Not done today    Medications:  Outpatient Encounter Medications as of 11/9/2022   Medication Sig Dispense Refill   • aspirin (aspirin) 81 MG EC tablet Take 1 tablet by mouth Daily. 30 tablet 11   • Continuous Blood Gluc Sensor  (Dexcom G6 Sensor) Every 10 (Ten) Days. This should be changed out 3 each 11   • Continuous Blood Gluc Transmit (Dexcom G6 Transmitter) misc 1 Device Every 3 (Three) Months. As directed 1 each 3   • Dupilumab 300 MG/2ML solution prefilled syringe Inject  under the skin into the appropriate area as directed Every 14 (Fourteen) Days.     • Ferrous Sulfate (IRON PO) Take 65 mg by mouth Daily.     • finasteride (PROSCAR) 5 MG tablet Take 1 tablet by mouth Daily. 90 tablet 3   • fluticasone (CUTIVATE) 0.005 % ointment Apply 1 application topically to the appropriate area as directed 2 (Two) Times a Day. 60 g 2   • fluticasone (FLONASE) 50 MCG/ACT nasal spray 1 spray into the nostril(s) as directed by provider Daily. 16 g 5   • Garlic 10 MG capsule Take 3,600 mg by mouth.     • glipizide (GLUCOTROL) 10 MG tablet Take 1 tablet by mouth 2 (Two) Times a Day Before Meals. 180 tablet 3   • glucose blood test strip 1 each by Other route 3 (Three) Times a Day As Needed (hypoglycemia). Use as instructed One Touch Ultra 100 each 11   • insulin aspart (NovoLOG FlexPen) 100 UNIT/ML solution pen-injector sc pen Inject 5 Units under the skin into the appropriate area as directed 3 (Three) Times a Day With Meals. 5 pen 2   • Insulin Glargine (Lantus SoloStar) 100 UNIT/ML injection pen Inject 20 Units under the skin into the appropriate area as directed As Needed.     • levalbuterol (Xopenex HFA) 45 MCG/ACT inhaler Inhale 1-2 puffs Every 4 (Four) Hours As Needed for Wheezing or Shortness of Air. 15 g 11   • lisinopril (PRINIVIL,ZESTRIL) 10 MG tablet Take 1 tablet by mouth Daily. 90 tablet 3   • meclizine (ANTIVERT) 12.5 MG tablet Take 1 tablet by mouth 3 (Three) Times a Day As Needed for Dizziness. 30 tablet 0   • Mesalamine 4 GM/60ML SF enema Insert  into the rectum Daily.     • metoprolol tartrate (LOPRESSOR) 25 MG tablet Take 1 tablet by mouth 2 (Two) Times a Day. 180 tablet 3   • nitroglycerin (NITROSTAT) 0.4 MG SL tablet 1 under  the tongue as needed for angina, may repeat q5mins for up three doses 25 tablet 1   • Pediatric Multiple Vit-C-FA (CHILDRENS CHEWABLE MULTI VITS PO) Take  by mouth.     • rosuvastatin (CRESTOR) 40 MG tablet Take 0.5 tablets by mouth Daily. To prevent heart attack and stroke 90 tablet 3   • triamcinolone (KENALOG) 0.1 % cream Apply  topically to the appropriate area as directed 2 (Two) Times a Day.     • Ustekinumab (STELARA) 90 MG/ML solution prefilled syringe Injection Inject 90 mg under the skin into the appropriate area as directed Every 28 (Twenty-Eight) Days. (Patient taking differently: Inject 90 mg under the skin into the appropriate area as directed Take As Directed. EVERY 4 WEEKS) 1 mL 6   • vitamin B-12 (CYANOCOBALAMIN) 1000 MCG tablet Take 1,000 mcg by mouth Daily.     • Vitamin D, Cholecalciferol, 25 MCG (1000 UT) capsule Take  by mouth.     • [DISCONTINUED] Budeson-Glycopyrrol-Formoterol (Breztri Aerosphere) 160-9-4.8 MCG/ACT aerosol inhaler Inhale 2 puffs 2 (Two) Times a Day. For breathing 10.7 g 11   • [DISCONTINUED] fluticasone (FLONASE) 50 MCG/ACT nasal spray 1 spray into the nostril(s) as directed by provider Daily. 16 g 5   • [DISCONTINUED] fluticasone (FLONASE) 50 MCG/ACT nasal spray 1 spray into the nostril(s) as directed by provider Daily. 16 g 5   • [DISCONTINUED] naproxen (NAPROSYN) 250 MG tablet Take 1 tablet by mouth 2 (Two) Times a Day With Meals. 60 tablet 0     Facility-Administered Encounter Medications as of 11/9/2022   Medication Dose Route Frequency Provider Last Rate Last Admin   • [DISCONTINUED] cyanocobalamin injection 1,000 mcg  1,000 mcg Intramuscular Q28 Days Rozina Hanson MD   1,000 mcg at 07/20/22 5864       Allergies:  Allergies   Allergen Reactions   • Albuterol Other (See Comments)   • Adhesive Tape Rash   • Azithromycin Rash   • Fentanyl Nausea And Vomiting       Immunizations:  Immunization History   Administered Date(s) Administered   • COVID-19 ([facility]) 04/12/2021  "  • Flu Vaccine Quad PF >36MO 11/13/2017   • Fluzone High Dose =>65 Years ([employer] ONLY) 11/01/2020   • Fluzone High-Dose 65+yrs 10/24/2022   • Influenza TIV (IM) 11/16/2016, 10/26/2018   • Influenza, Unspecified 10/21/2021   • Pneumococcal Conjugate 13-Valent (PCV13) 11/11/2013, 04/01/2019   • Pneumococcal Polysaccharide (PPSV23) 08/10/2020       Objective:    Vitals:  /74   Pulse 82   Ht 172.7 cm (68\")   Wt 106 kg (232 lb 12.8 oz)   SpO2 98%   BMI 35.40 kg/m²     Physical Exam:  General: Patient is a 82 y.o. pleasant elderly  male. Looks stated age. Appears to be in no acute distress.  Eyes: EOMI. PERRLA. Vision intact. No scleral icterus.  Ear, Nose, Mouth and Throat: Hearing is grossly intact. No Leukoplakia, pharyngitis, stomatitis or thrush. Swollen nasal mucosa with post nasal drop.  Neck: Range of motion of neck normal. No thyromegaly or masses. Mallampati Class 3  Respiratory: Clear to auscultation bilaterally. No use of accessory muscles. Decreased breath sounds.  Cardiovascular: Normal heart sounds. Regularly regular rhythm without murmur.  Gastrointestinal: Non tender, non distended, soft. Bowel sounds positive in all four quadrants. No organomegaly.  Skin: No obvious rashes, lesions, ulcers or large amount of bruising. No edema.   Neurological: No new motor deficits. Cranial nerves appear intact.  Psychiatric: Patient is alert and oriented to person, place and time.    Chest Imaging:  No recent chest imaging studies done last CT scan done in October 2021 reviewed.  Assessment:  1. Mild intermittent asthma without complication    2. SOB (shortness of breath)    3. Pulmonary scarring    4. Pulmonary nodule seen on imaging study    5. BREANN on CPAP    6. Class 1 obesity due to excess calories with serious comorbidity and body mass index (BMI) of 34.0 to 34.9 in adult    7. Non-seasonal allergic rhinitis, unspecified trigger    8. Non-seasonal allergic rhinitis due to pollen    9. " Nonsmoker        Plan/Recommendations:    1.  Patient is doing very well from the respiratory standpoint his pulmonary function test is electively normal and he is doing well on Xopenex inhaler only and he uses it occasionally as needed.  No refill was needed.  2.  For his nasal allergy he is using only fluticasone nasal spray which is working very well and occasionally takes over-the-counter loratadine but normally does not need it.  I refilled his fluticasone as requested.  3.  For his obstructive sleep apnea should continue using CPAP and the current settings and he is tolerating it well and sleeps well with the CPAP on.  4.  For his diabetic management continue follow-up with the primary care provider and he will also need his vaccinations updated he will need a booster dose for COVID-vaccine and he is already vaccinated for influenza but will need a pneumonia vaccine as well.  Return to pulmonary clinic for follow-up visit in 6 months time and a CT scan of the chest ordered before the next visit.  He can return to the pulmonary clinic earlier if needed.    Follow up:  6 Months    Time Spent:  30 minutes    I appreciate the opportunity of participating in this patient's care. I would like to thank the PCP for the referral.  Please feel free to contact me with any other questions.    Jacob Mejias MD   Pulmonologist/Intensivist     Electronically signed by: Jacob Mejias MD, 11/9/2022 17:31 CST

## 2022-12-09 ENCOUNTER — LAB (OUTPATIENT)
Dept: LAB | Facility: HOSPITAL | Age: 82
End: 2022-12-09

## 2022-12-09 DIAGNOSIS — I25.10 CORONARY ARTERY DISEASE DUE TO CALCIFIED CORONARY LESION: ICD-10-CM

## 2022-12-09 DIAGNOSIS — I25.84 CORONARY ARTERY DISEASE DUE TO CALCIFIED CORONARY LESION: ICD-10-CM

## 2022-12-09 DIAGNOSIS — E11.65 TYPE 2 DIABETES MELLITUS WITH HYPERGLYCEMIA, WITH LONG-TERM CURRENT USE OF INSULIN: ICD-10-CM

## 2022-12-09 DIAGNOSIS — Z95.1 S/P CABG (CORONARY ARTERY BYPASS GRAFT): ICD-10-CM

## 2022-12-09 DIAGNOSIS — I10 ESSENTIAL HYPERTENSION: ICD-10-CM

## 2022-12-09 DIAGNOSIS — Z79.4 TYPE 2 DIABETES MELLITUS WITH HYPERGLYCEMIA, WITH LONG-TERM CURRENT USE OF INSULIN: ICD-10-CM

## 2022-12-09 DIAGNOSIS — E11.9 TYPE 2 DIABETES MELLITUS TREATED WITH INSULIN: ICD-10-CM

## 2022-12-09 DIAGNOSIS — Z79.4 TYPE 2 DIABETES MELLITUS TREATED WITH INSULIN: ICD-10-CM

## 2022-12-09 LAB
ALBUMIN UR-MCNC: 3.6 MG/DL
CHOLEST SERPL-MCNC: 101 MG/DL (ref 130–200)
HDLC SERPL-MCNC: 32 MG/DL
LDLC SERPL CALC-MCNC: 46 MG/DL (ref 0–99)
LDLC/HDLC SERPL: 1.37 {RATIO}
TRIGL SERPL-MCNC: 126 MG/DL (ref 0–149)
VLDLC SERPL-MCNC: 23 MG/DL (ref 5–40)

## 2022-12-09 PROCEDURE — 80061 LIPID PANEL: CPT

## 2022-12-09 PROCEDURE — 82043 UR ALBUMIN QUANTITATIVE: CPT

## 2022-12-09 PROCEDURE — 84681 ASSAY OF C-PEPTIDE: CPT

## 2022-12-09 PROCEDURE — 36415 COLL VENOUS BLD VENIPUNCTURE: CPT

## 2022-12-10 LAB — C PEPTIDE SERPL-MCNC: 5.3 NG/ML (ref 1.1–4.4)

## 2022-12-14 ENCOUNTER — OFFICE VISIT (OUTPATIENT)
Dept: FAMILY MEDICINE CLINIC | Facility: CLINIC | Age: 82
End: 2022-12-14
Payer: MEDICARE

## 2022-12-14 VITALS
WEIGHT: 233.4 LBS | DIASTOLIC BLOOD PRESSURE: 80 MMHG | HEIGHT: 68 IN | BODY MASS INDEX: 35.37 KG/M2 | HEART RATE: 69 BPM | TEMPERATURE: 97.8 F | OXYGEN SATURATION: 98 % | SYSTOLIC BLOOD PRESSURE: 136 MMHG

## 2022-12-14 DIAGNOSIS — M79.675 PAIN OF TOE OF LEFT FOOT: ICD-10-CM

## 2022-12-14 DIAGNOSIS — I10 ESSENTIAL HYPERTENSION: ICD-10-CM

## 2022-12-14 DIAGNOSIS — E11.65 TYPE 2 DIABETES MELLITUS WITH HYPERGLYCEMIA, WITH LONG-TERM CURRENT USE OF INSULIN: Primary | ICD-10-CM

## 2022-12-14 DIAGNOSIS — D50.9 MICROCYTIC ANEMIA: ICD-10-CM

## 2022-12-14 DIAGNOSIS — Z79.4 TYPE 2 DIABETES MELLITUS WITH HYPERGLYCEMIA, WITH LONG-TERM CURRENT USE OF INSULIN: Primary | ICD-10-CM

## 2022-12-14 LAB
EXPIRATION DATE: NORMAL
HBA1C MFR BLD: 6.9 %
Lab: NORMAL

## 2022-12-14 RX ORDER — LISINOPRIL 20 MG/1
20 TABLET ORAL DAILY
Qty: 30 TABLET | Refills: 2 | Status: ON HOLD | OUTPATIENT
Start: 2022-12-14

## 2023-01-09 DIAGNOSIS — R31.0 GROSS HEMATURIA: ICD-10-CM

## 2023-01-09 RX ORDER — FINASTERIDE 5 MG/1
TABLET, FILM COATED ORAL
Qty: 90 TABLET | Refills: 0 | Status: ON HOLD | OUTPATIENT
Start: 2023-01-09

## 2023-01-10 ENCOUNTER — TELEPHONE (OUTPATIENT)
Dept: FAMILY MEDICINE CLINIC | Facility: CLINIC | Age: 83
End: 2023-01-10

## 2023-01-10 NOTE — TELEPHONE ENCOUNTER
Caller: MILAGROS BETTENCOURT     Relationship: SELF     Best call back number: [phone] (Mobile)    What is the medical concern/diagnosis: STATES EAR INFECTION AND DRAINAGE WITH SOME BLOOD     What specialty or service is being requested: EAR NOSE AND THROAT     What is the provider, practice or medical service name: DR [facility]ORDER[facility]     What is the office location: [facility]      What is the office phone number:  [phone]  Any additional details: STATES HE HAS DRAINAGE FROM HIS RIGHT EAR THAT HAS SOME BLOOD     HE STATES HE HAS BEEN TAKING AMOX CLAV FOR AN EAR INFECTION THAT [facility]  HAS PRESCRIBED ON 01/06/23 AND HE IS NOT FEELING BETTER       CALL BACK REQUESTED

## 2023-01-10 NOTE — TELEPHONE ENCOUNTER
If the blood is new he needs to go back to urgent care for he may have a ruptured TM and may need cipro drops to put in his ear until they can get him into see ENT. He needs to be seen before referral can be placed.

## 2023-01-12 ENCOUNTER — OFFICE VISIT (OUTPATIENT)
Dept: CARDIOLOGY | Facility: CLINIC | Age: 83
End: 2023-01-12
Payer: MEDICARE

## 2023-01-12 ENCOUNTER — TELEPHONE (OUTPATIENT)
Dept: ENT CLINIC | Age: 83
End: 2023-01-12

## 2023-01-12 VITALS
HEART RATE: 76 BPM | BODY MASS INDEX: 34.62 KG/M2 | OXYGEN SATURATION: 100 % | WEIGHT: 228.4 LBS | DIASTOLIC BLOOD PRESSURE: 78 MMHG | HEIGHT: 68 IN | SYSTOLIC BLOOD PRESSURE: 132 MMHG

## 2023-01-12 DIAGNOSIS — I25.84 CORONARY ARTERY DISEASE DUE TO CALCIFIED CORONARY LESION: ICD-10-CM

## 2023-01-12 DIAGNOSIS — I25.10 CORONARY ARTERY DISEASE DUE TO CALCIFIED CORONARY LESION: ICD-10-CM

## 2023-01-12 DIAGNOSIS — I10 ESSENTIAL HYPERTENSION: Primary | ICD-10-CM

## 2023-01-12 DIAGNOSIS — E78.2 MIXED HYPERLIPIDEMIA: ICD-10-CM

## 2023-01-12 PROCEDURE — 99214 OFFICE O/P EST MOD 30 MIN: CPT | Performed by: NURSE PRACTITIONER

## 2023-01-12 RX ORDER — ROSUVASTATIN CALCIUM 20 MG/1
20 TABLET, COATED ORAL NIGHTLY
Qty: 90 TABLET | Refills: 3 | Status: ON HOLD | OUTPATIENT
Start: 2023-01-12

## 2023-01-12 RX ORDER — NITROGLYCERIN 0.4 MG/1
TABLET SUBLINGUAL
Qty: 25 TABLET | Refills: 1 | Status: ON HOLD | OUTPATIENT
Start: 2023-01-12

## 2023-01-12 NOTE — PROGRESS NOTES
Subjective:     Encounter Date: 01/13/23      Patient ID: Zachariah Acosta is a 82 y.o. male.    Chief Complaint: shortness of breath , follow-up cad    History of Present Illness     82-year-old returns today for 6-month follow-up of coronary artery disease.  He had a CABG in 2012 and followed with a cardiologist in [place] thereafter, then had MI in April 2020 requiring PCI x6.  After establishing care with us, there was some concern regarding gastrointestinal bleeding, so Dr. Polanco reviewed the cardiac catheterization films from April 2020 and recommended discontinuation of Plavix given the circumstances.  He was instructed to continue aspirin 81 mg daily without interruption.    In 10/7/2021 he reported a significant decline in stamina and worsening shortness of breath over the preceding 2 months.  Though he reported his symptoms before CABG in 2012 were those of exertional dyspnea, he also admitted his breathing never really improved after CABG.  He was empirically tried on Imdur, but this only made him dizzy and did not necessarily improve his symptoms.  Lexiscan was low risk for ischemia.  At his last visit here, it was concluded his symptoms were likely noncardiac in nature, and it was recommended that he stop taking Imdur.      He came back to see Dr. Polanco in June 2022 and he was still having shortness of breath.  VA issues had resulted in him not having Symbicort.  He was using his rescue inhaler with relief.  He reported shortness of breath with moderate activity.  He was using a motorized cart at [employer] but could walk into the office from the parking garage area without stopping or getting short of breath.  He had not needed sublingual nitroglycerin.  He denied any orthopnea, PND, rapid weight changes.  No changes were made at that visit.  It was noted his dyspnea was noncardiac.    Today the patient presents for follow-up and reports some improvement in his chronic dyspnea on exertion.   When questioned about what he credits the improvement to he relates this to some changes in his inhalers.  He continues to deny any chest pain or any significant change in the way he feels compared to his last visit.  He is compliant with his medications and reports good blood pressure control.    The following portions of the patient's history were reviewed and updated as appropriate: allergies, current medications, past family history, past medical history, past social history, past surgical history and problem list.    Review of Systems   Constitutional: Positive for malaise/fatigue.   Cardiovascular: Positive for dyspnea on exertion. Negative for chest pain, claudication, leg swelling, near-syncope, orthopnea, palpitations, paroxysmal nocturnal dyspnea and syncope.   Respiratory: Positive for wheezing (occasional ). Negative for cough and shortness of breath.    Hematologic/Lymphatic: Does not bruise/bleed easily.   Musculoskeletal: Negative for falls.   Gastrointestinal: Negative for bloating.   Neurological: Negative for dizziness, light-headedness and weakness.       Allergies   Allergen Reactions   • Albuterol Other (See Comments)   • Adhesive Tape Rash   • Azithromycin Rash   • Fentanyl Nausea And Vomiting       Current Outpatient Medications:   •  aspirin (aspirin) 81 MG EC tablet, Take 1 tablet by mouth Daily., Disp: 30 tablet, Rfl: 11  •  Continuous Blood Gluc Sensor (Dexcom G6 Sensor), Every 10 (Ten) Days. This should be changed out, Disp: 3 each, Rfl: 11  •  Continuous Blood Gluc Transmit (Dexcom G6 Transmitter) misc, 1 Device Every 3 (Three) Months. As directed, Disp: 1 each, Rfl: 3  •  Dupilumab 300 MG/2ML solution prefilled syringe, Inject  under the skin into the appropriate area as directed Every 14 (Fourteen) Days., Disp: , Rfl:   •  Ferrous Sulfate (IRON PO), Take 65 mg by mouth Daily., Disp: , Rfl:   •  finasteride (PROSCAR) 5 MG tablet, Take 1 tablet by mouth once daily, Disp: 90 tablet, Rfl:  0  •  fluticasone (CUTIVATE) 0.005 % ointment, Apply 1 application topically to the appropriate area as directed 2 (Two) Times a Day., Disp: 60 g, Rfl: 2  •  fluticasone (FLONASE) 50 MCG/ACT nasal spray, 1 spray into the nostril(s) as directed by provider Daily., Disp: 16 g, Rfl: 5  •  Garlic 10 MG capsule, Take 3,600 mg by mouth., Disp: , Rfl:   •  glipizide (GLUCOTROL) 10 MG tablet, Take 1 tablet by mouth 2 (Two) Times a Day Before Meals., Disp: 180 tablet, Rfl: 3  •  glucose blood test strip, 1 each by Other route 3 (Three) Times a Day As Needed (hypoglycemia). Use as instructed One Touch Ultra, Disp: 100 each, Rfl: 11  •  insulin aspart (NovoLOG FlexPen) 100 UNIT/ML solution pen-injector sc pen, Inject 5 Units under the skin into the appropriate area as directed 3 (Three) Times a Day With Meals., Disp: 5 pen, Rfl: 2  •  Insulin Glargine (Lantus SoloStar) 100 UNIT/ML injection pen, Inject 20 Units under the skin into the appropriate area as directed As Needed., Disp: , Rfl:   •  levalbuterol (Xopenex HFA) 45 MCG/ACT inhaler, Inhale 1-2 puffs Every 4 (Four) Hours As Needed for Wheezing or Shortness of Air., Disp: 15 g, Rfl: 11  •  lisinopril (PRINIVIL,ZESTRIL) 20 MG tablet, Take 1 tablet by mouth Daily. In the AM for blood pressure, Disp: 30 tablet, Rfl: 2  •  meclizine (ANTIVERT) 12.5 MG tablet, Take 1 tablet by mouth 3 (Three) Times a Day As Needed for Dizziness., Disp: 30 tablet, Rfl: 0  •  Mesalamine 4 GM/60ML SF enema, Insert  into the rectum Daily., Disp: , Rfl:   •  metoprolol tartrate (LOPRESSOR) 25 MG tablet, Take 1 tablet by mouth 2 (Two) Times a Day., Disp: 180 tablet, Rfl: 3  •  nitroglycerin (NITROSTAT) 0.4 MG SL tablet, 1 under the tongue as needed for angina, may repeat q5mins for up three doses, Disp: 25 tablet, Rfl: 1  •  Pediatric Multiple Vit-C-FA (CHILDRENS CHEWABLE MULTI VITS PO), Take  by mouth., Disp: , Rfl:   •  triamcinolone (KENALOG) 0.1 % cream, Apply  topically to the appropriate area  "as directed 2 (Two) Times a Day., Disp: , Rfl:   •  Ustekinumab (STELARA) 90 MG/ML solution prefilled syringe Injection, Inject 90 mg under the skin into the appropriate area as directed Every 28 (Twenty-Eight) Days. (Patient taking differently: Inject 90 mg under the skin into the appropriate area as directed Take As Directed. EVERY 4 WEEKS), Disp: 1 mL, Rfl: 6  •  vitamin B-12 (CYANOCOBALAMIN) 1000 MCG tablet, Take 1,000 mcg by mouth Daily., Disp: , Rfl:   •  Vitamin D, Cholecalciferol, 25 MCG (1000 UT) capsule, Take  by mouth., Disp: , Rfl:   •  rosuvastatin (CRESTOR) 20 MG tablet, Take 1 tablet by mouth Every Night., Disp: 90 tablet, Rfl: 3         Objective:    /78   Pulse 76   Ht 172.7 cm (68\")   Wt 104 kg (228 lb 6.4 oz)   SpO2 100%   BMI 34.73 kg/m²        Vitals and nursing note reviewed.   Constitutional:       General: Not in acute distress.     Appearance: Well-developed and not in distress. Not diaphoretic.   Neck:      Vascular: No JVD.   Pulmonary:      Effort: Pulmonary effort is normal. No respiratory distress.      Breath sounds: Normal breath sounds.   Cardiovascular:      Normal rate. Regular rhythm.      Murmurs: There is no murmur.   Edema:     Peripheral edema (trace BLE edema ) present.  Abdominal:      Tenderness: There is no abdominal tenderness.   Skin:     General: Skin is warm and dry.   Neurological:      Mental Status: Alert and oriented to person, place, and time.         Lab Review:   Lab Results   Component Value Date    GLUCOSE 156 (H) 09/09/2022    BUN 13 09/09/2022    CREATININE 1.03 09/09/2022    EGFRIFNONA 77 01/14/2022    EGFRIFAFRI >59 01/03/2022    BCR 12.6 09/09/2022    K 4.0 09/09/2022    CO2 29.0 09/09/2022    CALCIUM 8.9 09/09/2022    PROTENTOTREF 6.8 06/16/2021    ALBUMIN 3.90 09/09/2022    LABIL2 1.5 06/16/2021    AST 19 09/09/2022    ALT 17 09/09/2022     Lab Results   Component Value Date    WBC 8.39 09/09/2022    HGB 14.4 09/09/2022    HCT 44.4 09/09/2022 "    MCV 97.2 (H) 09/09/2022     09/09/2022     Lab Results   Component Value Date    CHOL 101 (L) 12/09/2022    CHLPL 141 01/06/2021    TRIG 126 12/09/2022    HDL 32 (L) 12/09/2022    LDL 46 12/09/2022     Lab Results   Component Value Date    HGBA1C 6.9 12/14/2022             ECG 12 Lead    Date/Time: 1/13/2023 3:19 PM  Performed by: Mari Perdue APRN  Authorized by: Mari Perdue APRN   Comparison: compared with previous ECG from 6/1/2022  Similar to previous ECG  Rhythm: sinus rhythm  BPM: 63  Conduction: 1st degree AV block            10/25/2021 Lexiscan:    · Impressions are consistent with a low risk study.  · Myocardial perfusion imaging indicates a normal myocardial perfusion study with no evidence of ischemia.  · Left ventricular ejection fraction is normal.  · There is no prior study available for comparison.  · Findings consistent with a normal ECG stress test.        Assessment:      Problem List Items Addressed This Visit        Cardiac and Vasculature    CAD (coronary artery disease)    Overview     3v CABG in '12  Non-STEMI April 2020 diagnostic cath 4/13/2020 showed LIMA to LAD atretic, SVG to diagonal patent with significant backflow from diagonal graft into the LAD and down the LAD, SVG to PDA patent but not providing flow to PL branch because of proximal and mid native vessel (R-PDA) disease.  Left main 60-70% diffuse disease compromising flow into ungrafted circumflex territory.  Subsequently referred for consideration of redo CABG versus complex PCI.    -Return to Cath Lab 4/14/2020 with 4.0 x 8 mm drug-eluting stent placement to the left main (for purpose of improving flow into ungrafted circumflex territory), as well as for overlapping Julianne drug-eluting stents from the ostium into mid-RCA (3.5 x 12, 3.5 x 15, 3.5 x 12, 3.5 x 8) along with 3.5 x 12 mm Julianne FRANKY at the takeoff of the right-PL branch.  80-90% stenosis at the ostium of the R-PDA not treated since vein graft to PDA open,  but not providing any retrograde filling beyond this lesion into the PL system.  There was PTCA alone to area in the distal RCA between stented segments for reported inability to deliver a stent to the segment.  Balloon angioplasty was performed with a 3.5 mm balloon with reported residual stenosis of less than 10%.             Relevant Medications    nitroglycerin (NITROSTAT) 0.4 MG SL tablet    Essential hypertension - Primary    Overview     Last Assessment & Plan:   Hypertension is controlled.  Dietary sodium restriction.  Weight loss.  Regular aerobic exercise.  Continue current medications.  Blood pressure will be reassessed at the next regular appointment.         Mixed hyperlipidemia    Overview     Formatting of this note might be different from the original.  Cholesterol controlled. Continue lovastatin 20 milligrams    Last Assessment & Plan:   Formatting of this note might be different from the original.  Currently controlled on statin medication at home.  Will continue while in hospital.         Relevant Medications    rosuvastatin (CRESTOR) 20 MG tablet       Plan:     1. Coronary disease status post remote CABG with MI in April 2020 requiring stenting (total of 6; one to left main, 4 to RCA, one to R-PL): stable. Lexiscan was low risk for ischemia with a normal LVEF 10/2021.  His dyspnea is suspected to be of noncardiac origin.  -Continue aspirin, high intensity statin, beta-blocker, ACE inhibitor, as needed sublingual nitroglycerin (has not had to use)    2. Mixed hyperlipidemia: most recent LDL at goal.  See above.  - Continue rosuvastatin 40 mg nightly.       3. Essential hypertension:   Well controlled.  - Continue current medications.    He is on insulin and glipizide for his diabetes with an A1c of 6.9    F/u 6 months, or sooner if new/worsening symptoms    I spent 34 minutes caring for Zachariah on this date of service. This time includes time spent by me in the following activities: preparing for  the visit, reviewing tests, obtaining and/or reviewing a separately obtained history, performing a medically appropriate examination and/or evaluation, counseling and educating the patient/family/caregiver and documenting information in the medical record     BORIS Sanabria  15:20 CST  01/13/23

## 2023-01-12 NOTE — TELEPHONE ENCOUNTER
Patient has a referral and patient wanted in sooner then [facility] could scheduled. Patient has ear infection  in both ears and the infection cause hearing loss in his right ear. Please called the patient.       Thank you

## 2023-01-13 PROCEDURE — 93000 ELECTROCARDIOGRAM COMPLETE: CPT | Performed by: NURSE PRACTITIONER

## 2023-01-16 ENCOUNTER — OFFICE VISIT (OUTPATIENT)
Dept: ENT CLINIC | Age: 83
End: 2023-01-16
Payer: MEDICARE

## 2023-01-16 ENCOUNTER — PROCEDURE VISIT (OUTPATIENT)
Dept: ENT CLINIC | Age: 83
End: 2023-01-16

## 2023-01-16 VITALS
BODY MASS INDEX: 34.07 KG/M2 | WEIGHT: 230 LBS | HEIGHT: 69 IN | SYSTOLIC BLOOD PRESSURE: 132 MMHG | DIASTOLIC BLOOD PRESSURE: 84 MMHG

## 2023-01-16 DIAGNOSIS — H60.501 ACUTE OTITIS EXTERNA OF RIGHT EAR, UNSPECIFIED TYPE: Primary | ICD-10-CM

## 2023-01-16 DIAGNOSIS — H72.91 PERFORATION OF RIGHT TYMPANIC MEMBRANE: ICD-10-CM

## 2023-01-16 DIAGNOSIS — H66.93 RECURRENT OTITIS MEDIA, BILATERAL: Primary | ICD-10-CM

## 2023-01-16 DIAGNOSIS — B36.9 OTITIS EXTERNA, FUNGAL, LEFT EAR: ICD-10-CM

## 2023-01-16 DIAGNOSIS — H62.42 OTITIS EXTERNA, FUNGAL, LEFT EAR: ICD-10-CM

## 2023-01-16 PROCEDURE — 1123F ACP DISCUSS/DSCN MKR DOCD: CPT | Performed by: OTOLARYNGOLOGY

## 2023-01-16 PROCEDURE — 99204 OFFICE O/P NEW MOD 45 MIN: CPT | Performed by: OTOLARYNGOLOGY

## 2023-01-16 PROCEDURE — G8484 FLU IMMUNIZE NO ADMIN: HCPCS | Performed by: OTOLARYNGOLOGY

## 2023-01-16 PROCEDURE — G8427 DOCREV CUR MEDS BY ELIG CLIN: HCPCS | Performed by: OTOLARYNGOLOGY

## 2023-01-16 PROCEDURE — 4130F TOPICAL PREP RX AOE: CPT | Performed by: OTOLARYNGOLOGY

## 2023-01-16 PROCEDURE — G8417 CALC BMI ABV UP PARAM F/U: HCPCS | Performed by: OTOLARYNGOLOGY

## 2023-01-16 PROCEDURE — 92504 EAR MICROSCOPY EXAMINATION: CPT | Performed by: OTOLARYNGOLOGY

## 2023-01-16 PROCEDURE — 1036F TOBACCO NON-USER: CPT | Performed by: OTOLARYNGOLOGY

## 2023-01-16 RX ORDER — CLOTRIMAZOLE 1 G/ML
SOLUTION TOPICAL
Qty: 15 ML | Refills: 1 | Status: SHIPPED | OUTPATIENT
Start: 2023-01-16

## 2023-01-16 RX ORDER — GLIPIZIDE 10 MG/1
TABLET ORAL
COMMUNITY
Start: 2022-11-03

## 2023-01-16 RX ORDER — LEVALBUTEROL TARTRATE 45 UG/1
AEROSOL, METERED ORAL
COMMUNITY
Start: 2022-06-06

## 2023-01-16 RX ORDER — CIPROFLOXACIN AND DEXAMETHASONE 3; 1 MG/ML; MG/ML
4 SUSPENSION/ DROPS AURICULAR (OTIC) 2 TIMES DAILY
Qty: 7.5 ML | Refills: 0 | Status: SHIPPED | OUTPATIENT
Start: 2023-01-16 | End: 2023-01-30

## 2023-01-16 ASSESSMENT — ENCOUNTER SYMPTOMS
ALLERGIC/IMMUNOLOGIC NEGATIVE: 1
EYES NEGATIVE: 1
RESPIRATORY NEGATIVE: 1
GASTROINTESTINAL NEGATIVE: 1

## 2023-01-16 NOTE — PROGRESS NOTES
History   Chaz Avitia is a [de-identified] y.o. male who presented to the clinic this date with complaints  ear infection with drainage bilaterally. He also noted decreased hearing. He has been treated with abx and ear drops and noted symptoms seem to be improving. Summary   Otoscopy revealed debris in the left ear canal. Testing deferred pending medical management. Results   Otoscopy:   Right: Erythematous TM  Left: Debris/drainage in ear canal    Plan   Results of today's testing were discussed with Mr. Jalil Almodovar and the following recommendations were made: Follow up with ENT as scheduled. Recheck hearing following medical management.

## 2023-01-16 NOTE — PROGRESS NOTES
2023    Marnie Reynolds (:  1940) is a [de-identified] y.o. male, Established patient, here for evaluation of the following chief complaint(s):  New Patient (Ears)      Vitals:    23 1423   BP: 132/84   Weight: 230 lb (104.3 kg)   Height: 5' 9\" (1.753 m)       Wt Readings from Last 3 Encounters:   23 230 lb (104.3 kg)   21 216 lb (98 kg)       BP Readings from Last 3 Encounters:   23 132/84   22 124/63   22 123/69         SUBJECTIVE/OBJECTIVE:    New patient seen today for his years. The patient states for the last 2 weeks he has had painful drainage from his right ear. He says he usually has itchy ears and uses finger to scratch them and feel that this may have caused this. He says the drainage became bloody. He was placed on Augmentin along with Floxin drops. He says it is helping somewhat. He says his hearing is pretty much out on the right currently. He has itching on the left. Review of Systems   Constitutional: Negative. HENT:  Positive for ear discharge, ear pain and hearing loss. Eyes: Negative. Respiratory: Negative. Cardiovascular: Negative. Gastrointestinal: Negative. Endocrine: Negative. Musculoskeletal: Negative. Skin: Negative. Allergic/Immunologic: Negative. Neurological: Negative. Hematological: Negative. Psychiatric/Behavioral: Negative. Physical Exam  Vitals reviewed. Constitutional:       Appearance: Normal appearance. He is normal weight. HENT:      Head: Normocephalic and atraumatic. Right Ear: Ear canal and external ear normal. Drainage present. Tympanic membrane is perforated. Left Ear: Tympanic membrane, ear canal and external ear normal. Drainage present. Nose: Nose normal.      Mouth/Throat:      Mouth: Mucous membranes are moist.      Pharynx: Oropharynx is clear. Eyes:      Extraocular Movements: Extraocular movements intact.       Pupils: Pupils are equal, round, and reactive to light. Cardiovascular:      Rate and Rhythm: Normal rate and regular rhythm. Pulmonary:      Effort: Pulmonary effort is normal.      Breath sounds: Normal breath sounds. Musculoskeletal:      Cervical back: Normal range of motion. Skin:     General: Skin is warm and dry. Neurological:      General: No focal deficit present. Mental Status: He is alert and oriented to person, place, and time. Psychiatric:         Mood and Affect: Mood normal.         Behavior: Behavior normal.      Procedure Note:    Otomicroscopy     An operating microscope was utilized to visualize both ear canals. The right ear was inspected with the microscope and significant drainage was noted along with a TM perforation and irritated TM. The left ear was examined showing fungal debris deep in the canal which was suctioned out. Patient tolerated well. ASSESSMENT/PLAN:    1. Acute otitis externa of right ear, unspecified type  2. Perforation of right tympanic membrane  3. Otitis externa, fungal, left ear  He has an infection along with perforation on the right and a fungal infection on the left. Ciprodex drops for the right and clotrimazole drops to the left. Both the next 2 weeks and follow-up. Return in about 2 weeks (around 1/30/2023). An electronic signature was used to authenticate this note. Malissa Gonzalez MD       Please note that this chart was generated using dragon dictation software. Although every effort was made to ensure the accuracy of this automated transcription, some errors in transcription may have occurred.

## 2023-01-30 ENCOUNTER — LAB (OUTPATIENT)
Dept: LAB | Facility: HOSPITAL | Age: 83
End: 2023-01-30
Payer: MEDICARE

## 2023-01-30 ENCOUNTER — TRANSCRIBE ORDERS (OUTPATIENT)
Dept: ADMINISTRATIVE | Facility: HOSPITAL | Age: 83
End: 2023-01-30
Payer: MEDICARE

## 2023-01-30 ENCOUNTER — OFFICE VISIT (OUTPATIENT)
Dept: ENT CLINIC | Age: 83
End: 2023-01-30
Payer: MEDICARE

## 2023-01-30 VITALS
BODY MASS INDEX: 34.07 KG/M2 | SYSTOLIC BLOOD PRESSURE: 134 MMHG | WEIGHT: 230 LBS | DIASTOLIC BLOOD PRESSURE: 70 MMHG | HEIGHT: 69 IN

## 2023-01-30 DIAGNOSIS — K51.019: Primary | ICD-10-CM

## 2023-01-30 DIAGNOSIS — Z79.60 LONG-TERM USE OF IMMUNOSUPPRESSANT MEDICATION: ICD-10-CM

## 2023-01-30 DIAGNOSIS — H62.42 OTITIS EXTERNA, FUNGAL, LEFT EAR: Primary | ICD-10-CM

## 2023-01-30 DIAGNOSIS — H90.3 SENSORINEURAL HEARING LOSS (SNHL) OF BOTH EARS: ICD-10-CM

## 2023-01-30 DIAGNOSIS — B36.9 OTITIS EXTERNA, FUNGAL, LEFT EAR: Primary | ICD-10-CM

## 2023-01-30 DIAGNOSIS — H60.501 ACUTE OTITIS EXTERNA OF RIGHT EAR, UNSPECIFIED TYPE: ICD-10-CM

## 2023-01-30 PROCEDURE — 99213 OFFICE O/P EST LOW 20 MIN: CPT | Performed by: OTOLARYNGOLOGY

## 2023-01-30 PROCEDURE — G8417 CALC BMI ABV UP PARAM F/U: HCPCS | Performed by: OTOLARYNGOLOGY

## 2023-01-30 PROCEDURE — 36415 COLL VENOUS BLD VENIPUNCTURE: CPT | Performed by: NURSE PRACTITIONER

## 2023-01-30 PROCEDURE — 1036F TOBACCO NON-USER: CPT | Performed by: OTOLARYNGOLOGY

## 2023-01-30 PROCEDURE — 86480 TB TEST CELL IMMUN MEASURE: CPT | Performed by: NURSE PRACTITIONER

## 2023-01-30 PROCEDURE — G8484 FLU IMMUNIZE NO ADMIN: HCPCS | Performed by: OTOLARYNGOLOGY

## 2023-01-30 PROCEDURE — 82306 VITAMIN D 25 HYDROXY: CPT | Performed by: NURSE PRACTITIONER

## 2023-01-30 PROCEDURE — 1123F ACP DISCUSS/DSCN MKR DOCD: CPT | Performed by: OTOLARYNGOLOGY

## 2023-01-30 PROCEDURE — G8427 DOCREV CUR MEDS BY ELIG CLIN: HCPCS | Performed by: OTOLARYNGOLOGY

## 2023-01-30 PROCEDURE — 4130F TOPICAL PREP RX AOE: CPT | Performed by: OTOLARYNGOLOGY

## 2023-01-30 RX ORDER — DIAPER,BRIEF,INFANT-TODD,DISP
EACH MISCELLANEOUS
Qty: 28 G | Refills: 2 | Status: SHIPPED | OUTPATIENT
Start: 2023-01-30

## 2023-01-30 ASSESSMENT — ENCOUNTER SYMPTOMS
EYES NEGATIVE: 1
RESPIRATORY NEGATIVE: 1
ALLERGIC/IMMUNOLOGIC NEGATIVE: 1
GASTROINTESTINAL NEGATIVE: 1

## 2023-01-30 NOTE — PROGRESS NOTES
2023    Kayleen Iglesias (:  1940) is a [de-identified] y.o. male, Established patient, here for evaluation of the following chief complaint(s):  Follow-up (2 week f/u for OE with improvement )      Vitals:    23 1305   BP: 134/70   Weight: 230 lb (104.3 kg)   Height: 5' 9\" (1.753 m)       Wt Readings from Last 3 Encounters:   23 230 lb (104.3 kg)   23 230 lb (104.3 kg)   21 216 lb (98 kg)       BP Readings from Last 3 Encounters:   23 134/70   23 132/84   22 124/63         SUBJECTIVE/OBJECTIVE:    MonthsPatient seen today for his years. At the last visit I diagnosed a fungal infection of the left and otitis externa right with a TM perforation. Drops on the right and clotrimazole on the left. Reports feeling much better. He does still have itching around the outside of his ears but is ears are no longer draining and he feels better. Review of Systems   Constitutional: Negative. HENT:  Positive for hearing loss. Eyes: Negative. Respiratory: Negative. Cardiovascular: Negative. Gastrointestinal: Negative. Endocrine: Negative. Musculoskeletal: Negative. Skin: Negative. Allergic/Immunologic: Negative. Neurological: Negative. Hematological: Negative. Psychiatric/Behavioral: Negative. Physical Exam  Vitals reviewed. Constitutional:       Appearance: Normal appearance. He is normal weight. HENT:      Head: Normocephalic and atraumatic. Right Ear: Tympanic membrane, ear canal and external ear normal.      Left Ear: Tympanic membrane, ear canal and external ear normal.      Nose: Nose normal.      Mouth/Throat:      Mouth: Mucous membranes are moist.      Pharynx: Oropharynx is clear. Eyes:      Extraocular Movements: Extraocular movements intact. Pupils: Pupils are equal, round, and reactive to light. Cardiovascular:      Rate and Rhythm: Normal rate and regular rhythm.    Pulmonary:      Effort: Pulmonary effort is normal.      Breath sounds: Normal breath sounds. Musculoskeletal:      Cervical back: Normal range of motion. Skin:     General: Skin is warm and dry. Neurological:      General: No focal deficit present. Mental Status: He is alert and oriented to person, place, and time. Psychiatric:         Mood and Affect: Mood normal.         Behavior: Behavior normal.            ASSESSMENT/PLAN:    1. Otitis externa, fungal, left ear  2. Sensorineural hearing loss (SNHL) of both ears  3. Acute otitis externa of right ear, unspecified type  Definite improvement in both ears. I do want to use the fungal drops for another 4 to 5 days on the left. Right side needs no more drops. I will order some hydrocortisone ointment for his external ears to get the itching under control. Like see him back in a month make sure ears look good and a hearing test as well. Return in about 4 weeks (around 2/27/2023) for with hearing teset. An electronic signature was used to authenticate this note. Enid Diaz MD       Please note that this chart was generated using dragon dictation software. Although every effort was made to ensure the accuracy of this automated transcription, some errors in transcription may have occurred.

## 2023-01-31 LAB — 25(OH)D3 SERPL-MCNC: 40.1 NG/ML (ref 30–100)

## 2023-02-01 LAB
GAMMA INTERFERON BACKGROUND BLD IA-ACNC: 0.03 IU/ML
M TB IFN-G BLD-IMP: NEGATIVE
M TB IFN-G CD4+ T-CELLS BLD-ACNC: 0.04 IU/ML
M TBIFN-G CD4+ CD8+T-CELLS BLD-ACNC: 0.04 IU/ML
MITOGEN IGNF BLD-ACNC: >10 IU/ML
QUANTIFERON INCUBATION: NORMAL
SERVICE CMNT-IMP: NORMAL

## 2023-02-06 ENCOUNTER — APPOINTMENT (OUTPATIENT)
Dept: ULTRASOUND IMAGING | Facility: HOSPITAL | Age: 83
End: 2023-02-06
Payer: MEDICARE

## 2023-02-13 ENCOUNTER — TELEPHONE (OUTPATIENT)
Dept: CARDIOLOGY | Facility: CLINIC | Age: 83
End: 2023-02-13
Payer: MEDICARE

## 2023-02-13 LAB
ALBUMIN SERPL-MCNC: 4 G/DL (ref 3.5–5.2)
ALBUMIN/GLOB SERPL: 1.3 G/DL
ALP SERPL-CCNC: 80 U/L (ref 39–117)
ALT SERPL W P-5'-P-CCNC: 21 U/L (ref 1–41)
ANION GAP SERPL CALCULATED.3IONS-SCNC: 9 MMOL/L (ref 5–15)
AST SERPL-CCNC: 22 U/L (ref 1–40)
BASOPHILS # BLD AUTO: 0.08 10*3/MM3 (ref 0–0.2)
BASOPHILS NFR BLD AUTO: 0.8 % (ref 0–1.5)
BILIRUB SERPL-MCNC: 0.2 MG/DL (ref 0–1.2)
BUN SERPL-MCNC: 14 MG/DL (ref 8–23)
BUN/CREAT SERPL: 15.1 (ref 7–25)
CALCIUM SPEC-SCNC: 8.4 MG/DL (ref 8.6–10.5)
CHLORIDE SERPL-SCNC: 105 MMOL/L (ref 98–107)
CO2 SERPL-SCNC: 24 MMOL/L (ref 22–29)
CREAT SERPL-MCNC: 0.93 MG/DL (ref 0.76–1.27)
DEPRECATED RDW RBC AUTO: 45 FL (ref 37–54)
EGFRCR SERPLBLD CKD-EPI 2021: 83 ML/MIN/1.73
EOSINOPHIL # BLD AUTO: 0.96 10*3/MM3 (ref 0–0.4)
EOSINOPHIL NFR BLD AUTO: 10 % (ref 0.3–6.2)
ERYTHROCYTE [DISTWIDTH] IN BLOOD BY AUTOMATED COUNT: 13.2 % (ref 12.3–15.4)
GLOBULIN UR ELPH-MCNC: 3.2 GM/DL
GLUCOSE SERPL-MCNC: 118 MG/DL (ref 65–99)
HCT VFR BLD AUTO: 42.1 % (ref 37.5–51)
HGB BLD-MCNC: 14 G/DL (ref 13–17.7)
IMM GRANULOCYTES # BLD AUTO: 0.04 10*3/MM3 (ref 0–0.05)
IMM GRANULOCYTES NFR BLD AUTO: 0.4 % (ref 0–0.5)
LYMPHOCYTES # BLD AUTO: 1.92 10*3/MM3 (ref 0.7–3.1)
LYMPHOCYTES NFR BLD AUTO: 20.1 % (ref 19.6–45.3)
MCH RBC QN AUTO: 31.1 PG (ref 26.6–33)
MCHC RBC AUTO-ENTMCNC: 33.3 G/DL (ref 31.5–35.7)
MCV RBC AUTO: 93.6 FL (ref 79–97)
MONOCYTES # BLD AUTO: 0.77 10*3/MM3 (ref 0.1–0.9)
MONOCYTES NFR BLD AUTO: 8.1 % (ref 5–12)
NEUTROPHILS NFR BLD AUTO: 5.79 10*3/MM3 (ref 1.7–7)
NEUTROPHILS NFR BLD AUTO: 60.6 % (ref 42.7–76)
NRBC BLD AUTO-RTO: 0 /100 WBC (ref 0–0.2)
PLATELET # BLD AUTO: 236 10*3/MM3 (ref 140–450)
PMV BLD AUTO: 10 FL (ref 6–12)
POTASSIUM SERPL-SCNC: 3.9 MMOL/L (ref 3.5–5.2)
PROT SERPL-MCNC: 7.2 G/DL (ref 6–8.5)
RBC # BLD AUTO: 4.5 10*6/MM3 (ref 4.14–5.8)
SODIUM SERPL-SCNC: 138 MMOL/L (ref 136–145)
TROPONIN T SERPL HS-MCNC: 11 NG/L
WBC NRBC COR # BLD: 9.56 10*3/MM3 (ref 3.4–10.8)

## 2023-02-13 PROCEDURE — 99283 EMERGENCY DEPT VISIT LOW MDM: CPT

## 2023-02-13 PROCEDURE — 80053 COMPREHEN METABOLIC PANEL: CPT

## 2023-02-13 PROCEDURE — 93005 ELECTROCARDIOGRAM TRACING: CPT

## 2023-02-13 PROCEDURE — 93010 ELECTROCARDIOGRAM REPORT: CPT | Performed by: INTERNAL MEDICINE

## 2023-02-13 PROCEDURE — 99284 EMERGENCY DEPT VISIT MOD MDM: CPT

## 2023-02-13 PROCEDURE — 93005 ELECTROCARDIOGRAM TRACING: CPT | Performed by: STUDENT IN AN ORGANIZED HEALTH CARE EDUCATION/TRAINING PROGRAM

## 2023-02-13 PROCEDURE — 84484 ASSAY OF TROPONIN QUANT: CPT

## 2023-02-13 PROCEDURE — 85025 COMPLETE CBC W/AUTO DIFF WBC: CPT

## 2023-02-13 RX ORDER — ASPIRIN 81 MG/1
324 TABLET, CHEWABLE ORAL ONCE
Status: DISCONTINUED | OUTPATIENT
Start: 2023-02-13 | End: 2023-02-14

## 2023-02-13 RX ORDER — SODIUM CHLORIDE 0.9 % (FLUSH) 0.9 %
10 SYRINGE (ML) INJECTION AS NEEDED
Status: DISCONTINUED | OUTPATIENT
Start: 2023-02-13 | End: 2023-02-14 | Stop reason: HOSPADM

## 2023-02-13 NOTE — TELEPHONE ENCOUNTER
The patient called in reporting chest pressure and tightness starting yesterday.  He stated he took nitro, ASA, and tylenol to help relieve the pain.  He woke up this morning with the same pain.  I instructed him to report to the ER for further evaluation.  He stated he would do so.

## 2023-02-14 ENCOUNTER — APPOINTMENT (OUTPATIENT)
Dept: GENERAL RADIOLOGY | Facility: HOSPITAL | Age: 83
End: 2023-02-14
Payer: MEDICARE

## 2023-02-14 ENCOUNTER — HOSPITAL ENCOUNTER (EMERGENCY)
Facility: HOSPITAL | Age: 83
Discharge: HOME | End: 2023-02-14
Attending: STUDENT IN AN ORGANIZED HEALTH CARE EDUCATION/TRAINING PROGRAM | Admitting: STUDENT IN AN ORGANIZED HEALTH CARE EDUCATION/TRAINING PROGRAM
Payer: MEDICARE

## 2023-02-14 VITALS
TEMPERATURE: 98 F | OXYGEN SATURATION: 98 % | SYSTOLIC BLOOD PRESSURE: 164 MMHG | WEIGHT: 230 LBS | BODY MASS INDEX: 34.07 KG/M2 | HEIGHT: 69 IN | DIASTOLIC BLOOD PRESSURE: 78 MMHG | HEART RATE: 78 BPM | RESPIRATION RATE: 20 BRPM

## 2023-02-14 DIAGNOSIS — R07.9 CHEST PAIN, UNSPECIFIED TYPE: Primary | ICD-10-CM

## 2023-02-14 LAB
GEN 5 2HR TROPONIN T REFLEX: 11 NG/L
HOLD SPECIMEN: NORMAL
HOLD SPECIMEN: NORMAL
TROPONIN T DELTA: 0 NG/L
WHOLE BLOOD HOLD COAG: NORMAL
WHOLE BLOOD HOLD SPECIMEN: NORMAL

## 2023-02-14 PROCEDURE — 84484 ASSAY OF TROPONIN QUANT: CPT | Performed by: STUDENT IN AN ORGANIZED HEALTH CARE EDUCATION/TRAINING PROGRAM

## 2023-02-14 PROCEDURE — 36415 COLL VENOUS BLD VENIPUNCTURE: CPT

## 2023-02-14 PROCEDURE — 93010 ELECTROCARDIOGRAM REPORT: CPT | Performed by: INTERNAL MEDICINE

## 2023-02-14 PROCEDURE — 71045 X-RAY EXAM CHEST 1 VIEW: CPT

## 2023-02-14 PROCEDURE — 93005 ELECTROCARDIOGRAM TRACING: CPT | Performed by: STUDENT IN AN ORGANIZED HEALTH CARE EDUCATION/TRAINING PROGRAM

## 2023-02-14 NOTE — DISCHARGE INSTRUCTIONS
It was very nice to meet you, Zachariah. Thank you for allowing us to take care of you today at [facility].    Your evaluation today did not show any emergent findings or have any emergent indications for admission to the hospital.  Like we discussed please follow-up with your primary care provider or Dr. Cobian to set up an outpatient stress test however if you have any pain please come back to the ER soon as possible.    Please understand that an ER evaluation is just the start of your evaluation. We will do what we can, but we are often unable to fully figure out what is causing your symptoms from one evaluation. Thus, our primary goal is to determine whether you need to be evaluated in the hospital or if it is safe for you to go home and see other doctors such as a primary care physician or a specialist on an outpatient basis.     Like we discussed, it is VERY IMPORTANT that you follow up with your primary care doctor (call them to set up an appointment) within the next few days or as soon as possible so that you can be re-evaluated for improvement in your symptoms or for any other questions.     A copy of your results should be included in your paperwork. If you were prescribed any medications, please take them as directed or call us back with any questions.    Please return to the emergency room within 12-48 hours if you experience fever, chills, chest pain or shortness of breath, pain with inspiration/expiration, pain that travels to your arms, neck or back, nausea, vomiting, severe headache, tearing pain in your chest, dizziness, feel as though you are about to pass out, have any worsening symptoms, or any other concerns.    *IMPORTANT INFORMATION REGARDING XRAYS AND CT SCANS*  Please keep in mind that at nighttime we do not have an in-house radiologist and use a tele-radiology service. This means that while they provide us with information on emergent findings or important acute findings, they may  not report to us ALL of the other smaller, yet still important to know, findings that may be there on your imaging studies. While we will try our best to inform you about any incidental findings or abnormal findings that require follow up, please follow up with your primary care provider to have your imaging studies reviewed formally and have any abnormal findings addressed.

## 2023-02-16 LAB
QT INTERVAL: 436 MS
QT INTERVAL: 446 MS
QTC INTERVAL: 446 MS
QTC INTERVAL: 448 MS

## 2023-02-19 NOTE — ED PROVIDER NOTES
Subjective   History of Present Illness  Patient states he has been having some chest pain that started 3 days ago.  States that he took aspirin today and came in after he also took nitroglycerin.  He states that he had some mild relief with nitro.  States that he tried to go to his cardiologist earlier however he was told to come to the ER instead.  States that currently he has no chest pain.  States that he had a heart attack about 3 years ago.  States that he currently has no chest pain or shortness of breath or abdominal pain or dysuria or hematuria hematochezia.  Denies any recent falls.  Denies any headache, vision changes or neck pain or ringing in his ears.        Review of Systems   All other systems reviewed and are negative.      Past Medical History:   Diagnosis Date   • Asthma    • Coronary artery disease    • Diabetes mellitus (HCC)    • Elevated cholesterol    • HL (hearing loss) 2012   • Hyperlipidemia    • Hypertension    • Myocardial infarct (HCC)     [facility] 2020   • Sleep apnea     cpap at    • Sleep apnea, obstructive        Allergies   Allergen Reactions   • Albuterol Other (See Comments)   • Adhesive Tape Rash   • Azithromycin Rash   • Fentanyl Nausea And Vomiting       Past Surgical History:   Procedure Laterality Date   • ADENOIDECTOMY  1947   • BACK SURGERY     • CARDIAC CATHETERIZATION      stents x 6   • CARDIAC SURGERY      CABG TRIPLE BYPASS 2012   • CATARACT EXTRACTION     • COLONOSCOPY     • COLONOSCOPY N/A 06/04/2021    Procedure: COLONOSCOPY WITH ANESTHESIA;  Surgeon: Zachariah Menjivar DO;  Location: [facility] ENDOSCOPY;  Service: Gastroenterology;  Laterality: N/A;  pre hx ulcerative colitis  post ulcerative colitis  dr clolins gusman   • CORONARY ANGIOPLASTY WITH STENT PLACEMENT     • CORONARY ARTERY BYPASS GRAFT  December 2012   • CORONARY STENT PLACEMENT     • HIP SURGERY Right     total hip   • JOINT REPLACEMENT  2015    Right hip   • PENILE PROSTHESIS IMPLANT N/A 12/13/2021     Procedure: 3-PIECE INFLATABLE PENILE PROSTHESIS PLACEMENT;  Surgeon: Jose Tellez MD;  Location: [facility] OR;  Service: Urology;  Laterality: N/A;   • TONSILLECTOMY  1947       Family History   Problem Relation Age of Onset   • Colon cancer Brother    • Colon polyps Neg Hx    • Esophageal cancer Neg Hx        Social History     Socioeconomic History   • Marital status:    Tobacco Use   • Smoking status: Never   • Smokeless tobacco: Never   Vaping Use   • Vaping Use: Never used   Substance and Sexual Activity   • Alcohol use: Never   • Drug use: Never   • Sexual activity: Defer           Objective   Physical Exam  Vitals and nursing note reviewed.   Constitutional:       General: He is not in acute distress.     Appearance: He is well-developed and normal weight. He is not toxic-appearing.   HENT:      Head: Normocephalic and atraumatic.      Nose: Nose normal.   Eyes:      General:         Right eye: No discharge.         Left eye: No discharge.      Extraocular Movements: Extraocular movements intact.      Conjunctiva/sclera: Conjunctivae normal.      Pupils: Pupils are equal, round, and reactive to light.   Cardiovascular:      Rate and Rhythm: Normal rate and regular rhythm.      Pulses: Normal pulses.   Pulmonary:      Effort: Pulmonary effort is normal. No respiratory distress.      Breath sounds: No wheezing.   Abdominal:      General: Abdomen is flat.      Palpations: Abdomen is soft.      Tenderness: There is no abdominal tenderness. There is no guarding.   Musculoskeletal:         General: Normal range of motion.      Cervical back: Normal range of motion.   Skin:     General: Skin is warm.      Capillary Refill: Capillary refill takes less than 2 seconds.   Neurological:      General: No focal deficit present.      Mental Status: He is alert.   Psychiatric:         Mood and Affect: Mood normal.         Behavior: Behavior normal.         Procedures           ED Course                                            Medical Decision Making  Zachariah Acosta is a 82 y.o. male who presents to the ED for chest pain.     Patient was non-toxic appearing on arrival.    Vital signs stable on arrival.     Patient's presentation raises suspicion for differentials including, but not limited to, ACS, GERD, Anxiety.     Given this, Zachariah was placed on the monitor and IV access was obtained as needed. Laboratory studies and imaging studies were ordered.     My EKG interpretation: I have reviewed and interpreted the EKG to show sinus rhythm, rate of 63 with non-specific t-wave changes. Normal axis and intervals. No evidence of acute ischemia such as ST elevation, ST depression, or T wave inversion. No obvious signs of a malignant dysrhythmia or 3rd degree heart block.    Given findings described above, patient's presentation is most consistent with possible GERD although ACS cannot be ruled out completely. His troponins are normal and do not have a delta.    On re-evaluation, patient remained hemodynamically stable.     Decision rules/scores evaluated: HEART score of 5     Discussion with consultants: none     Shared decision making: with patients wife at bedside who is in agreement with disposition plan    Social Determinants of Health that impact treatment or disposition: none     Code status and discussions: none    I went over the workup and results so far with everyone in the room. I told them that I can admit him for further care such as a stress test. He politely declined and states he would like to follow up with his PCP and with his cardiologist. I answered all the questions regarding the emergency department evaluation, diagnosis, and treatment plan in plain and simple language that was understandable. I said that there is always some diagnostic uncertainty in the ER and went over the fact that the symptoms may change or new symptoms may reveal themselves after being discharged. Because of this, I said that it  is VERY IMPORTANT that Zachariah follows up, by CALLING as soon as possible to set up an appointment, with the primary care doctor within the next few days or as soon as reasonably possible so that the symptoms can be re-evaluated for improvement or for any other questions. I also gave Zachariah common sense return precautions and encouraged a quick return to the emergency department within 24 - 48hrs if there are any new, worsening, or concerning symptoms. The patient verbalized understanding of the discharge instructions and agreed with them. Zachariah was discharged in stable condition and was observed ambulating out of the ER.    Chest pain, unspecified type: complicated acute illness or injury  Amount and/or Complexity of Data Reviewed  Labs: ordered.  Radiology: ordered.  ECG/medicine tests: ordered.          Final diagnoses:   Chest pain, unspecified type       ED Disposition  ED Disposition     ED Disposition   Discharge    Condition   Stable    Comment   --             Amaya Mix, APRN  [address] [facility] [address], [facility] [address]  [facility] [id number] [phone]    Call in 1 day  As needed, If symptoms worsen         Medication List      Changed    Ustekinumab 90 MG/ML solution prefilled syringe Injection  Commonly known as: STELARA  Inject 90 mg under the skin into the appropriate area as directed Every 28 (Twenty-Eight) Days.  What changed:   · when to take this  · additional instructions             Mesfin Sherman MD  02/18/23 3640

## 2023-02-28 ENCOUNTER — OFFICE VISIT (OUTPATIENT)
Dept: ENT CLINIC | Age: 83
End: 2023-02-28
Payer: MEDICARE

## 2023-02-28 ENCOUNTER — OFFICE VISIT (OUTPATIENT)
Dept: FAMILY MEDICINE CLINIC | Facility: CLINIC | Age: 83
End: 2023-02-28
Payer: MEDICARE

## 2023-02-28 VITALS
HEIGHT: 69 IN | BODY MASS INDEX: 34.96 KG/M2 | WEIGHT: 236 LBS | DIASTOLIC BLOOD PRESSURE: 78 MMHG | SYSTOLIC BLOOD PRESSURE: 128 MMHG

## 2023-02-28 VITALS
WEIGHT: 236 LBS | BODY MASS INDEX: 34.96 KG/M2 | TEMPERATURE: 97.7 F | OXYGEN SATURATION: 97 % | DIASTOLIC BLOOD PRESSURE: 80 MMHG | HEART RATE: 71 BPM | SYSTOLIC BLOOD PRESSURE: 110 MMHG | HEIGHT: 69 IN

## 2023-02-28 DIAGNOSIS — I10 ESSENTIAL HYPERTENSION: ICD-10-CM

## 2023-02-28 DIAGNOSIS — E11.65 TYPE 2 DIABETES MELLITUS WITH HYPERGLYCEMIA, WITH LONG-TERM CURRENT USE OF INSULIN: Primary | ICD-10-CM

## 2023-02-28 DIAGNOSIS — H60.543 DERMATITIS OF BOTH EAR CANALS: ICD-10-CM

## 2023-02-28 DIAGNOSIS — J30.89 NON-SEASONAL ALLERGIC RHINITIS, UNSPECIFIED TRIGGER: ICD-10-CM

## 2023-02-28 DIAGNOSIS — Z79.4 TYPE 2 DIABETES MELLITUS WITH HYPERGLYCEMIA, WITH LONG-TERM CURRENT USE OF INSULIN: Primary | ICD-10-CM

## 2023-02-28 DIAGNOSIS — E66.9 OBESITY (BMI 30-39.9): ICD-10-CM

## 2023-02-28 DIAGNOSIS — H72.91 TYMPANIC MEMBRANE PERFORATION, RIGHT: Primary | ICD-10-CM

## 2023-02-28 PROCEDURE — 99213 OFFICE O/P EST LOW 20 MIN: CPT | Performed by: OTOLARYNGOLOGY

## 2023-02-28 PROCEDURE — 99214 OFFICE O/P EST MOD 30 MIN: CPT | Performed by: NURSE PRACTITIONER

## 2023-02-28 PROCEDURE — 1123F ACP DISCUSS/DSCN MKR DOCD: CPT | Performed by: OTOLARYNGOLOGY

## 2023-02-28 PROCEDURE — 1036F TOBACCO NON-USER: CPT | Performed by: OTOLARYNGOLOGY

## 2023-02-28 PROCEDURE — G8417 CALC BMI ABV UP PARAM F/U: HCPCS | Performed by: OTOLARYNGOLOGY

## 2023-02-28 PROCEDURE — G8484 FLU IMMUNIZE NO ADMIN: HCPCS | Performed by: OTOLARYNGOLOGY

## 2023-02-28 PROCEDURE — G8427 DOCREV CUR MEDS BY ELIG CLIN: HCPCS | Performed by: OTOLARYNGOLOGY

## 2023-02-28 PROCEDURE — 4130F TOPICAL PREP RX AOE: CPT | Performed by: OTOLARYNGOLOGY

## 2023-02-28 RX ORDER — INSULIN GLARGINE 100 [IU]/ML
INJECTION, SOLUTION SUBCUTANEOUS
Qty: 15 ML | Refills: 0 | Status: ON HOLD | OUTPATIENT
Start: 2023-02-28

## 2023-02-28 RX ORDER — METFORMIN HYDROCHLORIDE 500 MG/1
500 TABLET, EXTENDED RELEASE ORAL
COMMUNITY
Start: 2023-02-28 | End: 2023-03-31

## 2023-02-28 RX ORDER — METFORMIN HYDROCHLORIDE 500 MG/1
500 TABLET, EXTENDED RELEASE ORAL
Qty: 30 TABLET | Refills: 2 | Status: ON HOLD | OUTPATIENT
Start: 2023-02-28 | End: 2023-03-30

## 2023-02-28 ASSESSMENT — ENCOUNTER SYMPTOMS
GASTROINTESTINAL NEGATIVE: 1
RESPIRATORY NEGATIVE: 1
ALLERGIC/IMMUNOLOGIC NEGATIVE: 1
EYES NEGATIVE: 1

## 2023-02-28 NOTE — PROGRESS NOTES
2023    Hector Gee (:  1940) is a [de-identified] y.o. male, Established patient, here for evaluation of the following chief complaint(s):  Follow-up (Ears)      Vitals:    23 1347   BP: 128/78   Weight: 236 lb (107 kg)   Height: 5' 9\" (1.753 m)       Wt Readings from Last 3 Encounters:   23 236 lb (107 kg)   23 230 lb (104.3 kg)   23 230 lb (104.3 kg)       BP Readings from Last 3 Encounters:   23 128/78   23 134/70   23 132/84         SUBJECTIVE/OBJECTIVE:    Patient seen today for his years. I have been treating ear infections along with fungal infections and doing very well. Today he reports he is doing fine. No pain in his ears. He is using the hydrocortisone cream to control the dermatitis. Review of Systems   Constitutional: Negative. HENT: Negative. Eyes: Negative. Respiratory: Negative. Cardiovascular: Negative. Gastrointestinal: Negative. Endocrine: Negative. Musculoskeletal: Negative. Skin: Negative. Allergic/Immunologic: Negative. Neurological: Negative. Hematological: Negative. Psychiatric/Behavioral: Negative. Physical Exam  Vitals reviewed. Constitutional:       Appearance: Normal appearance. He is normal weight. HENT:      Head: Normocephalic and atraumatic. Right Ear: Tympanic membrane, ear canal and external ear normal.      Left Ear: Tympanic membrane, ear canal and external ear normal.      Nose: Nose normal.      Mouth/Throat:      Mouth: Mucous membranes are moist.      Pharynx: Oropharynx is clear. Eyes:      Extraocular Movements: Extraocular movements intact. Pupils: Pupils are equal, round, and reactive to light. Cardiovascular:      Rate and Rhythm: Normal rate and regular rhythm. Pulmonary:      Effort: Pulmonary effort is normal.      Breath sounds: Normal breath sounds. Musculoskeletal:      Cervical back: Normal range of motion.    Skin:     General: Skin is warm and dry. Neurological:      General: No focal deficit present. Mental Status: He is alert and oriented to person, place, and time. Psychiatric:         Mood and Affect: Mood normal.         Behavior: Behavior normal.            ASSESSMENT/PLAN:    1. Tympanic membrane perforation, right  2. Dermatitis of both ear canals  See him back in 6 months to make sure ears to look good and make sure the right side continues to heal.  Sooner with issues. Return in about 6 months (around 8/28/2023). An electronic signature was used to authenticate this note. Bijal Garcia MD       Please note that this chart was generated using dragon dictation software. Although every effort was made to ensure the accuracy of this automated transcription, some errors in transcription may have occurred.

## 2023-03-03 PROBLEM — Z79.4 TYPE 2 DIABETES MELLITUS WITH HYPERGLYCEMIA, WITH LONG-TERM CURRENT USE OF INSULIN: Status: ACTIVE | Noted: 2021-01-19

## 2023-03-03 PROBLEM — E11.65 TYPE 2 DIABETES MELLITUS WITH HYPERGLYCEMIA, WITH LONG-TERM CURRENT USE OF INSULIN: Status: ACTIVE | Noted: 2021-01-19

## 2023-04-06 ENCOUNTER — TELEPHONE (OUTPATIENT)
Dept: NEUROLOGY | Facility: CLINIC | Age: 83
End: 2023-04-06
Payer: MEDICARE

## 2023-04-06 DIAGNOSIS — Z99.89 OBSTRUCTIVE SLEEP APNEA ON CPAP: Primary | ICD-10-CM

## 2023-04-06 DIAGNOSIS — G47.33 OBSTRUCTIVE SLEEP APNEA ON CPAP: Primary | ICD-10-CM

## 2023-04-06 NOTE — TELEPHONE ENCOUNTER
CALLED PATIENT TO LET THEM KNOW THEY HAD AN APPOINTMENT IN SEPTEMBER WITH [facility] AND I WOULD HAVE TO CANCEL THAT APPOINTMENT DUE TO US NO LONGER SEEING SLEEP PATIENTS, SINCE DR JONES WOULD BE RETIRING WE NO LONGER HAVE A SLEEP SPECIALIST IN THE OFFICE. I LET THEM KNOW THEY WOULD BE GETTING REFERRED TO DR EAGLE AT [facility]. I PLACED THE REFERRAL AS WELL AS CANCELED APPOINTMENT WITH US. I SPOKE WITH PATIENT AND HE IS AWARE.

## 2023-04-09 ENCOUNTER — APPOINTMENT (OUTPATIENT)
Dept: GENERAL RADIOLOGY | Facility: HOSPITAL | Age: 83
DRG: 66 | End: 2023-04-09
Payer: MEDICARE

## 2023-04-09 ENCOUNTER — HOSPITAL ENCOUNTER (INPATIENT)
Facility: HOSPITAL | Age: 83
LOS: 3 days | Discharge: NURSING, REHAB OR LONG-TERM CARE | DRG: 66 | End: 2023-04-12
Attending: STUDENT IN AN ORGANIZED HEALTH CARE EDUCATION/TRAINING PROGRAM | Admitting: NEUROLOGICAL SURGERY
Payer: MEDICARE

## 2023-04-09 ENCOUNTER — APPOINTMENT (OUTPATIENT)
Dept: CT IMAGING | Facility: HOSPITAL | Age: 83
DRG: 66 | End: 2023-04-09
Payer: MEDICARE

## 2023-04-09 DIAGNOSIS — Z78.9 IMPAIRED MOBILITY AND ADLS: ICD-10-CM

## 2023-04-09 DIAGNOSIS — R47.81 SLURRED SPEECH: ICD-10-CM

## 2023-04-09 DIAGNOSIS — I61.9 INTRAPARENCHYMAL HEMORRHAGE OF BRAIN: ICD-10-CM

## 2023-04-09 DIAGNOSIS — Z74.09 IMPAIRED MOBILITY: ICD-10-CM

## 2023-04-09 DIAGNOSIS — Z74.09 IMPAIRED MOBILITY AND ADLS: ICD-10-CM

## 2023-04-09 DIAGNOSIS — R13.12 OROPHARYNGEAL DYSPHAGIA: Primary | ICD-10-CM

## 2023-04-09 LAB
ABO GROUP BLD: NORMAL
ALBUMIN SERPL-MCNC: 4.1 G/DL (ref 3.5–5.2)
ALBUMIN/GLOB SERPL: 1.3 G/DL
ALP SERPL-CCNC: 72 U/L (ref 39–117)
ALT SERPL W P-5'-P-CCNC: 22 U/L (ref 1–41)
ANION GAP SERPL CALCULATED.3IONS-SCNC: 10 MMOL/L (ref 5–15)
AST SERPL-CCNC: 22 U/L (ref 1–40)
BASOPHILS # BLD AUTO: 0.11 10*3/MM3 (ref 0–0.2)
BASOPHILS NFR BLD AUTO: 1.5 % (ref 0–1.5)
BILIRUB SERPL-MCNC: 0.4 MG/DL (ref 0–1.2)
BLD GP AB SCN SERPL QL: NEGATIVE
BUN SERPL-MCNC: 16 MG/DL (ref 8–23)
BUN/CREAT SERPL: 19 (ref 7–25)
CALCIUM SPEC-SCNC: 9.1 MG/DL (ref 8.6–10.5)
CHLORIDE SERPL-SCNC: 104 MMOL/L (ref 98–107)
CO2 SERPL-SCNC: 26 MMOL/L (ref 22–29)
CREAT SERPL-MCNC: 0.84 MG/DL (ref 0.76–1.27)
DEPRECATED RDW RBC AUTO: 46.6 FL (ref 37–54)
EGFRCR SERPLBLD CKD-EPI 2021: 88.2 ML/MIN/1.73
EOSINOPHIL # BLD AUTO: 1.01 10*3/MM3 (ref 0–0.4)
EOSINOPHIL NFR BLD AUTO: 13.9 % (ref 0.3–6.2)
ERYTHROCYTE [DISTWIDTH] IN BLOOD BY AUTOMATED COUNT: 12.9 % (ref 12.3–15.4)
GLOBULIN UR ELPH-MCNC: 3.2 GM/DL
GLUCOSE BLDC GLUCOMTR-MCNC: 117 MG/DL (ref 70–130)
GLUCOSE BLDC GLUCOMTR-MCNC: 154 MG/DL (ref 70–130)
GLUCOSE BLDC GLUCOMTR-MCNC: 159 MG/DL (ref 70–130)
GLUCOSE SERPL-MCNC: 156 MG/DL (ref 65–99)
HCT VFR BLD AUTO: 47.3 % (ref 37.5–51)
HGB BLD-MCNC: 15 G/DL (ref 13–17.7)
HOLD SPECIMEN: NORMAL
IMM GRANULOCYTES # BLD AUTO: 0.03 10*3/MM3 (ref 0–0.05)
IMM GRANULOCYTES NFR BLD AUTO: 0.4 % (ref 0–0.5)
INR PPP: 1.01 (ref 0.91–1.09)
LYMPHOCYTES # BLD AUTO: 1.52 10*3/MM3 (ref 0.7–3.1)
LYMPHOCYTES NFR BLD AUTO: 20.9 % (ref 19.6–45.3)
MCH RBC QN AUTO: 30.9 PG (ref 26.6–33)
MCHC RBC AUTO-ENTMCNC: 31.7 G/DL (ref 31.5–35.7)
MCV RBC AUTO: 97.3 FL (ref 79–97)
MONOCYTES # BLD AUTO: 0.68 10*3/MM3 (ref 0.1–0.9)
MONOCYTES NFR BLD AUTO: 9.3 % (ref 5–12)
NEUTROPHILS NFR BLD AUTO: 3.94 10*3/MM3 (ref 1.7–7)
NEUTROPHILS NFR BLD AUTO: 54 % (ref 42.7–76)
NRBC BLD AUTO-RTO: 0 /100 WBC (ref 0–0.2)
PLATELET # BLD AUTO: 237 10*3/MM3 (ref 140–450)
PMV BLD AUTO: 10.2 FL (ref 6–12)
POTASSIUM SERPL-SCNC: 4.2 MMOL/L (ref 3.5–5.2)
PROT SERPL-MCNC: 7.3 G/DL (ref 6–8.5)
PROTHROMBIN TIME: 13.4 SECONDS (ref 11.8–14.8)
RBC # BLD AUTO: 4.86 10*6/MM3 (ref 4.14–5.8)
RH BLD: POSITIVE
SODIUM SERPL-SCNC: 140 MMOL/L (ref 136–145)
T&S EXPIRATION DATE: NORMAL
WBC NRBC COR # BLD: 7.29 10*3/MM3 (ref 3.4–10.8)
WHOLE BLOOD HOLD COAG: NORMAL
WHOLE BLOOD HOLD SPECIMEN: NORMAL

## 2023-04-09 PROCEDURE — 97162 PT EVAL MOD COMPLEX 30 MIN: CPT

## 2023-04-09 PROCEDURE — 92610 EVALUATE SWALLOWING FUNCTION: CPT | Performed by: SPEECH-LANGUAGE PATHOLOGIST

## 2023-04-09 PROCEDURE — 70450 CT HEAD/BRAIN W/O DYE: CPT

## 2023-04-09 PROCEDURE — 97166 OT EVAL MOD COMPLEX 45 MIN: CPT | Performed by: OCCUPATIONAL THERAPIST

## 2023-04-09 PROCEDURE — 82962 GLUCOSE BLOOD TEST: CPT

## 2023-04-09 PROCEDURE — 86901 BLOOD TYPING SEROLOGIC RH(D): CPT | Performed by: STUDENT IN AN ORGANIZED HEALTH CARE EDUCATION/TRAINING PROGRAM

## 2023-04-09 PROCEDURE — 99285 EMERGENCY DEPT VISIT HI MDM: CPT

## 2023-04-09 PROCEDURE — 85610 PROTHROMBIN TIME: CPT | Performed by: STUDENT IN AN ORGANIZED HEALTH CARE EDUCATION/TRAINING PROGRAM

## 2023-04-09 PROCEDURE — 93010 ELECTROCARDIOGRAM REPORT: CPT | Performed by: INTERNAL MEDICINE

## 2023-04-09 PROCEDURE — 99222 1ST HOSP IP/OBS MODERATE 55: CPT | Performed by: NEUROLOGICAL SURGERY

## 2023-04-09 PROCEDURE — 86900 BLOOD TYPING SEROLOGIC ABO: CPT | Performed by: STUDENT IN AN ORGANIZED HEALTH CARE EDUCATION/TRAINING PROGRAM

## 2023-04-09 PROCEDURE — 94761 N-INVAS EAR/PLS OXIMETRY MLT: CPT

## 2023-04-09 PROCEDURE — 80053 COMPREHEN METABOLIC PANEL: CPT | Performed by: STUDENT IN AN ORGANIZED HEALTH CARE EDUCATION/TRAINING PROGRAM

## 2023-04-09 PROCEDURE — 71045 X-RAY EXAM CHEST 1 VIEW: CPT

## 2023-04-09 PROCEDURE — 86850 RBC ANTIBODY SCREEN: CPT | Performed by: STUDENT IN AN ORGANIZED HEALTH CARE EDUCATION/TRAINING PROGRAM

## 2023-04-09 PROCEDURE — 85025 COMPLETE CBC W/AUTO DIFF WBC: CPT | Performed by: STUDENT IN AN ORGANIZED HEALTH CARE EDUCATION/TRAINING PROGRAM

## 2023-04-09 PROCEDURE — 93005 ELECTROCARDIOGRAM TRACING: CPT | Performed by: STUDENT IN AN ORGANIZED HEALTH CARE EDUCATION/TRAINING PROGRAM

## 2023-04-09 RX ORDER — LABETALOL HYDROCHLORIDE 5 MG/ML
10 INJECTION, SOLUTION INTRAVENOUS
Status: DISCONTINUED | OUTPATIENT
Start: 2023-04-09 | End: 2023-04-10

## 2023-04-09 RX ORDER — SODIUM CHLORIDE 0.9 % (FLUSH) 0.9 %
10 SYRINGE (ML) INJECTION AS NEEDED
Status: DISCONTINUED | OUTPATIENT
Start: 2023-04-09 | End: 2023-04-10

## 2023-04-09 RX ORDER — CHLORHEXIDINE GLUCONATE 500 MG/1
1 CLOTH TOPICAL EVERY 24 HOURS
Status: DISCONTINUED | OUTPATIENT
Start: 2023-04-10 | End: 2023-04-10

## 2023-04-09 RX ORDER — CHLORHEXIDINE GLUCONATE 500 MG/1
1 CLOTH TOPICAL ONCE
Status: COMPLETED | OUTPATIENT
Start: 2023-04-09 | End: 2023-04-09

## 2023-04-09 RX ORDER — ENALAPRILAT 2.5 MG/2ML
1.25 INJECTION INTRAVENOUS EVERY 6 HOURS PRN
Status: DISCONTINUED | OUTPATIENT
Start: 2023-04-09 | End: 2023-04-12 | Stop reason: HOSPADM

## 2023-04-09 RX ORDER — BUDESONIDE, GLYCOPYRROLATE, AND FORMOTEROL FUMARATE 160; 9; 4.8 UG/1; UG/1; UG/1
2 AEROSOL, METERED RESPIRATORY (INHALATION) 2 TIMES DAILY
COMMUNITY

## 2023-04-09 RX ORDER — SODIUM CHLORIDE 9 MG/ML
40 INJECTION, SOLUTION INTRAVENOUS AS NEEDED
Status: DISCONTINUED | OUTPATIENT
Start: 2023-04-09 | End: 2023-04-10

## 2023-04-09 RX ORDER — CHOLECALCIFEROL (VITAMIN D3) 125 MCG
5 CAPSULE ORAL
COMMUNITY

## 2023-04-09 RX ORDER — SODIUM CHLORIDE 0.9 % (FLUSH) 0.9 %
10 SYRINGE (ML) INJECTION EVERY 12 HOURS SCHEDULED
Status: DISCONTINUED | OUTPATIENT
Start: 2023-04-09 | End: 2023-04-10

## 2023-04-09 RX ADMIN — Medication 10 ML: at 20:07

## 2023-04-09 RX ADMIN — Medication 1 APPLICATION: at 19:39

## 2023-04-09 RX ADMIN — Medication 10 ML: at 10:31

## 2023-04-09 RX ADMIN — CHLORHEXIDINE GLUCONATE 1 APPLICATION: 500 CLOTH TOPICAL at 19:39

## 2023-04-09 NOTE — THERAPY EVALUATION
Acute Care - Speech Language Pathology   Swallow Initial Evaluation [facility]     Patient Name: Zachariah Acosta  : 1940  MRN: [id number]  Today's Date: 2023               Admit Date: 2023    SPEECH-LANGUAGE PATHOLOGY EVALUATION - SWALLOW  Subjective: The patient was seen on this date for a Clinical Swallow evaluation.  Patient was alert and cooperative.  Significant history: admit with slurred speech, right sided weakness.  Failed screening due to droop, droop observed by RN in ICU, however resolved when SLP testing.  Objective: Textures given included thin liquid, nectar thick liquid, honey thick liquid, puree consistency, and regular consistency.  Assessment: Difficulties were noted with none of the above consistencies.  Observations: No overt s/s of aspiration seen with full range of consistencies.  Good mastication, no oral residue.  Facial weakness appears resolved.   SLP Findings:  Patient presents with functional swallow, without esophageal component.   Recommendations: Diet Textures: thin liquid, regular consistency food.  Medications should be taken whole with thin liquids. May have water and ice between meals after oral care, under staff or family supervision and with the recommended strategies for safe swallowing.   Recommended Strategies: Upright for PO and small bites and sips. Oral care before breakfast, after all meals and PRN.  Other Recommended Evaluations: Speech-Language Evaluation    Dysphagia therapy is not recommended.   Sabrina Espinoza MS-CCC/SLP, [facility] 2023 10:51 CDT    Visit Dx:     ICD-10-CM ICD-9-CM   1. Oropharyngeal dysphagia  R13.12 787.22     Patient Active Problem List   Diagnosis   • BREANN on CPAP   • CAD (coronary artery disease)   • Chronic kidney disease (CKD), stage III (moderate)   • Disorder of lung   • Essential hypertension   • Mild intermittent asthma without complication   • S/P CABG (coronary artery bypass graft)   • Type 2 diabetes mellitus with  hyperglycemia, with long-term current use of insulin (HCC)   • Ulcerative colitis   • Obesity (BMI 30-39.9)   • Non-seasonal allergic rhinitis   • SOB (shortness of breath)   • Pulmonary scarring   • Pulmonary nodule seen on imaging study   • Mixed hyperlipidemia   • Nonsmoker   • Ulcerative colitis with complication   • Intractable abdominal pain   • Constipation   • KE (acute kidney injury)   • Hematuria   • LLQ pain   • Erectile dysfunction   • Class 1 obesity due to excess calories with serious comorbidity and body mass index (BMI) of 34.0 to 34.9 in adult   • Thyroid nodule   • Double vision   • Elevated PSA   • Iron deficiency anemia due to chronic blood loss   • Long-term use of immunosuppressant medication   • Nontraumatic complete tear of left rotator cuff   • Rosacea   • Intraparenchymal hemorrhage of brain     Past Medical History:   Diagnosis Date   • Asthma    • Coronary artery disease    • Diabetes mellitus    • Elevated cholesterol    • HL (hearing loss) 2012   • Hyperlipidemia    • Hypertension    • Myocardial infarct     [facility] 2020   • Sleep apnea     cpap at    • Sleep apnea, obstructive      Past Surgical History:   Procedure Laterality Date   • ADENOIDECTOMY  1947   • BACK SURGERY     • CARDIAC CATHETERIZATION      stents x 6   • CARDIAC SURGERY      CABG TRIPLE BYPASS 2012   • CATARACT EXTRACTION     • COLONOSCOPY     • COLONOSCOPY N/A 06/04/2021    Procedure: COLONOSCOPY WITH ANESTHESIA;  Surgeon: Zachariah Menjivar DO;  Location: [facility] ENDOSCOPY;  Service: Gastroenterology;  Laterality: N/A;  pre hx ulcerative colitis  post ulcerative colitis  dr collins gusman   • CORONARY ANGIOPLASTY WITH STENT PLACEMENT     • CORONARY ARTERY BYPASS GRAFT  December 2012   • CORONARY STENT PLACEMENT     • HIP SURGERY Right     total hip   • JOINT REPLACEMENT  2015    Right hip   • PENILE PROSTHESIS IMPLANT N/A 12/13/2021    Procedure: 3-PIECE INFLATABLE PENILE PROSTHESIS PLACEMENT;  Surgeon: Jose Tellez  MD Hector;  Location: [facility] OR;  Service: Urology;  Laterality: N/A;   • TONSILLECTOMY  1947       SLP Recommendation and Plan  SLP Swallowing Diagnosis: swallow WFL/no suspected pharyngeal impairment (04/09/23 1000)  SLP Diet Recommendation: regular textures, thin liquids (04/09/23 1000)  Recommended Precautions and Strategies: upright posture during/after eating, small bites of food and sips of liquid (04/09/23 1000)  SLP Rec. for Method of Medication Administration: meds whole, with thin liquids (04/09/23 1000)     Monitor for Signs of Aspiration: notify SLP if any concerns (04/09/23 1000)  Recommended Diagnostics: SLE/Cog/Motor Speech Evaluation (04/09/23 1000)  Swallow Criteria for Skilled Therapeutic Interventions Met: no problems identified which require skilled intervention (04/09/23 1000)  Anticipated Discharge Disposition (SLP): unknown (04/09/23 1000)  Rehab Potential/Prognosis, Swallowing: good, to achieve stated therapy goals (04/09/23 1000)  Therapy Frequency (Swallow): evaluation only (04/09/23 1000)                                           Plan of Care Reviewed With: patient, spouse, caregiver  Progress: improving      SWALLOW EVALUATION (last 72 hours)     SLP Adult Swallow Evaluation     Row Name 04/09/23 1000                   Rehab Evaluation    Document Type evaluation  -KW        Subjective Information no complaints  -KW        Patient Observations alert;cooperative  -KW        Patient/Family/Caregiver Comments/Observations family present  -KW        Patient Effort good  -KW           General Information    Patient Profile Reviewed yes  -KW        Pertinent History Of Current Problem admit with slurred speech, right sided weakness, failed swallow screen due to facial weakness  -KW        Current Method of Nutrition NPO  -KW        Precautions/Limitations, Vision WFL;for purposes of eval  -KW        Precautions/Limitations, Hearing hearing impairment, bilaterally  -KW        Prior Level of  Function-Communication WFL  -KW        Prior Level of Function-Swallowing no diet consistency restrictions  -KW        Plans/Goals Discussed with patient and family  -KW        Barriers to Rehab hearing deficit  -KW        Patient's Goals for Discharge return to all previous roles/activities  -KW        Family Goals for Discharge patient able to return to all previous activities/roles  -KW           Pain    Additional Documentation Pain Scale: FACES Pre/Post-Treatment (Group)  -KW           Pain Scale: FACES Pre/Post-Treatment    Pain: FACES Scale, Pretreatment 0-->no hurt  -KW        Posttreatment Pain Rating 0-->no hurt  -KW           Oral Motor Structure and Function    Dentition Assessment natural, present and adequate  -KW        Secretion Management WNL/WFL  -KW        Mucosal Quality moist, healthy  -KW           Oral Musculature and Cranial Nerve Assessment    Oral Motor General Assessment WFL  -KW           General Eating/Swallowing Observations    Respiratory Support Currently in Use nasal cannula  -KW        Eating/Swallowing Skills self-fed  -KW        Positioning During Eating upright in bed  -KW        Utensils Used spoon;straw  -KW        Consistencies Trialed regular textures;thin liquids;nectar/syrup-thick liquids;honey-thick liquids;pudding thick  -KW           Clinical Swallow Eval    Oral Prep Phase WFL  -KW        Oral Transit WFL  -KW        Oral Residue WFL  -KW        Pharyngeal Phase no overt signs/symptoms of pharyngeal impairment  -KW        Clinical Swallow Evaluation Summary see top of report  -KW           SLP Evaluation Clinical Impression    SLP Swallowing Diagnosis swallow WFL/no suspected pharyngeal impairment  -KW        Functional Impact no impact on function  -KW        Rehab Potential/Prognosis, Swallowing good, to achieve stated therapy goals  -KW        Swallow Criteria for Skilled Therapeutic Interventions Met no problems identified which require skilled intervention  -KW            Recommendations    Therapy Frequency (Swallow) evaluation only  -        SLP Diet Recommendation regular textures;thin liquids  -        Recommended Diagnostics SLE/Cog/Motor Speech Evaluation  -        Recommended Precautions and Strategies upright posture during/after eating;small bites of food and sips of liquid  -        Oral Care Recommendations Oral Care BID/PRN  -        SLP Rec. for Method of Medication Administration meds whole;with thin liquids  -        Monitor for Signs of Aspiration notify SLP if any concerns  -        Anticipated Discharge Disposition (SLP) unknown  -              User Key  (r) = Recorded By, (t) = Taken By, (c) = Cosigned By    Initials Name Effective Dates    Sabrina Bangura MS-CCC/SLP, [facility] 02/03/23 -                 EDUCATION  The patient has been educated in the following areas:   Dysphagia (Swallowing Impairment).              Time Calculation:    Time Calculation- SLP     Row Name 04/09/23 1052             Time Calculation- SLP    SLP Start Time 1000  -KW      SLP Stop Time 1045  -KW      SLP Time Calculation (min) 45 min  -KW      SLP Received On 04/09/23  -KW         Untimed Charges    [id number] Eval Oral Pharyng Swallow Minutes 45  -KW         Total Minutes    Untimed Charges Total Minutes 45  -KW       Total Minutes 45  -KW            User Key  (r) = Recorded By, (t) = Taken By, (c) = Cosigned By    Initials Name Provider Type    Sabrina Bangura MS-CCC/SLP, [facility] Speech and Language Pathologist                Therapy Charges for Today     Code Description Service Date Service Provider Modifiers Qty    [id number] HC ST EVAL ORAL PHARYNG SWALLOW 3 4/9/2023 Sabrina Espinoza MS-CCC/SLP, [facility] GN 1               Sabrina Wurth, MS-CCC/SLP, [facility]  4/9/2023

## 2023-04-09 NOTE — ED PROVIDER NOTES
Subjective   History of Present Illness   Patient presents with sensory changes, weakness.  He went to bed last night feeling normal around 10 PM and noticed his the right side of his face feels symptoms when he first woke up today.  No history of stroke.  He does have a history of diabetes, heart attack in 2020.  Does not take any blood thinners.  Oddly, he feels he has had difficulty using his right arm and leg, and he had difficulty with speech described a EMS.  He has a history of longstanding double vision and squints but his vision has not changed at all.    Review of Systems   Constitutional: Negative for chills and fever.   Respiratory: Negative for cough and shortness of breath.    Cardiovascular: Negative for chest pain and palpitations.   Gastrointestinal: Negative for abdominal pain and vomiting.   Genitourinary: Negative for difficulty urinating and dysuria.   Neurological: Negative for syncope and light-headedness.       Past Medical History:   Diagnosis Date   • Asthma    • Coronary artery disease    • Diabetes mellitus    • Elevated cholesterol    • HL (hearing loss) 2012   • Hyperlipidemia    • Hypertension    • Myocardial infarct     [facility] 2020   • Sleep apnea     cpap at    • Sleep apnea, obstructive        Allergies   Allergen Reactions   • Albuterol Other (See Comments)   • Adhesive Tape Rash   • Azithromycin Rash   • Fentanyl Nausea And Vomiting       Past Surgical History:   Procedure Laterality Date   • ADENOIDECTOMY  1947   • BACK SURGERY     • CARDIAC CATHETERIZATION      stents x 6   • CARDIAC SURGERY      CABG TRIPLE BYPASS 2012   • CATARACT EXTRACTION     • COLONOSCOPY     • COLONOSCOPY N/A 06/04/2021    Procedure: COLONOSCOPY WITH ANESTHESIA;  Surgeon: Zachariah Menjivar DO;  Location: [facility] ENDOSCOPY;  Service: Gastroenterology;  Laterality: N/A;  pre hx ulcerative colitis  post ulcerative colitis  dr collins gusman   • CORONARY ANGIOPLASTY WITH STENT PLACEMENT     • CORONARY ARTERY  BYPASS GRAFT  December 2012   • CORONARY STENT PLACEMENT     • HIP SURGERY Right     total hip   • JOINT REPLACEMENT  2015    Right hip   • PENILE PROSTHESIS IMPLANT N/A 12/13/2021    Procedure: 3-PIECE INFLATABLE PENILE PROSTHESIS PLACEMENT;  Surgeon: Jose Tellez MD;  Location: [facility] OR;  Service: Urology;  Laterality: N/A;   • TONSILLECTOMY  1947       Family History   Problem Relation Age of Onset   • Colon cancer Brother    • Colon polyps Neg Hx    • Esophageal cancer Neg Hx        Social History     Socioeconomic History   • Marital status:    Tobacco Use   • Smoking status: Never   • Smokeless tobacco: Never   Vaping Use   • Vaping Use: Never used   Substance and Sexual Activity   • Alcohol use: Never   • Drug use: Never   • Sexual activity: Defer           Objective   Physical Exam  Vitals reviewed.   Constitutional:       General: He is not in acute distress.  HENT:      Head: Normocephalic and atraumatic.   Eyes:      Extraocular Movements: Extraocular movements intact.      Conjunctiva/sclera: Conjunctivae normal.   Cardiovascular:      Pulses: Normal pulses.      Heart sounds: Normal heart sounds.   Pulmonary:      Effort: Pulmonary effort is normal. No respiratory distress.   Abdominal:      General: Abdomen is flat. There is no distension.   Musculoskeletal:         General: No swelling or tenderness.      Cervical back: Normal range of motion and neck supple.      Right lower leg: No edema.      Left lower leg: No edema.   Skin:     General: Skin is warm and dry.   Neurological:      General: No focal deficit present.      Mental Status: He is alert. Mental status is at baseline.      Comments: Right upper extremity: 5/5 strength with handgrip and flexion/extension of shoulders, elbows.   Light touch sensation intact and equal when compared to the left upper extremity.    Left upper extremity: 5/5 strength with handgrip and flexion/extension of shoulders, elbows.   Light touch sensation  intact and equal when compared to the right upper extremity.    Right lower extremity: 5/5 strength with flexion/extension of hips, knees, and dorsi/plantarflexion of ankles. Able to wiggle toes.   Light touch sensation diminished when compared to the left lower extremity.    Left lower extremity: 5/5 strength with flexion/extension of hips, knees, and dorsi/plantarflexion of ankles. Able to wiggle toes.   Light touch sensation intact when compared to the right lower extremity.    Light sensation intact in bilateral face. CN 2-12 normal except mild nasolabial flattening on the right. No dysarthria or aphasia. FNF shows dysmetria on RUE. Heel-shin normal bilaterally. No peripheral visual field cuts   Psychiatric:         Behavior: Behavior normal.         Thought Content: Thought content normal.         Procedures           ED Course  ED Course as of 04/09/23 1331   Sun Apr 09, 2023   0858 EKG: sinus rhythm, no focal ischemic changes, no arrhythmia. [AS]   0858 CT shows IPH; will maintain BP <180 which it is currently. NO AC to reverse. Will d/w neurosurgery. [AS]      ED Course User Index  [AS] Hector Honeycutt MD                                           MDM   Zachariah Acosta is a 82 y.o. male with PMH above who presents to the Emergency Department with neurologic deficits.  Symptoms most consistent with stroke.  There are not signs of meningitis, subarachnoid hemorrhage because the patient does not have any headache.  No falls or head injuries reported.  Denies other symptoms. NIHSS 3, not tPA candidate based on time from start of symptoms.     ED Course:   -CT head shows intraparenchymal hemorrhage on the left.  Discussed with Dr. Lazo who recommends admission for further work-up and blood pressure control.  Currently normotensive, no meds indicated.  Labs are overall unremarkable. Pt monitored for the remainder of their stay in the ED and dispositioned without acute event    Final diagnosis:  [facility]    All questions answered. Patient/family was understanding and in agreement with today's assessment and plan. The patient was monitored during their stay in the ED and dispositioned without acute event.    Electronically signed by:  Hector Honeycutt MD 4/9/2023 13:31 CDT      Note: Dragon medical dictation software was used in the creation of this note.        Final diagnoses:   Intraparenchymal hemorrhage of brain       ED Disposition  ED Disposition     ED Disposition   Decision to Admit    Condition   --    Comment   Level of Care: Critical Care [6]   Diagnosis: Intraparenchymal hemorrhage of brain [598821]   Admitting Physician: SALUD RODRIGUEZ [[id number]]   Attending Physician: SALUD RODRIGUEZ [[id number]]   Certification: I Certify That Inpatient Hospital Services Are Medically Necessary For Greater Than 2 Midnights               No follow-up provider specified.       Medication List      No changes were made to your prescriptions during this visit.          Hector Honeycutt MD  04/09/23 4817

## 2023-04-09 NOTE — THERAPY EVALUATION
Patient Name: Zachariah Acosta  : 1940    MRN: [id number]                              Today's Date: 2023       Admit Date: 2023    Visit Dx:     ICD-10-CM ICD-9-CM   1. Oropharyngeal dysphagia  R13.12 787.22   2. Intraparenchymal hemorrhage of brain  I61.9 431   3. Impaired mobility and ADLs  Z74.09 V49.89    Z78.9    4. Impaired mobility  Z74.09 799.89     Patient Active Problem List   Diagnosis   • BREANN on CPAP   • CAD (coronary artery disease)   • Chronic kidney disease (CKD), stage III (moderate)   • Disorder of lung   • Essential hypertension   • Mild intermittent asthma without complication   • S/P CABG (coronary artery bypass graft)   • Type 2 diabetes mellitus with hyperglycemia, with long-term current use of insulin (HCC)   • Ulcerative colitis   • Obesity (BMI 30-39.9)   • Non-seasonal allergic rhinitis   • SOB (shortness of breath)   • Pulmonary scarring   • Pulmonary nodule seen on imaging study   • Mixed hyperlipidemia   • Nonsmoker   • Ulcerative colitis with complication   • Intractable abdominal pain   • Constipation   • KE (acute kidney injury)   • Hematuria   • LLQ pain   • Erectile dysfunction   • Class 1 obesity due to excess calories with serious comorbidity and body mass index (BMI) of 34.0 to 34.9 in adult   • Thyroid nodule   • Double vision   • Elevated PSA   • Iron deficiency anemia due to chronic blood loss   • Long-term use of immunosuppressant medication   • Nontraumatic complete tear of left rotator cuff   • Rosacea   • Intraparenchymal hemorrhage of brain     Past Medical History:   Diagnosis Date   • Asthma    • Coronary artery disease    • Diabetes mellitus    • Elevated cholesterol    • HL (hearing loss)    • Hyperlipidemia    • Hypertension    • Myocardial infarct     2020   • Sleep apnea     cpap at    • Sleep apnea, obstructive      Past Surgical History:   Procedure Laterality Date   • ADENOIDECTOMY     • BACK SURGERY     • CARDIAC  CATHETERIZATION      stents x 6   • CARDIAC SURGERY      CABG TRIPLE BYPASS 2012   • CATARACT EXTRACTION     • COLONOSCOPY     • COLONOSCOPY N/A 06/04/2021    Procedure: COLONOSCOPY WITH ANESTHESIA;  Surgeon: Zachariah Menjivar DO;  Location:  [facility] ENDOSCOPY;  Service: Gastroenterology;  Laterality: N/A;  pre hx ulcerative colitis  post ulcerative colitis  dr collins gusman   • CORONARY ANGIOPLASTY WITH STENT PLACEMENT     • CORONARY ARTERY BYPASS GRAFT  December 2012   • CORONARY STENT PLACEMENT     • HIP SURGERY Right     total hip   • JOINT REPLACEMENT  2015    Right hip   • PENILE PROSTHESIS IMPLANT N/A 12/13/2021    Procedure: 3-PIECE INFLATABLE PENILE PROSTHESIS PLACEMENT;  Surgeon: Jose Tellez MD;  Location: [facility] OR;  Service: Urology;  Laterality: N/A;   • TONSILLECTOMY  1947      General Information     Row Name 04/09/23 1300          Physical Therapy Time and Intention    Document Type evaluation  left thalamic hemorrhagic stroke  -     Mode of Treatment co-treatment;physical therapy  -     Row Name 04/09/23 1300          General Information    Patient Profile Reviewed yes  -     Prior Level of Function independent:;community mobility;ADL's;driving  owns a , bsc, shower chair.  tub  -     Existing Precautions/Restrictions oxygen therapy device and L/min;fall  -     Barriers to Rehab medically complex  -     Row Name 04/09/23 1300          Living Environment    People in Home spouse  -     Row Name 04/09/23 1300          Home Main Entrance    Number of Stairs, Main Entrance none  -     Stair Railings, Main Entrance none  -     Row Name 04/09/23 1300          Cognition    Orientation Status (Cognition) oriented x 4  -     Row Name 04/09/23 1300          Safety Issues, Functional Mobility    Safety Issues Affecting Function (Mobility) ability to follow commands  -     Impairments Affecting Function (Mobility) balance;coordination;endurance/activity tolerance;motor  control;strength;visual/perceptual;sensation/sensory awareness  -     Comment, Safety Issues/Impairments (Mobility) double vision present prior to stroke-pt holds L eye shut to prevent double vision   -           User Key  (r) = Recorded By, (t) = Taken By, (c) = Cosigned By    Initials Name Provider Type     Eliud Delatorre PT Physical Therapist               Mobility     Row Name 04/09/23 1300          Bed Mobility    Bed Mobility supine-sit;sit-supine  -     Supine-Sit [place] (Bed Mobility) moderate assist (50% patient effort)  -     Sit-Supine [place] (Bed Mobility) moderate assist (50% patient effort)  -     Assistive Device (Bed Mobility) head of bed elevated;bed rails  -     Row Name 04/09/23 1300          Sit-Stand Transfer    Sit-Stand [place] (Transfers) verbal cues;moderate assist (50% patient effort);2 person assist  -     Comment, (Sit-Stand Transfer) lateral weight shift, diagonal weight shifts at bedside.  unsafe today to leave bedside  -           User Key  (r) = Recorded By, (t) = Taken By, (c) = Cosigned By    Initials Name Provider Type     Eliud Delatorre, PT Physical Therapist               Obj/Interventions     Row Name 04/09/23 1300          Range of Motion Comprehensive    General Range of Motion bilateral upper extremity ROM WFL;bilateral lower extremity ROM WFL  -[facility] Name 04/09/23 1300          Strength Comprehensive (MMT)    General Manual Muscle Testing (MMT) Assessment lower extremity strength deficits identified  -     Comment, General Manual Muscle Testing (MMT) Assessment R hip 3-/5. MMT 5/5 R quad, but peggy in weight bearing  -[facility] Name 04/09/23 1300          Motor Skills    Motor Skills coordination  -     Coordination gross motor deficit;right;lower extremity;severe impairment;ataxia  -[facility] Name 04/09/23 1300          Sensory Assessment (Somatosensory)    Sensory Assessment (Somatosensory) sensation intact  -            User Key  (r) = Recorded By, (t) = Taken By, (c) = Cosigned By    Initials Name Provider Type     Eliud Delatorre, PT Physical Therapist               Goals/Plan     [facility] Name 04/09/23 1300          Bed Mobility Goal 1 (PT)    Activity/Assistive Device (Bed Mobility Goal 1, PT) bed mobility activities, all  -     [place] Level/Cues Needed (Bed Mobility Goal 1, PT) standby assist  -     Time Frame (Bed Mobility Goal 1, PT) 10 days  -     Progress/Outcomes (Bed Mobility Goal 1, PT) new goal  -[facility] Name 04/09/23 1300          Transfer Goal 1 (PT)    Activity/Assistive Device (Transfer Goal 1, PT) sit-to-stand/stand-to-sit  -LH     [place] Level/Cues Needed (Transfer Goal 1, PT) standby assist  -     Time Frame (Transfer Goal 1, PT) 10 days  -LH     Progress/Outcome (Transfer Goal 1, PT) new goal  -[facility] Name 04/09/23 1300          Gait Training Goal 1 (PT)    Activity/Assistive Device (Gait Training Goal 1, PT) gait (walking locomotion);decrease fall risk  -     [place] Level (Gait Training Goal 1, PT) contact guard required  -     Distance (Gait Training Goal 1, PT) 50ft  -     Time Frame (Gait Training Goal 1, PT) 10 days  -LH     Progress/Outcome (Gait Training Goal 1, PT) new [facility]  -[facility] Name 04/09/23 1300          Therapy Assessment/Plan (PT)    Planned Therapy Interventions (PT) balance training;bed mobility training;gait training;home exercise program;strengthening;postural re-education;patient/family education;neuromuscular re-education;transfer training  -           User Key  (r) = Recorded By, (t) = Taken By, (c) = Cosigned By    Initials Name Provider Type     Eliud Delatorre, PT Physical Therapist               Clinical Impression     [facility] Name 04/09/23 1300          Pain    Pretreatment Pain Rating 0/10 - no pain  -     Posttreatment Pain Rating 0/10 - no pain  -     Pain Intervention(s) Medication (See MAR)  -[facility] Name 04/09/23 1300          Plan of  Care Review    Plan of Care Reviewed With patient  -     Outcome Evaluation PT IE complete.  A&Ox4.  No c/o pain.  Pt independent PLOF.  Reports he had double vision even prior to stroke and he closes L eye to prevent double vision.  Today he is mod A x1-2 for mobility.  Pt w/ impaired R hip strength and impaired RLE coordination.  Lateral and diagonal weight shifting performed at bedside.  Unable to safely leave bedside due to R knee buckling and decreased coordination.  PT to see for gait safety and neuromuscular retraining.  Recommend acute rehab at [facility].  Thank you for referral.  -     Row Name 04/09/23 1300          Therapy Assessment/Plan (PT)    Patient/Family Therapy Goals Statement (PT) return home, return of function  -     Rehab Potential (PT) good, to achieve stated therapy goals  -     Criteria for Skilled Interventions Met (PT) yes;skilled treatment is necessary  -     Therapy Frequency (PT) 2 times/day  -     Predicted Duration of Therapy Intervention (PT) until OH  -     Row Name 04/09/23 1300          Positioning and Restraints    Pre-Treatment Position in bed  -     Post Treatment Position bed  -     In Bed fowlers;call light within reach;side rails up x2;SCD pump applied;patient within staff view;with family/caregiver  -           User Key  (r) = Recorded By, (t) = Taken By, (c) = Cosigned By    Initials Name Provider Type     Eliud Delatorre, PT Physical Therapist               Outcome Measures     Row Name 04/09/23 1406 04/09/23 1200       How much help from another person do you currently need...    Turning from your back to your side while in flat bed without using bedrails? 4  - 3  -ED    Moving from lying on back to sitting on the side of a flat bed without bedrails? 2  - 3  -ED    Moving to and from a bed to a chair (including a wheelchair)? 2  - 3  -ED    Standing up from a chair using your arms (e.g., wheelchair, bedside chair)? 2  - 3  -ED    Climbing 3-5 steps  with a railing? 1  - 3  -ED    To walk in hospital room? 1  - 3  -ED    AM-PAC 6 Clicks Score (PT) 12  - 18  -ED    Highest level of mobility 4 --> Transferred to chair/commode  - 6 --> Walked 10 steps or more  -ED    Row Name 04/09/23 1406 04/09/23 1305       Modified [place] Scale    Pre-Stroke Modified [place] Scale 0 - No Symptoms at all.  - 0 - No Symptoms at all.  -    Modified Migel Scale 4 - Moderately severe disability.  Unable to walk without assistance, and unable to attend to own bodily needs without assistance.  -LH 4 - Moderately severe disability.  Unable to walk without assistance, and unable to attend to own bodily needs without assistance.  -    Row Name 04/09/23 1406 04/09/23 1305       Functional Assessment    Outcome Measure Options AM-PAC 6 Clicks Basic Mobility (PT);Modified Migel  - AM-PAC 6 Clicks Daily Activity (OT);Modified [facility]          User Key  (r) = Recorded By, (t) = Taken By, (c) = Cosigned By    Initials Name Provider Type     Eliud Delatorre, PT Physical Therapist    Lakshmi Red, OTR/L, CSRS Occupational Therapist    Halina Prado, RN Registered Nurse                             Physical Therapy Education     Title: PT OT SLP Therapies (In Progress)     Topic: Physical Therapy (Done)     Point: Mobility training (Done)     Learning Progress Summary           Patient Acceptance, E,D, DU,VU by  at 4/9/2023 1408    Comment: benefits of PT and POC, call for A OOB                   Point: Precautions (Done)     Learning Progress Summary           Patient Acceptance, E,D, DU,VU by  at 4/9/2023 1408    Comment: benefits of PT and POC, call for A OOB                               User Key     Initials Effective Dates Name Provider Type Discipline     02/03/23 -  Eliud Delatorre, PT Physical Therapist PT              PT Recommendation and Plan  Planned Therapy Interventions (PT): balance training, bed mobility training, gait training, home exercise  program, strengthening, postural re-education, patient/family education, neuromuscular re-education, transfer training  Plan of Care Reviewed With: patient  Outcome Evaluation: PT IE complete.  A&Ox4.  No c/o pain.  Pt independent PLOF.  Reports he had double vision even prior to stroke and he closes L eye to prevent double vision.  Today he is mod A x1-2 for mobility.  Pt w/ impaired R hip strength and impaired RLE coordination.  Lateral and diagonal weight shifting performed at bedside.  Unable to safely leave bedside due to R knee buckling and decreased coordination.  PT to see for gait safety and neuromuscular retraining.  Recommend acute rehab at [facility].  Thank you for referral.     Time Calculation:    PT Charges     Row Name 04/09/23 1409             Time Calculation    Start Time 1300  -      Stop Time 1345  -      Time Calculation (min) 45 min  -      PT Received On 04/09/23  -      PT Goal Re-Cert Due Date 04/19/23  -         Untimed Charges    PT Eval/Re-eval Minutes 45  -         Total Minutes    Untimed Charges Total Minutes 45  -       Total Minutes 45  -            User Key  (r) = Recorded By, (t) = Taken By, (c) = Cosigned By    Initials Name Provider Type     Eliud Delatorre, PT Physical Therapist              Therapy Charges for Today     Code Description Service Date Service Provider Modifiers Qty    [id number]  PT EVAL MOD COMPLEXITY 3 4/9/2023 Eliud Delatorre, PT GP 1          PT G-Codes  Outcome Measure Options: AM-PAC 6 Clicks Basic Mobility (PT), Modified Migel  AM-PAC 6 Clicks Score (PT): 12  AM-PAC 6 Clicks Score (OT): 14  Modified Migel Scale: 4 - Moderately severe disability.  Unable to walk without assistance, and unable to attend to own bodily needs without assistance.  PT Discharge Summary  Anticipated Discharge Disposition (PT): inpatient rehabilitation facility    Eliud Delatorre PT  4/9/2023

## 2023-04-09 NOTE — PLAN OF CARE
Goal Outcome Evaluation:         New admission this morning. Pt c/o numbness and tingling of right arm. Speech eval cleared patient for regular diet.

## 2023-04-09 NOTE — PLAN OF CARE
Goal Outcome Evaluation:  Plan of Care Reviewed With: patient, spouse           Outcome Evaluation: OT eval completed. Pt presents alert and oriented x4, resting comfortably in bed. He reports at baseline being independent with all adls and mobility. Today he demonstrates decrease R LE strength, R UE/LE ataxia, postural control, and balance. He required Mod A to come to sitting at EOB. Upon sitting up he demonstrates a strong R lateral lean that requires vcs and Mod A to correct. B UE strength 5/5 with intact sensation. With formal testing and during functional tasks demonstrates severe ataxia in his R UE and LE. Pt was able to complete sit <> stand t/f from EOB with Mod Ax2. Attempted to weightshift L/R but demonstrates R knee buckling, therefore further mobility held at this time. He would benefit from skilled OT services to address these deficits and assist with return to PLOF. Recommend d/c to acute IP rehab for continued skilled OT services.

## 2023-04-09 NOTE — H&P
Date of Admission: 4/9/2023  Primary Care Physician: Amaya Mix APRN        Subjective:    Chief complaint hemorrhagic stroke    HPI: 82-year-old gentleman woke up this morning with right upper and lower extremity numbness weakness and difficulty functioning.  He denies any headache no nausea no vomiting.  He came to the emergency room where CT scan demonstrated a left thalamic hemorrhagic stroke.  He is not on any blood thinners.    Review of Systems   Neurological: Positive for weakness.   All other systems reviewed and are negative.       Pertinent positives/negatives documented in HPI.  All other systems reviewed and negative.    Past Medical History:   Past Medical History:   Diagnosis Date   • Asthma    • Coronary artery disease    • Diabetes mellitus    • Elevated cholesterol    • HL (hearing loss) 2012   • Hyperlipidemia    • Hypertension    • Myocardial infarct     [facility] 2020   • Sleep apnea     cpap at    • Sleep apnea, obstructive        Past Surgical History:   Past Surgical History:   Procedure Laterality Date   • ADENOIDECTOMY  1947   • BACK SURGERY     • CARDIAC CATHETERIZATION      stents x 6   • CARDIAC SURGERY      CABG TRIPLE BYPASS 2012   • CATARACT EXTRACTION     • COLONOSCOPY     • COLONOSCOPY N/A 06/04/2021    Procedure: COLONOSCOPY WITH ANESTHESIA;  Surgeon: Zachariah Menjivar DO;  Location: [facility] ENDOSCOPY;  Service: Gastroenterology;  Laterality: N/A;  pre hx ulcerative colitis  post ulcerative colitis  dr collins gusman   • CORONARY ANGIOPLASTY WITH STENT PLACEMENT     • CORONARY ARTERY BYPASS GRAFT  December 2012   • CORONARY STENT PLACEMENT     • HIP SURGERY Right     total hip   • JOINT REPLACEMENT  2015    Right hip   • PENILE PROSTHESIS IMPLANT N/A 12/13/2021    Procedure: 3-PIECE INFLATABLE PENILE PROSTHESIS PLACEMENT;  Surgeon: Jose Tellez MD;  Location: [facility] OR;  Service: Urology;  Laterality: N/A;   • TONSILLECTOMY  1947       Family History: family  history includes Colon cancer in his brother.    Social History:  reports that he has never smoked. He has never used smokeless tobacco. He reports that he does not drink alcohol and does not use drugs.    Code Status: Full    Medications:  Medications Prior to Admission   Medication Sig Dispense Refill Last Dose   • aspirin (aspirin) 81 MG EC tablet Take 1 tablet by mouth Daily. 30 tablet 11 4/9/2023   • Continuous Blood Gluc Sensor (Dexcom G6 Sensor) Every 10 (Ten) Days. This should be changed out 3 each 11 4/9/2023   • Continuous Blood Gluc Transmit (Dexcom G6 Transmitter) misc 1 Device Every 3 (Three) Months. As directed 1 each 3 4/9/2023   • Dupilumab 300 MG/2ML solution prefilled syringe Inject  under the skin into the appropriate area as directed Every 14 (Fourteen) Days.   4/9/2023   • Ferrous Sulfate (IRON PO) Take 65 mg by mouth Daily.   4/9/2023   • finasteride (PROSCAR) 5 MG tablet Take 1 tablet by mouth once daily 90 tablet 0 4/9/2023   • fluticasone (CUTIVATE) 0.005 % ointment Apply 1 application topically to the appropriate area as directed 2 (Two) Times a Day. 60 g 2 4/9/2023   • fluticasone (FLONASE) 50 MCG/ACT nasal spray 1 spray into the nostril(s) as directed by provider Daily. 16 g 5 4/9/2023   • Garlic 10 MG capsule Take 3,600 mg by mouth.   4/9/2023   • glucose blood test strip 1 each by Other route 3 (Three) Times a Day As Needed (hypoglycemia). Use as instructed One Touch Ultra 100 each 11 4/9/2023   • insulin aspart (NovoLOG FlexPen) 100 UNIT/ML solution pen-injector sc pen Inject 5 Units under the skin into the appropriate area as directed 3 (Three) Times a Day With Meals. 5 pen 2 4/9/2023   • Insulin Glargine (Lantus SoloStar) 100 UNIT/ML injection pen Begin taking 15 units at bedtime, increase by one unit every night that fasting blood sugar is above 130. 15 mL 0 4/9/2023   • levalbuterol (Xopenex HFA) 45 MCG/ACT inhaler Inhale 1-2 puffs Every 4 (Four) Hours As Needed for Wheezing or  Shortness of Air. 15 g 11 4/9/2023   • lisinopril (PRINIVIL,ZESTRIL) 20 MG tablet Take 1 tablet by mouth Daily. In the AM for blood pressure 30 tablet 2 4/9/2023   • metoprolol tartrate (LOPRESSOR) 25 MG tablet Take 1 tablet by mouth 2 (Two) Times a Day. 180 tablet 3 4/9/2023   • Pediatric Multiple Vit-C-FA (CHILDRENS CHEWABLE MULTI VITS PO) Take  by mouth.   4/9/2023   • rosuvastatin (CRESTOR) 20 MG tablet Take 1 tablet by mouth Every Night. 90 tablet 3 4/8/2023 at 2100   • Ustekinumab (STELARA) 90 MG/ML solution prefilled syringe Injection Inject 90 mg under the skin into the appropriate area as directed Every 28 (Twenty-Eight) Days. (Patient taking differently: Inject 90 mg under the skin into the appropriate area as directed Take As Directed. EVERY 4 WEEKS) 1 mL 6 4/8/2023 at 0900   • vitamin B-12 (CYANOCOBALAMIN) 1000 MCG tablet Take 1 tablet by mouth Daily.   4/9/2023   • Vitamin D, Cholecalciferol, 25 MCG (1000 UT) capsule Take  by mouth.   4/9/2023   • meclizine (ANTIVERT) 12.5 MG tablet Take 1 tablet by mouth 3 (Three) Times a Day As Needed for Dizziness. 30 tablet 0 More than a month   • Mesalamine 4 GM/60ML SF enema Insert  into the rectum Daily.   More than a month   • metFORMIN ER (GLUCOPHAGE-XR) 500 MG 24 hr tablet Take 1 tablet by mouth Daily With Breakfast for 30 days. 30 tablet 2    • nitroglycerin (NITROSTAT) 0.4 MG SL tablet 1 under the tongue as needed for angina, may repeat q5mins for up three doses 25 tablet 1 More than a month   • triamcinolone (KENALOG) 0.1 % cream Apply  topically to the appropriate area as directed 2 (Two) Times a Day.   4/6/2023 at 2100       Allergies:  Allergies   Allergen Reactions   • Albuterol Other (See Comments)   • Adhesive Tape Rash   • Azithromycin Rash   • Fentanyl Nausea And Vomiting       Objective:  Physical Exam  Eyes:      Extraocular Movements: EOM normal.      Pupils: Pupils are equal, round, and reactive to light.   Cardiovascular:      Rate and Rhythm:  Normal rate and regular rhythm.   Pulmonary:      Effort: No respiratory distress.      Breath sounds: Normal breath sounds.   Abdominal:      General: There is no distension.      Palpations: Abdomen is soft.   Neurological:      Mental Status: He is oriented to person, place, and time.      Coordination: Finger-Nose-Finger Test normal.   Psychiatric:         Speech: Speech normal.         Neurologic Exam     Mental Status   Oriented to person, place, and time.   Attention: normal.   Speech: speech is normal   Level of consciousness: alert  Knowledge: good.     Cranial Nerves     CN II   Visual fields full to confrontation.     CN III, IV, VI   Pupils are equal, round, and reactive to light.  Extraocular motions are normal.     CN V   Facial sensation intact.     CN VII   Facial expression full, symmetric.     CN VIII   CN VIII normal.     CN IX, X   CN IX normal.   CN X normal.     CN XI   CN XI normal.     CN XII   CN XII normal.     Motor Exam   Muscle bulk: normal  Overall muscle tone: normal  Right arm pronator drift: absent  Left arm pronator drift: absent    Strength   Strength 5/5 except as noted. Right upper and lower extremity strength is 5 out of 5 essentially in all flexion-extension movements but he has ataxic movements in the arm and the leg difficulty with rapid alternating movements also.     Sensory Exam   Light touch normal.   Pinprick normal.     Gait, Coordination, and Reflexes     Coordination   Finger to nose coordination: normal    Tremor   Resting tremor: absent  Intention tremor: absent  Action tremor: absent    Reflexes   Reflexes 2+ except as noted.       Vital Signs  Temp:  [97.7 °F (36.5 °C)-98.6 °F (37 °C)] 98.6 °F (37 °C)  Heart Rate:  [57-70] 64  Resp:  [20] 20  BP: (126-168)/(66-88) 141/85        Results Review:  I reviewed the patient's new clinical results.  I reviewed the patient's new imaging results and agree with the interpretation.  I reviewed the patient's other test results  and agree with the interpretation         Lab Results (last 24 hours)     Procedure Component Value Units Date/Time    [place] Draw [[id number]] Collected: 04/09/23 0835    Specimen: Blood Updated: 04/09/23 1104    Narrative:      The following orders were created for panel order [place] Draw.  Procedure                               Abnormality         Status                     ---------                               -----------         ------                     Green Top (Gel)[[id number]]                                  Final result               Lavender Top[[id number]]                                     Final result               Red Top[[id number]]                                                                     Light Blue Top[[id number]]                                   Final result                 Please view results for these tests on the individual orders.    Green Top (Gel) [[id number]] Collected: 04/09/23 0835    Specimen: Blood Updated: 04/09/23 0946     Extra Tube Hold for add-ons.     Comment: Auto resulted.       Light Blue Top [[id number]] Collected: 04/09/23 0835    Specimen: Blood Updated: 04/09/23 0946     Extra Tube Hold for add-ons.     Comment: Auto resulted       Lavender Top [[id number]] Collected: 04/09/23 0835    Specimen: Blood Updated: 04/09/23 0946     Extra Tube hold for add-on     Comment: Auto resulted       Comprehensive Metabolic Panel [[id number]]  (Abnormal) Collected: 04/09/23 0835    Specimen: Blood Updated: 04/09/23 0922     Glucose 156 mg/dL      BUN 16 mg/dL      Creatinine 0.84 mg/dL      Sodium 140 mmol/L      Potassium 4.2 mmol/L      Chloride 104 mmol/L      CO2 26.0 mmol/L      Calcium 9.1 mg/dL      Total Protein 7.3 g/dL      Albumin 4.1 g/dL      ALT (SGPT) 22 U/L      AST (SGOT) 22 U/L      Alkaline Phosphatase 72 U/L      Total Bilirubin 0.4 mg/dL      Globulin 3.2 gm/dL      A/G Ratio 1.3 g/dL      BUN/Creatinine Ratio 19.0     Anion Gap 10.0 mmol/L      eGFR 88.2  mL/min/1.73     Narrative:      GFR Normal >60  Chronic Kidney Disease <60  Kidney Failure <15    The GFR formula is only valid for adults with stable renal function between ages 18 and 70.    Protime-INR [[id number]]  (Normal) Collected: 04/09/23 0835    Specimen: Blood Updated: 04/09/23 0907     Protime 13.4 Seconds      INR 1.01    POC Glucose Once [[id number]]  (Abnormal) Collected: 04/09/23 0832    Specimen: Blood Updated: 04/09/23 0859     Glucose 154 mg/dL      Comment: : luisito AragonMeter ID: [id number]       CBC & Differential [[id number]]  (Abnormal) Collected: 04/09/23 0835    Specimen: Blood Updated: 04/09/23 0858    Narrative:      The following orders were created for panel order CBC & Differential.  Procedure                               Abnormality         Status                     ---------                               -----------         ------                     CBC Auto Differential[[id number]]        Abnormal            Final result                 Please view results for these tests on the individual orders.    CBC Auto Differential [[id number]]  (Abnormal) Collected: 04/09/23 0835    Specimen: Blood Updated: 04/09/23 0858     WBC 7.29 10*3/mm3      RBC 4.86 10*6/mm3      Hemoglobin 15.0 g/dL      Hematocrit 47.3 %      MCV 97.3 fL      MCH 30.9 pg      MCHC 31.7 g/dL      RDW 12.9 %      RDW-SD 46.6 fl      MPV 10.2 fL      Platelets 237 10*3/mm3      Neutrophil % 54.0 %      Lymphocyte % 20.9 %      Monocyte % 9.3 %      Eosinophil % 13.9 %      Basophil % 1.5 %      Immature Grans % 0.4 %      Neutrophils, Absolute 3.94 10*3/mm3      Lymphocytes, Absolute 1.52 10*3/mm3      Monocytes, Absolute 0.68 10*3/mm3      Eosinophils, Absolute 1.01 10*3/mm3      Basophils, Absolute 0.11 10*3/mm3      Immature Grans, Absolute 0.03 10*3/mm3      nRBC 0.0 /100 WBC           Imaging Results (Last 24 Hours)     Procedure Component Value Units Date/Time    XR Chest 1 View [[id number]] Collected:  04/09/23 0901     Updated: 04/09/23 0905    Narrative:      EXAMINATION: XR CHEST 1 VW-     4/9/2023 8:58 AM CDT     HISTORY: Stroke Protocol (Onset > 12 hrs). Right-sided weakness. Acute  stroke.     One view chest x-ray.     Comparison is made with 02/14/2023.     CABG changes.  Heart size is normal.  The mediastinum is within normal limits.      The lungs are normally expanded with no pneumonia or pneumothorax.  Mild chronic appearing interstitial disease with a few calcified  granulomas.  No congestive failure changes.                                                                       Impression:      1. No acute disease.           This report was finalized on 04/09/2023 09:01 by Dr. Russell Armijo MD.    CT Head Without Contrast [[id number]] Collected: 04/09/23 0844     Updated: 04/09/23 0850    Narrative:      EXAMINATION: CT HEAD WO CONTRAST-      4/9/2023 8:36 AM CDT     HISTORY: stroke eval; NIHSS 3, >4.5h onset. Right-sided weakness..  Kidney disease and coronary artery disease. Hypertension.     In order to have a CT radiation dose as low as reasonably achievable  Automated Exposure Control was utilized for adjustment of the mA and/or  KV according to patient size.     DLP in mGycm= 768.     Noncontrast head CT.  Axial, sagittal, and coronal sequences.     17 x 16 mm acute hemorrhage within the left thalamus.  Likely hypertensive type hemorrhage.     Normal ventricle size.     No significant mass effect.     The paranasal sinuses.  Normal skull.     Summary:  1. 17 mm acute hemorrhage within the left thalamus. Hypertensive  hemorrhage likely.  2. Otherwise normal appearance of the brain and normal ventricle size.                                   This report was finalized on 04/09/2023 08:47 by Dr. Russell Armijo MD.                 Assessment/Plan:   Patient with right upper and lower ataxia in the left thalamic hemorrhagic stroke.  Has been admitted to the intensive care unit.  We will maintain blood  pressures less than 140.  We will repeat a CT scan a little later today.  We will mobilize him with physical therapy and Occupational Therapy and have him evaluated by speech therapy.    Active Hospital Problems    Diagnosis    • **Intraparenchymal hemorrhage of brain        I discussed the patient's findings and my recommendations with patient and family    Jonny Lazo MD  04/09/23  12:37 CDT    Time: More than 50% of time spent in counseling and coordination of care:  Total face-to-face/floor time 45 min.  Time spent in counseling 25 min. Counseling included the following topics: Diagnosis, condition, treatment, plan

## 2023-04-09 NOTE — PLAN OF CARE
Goal Outcome Evaluation:  Plan of Care Reviewed With: patient, spouse, caregiver        Progress: improving       SPEECH-LANGUAGE PATHOLOGY EVALUATION - SWALLOW  Subjective: The patient was seen on this date for a Clinical Swallow evaluation.  Patient was alert and cooperative.  Significant history: admit with slurred speech, right sided weakness.  Failed screening due to droop, droop observed by RN in ICU, however resolved when SLP testing.  Objective: Textures given included thin liquid, nectar thick liquid, honey thick liquid, puree consistency, and regular consistency.  Assessment: Difficulties were noted with none of the above consistencies.  Observations: No overt s/s of aspiration seen with full range of consistencies.  Good mastication, no oral residue.  Facial weakness appears resolved.   SLP Findings:  Patient presents with functional swallow, without esophageal component.   Recommendations: Diet Textures: thin liquid, regular consistency food.  Medications should be taken whole with thin liquids. May have water and ice between meals after oral care, under staff or family supervision and with the recommended strategies for safe swallowing.   Recommended Strategies: Upright for PO and small bites and sips. Oral care before breakfast, after all meals and PRN.  Other Recommended Evaluations: Speech-Language Evaluation    Dysphagia therapy is not recommended.   DUSTIN Caballero/SLP, CNT 4/9/2023 10:51 CDT

## 2023-04-09 NOTE — PLAN OF CARE
Problem: Adult Inpatient Plan of Care  Goal: Plan of Care Review  Recent Flowsheet Documentation  Taken 4/9/2023 1300 by Eliud Delatorre, PT  Plan of Care Reviewed With: patient  Outcome Evaluation: PT IE complete.  A&Ox4.  No c/o pain.  Pt independent PLOF.  Reports he had double vision even prior to stroke and he closes L eye to prevent double vision.  Today he is mod A x1-2 for mobility.  Pt w/ impaired R hip strength and impaired RLE coordination.  Lateral and diagonal weight shifting performed at bedside.  Unable to safely leave bedside due to R knee buckling and decreased coordination.  PT to see for gait safety and neuromuscular retraining.  Recommend acute rehab at NH.  Thank you for referral.   Goal Outcome Evaluation:  Plan of Care Reviewed With: patient           Outcome Evaluation: PT IE complete.  A&Ox4.  No c/o pain.  Pt independent PLOF.  Reports he had double vision even prior to stroke and he closes L eye to prevent double vision.  Today he is mod A x1-2 for mobility.  Pt w/ impaired R hip strength and impaired RLE coordination.  Lateral and diagonal weight shifting performed at bedside.  Unable to safely leave bedside due to R knee buckling and decreased coordination.  PT to see for gait safety and neuromuscular retraining.  Recommend acute rehab at NH.  Thank you for referral.

## 2023-04-09 NOTE — THERAPY EVALUATION
Patient Name: Zachariah Acosta  : 1940    MRN: [id number]                              Today's Date: 2023       Admit Date: 2023    Visit Dx:     ICD-10-CM ICD-9-CM   1. Oropharyngeal dysphagia  R13.12 787.22   2. Intraparenchymal hemorrhage of brain  I61.9 431   3. Impaired mobility and ADLs  Z74.09 V49.89    Z78.9      Patient Active Problem List   Diagnosis   • BREANN on CPAP   • CAD (coronary artery disease)   • Chronic kidney disease (CKD), stage III (moderate)   • Disorder of lung   • Essential hypertension   • Mild intermittent asthma without complication   • S/P CABG (coronary artery bypass graft)   • Type 2 diabetes mellitus with hyperglycemia, with long-term current use of insulin (HCC)   • Ulcerative colitis   • Obesity (BMI 30-39.9)   • Non-seasonal allergic rhinitis   • SOB (shortness of breath)   • Pulmonary scarring   • Pulmonary nodule seen on imaging study   • Mixed hyperlipidemia   • Nonsmoker   • Ulcerative colitis with complication   • Intractable abdominal pain   • Constipation   • KE (acute kidney injury)   • Hematuria   • LLQ pain   • Erectile dysfunction   • Class 1 obesity due to excess calories with serious comorbidity and body mass index (BMI) of 34.0 to 34.9 in adult   • Thyroid nodule   • Double vision   • Elevated PSA   • Iron deficiency anemia due to chronic blood loss   • Long-term use of immunosuppressant medication   • Nontraumatic complete tear of left rotator cuff   • Rosacea   • Intraparenchymal hemorrhage of brain     Past Medical History:   Diagnosis Date   • Asthma    • Coronary artery disease    • Diabetes mellitus    • Elevated cholesterol    • HL (hearing loss)    • Hyperlipidemia    • Hypertension    • Myocardial infarct     2020   • Sleep apnea     cpap at    • Sleep apnea, obstructive      Past Surgical History:   Procedure Laterality Date   • ADENOIDECTOMY     • BACK SURGERY     • CARDIAC CATHETERIZATION      stents x 6   • CARDIAC  SURGERY      CABG TRIPLE BYPASS 2012   • CATARACT EXTRACTION     • COLONOSCOPY     • COLONOSCOPY N/A 06/04/2021    Procedure: COLONOSCOPY WITH ANESTHESIA;  Surgeon: Zachariah Menjivar DO;  Location:  [facility];  Service: Gastroenterology;  Laterality: N/A;  pre hx ulcerative colitis  post ulcerative colitis  dr collins gusman   • CORONARY ANGIOPLASTY WITH STENT PLACEMENT     • CORONARY ARTERY BYPASS GRAFT  December 2012   • CORONARY STENT PLACEMENT     • HIP SURGERY Right     total hip   • JOINT REPLACEMENT  2015    Right hip   • PENILE PROSTHESIS IMPLANT N/A 12/13/2021    Procedure: 3-PIECE INFLATABLE PENILE PROSTHESIS PLACEMENT;  Surgeon: Jose Tellez MD;  Location: [facility] OR;  Service: Urology;  Laterality: N/A;   • TONSILLECTOMY  1947      General Information     Row Name 04/09/23 1305          OT Time and Intention    Document Type evaluation  dx: left thalamic hemorrhagic stroke  -     Mode of Treatment occupational therapy  -     Row Name 04/09/23 1305          General Information    Patient Profile Reviewed yes  -     Prior Level of Function independent:;all household mobility;transfer;ADL's;bed mobility  -     Existing Precautions/Restrictions fall;oxygen therapy device and L/min  -     Barriers to Rehab medically complex  -     Row Name 04/09/23 1305          Occupational Profile    Environmental Supports and Barriers (Occupational Profile) rwx, bsc, tub shower with shower chair.  -     Row Name 04/09/23 1305          Living Environment    People in Home spouse  -     Row Name 04/09/23 1305          Home Main Entrance    Number of Stairs, Main Entrance none  -     Row Name 04/09/23 1305          Stairs Within Home, Primary    Number of Stairs, Within Home, Primary none  -     Row Name 04/09/23 1305          Cognition    Orientation Status (Cognition) oriented x 4  -     Row Name 04/09/23 1305          Safety Issues, Functional Mobility    Impairments Affecting Function  (Mobility) balance;endurance/activity tolerance;motor control;coordination;sensation/sensory awareness  -           User Key  (r) = Recorded By, (t) = Taken By, (c) = Cosigned By    Initials Name Provider Type    Lakshmi Red OTR/L, ROBLES Occupational Therapist                 Mobility/ADL's     Row Name 04/09/23 1305          Bed Mobility    Bed Mobility supine-sit  -     Supine-Sit [place] (Bed Mobility) moderate assist (50% patient effort)  -     Assistive Device (Bed Mobility) head of bed elevated;bed rails  -[facility] Name 04/09/23 1305          Transfers    Transfers sit-stand transfer;stand-sit transfer  -[facility] Name 04/09/23 1305          Sit-Stand Transfer    Sit-Stand [place] (Transfers) moderate assist (50% patient effort);2 person assist  -[facility] Name 04/09/23 1305          Stand-Sit Transfer    Stand-Sit [place] (Transfers) moderate assist (50% patient effort);2 person assist  -[facility] Name 04/09/23 1305          Activities of Daily Living    BADL Assessment/Intervention lower body dressing  -[facility] Name 04/09/23 1305          Lower Body Dressing Assessment/Training    [place] Level (Lower Body Dressing) don;doff;socks  -     Position (Lower Body Dressing) edge of bed sitting  -           User Key  (r) = Recorded By, (t) = Taken By, (c) = Cosigned By    Initials Name Provider Type    Lakshmi Estevez OTR/L, ROBLES Occupational Therapist               Obj/Interventions     Row Name 04/09/23 1305          Sensory Assessment (Somatosensory)    Sensory Assessment (Somatosensory) UE sensation intact  -[facility] Name 04/09/23 1305          Vision Assessment/Intervention    Visual Impairment/Limitations WFL;diplopia  -     Vision Assessment Comment he has diplopia at baseline.  -     Row Name 04/09/23 1305          Range of Motion Comprehensive    General Range of Motion bilateral upper extremity ROM WFL  -     Row Name 04/09/23 1305           Strength Comprehensive (MMT)    Comment, General Manual Muscle Testing (MMT) Assessment B UE strength 5/5  -     Row Name 04/09/23 1305          Motor Skills    Motor Skills coordination  -     Coordination gross motor deficit;right;bilateral;upper extremity;lower extremity;finger to nose;dysmetria;moderate impairment;severe impairment;ataxia  -J     Row Name 04/09/23 1305          Balance    Balance Assessment sitting static balance;sitting dynamic balance;standing static balance  -JJ     Static Sitting Balance moderate assist;minimal assist  -JJ     Dynamic Sitting Balance moderate assist  -JJ     Position, Sitting Balance supported;sitting edge of bed  -JJ     Static Standing Balance moderate assist;2-person assist  -JJ           User Key  (r) = Recorded By, (t) = Taken By, (c) = Cosigned By    Initials Name Provider Type    Lakshmi Red, OTR/L, CSRS Occupational Therapist               Goals/Plan     Row Name 04/09/23 1305          Transfer Goal 1 (OT)    Activity/Assistive Device (Transfer Goal 1, OT) commode  -JJ     [place] Level/Cues Needed (Transfer Goal 1, OT) minimum assist (75% or more patient effort)  -JJ     Time Frame (Transfer Goal 1, OT) long term goal (LTG);by discharge  -JJ     Progress/Outcome (Transfer Goal 1, OT) new goal  -     Row Name 04/09/23 1305          Dressing Goal 1 (OT)    Activity/Device (Dressing Goal 1, OT) lower body dressing  -JJ     [place]/Cues Needed (Dressing Goal 1, OT) minimum assist (75% or more patient effort)  -JJ     Time Frame (Dressing Goal 1, OT) long term goal (LTG);by discharge  -JJ     Progress/Outcome (Dressing Goal 1, OT) new goal  -JJ     Row Name 04/09/23 1305          Toileting Goal 1 (OT)    Activity/Device (Toileting Goal 1, OT) toileting skills, all  -JJ     [place] Level/Cues Needed (Toileting Goal 1, OT) minimum assist (75% or more patient effort)  -JJ     Time Frame (Toileting Goal 1, OT) long term goal (LTG);by discharge   -     Progress/Outcome (Toileting Goal 1, OT) new goal  -     Row Name 04/09/23 1305          Problem Specific Goal 1 (OT)    Problem Specific Goal 1 (OT) Pt will complete R UE GMC task in standing with Min A or less to maintain balance and 80% accuracy during functional task to improve balance and coordination in preperation of higher level adl independence.  -     Time Frame (Problem Specific Goal 1, OT) long term goal (LTG);by discharge  -     Progress/Outcome (Problem Specific Goal 1, OT) new goal  -     Row Name 04/09/23 1305          Therapy Assessment/Plan (OT)    Planned Therapy Interventions (OT) activity tolerance training;adaptive equipment training;BADL retraining;functional balance retraining;neuromuscular control/coordination retraining;occupation/activity based interventions;patient/caregiver education/training;transfer/mobility retraining  -           User Key  (r) = Recorded By, (t) = Taken By, (c) = Cosigned By    Initials Name Provider Type    Lakshmi Red, OTR/L, CSRS Occupational Therapist               Clinical Impression     Row Name 04/09/23 1305          Pain Assessment    Pretreatment Pain Rating 0/10 - no pain  -     Posttreatment Pain Rating 0/10 - no pain  -     Pain Intervention(s) Medication (See MAR);Repositioned;Ambulation/increased activity  -     Row Name 04/09/23 1309          Plan of Care Review    Plan of Care Reviewed With patient;spouse  -     Outcome Evaluation OT eval completed. Pt presents alert and oriented x4, resting comfortably in bed. He reports at baseline being independent with all adls and mobility. Today he demonstrates decrease R LE strength, R UE/LE ataxia, postural control, and balance. He required Mod A to come to sitting at EOB. Upon sitting up he demonstrates a strong R lateral lean that requires vcs and Mod A to correct. B UE strength 5/5 with intact sensation. With formal testing and during functional tasks demonstrates severe  ataxia in his R UE and LE. Pt was able to complete sit <> stand t/f from EOB with Mod Ax2. Attempted to weightshift L/R but demonstrates R knee buckling, therefore further mobility held at this time. He would benefit from skilled OT services to address these deficits and assist with return to PLOF. Recommend d/c to acute IP rehab for continued skilled OT services.  -     Row Name 04/09/23 1305          Therapy Assessment/Plan (OT)    Rehab Potential (OT) good, to achieve stated therapy goals  -     Criteria for Skilled Therapeutic Interventions Met (OT) yes;skilled treatment is necessary  -     Therapy Frequency (OT) evaluation only  -J     Row Name 04/09/23 1305          Therapy Plan Review/Discharge Plan (OT)    Anticipated Discharge Disposition (OT) inpatient rehabilitation facility  -     Row Name 04/09/23 1305          Positioning and Restraints    Pre-Treatment Position in bed  -JJ     Post Treatment Position bed  -JJ     In Bed notified nsg;fowlers;encouraged to call for assist;call light within reach;side rails up x3;patient within staff view;with family/caregiver;SCD pump applied  -           User Key  (r) = Recorded By, (t) = Taken By, (c) = Cosigned By    Initials Name Provider Type    Lakshmi Red, TEX/L, CSRS Occupational Therapist               Outcome Measures     Row Name 04/09/23 1305          How much help from another is currently needed...    Putting on and taking off regular lower body clothing? 2  -JJ     Bathing (including washing, rinsing, and drying) 2  -JJ     Toileting (which includes using toilet bed pan or urinal) 2  -JJ     Putting on and taking off regular upper body clothing 2  -JJ     Taking care of personal grooming (such as brushing teeth) 3  -JJ     Eating meals 3  -JJ     AM-PAC 6 Clicks Score (OT) 14  -JJ     Row Name 04/09/23 1200          How much help from another person do you currently need...    Turning from your back to your side while in flat bed  without using bedrails? 3  -ED     Moving from lying on back to sitting on the side of a flat bed without bedrails? 3  -ED     Moving to and from a bed to a chair (including a wheelchair)? 3  -ED     Standing up from a chair using your arms (e.g., wheelchair, bedside chair)? 3  -ED     Climbing 3-5 steps with a railing? 3  -ED     To walk in hospital room? 3  -ED     AM-PAC 6 Clicks Score (PT) 18  -ED     Highest level of mobility 6 --> Walked 10 steps or more  -     Row Name 04/09/23 1305          Modified [place] Scale    Pre-Stroke Modified [place] Scale 0 - No Symptoms at all.  -     Modified [place] Scale 4 - Moderately severe disability.  Unable to walk without assistance, and unable to attend to own bodily needs without assistance.  -     Row Name 04/09/23 1305          Functional Assessment    Outcome Measure Options AM-PAC 6 Clicks Daily Activity (OT);Modified [place]  -           User Key  (r) = Recorded By, (t) = Taken By, (c) = Cosigned By    Initials Name Provider Type    Lakshmi Red, OTR/L, CSRS Occupational Therapist    ED Halina eNgron, RN Registered Nurse                Occupational Therapy Education     Title: PT OT SLP Therapies (In Progress)     Topic: Occupational Therapy (In Progress)     Point: ADL training (Done)     Description:   Instruct learner(s) on proper safety adaptation and remediation techniques during self care or transfers.   Instruct in proper use of assistive devices.              Learning Progress Summary           Patient Acceptance, E, VU by SIERRA at 4/9/2023 8132                   Point: Home exercise program (Not Started)     Description:   Instruct learner(s) on appropriate technique for monitoring, assisting and/or progressing therapeutic exercises/activities.              Learner Progress:  Not documented in this visit.          Point: Precautions (Done)     Description:   Instruct learner(s) on prescribed precautions during self-care and functional  transfers.              Learning Progress Summary           Patient Acceptance, ELIJAH, VU by SIERRA at 4/9/2023 1402                   Point: Body mechanics (Not Started)     Description:   Instruct learner(s) on proper positioning and spine alignment during self-care, functional mobility activities and/or exercises.              Learner Progress:  Not documented in this visit.                      User Key     Initials Effective Dates Name Provider Type Discipline    SIERRA 11/10/21 -  Lakshmi Chino, OTR/L, CSRS Occupational Therapist OT              OT Recommendation and Plan  Planned Therapy Interventions (OT): activity tolerance training, adaptive equipment training, BADL retraining, functional balance retraining, neuromuscular control/coordination retraining, occupation/activity based interventions, patient/caregiver education/training, transfer/mobility retraining  Therapy Frequency (OT): evaluation only  Plan of Care Review  Plan of Care Reviewed With: patient, spouse  Outcome Evaluation: OT eval completed. Pt presents alert and oriented x4, resting comfortably in bed. He reports at baseline being independent with all adls and mobility. Today he demonstrates decrease R LE strength, R UE/LE ataxia, postural control, and balance. He required Mod A to come to sitting at EOB. Upon sitting up he demonstrates a strong R lateral lean that requires vcs and Mod A to correct. B UE strength 5/5 with intact sensation. With formal testing and during functional tasks demonstrates severe ataxia in his R UE and LE. Pt was able to complete sit <> stand t/f from EOB with Mod Ax2. Attempted to weightshift L/R but demonstrates R knee buckling, therefore further mobility held at this time. He would benefit from skilled OT services to address these deficits and assist with return to PLOF. Recommend d/c to acute IP rehab for continued skilled OT services.     Time Calculation:    Time Calculation- OT     Row Name 04/09/23 2760              Time Calculation- OT    OT Start Time 1305  -      OT Stop Time 1345  -      OT Time Calculation (min) 40 min  -      OT Received On 04/09/23  -      OT Goal Re-Cert Due Date 04/19/23  -            User Key  (r) = Recorded By, (t) = Taken By, (c) = Cosigned By    Initials Name Provider Type    Lakshmi Red OTR/L, CSRS Occupational Therapist              Therapy Charges for Today     Code Description Service Date Service Provider Modifiers Qty    20093664301 HC OT EVAL MOD COMPLEXITY 3 4/9/2023 Lakshmi Chino OTR/L, CSRS GO 1               TEX Martini/L, CSRS  4/9/2023

## 2023-04-10 DIAGNOSIS — I10 ESSENTIAL HYPERTENSION: ICD-10-CM

## 2023-04-10 LAB
CHOLEST SERPL-MCNC: 92 MG/DL (ref 0–200)
GLUCOSE BLDC GLUCOMTR-MCNC: 151 MG/DL (ref 70–130)
GLUCOSE BLDC GLUCOMTR-MCNC: 151 MG/DL (ref 70–130)
GLUCOSE BLDC GLUCOMTR-MCNC: 181 MG/DL (ref 70–130)
HBA1C MFR BLD: 7.3 % (ref 4.8–5.6)
HDLC SERPL-MCNC: 31 MG/DL (ref 40–60)
LDLC SERPL CALC-MCNC: 41 MG/DL (ref 0–100)
LDLC/HDLC SERPL: 1.26 {RATIO}
QT INTERVAL: 434 MS
QTC INTERVAL: 434 MS
TRIGL SERPL-MCNC: 109 MG/DL (ref 0–150)
VLDLC SERPL-MCNC: 20 MG/DL (ref 5–40)

## 2023-04-10 PROCEDURE — 97535 SELF CARE MNGMENT TRAINING: CPT

## 2023-04-10 PROCEDURE — 82962 GLUCOSE BLOOD TEST: CPT

## 2023-04-10 PROCEDURE — 63710000001 INSULIN DETEMIR PER 5 UNITS: Performed by: NURSE PRACTITIONER

## 2023-04-10 PROCEDURE — 99232 SBSQ HOSP IP/OBS MODERATE 35: CPT | Performed by: NURSE PRACTITIONER

## 2023-04-10 PROCEDURE — 97530 THERAPEUTIC ACTIVITIES: CPT

## 2023-04-10 PROCEDURE — 36415 COLL VENOUS BLD VENIPUNCTURE: CPT | Performed by: NEUROLOGICAL SURGERY

## 2023-04-10 PROCEDURE — 92523 SPEECH SOUND LANG COMPREHEN: CPT | Performed by: SPEECH-LANGUAGE PATHOLOGIST

## 2023-04-10 PROCEDURE — 83036 HEMOGLOBIN GLYCOSYLATED A1C: CPT | Performed by: NEUROLOGICAL SURGERY

## 2023-04-10 PROCEDURE — 80061 LIPID PANEL: CPT | Performed by: NEUROLOGICAL SURGERY

## 2023-04-10 RX ORDER — ROSUVASTATIN CALCIUM 20 MG/1
20 TABLET, COATED ORAL NIGHTLY
Status: DISCONTINUED | OUTPATIENT
Start: 2023-04-10 | End: 2023-04-12 | Stop reason: HOSPADM

## 2023-04-10 RX ORDER — LISINOPRIL 20 MG/1
20 TABLET ORAL DAILY
Status: DISCONTINUED | OUTPATIENT
Start: 2023-04-11 | End: 2023-04-12 | Stop reason: HOSPADM

## 2023-04-10 RX ORDER — DEXTROSE MONOHYDRATE 25 G/50ML
25 INJECTION, SOLUTION INTRAVENOUS
Status: DISCONTINUED | OUTPATIENT
Start: 2023-04-10 | End: 2023-04-12 | Stop reason: HOSPADM

## 2023-04-10 RX ORDER — FINASTERIDE 5 MG/1
5 TABLET, FILM COATED ORAL DAILY
Status: DISCONTINUED | OUTPATIENT
Start: 2023-04-11 | End: 2023-04-12 | Stop reason: HOSPADM

## 2023-04-10 RX ORDER — METFORMIN HYDROCHLORIDE 500 MG/1
500 TABLET, EXTENDED RELEASE ORAL
Status: DISCONTINUED | OUTPATIENT
Start: 2023-04-11 | End: 2023-04-12 | Stop reason: HOSPADM

## 2023-04-10 RX ORDER — INSULIN LISPRO 100 [IU]/ML
0-9 INJECTION, SOLUTION INTRAVENOUS; SUBCUTANEOUS
Status: DISCONTINUED | OUTPATIENT
Start: 2023-04-11 | End: 2023-04-12 | Stop reason: HOSPADM

## 2023-04-10 RX ORDER — LISINOPRIL 20 MG/1
20 TABLET ORAL DAILY
Qty: 90 TABLET | Refills: 1 | Status: SHIPPED | OUTPATIENT
Start: 2023-04-10

## 2023-04-10 RX ORDER — NICOTINE POLACRILEX 4 MG
15 LOZENGE BUCCAL
Status: DISCONTINUED | OUTPATIENT
Start: 2023-04-10 | End: 2023-04-12 | Stop reason: HOSPADM

## 2023-04-10 RX ORDER — LABETALOL HYDROCHLORIDE 5 MG/ML
10 INJECTION, SOLUTION INTRAVENOUS EVERY 4 HOURS PRN
Status: DISCONTINUED | OUTPATIENT
Start: 2023-04-10 | End: 2023-04-12 | Stop reason: HOSPADM

## 2023-04-10 RX ADMIN — Medication 1 APPLICATION: at 08:17

## 2023-04-10 RX ADMIN — METOPROLOL TARTRATE 25 MG: 25 TABLET, FILM COATED ORAL at 21:41

## 2023-04-10 RX ADMIN — INSULIN DETEMIR 15 UNITS: 100 INJECTION, SOLUTION SUBCUTANEOUS at 21:42

## 2023-04-10 RX ADMIN — ROSUVASTATIN CALCIUM 20 MG: 20 TABLET, FILM COATED ORAL at 21:41

## 2023-04-10 RX ADMIN — Medication 10 ML: at 08:17

## 2023-04-10 NOTE — THERAPY TREATMENT NOTE
Acute Care - Physical Therapy Treatment Note  [facility]     Patient Name: Zachariah Acosta  : 1940  MRN: [id number]  Today's Date: 4/10/2023      Visit Dx:     ICD-10-CM ICD-9-CM   1. Oropharyngeal dysphagia  R13.12 787.22   2. Intraparenchymal hemorrhage of brain  I61.9 431   3. Impaired mobility and ADLs  Z74.09 V49.89    Z78.9    4. Impaired mobility  Z74.09 799.89   5. Slurred speech  R47.81 784.59     Patient Active Problem List   Diagnosis   • BREANN on CPAP   • CAD (coronary artery disease)   • Chronic kidney disease (CKD), stage III (moderate)   • Disorder of lung   • Essential hypertension   • Mild intermittent asthma without complication   • S/P CABG (coronary artery bypass graft)   • Type 2 diabetes mellitus with hyperglycemia, with long-term current use of insulin (HCC)   • Ulcerative colitis   • Obesity (BMI 30-39.9)   • Non-seasonal allergic rhinitis   • SOB (shortness of breath)   • Pulmonary scarring   • Pulmonary nodule seen on imaging study   • Mixed hyperlipidemia   • Nonsmoker   • Ulcerative colitis with complication   • Intractable abdominal pain   • Constipation   • KE (acute kidney injury)   • Hematuria   • LLQ pain   • Erectile dysfunction   • Class 1 obesity due to excess calories with serious comorbidity and body mass index (BMI) of 34.0 to 34.9 in adult   • Thyroid nodule   • Double vision   • Elevated PSA   • Iron deficiency anemia due to chronic blood loss   • Long-term use of immunosuppressant medication   • Nontraumatic complete tear of left rotator cuff   • Rosacea   • Intraparenchymal hemorrhage of brain     Past Medical History:   Diagnosis Date   • Asthma    • Coronary artery disease    • Diabetes mellitus    • Elevated cholesterol    • HL (hearing loss)    • Hyperlipidemia    • Hypertension    • Myocardial infarct     2020   • Sleep apnea     cpap at    • Sleep apnea, obstructive      Past Surgical History:   Procedure Laterality Date   • ADENOIDECTOMY   1947   • BACK SURGERY     • CARDIAC CATHETERIZATION      stents x 6   • CARDIAC SURGERY      CABG TRIPLE BYPASS 2012   • CATARACT EXTRACTION     • COLONOSCOPY     • COLONOSCOPY N/A 06/04/2021    Procedure: COLONOSCOPY WITH ANESTHESIA;  Surgeon: Zachariah Menjivar DO;  Location: [facility] ENDOSCOPY;  Service: Gastroenterology;  Laterality: N/A;  pre hx ulcerative colitis  post ulcerative colitis  dr collins gusman   • CORONARY ANGIOPLASTY WITH STENT PLACEMENT     • CORONARY ARTERY BYPASS GRAFT  December 2012   • CORONARY STENT PLACEMENT     • HIP SURGERY Right     total hip   • JOINT REPLACEMENT  2015    Right hip   • PENILE PROSTHESIS IMPLANT N/A 12/13/2021    Procedure: 3-PIECE INFLATABLE PENILE PROSTHESIS PLACEMENT;  Surgeon: Jose Tellez MD;  Location: [facility] OR;  Service: Urology;  Laterality: N/A;   • TONSILLECTOMY  1947     PT Assessment (last 12 hours)     PT Evaluation and Treatment     Row Name 04/10/23 48          Physical Therapy Time and Intention    Subjective Information complains of;weakness  R side weakness  -     Document Type therapy note (daily note)  -     Mode of Treatment physical therapy  -     Patient Effort good  -     Comment keeps L eye shut to prevent diplopia(from before stroke, chronic)  -     Row Name 04/10/23 0748          General Information    Existing Precautions/Restrictions fall  R side weakness  -     Limitations/Impairments hearing  -     Barriers to Rehab medically complex;hearing deficit;visual deficit  -     Row Name 04/10/23 0748          Pain    Posttreatment Pain Rating 0/10 - no pain  -     Row Name 04/10/23 0748          Vision Assessment/Intervention    Visual Impairment/Limitations WFL;diplopia  chronic, before stroke  -     Row Name 04/10/23 0731          Bed Mobility    Rolling Right [place] (Bed Mobility) verbal cues;minimum assist (75% patient effort)  -     Sidelying-Sit [place] (Bed Mobility) verbal cues;moderate assist (50%  patient effort)  -     Comment, (Bed Mobility) HOB 20 degrees  -     Row Name 04/10/23 0748          Transfers    Comment, (Transfers) stood x7. Worked on pt. holding knee in extension.  -     Row Name 04/10/23 0748          Bed-Chair Transfer    Bed-Chair [place] (Transfers) verbal cues;moderate assist (50% patient effort)  -     Comment, (Bed-Chair Transfer) block R knee  -     Row Name 04/10/23 0748          Chair-Bed Transfer    Chair-Bed [place] (Transfers) verbal cues;moderate assist (50% patient effort)  -RINKU     Comment, (Chair-Bed Transfer) block R knee  -     Row Name 04/10/23 0748          Sit-Stand Transfer    Sit-Stand [place] (Transfers) minimum assist (75% patient effort)  -     Comment, (Sit-Stand Transfer) lateral and diagonal weight shifting. R knee flexed and bucked at times. PTA blocked during activity and transfer  -     Row Name 04/10/23 0748          Stand-Sit Transfer    Stand-Sit [place] (Transfers) verbal cues;minimum assist (75% patient effort)  -     Row Name 04/10/23 0748          Balance    Balance Assessment sitting static balance  -     Static Sitting Balance contact guard  -     Dynamic Sitting Balance contact guard;minimal assist  -     Position, Sitting Balance unsupported;sitting edge of bed  -     Static Standing Balance minimal assist;moderate assist  R knee blocked at times in static  -     Balance Interventions sitting;standing;dynamic;core stability exercise;dynamic reaching;weight shifting activity  -     Row Name 04/10/23 0748          Motor Skills    Therapeutic Exercise hip;knee;ankle  -[facility] Name 04/10/23 0748          Hip (Therapeutic Exercise)    Hip (Therapeutic Exercise) AROM (active range of motion)  -     Hip AROM (Therapeutic Exercise) bilateral;flexion;aBduction;aDduction  -     Row Name 04/10/23 0748          Knee (Therapeutic Exercise)    Knee (Therapeutic Exercise) AROM (active range of motion)  -      Knee AROM (Therapeutic Exercise) bilateral;LAQ (long arc quad)  -     Row Name 04/10/23 0748          Ankle (Therapeutic Exercise)    Ankle (Therapeutic Exercise) AROM (active range of motion)  -     Ankle AROM (Therapeutic Exercise) bilateral;dorsiflexion;plantarflexion  -     Row Name 04/10/23 0748          Positioning and Restraints    Pre-Treatment Position in bed  -     Post Treatment Position chair  -     In Chair sitting;call light within reach;encouraged to call for assist;with nsg  -           User Key  (r) = Recorded By, (t) = Taken By, (c) = Cosigned By    Initials Name Provider Type     Alba Jackson, KYLE Physical Therapist Assistant                Physical Therapy Education     Title: PT OT SLP Therapies (Done)     Topic: Physical Therapy (Done)     Point: Mobility training (Done)     Learning Progress Summary           Patient Acceptance, E,D, NR,VU by  at 4/10/2023 0843    Comment: bed mobility. Also weight shifting for transfers and gait    Acceptance, E,TB, VU by  at 4/10/2023 0518    Acceptance, E,D, DU,VU by  at 4/9/2023 1408    Comment: benefits of PT and POC, call for A OOB                   Point: Precautions (Done)     Learning Progress Summary           Patient Acceptance, E,TB, VU by  at 4/10/2023 0518    Acceptance, E,D, DU,VU by  at 4/9/2023 1408    Comment: benefits of PT and POC, call for A OOB                               User Key     Initials Effective Dates Name Provider Type Discipline     02/03/23 -  Eliud Delatorre, PT Physical Therapist PT    RINKU 02/03/23 -  Alba Jackson PTA Physical Therapist Assistant PT    CR 03/03/23 -  Faraz Caraballo, RN Registered Nurse Nurse              PT Recommendation and Plan             Time Calculation:    PT Charges     Row Name 04/10/23 0850             Time Calculation    Start Time 0748  -      Stop Time 0829  -      Time Calculation (min) 41 min  -         Timed Charges    67792 - PT Therapeutic  Activity Minutes 41  -RINKU         Total Minutes    Timed Charges Total Minutes 41  -RINKU       Total Minutes 41  -RINKU            User Key  (r) = Recorded By, (t) = Taken By, (c) = Cosigned By    Initials Name Provider Type    Alba Prince PTA Physical Therapist Assistant              Therapy Charges for Today     Code Description Service Date Service Provider Modifiers Qty    [id number]  PT THERAPEUTIC ACT EA 15 MIN 4/10/2023 Alba Jackson PTA GP 3          PT G-Codes  Outcome Measure Options: AM-PAC 6 Clicks Basic Mobility (PT), Modified Bowman  AM-PAC 6 Clicks Score (PT): 16  AM-PAC 6 Clicks Score (OT): 14  Modified Bowman Scale: 4 - Moderately severe disability.  Unable to walk without assistance, and unable to attend to own bodily needs without assistance.    Alba Jackson PTA  4/10/2023

## 2023-04-10 NOTE — PLAN OF CARE
Goal Outcome Evaluation:      Patient required min assist to roll to Right. MOd assist for side lying to sit. Able to sit EOB with CGA when static, CGA to min assist during dynamic reaching, Able to actively exercise B LE's. Worked thru trunk resistance ex's, reaching all directions and controlling leans with no support. Stood with MIn assist. R knee is flexed in static standing but requires blocking during dynamic activity. Practiced lateral and diagonal weight shifting With R knee blocked. Transfers bed to chair to bed x 2 with MOD assist and cues for weight shifting and R knee blocked. Patient not able to pick R foot up, only able to scoot. Patient is motivated and will benefit from strengthening, standing balance activities progressing towards gait.

## 2023-04-10 NOTE — PLAN OF CARE
Goal Outcome Evaluation:  Plan of Care Reviewed With: patient, spouse        Progress: improving  Outcome Evaluation: pt is aox4, NIH1-2, weakness to right side, pt reports double vision as baseline for several years, no complaints of pain, wif at bedside, safety maintained.

## 2023-04-10 NOTE — PLAN OF CARE
Goal Outcome Evaluation:                 Pt was upright in a chair after eating breakfast. He was alert and cooperative. SLP asked if he had any difficulty swallowing with breakfast, pt reported no difficulty and had eaten 100% of his breakfast. Pt was given an informal speech-language evaluation and scored within normal limits. Pt was able to perform expressive tasks such as automatic sequencing, naming, and phrase and sentence completion. His auditory comprehension was intact, as seen by his ability to follow directions and answer yes/no questions. Pt was oriented to his environment and the date. He also completed higher level cognitive tasks such as explaining multiple meaning words, problem solving, and analogies. Pt speech was intelligible, showing no characteristics of dysarthria, his face was symmetrical, but pt did keep his left eye closed for majority of the evaluation. Speech and language services are not recommended due to pt being within normal limits regarding communication. It is recommended that pt be discharged. Please alert SLP if there are any further concerns.     Completed by Lelo Serra SLP Student

## 2023-04-10 NOTE — PROGRESS NOTES
"Progress Note    Patient:  Zachariah Acosta  YOB: 1940  MRN: [id number]   Admit date: 4/9/2023   Admitting Physician: Jonny Lazo MD  Primary Care Physician: Amaya Mix APRN    Chief Complaint: Hemorrhagic CVA    Interval History: No events overnight, blood pressure has remained stable.  Denies any headaches.  Denies any nausea vomiting.  He is tolerating p.o.  Voiding spontaneously.  No seizures.    Intake/Output Summary (Last 24 hours) at 4/10/2023 0928  Last data filed at 4/10/2023 0700  Gross per 24 hour   Intake --   Output 1550 ml   Net -1550 ml     Allergies:   Allergies   Allergen Reactions   • Albuterol Other (See Comments)   • Adhesive Tape Rash   • Azithromycin Rash   • Fentanyl Nausea And Vomiting     Current Scheduled Medications:   Chlorhexidine Gluconate Cloth, 1 application, Topical, Q24H  mupirocin, 1 application, Each Nare, BID  sodium chloride, 10 mL, Intravenous, Q12H      Current PRN Medications:  •  enalaprilat  •  labetalol  •  sodium chloride  •  sodium chloride  •  sodium chloride    Review of Systems   Constitutional: Negative for fever.       Pertinent positives/negatives documented in HPI.  All other systems reviewed and negative.    Vital Signs:  /72   Pulse 74   Temp 98.2 °F (36.8 °C) (Oral)   Resp 12   Ht 175.3 cm (69.02\")   Wt 103 kg (227 lb 8.2 oz)   SpO2 97%   BMI 33.58 kg/m²     Physical Exam  Constitutional:       General: Vital signs are normal.   Eyes:      Extraocular Movements: EOM normal.      Pupils: Pupils are equal, round, and reactive to light.   Cardiovascular:      Rate and Rhythm: Normal rate and regular rhythm.      Pulses: Intact distal pulses.   Pulmonary:      Effort: Pulmonary effort is normal. No respiratory distress.   Abdominal:      General: Bowel sounds are normal. There is no distension.      Palpations: Abdomen is soft.   Musculoskeletal:         General: Normal range of motion.   Skin:     General: " "Skin is warm and dry.   Neurological:      Mental Status: He is oriented to person, place, and time.      Motor: Motor strength is normal.   Psychiatric:         Speech: Speech normal.       Vital signs reviewed.  Line/IV site: No erythema, warmth, induration, or tenderness.    Objective:  General Appearance:  Comfortable, well-appearing, in no acute distress and not in pain.    Vital signs: (most recent): Blood pressure 138/72, pulse 74, temperature 98.2 °F (36.8 °C), temperature source Oral, resp. rate 12, height 175.3 cm (69.02\"), weight 103 kg (227 lb 8.2 oz), SpO2 97 %.  Vital signs are normal.  No fever.    Output: Producing urine.    HEENT: Normal HEENT exam.    Lungs:  Normal effort and normal respiratory rate.  He is not in respiratory distress.    Heart: Normal rate.  Regular rhythm.    Chest: Symmetric chest wall expansion.   Extremities: Normal range of motion.    Skin:  Warm and dry.    Abdomen: Abdomen is soft and non-distended.  Bowel sounds are normal.   There is no abdominal tenderness.     Pulses: Distal pulses are intact.        Neurologic Exam     Mental Status   Oriented to person, place, and time.   Attention: normal. Concentration: normal.   Speech: speech is normal   Level of consciousness: alert  Normal comprehension.     Cranial Nerves     CN II   Visual fields full to confrontation.     CN III, IV, VI   Pupils are equal, round, and reactive to light.  Extraocular motions are normal.     CN V   Facial sensation intact.     CN VII   Facial expression full, symmetric.   Right facial weakness: central    CN VIII   CN VIII normal.     CN IX, X   CN IX normal.   CN X normal.     CN XI   CN XI normal.     CN XII   CN XII normal.   Tongue deviation: right    Motor Exam   Muscle bulk: normal    Strength   Strength 5/5 throughout. Ataxic movements of the right side     Sensory Exam   Light touch normal.     Gait, Coordination, and Reflexes     Reflexes   Reflexes 2+ except as noted.       Lab " Results:  ABG:     CBC:   Results from last 7 days   Lab Units 04/09/23  0835   WBC 10*3/mm3 7.29   HEMOGLOBIN g/dL 15.0   HEMATOCRIT % 47.3   PLATELETS 10*3/mm3 237     BMP:  Results from last 7 days   Lab Units 04/09/23  0835   SODIUM mmol/L 140   POTASSIUM mmol/L 4.2   CHLORIDE mmol/L 104   CO2 mmol/L 26.0   BUN mg/dL 16   CREATININE mg/dL 0.84   GLUCOSE mg/dL 156*   CALCIUM mg/dL 9.1   ALT (SGPT) U/L 22     Culture Results: No results found for: BLOODCX, URINECX, WOUNDCX, MRSACX, RESPCX, STOOLCX        Radiology:     Repeat CT scan of the head did not show any changes in the hemorrhagic CVA.  No significant mass effect, minimal shifting to the right    Impression/Recommendations:   CV: Heart rate and blood pressure stable  RESP: Oxygen level stable  GI: Tolerating p.o.  : Adequate urine output, BUN and creatinine stable  NEURO: Neuro exam stable, still with ataxic movements of the right side  ID: None  Patient is doing well.  We will transition the patient to the neuro floor.  The patient is can continue work with physical and Occupational Therapy.  We will continue to monitor patient's symptoms.      Intraparenchymal hemorrhage of brain      [place], APRN

## 2023-04-10 NOTE — THERAPY EVALUATION
Acute Care - Speech Language Pathology Initial Evaluation/Discharge Note   [facility]     Patient Name: Zachariah Acosta  : 1940  MRN: [id number]  Today's Date: 4/10/2023               Admit Date: 2023     Goal Outcome Evaluation:     Pt was upright in a chair after eating breakfast. He was alert and cooperative. SLP asked if he had any difficulty swallowing with breakfast, pt reported no difficulty and had eaten 100% of his breakfast. Pt was given an informal speech-language evaluation and scored within normal limits. Pt was able to perform expressive tasks such as automatic sequencing, naming, and phrase and sentence completion. His auditory comprehension was intact, as seen by his ability to follow directions and answer yes/no questions. Pt was oriented to his environment and the date. He also completed higher level cognitive tasks such as explaining multiple meaning words, problem solving, and analogies. Pt speech was intelligible, showing no characteristics of dysarthria, his face was symmetrical, but pt did keep his left eye closed for majority of the evaluation. Speech and language services are not recommended due to pt being within normal limits regarding communication. It is recommended that pt be discharged. Please alert SLP if there are any further concerns.     Completed by Lelo Serra SLP Student      Visit Dx:    ICD-10-CM ICD-9-CM   1. Oropharyngeal dysphagia  R13.12 787.22   2. Intraparenchymal hemorrhage of brain  I61.9 431   3. Impaired mobility and ADLs  Z74.09 V49.89    Z78.9    4. Impaired mobility  Z74.09 799.89   5. Slurred speech  R47.81 784.59     Patient Active Problem List   Diagnosis    BREANN on CPAP    CAD (coronary artery disease)    Chronic kidney disease (CKD), stage III (moderate)    Disorder of lung    Essential hypertension    Mild intermittent asthma without complication    S/P CABG (coronary artery bypass graft)    Type 2 diabetes mellitus with hyperglycemia,  with long-term current use of insulin (HCC)    Ulcerative colitis    Obesity (BMI 30-39.9)    Non-seasonal allergic rhinitis    SOB (shortness of breath)    Pulmonary scarring    Pulmonary nodule seen on imaging study    Mixed hyperlipidemia    Nonsmoker    Ulcerative colitis with complication    Intractable abdominal pain    Constipation    KE (acute kidney injury)    Hematuria    LLQ pain    Erectile dysfunction    Class 1 obesity due to excess calories with serious comorbidity and body mass index (BMI) of 34.0 to 34.9 in adult    Thyroid nodule    Double vision    Elevated PSA    Iron deficiency anemia due to chronic blood loss    Long-term use of immunosuppressant medication    Nontraumatic complete tear of left rotator cuff    Rosacea    Intraparenchymal hemorrhage of brain     Past Medical History:   Diagnosis Date    Asthma     Coronary artery disease     Diabetes mellitus     Elevated cholesterol     HL (hearing loss) 2012    Hyperlipidemia     Hypertension     Myocardial infarct     EASTER 2020    Sleep apnea     cpap at hs    Sleep apnea, obstructive      Past Surgical History:   Procedure Laterality Date    ADENOIDECTOMY  1947    BACK SURGERY      CARDIAC CATHETERIZATION      stents x 6    CARDIAC SURGERY      CABG TRIPLE BYPASS 2012    CATARACT EXTRACTION      COLONOSCOPY      COLONOSCOPY N/A 06/04/2021    Procedure: COLONOSCOPY WITH ANESTHESIA;  Surgeon: Zachariah Menjivar DO;  Location: [facility] ENDOSCOPY;  Service: Gastroenterology;  Laterality: N/A;  pre hx ulcerative colitis  post ulcerative colitis  dr collins gusman    CORONARY ANGIOPLASTY WITH STENT PLACEMENT      CORONARY ARTERY BYPASS GRAFT  December 2012    CORONARY STENT PLACEMENT      HIP SURGERY Right     total hip    JOINT REPLACEMENT  2015    Right hip    PENILE PROSTHESIS IMPLANT N/A 12/13/2021    Procedure: 3-PIECE INFLATABLE PENILE PROSTHESIS PLACEMENT;  Surgeon: Jose Tellez MD;  Location: [facility] OR;  Service: Urology;   Laterality: N/A;    TONSILLECTOMY  1947       SLP Recommendation and Plan                          Predicted Duration Therapy Intervention (Days): until discharge (04/10/23 0830)                                 SLP EVALUATION (last 72 hours)       SLP SLC Evaluation       Row Name 04/10/23 0830                   Communication Assessment/Intervention    Document Type evaluation  -MG (r) MM (t) MG (c)        Subjective Information no complaints  -MG (r) MM (t) MG (c)        Patient Observations alert;cooperative  -MG (r) MM (t) MG (c)        Patient Effort good  -MG (r) MM (t) MG (c)        Symptoms Noted During/After Treatment none  -MG (r) MM (t) MG (c)           General Information    Patient Profile Reviewed yes  -MG (r) MM (t) MG (c)        Pertinent History Of Current Problem admit with slurred speech, right sided weakness, failed swallow screen due to facial weakness  -MG        Precautions/Limitations, Vision WFL;for purposes of eval  -MG (r) MM (t) MG (c)        Precautions/Limitations, Hearing WFL  -MG (r) MM (t) MG (c)        Prior Level of Function-Communication WFL  -MG        Plans/Goals Discussed with patient  -MG (r) MM (t) MG (c)        Barriers to Rehab none identified  -MG (r) MM (t) MG (c)        Patient's Goals for Discharge return to home  -MG (r) MM (t) MG (c)        Standardized Assessment Used other (see comments)  Informal speech-language  -MG (r) MM (t) MG (c)           Pain    Additional Documentation Pain Scale: FACES Pre/Post-Treatment (Group)  -MG (r) MM (t) MG (c)           Pain Scale: Numbers Pre/Post-Treatment    Pretreatment Pain Rating --  -MG           Pain Scale: FACES Pre/Post-Treatment    Pain: FACES Scale, Pretreatment 0-->no hurt  -MG        Posttreatment Pain Rating 0-->no hurt  -MG           Comprehension Assessment/Intervention    Comprehension Assessment/Intervention Auditory Comprehension  -MG (r) MM (t) MG (c)           Auditory Comprehension Assessment/Intervention     Auditory Comprehension (Communication) WFL  -MG           Expression Assessment/Intervention    Expression Assessment/Intervention verbal expression  -MG (r) MM (t) MG (c)           Verbal Expression Assessment/Intervention    Verbal Expression WFL  -MG           Oral Motor Structure and Function    Oral Motor Structure and Function WNL  -MG (r) MM (t) MG (c)           Oral Musculature and Cranial Nerve Assessment    Oral Motor General Assessment WFL  -MG (r) MM (t) MG (c)           Motor Speech Assessment/Intervention    Motor Speech Function WNL  -MG (r) MM (t) MG (c)        Characteristics Consistent with Dysarthria other (see comments)  Pt did not present with slurred speech or any characteristic of dysarthria  -MG           Cursory Voice Assessment/Intervention    Quality and Resonance (Voice) --  -MG           Cognitive Assessment Intervention- SLP    Cognitive Function (Cognition) WFL  -MG           SLP Evaluation Clinical Impressions    SLP Diagnosis functional speech/language skills;functional cognitive-linguistic skills  -MG (r) MM (t) MG (c)        SLC Criteria for Skilled Therapy Interventions Met no problems identified which require skilled intervention;baseline status  -MG (r) MM (t) MG (c)        Functional Impact no impact on function  -MG (r) MM (t) MG (c)           Recommendations    Therapy Frequency (SLP SLC) other (see comments);evaluation only  discharge  -MG        Predicted Duration Therapy Intervention (Days) until discharge  -MG        Anticipated Discharge Disposition (SLP) home  -MG (r) MM (t) MG (c)           SLP Discharge Summary    Discharge Destination unknown  -MG (r) MM (t) MG (c)        Progress Toward Achieving Short/long Term Goals discharge on same date as initial evaluation  -MG (r) MM (t) MG (c)        Reason for Discharge other (see comments)  Not appropriate/does not qualify for services  -MG (r) MM (t) MG (c)                  User Key  (r) = Recorded By, (t) = Taken By, (c)  = Cosigned By      Initials Name Effective Dates     Latesha Gupta MS CCC-SLP 08/12/22 -     Lelo Medeiros, Speech Therapy Student 03/06/23 -                        EDUCATION  The patient has been educated in the following areas:     Communication Impairment.                      Time Calculation:      Time Calculation- SLP       Row Name 04/10/23 0909             Time Calculation- SLP    SLP Start Time 0830  -MG (r) MM (t) MG (c)      SLP Stop Time 0910  -MG (r) MM (t) MG (c)      SLP Time Calculation (min) 40 min  -MG (r) MM (t)      SLP Received On 04/10/23  -MG (r) MM (t) MG (c)         Untimed Charges    SLP Eval/Re-eval  ST Eval Speech and Production w/ Language - 58952  -MG      [id number] Eval Speech and Production w/ Language Minutes 40  -MG (r) MM (t) MG (c)         Total Minutes    Untimed Charges Total Minutes 40  -MG (r) MM (t)       Total Minutes 40  -MG (r) MM (t)                User Key  (r) = Recorded By, (t) = Taken By, (c) = Cosigned By      Initials Name Provider Type    Latesha Grace, MS CCC-SLP Speech and Language Pathologist    Lelo Medeiros, Speech Therapy Student SLP Student                    Therapy Charges for Today       Code Description Service Date Service Provider Modifiers Qty    93085145235 HC ST EVAL SPEECH AND PROD W LANG  3 4/10/2023 Latesha Gupta, MS CCC-SLP GN 1                       Latesha Gupta MS CCC-SLP  4/10/2023

## 2023-04-10 NOTE — PLAN OF CARE
Goal Outcome Evaluation:  Plan of Care Reviewed With: (P) patient        Progress: (P) improving  Outcome Evaluation: (P) OT tx completed this date. Pt A&O x4 and in fowlers upon arrival. Pt cleared by ns for therapy. Pt completed supine<> sit from fowlers and scooting to EOB requiring SBA. Pt completed grooming tasks of oral hygiene, washing face, and washing hair with wash cloth while seated EOB requiring set up and tolerating 8-10 minutes of static and dynamic sitting balance on EOB. Pt had difficulty holding objects in his right hand to complete grooming tasks d/t RUE weakness and ataxia. Pt required SBA for EOB<>supine and for scooting up in bed for positioning.  OT POC to continue.

## 2023-04-10 NOTE — THERAPY TREATMENT NOTE
Acute Care - Occupational Therapy Treatment Note  [facility]     Patient Name: Zachariah Acosta  : 1940  MRN: [id number]  Today's Date: 4/10/2023             Admit Date: 2023       ICD-10-CM ICD-9-CM   1. Oropharyngeal dysphagia  R13.12 787.22   2. Intraparenchymal hemorrhage of brain  I61.9 431   3. Impaired mobility and ADLs  Z74.09 V49.89    Z78.9    4. Impaired mobility  Z74.09 799.89   5. Slurred speech  R47.81 784.59     Patient Active Problem List   Diagnosis   • BREANN on CPAP   • CAD (coronary artery disease)   • Chronic kidney disease (CKD), stage III (moderate)   • Disorder of lung   • Essential hypertension   • Mild intermittent asthma without complication   • S/P CABG (coronary artery bypass graft)   • Type 2 diabetes mellitus with hyperglycemia, with long-term current use of insulin ([facility])   • Ulcerative colitis   • Obesity (BMI 30-39.9)   • Non-seasonal allergic rhinitis   • SOB (shortness of breath)   • Pulmonary scarring   • Pulmonary nodule seen on imaging study   • Mixed hyperlipidemia   • Nonsmoker   • Ulcerative colitis with complication   • Intractable abdominal pain   • Constipation   • KE (acute kidney injury)   • Hematuria   • LLQ pain   • Erectile dysfunction   • Class 1 obesity due to excess calories with serious comorbidity and body mass index (BMI) of 34.0 to 34.9 in adult   • Thyroid nodule   • Double vision   • Elevated PSA   • Iron deficiency anemia due to chronic blood loss   • Long-term use of immunosuppressant medication   • Nontraumatic complete tear of left rotator cuff   • Rosacea   • Intraparenchymal hemorrhage of brain     Past Medical History:   Diagnosis Date   • Asthma    • Coronary artery disease    • Diabetes mellitus    • Elevated cholesterol    • HL (hearing loss)    • Hyperlipidemia    • Hypertension    • Myocardial infarct     2020   • Sleep apnea     cpap at    • Sleep apnea, obstructive      Past Surgical History:   Procedure Laterality  Date   • ADENOIDECTOMY  1947   • BACK SURGERY     • CARDIAC CATHETERIZATION      stents x 6   • CARDIAC SURGERY      CABG TRIPLE BYPASS 2012   • CATARACT EXTRACTION     • COLONOSCOPY     • COLONOSCOPY N/A 06/04/2021    Procedure: COLONOSCOPY WITH ANESTHESIA;  Surgeon: Zachariah Menjivar DO;  Location:  [facility] ENDOSCOPY;  Service: Gastroenterology;  Laterality: N/A;  pre hx ulcerative colitis  post ulcerative colitis  dr collins gusman   • CORONARY ANGIOPLASTY WITH STENT PLACEMENT     • CORONARY ARTERY BYPASS GRAFT  December 2012   • CORONARY STENT PLACEMENT     • HIP SURGERY Right     total hip   • JOINT REPLACEMENT  2015    Right hip   • PENILE PROSTHESIS IMPLANT N/A 12/13/2021    Procedure: 3-PIECE INFLATABLE PENILE PROSTHESIS PLACEMENT;  Surgeon: Jose Tellez MD;  Location:  [facility] OR;  Service: Urology;  Laterality: N/A;   • TONSILLECTOMY  1947         OT ASSESSMENT FLOWSHEET (last 12 hours)     OT Evaluation and Treatment     Row Name 04/10/23 1600 04/10/23 1524                OT Time and Intention    Document Type -- therapy note (daily note)  -LS       Mode of Treatment -- occupational therapy  -LS       Patient Effort -- good (P)   -GK          General Information    Patient Profile Reviewed -- yes (P)   -GK       Existing Precautions/Restrictions -- fall (P)   -GK       Limitations/Impairments -- hearing (P)   -GK       Barriers to Rehab -- medically complex (P)   -GK          Pain Assessment    Pretreatment Pain Rating -- 0/10 - no pain (P)   -GK       Posttreatment Pain Rating -- 0/10 - no pain (P)   -GK          Cognition    Orientation Status (Cognition) -- oriented x 4 (P)   -GK          Coping    Observed Emotional State -- calm;cooperative (P)   -GK       Verbalized Emotional State -- acceptance (P)   -GK          Plan of Care Review    Plan of Care Reviewed With -- patient (P)   -GK       Progress -- improving (P)   -GK       Outcome Evaluation -- OT tx completed this date. Pt A&O x4 and in  fowlers upon arrival. Pt cleared by [facility] for therapy. Pt completed supine<> sit from fowlers and scooting to EOB requiring SBA. Pt completed grooming tasks of oral hygiene, washing face, and washing hair with wash cloth while seated EOB requiring set up and tolerating 8-10 minutes of static and dynamic sitting balance on EOB. Pt had difficulty holding objects in his right hand to complete grooming tasks d/t RUE weakness and ataxia. Pt required SBA for EOB<>supine and for scooting up in bed for positioning.  OT POC to continue. (P)   -          Positioning and Restraints    Pre-Treatment Position in bed (P)   -GK --       Post Treatment Position bed (P)   -GK --       In Bed fowlers;call light within reach;encouraged to call for assist (P)   - --          Therapy Plan Review/Discharge Plan (OT)    Anticipated Discharge Disposition (OT) inpatient rehabilitation facility (P)   - --             User Key  (r) = Recorded By, (t) = Taken By, (c) = Cosigned By    Initials Name Effective Dates    LS Edie Womack COTA 09/22/22 -      Cielo Stokes OTA Student 02/20/23 -                  Occupational Therapy Education     Title: PT OT SLP Therapies (Done)     Topic: Occupational Therapy (Done)     Point: ADL training (Done)     Description:   Instruct learner(s) on proper safety adaptation and remediation techniques during self care or transfers.   Instruct in proper use of assistive devices.              Learning Progress Summary           Patient Acceptance, E, VU by GK at 4/10/2023 1616    Comment: Pt educated on compensatory strategies during ADLs when using right hand.    Acceptance, E,TB, VU by CHRISTIANA at 4/10/2023 0518    Acceptance, E, VU by SIERRA at 4/9/2023 1402                   Point: Home exercise program (Done)     Description:   Instruct learner(s) on appropriate technique for monitoring, assisting and/or progressing therapeutic exercises/activities.              Learning Progress Summary           Patient  Acceptance, E,TB, VU by CR at 4/10/2023 0518                   Point: Precautions (Done)     Description:   Instruct learner(s) on prescribed precautions during self-care and functional transfers.              Learning Progress Summary           Patient Acceptance, E,TB, VU by CR at 4/10/2023 0518    Acceptance, E, VU by HÉCTOR at 4/9/2023 1402                   Point: Body mechanics (Done)     Description:   Instruct learner(s) on proper positioning and spine alignment during self-care, functional mobility activities and/or exercises.              Learning Progress Summary           Patient Acceptance, E,TB, VU by CR at 4/10/2023 0518                               User Key     Initials Effective Dates Name Provider Type Discipline    HÉCTOR 11/10/21 -  Lakshmi Chino, OTR/L, CSRS Occupational Therapist OT    CR 03/03/23 -  Faraz Caraballo, RN Registered Nurse Nurse     02/20/23 -  Cielo Stokes OTA Student OT Student OT                  OT Recommendation and Plan     Plan of Care Review  Plan of Care Reviewed With: (P) patient  Progress: (P) improving  Outcome Evaluation: (P) OT tx completed this date. Pt A&O x4 and in fowlers upon arrival. Pt cleared by ns for therapy. Pt completed supine<> sit from fowlers and scooting to EOB requiring SBA. Pt completed grooming tasks of oral hygiene, washing face, and washing hair with wash cloth while seated EOB requiring set up and tolerating 8-10 minutes of static and dynamic sitting balance on EOB. Pt had difficulty holding objects in his right hand to complete grooming tasks d/t RUE weakness and ataxia. Pt required SBA for EOB<>supine and for scooting up in bed for positioning.  OT POC to continue.  Plan of Care Reviewed With: (P) patient  Outcome Evaluation: (P) OT tx completed this date. Pt A&O x4 and in fowlers upon arrival. Pt cleared by nsg for therapy. Pt completed supine<> sit from fowlers and scooting to EOB requiring SBA. Pt completed grooming tasks of oral  hygiene, washing face, and washing hair with wash cloth while seated EOB requiring set up and tolerating 8-10 minutes of static and dynamic sitting balance on EOB. Pt had difficulty holding objects in his right hand to complete grooming tasks d/t RUE weakness and ataxia. Pt required SBA for EOB<>supine and for scooting up in bed for positioning.  OT POC to continue.     Outcome Measures     Row Name 04/10/23 1524             How much help from another is currently needed...    Putting on and taking off regular lower body clothing? 2 (P)   -GK      Bathing (including washing, rinsing, and drying) 2 (P)   -GK      Toileting (which includes using toilet bed pan or urinal) 2 (P)   -GK      Putting on and taking off regular upper body clothing 2 (P)   -GK      Taking care of personal grooming (such as brushing teeth) 3 (P)   -GK      Eating meals 3 (P)   -GK      AM-PAC 6 Clicks Score (OT) 14 (P)   -GK         Functional Assessment    Outcome Measure Options AM-PAC 6 Clicks Daily Activity (OT) (P)   -GK            User Key  (r) = Recorded By, (t) = Taken By, (c) = Cosigned By    Initials Name Provider Type    Cielo Barrera OTA Student OT Student                Time Calculation:    Time Calculation- OT     Row Name 04/10/23 1620             Time Calculation- OT    OT Start Time 1524  -LS      OT Stop Time 1550  -LS      OT Time Calculation (min) 26 min  -      Total Timed Code Minutes- OT 26 minute(s)  -LS      OT Received On 04/10/23  -            User Key  (r) = Recorded By, (t) = Taken By, (c) = Cosigned By    Initials Name Provider Type     Edie Womack COTA Occupational Therapist Assistant                       LIZZETTE Fregoso Student  4/10/2023

## 2023-04-10 NOTE — PLAN OF CARE
"Goal Outcome Evaluation:         Pt is A&O X4, and follows commands. NIH was scored 1 on my shift. Pt has been NS/Bradycardia, BP has been stable. BP remained below 160 systolic this shift. Remains on room air, but does use CPAP at night. Pt still has some c/o numbness present in some extremities. Although pt is not sure he stated \" it feels like there is some numbness, but not sure until he can stand up and see\". Pt blood sugar has been above 140 both times this shift, will continue to monitor them. Remained afebrile, and no complaints of pain this shift.        "

## 2023-04-10 NOTE — PLAN OF CARE
Goal Outcome Evaluation:  Plan of Care Reviewed With: other (see comments)      Progress: no change  Outcome Evaluation: Ntn assessment completed. Oral intake insufficient at this time. Regular diet. Monitor for diet tolerance. Cont to follow for plan of care.

## 2023-04-10 NOTE — TELEPHONE ENCOUNTER
Rx Refill Note  Requested Prescriptions     Pending Prescriptions Disp Refills   • lisinopril (PRINIVIL,ZESTRIL) 20 MG tablet 30 tablet 2     Sig: Take 1 tablet by mouth Daily. In the AM for blood pressure      Last office visit with prescribing clinician: 2/28/2023   Last telemedicine visit with prescribing clinician: 5/3/2023   Next office visit with prescribing clinician: 5/3/2023                         Would you like a call back once the refill request has been completed: [] Yes [] No    If the office needs to give you a call back, can they leave a voicemail: [] Yes [] No    Emily Clark MA  04/10/23, 13:50 CDT

## 2023-04-11 LAB
GLUCOSE BLDC GLUCOMTR-MCNC: 140 MG/DL (ref 70–130)
GLUCOSE BLDC GLUCOMTR-MCNC: 164 MG/DL (ref 70–130)
GLUCOSE BLDC GLUCOMTR-MCNC: 170 MG/DL (ref 70–130)
GLUCOSE BLDC GLUCOMTR-MCNC: 210 MG/DL (ref 70–130)
GLUCOSE BLDC GLUCOMTR-MCNC: 217 MG/DL (ref 70–130)

## 2023-04-11 PROCEDURE — 97116 GAIT TRAINING THERAPY: CPT

## 2023-04-11 PROCEDURE — 63710000001 INSULIN DETEMIR PER 5 UNITS: Performed by: NURSE PRACTITIONER

## 2023-04-11 PROCEDURE — 63710000001 INSULIN LISPRO (HUMAN) PER 5 UNITS: Performed by: NURSE PRACTITIONER

## 2023-04-11 PROCEDURE — 97530 THERAPEUTIC ACTIVITIES: CPT

## 2023-04-11 PROCEDURE — 99231 SBSQ HOSP IP/OBS SF/LOW 25: CPT | Performed by: NEUROLOGICAL SURGERY

## 2023-04-11 PROCEDURE — 97535 SELF CARE MNGMENT TRAINING: CPT

## 2023-04-11 PROCEDURE — 97110 THERAPEUTIC EXERCISES: CPT

## 2023-04-11 PROCEDURE — 82962 GLUCOSE BLOOD TEST: CPT

## 2023-04-11 RX ORDER — ONDANSETRON 4 MG/1
4 TABLET, FILM COATED ORAL EVERY 6 HOURS PRN
Status: DISCONTINUED | OUTPATIENT
Start: 2023-04-11 | End: 2023-04-12 | Stop reason: HOSPADM

## 2023-04-11 RX ORDER — HYDROCODONE BITARTRATE AND ACETAMINOPHEN 7.5; 325 MG/1; MG/1
1 TABLET ORAL EVERY 6 HOURS PRN
Status: DISCONTINUED | OUTPATIENT
Start: 2023-04-11 | End: 2023-04-12 | Stop reason: HOSPADM

## 2023-04-11 RX ADMIN — METFORMIN HYDROCHLORIDE 500 MG: 500 TABLET, EXTENDED RELEASE ORAL at 08:29

## 2023-04-11 RX ADMIN — HYDROCODONE BITARTRATE AND ACETAMINOPHEN 1 TABLET: 7.5; 325 TABLET ORAL at 13:05

## 2023-04-11 RX ADMIN — INSULIN LISPRO 4 UNITS: 100 INJECTION, SOLUTION INTRAVENOUS; SUBCUTANEOUS at 12:08

## 2023-04-11 RX ADMIN — ROSUVASTATIN CALCIUM 20 MG: 20 TABLET, FILM COATED ORAL at 21:05

## 2023-04-11 RX ADMIN — METOPROLOL TARTRATE 25 MG: 25 TABLET, FILM COATED ORAL at 21:05

## 2023-04-11 RX ADMIN — INSULIN DETEMIR 15 UNITS: 100 INJECTION, SOLUTION SUBCUTANEOUS at 21:06

## 2023-04-11 RX ADMIN — FINASTERIDE 5 MG: 5 TABLET, FILM COATED ORAL at 08:29

## 2023-04-11 RX ADMIN — INSULIN LISPRO 2 UNITS: 100 INJECTION, SOLUTION INTRAVENOUS; SUBCUTANEOUS at 08:29

## 2023-04-11 RX ADMIN — METOPROLOL TARTRATE 25 MG: 25 TABLET, FILM COATED ORAL at 08:29

## 2023-04-11 RX ADMIN — LISINOPRIL 20 MG: 20 TABLET ORAL at 08:29

## 2023-04-11 NOTE — PROGRESS NOTES
Zachariah Garcia [place]  82 y.o.      Chief complaint:   Hemorrhagic stroke    Subjective  No complaints this morning tolerating all p.o.  Working with physical therapy    Temp:  [98 °F (36.7 °C)-98.4 °F (36.9 °C)] 98.3 °F (36.8 °C)  Heart Rate:  [59-76] 59  Resp:  [16-18] 18  BP: (130-147)/(68-85) 135/76      Objective    Neurologic Exam     Mental Status   Oriented to person, place, and time.   Attention: normal.   Speech: speech is normal   Level of consciousness: alert  Knowledge: good.     Cranial Nerves     CN II   Visual fields full to confrontation.     CN III, IV, VI   Pupils are equal, round, and reactive to light.  Extraocular motions are normal.     CN V   Facial sensation intact.     CN VII   Facial expression full, symmetric.     CN VIII   CN VIII normal.     CN IX, X   CN IX normal.   CN X normal.     CN XI   CN XI normal.     CN XII   CN XII normal.     Motor Exam   Muscle bulk: normal  Overall muscle tone: normal  Right arm pronator drift: absent  Left arm pronator drift: absent    Strength   Strength 5/5 throughout. Right upper and lower ataxia improved this morning.     Sensory Exam   Light touch normal.   Pinprick normal.     Gait, Coordination, and Reflexes     Coordination   Finger to nose coordination: normal    Tremor   Resting tremor: absent  Intention tremor: absent  Action tremor: absent    Reflexes   Reflexes 2+ except as noted.       Lab Results (last 24 hours)     Procedure Component Value Units Date/Time    POC Glucose Once [[id number]]  (Abnormal) Collected: 04/11/23 0632    Specimen: Blood Updated: 04/11/23 0647     Glucose 164 mg/dL      Comment: : ben Watkins [employer] ID: [id number]       POC Glucose Once [[id number]]  (Abnormal) Collected: 04/11/23 0052    Specimen: Blood Updated: 04/11/23 0102     Glucose 170 mg/dL      Comment: : ben Watkins [employer] ID: [id number]       POC Glucose Once [[id number]]  (Abnormal) Collected: 04/10/23 1629    Specimen: Blood  Updated: 04/10/23 1644     Glucose 151 mg/dL      Comment: : [id number] Hardik [employer] ID: [id number]       POC Glucose Once [[id number]]  (Abnormal) Collected: 04/10/23 1217    Specimen: Blood Updated: 04/10/23 1228     Glucose 181 mg/dL      Comment: : [id number] Hardik [employer] ID: [id number]                 Plan:   Left hemorrhagic stroke.  Bedside exam improved this morning.  Mobilize with physical therapy today.  Discharge home versus rehab placement pending.      Intraparenchymal hemorrhage of brain        Jonny Lazo MD

## 2023-04-11 NOTE — CASE MANAGEMENT/SOCIAL WORK
Discharge Planning Assessment   [place]     Patient Name: Zachariah Acosta  MRN: [id number]  Today's Date: 4/11/2023    Admit Date: 4/9/2023        Discharge Needs Assessment     Row Name 04/11/23 1219       Living Environment    People in Home spouse    Name(s) of People in Home Nannette Acosta,  kathleen    Current Living Arrangements home    Potentially Unsafe Housing Conditions none    Primary Care Provided by spouse/significant other;self    Provides Primary Care For no one, unable/limited ability to care for self    Caregiving Concerns CVA    Family Caregiver if Needed spouse    Family Caregiver Names Nannette    Quality of Family Relationships helpful;involved;supportive    Able to Return to Prior Arrangements other (see comments)  therapy rec acute rehab       Resource/Environmental Concerns    Resource/Environmental Concerns none    Transportation Concerns none       Food Insecurity    Within the past 12 months, you worried that your food would run out before you got the money to buy more. Never true    Within the past 12 months, the food you bought just didn't last and you didn't have money to get more. Never true       Transition Planning    Patient/Family Anticipates Transition to home with family;inpatient rehabilitation facility;home with help/services    Patient/Family Anticipated Services at Transition home health care;rehabilitation services    Transportation Anticipated family or friend will provide       Discharge Needs Assessment    Readmission Within the Last 30 Days no previous admission in last 30 days    Equipment Currently Used at Home walker, rolling;cpap    Concerns to be Addressed no discharge needs identified    Anticipated Changes Related to Illness inability to care for self    Equipment Needed After Discharge rollator    Outpatient/Agency/Support Group Needs homecare agency    Discharge Facility/Level of Care Needs home with home health    Provided Post Acute Provider List? Yes     Post Acute Provider List Home Health    Provided Post Acute Provider Quality & Resource List? Yes    Post Acute Provider Quality and Resource List Home Health    Delivered To Patient;Support Person    Support Person Nannette    Method of Delivery In person    Patient's Choice of Community Agency(s) [facility] health;   [facility] DME    Discharge Coordination/Progress Patient plans for home with [facility]. Requesting Rollator from [facility] is possible. WIfe at bedside. Would like to go home if possible instead of acute rehab. If referral needed to [facility] will follow. SW updated.  Therapy rec acute rehab.      Information given regarding acute rehab to explain the benefits.  Patient/spouse to consider since therapy highly recommending. Pending decision for referral or not.  Will follow               Discharge Plan    No documentation.               Continued Care and Services - Admitted Since 4/9/2023    Coordination has not been started for this encounter.       Expected Discharge Date and Time     Expected Discharge Date Expected Discharge Time    Apr 12, 2023          Demographic Summary    No documentation.                Functional Status    No documentation.                Psychosocial    No documentation.                Abuse/Neglect    No documentation.                Legal    No documentation.                Substance Abuse    No documentation.                Patient Forms    No documentation.                   Lorrie Rubin RN

## 2023-04-11 NOTE — THERAPY TREATMENT NOTE
Acute Care - Physical Therapy Treatment Note  [facility]     Patient Name: Zachariah Acosta  : 1940  MRN: [id number]  Today's Date: 2023      Visit Dx:     ICD-10-CM ICD-9-CM   1. Oropharyngeal dysphagia  R13.12 787.22   2. Intraparenchymal hemorrhage of brain  I61.9 431   3. Impaired mobility and ADLs  Z74.09 V49.89    Z78.9    4. Impaired mobility  Z74.09 799.89   5. Slurred speech  R47.81 784.59     Patient Active Problem List   Diagnosis   • BREANN on CPAP   • CAD (coronary artery disease)   • Chronic kidney disease (CKD), stage III (moderate)   • Disorder of lung   • Essential hypertension   • Mild intermittent asthma without complication   • S/P CABG (coronary artery bypass graft)   • Type 2 diabetes mellitus with hyperglycemia, with long-term current use of insulin (HCC)   • Ulcerative colitis   • Obesity (BMI 30-39.9)   • Non-seasonal allergic rhinitis   • SOB (shortness of breath)   • Pulmonary scarring   • Pulmonary nodule seen on imaging study   • Mixed hyperlipidemia   • Nonsmoker   • Ulcerative colitis with complication   • Intractable abdominal pain   • Constipation   • KE (acute kidney injury)   • Hematuria   • LLQ pain   • Erectile dysfunction   • Class 1 obesity due to excess calories with serious comorbidity and body mass index (BMI) of 34.0 to 34.9 in adult   • Thyroid nodule   • Double vision   • Elevated PSA   • Iron deficiency anemia due to chronic blood loss   • Long-term use of immunosuppressant medication   • Nontraumatic complete tear of left rotator cuff   • Rosacea   • Intraparenchymal hemorrhage of brain     Past Medical History:   Diagnosis Date   • Asthma    • Coronary artery disease    • Diabetes mellitus    • Elevated cholesterol    • HL (hearing loss)    • Hyperlipidemia    • Hypertension    • Myocardial infarct     2020   • Sleep apnea     cpap at    • Sleep apnea, obstructive      Past Surgical History:   Procedure Laterality Date   • ADENOIDECTOMY   1947   • BACK SURGERY     • CARDIAC CATHETERIZATION      stents x 6   • CARDIAC SURGERY      CABG TRIPLE BYPASS 2012   • CATARACT EXTRACTION     • COLONOSCOPY     • COLONOSCOPY N/A 06/04/2021    Procedure: COLONOSCOPY WITH ANESTHESIA;  Surgeon: Zachariah Menjivar DO;  Location:  [facility];  Service: Gastroenterology;  Laterality: N/A;  pre hx ulcerative colitis  post ulcerative colitis  dr collins gusman   • CORONARY ANGIOPLASTY WITH STENT PLACEMENT     • CORONARY ARTERY BYPASS GRAFT  December 2012   • CORONARY STENT PLACEMENT     • HIP SURGERY Right     total hip   • JOINT REPLACEMENT  2015    Right hip   • PENILE PROSTHESIS IMPLANT N/A 12/13/2021    Procedure: 3-PIECE INFLATABLE PENILE PROSTHESIS PLACEMENT;  Surgeon: Jose Tellez MD;  Location:  [facility] OR;  Service: Urology;  Laterality: N/A;   • TONSILLECTOMY  1947     PT Assessment (last 12 hours)     PT Evaluation and Treatment     Row Name 04/11/23 1327 04/11/23 0751       Physical Therapy Time and Intention    Subjective Information no complaints (P)   -AR no complaints  stiffness in neck from sleeping  -MS (r) AR (t) MS (c)    Document Type therapy note (daily note) (P)   -AR therapy note (daily note)  -MS (r) AR (t) MS (c)    Mode of Treatment physical therapy (P)   -AR physical therapy  -MS (r) AR (t) MS (c)    Row Name 04/11/23 1327 04/11/23 0751       General Information    Existing Precautions/Restrictions fall (P)   -AR fall;right  R side weakness  -MS (r) AR (t) MS (c)    Row Name 04/11/23 1327 04/11/23 0751       Pain    Pretreatment Pain Rating 0/10 - no pain (P)   -AR 0/10 - no pain  -MS (r) AR (t) MS (c)    Posttreatment Pain Rating 0/10 - no pain (P)   -AR 0/10 - no pain  -MS (r) AR (t) MS (c)    Pain Intervention(s) Repositioned (P)   -AR Repositioned  -MS (r) AR (t) MS (c)    Row Name 04/11/23 1327 04/11/23 0751       Bed Mobility    Bed Mobility -- (P)   In chair  -AR supine-sit  -MS (r) AR (t) MS (c)    Supine-Sit [place] (Bed  Mobility) -- (P)   In chair  -AR 1 person assist;contact guard;minimum assist (75% patient effort)  -MS (r) AR (t) MS (c)    Sit-Supine [place] (Bed Mobility) contact guard;minimum assist (75% patient effort) (P)   -AR --  chair  -MS (r) AR (t) MS (c)    Row Name 04/11/23 132 04/11/23 0751       Transfers    Transfers sit-stand transfer;stand-sit transfer (P)   -AR sit-stand transfer;stand-sit transfer  -MS (r) AR (t) MS (c)    Row Name 04/11/23 132 04/11/23 0751       Sit-Stand Transfer    Sit-Stand [place] (Transfers) contact guard;minimum assist (75% patient effort) (P)   -AR contact guard;minimum assist (75% patient effort)  -MS (r) AR (t) MS (c)    Assistive Device (Sit-Stand Transfers) walker, front-wheeled (P)   -AR walker, front-wheeled  -MS (r) AR (t) MS (c)    Row Name 04/11/23 [facility] 04/11/23 0751       Stand-Sit Transfer    Stand-Sit [place] (Transfers) contact guard;minimum assist (75% patient effort) (P)   -AR contact guard;minimum assist (75% patient effort);1 person assist  -MS (r) AR (t) MS (c)    Assistive Device (Stand-Sit Transfers) walker, front-wheeled (P)   -AR walker, front-wheeled  -MS (r) AR (t) MS (c)    Row Name 04/11/23 [facility] 04/11/23 0751       Gait/Stairs (Locomotion)    [place] Level (Gait) contact guard;minimum assist (75% patient effort) (P)   -AR contact guard;minimum assist (75% patient effort);1 person assist  -MS (r) AR (t) MS (c)    Assistive Device (Gait) walker, front-wheeled (P)   -AR walker, front-wheeled  -MS (r) AR (t) MS (c)    Distance in Feet (Gait) 40 X 2 (P)   -AR 40 X 2  -MS (r) AR (t) MS (c)    Right Sided Gait Deviations heel strike decreased (P)   -AR heel strike decreased  -MS (r) AR (t) MS (c)    Comment, (Gait/Stairs) -- --  Verbal cues to lift foot off floor  -MS (r) AR (t) MS (c)    Row Name 04/11/23 1327 04/11/23 0751       Motor Skills    Comments, Therapeutic Exercise BLE Ankle pumps and LAQ x 20 AROM (P)   -AR BLE ankle pumps and LAQ X  20. BUE bicep curls X 20  -MS (r) AR (t) MS (c)    Additional Documentation Comments, Therapeutic Exercise (Row) (P)   -AR Comments, Therapeutic Exercise (Row)  -MS (r) AR (t) MS (c)    Row Name 04/11/23 0751          Plan of Care Review    Plan of Care Reviewed With patient  -MS (r) AR (t) MS (c)     Progress improving  -MS (r) AR (t) MS (c)     Outcome Evaluation Pt tranfered supine-to-sit cga/min assist. Pt performed BLE ankle pumps and LAQ X 20, and BUE bicep curls X 20. Sit-to-stand cga/min assist. Pt ambulated 40 feet X 2 to hallway and back to the chair, verbal cues for placement of right foot. Pt in chair for breakfast with call light and exit alarm on. Pt would benefit from acute rehab.  -MS (r) AR (t) MS (c)     Row Name 04/11/23 1327 04/11/23 0751       Positioning and Restraints    Pre-Treatment Position sitting in chair/recliner (P)   -AR in bed  -MS (r) AR (t) MS (c)    Post Treatment Position bed (P)   -AR chair  -MS (r) AR (t) MS (c)    In Bed supine;call light within reach;encouraged to call for assist;exit alarm on (P)   -AR --  Chair  -MS (r) AR (t) MS (c)    In Chair -- sitting;call light within reach;encouraged to call for assist;exit alarm on  -MS (r) AR (t) MS (c)          User Key  (r) = Recorded By, (t) = Taken By, (c) = Cosigned By    Initials Name Provider Type    Latesha Ryan R, PT, DPT, NCS Physical Therapist    Jeronimo Stallworth, PTA Student PTA Student                Physical Therapy Education     Title: PT OT SLP Therapies (Done)     Topic: Physical Therapy (Done)     Point: Mobility training (Done)     Learning Progress Summary           Patient Acceptance, E,D, NR,VU by RINKU at 4/10/2023 0843    Comment: bed mobility. Also weight shifting for transfers and gait    Acceptance, E,TB, VU by CR at 4/10/2023 0518    Acceptance, E,D, DU,VU by LH at 4/9/2023 1408    Comment: benefits of PT and POC, call for A OOB                   Point: Precautions (Done)     Learning Progress Summary            Patient Acceptance, E,TB, VU by CR at 4/10/2023 0518    Acceptance, E,D, DU,VU by  at 4/9/2023 1408    Comment: benefits of PT and POC, call for A OOB                               User Key     Initials Effective Dates Name Provider Type Discipline     02/03/23 -  Eliud Delatorre, PT Physical Therapist PT    RINKU 02/03/23 -  Alba Jackson, KYLE Physical Therapist Assistant PT    CR 03/03/23 -  Faraz Caraballo, RN Registered Nurse Nurse              PT Recommendation and Plan     Plan of Care Reviewed With: patient  Progress: improving  Outcome Evaluation: Pt tranfered supine-to-sit cga/min assist. Pt performed BLE ankle pumps and LAQ X 20, and BUE bicep curls X 20. Sit-to-stand cga/min assist. Pt ambulated 40 feet X 2 to hallway and back to the chair, verbal cues for placement of right foot. Pt in chair for breakfast with call light and exit alarm on. Pt would benefit from acute rehab.   Outcome Measures     Row Name 04/11/23 0900 04/10/23 1524          How much help from another person do you currently need...    Turning from your back to your side while in flat bed without using bedrails? 3  -MS (r) AR (t) MS (c) --     Moving from lying on back to sitting on the side of a flat bed without bedrails? 3  -MS (r) AR (t) MS (c) --     Moving to and from a bed to a chair (including a wheelchair)? 3  -MS (r) AR (t) MS (c) --     Standing up from a chair using your arms (e.g., wheelchair, bedside chair)? 3  -MS (r) AR (t) MS (c) --     Climbing 3-5 steps with a railing? 2  -MS (r) AR (t) MS (c) --     To walk in hospital room? 3  -MS (r) AR (t) MS (c) --     AM-PAC 6 Clicks Score (PT) 17  -MS (r) AR (t) --        How much help from another is currently needed...    Putting on and taking off regular lower body clothing? 2  -SP (r) GK (t) SP (c) 2  -SP (r) GK (t) SP (c)     Bathing (including washing, rinsing, and drying) 2  -SP (r) GK (t) SP (c) 2  -SP (r) GK (t) SP (c)     Toileting (which includes using  toilet bed pan or urinal) 3  -SP (r) GK (t) SP (c) 2  -SP (r) GK (t) SP (c)     Putting on and taking off regular upper body clothing 2  -SP (r) GK (t) SP (c) 2  -SP (r) GK (t) SP (c)     Taking care of personal grooming (such as brushing teeth) 3  -SP (r) GK (t) SP (c) 3  -SP (r) GK (t) SP (c)     Eating meals 3  -SP (r) GK (t) SP (c) 3  -SP (r) GK (t) SP (c)     AM-PAC 6 Clicks Score (OT) 15  -SP (r) GK (t) 14  -SP (r) GK (t)        Functional Assessment    Outcome Measure Options AM-PAC 6 Clicks Daily Activity (OT)  -SP (r) GK (t) SP (c) AM-PAC 6 Clicks Daily Activity (OT)  -SP (r) GK (t) SP (c)           User Key  (r) = Recorded By, (t) = Taken By, (c) = Cosigned By    Initials Name Provider Type    MS Joshua Latesha R, PT, DPT, NCS Physical Therapist    SP Christie Zazueta, RN Registered Nurse    Cielo Barrera, LIZZETTE Student OT Student    Jeronimo Stallworth, KYLE Student PTA Student                 Time Calculation:    PT Charges     Row Name 04/11/23 1355 04/11/23 0859          Time Calculation    Start Time 1327  - 0751  -     Stop Time 1345  -AH 0816  -AH     Time Calculation (min) 18 min  -AH 25 min  -AH     PT Received On -- 04/11/23  -        Time Calculation- PT    Total Timed Code Minutes- PT 18 minute(s)  -AH 25 minute(s)  -AH        Timed Charges    00839 - PT Therapeutic Exercise Minutes -- 10  -AH     13267 - Gait Training Minutes  18  -AH 15  -AH        Total Minutes    Timed Charges Total Minutes 18  -AH 25  -AH      Total Minutes 18  -AH 25  -AH           User Key  (r) = Recorded By, (t) = Taken By, (c) = Cosigned By    Initials Name Provider Type     Dafne Sherwood, PTA Physical Therapist Assistant                  PT G-Codes  Outcome Measure Options: AM-PAC 6 Clicks Daily Activity (OT)  AM-PAC 6 Clicks Score (PT): 17  AM-PAC 6 Clicks Score (OT): 15  Modified Gilbert Scale: 4 - Moderately severe disability.  Unable to walk without assistance, and unable to attend to own bodily needs  without assistance.    Jeronimo Maloney, PTA Student  4/11/2023

## 2023-04-11 NOTE — PLAN OF CARE
Goal Outcome Evaluation:  Plan of Care Reviewed With: patient        Progress: improving  Outcome Evaluation: Patient is alert and oriented times 4, NIH 1, states he still feels like his right side is slightly weaker, JOSHI, denies any numbness or tingling, no complaints of pain or discomfort, VSS, Tele running SR, bed alarm in use, safety maintained.

## 2023-04-11 NOTE — PLAN OF CARE
Goal Outcome Evaluation:  Plan of Care Reviewed With: (P) patient        Progress: (P) improving  Outcome Evaluation: (P) Pt tranfered supine-to-sit cga/min assist. Pt performed BLE ankle pumps and LAQ X 20, and BUE bicep curls X 20. Sit-to-stand cga/min assist. Pt ambulated 40 feet X 2 to hallway and back to the chair, verbal cues for placement of right foot. Pt in chair for breakfast with call light and exit alarm on. Pt would benefit from acute rehab.

## 2023-04-11 NOTE — THERAPY TREATMENT NOTE
Acute Care - Physical Therapy Treatment Note  [facility]     Patient Name: Zachariah Acosta  : 1940  MRN: [id number]  Today's Date: 2023      Visit Dx:     ICD-10-CM ICD-9-CM   1. Oropharyngeal dysphagia  R13.12 787.22   2. Intraparenchymal hemorrhage of brain  I61.9 431   3. Impaired mobility and ADLs  Z74.09 V49.89    Z78.9    4. Impaired mobility  Z74.09 799.89   5. Slurred speech  R47.81 784.59     Patient Active Problem List   Diagnosis   • BREANN on CPAP   • CAD (coronary artery disease)   • Chronic kidney disease (CKD), stage III (moderate)   • Disorder of lung   • Essential hypertension   • Mild intermittent asthma without complication   • S/P CABG (coronary artery bypass graft)   • Type 2 diabetes mellitus with hyperglycemia, with long-term current use of insulin (HCC)   • Ulcerative colitis   • Obesity (BMI 30-39.9)   • Non-seasonal allergic rhinitis   • SOB (shortness of breath)   • Pulmonary scarring   • Pulmonary nodule seen on imaging study   • Mixed hyperlipidemia   • Nonsmoker   • Ulcerative colitis with complication   • Intractable abdominal pain   • Constipation   • KE (acute kidney injury)   • Hematuria   • LLQ pain   • Erectile dysfunction   • Class 1 obesity due to excess calories with serious comorbidity and body mass index (BMI) of 34.0 to 34.9 in adult   • Thyroid nodule   • Double vision   • Elevated PSA   • Iron deficiency anemia due to chronic blood loss   • Long-term use of immunosuppressant medication   • Nontraumatic complete tear of left rotator cuff   • Rosacea   • Intraparenchymal hemorrhage of brain     Past Medical History:   Diagnosis Date   • Asthma    • Coronary artery disease    • Diabetes mellitus    • Elevated cholesterol    • HL (hearing loss)    • Hyperlipidemia    • Hypertension    • Myocardial infarct     2020   • Sleep apnea     cpap at    • Sleep apnea, obstructive      Past Surgical History:   Procedure Laterality Date   • ADENOIDECTOMY   1947   • BACK SURGERY     • CARDIAC CATHETERIZATION      stents x 6   • CARDIAC SURGERY      CABG TRIPLE BYPASS 2012   • CATARACT EXTRACTION     • COLONOSCOPY     • COLONOSCOPY N/A 06/04/2021    Procedure: COLONOSCOPY WITH ANESTHESIA;  Surgeon: Zachariah Menjivar DO;  Location:  [facility];  Service: Gastroenterology;  Laterality: N/A;  pre hx ulcerative colitis  post ulcerative colitis  dr collins gusman   • CORONARY ANGIOPLASTY WITH STENT PLACEMENT     • CORONARY ARTERY BYPASS GRAFT  December 2012   • CORONARY STENT PLACEMENT     • HIP SURGERY Right     total hip   • JOINT REPLACEMENT  2015    Right hip   • PENILE PROSTHESIS IMPLANT N/A 12/13/2021    Procedure: 3-PIECE INFLATABLE PENILE PROSTHESIS PLACEMENT;  Surgeon: Jose Tellez MD;  Location:  [facility];  Service: Urology;  Laterality: N/A;   • TONSILLECTOMY  1947     PT Assessment (last 12 hours)     PT Evaluation and Treatment     Row Name 04/11/23 0751          Physical Therapy Time and Intention    Subjective Information no complaints (P)   stiffness in neck from sleeping  -AR     Document Type therapy note (daily note) (P)   -AR     Mode of Treatment physical therapy (P)   -AR     Row Name 04/11/23 0751          General Information    Existing Precautions/Restrictions fall;right (P)   R side weakness  -AR     Row Name 04/11/23 0751          Pain    Pretreatment Pain Rating 0/10 - no pain (P)   -AR     Posttreatment Pain Rating 0/10 - no pain (P)   -AR     Pain Intervention(s) Repositioned (P)   -AR     Row Name 04/11/23 0751          Bed Mobility    Bed Mobility supine-sit (P)   -AR     Supine-Sit [place] (Bed Mobility) 1 person assist;contact guard;minimum assist (75% patient effort) (P)   -AR     Sit-Supine [place] (Bed Mobility) -- (P)   chair  -AR     Row Name 04/11/23 0751          Transfers    Transfers sit-stand transfer;stand-sit transfer (P)   -AR     Row Name 04/11/23 0751          Sit-Stand Transfer    Sit-Stand [place]  (Transfers) contact guard;minimum assist (75% patient effort) (P)   -AR     Assistive Device (Sit-Stand Transfers) walker, front-wheeled (P)   -AR     Row Name 04/11/23 0751          Stand-Sit Transfer    Stand-Sit [place] (Transfers) contact guard;minimum assist (75% patient effort);1 person assist (P)   -AR     Assistive Device (Stand-Sit Transfers) walker, front-wheeled (P)   -AR     Row Name 04/11/23 0751          Gait/Stairs (Locomotion)    [place] Level (Gait) contact guard;minimum assist (75% patient effort);1 person assist (P)   -AR     Assistive Device (Gait) walker, front-wheeled (P)   -AR     Distance in Feet (Gait) 40 X 2 (P)   -AR     Right Sided Gait Deviations heel strike decreased (P)   -AR     Comment, (Gait/Stairs) -- (P)   Verbal cues to lift foot off floor  -AR     Row Name 04/11/23 0751          Motor Skills    Comments, Therapeutic Exercise BLE ankle pumps and LAQ X 20. BUE bicep curls X 20 (P)   -AR     Additional Documentation Comments, Therapeutic Exercise (Row) (P)   -AR     Row Name 04/11/23 0751          Plan of Care Review    Plan of Care Reviewed With patient (P)   -AR     Progress improving (P)   -AR     Outcome Evaluation Pt tranfered supine-to-sit cga/min assist. Pt performed BLE ankle pumps and LAQ X 20, and BUE bicep curls X 20. Sit-to-stand cga/min assist. Pt ambulated 40 feet X 2 to hallway and back to the chair, verbal cues for placement of right foot. Pt in chair for breakfast with call light and exit alarm on. Pt would benefit from acute rehab. (P)   -AR     Row Name 04/11/23 0751          Positioning and Restraints    Pre-Treatment Position in bed (P)   -AR     Post Treatment Position chair (P)   -AR     In Bed -- (P)   Chair  -AR     In Chair sitting;call light within reach;encouraged to call for assist;exit alarm on (P)   -AR           User Key  (r) = Recorded By, (t) = Taken By, (c) = Cosigned By    Initials Name Provider Type    AR Jeronimo Maloney, PTA Student  PTA Student                Physical Therapy Education     Title: PT OT SLP Therapies (Done)     Topic: Physical Therapy (Done)     Point: Mobility training (Done)     Learning Progress Summary           Patient Acceptance, E,D, NR,VU by RINKU at 4/10/2023 0843    Comment: bed mobility. Also weight shifting for transfers and gait    Acceptance, E,TB, VU by CR at 4/10/2023 0518    Acceptance, E,D, DU,VU by  at 4/9/2023 1408    Comment: benefits of PT and POC, call for A OOB                   Point: Precautions (Done)     Learning Progress Summary           Patient Acceptance, E,TB, VU by CR at 4/10/2023 0518    Acceptance, E,D, DU,VU by  at 4/9/2023 1408    Comment: benefits of PT and POC, call for A OOB                               User Key     Initials Effective Dates Name Provider Type Discipline     02/03/23 -  Eliud Delatorre, PT Physical Therapist PT    RINKU 02/03/23 -  Alba Jackson, PTA Physical Therapist Assistant PT    CHRISTIANA 03/03/23 -  Faraz Caraballo, RN Registered Nurse Nurse              PT Recommendation and Plan     Plan of Care Reviewed With: (P) patient  Progress: (P) improving  Outcome Evaluation: (P) Pt tranfered supine-to-sit cga/min assist. Pt performed BLE ankle pumps and LAQ X 20, and BUE bicep curls X 20. Sit-to-stand cga/min assist. Pt ambulated 40 feet X 2 to hallway and back to the chair, verbal cues for placement of right foot. Pt in chair for breakfast with call light and exit alarm on. Pt would benefit from acute rehab.   Outcome Measures     Row Name 04/11/23 0900 04/10/23 1524          How much help from another person do you currently need...    Turning from your back to your side while in flat bed without using bedrails? 3 (P)   -AR --     Moving from lying on back to sitting on the side of a flat bed without bedrails? 3 (P)   -AR --     Moving to and from a bed to a chair (including a wheelchair)? 3 (P)   -AR --     Standing up from a chair using your arms (e.g., wheelchair,  bedside chair)? 3 (P)   -AR --     Climbing 3-5 steps with a railing? 2 (P)   -AR --     To walk in hospital room? 3 (P)   -AR --     AM-PAC 6 Clicks Score (PT) 17 (P)   -AR --        How much help from another is currently needed...    Putting on and taking off regular lower body clothing? 2 (P)   -GK 2 (P)   -GK     Bathing (including washing, rinsing, and drying) 2 (P)   -GK 2 (P)   -GK     Toileting (which includes using toilet bed pan or urinal) 3 (P)   -GK 2 (P)   -GK     Putting on and taking off regular upper body clothing 2 (P)   -GK 2 (P)   -GK     Taking care of personal grooming (such as brushing teeth) 3 (P)   -GK 3 (P)   -GK     Eating meals 3 (P)   -GK 3 (P)   -GK     AM-PAC 6 Clicks Score (OT) 15 (P)   -GK 14 (P)   -GK        Functional Assessment    Outcome Measure Options AM-PAC 6 Clicks Daily Activity (OT) (P)   -GK AM-PAC 6 Clicks Daily Activity (OT) (P)   -GK           User Key  (r) = Recorded By, (t) = Taken By, (c) = Cosigned By    Initials Name Provider Type     Cielo Stokes, LIZZETTE Student OT Student    Jeronimo Stallworth PTA Student PTA Student                 Time Calculation:    PT Charges     Row Name 04/11/23 0859             Time Calculation    Start Time 0751  -      Stop Time 0816  -      Time Calculation (min) 25 min  -      PT Received On 04/11/23  -         Time Calculation- PT    Total Timed Code Minutes- PT 25 minute(s)  -AH         Timed Charges    94596 - PT Therapeutic Exercise Minutes 10  -AH      55242 - Gait Training Minutes  15  -AH         Total Minutes    Timed Charges Total Minutes 25  -AH       Total Minutes 25  -AH            User Key  (r) = Recorded By, (t) = Taken By, (c) = Cosigned By    Initials Name Provider Type     Dafne Sherwood, PTA Physical Therapist Assistant                  PT G-Codes  Outcome Measure Options: (P) AM-PAC 6 Clicks Daily Activity (OT)  AM-PAC 6 Clicks Score (PT): (P) 17  AM-PAC 6 Clicks Score (OT): (P) 15  Modified [place] Scale:  4 - Moderately severe disability.  Unable to walk without assistance, and unable to attend to own bodily needs without assistance.    Jeronimo Maloney, PTA Student  4/11/2023

## 2023-04-11 NOTE — PLAN OF CARE
Goal Outcome Evaluation:  Plan of Care Reviewed With: (P) patient        Progress: (P) improving  Outcome Evaluation: (P) OT tx completed this date. Pt cleared for therapy by nsg. Pt in recliner eating breakfast upon arrival. Pt completed grooming tasks of washing hair with shower cap, and oral hygiene requiring set up while seated in recliner. Pt continues to have weakness and ataxia in RUE and continues to have difficulty holding objects during grooming tasks. Pt completed sit<>stand t/f with RW requiring CGA and completed functional mobility to bathroom utilizing RW. Pt completed toileting tasks requiring SBA for all aspects of toileting. Pt utilized grab bars for toilet hygiene. Pt completed stand<>step t/f with RW to sink from toilet to wash his hands requiring CGA and required CGA for functional mobility with RW back to recliner. OT POC to continue.

## 2023-04-11 NOTE — PLAN OF CARE
Goal Outcome Evaluation:  Plan of Care Reviewed With: patient, spouse         Patient is alert and oriented x 4. VSS. RA.Complains of pain at back of neck and base of head. MD notified and received new order for PRN pain med. States good relief after medication. Patient also complained of nausea and sweating. At the same time patient was using toilet. BM x 4 today , watery stool x 1 per patient. Vitals and blood sugar checked. Stable. Relieved nausea after patient is back to bed. Stone ESCALANTE notified. Safety precautions maintained. Tele continue, normal sinus rhythm. Wife at bedside. Will continue to monitor.

## 2023-04-11 NOTE — THERAPY TREATMENT NOTE
Acute Care - Occupational Therapy Treatment Note  [facility]     Patient Name: Zachariah Acosta  : 1940  MRN: [id number]  Today's Date: 2023             Admit Date: 2023       ICD-10-CM ICD-9-CM   1. Oropharyngeal dysphagia  R13.12 787.22   2. Intraparenchymal hemorrhage of brain  I61.9 431   3. Impaired mobility and ADLs  Z74.09 V49.89    Z78.9    4. Impaired mobility  Z74.09 799.89   5. Slurred speech  R47.81 784.59     Patient Active Problem List   Diagnosis   • BREANN on CPAP   • CAD (coronary artery disease)   • Chronic kidney disease (CKD), stage III (moderate)   • Disorder of lung   • Essential hypertension   • Mild intermittent asthma without complication   • S/P CABG (coronary artery bypass graft)   • Type 2 diabetes mellitus with hyperglycemia, with long-term current use of insulin ([facility])   • Ulcerative colitis   • Obesity (BMI 30-39.9)   • Non-seasonal allergic rhinitis   • SOB (shortness of breath)   • Pulmonary scarring   • Pulmonary nodule seen on imaging study   • Mixed hyperlipidemia   • Nonsmoker   • Ulcerative colitis with complication   • Intractable abdominal pain   • Constipation   • KE (acute kidney injury)   • Hematuria   • LLQ pain   • Erectile dysfunction   • Class 1 obesity due to excess calories with serious comorbidity and body mass index (BMI) of 34.0 to 34.9 in adult   • Thyroid nodule   • Double vision   • Elevated PSA   • Iron deficiency anemia due to chronic blood loss   • Long-term use of immunosuppressant medication   • Nontraumatic complete tear of left rotator cuff   • Rosacea   • Intraparenchymal hemorrhage of brain     Past Medical History:   Diagnosis Date   • Asthma    • Coronary artery disease    • Diabetes mellitus    • Elevated cholesterol    • HL (hearing loss)    • Hyperlipidemia    • Hypertension    • Myocardial infarct     2020   • Sleep apnea     cpap at    • Sleep apnea, obstructive      Past Surgical History:   Procedure Laterality  Date   • ADENOIDECTOMY  1947   • BACK SURGERY     • CARDIAC CATHETERIZATION      stents x 6   • CARDIAC SURGERY      CABG TRIPLE BYPASS 2012   • CATARACT EXTRACTION     • COLONOSCOPY     • COLONOSCOPY N/A 06/04/2021    Procedure: COLONOSCOPY WITH ANESTHESIA;  Surgeon: Zachariah Menjivar DO;  Location:  [facility];  Service: Gastroenterology;  Laterality: N/A;  pre hx ulcerative colitis  post ulcerative colitis  dr collins gusman   • CORONARY ANGIOPLASTY WITH STENT PLACEMENT     • CORONARY ARTERY BYPASS GRAFT  December 2012   • CORONARY STENT PLACEMENT     • HIP SURGERY Right     total hip   • JOINT REPLACEMENT  2015    Right hip   • PENILE PROSTHESIS IMPLANT N/A 12/13/2021    Procedure: 3-PIECE INFLATABLE PENILE PROSTHESIS PLACEMENT;  Surgeon: Jose Tellez MD;  Location:  [facility];  Service: Urology;  Laterality: N/A;   • TONSILLECTOMY  1947         OT ASSESSMENT FLOWSHEET (last 12 hours)     OT Evaluation and Treatment     Row Name 04/11/23 0820                   OT Time and Intention    Subjective Information complains of;weakness  -SP (r) GK (t) SP (c)        Document Type therapy note (daily note)  -SP (r) GK (t) SP (c)        Mode of Treatment occupational therapy  -SP (r) GK (t) SP (c)        Patient Effort good  -SP (r) GK (t) SP (c)           General Information    Patient Profile Reviewed yes  -SP (r) GK (t) SP (c)        Existing Precautions/Restrictions fall  -SP (r) GK (t) SP (c)        Limitations/Impairments hearing  -SP (r) GK (t) SP (c)        Barriers to Rehab medically complex  -SP (r) GK (t) SP (c)           Pain Assessment    Pretreatment Pain Rating 0/10 - no pain  -SP (r) GK (t) SP (c)        Posttreatment Pain Rating 0/10 - no pain  -SP (r) GK (t) SP (c)           Cognition    Orientation Status (Cognition) oriented x 4  -SP (r) GK (t) SP (c)           Coping    Observed Emotional State calm;cooperative  -SP (r) GK (t) SP (c)        Verbalized Emotional State acceptance  -SP (r) GK  (t) SP (c)           Plan of Care Review    Plan of Care Reviewed With patient  -SP (r) GK (t) SP (c)        Progress improving  -SP (r) GK (t) SP (c)        Outcome Evaluation OT tx completed this date. Pt cleared for therapy by nsg. Pt in recliner eating breakfast upon arrival. Pt completed grooming tasks of washing hair with shower cap, and oral hygiene requiring set up while seated in recliner. Pt continues to have weakness and ataxia in RUE and continues to have difficulty holding objects during grooming tasks. Pt completed sit<>stand t/f with RW requiring CGA and completed functional mobility to bathroom utilizing RW. Pt completed toileting tasks requiring SBA for all aspects of toileting. Pt utilized grab bars for toilet hygiene. Pt completed stand<>step t/f with RW to sink from toilet to wash his hands requiring CGA and required CGA for functional mobility with RW back to recliner. OT POC to continue.  -SP (r) GK (t) SP (c)           Positioning and Restraints    Pre-Treatment Position sitting in chair/recliner  -SP (r) GK (t) SP (c)        Post Treatment Position chair  -SP (r) GK (t) SP (c)        In Chair notified nsg;reclined;call light within reach;encouraged to call for assist;exit alarm on  -SP (r) GK (t) SP (c)           Therapy Plan Review/Discharge Plan (OT)    Anticipated Discharge Disposition (OT) inpatient rehabilitation facility  -SP (r) GK (t) SP (c)              User Key  (r) = Recorded By, (t) = Taken By, (c) = Cosigned By    Initials Name Effective Dates    Christie Dominguez RN 07/09/21 -     Cielo Barrera OTA Student 02/20/23 -                  Occupational Therapy Education     Title: PT OT SLP Therapies (Done)     Topic: Occupational Therapy (Done)     Point: ADL training (Done)     Description:   Instruct learner(s) on proper safety adaptation and remediation techniques during self care or transfers.   Instruct in proper use of assistive devices.              Learning Progress  Summary           Patient Acceptance, E, VU by  at 4/11/2023 0910    Comment: Pt educated on compensatory strategies for completing ADL tasks with right hand.    Acceptance, E, VU by  at 4/10/2023 1616    Comment: Pt educated on compensatory strategies during ADLs when using right hand.    Acceptance, E,TB, VU by CR at 4/10/2023 0518    Acceptance, E, VU by JHÉCTOR at 4/9/2023 1402                   Point: Home exercise program (Done)     Description:   Instruct learner(s) on appropriate technique for monitoring, assisting and/or progressing therapeutic exercises/activities.              Learning Progress Summary           Patient Acceptance, E,TB, VU by CR at 4/10/2023 0518                   Point: Precautions (Done)     Description:   Instruct learner(s) on prescribed precautions during self-care and functional transfers.              Learning Progress Summary           Patient Acceptance, E,TB, VU by CR at 4/10/2023 0518    Acceptance, E, VU by SIERRA at 4/9/2023 1402                   Point: Body mechanics (Done)     Description:   Instruct learner(s) on proper positioning and spine alignment during self-care, functional mobility activities and/or exercises.              Learning Progress Summary           Patient Acceptance, E,TB, VU by CR at 4/10/2023 0518                               User Key     Initials Effective Dates Name Provider Type Discipline     11/10/21 -  Lakshmi Chino OTR/L, CSRS Occupational Therapist OT     03/03/23 -  Faraz Caraballo, RN Registered Nurse Nurse     02/20/23 -  Cielo Stokes OTA Student OT Student OT                  OT Recommendation and Plan     Plan of Care Review  Plan of Care Reviewed With: patient  Progress: improving  Outcome Evaluation: OT tx completed this date. Pt cleared for therapy by nsg. Pt in recliner eating breakfast upon arrival. Pt completed grooming tasks of washing hair with shower cap, and oral hygiene requiring set up while seated in recliner. Pt  continues to have weakness and ataxia in RUE and continues to have difficulty holding objects during grooming tasks. Pt completed sit<>stand t/f with RW requiring CGA and completed functional mobility to bathroom utilizing RW. Pt completed toileting tasks requiring SBA for all aspects of toileting. Pt utilized grab bars for toilet hygiene. Pt completed stand<>step t/f with RW to sink from toilet to wash his hands requiring CGA and required CGA for functional mobility with RW back to recliner. OT POC to continue.  Plan of Care Reviewed With: patient  Outcome Evaluation: OT tx completed this date. Pt cleared for therapy by ns. Pt in recliner eating breakfast upon arrival. Pt completed grooming tasks of washing hair with shower cap, and oral hygiene requiring set up while seated in recliner. Pt continues to have weakness and ataxia in RUE and continues to have difficulty holding objects during grooming tasks. Pt completed sit<>stand t/f with RW requiring CGA and completed functional mobility to bathroom utilizing RW. Pt completed toileting tasks requiring SBA for all aspects of toileting. Pt utilized grab bars for toilet hygiene. Pt completed stand<>step t/f with RW to sink from toilet to wash his hands requiring CGA and required CGA for functional mobility with RW back to recliner. OT POC to continue.     Outcome Measures     Row Name 04/11/23 0900 04/10/23 7545          How much help from another person do you currently need...    Turning from your back to your side while in flat bed without using bedrails? 3  -MS (r) AR (t) MS (c) --     Moving from lying on back to sitting on the side of a flat bed without bedrails? 3  -MS (r) AR (t) MS (c) --     Moving to and from a bed to a chair (including a wheelchair)? 3  -MS (r) AR (t) MS (c) --     Standing up from a chair using your arms (e.g., wheelchair, bedside chair)? 3  -MS (r) AR (t) MS (c) --     Climbing 3-5 steps with a railing? 2  -MS (r) AR (t) MS (c) --     To  walk in hospital room? 3  -MS (r) AR (t) MS (c) --     AM-PAC 6 Clicks Score (PT) 17  -MS (r) AR (t) --        How much help from another is currently needed...    Putting on and taking off regular lower body clothing? 2  -SP (r) GK (t) SP (c) 2  -SP (r) GK (t) SP (c)     Bathing (including washing, rinsing, and drying) 2  -SP (r) GK (t) SP (c) 2  -SP (r) GK (t) SP (c)     Toileting (which includes using toilet bed pan or urinal) 3  -SP (r) GK (t) SP (c) 2  -SP (r) GK (t) SP (c)     Putting on and taking off regular upper body clothing 2  -SP (r) GK (t) SP (c) 2  -SP (r) GK (t) SP (c)     Taking care of personal grooming (such as brushing teeth) 3  -SP (r) GK (t) SP (c) 3  -SP (r) GK (t) SP (c)     Eating meals 3  -SP (r) GK (t) SP (c) 3  -SP (r) GK (t) SP (c)     AM-PAC 6 Clicks Score (OT) 15  -SP (r) GK (t) 14  -SP (r) GK (t)        Functional Assessment    Outcome Measure Options AM-PAC 6 Clicks Daily Activity (OT)  -SP (r) GK (t) SP (c) AM-PAC 6 Clicks Daily Activity (OT)  -SP (r) GK (t) SP (c)           User Key  (r) = Recorded By, (t) = Taken By, (c) = Cosigned By    Initials Name Provider Type    MS Lewis Latesha R, PT, DPT, NCS Physical Therapist    SP Christie Zazueta, RN Registered Nurse    Cielo Barrera, LIZZETTE Student OT Student    Jeronimo Stallworth, PTA Student PTA Student                Time Calculation:    Time Calculation- OT     Row Name 04/11/23 1355 04/11/23 1340 04/11/23 0859       Time Calculation- OT    OT Start Time -- 0820  -LS --    OT Stop Time -- 0858  -LS --    OT Time Calculation (min) -- 38 min  -LS --    Total Timed Code Minutes- OT -- 38 minute(s)  -LS --    OT Received On -- 04/11/23  -LS --       Timed Charges    76543 - Gait Training Minutes  18  -AH -- 15  -AH       Total Minutes    Timed Charges Total Minutes 18  -AH -- 15  -AH     Total Minutes 18  -AH -- 15  -AH          User Key  (r) = Recorded By, (t) = Taken By, (c) = Cosigned By    Initials Name Provider Type    SANDRA Sherwood  Dafne ALDRICH PTA Physical Therapist Assistant    Edie Talavera COTA Occupational Therapist Assistant                       Cielo Stokes, [facility] Student  4/11/2023

## 2023-04-12 ENCOUNTER — HOSPITAL ENCOUNTER (INPATIENT)
Age: 83
LOS: 10 days | Discharge: HOME | End: 2023-04-22
Attending: PSYCHIATRY & NEUROLOGY | Admitting: PSYCHIATRY & NEUROLOGY
Payer: MEDICARE

## 2023-04-12 ENCOUNTER — APPOINTMENT (OUTPATIENT)
Dept: CT IMAGING | Facility: HOSPITAL | Age: 83
DRG: 66 | End: 2023-04-12
Payer: MEDICARE

## 2023-04-12 VITALS
DIASTOLIC BLOOD PRESSURE: 73 MMHG | OXYGEN SATURATION: 95 % | RESPIRATION RATE: 20 BRPM | SYSTOLIC BLOOD PRESSURE: 122 MMHG | WEIGHT: 236 LBS | BODY MASS INDEX: 34.96 KG/M2 | TEMPERATURE: 98.8 F | HEART RATE: 69 BPM | HEIGHT: 69 IN

## 2023-04-12 DIAGNOSIS — I62.9 INTRACRANIAL BLEED (HCC): ICD-10-CM

## 2023-04-12 DIAGNOSIS — R53.1 WEAKNESS: Primary | ICD-10-CM

## 2023-04-12 PROBLEM — I61.9 INTRACEREBRAL HEMORRHAGE (HCC): Status: ACTIVE | Noted: 2023-04-12

## 2023-04-12 PROBLEM — E11.8 TYPE 2 DIABETES MELLITUS WITH COMPLICATION, WITHOUT LONG-TERM CURRENT USE OF INSULIN (HCC): Status: ACTIVE | Noted: 2023-04-12

## 2023-04-12 PROBLEM — G47.09 OTHER INSOMNIA: Status: ACTIVE | Noted: 2023-04-12

## 2023-04-12 PROBLEM — E78.49 OTHER HYPERLIPIDEMIA: Status: ACTIVE | Noted: 2023-04-12

## 2023-04-12 LAB
ALBUMIN SERPL-MCNC: 3.6 G/DL (ref 3.5–5.2)
ALP SERPL-CCNC: 70 U/L (ref 40–130)
ALT SERPL-CCNC: 21 U/L (ref 5–41)
ANION GAP SERPL CALCULATED.3IONS-SCNC: 13 MMOL/L (ref 7–19)
AST SERPL-CCNC: 20 U/L (ref 5–40)
BASOPHILS # BLD: 0.1 K/UL (ref 0–0.2)
BASOPHILS NFR BLD: 1.2 % (ref 0–1)
BILIRUB SERPL-MCNC: <0.2 MG/DL (ref 0.2–1.2)
BUN SERPL-MCNC: 16 MG/DL (ref 8–23)
CALCIUM SERPL-MCNC: 9 MG/DL (ref 8.8–10.2)
CHLORIDE SERPL-SCNC: 104 MMOL/L (ref 98–111)
CO2 SERPL-SCNC: 22 MMOL/L (ref 22–29)
CREAT SERPL-MCNC: 1 MG/DL (ref 0.5–1.2)
EOSINOPHIL # BLD: 1 K/UL (ref 0–0.6)
EOSINOPHIL NFR BLD: 12.1 % (ref 0–5)
ERYTHROCYTE [DISTWIDTH] IN BLOOD BY AUTOMATED COUNT: 12.7 % (ref 11.5–14.5)
GLUCOSE BLD-MCNC: 121 MG/DL (ref 70–99)
GLUCOSE BLD-MCNC: 191 MG/DL (ref 70–99)
GLUCOSE BLDC GLUCOMTR-MCNC: 206 MG/DL (ref 70–130)
GLUCOSE BLDC GLUCOMTR-MCNC: 280 MG/DL (ref 70–130)
GLUCOSE SERPL-MCNC: 162 MG/DL (ref 74–109)
HCT VFR BLD AUTO: 44.4 % (ref 42–52)
HGB BLD-MCNC: 14.7 G/DL (ref 14–18)
IMM GRANULOCYTES # BLD: 0 K/UL
LYMPHOCYTES # BLD: 2 K/UL (ref 1.1–4.5)
LYMPHOCYTES NFR BLD: 23.5 % (ref 20–40)
MCH RBC QN AUTO: 31.8 PG (ref 27–31)
MCHC RBC AUTO-ENTMCNC: 33.1 G/DL (ref 33–37)
MCV RBC AUTO: 96.1 FL (ref 80–94)
MONOCYTES # BLD: 0.6 K/UL (ref 0–0.9)
MONOCYTES NFR BLD: 6.9 % (ref 0–10)
NEUTROPHILS # BLD: 4.6 K/UL (ref 1.5–7.5)
NEUTS SEG NFR BLD: 55.9 % (ref 50–65)
PERFORMED ON: ABNORMAL
PERFORMED ON: ABNORMAL
PLATELET # BLD AUTO: 227 K/UL (ref 130–400)
PMV BLD AUTO: 10.1 FL (ref 9.4–12.4)
POTASSIUM SERPL-SCNC: 3.9 MMOL/L (ref 3.5–5)
PREALB SERPL-MCNC: 21 MG/DL (ref 20–40)
PROT SERPL-MCNC: 6.8 G/DL (ref 6.6–8.7)
RBC # BLD AUTO: 4.62 M/UL (ref 4.7–6.1)
SARS-COV-2 AG RESP QL IA.RAPID: NORMAL
SODIUM SERPL-SCNC: 139 MMOL/L (ref 136–145)
T4 FREE SERPL-MCNC: 0.95 NG/DL (ref 0.93–1.7)
TSH SERPL DL<=0.005 MIU/L-ACNC: 5.01 UIU/ML (ref 0.35–5.5)
WBC # BLD AUTO: 8.3 K/UL (ref 4.8–10.8)

## 2023-04-12 PROCEDURE — 6370000000 HC RX 637 (ALT 250 FOR IP): Performed by: PSYCHIATRY & NEUROLOGY

## 2023-04-12 PROCEDURE — 82962 GLUCOSE BLOOD TEST: CPT

## 2023-04-12 PROCEDURE — 87426 SARSCOV CORONAVIRUS AG IA: CPT | Performed by: NEUROLOGICAL SURGERY

## 2023-04-12 PROCEDURE — 85025 COMPLETE CBC W/AUTO DIFF WBC: CPT

## 2023-04-12 PROCEDURE — 82607 VITAMIN B-12: CPT

## 2023-04-12 PROCEDURE — 1180000000 HC REHAB R&B

## 2023-04-12 PROCEDURE — 63710000001 INSULIN LISPRO (HUMAN) PER 5 UNITS: Performed by: NURSE PRACTITIONER

## 2023-04-12 PROCEDURE — 36415 COLL VENOUS BLD VENIPUNCTURE: CPT

## 2023-04-12 PROCEDURE — 84134 ASSAY OF PREALBUMIN: CPT

## 2023-04-12 PROCEDURE — 84439 ASSAY OF FREE THYROXINE: CPT

## 2023-04-12 PROCEDURE — 84443 ASSAY THYROID STIM HORMONE: CPT

## 2023-04-12 PROCEDURE — 70450 CT HEAD/BRAIN W/O DYE: CPT

## 2023-04-12 PROCEDURE — 94640 AIRWAY INHALATION TREATMENT: CPT

## 2023-04-12 PROCEDURE — 80053 COMPREHEN METABOLIC PANEL: CPT

## 2023-04-12 PROCEDURE — 97116 GAIT TRAINING THERAPY: CPT

## 2023-04-12 PROCEDURE — 99239 HOSP IP/OBS DSCHRG MGMT >30: CPT | Performed by: NURSE PRACTITIONER

## 2023-04-12 PROCEDURE — 97535 SELF CARE MNGMENT TRAINING: CPT

## 2023-04-12 PROCEDURE — 97530 THERAPEUTIC ACTIVITIES: CPT

## 2023-04-12 RX ORDER — INSULIN LISPRO 100 [IU]/ML
5 INJECTION, SOLUTION INTRAVENOUS; SUBCUTANEOUS
Status: DISCONTINUED | OUTPATIENT
Start: 2023-04-12 | End: 2023-04-22 | Stop reason: HOSPADM

## 2023-04-12 RX ORDER — FINASTERIDE 5 MG/1
5 TABLET, FILM COATED ORAL DAILY
Status: DISCONTINUED | OUTPATIENT
Start: 2023-04-12 | End: 2023-04-22 | Stop reason: HOSPADM

## 2023-04-12 RX ORDER — INSULIN LISPRO 100 [IU]/ML
0-4 INJECTION, SOLUTION INTRAVENOUS; SUBCUTANEOUS NIGHTLY
Status: DISCONTINUED | OUTPATIENT
Start: 2023-04-12 | End: 2023-04-22 | Stop reason: HOSPADM

## 2023-04-12 RX ORDER — ACETAMINOPHEN 500 MG
500 TABLET ORAL EVERY 6 HOURS PRN
Status: DISCONTINUED | OUTPATIENT
Start: 2023-04-12 | End: 2023-04-14

## 2023-04-12 RX ORDER — MECOBALAMIN 5000 MCG
5 TABLET,DISINTEGRATING ORAL NIGHTLY
Status: DISCONTINUED | OUTPATIENT
Start: 2023-04-12 | End: 2023-04-22 | Stop reason: HOSPADM

## 2023-04-12 RX ORDER — CHOLECALCIFEROL (VITAMIN D3) 125 MCG
5 CAPSULE ORAL NIGHTLY
Status: ON HOLD | COMMUNITY
End: 2023-04-20 | Stop reason: HOSPADM

## 2023-04-12 RX ORDER — ACETAMINOPHEN 500 MG
500 TABLET ORAL EVERY 6 HOURS PRN
Status: ON HOLD | COMMUNITY
End: 2023-04-20 | Stop reason: HOSPADM

## 2023-04-12 RX ORDER — INSULIN GLARGINE 100 [IU]/ML
15 INJECTION, SOLUTION SUBCUTANEOUS NIGHTLY
Status: DISCONTINUED | OUTPATIENT
Start: 2023-04-12 | End: 2023-04-22 | Stop reason: HOSPADM

## 2023-04-12 RX ORDER — ROSUVASTATIN CALCIUM 20 MG/1
20 TABLET, COATED ORAL NIGHTLY
Status: DISCONTINUED | OUTPATIENT
Start: 2023-04-12 | End: 2023-04-22 | Stop reason: HOSPADM

## 2023-04-12 RX ORDER — HYDROCODONE BITARTRATE AND ACETAMINOPHEN 7.5; 325 MG/1; MG/1
1 TABLET ORAL EVERY 6 HOURS PRN
Status: DISCONTINUED | OUTPATIENT
Start: 2023-04-12 | End: 2023-04-22 | Stop reason: HOSPADM

## 2023-04-12 RX ORDER — BUDESONIDE AND FORMOTEROL FUMARATE DIHYDRATE 160; 4.5 UG/1; UG/1
2 AEROSOL RESPIRATORY (INHALATION) 2 TIMES DAILY
Status: DISCONTINUED | OUTPATIENT
Start: 2023-04-12 | End: 2023-04-22 | Stop reason: HOSPADM

## 2023-04-12 RX ORDER — INSULIN LISPRO 100 [IU]/ML
0-4 INJECTION, SOLUTION INTRAVENOUS; SUBCUTANEOUS
Status: DISCONTINUED | OUTPATIENT
Start: 2023-04-12 | End: 2023-04-22 | Stop reason: HOSPADM

## 2023-04-12 RX ORDER — FLUTICASONE PROPIONATE 50 MCG
1 SPRAY, SUSPENSION (ML) NASAL DAILY
Status: DISCONTINUED | OUTPATIENT
Start: 2023-04-12 | End: 2023-04-22 | Stop reason: HOSPADM

## 2023-04-12 RX ORDER — FERROUS SULFATE 325(65) MG
325 TABLET ORAL
Status: DISCONTINUED | OUTPATIENT
Start: 2023-04-12 | End: 2023-04-22 | Stop reason: HOSPADM

## 2023-04-12 RX ORDER — PROCHLORPERAZINE 25 MG/1
SUPPOSITORY RECTAL
COMMUNITY

## 2023-04-12 RX ORDER — LISINOPRIL 20 MG/1
20 TABLET ORAL DAILY
Status: ON HOLD | COMMUNITY
End: 2023-04-20 | Stop reason: HOSPADM

## 2023-04-12 RX ORDER — CHOLECALCIFEROL (VITAMIN D3) 125 MCG
1000 CAPSULE ORAL DAILY
Status: DISCONTINUED | OUTPATIENT
Start: 2023-04-12 | End: 2023-04-22 | Stop reason: HOSPADM

## 2023-04-12 RX ORDER — MESALAMINE 4 G/60ML
4 ENEMA RECTAL EVERY OTHER DAY
Status: DISCONTINUED | OUTPATIENT
Start: 2023-04-12 | End: 2023-04-22 | Stop reason: HOSPADM

## 2023-04-12 RX ORDER — LISINOPRIL 20 MG/1
20 TABLET ORAL DAILY
Status: DISCONTINUED | OUTPATIENT
Start: 2023-04-12 | End: 2023-04-22 | Stop reason: HOSPADM

## 2023-04-12 RX ORDER — NITROGLYCERIN 0.4 MG/1
0.4 TABLET SUBLINGUAL EVERY 5 MIN PRN
Status: DISCONTINUED | OUTPATIENT
Start: 2023-04-12 | End: 2023-04-22 | Stop reason: HOSPADM

## 2023-04-12 RX ORDER — LEVALBUTEROL TARTRATE 45 UG/1
1 AEROSOL, METERED ORAL EVERY 4 HOURS PRN
Status: DISCONTINUED | OUTPATIENT
Start: 2023-04-12 | End: 2023-04-22 | Stop reason: HOSPADM

## 2023-04-12 RX ORDER — POLYETHYLENE GLYCOL 3350 17 G/17G
17 POWDER, FOR SOLUTION ORAL DAILY PRN
Status: DISCONTINUED | OUTPATIENT
Start: 2023-04-12 | End: 2023-04-22 | Stop reason: HOSPADM

## 2023-04-12 RX ORDER — TRIAMCINOLONE ACETONIDE 1 MG/G
CREAM TOPICAL 2 TIMES DAILY
Status: DISCONTINUED | OUTPATIENT
Start: 2023-04-12 | End: 2023-04-22 | Stop reason: HOSPADM

## 2023-04-12 RX ORDER — ACETAMINOPHEN 500 MG
500 TABLET ORAL EVERY 6 HOURS PRN
Start: 2023-04-12

## 2023-04-12 RX ORDER — INSULIN ASPART 100 [IU]/ML
5 INJECTION, SOLUTION INTRAVENOUS; SUBCUTANEOUS
Status: ON HOLD | COMMUNITY
End: 2023-04-20 | Stop reason: HOSPADM

## 2023-04-12 RX ORDER — ACETAMINOPHEN 325 MG/1
650 TABLET ORAL EVERY 4 HOURS PRN
Status: DISCONTINUED | OUTPATIENT
Start: 2023-04-12 | End: 2023-04-22 | Stop reason: HOSPADM

## 2023-04-12 RX ORDER — HYDROCODONE BITARTRATE AND ACETAMINOPHEN 7.5; 325 MG/1; MG/1
1 TABLET ORAL EVERY 6 HOURS PRN
Status: ON HOLD | COMMUNITY
End: 2023-04-20 | Stop reason: HOSPADM

## 2023-04-12 RX ORDER — MECLIZINE HCL 12.5 MG/1
12.5 TABLET ORAL 3 TIMES DAILY PRN
Status: DISCONTINUED | OUTPATIENT
Start: 2023-04-12 | End: 2023-04-22 | Stop reason: HOSPADM

## 2023-04-12 RX ORDER — VITAMIN B COMPLEX
1000 TABLET ORAL DAILY
Status: DISCONTINUED | OUTPATIENT
Start: 2023-04-12 | End: 2023-04-22 | Stop reason: HOSPADM

## 2023-04-12 RX ORDER — HYDROCODONE BITARTRATE AND ACETAMINOPHEN 7.5; 325 MG/1; MG/1
1 TABLET ORAL EVERY 6 HOURS PRN
Refills: 0
Start: 2023-04-12 | End: 2023-04-18

## 2023-04-12 RX ORDER — ACETAMINOPHEN 500 MG
500 TABLET ORAL EVERY 6 HOURS PRN
Status: DISCONTINUED | OUTPATIENT
Start: 2023-04-12 | End: 2023-04-12 | Stop reason: HOSPADM

## 2023-04-12 RX ORDER — MECLIZINE HCL 12.5 MG/1
12.5 TABLET ORAL 3 TIMES DAILY PRN
Status: ON HOLD | COMMUNITY
End: 2023-04-20 | Stop reason: HOSPADM

## 2023-04-12 RX ADMIN — INSULIN LISPRO 3 UNITS: 100 INJECTION, SOLUTION INTRAVENOUS; SUBCUTANEOUS at 11:38

## 2023-04-12 RX ADMIN — LISINOPRIL 20 MG: 20 TABLET ORAL at 08:51

## 2023-04-12 RX ADMIN — ROSUVASTATIN CALCIUM 20 MG: 20 TABLET, COATED ORAL at 21:23

## 2023-04-12 RX ADMIN — Medication 2 PUFF: at 19:31

## 2023-04-12 RX ADMIN — Medication 5 MG: at 21:23

## 2023-04-12 RX ADMIN — ACETAMINOPHEN 650 MG: 325 TABLET ORAL at 21:30

## 2023-04-12 RX ADMIN — METOPROLOL TARTRATE 25 MG: 25 TABLET, FILM COATED ORAL at 08:51

## 2023-04-12 RX ADMIN — FINASTERIDE 5 MG: 5 TABLET, FILM COATED ORAL at 08:51

## 2023-04-12 RX ADMIN — INSULIN LISPRO 4 UNITS: 100 INJECTION, SOLUTION INTRAVENOUS; SUBCUTANEOUS at 08:51

## 2023-04-12 RX ADMIN — METFORMIN HYDROCHLORIDE 500 MG: 500 TABLET, EXTENDED RELEASE ORAL at 08:51

## 2023-04-12 RX ADMIN — ACETAMINOPHEN 500 MG: 500 TABLET, FILM COATED ORAL at 08:50

## 2023-04-12 RX ADMIN — Medication 2 PUFF: at 19:32

## 2023-04-12 RX ADMIN — INSULIN GLARGINE 15 UNITS: 100 INJECTION, SOLUTION SUBCUTANEOUS at 21:24

## 2023-04-12 RX ADMIN — METOPROLOL TARTRATE 25 MG: 25 TABLET, FILM COATED ORAL at 21:23

## 2023-04-12 ASSESSMENT — PAIN SCALES - GENERAL
PAINLEVEL_OUTOF10: 0
PAINLEVEL_OUTOF10: 1
PAINLEVEL_OUTOF10: 5

## 2023-04-12 ASSESSMENT — PAIN DESCRIPTION - ORIENTATION: ORIENTATION: POSTERIOR

## 2023-04-12 ASSESSMENT — PAIN - FUNCTIONAL ASSESSMENT: PAIN_FUNCTIONAL_ASSESSMENT: ACTIVITIES ARE NOT PREVENTED

## 2023-04-12 ASSESSMENT — PAIN DESCRIPTION - DESCRIPTORS: DESCRIPTORS: ACHING;DISCOMFORT

## 2023-04-12 ASSESSMENT — PAIN DESCRIPTION - LOCATION: LOCATION: NECK;SHOULDER

## 2023-04-12 NOTE — PLAN OF CARE
Goal Outcome Evaluation:  Plan of Care Reviewed With: patient        Progress: improving  Outcome Evaluation: Patient is alert and oriented times 4, no complaints of pain or discomfort, states has had some mild tingling in BLE, NIH remains a 1, Neuro checks per orders WDL, blood glucose monitored as ordered, wears home CPAP at night, JOSHI, PPP, VSS, safety maintained.

## 2023-04-12 NOTE — PROGRESS NOTES
"Zachariah Vargasham  82 y.o.      Chief complaint:   Hemorrhagic CVA    Subjective:  Symptoms:  Stable.    Diet:  Adequate intake.    Activity level: Returning to normal.    Pain:  He complains of pain that is mild.  He reports pain is improving.  Pain is well controlled.      No events overnight.  Patient tolerating p.o.  Working with physical therapy.  Blood pressure under good control    Temp:  [97.8 °F (36.6 °C)-98.7 °F (37.1 °C)] 98.3 °F (36.8 °C)  Heart Rate:  [57-68] 68  Resp:  [18-20] 18  BP: (113-132)/(57-84) 126/57      Objective:  General Appearance:  Comfortable, well-appearing, in no acute distress and in pain.    Vital signs: (most recent): Blood pressure 126/57, pulse 68, temperature 98.3 °F (36.8 °C), temperature source Oral, resp. rate 18, height 175.3 cm (69\"), weight 107 kg (236 lb), SpO2 96 %.  Vital signs are normal.  No fever.    Output: Producing urine.    HEENT: Normal HEENT exam.    Lungs:  Normal effort and normal respiratory rate.  He is not in respiratory distress.    Heart: Normal rate.  Regular rhythm.    Chest: Symmetric chest wall expansion.   Skin:  Warm and dry.    Abdomen: Abdomen is soft and non-distended.  Bowel sounds are normal.   There is no abdominal tenderness.     Pulses: Distal pulses are intact.        Neurologic Exam     Mental Status   Oriented to person, place, and time.   Attention: normal. Concentration: normal.   Speech: speech is normal   Level of consciousness: alert  Normal comprehension.     Cranial Nerves     CN II   Visual fields full to confrontation.     CN III, IV, VI   Pupils are equal, round, and reactive to light.  Extraocular motions are normal.     CN V   Facial sensation intact.     CN VII   Facial expression full, symmetric.   Right facial weakness: central    CN VIII   CN VIII normal.     CN IX, X   CN IX normal.   CN X normal.     CN XI   CN XI normal.     CN XII   CN XII normal.   Tongue deviation: right    Motor Exam   Muscle bulk: " normal    Strength   Strength 5/5 throughout. Ataxic movements of the right side     Sensory Exam   Light touch normal.     Gait, Coordination, and Reflexes     Reflexes   Reflexes 2+ except as noted.       Lab Results (last 24 hours)     Procedure Component Value Units Date/Time    POC Glucose Once [[id number]]  (Abnormal) Collected: 04/12/23 0750    Specimen: Blood Updated: 04/12/23 0802     Glucose 280 mg/dL      Comment: : [id number]  KierstonMeter ID: [id number]       POC Glucose Once [[id number]]  (Abnormal) Collected: 04/11/23 2107    Specimen: Blood Updated: 04/11/23 2124     Glucose 210 mg/dL      Comment: : ben Roland LisaMeter ID: [id number]       POC Glucose Once [[id number]]  (Abnormal) Collected: 04/11/23 1603    Specimen: Blood Updated: 04/11/23 1614     Glucose 140 mg/dL      Comment: : [id number] Marbin SanjitaMeter ID: [id number]       POC Glucose Once [[id number]]  (Abnormal) Collected: 04/11/23 1113    Specimen: Blood Updated: 04/11/23 1123     Glucose 217 mg/dL      Comment: : [id number] Hernandez KierstonMeter ID: [id number]                 Plan:   CV: Heart rate and blood pressure stable  Respiratory: Oxygen level stable  GI: Tolerating p.o.  : Adequate urine output  Neuro: Exam stable  Continue working with physical and Occupational Therapy.  We will add Tylenol to help with pain.  Patient is agreeable to go to inpatient rehab.   aware and making referrals.      Intraparenchymal hemorrhage of brain        El Adamson, APRN

## 2023-04-12 NOTE — DISCHARGE SUMMARY
Date of Discharge:  4/12/2023    Discharge Diagnosis: Hemorrhagic CVA    Presenting Problem/History of Present Illness  Intraparenchymal hemorrhage of brain [I61.9]       Hospital Course  Patient is a 82 y.o. male presented with right upper and lower extremity weakness and difficulty with overall functioning.  He was complaining any headache or nausea or vomiting.  He came to the emergency room where work-up did find that he had a left thalamic hemorrhagic CVA.  He was taking a baby aspirin.  The patient was admitted to the intensive care unit and was monitored with aggressive blood pressure control and serial imaging.  Imaging did show stable appearance of the hemorrhagic CVA.  The patient has been working with physical and Occupational Therapy.  He is still having some difficulty with ambulating.  It is felt at this time the patient is stable and suitable to be discharged to inpatient rehab at [facility].  We will follow-up again with the patient in 3 weeks with repeat CT scan of the head.  Blood pressure does need to be maintained less than 160.  He will need to follow-up with his primary care doctor as well post rehab stay.  His questions and concerns were addressed.      Procedures Performed         Consults:   Consults     No orders found from 3/11/2023 to 4/10/2023.            Condition on Discharge: Stable    Vital Signs  Temp:  [97.8 °F (36.6 °C)-98.8 °F (37.1 °C)] 98.8 °F (37.1 °C)  Heart Rate:  [60-69] 69  Resp:  [18-20] 20  BP: (113-126)/(57-73) 122/73    Physical Exam:   Physical Exam  Eyes:      Extraocular Movements: EOM normal.      Pupils: Pupils are equal, round, and reactive to light.   Cardiovascular:      Rate and Rhythm: Normal rate and regular rhythm.   Pulmonary:      Effort: Pulmonary effort is normal. No respiratory distress.   Abdominal:      General: Bowel sounds are normal. There is no distension.      Palpations: Abdomen is soft.   Musculoskeletal:         General: Normal range of  motion.   Skin:     General: Skin is warm and dry.   Neurological:      Mental Status: He is oriented to person, place, and time.      Motor: Motor strength is normal.   Psychiatric:         Speech: Speech normal.          Neurologic Exam     Mental Status   Oriented to person, place, and time.   Attention: normal. Concentration: normal.   Speech: speech is normal   Level of consciousness: alert  Normal comprehension.     Cranial Nerves     CN II   Visual fields full to confrontation.     CN III, IV, VI   Pupils are equal, round, and reactive to light.  Extraocular motions are normal.     CN V   Facial sensation intact.     CN VII   Facial expression full, symmetric.   Right facial weakness: central    CN VIII   CN VIII normal.     CN IX, X   CN IX normal.   CN X normal.     CN XI   CN XI normal.     CN XII   CN XII normal.   Tongue deviation: right    Motor Exam   Muscle bulk: normal    Strength   Strength 5/5 throughout. Ataxic movements of the right side     Sensory Exam   Light touch normal.     Gait, Coordination, and Reflexes     Reflexes   Reflexes 2+ except as noted.          Discharge Disposition  Rehab Facility or Unit (DC - External)    Discharge Medications     Discharge Medications      New Medications      Instructions Start Date   acetaminophen 500 MG tablet  Commonly known as: TYLENOL   500 mg, Oral, Every 6 Hours PRN      HYDROcodone-acetaminophen 7.5-325 MG per tablet  Commonly known as: NORCO   1 tablet, Oral, Every 6 Hours PRN         Changes to Medications      Instructions Start Date   Lantus SoloStar 100 UNIT/ML injection pen  Generic drug: Insulin Glargine  What changed:   · how much to take  · when to take this   Begin taking 15 units at bedtime, increase by one unit every night that fasting blood sugar is above 130.      levalbuterol 45 MCG/ACT inhaler  Commonly known as: Xopenex HFA  What changed: when to take this   1-2 puffs, Inhalation, Every 4 Hours PRN      Ustekinumab 90 MG/ML  solution prefilled syringe Injection  Commonly known as: STELARA  What changed:   · when to take this  · additional instructions   90 mg, Subcutaneous, Every 28 Days         Continue These Medications      Instructions Start Date   Breztri Aerosphere 160-9-4.8 MCG/ACT aerosol inhaler  Generic drug: Budeson-Glycopyrrol-Formoterol   2 puffs, Inhalation, 2 Times Daily      CHILDRENS CHEWABLE MULTI VITS PO   Oral      Dexcom G6 Sensor   Does not apply, Every 10 Days, This should be changed out      Dexcom G6 Transmitter misc   1 Device, Does not apply, Every 3 Months, As directed      Dupilumab 300 MG/2ML solution prefilled syringe   Subcutaneous, Every 14 Days      finasteride 5 MG tablet  Commonly known as: PROSCAR   Take 1 tablet by mouth once daily      fluticasone 50 MCG/ACT nasal spray  Commonly known as: FLONASE   1 spray, Nasal, Daily      GARLIC PO   1 tablet, Oral, Daily      glucose blood test strip   1 each, Other, 3 Times Daily PRN, Use as instructed One Touch Ultra      IRON PO   65 mg, Oral, Daily      lisinopril 20 MG tablet  Commonly known as: PRINIVIL,ZESTRIL   20 mg, Oral, Daily, In the AM for blood pressure      meclizine 12.5 MG tablet  Commonly known as: ANTIVERT   12.5 mg, Oral, 3 Times Daily PRN      melatonin 5 MG tablet tablet   5 mg, Oral      Mesalamine (Sulfite-Free) 4 GM/60ML enema   1 application, Rectal, Every Other Day      metFORMIN  MG 24 hr tablet  Commonly known as: GLUCOPHAGE-XR   500 mg, Oral, Daily With Breakfast      metoprolol tartrate 25 MG tablet  Commonly known as: LOPRESSOR   25 mg, Oral, 2 Times Daily      nitroglycerin 0.4 MG SL tablet  Commonly known as: NITROSTAT   1 under the tongue as needed for angina, may repeat q5mins for up three doses      NovoLOG FlexPen 100 UNIT/ML solution pen-injector sc pen  Generic drug: insulin aspart   5 Units, Subcutaneous, 3 Times Daily With Meals      rosuvastatin 20 MG tablet  Commonly known as: CRESTOR   20 mg, Oral, Nightly       triamcinolone 0.1 % cream  Commonly known as: KENALOG   Topical, 2 Times Daily      vitamin B-12 1000 MCG tablet  Commonly known as: CYANOCOBALAMIN   1,000 mcg, Oral, Daily      Vitamin D (Cholecalciferol) 25 MCG (1000 UT) capsule   Oral         Stop These Medications    aspirin 81 MG EC tablet            Discharge Diet:   Diet Instructions     Diet: Regular/House Diet; Regular Texture (IDDSI 7); Thin (IDDSI 0)      Discharge Diet: Regular/House Diet    Texture: Regular Texture (IDDSI 7)    Fluid Consistency: Thin (IDDSI 0)          Activity at Discharge:   Activity Instructions     Other Activity Restrictions      Type of Restriction: Other    Explain Other Restrictions: No driving, no strenuous activity    Other Instructions (Specify)      Activity Instructions: No strenuous activity, no driving          Follow-up Appointments  Future Appointments   Date Time Provider Department Center   4/12/2023  1:30 PM PAD CT 1 BH PAD CAT PAD   5/3/2023  8:15 AM Amaya Mix APRN [facility] PAD PAD   5/3/2023 10:00 AM PAD [place] CT 1 BH PAD CT [facility]   5/10/2023  3:00 PM Jacob Mejias MD [facility] PAD PAD   7/26/2023  1:45 PM Yung Polanco MD [facility] CD PAD PAD     Additional Instructions for the Follow-ups that You Need to Schedule     Call MD With Problems / Concerns   As directed      Instructions: Worsening headache, drainage from wound, fever, confusion    Order Comments: Instructions: Worsening headache, drainage from wound, fever, confusion          Discharge Follow-up with Specialty: priscilla arredondo np; 3 Weeks   As directed      Specialty: priscilla arredondo np    Follow Up: 3 Weeks    Follow Up Details: CT scan to be done on day of appointment with Priscilla SMALLS Head Without Contrast   May 12, 2023      To be done on same day as appointment is Priscilla Arredondo NP    STEREOTACTIC GUIDANCE PROTOCOL?: No               Test Results Pending at Discharge       Priscilla Arredondo  BORIS  04/12/23  13:27 CDT    Time: Discharge 35 min

## 2023-04-12 NOTE — THERAPY DISCHARGE NOTE
Acute Care - Physical Therapy Discharge Summary  [facility]       Patient Name: Zachariah Acosta  : 1940  MRN: [id number]    Today's Date: 2023                 Admit Date: 2023      PT Recommendation and Plan    Visit Dx:    ICD-10-CM ICD-9-CM   1. Oropharyngeal dysphagia  R13.12 787.22   2. Intraparenchymal hemorrhage of brain  I61.9 431   3. Impaired mobility and ADLs  Z74.09 V49.89    Z78.9    4. Impaired mobility  Z74.09 799.89   5. Slurred speech  R47.81 784.59        Outcome Measures     Row Name 23 1100 23 0900 23 09       How much help from another person do you currently need...    Turning from your back to your side while in flat bed without using bedrails? 3  -MS (r) AR (t) MS (c) -- 3  -MS (r) AR (t) MS (c)    Moving from lying on back to sitting on the side of a flat bed without bedrails? 3  -MS (r) AR (t) MS (c) -- 3  -MS (r) AR (t) MS (c)    Moving to and from a bed to a chair (including a wheelchair)? 3  -MS (r) AR (t) MS (c) -- 3  -MS (r) AR (t) MS (c)    Standing up from a chair using your arms (e.g., wheelchair, bedside chair)? 4  -MS (r) AR (t) MS (c) -- 3  -MS (r) AR (t) MS (c)    Climbing 3-5 steps with a railing? 3  -MS (r) AR (t) MS (c) -- 2  -MS (r) AR (t) MS (c)    To walk in hospital room? 3  -MS (r) AR (t) MS (c) -- 3  -MS (r) AR (t) MS (c)    AM-PAC 6 Clicks Score (PT) 19  -MS (r) AR (t) -- 17  -MS (r) AR (t)       How much help from another is currently needed...    Putting on and taking off regular lower body clothing? -- 2  -MS (r) GK (t) MS (c) 2  -SP (r) GK (t) SP (c)    Bathing (including washing, rinsing, and drying) -- 3  -MS (r) GK (t) MS (c) 2  -SP (r) GK (t) SP (c)    Toileting (which includes using toilet bed pan or urinal) -- 3  -MS (r) GK (t) MS (c) 3  -SP (r) GK (t) SP (c)    Putting on and taking off regular upper body clothing -- 3  -MS (r) GK (t) MS (c) 2  -SP (r) GK (t) SP (c)    Taking care of personal grooming (such as  brushing teeth) -- 3  -MS (r) GK (t) MS (c) 3  -SP (r) GK (t) SP (c)    Eating meals -- 4  -MS (r) GK (t) MS (c) 3  -SP (r) GK (t) SP (c)    AM-PAC 6 Clicks Score (OT) -- 18  -MS (r) GK (t) 15  -SP (r) GK (t)       Functional Assessment    Outcome Measure Options -- AM-PAC 6 Clicks Daily Activity (OT)  -MS (r) GK (t) MS (c) AM-PAC 6 Clicks Daily Activity (OT)  -SP (r) GK (t) SP (c)    Row Name 04/10/23 1524             How much help from another is currently needed...    Putting on and taking off regular lower body clothing? 2  -SP (r) GK (t) SP (c)      Bathing (including washing, rinsing, and drying) 2  -SP (r) GK (t) SP (c)      Toileting (which includes using toilet bed pan or urinal) 2  -SP (r) GK (t) SP (c)      Putting on and taking off regular upper body clothing 2  -SP (r) GK (t) SP (c)      Taking care of personal grooming (such as brushing teeth) 3  -SP (r) GK (t) SP (c)      Eating meals 3  -SP (r) GK (t) SP (c)      AM-PAC 6 Clicks Score (OT) 14  -SP (r) GK (t)         Functional Assessment    Outcome Measure Options AM-PAC 6 Clicks Daily Activity (OT)  -SP (r) GK (t) SP (c)            User Key  (r) = Recorded By, (t) = Taken By, (c) = Cosigned By    Initials Name Provider Type    Latesha Ryan R, PT, DPT, NCS Physical Therapist    Christie Dominguez, RN Registered Nurse    Cielo Barrera, LIZZETTE Student OT Student    Jeronimo Stallworth, PTA Student PTA Student                 PT Charges     Row Name 04/12/23 1123             Time Calculation    Start Time 1050  -MF      Stop Time 1106  -MF      Time Calculation (min) 16 min  -MF      PT Received On 04/12/23  -         Time Calculation- PT    Total Timed Code Minutes- PT 16 minute(s)  -         Timed Charges    58589 - Gait Training Minutes  16  -MF         Total Minutes    Timed Charges Total Minutes 16  -MF       Total Minutes 16  -MF            User Key  (r) = Recorded By, (t) = Taken By, (c) = Cosigned By    Initials Name Provider Type    MF  Cindi Conn PTA Physical Therapist Assistant                 PT Rehab Goals     Row Name 04/12/23 1537             Bed Mobility Goal 1 (PT)    Activity/Assistive Device (Bed Mobility Goal 1, PT) bed mobility activities, all  -NW      [place] Level/Cues Needed (Bed Mobility Goal 1, PT) standby assist  -NW      Time Frame (Bed Mobility Goal 1, PT) 10 days  -NW      Progress/Outcomes (Bed Mobility Goal 1, PT) goal not met  -NW         Transfer Goal 1 (PT)    Activity/Assistive Device (Transfer Goal 1, PT) sit-to-stand/stand-to-sit  -NW      [place] Level/Cues Needed (Transfer Goal 1, PT) standby assist  -NW      Time Frame (Transfer Goal 1, PT) 10 days  -NW      Progress/Outcome (Transfer Goal 1, PT) goal not met  -NW         Gait Training Goal 1 (PT)    Activity/Assistive Device (Gait Training Goal 1, PT) gait (walking locomotion);decrease fall risk  -NW      [place] Level (Gait Training Goal 1, PT) contact guard required  -NW      Distance (Gait Training Goal 1, PT) 50ft  -NW      Time Frame (Gait Training Goal 1, PT) 10 days  -NW      Progress/Outcome (Gait Training Goal 1, PT) goal partially met  -NW            User Key  (r) = Recorded By, (t) = Taken By, (c) = Cosigned By    Initials Name Provider Type Discipline    Sariah Flood PTA Physical Therapist Assistant PT                    PT Discharge Summary  Anticipated Discharge Disposition (PT): inpatient rehabilitation facility  Reason for Discharge: Discharge from facility  Outcomes Achieved: Refer to plan of care for updates on goals achieved  Discharge Destination: Inpatient rehabilitation facility      Sariah Gleason PTA   4/12/2023

## 2023-04-12 NOTE — PLAN OF CARE
Goal Outcome Evaluation:  Plan of Care Reviewed With: patient, spouse        Progress: no change  Outcome Evaluation: NTN DM consult complete. Pt and wife present. Education not warranted as pt stated he has had T2DM for several years and has been educated. Will continue to follow per protocol.

## 2023-04-12 NOTE — PLAN OF CARE
Goal Outcome Evaluation:  Plan of Care Reviewed With: (P) patient        Progress: (P) improving  Outcome Evaluation: (P) OT tx completed this date. Pt in supine upon arrival. Pt completed fowlers<>sit and scooting to EOB requiring CGA. Pt completed EOB<>stand with RW requiring CGA.  Pt completed functional mobility to bathroom utilizing RW requiring CGA and SBA for toilet hygiene. Pt completed sit<>stand from toilet with RW requiring CGA and required CGA for stand<>step t/f from toilet to sink to wash hands. Pt completed grooming tasks while standing at sink of washing face, washing hands, and oral hygiene requiring set up and CGA. Pt’s functional mobility has improved and was educated on safety during functional mobility d/t right leg weakness and verbalized understanding. Pt completed functional mobility back to EOB utilizing RW requiring CGA and completed UB bathing while seated EOB requiring SBA and Min A for LB bathing. Pt completed UB dressing of donning gown requiring Min A and Max A with LB dressing of donning socks d/t fatigue and weakness. Pt completed EOB<>fowlers requiring SBA and was left in fowlers with wife. OT POC to continue.

## 2023-04-12 NOTE — CASE MANAGEMENT/SOCIAL WORK
Continued Stay Note   [facility]     Patient Name: Zachariah Acosta  MRN: [id number]  Today's Date: 4/12/2023    Admit Date: 4/9/2023        Discharge Plan     Row Name 04/12/23 0831       Plan    Plan Comments Patient/ wife requesting referral to [facility] rehab. Sent to [facility] at [facility], pending bed offer.  Patient is ready to go today per El ESCALANTE.               Discharge Codes    No documentation.               Expected Discharge Date and Time     Expected Discharge Date Expected Discharge Time    Apr 12, 2023             Lorrie Rubin RN

## 2023-04-12 NOTE — PLAN OF CARE
Goal Outcome Evaluation:  Plan of Care Reviewed With: spouse, daughter        Progress: improving  Outcome Evaluation: [facility]. Verbalizes understanding of DC instructions, follow up appts, medications changes. APRN aware ot CT results prior to dc, no new orders, IV dc'd with catheter tips intact. Safety maintained.

## 2023-04-12 NOTE — THERAPY TREATMENT NOTE
Acute Care - Physical Therapy Treatment Note  [facility]     Patient Name: Zachariah Acosta  : 1940  MRN: [id number]  Today's Date: 2023      Visit Dx:     ICD-10-CM ICD-9-CM   1. Oropharyngeal dysphagia  R13.12 787.22   2. Intraparenchymal hemorrhage of brain  I61.9 431   3. Impaired mobility and ADLs  Z74.09 V49.89    Z78.9    4. Impaired mobility  Z74.09 799.89   5. Slurred speech  R47.81 784.59     Patient Active Problem List   Diagnosis   • BREANN on CPAP   • CAD (coronary artery disease)   • Chronic kidney disease (CKD), stage III (moderate)   • Disorder of lung   • Essential hypertension   • Mild intermittent asthma without complication   • S/P CABG (coronary artery bypass graft)   • Type 2 diabetes mellitus with hyperglycemia, with long-term current use of insulin (HCC)   • Ulcerative colitis   • Obesity (BMI 30-39.9)   • Non-seasonal allergic rhinitis   • SOB (shortness of breath)   • Pulmonary scarring   • Pulmonary nodule seen on imaging study   • Mixed hyperlipidemia   • Nonsmoker   • Ulcerative colitis with complication   • Intractable abdominal pain   • Constipation   • KE (acute kidney injury)   • Hematuria   • LLQ pain   • Erectile dysfunction   • Class 1 obesity due to excess calories with serious comorbidity and body mass index (BMI) of 34.0 to 34.9 in adult   • Thyroid nodule   • Double vision   • Elevated PSA   • Iron deficiency anemia due to chronic blood loss   • Long-term use of immunosuppressant medication   • Nontraumatic complete tear of left rotator cuff   • Rosacea   • Intraparenchymal hemorrhage of brain     Past Medical History:   Diagnosis Date   • Asthma    • Coronary artery disease    • Diabetes mellitus    • Elevated cholesterol    • HL (hearing loss)    • Hyperlipidemia    • Hypertension    • Myocardial infarct     2020   • Sleep apnea     cpap at    • Sleep apnea, obstructive      Past Surgical History:   Procedure Laterality Date   • ADENOIDECTOMY   1947   • BACK SURGERY     • CARDIAC CATHETERIZATION      stents x 6   • CARDIAC SURGERY      CABG TRIPLE BYPASS 2012   • CATARACT EXTRACTION     • COLONOSCOPY     • COLONOSCOPY N/A 06/04/2021    Procedure: COLONOSCOPY WITH ANESTHESIA;  Surgeon: Zachariah Menjivar DO;  Location:  [facility];  Service: Gastroenterology;  Laterality: N/A;  pre hx ulcerative colitis  post ulcerative colitis  dr collins gusman   • CORONARY ANGIOPLASTY WITH STENT PLACEMENT     • CORONARY ARTERY BYPASS GRAFT  December 2012   • CORONARY STENT PLACEMENT     • HIP SURGERY Right     total hip   • JOINT REPLACEMENT  2015    Right hip   • PENILE PROSTHESIS IMPLANT N/A 12/13/2021    Procedure: 3-PIECE INFLATABLE PENILE PROSTHESIS PLACEMENT;  Surgeon: Jose Tellez MD;  Location:  [facility];  Service: Urology;  Laterality: N/A;   • TONSILLECTOMY  1947     PT Assessment (last 12 hours)     PT Evaluation and Treatment     Row Name 04/12/23 1050          Physical Therapy Time and Intention    Subjective Information complains of (P)   stiffness in neck from arthritis  -AR     Document Type therapy note (daily note) (P)   -AR     Mode of Treatment physical therapy (P)   -AR     Patient Effort good (P)   -AR     Row Name 04/12/23 1050          General Information    Existing Precautions/Restrictions fall (P)   -AR     Limitations/Impairments hearing (P)   -AR     Row Name 04/12/23 1050          Pain    Pretreatment Pain Rating 0/10 - no pain (P)   -AR     Posttreatment Pain Rating 0/10 - no pain (P)   -AR     Pain Intervention(s) Repositioned (P)   -AR     Row Name 04/12/23 1050          Bed Mobility    Bed Mobility supine-sit (P)   -AR     Supine-Sit [place] (Bed Mobility) minimum assist (75% patient effort) (P)   -AR     Sit-Supine [place] (Bed Mobility) -- (P)   in chair  -AR     Row Name 04/12/23 1050          Transfers    Transfers sit-stand transfer;stand-sit transfer (P)   -AR     Row Name 04/12/23 1050          Sit-Stand  Transfer    Sit-Stand [place] (Transfers) contact guard;minimum assist (75% patient effort) (P)   -AR     Assistive Device (Sit-Stand Transfers) walker, front-wheeled (P)   -AR     Row Name 04/12/23 1050          Stand-Sit Transfer    Stand-Sit [place] (Transfers) contact guard;minimum assist (75% patient effort) (P)   -AR     Assistive Device (Stand-Sit Transfers) walker, front-wheeled (P)   -AR     Row Name 04/12/23 1050          Gait/Stairs (Locomotion)    [place] Level (Gait) contact guard;minimum assist (75% patient effort);verbal cues (P)   cues to  R foot 25% of time  -AR     Assistive Device (Gait) walker, front-wheeled (P)   -AR     Distance in Feet (Gait) 50 X 2 (P)   -AR     Right Sided Gait Deviations heel strike decreased (P)   -AR     Row Name 04/12/23 1050          Motor Skills    Comments, Therapeutic Exercise BLE ankle pumps, LAQ, and marching X 20. BUE bicep curls X 20 (P)   -AR     Additional Documentation Comments, Therapeutic Exercise (Row) (P)   -AR     Row Name 04/12/23 1050          Plan of Care Review    Plan of Care Reviewed With patient (P)   -AR     Progress no change (P)   -AR     Outcome Evaluation Pt performed supine-to-sit min assist for reaching. Sit-to-stand cga/min assist. Ambulated 50 X 2 to hallway and back to chair. In chair pt performed BLE ankle pumps, LAQ, and marching X 20. BUE bicep curls X 20. Pt reported No pain and left with call light and family present (P)   -AR     Row Name 04/12/23 1050          Positioning and Restraints    Pre-Treatment Position in bed (P)   -AR     Post Treatment Position chair (P)   -AR     In Bed -- (P)   In chair  -AR     In Chair sitting;call light within reach;encouraged to call for assist;exit alarm on;with family/caregiver (P)   -AR           User Key  (r) = Recorded By, (t) = Taken By, (c) = Cosigned By    Initials Name Provider Type    Jeronimo Stallworth, PTA Student PTA Student                Physical Therapy  Education     Title: PT OT SLP Therapies (Done)     Topic: Physical Therapy (Done)     Point: Mobility training (Done)     Learning Progress Summary           Patient Acceptance, E,D, NR,VU by RINKU at 4/10/2023 0843    Comment: bed mobility. Also weight shifting for transfers and gait    Acceptance, E,TB, VU by CR at 4/10/2023 0518    Acceptance, E,D, DU,VU by  at 4/9/2023 1408    Comment: benefits of PT and POC, call for A OOB                   Point: Precautions (Done)     Learning Progress Summary           Patient Acceptance, E,TB, VU by CR at 4/10/2023 0518    Acceptance, E,D, DU,VU by  at 4/9/2023 1408    Comment: benefits of PT and POC, call for A OOB                               User Key     Initials Effective Dates Name Provider Type Discipline     02/03/23 -  Eliud Delatorre, PT Physical Therapist PT    RINKU 02/03/23 -  Alba Jackson, KYLE Physical Therapist Assistant PT    CHRISTIANA 03/03/23 -  Faraz Caraballo, RN Registered Nurse Nurse              PT Recommendation and Plan     Plan of Care Reviewed With: (P) patient  Progress: (P) no change  Outcome Evaluation: (P) Pt performed supine-to-sit min assist for reaching. Sit-to-stand cga/min assist. Ambulated 50 X 2 to hallway and back to chair. In chair pt performed BLE ankle pumps, LAQ, and marching X 20. BUE bicep curls X 20. Pt reported No pain and left with call light and family present   Outcome Measures     Row Name 04/12/23 1100 04/12/23 0900 04/11/23 0900       How much help from another person do you currently need...    Turning from your back to your side while in flat bed without using bedrails? 3 (P)   -AR -- 3  -MS (r) AR (t) MS (c)    Moving from lying on back to sitting on the side of a flat bed without bedrails? 3 (P)   -AR -- 3  -MS (r) AR (t) MS (c)    Moving to and from a bed to a chair (including a wheelchair)? 3 (P)   -AR -- 3  -MS (r) AR (t) MS (c)    Standing up from a chair using your arms (e.g., wheelchair, bedside chair)? 4 (P)    -AR -- 3  -MS (r) AR (t) MS (c)    Climbing 3-5 steps with a railing? 3 (P)   -AR -- 2  -MS (r) AR (t) MS (c)    To walk in hospital room? 3 (P)   -AR -- 3  -MS (r) AR (t) MS (c)    AM-PAC 6 Clicks Score (PT) 19 (P)   -AR -- 17  -MS (r) AR (t)       How much help from another is currently needed...    Putting on and taking off regular lower body clothing? -- 2 (P)   -GK 2  -SP (r) GK (t) SP (c)    Bathing (including washing, rinsing, and drying) -- 3 (P)   -GK 2  -SP (r) GK (t) SP (c)    Toileting (which includes using toilet bed pan or urinal) -- 3 (P)   -GK 3  -SP (r) GK (t) SP (c)    Putting on and taking off regular upper body clothing -- 3 (P)   -GK 2  -SP (r) GK (t) SP (c)    Taking care of personal grooming (such as brushing teeth) -- 3 (P)   -GK 3  -SP (r) GK (t) SP (c)    Eating meals -- 4 (P)   -GK 3  -SP (r) GK (t) SP (c)    AM-PAC 6 Clicks Score (OT) -- 18 (P)   -GK 15  -SP (r) GK (t)       Functional Assessment    Outcome Measure Options -- AM-PAC 6 Clicks Daily Activity (OT) (P)   -GK AM-PAC 6 Clicks Daily Activity (OT)  -SP (r) GK (t) SP (c)    Row Name 04/10/23 1529             How much help from another is currently needed...    Putting on and taking off regular lower body clothing? 2  -SP (r) GK (t) SP (c)      Bathing (including washing, rinsing, and drying) 2  -SP (r) GK (t) SP (c)      Toileting (which includes using toilet bed pan or urinal) 2  -SP (r) GK (t) SP (c)      Putting on and taking off regular upper body clothing 2  -SP (r) GK (t) SP (c)      Taking care of personal grooming (such as brushing teeth) 3  -SP (r) GK (t) SP (c)      Eating meals 3  -SP (r) GK (t) SP (c)      AM-PAC 6 Clicks Score (OT) 14  -SP (r) GK (t)         Functional Assessment    Outcome Measure Options AM-PAC 6 Clicks Daily Activity (OT)  -SP (r) GK (t) SP (c)            User Key  (r) = Recorded By, (t) = Taken By, (c) = Cosigned By    Initials Name Provider Type    Latesha Ryan R, PT, DPT, NCS Physical  Therapist    Christie Dominguez, RN Registered Nurse    Cielo Barrera, LIZZETTE Student OT Student    Jeronimo Stallworth, PTA Student PTA Student                 Time Calculation:         PT G-Codes  Outcome Measure Options: (P) AM-PAC 6 Clicks Daily Activity (OT)  AM-PAC 6 Clicks Score (PT): (P) 19  AM-PAC 6 Clicks Score (OT): (P) 18  Modified Treasure Scale: 4 - Moderately severe disability.  Unable to walk without assistance, and unable to attend to own bodily needs without assistance.    Jeronimo Maloney, PTA Student  4/12/2023

## 2023-04-12 NOTE — THERAPY DISCHARGE NOTE
Acute Care - Occupational Therapy Discharge Summary  [facility]     Patient Name: Zachariah Acosta  : 1940  MRN: [id number]    Today's Date: 2023                 Admit Date: 2023        OT Recommendation and Plan    Visit Dx:    ICD-10-CM ICD-9-CM   1. Oropharyngeal dysphagia  R13.12 787.22   2. Intraparenchymal hemorrhage of brain  I61.9 431   3. Impaired mobility and ADLs  Z74.09 V49.89    Z78.9    4. Impaired mobility  Z74.09 799.89   5. Slurred speech  R47.81 784.59          Time Calculation- OT     Row Name 23 1233 23 1123          Time Calculation- OT    OT Start Time 08  -LS --     OT Stop Time 0852  -LS --     OT Time Calculation (min) 39 min  -LS --     Total Timed Code Minutes- OT 39 minute(s)  -LS --     OT Received On 23  - --        Timed Charges    38407 - Gait Training Minutes  -- 16  -MF        Total Minutes    Timed Charges Total Minutes -- 16  -MF      Total Minutes -- 16  -MF           User Key  (r) = Recorded By, (t) = Taken By, (c) = Cosigned By    Initials Name Provider Type    Cindi Barksdale PTA Physical Therapist Assistant    Edie Talavera COTA Occupational Therapist Assistant                   OT Rehab Goals     Row Name 23 1400             Transfer Goal 1 (OT)    Activity/Assistive Device (Transfer Goal 1, OT) commode  -LS      [place] Level/Cues Needed (Transfer Goal 1, OT) minimum assist (75% or more patient effort)  -LS      Time Frame (Transfer Goal 1, OT) long term goal (LTG);by discharge  -LS      Progress/Outcome (Transfer Goal 1, OT) goal met  -LS         Dressing Goal 1 (OT)    Activity/Device (Dressing Goal 1, OT) lower body dressing  -LS      [place]/Cues Needed (Dressing Goal 1, OT) minimum assist (75% or more patient effort)  -LS      Time Frame (Dressing Goal 1, OT) long term goal (LTG);by discharge  -LS      Progress/Outcome (Dressing Goal 1, OT) goal partially met  -LS         Toileting Goal 1 (OT)     Activity/Device (Toileting Goal 1, OT) toileting skills, all  -LS      [place] Level/Cues Needed (Toileting Goal 1, OT) minimum assist (75% or more patient effort)  -LS      Time Frame (Toileting Goal 1, OT) long term goal (LTG);by discharge  -LS      Progress/Outcome (Toileting Goal 1, OT) goal met  -LS         Problem Specific Goal 1 (OT)    Problem Specific Goal 1 (OT) Pt will complete R UE GMC task in standing with Min A or less to maintain balance and 80% accuracy during functional task to improve balance and coordination in preperation of higher level adl independence.  -LS      Time Frame (Problem Specific Goal 1, OT) long term goal (LTG);by discharge  -LS      Progress/Outcome (Problem Specific Goal 1, OT) goal met  -LS            User Key  (r) = Recorded By, (t) = Taken By, (c) = Cosigned By    Initials Name Provider Type Discipline    Edie Talavera COTA Occupational Therapist Assistant THERAPIES                 Outcome Measures     Row Name 04/12/23 1100 04/12/23 0900 04/11/23 0900       How much help from another person do you currently need...    Turning from your back to your side while in flat bed without using bedrails? 3  -MS (r) AR (t) MS (c) -- 3  -MS (r) AR (t) MS (c)    Moving from lying on back to sitting on the side of a flat bed without bedrails? 3  -MS (r) AR (t) MS (c) -- 3  -MS (r) AR (t) MS (c)    Moving to and from a bed to a chair (including a wheelchair)? 3  -MS (r) AR (t) MS (c) -- 3  -MS (r) AR (t) MS (c)    Standing up from a chair using your arms (e.g., wheelchair, bedside chair)? 4  -MS (r) AR (t) MS (c) -- 3  -MS (r) AR (t) MS (c)    Climbing 3-5 steps with a railing? 3  -MS (r) AR (t) MS (c) -- 2  -MS (r) AR (t) MS (c)    To walk in hospital room? 3  -MS (r) AR (t) MS (c) -- 3  -MS (r) AR (t) MS (c)    AM-PAC 6 Clicks Score (PT) 19  -MS (r) AR (t) -- 17  -MS (r) AR (t)       How much help from another is currently needed...    Putting on and taking off regular lower body  clothing? -- 2  -MS (r) GK (t) MS (c) 2  -SP (r) GK (t) SP (c)    Bathing (including washing, rinsing, and drying) -- 3  -MS (r) GK (t) MS (c) 2  -SP (r) GK (t) SP (c)    Toileting (which includes using toilet bed pan or urinal) -- 3  -MS (r) GK (t) MS (c) 3  -SP (r) GK (t) SP (c)    Putting on and taking off regular upper body clothing -- 3  -MS (r) GK (t) MS (c) 2  -SP (r) GK (t) SP (c)    Taking care of personal grooming (such as brushing teeth) -- 3  -MS (r) GK (t) MS (c) 3  -SP (r) GK (t) SP (c)    Eating meals -- 4  -MS (r) GK (t) MS (c) 3  -SP (r) GK (t) SP (c)    AM-PAC 6 Clicks Score (OT) -- 18  -MS (r) GK (t) 15  -SP (r) GK (t)       Functional Assessment    Outcome Measure Options -- AM-PAC 6 Clicks Daily Activity (OT)  -MS (r) GK (t) MS (c) AM-PAC 6 Clicks Daily Activity (OT)  -SP (r) GK (t) SP (c)    Row Name 04/10/23 1524             How much help from another is currently needed...    Putting on and taking off regular lower body clothing? 2  -SP (r) GK (t) SP (c)      Bathing (including washing, rinsing, and drying) 2  -SP (r) GK (t) SP (c)      Toileting (which includes using toilet bed pan or urinal) 2  -SP (r) GK (t) SP (c)      Putting on and taking off regular upper body clothing 2  -SP (r) GK (t) SP (c)      Taking care of personal grooming (such as brushing teeth) 3  -SP (r) GK (t) SP (c)      Eating meals 3  -SP (r) GK (t) SP (c)      AM-PAC 6 Clicks Score (OT) 14  -SP (r) GK (t)         Functional Assessment    Outcome Measure Options AM-PAC 6 Clicks Daily Activity (OT)  -SP (r) GK (t) SP (c)            User Key  (r) = Recorded By, (t) = Taken By, (c) = Cosigned By    Initials Name Provider Type    Latesha Ryan R, PT, DPT, NCS Physical Therapist    Christie Dominguez, RN Registered Nurse    Cielo Barrera LIZZETTE Student OT Student    Jeronimo Stallworth, PTA Student PTA Student                    Therapy Charges for Today     Code Description Service Date Service Provider Modifiers Qty     45534149812 HC OT THERAPEUTIC ACT EA 15 MIN 4/11/2023 Edie Womack COTA GO 1    35999719453 HC OT SELF CARE/MGMT/TRAIN EA 15 MIN 4/11/2023 Edie Womack COTA GO 2    50449745420 HC OT THERAPEUTIC ACT EA 15 MIN 4/12/2023 Edie Womack COTA GO 1    16790219226 HC OT SELF CARE/MGMT/TRAIN EA 15 MIN 4/12/2023 Edie Womack COTA GO 2          OT Discharge Summary  Anticipated Discharge Disposition (OT): inpatient rehabilitation facility  Reason for Discharge: Discharge from facility  Outcomes Achieved: Refer to plan of care for updates on goals achieved  Discharge Destination: Inpatient rehabilitation facility      GAIL Rodriguez  4/12/2023

## 2023-04-12 NOTE — THERAPY TREATMENT NOTE
Acute Care - Occupational Therapy Treatment Note  [facility]     Patient Name: Zachariah Acosta  : 1940  MRN: [id number]  Today's Date: 2023             Admit Date: 2023       ICD-10-CM ICD-9-CM   1. Oropharyngeal dysphagia  R13.12 787.22   2. Intraparenchymal hemorrhage of brain  I61.9 431   3. Impaired mobility and ADLs  Z74.09 V49.89    Z78.9    4. Impaired mobility  Z74.09 799.89   5. Slurred speech  R47.81 784.59     Patient Active Problem List   Diagnosis   • BREANN on CPAP   • CAD (coronary artery disease)   • Chronic kidney disease (CKD), stage III (moderate)   • Disorder of lung   • Essential hypertension   • Mild intermittent asthma without complication   • S/P CABG (coronary artery bypass graft)   • Type 2 diabetes mellitus with hyperglycemia, with long-term current use of insulin ([facility])   • Ulcerative colitis   • Obesity (BMI 30-39.9)   • Non-seasonal allergic rhinitis   • SOB (shortness of breath)   • Pulmonary scarring   • Pulmonary nodule seen on imaging study   • Mixed hyperlipidemia   • Nonsmoker   • Ulcerative colitis with complication   • Intractable abdominal pain   • Constipation   • KE (acute kidney injury)   • Hematuria   • LLQ pain   • Erectile dysfunction   • Class 1 obesity due to excess calories with serious comorbidity and body mass index (BMI) of 34.0 to 34.9 in adult   • Thyroid nodule   • Double vision   • Elevated PSA   • Iron deficiency anemia due to chronic blood loss   • Long-term use of immunosuppressant medication   • Nontraumatic complete tear of left rotator cuff   • Rosacea   • Intraparenchymal hemorrhage of brain     Past Medical History:   Diagnosis Date   • Asthma    • Coronary artery disease    • Diabetes mellitus    • Elevated cholesterol    • HL (hearing loss)    • Hyperlipidemia    • Hypertension    • Myocardial infarct     2020   • Sleep apnea     cpap at    • Sleep apnea, obstructive      Past Surgical History:   Procedure Laterality  Date   • ADENOIDECTOMY  1947   • BACK SURGERY     • CARDIAC CATHETERIZATION      stents x 6   • CARDIAC SURGERY      CABG TRIPLE BYPASS 2012   • CATARACT EXTRACTION     • COLONOSCOPY     • COLONOSCOPY N/A 06/04/2021    Procedure: COLONOSCOPY WITH ANESTHESIA;  Surgeon: Zachariah Menjivar DO;  Location:  [facility];  Service: Gastroenterology;  Laterality: N/A;  pre hx ulcerative colitis  post ulcerative colitis  dr collins gusman   • CORONARY ANGIOPLASTY WITH STENT PLACEMENT     • CORONARY ARTERY BYPASS GRAFT  December 2012   • CORONARY STENT PLACEMENT     • HIP SURGERY Right     total hip   • JOINT REPLACEMENT  2015    Right hip   • PENILE PROSTHESIS IMPLANT N/A 12/13/2021    Procedure: 3-PIECE INFLATABLE PENILE PROSTHESIS PLACEMENT;  Surgeon: Jose Tellez MD;  Location:  [facility] OR;  Service: Urology;  Laterality: N/A;   • TONSILLECTOMY  1947         OT ASSESSMENT FLOWSHEET (last 12 hours)     OT Evaluation and Treatment     Row Name 04/12/23 0813                   OT Time and Intention    Subjective Information complains of;weakness;pain  -MS (r) GK (t) MS (c)        Document Type therapy note (daily note)  -MS (r) GK (t) MS (c)        Mode of Treatment occupational therapy  -MS (r) GK (t) MS (c)        Patient Effort good  -MS (r) GK (t) MS (c)           General Information    Patient Profile Reviewed yes  -MS (r) GK (t) MS (c)        Existing Precautions/Restrictions fall  -MS (r) GK (t) MS (c)        Limitations/Impairments hearing  -MS (r) GK (t) MS (c)        Barriers to Rehab medically complex  -MS (r) GK (t) MS (c)           Pain Assessment    Pretreatment Pain Rating 6/10  -MS (r) GK (t) MS (c)        Posttreatment Pain Rating 3/10  -MS (r) GK (t) MS (c)        Pain Location - Side/Orientation Right  -MS (r) GK (t) MS (c)        Pain Location upper  -MS (r) GK (t) MS (c)        Pain Location - shoulder  -MS (r) GK (t) MS (c)           Cognition    Orientation Status (Cognition) oriented x 4  -MS  (r) GK (t) MS (c)           Coping    Observed Emotional State calm;cooperative  -MS (r) GK (t) MS (c)        Verbalized Emotional State acceptance  -MS (r) GK (t) MS (c)           Plan of Care Review    Plan of Care Reviewed With patient  -MS (r) GK (t) MS (c)        Progress improving  -MS (r) GK (t) MS (c)        Outcome Evaluation OT tx completed this date. Pt in supine upon arrival. Pt completed fowlers<>sit and scooting to EOB requiring CGA. Pt completed EOB<>stand with RW requiring CGA.  Pt completed functional mobility to bathroom utilizing RW requiring CGA and SBA for toilet hygiene. Pt completed sit<>stand from toilet with RW requiring CGA and required CGA for stand<>step t/f from toilet to sink to wash hands. Pt completed grooming tasks while standing at sink of washing face, washing hands, and oral hygiene requiring set up and CGA. Pt’s functional mobility has improved and was educated on safety during functional mobility d/t right leg weakness and verbalized understanding. Pt completed functional mobility back to EOB utilizing RW requiring CGA and completed UB bathing while seated EOB requiring SBA and Min A for LB bathing. Pt completed UB dressing of donning gown requiring Min A and Max A with LB dressing of donning socks d/t fatigue and weakness. Pt completed EOB<>fowlers requiring SBA and was left in fowlers with wife. OT POC to continue.  -MS (r) GK (t) MS (c)           Positioning and Restraints    Pre-Treatment Position in bed  -MS (r) GK (t) MS (c)        Post Treatment Position bed  -MS (r) GK (t) MS (c)        In Bed notified nsg;fowlers;call light within reach;encouraged to call for assist;with family/caregiver  -MS (r) GK (t) MS (c)           Therapy Plan Review/Discharge Plan (OT)    Anticipated Discharge Disposition (OT) inpatient rehabilitation facility  -MS (r) GK (t) MS (c)              User Key  (r) = Recorded By, (t) = Taken By, (c) = Cosigned By    Initials Name Effective Dates    MS  Latesha Lewis, PT, DPT, NCS 06/19/18 -     GK Cielo Stokes OTA Student 02/20/23 -                  Occupational Therapy Education     Title: PT OT SLP Therapies (Done)     Topic: Occupational Therapy (Done)     Point: ADL training (Done)     Description:   Instruct learner(s) on proper safety adaptation and remediation techniques during self care or transfers.   Instruct in proper use of assistive devices.              Learning Progress Summary           Patient Acceptance, E, VU,DU by IVANA at 4/12/2023 0901    Comment: Pt educated on safety during functional mobility    Acceptance, E, VU by IVANA at 4/11/2023 0910    Comment: Pt educated on compensatory strategies for completing ADL tasks with right hand.    Acceptance, E, VU by IVANA at 4/10/2023 1616    Comment: Pt educated on compensatory strategies during ADLs when using right hand.    Acceptance, E,TB, VU by HCRISTIANA at 4/10/2023 0518    Acceptance, E, VU by SIERRA at 4/9/2023 1402                   Point: Home exercise program (Done)     Description:   Instruct learner(s) on appropriate technique for monitoring, assisting and/or progressing therapeutic exercises/activities.              Learning Progress Summary           Patient Acceptance, E,TB, VU by CR at 4/10/2023 0518                   Point: Precautions (Done)     Description:   Instruct learner(s) on prescribed precautions during self-care and functional transfers.              Learning Progress Summary           Patient Acceptance, E,TB, VU by CR at 4/10/2023 0518    Acceptance, E, VU by SIERRA at 4/9/2023 1402                   Point: Body mechanics (Done)     Description:   Instruct learner(s) on proper positioning and spine alignment during self-care, functional mobility activities and/or exercises.              Learning Progress Summary           Patient Acceptance, E,TB, VU by CR at 4/10/2023 0518                               User Key     Initials Effective Dates Name Provider Type Discipline    SIERRA 11/10/21 -   Lakshmi Chino, OTR/L, CSRS Occupational Therapist OT    CR 03/03/23 -  Faraz Caraballo, RN Registered Nurse Nurse     02/20/23 -  Cielo Stokes OTA Student OT Student OT                  OT Recommendation and Plan     Plan of Care Review  Plan of Care Reviewed With: patient  Progress: improving  Outcome Evaluation: OT tx completed this date. Pt in supine upon arrival. Pt completed fowlers<>sit and scooting to EOB requiring CGA. Pt completed EOB<>stand with RW requiring CGA.  Pt completed functional mobility to bathroom utilizing RW requiring CGA and SBA for toilet hygiene. Pt completed sit<>stand from toilet with RW requiring CGA and required CGA for stand<>step t/f from toilet to sink to wash hands. Pt completed grooming tasks while standing at sink of washing face, washing hands, and oral hygiene requiring set up and CGA. Pt’s functional mobility has improved and was educated on safety during functional mobility d/t right leg weakness and verbalized understanding. Pt completed functional mobility back to EOB utilizing RW requiring CGA and completed UB bathing while seated EOB requiring SBA and Min A for LB bathing. Pt completed UB dressing of donning gown requiring Min A and Max A with LB dressing of donning socks d/t fatigue and weakness. Pt completed EOB<>fowlers requiring SBA and was left in fowlers with wife. OT POC to continue.  Plan of Care Reviewed With: patient  Outcome Evaluation: OT tx completed this date. Pt in supine upon arrival. Pt completed fowlers<>sit and scooting to EOB requiring CGA. Pt completed EOB<>stand with RW requiring CGA.  Pt completed functional mobility to bathroom utilizing RW requiring CGA and SBA for toilet hygiene. Pt completed sit<>stand from toilet with RW requiring CGA and required CGA for stand<>step t/f from toilet to sink to wash hands. Pt completed grooming tasks while standing at sink of washing face, washing hands, and oral hygiene requiring set up and CGA. Pt’s  functional mobility has improved and was educated on safety during functional mobility d/t right leg weakness and verbalized understanding. Pt completed functional mobility back to EOB utilizing RW requiring CGA and completed UB bathing while seated EOB requiring SBA and Min A for LB bathing. Pt completed UB dressing of donning gown requiring Min A and Max A with LB dressing of donning socks d/t fatigue and weakness. Pt completed EOB<>fowlers requiring SBA and was left in fowlers with wife. OT POC to continue.     Outcome Measures     Row Name 04/12/23 1100 04/12/23 0900 04/11/23 0900       How much help from another person do you currently need...    Turning from your back to your side while in flat bed without using bedrails? 3  -MS (r) AR (t) MS (c) -- 3  -MS (r) AR (t) MS (c)    Moving from lying on back to sitting on the side of a flat bed without bedrails? 3  -MS (r) AR (t) MS (c) -- 3  -MS (r) AR (t) MS (c)    Moving to and from a bed to a chair (including a wheelchair)? 3  -MS (r) AR (t) MS (c) -- 3  -MS (r) AR (t) MS (c)    Standing up from a chair using your arms (e.g., wheelchair, bedside chair)? 4  -MS (r) AR (t) MS (c) -- 3  -MS (r) AR (t) MS (c)    Climbing 3-5 steps with a railing? 3  -MS (r) AR (t) MS (c) -- 2  -MS (r) AR (t) MS (c)    To walk in hospital room? 3  -MS (r) AR (t) MS (c) -- 3  -MS (r) AR (t) MS (c)    AM-PAC 6 Clicks Score (PT) 19  -MS (r) AR (t) -- 17  -MS (r) AR (t)       How much help from another is currently needed...    Putting on and taking off regular lower body clothing? -- 2  -MS (r) GK (t) MS (c) 2  -SP (r) GK (t) SP (c)    Bathing (including washing, rinsing, and drying) -- 3  -MS (r) GK (t) MS (c) 2  -SP (r) GK (t) SP (c)    Toileting (which includes using toilet bed pan or urinal) -- 3  -MS (r) GK (t) MS (c) 3  -SP (r) GK (t) SP (c)    Putting on and taking off regular upper body clothing -- 3  -MS (r) GK (t) MS (c) 2  -SP (r) GK (t) SP (c)    Taking care of personal  grooming (such as brushing teeth) -- 3  -MS (r) GK (t) MS (c) 3  -SP (r) GK (t) SP (c)    Eating meals -- 4  -MS (r) GK (t) MS (c) 3  -SP (r) GK (t) SP (c)    AM-PAC 6 Clicks Score (OT) -- 18  -MS (r) GK (t) 15  -SP (r) GK (t)       Functional Assessment    Outcome Measure Options -- AM-PAC 6 Clicks Daily Activity (OT)  -MS (r) GK (t) MS (c) AM-PAC 6 Clicks Daily Activity (OT)  -SP (r) GK (t) SP (c)    Row Name 04/10/23 1524             How much help from another is currently needed...    Putting on and taking off regular lower body clothing? 2  -SP (r) GK (t) SP (c)      Bathing (including washing, rinsing, and drying) 2  -SP (r) GK (t) SP (c)      Toileting (which includes using toilet bed pan or urinal) 2  -SP (r) GK (t) SP (c)      Putting on and taking off regular upper body clothing 2  -SP (r) GK (t) SP (c)      Taking care of personal grooming (such as brushing teeth) 3  -SP (r) GK (t) SP (c)      Eating meals 3  -SP (r) GK (t) SP (c)      AM-PAC 6 Clicks Score (OT) 14  -SP (r) GK (t)         Functional Assessment    Outcome Measure Options AM-PAC 6 Clicks Daily Activity (OT)  -SP (r) GK (t) SP (c)            User Key  (r) = Recorded By, (t) = Taken By, (c) = Cosigned By    Initials Name Provider Type    MS Lewis Latesha R, PT, DPT, NCS Physical Therapist    Christie Dominguez, RN Registered Nurse    Cielo Barrera, LIZZETTE Student OT Student    Jeronimo Stallworth, PTA Student PTA Student                Time Calculation:    Time Calculation- OT     Row Name 04/12/23 1233 04/12/23 1123          Time Calculation- OT    OT Start Time 0813  -LS --     OT Stop Time 0852  -LS --     OT Time Calculation (min) 39 min  -LS --     Total Timed Code Minutes- OT 39 minute(s)  -LS --     OT Received On 04/12/23  -LS --        Timed Charges    48155 - Gait Training Minutes  -- 16  -MF        Total Minutes    Timed Charges Total Minutes -- 16  -MF      Total Minutes -- 16  -MF           User Key  (r) = Recorded By, (t) =  Taken By, (c) = Cosigned By    Initials Name Provider Type    Cindi Barksdale, KYLE Physical Therapist Assistant    Edie Talavera COTA Occupational Therapist Assistant                       LIZZETTE Fregoso Student  4/12/2023

## 2023-04-12 NOTE — CASE MANAGEMENT/SOCIAL WORK
Continued Stay Note  [facility]     Patient Name: Zachariah Acosta  MRN: [id number]  Today's Date: 4/12/2023    Admit Date: 4/9/2023        Discharge Plan     Row Name 04/12/23 1237       Plan    Plan Comments Bed offered at [facility] later today. Pt will need a new covid test (already ordered). Family aware of dc later today to [facility].    Final Discharge Disposition Code 62 - inpatient rehab facility    Final Note Pt will be dc'd to [facility] later today. Call report number is [phone].               Discharge Codes    No documentation.               Expected Discharge Date and Time     Expected Discharge Date Expected Discharge Time    Apr 12, 2023             JULIA Bradford

## 2023-04-12 NOTE — PLAN OF CARE
Goal Outcome Evaluation:  Plan of Care Reviewed With: (P) patient        Progress: (P) no change  Outcome Evaluation: (P) Pt performed supine-to-sit min assist for reaching. Sit-to-stand cga/min assist. Ambulated 50 X 2 to hallway and back to chair. In chair pt performed BLE ankle pumps, LAQ, and marching X 20. BUE bicep curls X 20. Pt reported No pain and left with call light and family present

## 2023-04-13 PROBLEM — R53.1 WEAKNESS: Status: ACTIVE | Noted: 2023-04-13

## 2023-04-13 PROBLEM — N40.0 BPH (BENIGN PROSTATIC HYPERPLASIA): Status: ACTIVE | Noted: 2023-04-13

## 2023-04-13 PROBLEM — D64.9 ANEMIA: Status: ACTIVE | Noted: 2023-04-13

## 2023-04-13 PROBLEM — K51.90 ULCERATIVE COLITIS (HCC): Status: ACTIVE | Noted: 2023-04-13

## 2023-04-13 LAB
BASOPHILS # BLD: 0.1 K/UL (ref 0–0.2)
BASOPHILS NFR BLD: 1.3 % (ref 0–1)
EOSINOPHIL # BLD: 1.1 K/UL (ref 0–0.6)
EOSINOPHIL NFR BLD: 14 % (ref 0–5)
ERYTHROCYTE [DISTWIDTH] IN BLOOD BY AUTOMATED COUNT: 12.7 % (ref 11.5–14.5)
GLUCOSE BLD-MCNC: 143 MG/DL (ref 70–99)
GLUCOSE BLD-MCNC: 158 MG/DL (ref 70–99)
GLUCOSE BLD-MCNC: 159 MG/DL (ref 70–99)
GLUCOSE BLD-MCNC: 187 MG/DL (ref 70–99)
HCT VFR BLD AUTO: 42.8 % (ref 42–52)
HGB BLD-MCNC: 14.1 G/DL (ref 14–18)
IMM GRANULOCYTES # BLD: 0 K/UL
LYMPHOCYTES # BLD: 1.8 K/UL (ref 1.1–4.5)
LYMPHOCYTES NFR BLD: 23.6 % (ref 20–40)
MCH RBC QN AUTO: 31.8 PG (ref 27–31)
MCHC RBC AUTO-ENTMCNC: 32.9 G/DL (ref 33–37)
MCV RBC AUTO: 96.4 FL (ref 80–94)
MONOCYTES # BLD: 0.7 K/UL (ref 0–0.9)
MONOCYTES NFR BLD: 8.9 % (ref 0–10)
NEUTROPHILS # BLD: 4 K/UL (ref 1.5–7.5)
NEUTS SEG NFR BLD: 51.7 % (ref 50–65)
PERFORMED ON: ABNORMAL
PLATELET # BLD AUTO: 222 K/UL (ref 130–400)
PMV BLD AUTO: 10.7 FL (ref 9.4–12.4)
PREALB SERPL-MCNC: 19 MG/DL (ref 20–40)
RBC # BLD AUTO: 4.44 M/UL (ref 4.7–6.1)
VIT B12 SERPL-MCNC: 547 PG/ML (ref 211–946)
WBC # BLD AUTO: 7.7 K/UL (ref 4.8–10.8)

## 2023-04-13 PROCEDURE — 97165 OT EVAL LOW COMPLEX 30 MIN: CPT

## 2023-04-13 PROCEDURE — 6370000000 HC RX 637 (ALT 250 FOR IP): Performed by: PSYCHIATRY & NEUROLOGY

## 2023-04-13 PROCEDURE — 92522 EVALUATE SPEECH PRODUCTION: CPT

## 2023-04-13 PROCEDURE — 97110 THERAPEUTIC EXERCISES: CPT

## 2023-04-13 PROCEDURE — 97161 PT EVAL LOW COMPLEX 20 MIN: CPT

## 2023-04-13 PROCEDURE — 92610 EVALUATE SWALLOWING FUNCTION: CPT

## 2023-04-13 PROCEDURE — 84134 ASSAY OF PREALBUMIN: CPT

## 2023-04-13 PROCEDURE — 97116 GAIT TRAINING THERAPY: CPT

## 2023-04-13 PROCEDURE — 99223 1ST HOSP IP/OBS HIGH 75: CPT | Performed by: PSYCHIATRY & NEUROLOGY

## 2023-04-13 PROCEDURE — 94640 AIRWAY INHALATION TREATMENT: CPT

## 2023-04-13 PROCEDURE — 97530 THERAPEUTIC ACTIVITIES: CPT

## 2023-04-13 PROCEDURE — 36415 COLL VENOUS BLD VENIPUNCTURE: CPT

## 2023-04-13 PROCEDURE — 82962 GLUCOSE BLOOD TEST: CPT

## 2023-04-13 PROCEDURE — 97535 SELF CARE MNGMENT TRAINING: CPT

## 2023-04-13 PROCEDURE — 1180000000 HC REHAB R&B

## 2023-04-13 PROCEDURE — 85025 COMPLETE CBC W/AUTO DIFF WBC: CPT

## 2023-04-13 RX ADMIN — LISINOPRIL 20 MG: 20 TABLET ORAL at 08:25

## 2023-04-13 RX ADMIN — METOPROLOL TARTRATE 25 MG: 25 TABLET, FILM COATED ORAL at 08:25

## 2023-04-13 RX ADMIN — INSULIN GLARGINE 15 UNITS: 100 INJECTION, SOLUTION SUBCUTANEOUS at 21:18

## 2023-04-13 RX ADMIN — ACETAMINOPHEN 650 MG: 325 TABLET ORAL at 18:20

## 2023-04-13 RX ADMIN — CYANOCOBALAMIN TAB 500 MCG 1000 MCG: 500 TAB at 08:25

## 2023-04-13 RX ADMIN — METOPROLOL TARTRATE 25 MG: 25 TABLET, FILM COATED ORAL at 21:18

## 2023-04-13 RX ADMIN — Medication 2 PUFF: at 18:23

## 2023-04-13 RX ADMIN — FLUTICASONE PROPIONATE 1 SPRAY: 50 SPRAY, METERED NASAL at 08:24

## 2023-04-13 RX ADMIN — Medication 5 MG: at 21:18

## 2023-04-13 RX ADMIN — FINASTERIDE 5 MG: 5 TABLET, FILM COATED ORAL at 08:25

## 2023-04-13 RX ADMIN — FERROUS SULFATE TAB 325 MG (65 MG ELEMENTAL FE) 325 MG: 325 (65 FE) TAB at 08:25

## 2023-04-13 RX ADMIN — Medication 2 PUFF: at 07:30

## 2023-04-13 RX ADMIN — TRIAMCINOLONE ACETONIDE: 1 CREAM TOPICAL at 21:18

## 2023-04-13 RX ADMIN — Medication 1000 UNITS: at 08:25

## 2023-04-13 RX ADMIN — Medication 2 PUFF: at 18:24

## 2023-04-13 RX ADMIN — ACETAMINOPHEN 650 MG: 325 TABLET ORAL at 11:16

## 2023-04-13 RX ADMIN — ROSUVASTATIN CALCIUM 20 MG: 20 TABLET, COATED ORAL at 21:18

## 2023-04-13 ASSESSMENT — PAIN SCALES - GENERAL
PAINLEVEL_OUTOF10: 0
PAINLEVEL_OUTOF10: 3
PAINLEVEL_OUTOF10: 3

## 2023-04-13 ASSESSMENT — PAIN DESCRIPTION - LOCATION
LOCATION: NECK
LOCATION: NECK

## 2023-04-13 ASSESSMENT — 9 HOLE PEG TEST: TESTTIME_SECONDS: 45

## 2023-04-13 ASSESSMENT — PAIN DESCRIPTION - ORIENTATION
ORIENTATION: POSTERIOR
ORIENTATION: POSTERIOR

## 2023-04-13 ASSESSMENT — PAIN DESCRIPTION - DESCRIPTORS
DESCRIPTORS: ACHING
DESCRIPTORS: ACHING

## 2023-04-13 ASSESSMENT — PAIN - FUNCTIONAL ASSESSMENT
PAIN_FUNCTIONAL_ASSESSMENT: ACTIVITIES ARE NOT PREVENTED
PAIN_FUNCTIONAL_ASSESSMENT: ACTIVITIES ARE NOT PREVENTED

## 2023-04-14 LAB
GLUCOSE BLD-MCNC: 134 MG/DL (ref 70–99)
GLUCOSE BLD-MCNC: 167 MG/DL (ref 70–99)
GLUCOSE BLD-MCNC: 206 MG/DL (ref 70–99)
PERFORMED ON: ABNORMAL

## 2023-04-14 PROCEDURE — 6370000000 HC RX 637 (ALT 250 FOR IP): Performed by: PSYCHIATRY & NEUROLOGY

## 2023-04-14 PROCEDURE — 97535 SELF CARE MNGMENT TRAINING: CPT

## 2023-04-14 PROCEDURE — 82962 GLUCOSE BLOOD TEST: CPT

## 2023-04-14 PROCEDURE — 97110 THERAPEUTIC EXERCISES: CPT

## 2023-04-14 PROCEDURE — 99233 SBSQ HOSP IP/OBS HIGH 50: CPT | Performed by: PSYCHIATRY & NEUROLOGY

## 2023-04-14 PROCEDURE — 97116 GAIT TRAINING THERAPY: CPT

## 2023-04-14 PROCEDURE — 1180000000 HC REHAB R&B

## 2023-04-14 PROCEDURE — 97530 THERAPEUTIC ACTIVITIES: CPT

## 2023-04-14 PROCEDURE — 94640 AIRWAY INHALATION TREATMENT: CPT

## 2023-04-14 PROCEDURE — 94760 N-INVAS EAR/PLS OXIMETRY 1: CPT

## 2023-04-14 RX ORDER — DEXTROSE MONOHYDRATE 100 MG/ML
INJECTION, SOLUTION INTRAVENOUS CONTINUOUS PRN
Status: DISCONTINUED | OUTPATIENT
Start: 2023-04-14 | End: 2023-04-22 | Stop reason: HOSPADM

## 2023-04-14 RX ORDER — ACETAMINOPHEN 500 MG
500 TABLET ORAL EVERY 12 HOURS
Status: DISCONTINUED | OUTPATIENT
Start: 2023-04-14 | End: 2023-04-22 | Stop reason: HOSPADM

## 2023-04-14 RX ADMIN — TRIAMCINOLONE ACETONIDE: 1 CREAM TOPICAL at 07:34

## 2023-04-14 RX ADMIN — INSULIN LISPRO 5 UNITS: 100 INJECTION, SOLUTION INTRAVENOUS; SUBCUTANEOUS at 07:51

## 2023-04-14 RX ADMIN — METOPROLOL TARTRATE 25 MG: 25 TABLET, FILM COATED ORAL at 07:38

## 2023-04-14 RX ADMIN — MESALAMINE 4 G: 4 ENEMA RECTAL at 07:42

## 2023-04-14 RX ADMIN — Medication 1000 UNITS: at 07:38

## 2023-04-14 RX ADMIN — Medication 2 PUFF: at 18:33

## 2023-04-14 RX ADMIN — INSULIN LISPRO 1 UNITS: 100 INJECTION, SOLUTION INTRAVENOUS; SUBCUTANEOUS at 12:40

## 2023-04-14 RX ADMIN — ACETAMINOPHEN 500 MG: 500 TABLET ORAL at 17:31

## 2023-04-14 RX ADMIN — LISINOPRIL 20 MG: 20 TABLET ORAL at 07:38

## 2023-04-14 RX ADMIN — FLUTICASONE PROPIONATE 1 SPRAY: 50 SPRAY, METERED NASAL at 07:37

## 2023-04-14 RX ADMIN — FERROUS SULFATE TAB 325 MG (65 MG ELEMENTAL FE) 325 MG: 325 (65 FE) TAB at 07:38

## 2023-04-14 RX ADMIN — ACETAMINOPHEN 650 MG: 325 TABLET ORAL at 07:39

## 2023-04-14 RX ADMIN — INSULIN LISPRO 5 UNITS: 100 INJECTION, SOLUTION INTRAVENOUS; SUBCUTANEOUS at 17:34

## 2023-04-14 RX ADMIN — TRIAMCINOLONE ACETONIDE: 1 CREAM TOPICAL at 22:02

## 2023-04-14 RX ADMIN — METOPROLOL TARTRATE 25 MG: 25 TABLET, FILM COATED ORAL at 22:02

## 2023-04-14 RX ADMIN — FINASTERIDE 5 MG: 5 TABLET, FILM COATED ORAL at 07:38

## 2023-04-14 RX ADMIN — ROSUVASTATIN CALCIUM 20 MG: 20 TABLET, COATED ORAL at 22:02

## 2023-04-14 RX ADMIN — CYANOCOBALAMIN TAB 500 MCG 1000 MCG: 500 TAB at 07:38

## 2023-04-14 RX ADMIN — Medication 2 PUFF: at 06:36

## 2023-04-14 RX ADMIN — Medication 5 MG: at 22:02

## 2023-04-14 RX ADMIN — INSULIN LISPRO 5 UNITS: 100 INJECTION, SOLUTION INTRAVENOUS; SUBCUTANEOUS at 12:41

## 2023-04-14 RX ADMIN — INSULIN GLARGINE 15 UNITS: 100 INJECTION, SOLUTION SUBCUTANEOUS at 23:17

## 2023-04-14 ASSESSMENT — PAIN DESCRIPTION - LOCATION: LOCATION: NECK

## 2023-04-14 ASSESSMENT — PAIN SCALES - GENERAL
PAINLEVEL_OUTOF10: 0
PAINLEVEL_OUTOF10: 0
PAINLEVEL_OUTOF10: 3
PAINLEVEL_OUTOF10: 0

## 2023-04-14 ASSESSMENT — 9 HOLE PEG TEST
TEST_RESULT: IMPAIRED
TEST_RESULT: FUNCTIONAL
TESTTIME_SECONDS: 26.33
TESTTIME_SECONDS: 38.45

## 2023-04-14 ASSESSMENT — PAIN DESCRIPTION - DESCRIPTORS: DESCRIPTORS: ACHING

## 2023-04-15 LAB
GLUCOSE BLD-MCNC: 141 MG/DL (ref 70–99)
PERFORMED ON: ABNORMAL

## 2023-04-15 PROCEDURE — 94760 N-INVAS EAR/PLS OXIMETRY 1: CPT

## 2023-04-15 PROCEDURE — 99232 SBSQ HOSP IP/OBS MODERATE 35: CPT | Performed by: PSYCHIATRY & NEUROLOGY

## 2023-04-15 PROCEDURE — 1180000000 HC REHAB R&B

## 2023-04-15 PROCEDURE — 97116 GAIT TRAINING THERAPY: CPT

## 2023-04-15 PROCEDURE — 82962 GLUCOSE BLOOD TEST: CPT

## 2023-04-15 PROCEDURE — 6370000000 HC RX 637 (ALT 250 FOR IP): Performed by: PSYCHIATRY & NEUROLOGY

## 2023-04-15 PROCEDURE — 97535 SELF CARE MNGMENT TRAINING: CPT

## 2023-04-15 PROCEDURE — 97110 THERAPEUTIC EXERCISES: CPT

## 2023-04-15 PROCEDURE — 94640 AIRWAY INHALATION TREATMENT: CPT

## 2023-04-15 PROCEDURE — 97530 THERAPEUTIC ACTIVITIES: CPT

## 2023-04-15 RX ADMIN — ROSUVASTATIN CALCIUM 20 MG: 20 TABLET, COATED ORAL at 20:56

## 2023-04-15 RX ADMIN — Medication 5 MG: at 20:56

## 2023-04-15 RX ADMIN — METOPROLOL TARTRATE 25 MG: 25 TABLET, FILM COATED ORAL at 07:56

## 2023-04-15 RX ADMIN — Medication 2 PUFF: at 18:30

## 2023-04-15 RX ADMIN — LISINOPRIL 20 MG: 20 TABLET ORAL at 07:56

## 2023-04-15 RX ADMIN — ACETAMINOPHEN 500 MG: 500 TABLET ORAL at 17:30

## 2023-04-15 RX ADMIN — TRIAMCINOLONE ACETONIDE: 1 CREAM TOPICAL at 07:53

## 2023-04-15 RX ADMIN — Medication 2 PUFF: at 06:45

## 2023-04-15 RX ADMIN — FLUTICASONE PROPIONATE 1 SPRAY: 50 SPRAY, METERED NASAL at 07:55

## 2023-04-15 RX ADMIN — INSULIN GLARGINE 15 UNITS: 100 INJECTION, SOLUTION SUBCUTANEOUS at 20:56

## 2023-04-15 RX ADMIN — METOPROLOL TARTRATE 25 MG: 25 TABLET, FILM COATED ORAL at 21:02

## 2023-04-15 RX ADMIN — TRIAMCINOLONE ACETONIDE: 1 CREAM TOPICAL at 20:56

## 2023-04-15 RX ADMIN — INSULIN LISPRO 5 UNITS: 100 INJECTION, SOLUTION INTRAVENOUS; SUBCUTANEOUS at 11:59

## 2023-04-15 RX ADMIN — FERROUS SULFATE TAB 325 MG (65 MG ELEMENTAL FE) 325 MG: 325 (65 FE) TAB at 07:56

## 2023-04-15 RX ADMIN — FINASTERIDE 5 MG: 5 TABLET, FILM COATED ORAL at 07:57

## 2023-04-15 RX ADMIN — CYANOCOBALAMIN TAB 500 MCG 1000 MCG: 500 TAB at 07:56

## 2023-04-15 RX ADMIN — INSULIN LISPRO 5 UNITS: 100 INJECTION, SOLUTION INTRAVENOUS; SUBCUTANEOUS at 16:53

## 2023-04-15 RX ADMIN — ACETAMINOPHEN 500 MG: 500 TABLET ORAL at 06:14

## 2023-04-15 RX ADMIN — Medication 1000 UNITS: at 07:56

## 2023-04-15 ASSESSMENT — PAIN SCALES - GENERAL
PAINLEVEL_OUTOF10: 0
PAINLEVEL_OUTOF10: 3
PAINLEVEL_OUTOF10: 0
PAINLEVEL_OUTOF10: 0

## 2023-04-15 ASSESSMENT — PAIN DESCRIPTION - LOCATION: LOCATION: NECK;BACK

## 2023-04-15 ASSESSMENT — PAIN - FUNCTIONAL ASSESSMENT: PAIN_FUNCTIONAL_ASSESSMENT: PREVENTS OR INTERFERES SOME ACTIVE ACTIVITIES AND ADLS

## 2023-04-15 ASSESSMENT — PAIN DESCRIPTION - ORIENTATION: ORIENTATION: LOWER

## 2023-04-15 ASSESSMENT — PAIN DESCRIPTION - DESCRIPTORS: DESCRIPTORS: ACHING

## 2023-04-16 PROCEDURE — 94760 N-INVAS EAR/PLS OXIMETRY 1: CPT

## 2023-04-16 PROCEDURE — 94640 AIRWAY INHALATION TREATMENT: CPT

## 2023-04-16 PROCEDURE — 99232 SBSQ HOSP IP/OBS MODERATE 35: CPT | Performed by: PSYCHIATRY & NEUROLOGY

## 2023-04-16 PROCEDURE — 1180000000 HC REHAB R&B

## 2023-04-16 PROCEDURE — 6370000000 HC RX 637 (ALT 250 FOR IP): Performed by: PSYCHIATRY & NEUROLOGY

## 2023-04-16 RX ADMIN — METOPROLOL TARTRATE 25 MG: 25 TABLET, FILM COATED ORAL at 08:13

## 2023-04-16 RX ADMIN — Medication 1000 UNITS: at 08:13

## 2023-04-16 RX ADMIN — LISINOPRIL 20 MG: 20 TABLET ORAL at 08:12

## 2023-04-16 RX ADMIN — MESALAMINE 4 G: 4 ENEMA RECTAL at 08:11

## 2023-04-16 RX ADMIN — FLUTICASONE PROPIONATE 1 SPRAY: 50 SPRAY, METERED NASAL at 08:11

## 2023-04-16 RX ADMIN — METOPROLOL TARTRATE 25 MG: 25 TABLET, FILM COATED ORAL at 21:15

## 2023-04-16 RX ADMIN — FINASTERIDE 5 MG: 5 TABLET, FILM COATED ORAL at 08:12

## 2023-04-16 RX ADMIN — ACETAMINOPHEN 500 MG: 500 TABLET ORAL at 05:45

## 2023-04-16 RX ADMIN — INSULIN LISPRO 5 UNITS: 100 INJECTION, SOLUTION INTRAVENOUS; SUBCUTANEOUS at 08:22

## 2023-04-16 RX ADMIN — Medication 2 PUFF: at 18:51

## 2023-04-16 RX ADMIN — ACETAMINOPHEN 500 MG: 500 TABLET ORAL at 16:54

## 2023-04-16 RX ADMIN — Medication 2 PUFF: at 07:38

## 2023-04-16 RX ADMIN — INSULIN LISPRO 5 UNITS: 100 INJECTION, SOLUTION INTRAVENOUS; SUBCUTANEOUS at 16:55

## 2023-04-16 RX ADMIN — TRIAMCINOLONE ACETONIDE: 1 CREAM TOPICAL at 21:21

## 2023-04-16 RX ADMIN — CYANOCOBALAMIN TAB 500 MCG 1000 MCG: 500 TAB at 08:12

## 2023-04-16 RX ADMIN — Medication 5 MG: at 21:16

## 2023-04-16 RX ADMIN — TRIAMCINOLONE ACETONIDE: 1 CREAM TOPICAL at 08:11

## 2023-04-16 RX ADMIN — FERROUS SULFATE TAB 325 MG (65 MG ELEMENTAL FE) 325 MG: 325 (65 FE) TAB at 08:12

## 2023-04-16 RX ADMIN — INSULIN LISPRO 5 UNITS: 100 INJECTION, SOLUTION INTRAVENOUS; SUBCUTANEOUS at 11:49

## 2023-04-16 RX ADMIN — ROSUVASTATIN CALCIUM 20 MG: 20 TABLET, COATED ORAL at 21:15

## 2023-04-16 RX ADMIN — INSULIN GLARGINE 15 UNITS: 100 INJECTION, SOLUTION SUBCUTANEOUS at 21:17

## 2023-04-16 ASSESSMENT — PAIN DESCRIPTION - DESCRIPTORS
DESCRIPTORS: OTHER (COMMENT)
DESCRIPTORS: ACHING

## 2023-04-16 ASSESSMENT — PAIN DESCRIPTION - LOCATION
LOCATION: NECK
LOCATION: BACK;NECK

## 2023-04-16 ASSESSMENT — PAIN - FUNCTIONAL ASSESSMENT: PAIN_FUNCTIONAL_ASSESSMENT: PREVENTS OR INTERFERES SOME ACTIVE ACTIVITIES AND ADLS

## 2023-04-16 ASSESSMENT — PAIN SCALES - GENERAL
PAINLEVEL_OUTOF10: 0
PAINLEVEL_OUTOF10: 2
PAINLEVEL_OUTOF10: 0
PAINLEVEL_OUTOF10: 1

## 2023-04-16 ASSESSMENT — PAIN DESCRIPTION - ORIENTATION: ORIENTATION: LOWER

## 2023-04-17 LAB
ALBUMIN SERPL-MCNC: 3.7 G/DL (ref 3.5–5.2)
ALP SERPL-CCNC: 71 U/L (ref 40–130)
ALT SERPL-CCNC: 23 U/L (ref 5–41)
ANION GAP SERPL CALCULATED.3IONS-SCNC: 11 MMOL/L (ref 7–19)
AST SERPL-CCNC: 20 U/L (ref 5–40)
BASOPHILS # BLD: 0.1 K/UL (ref 0–0.2)
BASOPHILS NFR BLD: 1.2 % (ref 0–1)
BILIRUB SERPL-MCNC: 0.3 MG/DL (ref 0.2–1.2)
BUN SERPL-MCNC: 14 MG/DL (ref 8–23)
CALCIUM SERPL-MCNC: 9.1 MG/DL (ref 8.8–10.2)
CHLORIDE SERPL-SCNC: 104 MMOL/L (ref 98–111)
CO2 SERPL-SCNC: 25 MMOL/L (ref 22–29)
CREAT SERPL-MCNC: 1 MG/DL (ref 0.5–1.2)
EOSINOPHIL # BLD: 1.1 K/UL (ref 0–0.6)
EOSINOPHIL NFR BLD: 11.5 % (ref 0–5)
ERYTHROCYTE [DISTWIDTH] IN BLOOD BY AUTOMATED COUNT: 12.6 % (ref 11.5–14.5)
GLUCOSE BLD-MCNC: 156 MG/DL (ref 70–99)
GLUCOSE SERPL-MCNC: 143 MG/DL (ref 74–109)
HCT VFR BLD AUTO: 47.2 % (ref 42–52)
HGB BLD-MCNC: 15.4 G/DL (ref 14–18)
IMM GRANULOCYTES # BLD: 0.1 K/UL
LYMPHOCYTES # BLD: 2 K/UL (ref 1.1–4.5)
LYMPHOCYTES NFR BLD: 21 % (ref 20–40)
MCH RBC QN AUTO: 31.8 PG (ref 27–31)
MCHC RBC AUTO-ENTMCNC: 32.6 G/DL (ref 33–37)
MCV RBC AUTO: 97.3 FL (ref 80–94)
MONOCYTES # BLD: 0.7 K/UL (ref 0–0.9)
MONOCYTES NFR BLD: 7.5 % (ref 0–10)
NEUTROPHILS # BLD: 5.4 K/UL (ref 1.5–7.5)
NEUTS SEG NFR BLD: 58.3 % (ref 50–65)
PERFORMED ON: ABNORMAL
PLATELET # BLD AUTO: 235 K/UL (ref 130–400)
PMV BLD AUTO: 9.6 FL (ref 9.4–12.4)
POTASSIUM SERPL-SCNC: 5 MMOL/L (ref 3.5–5)
PROT SERPL-MCNC: 6.9 G/DL (ref 6.6–8.7)
RBC # BLD AUTO: 4.85 M/UL (ref 4.7–6.1)
SODIUM SERPL-SCNC: 140 MMOL/L (ref 136–145)
WBC # BLD AUTO: 9.3 K/UL (ref 4.8–10.8)

## 2023-04-17 PROCEDURE — 97110 THERAPEUTIC EXERCISES: CPT

## 2023-04-17 PROCEDURE — 80053 COMPREHEN METABOLIC PANEL: CPT

## 2023-04-17 PROCEDURE — 1180000000 HC REHAB R&B

## 2023-04-17 PROCEDURE — 6370000000 HC RX 637 (ALT 250 FOR IP): Performed by: PSYCHIATRY & NEUROLOGY

## 2023-04-17 PROCEDURE — 99232 SBSQ HOSP IP/OBS MODERATE 35: CPT | Performed by: PSYCHIATRY & NEUROLOGY

## 2023-04-17 PROCEDURE — 94760 N-INVAS EAR/PLS OXIMETRY 1: CPT

## 2023-04-17 PROCEDURE — 97535 SELF CARE MNGMENT TRAINING: CPT

## 2023-04-17 PROCEDURE — 85025 COMPLETE CBC W/AUTO DIFF WBC: CPT

## 2023-04-17 PROCEDURE — 82962 GLUCOSE BLOOD TEST: CPT

## 2023-04-17 PROCEDURE — 94640 AIRWAY INHALATION TREATMENT: CPT

## 2023-04-17 PROCEDURE — 97530 THERAPEUTIC ACTIVITIES: CPT

## 2023-04-17 PROCEDURE — 36415 COLL VENOUS BLD VENIPUNCTURE: CPT

## 2023-04-17 PROCEDURE — 97116 GAIT TRAINING THERAPY: CPT

## 2023-04-17 RX ADMIN — FERROUS SULFATE TAB 325 MG (65 MG ELEMENTAL FE) 325 MG: 325 (65 FE) TAB at 07:34

## 2023-04-17 RX ADMIN — METOPROLOL TARTRATE 25 MG: 25 TABLET, FILM COATED ORAL at 07:34

## 2023-04-17 RX ADMIN — Medication 2 PUFF: at 19:09

## 2023-04-17 RX ADMIN — INSULIN LISPRO 5 UNITS: 100 INJECTION, SOLUTION INTRAVENOUS; SUBCUTANEOUS at 12:20

## 2023-04-17 RX ADMIN — FLUTICASONE PROPIONATE 1 SPRAY: 50 SPRAY, METERED NASAL at 07:36

## 2023-04-17 RX ADMIN — ACETAMINOPHEN 500 MG: 500 TABLET ORAL at 05:47

## 2023-04-17 RX ADMIN — Medication 2 PUFF: at 19:12

## 2023-04-17 RX ADMIN — INSULIN LISPRO 5 UNITS: 100 INJECTION, SOLUTION INTRAVENOUS; SUBCUTANEOUS at 09:01

## 2023-04-17 RX ADMIN — INSULIN GLARGINE 15 UNITS: 100 INJECTION, SOLUTION SUBCUTANEOUS at 21:09

## 2023-04-17 RX ADMIN — Medication 2 PUFF: at 06:23

## 2023-04-17 RX ADMIN — INSULIN LISPRO 5 UNITS: 100 INJECTION, SOLUTION INTRAVENOUS; SUBCUTANEOUS at 17:20

## 2023-04-17 RX ADMIN — METOPROLOL TARTRATE 25 MG: 25 TABLET, FILM COATED ORAL at 21:08

## 2023-04-17 RX ADMIN — ACETAMINOPHEN 500 MG: 500 TABLET ORAL at 17:23

## 2023-04-17 RX ADMIN — Medication 5 MG: at 21:07

## 2023-04-17 RX ADMIN — TRIAMCINOLONE ACETONIDE: 1 CREAM TOPICAL at 21:07

## 2023-04-17 RX ADMIN — CYANOCOBALAMIN TAB 500 MCG 1000 MCG: 500 TAB at 07:34

## 2023-04-17 RX ADMIN — Medication 1000 UNITS: at 07:35

## 2023-04-17 RX ADMIN — FINASTERIDE 5 MG: 5 TABLET, FILM COATED ORAL at 07:34

## 2023-04-17 RX ADMIN — ROSUVASTATIN CALCIUM 20 MG: 20 TABLET, COATED ORAL at 21:08

## 2023-04-17 RX ADMIN — LISINOPRIL 20 MG: 20 TABLET ORAL at 07:34

## 2023-04-17 RX ADMIN — TRIAMCINOLONE ACETONIDE: 1 CREAM TOPICAL at 07:35

## 2023-04-17 ASSESSMENT — PAIN DESCRIPTION - ORIENTATION
ORIENTATION: POSTERIOR
ORIENTATION: POSTERIOR

## 2023-04-17 ASSESSMENT — PAIN DESCRIPTION - LOCATION
LOCATION: NECK
LOCATION: NECK

## 2023-04-17 ASSESSMENT — PAIN SCALES - GENERAL
PAINLEVEL_OUTOF10: 0
PAINLEVEL_OUTOF10: 2
PAINLEVEL_OUTOF10: 2

## 2023-04-17 ASSESSMENT — PAIN DESCRIPTION - DESCRIPTORS
DESCRIPTORS: ACHING
DESCRIPTORS: ACHING;DISCOMFORT

## 2023-04-17 NOTE — PATIENT CARE CONFERENCE
[facility] ACUTE INPATIENT REHABILITATION  TEAM CONFERENCE NOTE    Date: 2023  Patient Name: Jerilyn Kay        MRN: [id number]    : 1940  ([de-identified] y.o.)  Gender: male      Diagnosis: Nontraumatic Intracerebral hemorrhage (L side, R body involved)      PHYSICAL THERAPY  GROSS ASSESSMENT  Chief Reason for Therapy  Chief Reason: Neurologic    BED MOBILITY  Bed mobility  Bridging: Independent  Rolling to Left: Supervision  Rolling to Right: Independent  Supine to Sit: Independent  Sit to Supine: Independent  Scooting: Stand by assistance  Bed Mobility Comments: standard bed in OT apartment       TRANSFERS  Transfers  Sit to Stand: Stand by assistance, Contact guard assistance  Stand to Sit: Stand by assistance  Bed to Chair: Contact guard assistance  Stand Pivot Transfers: Stand by assistance  Lateral Transfers: Contact guard assistance, Stand by assistance (W/C to/from Recliner using RW)  Car Transfer: Contact guard assistance  WHEELCHAIR PROPULSION  Propulsion 1  Propulsion: Manual  Level: Level Tile  Method: MAXIMILIANO REEVES  Level of Assistance: Modified independent (keep one eye closed due to double vision)  Description/ Details: even bilat UE pushes; good pace  Distance: 175'  AMBULATION  Ambulation  Surface: Level tile  Device: Rollator  Assistance: Stand by assistance, Contact guard assistance  Quality of Gait: steady without path deviation, no LOB, no buckle  Gait Deviations: Decreased step length, Decreased step height  Distance: 250', 200'  Comments: Incorporated turns. STAIRS  Stairs  # Steps : 3  Stairs Height: 6\"  Rails: Bilateral  Device: No Device, Hand Held Assist  Assistance: Stand by assistance, Contact guard assistance  Comment: also did 4- 4\" steps CGA/SBA  GOALS:  Short Term Goals  Time Frame for Short Term Goals: 1 week  Short Term Goal 1: SBA w/ transfers  Short Term Goal 2:  Indep WC propulsion 150 ft  Short Term Goal 3: CGA w/ ambulation 50 ft w/ safest AD  Short Term Goal 4: CGA w/ 3 no

## 2023-04-18 PROCEDURE — 94640 AIRWAY INHALATION TREATMENT: CPT

## 2023-04-18 PROCEDURE — 97110 THERAPEUTIC EXERCISES: CPT

## 2023-04-18 PROCEDURE — 94760 N-INVAS EAR/PLS OXIMETRY 1: CPT

## 2023-04-18 PROCEDURE — 6370000000 HC RX 637 (ALT 250 FOR IP): Performed by: PSYCHIATRY & NEUROLOGY

## 2023-04-18 PROCEDURE — 1180000000 HC REHAB R&B

## 2023-04-18 PROCEDURE — 97116 GAIT TRAINING THERAPY: CPT

## 2023-04-18 PROCEDURE — 97530 THERAPEUTIC ACTIVITIES: CPT

## 2023-04-18 PROCEDURE — 97535 SELF CARE MNGMENT TRAINING: CPT

## 2023-04-18 PROCEDURE — 99233 SBSQ HOSP IP/OBS HIGH 50: CPT | Performed by: PSYCHIATRY & NEUROLOGY

## 2023-04-18 RX ADMIN — ROSUVASTATIN CALCIUM 20 MG: 20 TABLET, COATED ORAL at 20:39

## 2023-04-18 RX ADMIN — METOPROLOL TARTRATE 25 MG: 25 TABLET, FILM COATED ORAL at 20:39

## 2023-04-18 RX ADMIN — INSULIN GLARGINE 15 UNITS: 100 INJECTION, SOLUTION SUBCUTANEOUS at 20:40

## 2023-04-18 RX ADMIN — LISINOPRIL 20 MG: 20 TABLET ORAL at 08:26

## 2023-04-18 RX ADMIN — Medication 2 PUFF: at 20:39

## 2023-04-18 RX ADMIN — INSULIN LISPRO 5 UNITS: 100 INJECTION, SOLUTION INTRAVENOUS; SUBCUTANEOUS at 12:25

## 2023-04-18 RX ADMIN — Medication 5 MG: at 20:39

## 2023-04-18 RX ADMIN — INSULIN LISPRO 5 UNITS: 100 INJECTION, SOLUTION INTRAVENOUS; SUBCUTANEOUS at 08:32

## 2023-04-18 RX ADMIN — ACETAMINOPHEN 500 MG: 500 TABLET ORAL at 17:26

## 2023-04-18 RX ADMIN — TRIAMCINOLONE ACETONIDE: 1 CREAM TOPICAL at 20:39

## 2023-04-18 RX ADMIN — METOPROLOL TARTRATE 25 MG: 25 TABLET, FILM COATED ORAL at 08:26

## 2023-04-18 RX ADMIN — TRIAMCINOLONE ACETONIDE: 1 CREAM TOPICAL at 08:26

## 2023-04-18 RX ADMIN — FERROUS SULFATE TAB 325 MG (65 MG ELEMENTAL FE) 325 MG: 325 (65 FE) TAB at 08:27

## 2023-04-18 RX ADMIN — CYANOCOBALAMIN TAB 500 MCG 1000 MCG: 500 TAB at 08:27

## 2023-04-18 RX ADMIN — MESALAMINE 4 G: 4 ENEMA RECTAL at 08:28

## 2023-04-18 RX ADMIN — ACETAMINOPHEN 500 MG: 500 TABLET ORAL at 05:57

## 2023-04-18 RX ADMIN — FLUTICASONE PROPIONATE 1 SPRAY: 50 SPRAY, METERED NASAL at 08:26

## 2023-04-18 RX ADMIN — Medication 2 PUFF: at 20:36

## 2023-04-18 RX ADMIN — Medication 2 PUFF: at 07:06

## 2023-04-18 RX ADMIN — Medication 1000 UNITS: at 08:27

## 2023-04-18 RX ADMIN — FINASTERIDE 5 MG: 5 TABLET, FILM COATED ORAL at 08:27

## 2023-04-18 RX ADMIN — INSULIN LISPRO 5 UNITS: 100 INJECTION, SOLUTION INTRAVENOUS; SUBCUTANEOUS at 17:28

## 2023-04-18 ASSESSMENT — PAIN DESCRIPTION - LOCATION
LOCATION: NECK
LOCATION: NECK

## 2023-04-18 ASSESSMENT — PAIN DESCRIPTION - DESCRIPTORS
DESCRIPTORS: ACHING;DISCOMFORT
DESCRIPTORS: ACHING

## 2023-04-18 ASSESSMENT — PAIN DESCRIPTION - ORIENTATION
ORIENTATION: POSTERIOR
ORIENTATION: POSTERIOR

## 2023-04-18 ASSESSMENT — PAIN SCALES - GENERAL
PAINLEVEL_OUTOF10: 0
PAINLEVEL_OUTOF10: 2
PAINLEVEL_OUTOF10: 0
PAINLEVEL_OUTOF10: 2

## 2023-04-18 NOTE — CARE COORDINATION
[address]  Test for Patient [place] in the Areas of Transfers/Ambulation    Ambulation/Transfers   Independent ambulation in room with assistive device:  Yes     -Device Type:  Rollator  Independent transfers from wheelchair to surface in room:  Yes     Bathroom Transfers  Independent transfers in patient bathroom:  Yes         Inpatient Rehabilitation Nursing and Therapies feel as though the patient qualifies for an acute rehabilitation test for independence in the areas of transfers and ambulation prior to discharging from Inpatient Rehabilitation Unit at [facility].  This test for independence in the areas of transfers and ambulation must be agreed upon by the patient's physician, nurse, and therapists.         Nurse Approval:  Electronically signed by Matthew Og LPN on 1/47/8098 at 3:36 PM     Physical Therapist Approval:  Electronically signed by Alberto Coleman PTA on 4/18/2023 at 1:50 PM      Occupational Therapist Approval: Electronically signed by BIBI Aguilera on 4/18/2023 at 1:39 PM

## 2023-04-19 PROCEDURE — 97116 GAIT TRAINING THERAPY: CPT

## 2023-04-19 PROCEDURE — 97110 THERAPEUTIC EXERCISES: CPT

## 2023-04-19 PROCEDURE — 6370000000 HC RX 637 (ALT 250 FOR IP): Performed by: PSYCHIATRY & NEUROLOGY

## 2023-04-19 PROCEDURE — 97530 THERAPEUTIC ACTIVITIES: CPT

## 2023-04-19 PROCEDURE — 1180000000 HC REHAB R&B

## 2023-04-19 PROCEDURE — 99232 SBSQ HOSP IP/OBS MODERATE 35: CPT | Performed by: PSYCHIATRY & NEUROLOGY

## 2023-04-19 PROCEDURE — 94640 AIRWAY INHALATION TREATMENT: CPT

## 2023-04-19 PROCEDURE — 94760 N-INVAS EAR/PLS OXIMETRY 1: CPT

## 2023-04-19 RX ADMIN — CYANOCOBALAMIN TAB 500 MCG 1000 MCG: 500 TAB at 08:31

## 2023-04-19 RX ADMIN — LISINOPRIL 20 MG: 20 TABLET ORAL at 08:39

## 2023-04-19 RX ADMIN — INSULIN LISPRO 5 UNITS: 100 INJECTION, SOLUTION INTRAVENOUS; SUBCUTANEOUS at 17:52

## 2023-04-19 RX ADMIN — TRIAMCINOLONE ACETONIDE: 1 CREAM TOPICAL at 21:06

## 2023-04-19 RX ADMIN — INSULIN GLARGINE 15 UNITS: 100 INJECTION, SOLUTION SUBCUTANEOUS at 21:05

## 2023-04-19 RX ADMIN — Medication 2 PUFF: at 19:09

## 2023-04-19 RX ADMIN — METOPROLOL TARTRATE 25 MG: 25 TABLET, FILM COATED ORAL at 21:05

## 2023-04-19 RX ADMIN — FERROUS SULFATE TAB 325 MG (65 MG ELEMENTAL FE) 325 MG: 325 (65 FE) TAB at 08:32

## 2023-04-19 RX ADMIN — TRIAMCINOLONE ACETONIDE: 1 CREAM TOPICAL at 08:32

## 2023-04-19 RX ADMIN — FLUTICASONE PROPIONATE 1 SPRAY: 50 SPRAY, METERED NASAL at 08:32

## 2023-04-19 RX ADMIN — ACETAMINOPHEN 500 MG: 500 TABLET ORAL at 17:50

## 2023-04-19 RX ADMIN — INSULIN LISPRO 5 UNITS: 100 INJECTION, SOLUTION INTRAVENOUS; SUBCUTANEOUS at 12:23

## 2023-04-19 RX ADMIN — INSULIN LISPRO 5 UNITS: 100 INJECTION, SOLUTION INTRAVENOUS; SUBCUTANEOUS at 08:41

## 2023-04-19 RX ADMIN — Medication 2 PUFF: at 19:10

## 2023-04-19 RX ADMIN — ROSUVASTATIN CALCIUM 20 MG: 20 TABLET, COATED ORAL at 21:05

## 2023-04-19 RX ADMIN — METOPROLOL TARTRATE 25 MG: 25 TABLET, FILM COATED ORAL at 08:31

## 2023-04-19 RX ADMIN — Medication 1000 UNITS: at 08:31

## 2023-04-19 RX ADMIN — Medication 5 MG: at 21:05

## 2023-04-19 RX ADMIN — ACETAMINOPHEN 500 MG: 500 TABLET ORAL at 05:48

## 2023-04-19 RX ADMIN — FINASTERIDE 5 MG: 5 TABLET, FILM COATED ORAL at 08:31

## 2023-04-19 RX ADMIN — Medication 2 PUFF: at 07:40

## 2023-04-19 ASSESSMENT — PAIN DESCRIPTION - DESCRIPTORS
DESCRIPTORS: ACHING
DESCRIPTORS: ACHING;DISCOMFORT

## 2023-04-19 ASSESSMENT — PAIN SCALES - GENERAL
PAINLEVEL_OUTOF10: 0
PAINLEVEL_OUTOF10: 2
PAINLEVEL_OUTOF10: 0
PAINLEVEL_OUTOF10: 2

## 2023-04-19 ASSESSMENT — PAIN - FUNCTIONAL ASSESSMENT: PAIN_FUNCTIONAL_ASSESSMENT: ACTIVITIES ARE NOT PREVENTED

## 2023-04-19 ASSESSMENT — PAIN DESCRIPTION - LOCATION
LOCATION: GENERALIZED
LOCATION: NECK

## 2023-04-19 ASSESSMENT — PAIN DESCRIPTION - ORIENTATION: ORIENTATION: POSTERIOR

## 2023-04-20 LAB
ALBUMIN SERPL-MCNC: 3.6 G/DL (ref 3.5–5.2)
ALP SERPL-CCNC: 67 U/L (ref 40–130)
ALT SERPL-CCNC: 21 U/L (ref 5–41)
ANION GAP SERPL CALCULATED.3IONS-SCNC: 10 MMOL/L (ref 7–19)
AST SERPL-CCNC: 18 U/L (ref 5–40)
BASOPHILS # BLD: 0.1 K/UL (ref 0–0.2)
BASOPHILS NFR BLD: 1 % (ref 0–1)
BILIRUB SERPL-MCNC: 0.4 MG/DL (ref 0.2–1.2)
BUN SERPL-MCNC: 22 MG/DL (ref 8–23)
CALCIUM SERPL-MCNC: 9.1 MG/DL (ref 8.8–10.2)
CHLORIDE SERPL-SCNC: 102 MMOL/L (ref 98–111)
CO2 SERPL-SCNC: 25 MMOL/L (ref 22–29)
CREAT SERPL-MCNC: 1.1 MG/DL (ref 0.5–1.2)
EOSINOPHIL # BLD: 0.9 K/UL (ref 0–0.6)
EOSINOPHIL NFR BLD: 10.7 % (ref 0–5)
ERYTHROCYTE [DISTWIDTH] IN BLOOD BY AUTOMATED COUNT: 12.9 % (ref 11.5–14.5)
GLUCOSE SERPL-MCNC: 128 MG/DL (ref 74–109)
HCT VFR BLD AUTO: 43.9 % (ref 42–52)
HGB BLD-MCNC: 14.7 G/DL (ref 14–18)
IMM GRANULOCYTES # BLD: 0 K/UL
LYMPHOCYTES # BLD: 2 K/UL (ref 1.1–4.5)
LYMPHOCYTES NFR BLD: 23.2 % (ref 20–40)
MCH RBC QN AUTO: 32.6 PG (ref 27–31)
MCHC RBC AUTO-ENTMCNC: 33.5 G/DL (ref 33–37)
MCV RBC AUTO: 97.3 FL (ref 80–94)
MONOCYTES # BLD: 0.9 K/UL (ref 0–0.9)
MONOCYTES NFR BLD: 9.7 % (ref 0–10)
NEUTROPHILS # BLD: 4.8 K/UL (ref 1.5–7.5)
NEUTS SEG NFR BLD: 54.9 % (ref 50–65)
PLATELET # BLD AUTO: 234 K/UL (ref 130–400)
PMV BLD AUTO: 10.1 FL (ref 9.4–12.4)
POTASSIUM SERPL-SCNC: 5.1 MMOL/L (ref 3.5–5)
PROT SERPL-MCNC: 6.6 G/DL (ref 6.6–8.7)
RBC # BLD AUTO: 4.51 M/UL (ref 4.7–6.1)
SODIUM SERPL-SCNC: 137 MMOL/L (ref 136–145)
WBC # BLD AUTO: 8.8 K/UL (ref 4.8–10.8)

## 2023-04-20 PROCEDURE — 36415 COLL VENOUS BLD VENIPUNCTURE: CPT

## 2023-04-20 PROCEDURE — 94760 N-INVAS EAR/PLS OXIMETRY 1: CPT

## 2023-04-20 PROCEDURE — 94640 AIRWAY INHALATION TREATMENT: CPT

## 2023-04-20 PROCEDURE — 85025 COMPLETE CBC W/AUTO DIFF WBC: CPT

## 2023-04-20 PROCEDURE — 6370000000 HC RX 637 (ALT 250 FOR IP): Performed by: PSYCHIATRY & NEUROLOGY

## 2023-04-20 PROCEDURE — 80053 COMPREHEN METABOLIC PANEL: CPT

## 2023-04-20 PROCEDURE — 1180000000 HC REHAB R&B

## 2023-04-20 PROCEDURE — 97530 THERAPEUTIC ACTIVITIES: CPT

## 2023-04-20 PROCEDURE — 97110 THERAPEUTIC EXERCISES: CPT

## 2023-04-20 PROCEDURE — 97116 GAIT TRAINING THERAPY: CPT

## 2023-04-20 PROCEDURE — 99232 SBSQ HOSP IP/OBS MODERATE 35: CPT | Performed by: PSYCHIATRY & NEUROLOGY

## 2023-04-20 RX ORDER — TRIAMCINOLONE ACETONIDE 1 MG/G
CREAM TOPICAL
Qty: 1 EACH | Refills: 0 | Status: SHIPPED | OUTPATIENT
Start: 2023-04-20

## 2023-04-20 RX ORDER — CHOLECALCIFEROL (VITAMIN D3) 25 MCG
1000 TABLET ORAL DAILY
Qty: 30 TABLET | Refills: 0 | Status: SHIPPED | OUTPATIENT
Start: 2023-04-20

## 2023-04-20 RX ORDER — INSULIN GLARGINE 100 [IU]/ML
15 INJECTION, SOLUTION SUBCUTANEOUS NIGHTLY
Qty: 10 ML | Refills: 0 | Status: SHIPPED | OUTPATIENT
Start: 2023-04-20

## 2023-04-20 RX ORDER — LEVALBUTEROL TARTRATE 45 UG/1
1 AEROSOL, METERED ORAL EVERY 4 HOURS PRN
Qty: 1 EACH | Refills: 0 | Status: SHIPPED | OUTPATIENT
Start: 2023-04-20

## 2023-04-20 RX ORDER — FLUTICASONE PROPIONATE 50 MCG
1 SPRAY, SUSPENSION (ML) NASAL DAILY
Qty: 16 G | Refills: 0 | Status: SHIPPED | OUTPATIENT
Start: 2023-04-20

## 2023-04-20 RX ORDER — MESALAMINE 4 G/60ML
4 ENEMA RECTAL EVERY OTHER DAY
Qty: 15 ENEMA | Refills: 0 | Status: SHIPPED | OUTPATIENT
Start: 2023-04-20

## 2023-04-20 RX ORDER — FINASTERIDE 5 MG/1
5 TABLET, FILM COATED ORAL DAILY
Qty: 30 TABLET | Refills: 0 | Status: SHIPPED | OUTPATIENT
Start: 2023-04-20

## 2023-04-20 RX ORDER — BUDESONIDE AND FORMOTEROL FUMARATE DIHYDRATE 160; 4.5 UG/1; UG/1
2 AEROSOL RESPIRATORY (INHALATION) 2 TIMES DAILY
Qty: 10.2 G | Refills: 0 | Status: SHIPPED | OUTPATIENT
Start: 2023-04-20

## 2023-04-20 RX ORDER — LISINOPRIL 20 MG/1
20 TABLET ORAL DAILY
Qty: 30 TABLET | Refills: 0 | Status: SHIPPED | OUTPATIENT
Start: 2023-04-20

## 2023-04-20 RX ORDER — INSULIN LISPRO 100 [IU]/ML
5 INJECTION, SOLUTION INTRAVENOUS; SUBCUTANEOUS
Qty: 1 EACH | Refills: 0 | Status: SHIPPED | OUTPATIENT
Start: 2023-04-20

## 2023-04-20 RX ORDER — FERROUS SULFATE 325(65) MG
325 TABLET ORAL
Qty: 30 TABLET | Refills: 0 | Status: SHIPPED | OUTPATIENT
Start: 2023-04-20

## 2023-04-20 RX ORDER — MECOBALAMIN 5000 MCG
5 TABLET,DISINTEGRATING ORAL NIGHTLY
Qty: 30 TABLET | Refills: 0 | Status: SHIPPED | OUTPATIENT
Start: 2023-04-20

## 2023-04-20 RX ORDER — ROSUVASTATIN CALCIUM 40 MG/1
20 TABLET, COATED ORAL NIGHTLY
Qty: 15 TABLET | Refills: 0 | Status: SHIPPED | OUTPATIENT
Start: 2023-04-20

## 2023-04-20 RX ADMIN — FINASTERIDE 5 MG: 5 TABLET, FILM COATED ORAL at 09:08

## 2023-04-20 RX ADMIN — Medication 2 PUFF: at 19:04

## 2023-04-20 RX ADMIN — FLUTICASONE PROPIONATE 1 SPRAY: 50 SPRAY, METERED NASAL at 09:07

## 2023-04-20 RX ADMIN — CYANOCOBALAMIN TAB 500 MCG 1000 MCG: 500 TAB at 09:08

## 2023-04-20 RX ADMIN — METOPROLOL TARTRATE 25 MG: 25 TABLET, FILM COATED ORAL at 09:08

## 2023-04-20 RX ADMIN — Medication 5 MG: at 20:36

## 2023-04-20 RX ADMIN — TRIAMCINOLONE ACETONIDE: 1 CREAM TOPICAL at 09:06

## 2023-04-20 RX ADMIN — Medication 1000 UNITS: at 09:08

## 2023-04-20 RX ADMIN — MESALAMINE 4 G: 4 ENEMA RECTAL at 09:09

## 2023-04-20 RX ADMIN — ACETAMINOPHEN 500 MG: 500 TABLET ORAL at 17:03

## 2023-04-20 RX ADMIN — LISINOPRIL 20 MG: 20 TABLET ORAL at 09:08

## 2023-04-20 RX ADMIN — INSULIN LISPRO 5 UNITS: 100 INJECTION, SOLUTION INTRAVENOUS; SUBCUTANEOUS at 17:04

## 2023-04-20 RX ADMIN — TRIAMCINOLONE ACETONIDE: 1 CREAM TOPICAL at 20:36

## 2023-04-20 RX ADMIN — INSULIN LISPRO 5 UNITS: 100 INJECTION, SOLUTION INTRAVENOUS; SUBCUTANEOUS at 09:10

## 2023-04-20 RX ADMIN — METOPROLOL TARTRATE 25 MG: 25 TABLET, FILM COATED ORAL at 20:36

## 2023-04-20 RX ADMIN — FERROUS SULFATE TAB 325 MG (65 MG ELEMENTAL FE) 325 MG: 325 (65 FE) TAB at 09:08

## 2023-04-20 RX ADMIN — INSULIN GLARGINE 15 UNITS: 100 INJECTION, SOLUTION SUBCUTANEOUS at 20:36

## 2023-04-20 RX ADMIN — Medication 2 PUFF: at 06:15

## 2023-04-20 RX ADMIN — ACETAMINOPHEN 500 MG: 500 TABLET ORAL at 05:43

## 2023-04-20 RX ADMIN — ROSUVASTATIN CALCIUM 20 MG: 20 TABLET, COATED ORAL at 20:36

## 2023-04-20 ASSESSMENT — 9 HOLE PEG TEST
TEST_RESULT: FUNCTIONAL
TEST_RESULT: FUNCTIONAL
TESTTIME_SECONDS: 31
TESTTIME_SECONDS: 24

## 2023-04-20 ASSESSMENT — PAIN SCALES - GENERAL
PAINLEVEL_OUTOF10: 0
PAINLEVEL_OUTOF10: 2
PAINLEVEL_OUTOF10: 0

## 2023-04-20 ASSESSMENT — PAIN DESCRIPTION - DESCRIPTORS: DESCRIPTORS: ACHING

## 2023-04-20 ASSESSMENT — PAIN DESCRIPTION - LOCATION: LOCATION: GENERALIZED

## 2023-04-20 NOTE — DISCHARGE INSTRUCTIONS
Discharge Instructions      Patient Instructions:   Home  Therapy orders: PT and OT   Discharge lab work: none  Code status: Full Code   Activity: activity as tolerated  Diet: ADULT DIET; Regular; 4 carb choices (60 gm/meal);  No Added Salt (3-4 gm)    Wound Care: as directed  Equipment: as per therapy

## 2023-04-20 NOTE — DISCHARGE SUMMARY
Neurology Discharge Summary     Patient Identification:  Luzmaria Peraza is a [de-identified] y.o. male. :  1940  Admit Date:  2023  Discharge date : 2023. Attending Provider: Aki Cordoba MD     Account Number: [id number]                                   Admission Diagnoses:   Intracranial bleed ([facility] [I62.9]    Discharge Diagnoses:  Principal Problem:    Intracerebral hemorrhage ([facility]  Active Problems:    [facility]    Type 2 diabetes mellitus with complication, without long-term current use of insulin ([facility]    Other hyperlipidemia    Other insomnia    Ulcerative colitis (HCC)    BPH (benign prostatic hyperplasia)    Anemia    Weakness  Resolved Problems:    * No resolved hospital problems. *      Discharge Medications:    Current Discharge Medication List             Details   budesonide-formoterol (SYMBICORT) 160-4.5 MCG/ACT AERO Inhale 2 puffs into the lungs in the morning and 2 puffs in the evening. Qty: 10.2 g, Refills: 0      tiotropium (SPIRIVA RESPIMAT) 2.5 MCG/ACT AERS inhaler Inhale 2 puffs into the lungs daily  Qty: 1 each, Refills: 0      insulin glargine (LANTUS) 100 UNIT/ML injection vial Inject 15 Units into the skin nightly  Qty: 10 mL, Refills: 0      insulin lispro (HUMALOG) 100 UNIT/ML SOLN injection vial Inject 5 Units into the skin 3 times daily (with meals)  Qty: 1 each, Refills: 0      Needles & Syringes MISC 1 each by Does not apply route daily Insulin needles and syringes  Qty: 100 each, Refills: 0      ferrous sulfate (IRON 325) 325 (65 Fe) MG tablet Take 1 tablet by mouth daily (with breakfast)  Qty: 30 tablet, Refills: 0      melatonin 5 MG TBDP disintegrating tablet Take 1 tablet by mouth nightly  Qty: 30 tablet, Refills: 0      Cholecalciferol (VITAMIN D) 25 MCG TABS Take 1 tablet by mouth daily  Qty: 30 tablet, Refills: 0    Comments: Labeling may look different. 25 mcg=1000 Units. Please double check dosages.                 Details

## 2023-04-21 LAB
GLUCOSE BLD-MCNC: 103 MG/DL (ref 70–99)
GLUCOSE BLD-MCNC: 134 MG/DL (ref 70–99)
GLUCOSE BLD-MCNC: 148 MG/DL (ref 70–99)
GLUCOSE BLD-MCNC: 175 MG/DL (ref 70–99)
PERFORMED ON: ABNORMAL

## 2023-04-21 PROCEDURE — 6370000000 HC RX 637 (ALT 250 FOR IP): Performed by: PSYCHIATRY & NEUROLOGY

## 2023-04-21 PROCEDURE — 97530 THERAPEUTIC ACTIVITIES: CPT

## 2023-04-21 PROCEDURE — 97110 THERAPEUTIC EXERCISES: CPT

## 2023-04-21 PROCEDURE — 99232 SBSQ HOSP IP/OBS MODERATE 35: CPT | Performed by: PSYCHIATRY & NEUROLOGY

## 2023-04-21 PROCEDURE — 94640 AIRWAY INHALATION TREATMENT: CPT

## 2023-04-21 PROCEDURE — 94760 N-INVAS EAR/PLS OXIMETRY 1: CPT

## 2023-04-21 PROCEDURE — 97116 GAIT TRAINING THERAPY: CPT

## 2023-04-21 PROCEDURE — 1180000000 HC REHAB R&B

## 2023-04-21 PROCEDURE — 82962 GLUCOSE BLOOD TEST: CPT

## 2023-04-21 RX ADMIN — Medication 1000 UNITS: at 10:04

## 2023-04-21 RX ADMIN — INSULIN LISPRO 5 UNITS: 100 INJECTION, SOLUTION INTRAVENOUS; SUBCUTANEOUS at 10:06

## 2023-04-21 RX ADMIN — FINASTERIDE 5 MG: 5 TABLET, FILM COATED ORAL at 10:04

## 2023-04-21 RX ADMIN — TRIAMCINOLONE ACETONIDE: 1 CREAM TOPICAL at 21:09

## 2023-04-21 RX ADMIN — TRIAMCINOLONE ACETONIDE: 1 CREAM TOPICAL at 10:05

## 2023-04-21 RX ADMIN — ACETAMINOPHEN 500 MG: 500 TABLET ORAL at 18:13

## 2023-04-21 RX ADMIN — METOPROLOL TARTRATE 25 MG: 25 TABLET, FILM COATED ORAL at 10:04

## 2023-04-21 RX ADMIN — ACETAMINOPHEN 500 MG: 500 TABLET ORAL at 05:44

## 2023-04-21 RX ADMIN — Medication 5 MG: at 21:09

## 2023-04-21 RX ADMIN — FERROUS SULFATE TAB 325 MG (65 MG ELEMENTAL FE) 325 MG: 325 (65 FE) TAB at 10:04

## 2023-04-21 RX ADMIN — CYANOCOBALAMIN TAB 500 MCG 1000 MCG: 500 TAB at 10:04

## 2023-04-21 RX ADMIN — Medication 2 PUFF: at 06:21

## 2023-04-21 RX ADMIN — ROSUVASTATIN CALCIUM 20 MG: 20 TABLET, COATED ORAL at 21:09

## 2023-04-21 RX ADMIN — FLUTICASONE PROPIONATE 1 SPRAY: 50 SPRAY, METERED NASAL at 10:03

## 2023-04-21 RX ADMIN — Medication 2 PUFF: at 19:31

## 2023-04-21 RX ADMIN — INSULIN GLARGINE 15 UNITS: 100 INJECTION, SOLUTION SUBCUTANEOUS at 21:09

## 2023-04-21 RX ADMIN — METOPROLOL TARTRATE 25 MG: 25 TABLET, FILM COATED ORAL at 21:09

## 2023-04-21 RX ADMIN — LISINOPRIL 20 MG: 20 TABLET ORAL at 10:04

## 2023-04-21 ASSESSMENT — PAIN DESCRIPTION - DESCRIPTORS: DESCRIPTORS: OTHER (COMMENT)

## 2023-04-21 ASSESSMENT — PAIN DESCRIPTION - ORIENTATION: ORIENTATION: MID

## 2023-04-21 ASSESSMENT — PAIN DESCRIPTION - LOCATION
LOCATION: NECK
LOCATION: BACK

## 2023-04-21 ASSESSMENT — PAIN - FUNCTIONAL ASSESSMENT: PAIN_FUNCTIONAL_ASSESSMENT: PREVENTS OR INTERFERES SOME ACTIVE ACTIVITIES AND ADLS

## 2023-04-21 ASSESSMENT — PAIN SCALES - GENERAL
PAINLEVEL_OUTOF10: 0
PAINLEVEL_OUTOF10: 0
PAINLEVEL_OUTOF10: 7
PAINLEVEL_OUTOF10: 0

## 2023-04-21 NOTE — CARE COORDINATION
Mr Leonardo Roberson, expecting discharge Saturday, 4/22, going home with wife. He will return for outpatient physical therapy at [facility], starting 4/25/23.

## 2023-04-21 NOTE — PROGRESS NOTES
04/19/23 0900   Transfers   Sit to Stand Modified independent   Stand to Sit Modified independent   Bed to Chair Modified independent   Comment SBA BED TO CHAIR WITHOUT ROLLATOR   Ambulation   Device Rollator   Assistance Contact guard assistance;Stand by assistance; Moderate assistance   Quality of Gait DESCENDING RAMP PATIENT PRESENTED WITH RIGHT KNEE FLEXION DURING STANCE, 2 INSTANCES OF BUCKLE WHEN STEPPING UP/DOWN WITH UNEVEN SURFACES IN SIDEWALK. INCREASED FOREFOOT DRAG WHEN ASCNDING RAMP, ABLE TO CORRECT WITH VERBAL CUES. NO BUCKLE. Distance 500+ X2   Comments AMB FROM MAIN ENTRANCE DOWN RAMP TO [place]. RESTED ON BENCH, THEN AMBULATED BACK. Ambulation 2   Device 2 No device   Assistance 2 Contact guard assistance   Quality of Gait 2 VERBAL CUES FOR NORMAL SPEED, FORWARD GAZE. INCONSISTENT RIGHT STEP LENGTH, SLIGHT INCREASED LATERAL DEVIATION TO RIGHT. NO TOE/FOREFOOT DRAG. ONE ISNTANCE OF SLIGHT LOB ON RIGHT TURN WHICH PATIETN SELF CORRECTED. Distance 300 FT   Assessment   Assessment GAIT OUTSIDE ON RAMP WITH ROLLATOR. INSTANCES OF RIGHT KNEE ISNTABILITY, BUCKLING ON DESCENSION WHEN CONCRETE WAS UNEVEN.   PRACTICED AMBULATING ON UNIT WITHOUT A.D.
04/19/23 1430   Chief Reason for Therapy   Chief Reason Neurologic   Restrictions/Precautions   Restrictions/Precautions Up Ad Renate   General   Chart Reviewed Yes   Patient assessed for rehabilitation services? 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; Primary HTN; Type 2 DM w/ hyperglycemia; CKD stage 3; Cardiac Cath stents x6; CABG triple bypass; R ADITHYA; Obstructive sleep apnea (uses cpap); penile prosthesis implant; asthma   Diagnosis Nontraumatic Intracerebral hemorrhage (L side, R body involved)   General Comment   Comments Pt sitting in recliner in room   Subjective   Subjective Pt in recliner and agreed to therapy   Transfers   Sit to Stand Independent    Stand to Sit Independent    Lateral Transfers Supervision, Modified Independent   (Pt R w/c >< R side of mat table, Pt R w/c >< recliner chair - W/o AD)   Ambulation   WB Status fwbat   Ambulation   Surface Level tile   Device Rollator   Assistance Supervision    Quality of Gait Reciprocal gait pattern, t cue to keep gaze fwd, no lob. Gait Deviations Decreased step length;Decreased step height   Distance 525'  Multiple L/R turns    PT Exercises   Exercise Treatment Sitting LAQ x 15, seated marches x 15, and AP x 15. Supine SLR x 15 BLE, bridges x 20 w/ rest 15s break between first 10, and prone hip ext x 15 BLE. Assessment   Assessment Pt showing good endurance w/ ambulation and no LOB, and able to tolerate supine BLE ther-ex w/o showing signs of fatigue. Safety Devices   Type of Devices Call light within reach; Left in chair;Gait belt   Recommendation   Requires PT Follow-Up No     Electronically signed by Ceci Yuan PTA on 4/19/2023 at 4:15 PM
Aleks Otto  [id number]       04/21/23 1495 04/21/23 0939 04/21/23 0940   Transfers   Sit to Stand Independent  --   --    Stand to Sit Independent  --   --    Ambulation   WB Status fwbat  --   --    Ambulation   Surface Level tile  --   --    Device Rollator  --   --    Assistance Modified Independent  --   --    Quality of Gait Reciprocal gait pattern. Good pace. No LOB. --   --    Gait Deviations Decreased step length;Decreased step height  --   --    Distance 250', 350'  --   --    Stairs/Curb   Stairs? Yes  --   --    Stairs   # Steps  12  --   --    Stairs Height 4\"  --   --    Rails Bilateral  --   --    Assistance Stand by assistance  --   --    Comment Step-to leading with Left going up; leading with Right going down. Occ toe drag on R going up.  --   --    Wheelchair Activities   Wheelchair Size Not uisng W/C.  --   --    PT Exercises   Exercise Treatment  --  Sitting BLE exercises  x20 reps with 1-1/2 lb weights. --    Activity Tolerance   Activity Tolerance  --   --  Patient tolerated treatment well   Assessment   Assessment  --   --  Mila session well. Able to go up/down 1 flight of stairs using HRs with SBA. Occ R toe drag noted going up stairs. PT Individual Minutes   Time In  --   --   --    Time Out  --   --   --    Minutes  --   --   --       04/21/23 0941   Transfers   Sit to Stand  --    Stand to Sit  --    Ambulation   WB Status  --    Ambulation   Surface  --    Device  --    Assistance  --    Quality of Gait  --    Gait Deviations  --    Distance  --    Stairs/Curb   Stairs?   --    Stairs   # Steps   --    Stairs Height  --    Rails  --    Assistance  --    Comment  --    Wheelchair Activities   Wheelchair Size  --    PT Exercises   Exercise Treatment  --    Activity Tolerance   Activity Tolerance  --    Assessment   Assessment  --    PT Individual Minutes   Time In 0900   Time Out 0413   Minutes 53     Electronically signed by Russ Tovar PTA on 4/21/2023 at 9:58 AM
Collette Curt  [id number]       04/17/23 1352 04/17/23 1400 04/17/23 1401   Subjective   Pain 0/10  --   --    Transfers   Sit to Stand  --  Stand by assistance;Contact guard assistance  --    Stand to Sit  --  Stand by assistance  --    Ambulation   WB Status  --   --  fwbat   Ambulation   Surface  --   --  Level tile   Device  --   --  Rollator   Assistance  --   --  Stand by assistance;Contact guard assistance   Distance  --   --  250', 200'   Comments  --   --  Incorporated turns. PT Exercises   Exercise Treatment  --   --   --    Activity Tolerance   Activity Tolerance  --   --   --    Assessment   Assessment  --   --   --    Safety Devices   Type of Devices  --   --   --       04/17/23 1419 04/17/23 1430   Subjective   Pain  --   --    Transfers   Sit to Stand  --   --    Stand to Sit  --   --    Ambulation   WB Status  --   --    Ambulation   Surface  --   --    Device  --   --    Assistance  --   --    Distance  --   --    Comments  --   --    PT Exercises   Exercise Treatment Standing BLE exercises   x15 reps in parallel bars with 1 sitting rest.  Mini squats, HS curls, hip abd/add, hip ext, heel/toe raises, marching.  --    Activity Tolerance   Activity Tolerance  --  Patient tolerated treatment well   Assessment   Assessment  --  Continued practicing amb using Rollator. Doing well wt this. Safety Devices   Type of Devices  --  Call light within reach; Chair alarm in place     Electronically signed by Da Patterson PTA on 4/17/2023 at 3:43 PM
Facility/Department: [facility] 8 REHAB UNIT  Occupational Therapy Treatment Note    Name: Michelle Lei  : 1940  MRN: [id number]  Date of Service: 2023    Discharge Recommendations:  Home with assist PRN, Home with Home health OT          Patient Diagnosis(es): There were no encounter diagnoses. Past Medical History:  has a past medical history of CAD (coronary artery disease), Constipation, Diabetes mellitus ([facility] Hematuria, Hypertension, NSTEMI (non-ST elevated myocardial infarction) ([facility] [address] and Ulcerative colitis ([facility] Past Surgical History:  has a past surgical history that includes Coronary artery bypass graft; shoulder surgery; back surgery; hip surgery; and Coronary angioplasty with stent. Treatment Diagnosis: CVA with Right hemiparesis    Assessment   Performance deficits / Impairments: Decreased functional mobility ; Decreased coordination;Decreased vision/visual deficit; Decreased endurance;Decreased high-level IADLs;Decreased strength;Decreased fine motor control  Assessment: Pt. cleared for up ad renate in room with rollator, but recommend setup/cleanup assistance for showers at this time.   Treatment Diagnosis: CVA with Right hemiparesis  Prognosis: Good  Activity Tolerance  Activity Tolerance: Patient Tolerated treatment well              Plan   Occupational Therapy Plan  Specific Instructions for Next Treatment: sock aide, R  average  Current Treatment Recommendations: Strengthening, Patient/Caregiver education & training, Equipment evaluation, education, & procurement, Home management training, Functional mobility training, Endurance training, Cognitive reorientation, Coordination training     Restrictions  Restrictions/Precautions  Restrictions/Precautions: Up Ad Renate    Subjective   General  Chart Reviewed: Yes, Orders  Patient assessed for rehabilitation services?: Yes  Family / Caregiver Present: No  Diagnosis: CVA with Right hemiparesis     Objective   Functional Mobility  Functional
Facility/Department: [facility] REHAB UNIT  Occupational Therapy     Name: Daksha Neely  : 1940  MRN: [id number]  Date of Service: 2023    Discharge Recommendations:  Home with assist PRN, Home with Home health OT        Patient Diagnosis(es): There were no encounter diagnoses. Past Medical History:  has a past medical history of CAD (coronary artery disease), Constipation, Diabetes mellitus ([facility] [address] [facility] Hematuria, Hypertension, NSTEMI (non-ST elevated myocardial infarction) ([facility] [address] [facility] and Ulcerative colitis ([facility] Past Surgical History:  has a past surgical history that includes Coronary artery bypass graft; shoulder surgery; back surgery; hip surgery; and Coronary angioplasty with stent. Treatment Diagnosis: CVA with Right hemiparesis      Assessment   Performance deficits / Impairments: Decreased functional mobility ; Decreased coordination;Decreased vision/visual deficit; Decreased endurance;Decreased high-level IADLs;Decreased strength;Decreased fine motor control  Assessment: Pt. cleared for up ad melissa in room with rollator, but recommend setup/cleanup assistance for showers at this time.   Treatment Diagnosis: CVA with Right hemiparesis  Prognosis: Good  Activity Tolerance  Activity Tolerance: Patient Tolerated treatment well          Plan   Occupational Therapy Plan  Specific Instructions for Next Treatment: sock aide, R  average  Current Treatment Recommendations: Strengthening, Patient/Caregiver education & training, Equipment evaluation, education, & procurement, Home management training, Functional mobility training, Endurance training, Cognitive reorientation, Coordination training       Restrictions  Restrictions/Precautions  Restrictions/Precautions: Up Ad Melissa      Subjective   General  Chart Reviewed: Yes, Orders  Patient assessed for rehabilitation services?: Yes  Family / Caregiver Present: No  Diagnosis: CVA with Right hemiparesis       Social/Functional History  Social/Functional
Facility/Department: [facility] 8 REHAB UNIT  Occupational Therapy     Name: Blanca Gordon  : 1940  MRN: [id number]  Date of Service: 2023    Discharge Recommendations:  Home with assist PRN, Home with Home health OT        Patient Diagnosis(es): There were no encounter diagnoses. Past Medical History:  has a past medical history of CAD (coronary artery disease), Constipation, Diabetes mellitus ([facility] [address] [facility] Hematuria, Hypertension, NSTEMI (non-ST elevated myocardial infarction) ([facility] [address] and Ulcerative colitis ([facility] Past Surgical History:  has a past surgical history that includes Coronary artery bypass graft; shoulder surgery; back surgery; hip surgery; and Coronary angioplasty with stent. Treatment Diagnosis: CVA with Right hemiparesis      Assessment   Performance deficits / Impairments: Decreased functional mobility ; Decreased coordination;Decreased vision/visual deficit; Decreased endurance;Decreased high-level IADLs;Decreased strength;Decreased fine motor control  Treatment Diagnosis: CVA with Right hemiparesis  Prognosis: Good  Activity Tolerance  Activity Tolerance: Patient Tolerated treatment well         Plan   Occupational Therapy Plan  Specific Instructions for Next Treatment: sock aide, R  average  Current Treatment Recommendations: Strengthening, Patient/Caregiver education & training, Equipment evaluation, education, & procurement, Home management training, Functional mobility training, Endurance training, Cognitive reorientation, Coordination training       Restrictions  Restrictions/Precautions  Restrictions/Precautions: Up Ad Renate      Subjective   General  Chart Reviewed: Yes, Orders  Patient assessed for rehabilitation services?: Yes  Family / Caregiver Present: No  Diagnosis: CVA with Right hemiparesis       Social/Functional History  Social/Functional History  Type of Home: House  Home Layout: One level  Home Access: Stairs to enter without rails  Entrance Stairs - Number of Steps:
Nutrition Assessment     Type and Reason for Visit: Reassess    Nutrition Recommendations/Plan:   Provide Carbohydrate food list and DM edu prior to d/c. Malnutrition Assessment:  Malnutrition Status: No malnutrition    Nutrition Assessment:  Pt remains adequately nourished AEB adequate po intake of meals (>75%) and reported good appetite. No new weight from admission, stable per UBW. Accuchecks 141-156. Last BM noted 4/15. Pt requesting carbohydrate food list and is practicing carb counting when ordering meals. Will provide edu prior to d/c. Nutrition Related Findings:     Wound Type: None    Current Nutrition Therapies:    ADULT DIET; Regular; 4 carb choices (60 gm/meal);  No Added Salt (3-4 gm)    Anthropometric Measures:  Height: 5' 9\" (175.3 cm)  Current Body Wt: 231 lb (104.8 kg)   BMI: 34.1    Nutrition Diagnosis:   No nutrition diagnosis at this time     Nutrition Interventions:   Food and/or Nutrient Delivery: Continue Current Diet  Nutrition Education/Counseling: Education initiated  Coordination of Nutrition Care: Continue to monitor while inpatient  Plan of Care discussed with: Patient    Goals:  Previous Goal Met: Progressing toward Goal(s)  Goals: Meet at least 75% of estimated needs, prior to discharge    Nutrition Monitoring and Evaluation:   Behavioral-Environmental Outcomes: Knowledge or Skill  Food/Nutrient Intake Outcomes: Food and Nutrient Intake  Physical Signs/Symptoms Outcomes: Biochemical Data, Nutrition Focused Physical Findings, Weight    Discharge Planning:    Continue current diet     Danni Pop, MS, RD, LD  Contact: [phone]
Occupational Therapy  Facility/Department: [facility] 8 REHAB UNIT  Rehabilitation Occupational Therapy Daily Treatment Note    Date: 23  Patient Name: Aliya Childers       Room: 7916/306-69  MRN: [id number]  Account: [de-identified]   : 1940  [de-identified] y.o.) Gender: male        Diagnosis: CVA with Right hemiparesis       Treatment Diagnosis: CVA with Right hemiparesis   Past Medical History:  has a past medical history of CAD (coronary artery disease), Constipation, Diabetes mellitus ([facility] [address] [facility] Hematuria, Hypertension, NSTEMI (non-ST elevated myocardial infarction) ([facility] [address] and Ulcerative colitis ([facility] [address] Past Surgical History:   has a past surgical history that includes Coronary artery bypass graft; shoulder surgery; back surgery; hip surgery; and Coronary angioplasty with stent.        23 1500   Patient Education   Education Given To Patient   Education Provided Home Exercise Program   Education Provided Comments medium resistance Tband   Education Method Demonstration;Verbal;Teach Back   Barriers to Learning None   Education Outcome Demonstrated understanding;Verbalized understanding
Patient:   Emmanuel Dupont  MR#:    [id number]   Room[facility]    [facility]-   YOB: 1940  Date of Progress Note: 4/17/2023  Time of Note                           9:41 AM  Consulting Physician:   Nehemiah Card M.D. Attending Physician:  Nehemiah Card MD     Chief complaint Intracranial hemorrhage    S:This [de-identified] y.o. male  admitted to \[facility]\"" on 4/9/2023 with complaint of R UE and R LE numbness, weakness and difficulty functioning. Pt was in his normal state when he went to bed the previous evening, but had R sided weakness when he woke up on 4/9/2023. In ED, patient was noted to have ataxic movements in his R arm and leg. CT of head showed a 17mm acute hemorrhage within the L thalamus. He was admitted to ICU. He was moved to the floor yesterday. He is on room air and is medically stable. He has improved some but continues with ataxic movements in his R extremities. He is participating with therapy and is ready to begin rehab, with goal of returning home after rehab with support from his wife. No new complaint. Feels well overall.       REVIEW OF SYSTEMS:  Constitutional: No fevers No chills  Neck:No stiffness  Respiratory: No shortness of breath  Cardiovascular: No chest pain No palpitations  Gastrointestinal: No abdominal pain    Genitourinary: No Dysuria  Neurological: No headache, no confusion    Past Medical History:      Diagnosis Date    CAD (coronary artery disease)     Constipation     Diabetes mellitus (HCC)     Hematuria     Hypertension     NSTEMI (non-ST elevated myocardial infarction) ([facility] [address]     Ulcerative colitis ([facility] [address]        Past Surgical History:      Procedure Laterality Date    BACK SURGERY      CORONARY ANGIOPLASTY WITH STENT PLACEMENT      CORONARY ARTERY BYPASS GRAFT      HIP SURGERY      SHOULDER SURGERY         Medications in Hospital:      Current Facility-Administered Medications:     glucose chewable tablet 16 g, 4 tablet, Oral, PRN, Nehemiah Card MD    dextrose bolus 10% 125 mL,
Patient:   Melanie Ty  MR#:    [id number]   Room:    0826/826-02   YOB: 1940  Date of Progress Note: 4/18/2023  Time of Note                           8:57 AM  Consulting Physician:   Aicha Morales M.D. Attending Physician:  Aicha Morales MD     Chief complaint Intracranial hemorrhage    S:This [de-identified] y.o. male  admitted to hospitals on 4/9/2023 with complaint of R UE and R LE numbness, weakness and difficulty functioning. Pt was in his normal state when he went to bed the previous evening, but had R sided weakness when he woke up on 4/9/2023. In ED, patient was noted to have ataxic movements in his R arm and leg. CT of head showed a 17mm acute hemorrhage within the L thalamus. He was admitted to ICU. He was moved to the floor yesterday. He is on room air and is medically stable. He has improved some but continues with ataxic movements in his R extremities. He is participating with therapy and is ready to begin rehab, with goal of returning home after rehab with support from his wife. No new issues. Feels well.        REVIEW OF SYSTEMS:  Constitutional: No fevers No chills  Neck:No stiffness  Respiratory: No shortness of breath  Cardiovascular: No chest pain No palpitations  Gastrointestinal: No abdominal pain    Genitourinary: No Dysuria  Neurological: No headache, no confusion    Past Medical History:      Diagnosis Date    CAD (coronary artery disease)     Constipation     Diabetes mellitus (HCC)     Hematuria     Hypertension     NSTEMI (non-ST elevated myocardial infarction) ([facility] [address]     Ulcerative colitis ([facility] [address]        Past Surgical History:      Procedure Laterality Date    BACK SURGERY      CORONARY ANGIOPLASTY WITH STENT PLACEMENT      CORONARY ARTERY BYPASS GRAFT      HIP SURGERY      SHOULDER SURGERY         Medications in Hospital:      Current Facility-Administered Medications:     glucose chewable tablet 16 g, 4 tablet, Oral, PRN, Aicha Morales MD    dextrose bolus 10% 125 mL, 125 mL,
Physical Therapy  Name: Melanie Ty  MRN:  [id number]  Date of service:  4/20/2023 04/20/23 0900   Restrictions/Precautions   Restrictions/Precautions Up Ad Renate   General Comment   Comments Pt sitting in recliner in room   Subjective   Subjective Pt ready and without complaint. Spouse present for FTD.    Bed mobility   Bridging Independent   Rolling to Left Independent   Rolling to Right Independent   Supine to Sit Independent   Sit to Supine Independent   Transfers   Sit to Stand Modified independent   Stand to Sit Modified independent   Bed to Chair Modified independent   Car Transfer Stand by assistance;Contact guard assistance  (simulator to mock truck with step side with cues to sequence: backing to step, locking brakes, stepping up onto step side and reaching back for seat and handle, lowering bottom into seat prior to bringing legs in; performed x 2 due to impulsivity 1st)   Ambulation   WB Status fwbat   Ambulation   Surface Level tile   Device Rollator   Other Apparatus   (wearing shoes)   Assistance Stand by assistance   Quality of Gait Reciprocal gait pattern,no lob   Distance 200'   Ambulation 2   Surface - 2 outdoors;uneven;ramp  (surfaces including up/dwn hill slope, uneven sidewalks, pavers)   Device 2 Rollator   Assistance 2 Stand by assistance;Contact guard assistance   Quality of Gait 2 excess speed with downhill, increased scuffing of R foot with cues to be more deliberate with hip flex and DF   Stairs   # Steps  4  (performed stairs and curb)   Stairs Height 4\"   Rails Bilateral   Curbs 6\"  (performed up/dwn x 6)   Device 4 wheeled walker   Assistance Minimal assistance;Contact guard assistance   Comment STEPS: step to pattern ascending with L and descending with R (recalls technique w/o cues); CURB: cues to remember sequence: approach curb, lock brakes, lift front of walker onto curb, step closer, lift back of walker onto curb, step up leading with L   Assessment   Assessment Pt did
--   --   --    Distance  --   --   --    Comments  --   --   --    PT Exercises   Exercise Treatment Sitting BLE exercises  x20 reps with 1-1/2 lb weights. Heel raises/ toe raises, LAQ, Hip flex. --   --    Activity Tolerance   Activity Tolerance  --  Patient tolerated treatment well  --    Assessment   Assessment  --  Mila session well. Practiced using Rollator during amb. Able to increase distance today and more even gladys when given verbal cues to slow down. --    Safety Devices   Type of Devices  --   --   --    PT Individual Minutes   Time In  --   --  1000   Time Out  --   --  1100   Minutes  --   --  60      04/17/23 1059   Subjective   Pain  --    Transfers   Sit to Stand  --    Stand to Sit  --    Lateral Transfers  --    Ambulation   WB Status  --    Ambulation   Surface  --    Device  --    Assistance  --    Quality of Gait  --    Gait Deviations  --    Distance  --    Comments  --    Ambulation 2   Surface - 2  --    Device 2  --    Assistance 2  --    Quality of Gait 2  --    Distance  --    Comments  --    PT Exercises   Exercise Treatment  --    Activity Tolerance   Activity Tolerance  --    Assessment   Assessment  --    Safety Devices   Type of Devices Call light within reach; Chair alarm in place   PT Individual Minutes   Time In  --    Time Out  --    Minutes  --      Electronically signed by Kamila Avery PTA on 4/17/2023 at 11:00 AM
Ambulating   Tub Transfers Modified independence   Tub Transfers Comments also practiced shower seat and GBs which pt already has, spouse prefers the TTB for safety reasons   OT Exercises   Exercise Treatment Reviewed HEP   Long Term Goals   Long Term Goal 2 MET   Long Term Goal 4 MET   Assessment   Performance deficits / Impairments Decreased functional mobility ; Decreased coordination;Decreased vision/visual deficit; Decreased endurance;Decreased high-level IADLs;Decreased strength;Decreased fine motor control   Assessment Pt will order a TTB. Rollator and BSC arranged. Treatment Diagnosis CVA with Right hemiparesis   Education   Education Given To Patient; Family  (spouse)   Education Provided Home Exercise Program;Transfer Training; Safety; Family Education   Education Provided Comments fall recovery, DME recs, home management, HEP   Education Method Demonstration;Verbal;Printed Information/Hand-outs   Education Outcome Verbalized understanding;Demonstrated understanding   Time Code Minutes    Timed Code Treatment Minutes 60 Minutes   OT Individual Minutes   Time In 1000   Time Out 1100   Minutes 61
Devices  --   --   --    PT Individual Minutes   Time In  --   --  1430   Time Out  --   --  1508   Minutes  --   --  38      04/21/23 1514   Restrictions/Precautions   Restrictions/Precautions  --    Subjective   Subjective  --    Transfers   Sit to Stand  --    Stand to Sit  --    Ambulation   WB Status  --    Ambulation   Surface  --    Device  --    Assistance  --    Quality of Gait  --    Gait Deviations  --    Distance  --    Wheelchair Activities   Wheelchair Size  --    PT Exercises   Exercise Treatment  --    Activity Tolerance   Activity Tolerance  --    Assessment   Assessment  --    Safety Devices   Type of Devices Call light within reach   PT Individual Minutes   Time In  --    Time Out  --    Minutes  --      Electronically signed by Da Patterson PTA on 4/21/2023 at 3:15 PM
Initiated amb ex's to be deliberate with both hip flex and DF to hopefully improve consistency with foot clearance.    Safety Devices   Type of Devices Call light within reach;Gait belt  (Given to PT.)   PT Individual Minutes   Time In 1345   Time Out 1430   Minutes 45         Electronically signed by Caitlin Tang PTA on 4/18/2023 at 2:32 PM
Treatment Minutes 45 Minutes   OT Individual Minutes   Time In 1300   Time Out 1345   Minutes 45        04/20/23 1300   Fine Motor Skills   Left 9-Hole Peg Test Functional   Left 9 Hole Peg Test Time (secs) 24   Right 9-Hole Peg Test Functional   Right 9 Hole Peg Test Time (secs) 31   Fine Motor Comment 9 hole peg test completed and time decreased
approach curb, lock brakes, lift front of walker onto curb, step closer, lift back of walker onto curb, step up leading with L   PT Exercises   Dynamic Sitting Balance Exercises STS x 10 from low mat table hands on mat to rise/lower but wbing solely on LE's, heel taps x 10 ea standing w/o UE support cg@, sidestepping 2 x 10' L and R with cg@, bkwd stepping 2 x 10'   Assessment   Assessment Pt did better with car transfer and curb upon this afternoon's review but will most likely need cues from spouse to consistently perform safely. Pt with improved stability for sidestepping and retro stepping but will cont to benefit from use of rollator for amb due to dec DF and wt shift during gt causing trip hazard. Safety Devices   Type of Devices Call light within reach; Left in chair   PT Individual Minutes   Time In 1435   Time Out 1510   Minutes 35         Electronically signed by Rory Solis PTA on 4/20/2023 at 3:10 PM
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 Has preferred to close and alternate closing eye- spouse has recently bought him some adhesive eye patches.    Treatment Diagnosis CVA with Right hemiparesis   Time Code Minutes    Timed Code Treatment Minutes 39 Minutes   OT Individual Minutes   Time In 1300   Time Out 1345   Minutes 45
coordination;Decreased vision/visual deficit; Decreased endurance;Decreased high-level IADLs;Decreased strength;Decreased fine motor control   Treatment Diagnosis CVA with Right hemiparesis   Education   Education Given To Patient   Education Provided Home Exercise Program   Education Method Demonstration;Verbal;Printed Information/Hand-outs   Barriers to Learning None   Education Outcome Verbalized understanding;Demonstrated understanding   Time Code Minutes    Timed Code Treatment Minutes 60 Minutes   OT Individual Minutes   Time In 1100   Time Out 1130   Minutes 30   OT Concurrent Minutes   Time In 1130   Time Out 1200   [id number] 53
good control  (EDU on minimizing use of hands with STS to allow LE's to work more as UE's should be more for safety)   Assessment   Assessment Pt shows good confidence and stability with rollator. Pt given rollator to use in room. Pt demonstrated ability to safely get up ad melissa in room for PT. OT will assess later today before clearance given. Pt shows imroved balance with dynamic activities during today's session compared to our work on Saturday 4/15. Safety Devices   Type of Devices Call light within reach; Chair alarm in place; Left in chair   PT Individual Minutes   Time In 1000   Time Out 1100   Minutes 60         Electronically signed by Yasmeen Alex PTA on 4/18/2023 at 11:18 AM
management training;Functional mobility training; Endurance training;Cognitive reorientation; Coordination training   Safety Devices   Type of Devices Left in chair;Gait belt   Time Code Minutes    Timed Code Treatment Minutes 39 Minutes   OT Individual Minutes   Time In 1300   Time Out 0248   [id number] 35
mobility training; Endurance training;Cognitive reorientation; Coordination training   Safety Devices   Type of Devices Left in chair;Gait belt  (Delivered to PT)   Time Code Minutes    Timed Code Treatment Minutes 39 Minutes   OT Individual Minutes   Time In 0815   Time Out 0900   [facility] 50
45         Timed Code Treatment Minutes: 45 Minutes       Electronically signed by BIBI Aguilera on 4/17/2023 at 10:37 AM
appears symmetric, good expansion,   normal effort without use of accessory muscles  Musculoskeletal - no significant wasting of muscles noted, no bony deformities  Extremities-no clubbing, cyanosis or edema  Skin - warm, dry, and intact. No rash, erythema, or pallor. Psychiatric - mood, affect, and behavior appear normal.      Neurological exam  Awake, alert, fluent oriented appropriate affect  Attention and concentration appear appropriate  Recent and remote memory appears unremarkable  Speech normal without dysarthria  No clear issues with language of fund of knowledge     Cranial Nerve Exam     CN III, IV,VI-EOMI, No nystagmus, disconjugate eye movements, no ptosis    CN VII-no facial assymetry       Motor Exam  antigravity throughout upper and lower extremities bilaterally      Tremors- no tremors in hands or head noted     Gait  Not tested     Nursing/pcp notes, imaging,labs and vitals reviewed.      PT,OT and/or speech notes reviewed    Lab Results   Component Value Date    WBC 9.3 04/17/2023    HGB 15.4 04/17/2023    HCT 47.2 04/17/2023    MCV 97.3 (H) 04/17/2023     04/17/2023     Lab Results   Component Value Date     04/17/2023    K 5.0 04/17/2023     04/17/2023    CO2 25 04/17/2023    BUN 14 04/17/2023    CREATININE 1.0 04/17/2023    GLUCOSE 143 (H) 04/17/2023    CALCIUM 9.1 04/17/2023    PROT 6.9 04/17/2023    LABALBU 3.7 04/17/2023    BILITOT 0.3 04/17/2023    ALKPHOS 71 04/17/2023    AST 20 04/17/2023    ALT 23 04/17/2023    LABGLOM >60 04/17/2023    GFRAA >59 01/03/2022   No results found for: INR, PROTIME      Cleven Cowcharis, PTA  Physical Therapist Assistant  Physical Therapy  Progress Notes     Signed  Date of Service:  4/18/2023  2:32 PM     Signed                                                                                              Name: Qamar Swenson  MRN:  [id number]  Date of service:  4/18/2023 04/18/23 1300   Chief Reason for Therapy   Chief Reason
Therapy  Name: Raj Rodriguez  MRN:  [id number]  Date of service:  4/20/2023 04/20/23 0900   Restrictions/Precautions   Restrictions/Precautions Up Ad Renate   General Comment   Comments Pt sitting in recliner in room   Subjective   Subjective Pt ready and without complaint. Spouse present for FTD.    Bed mobility   Bridging Independent   Rolling to Left Independent   Rolling to Right Independent   Supine to Sit Independent   Sit to Supine Independent   Transfers   Sit to Stand Modified independent   Stand to Sit Modified independent   Bed to Chair Modified independent   Car Transfer Stand by assistance;Contact guard assistance  (simulator to mock truck with step side with cues to sequence: backing to step, locking brakes, stepping up onto step side and reaching back for seat and handle, lowering bottom into seat prior to bringing legs in; performed x 2 due to impulsivity 1st)   Ambulation   WB Status fwbat   Ambulation   Surface Level tile   Device Rollator   Other Apparatus    (wearing shoes)   Assistance Stand by assistance   Quality of Gait Reciprocal gait pattern,no lob   Distance 200'   Ambulation 2   Surface - 2 outdoors;uneven;ramp  (surfaces including up/dwn hill slope, uneven sidewalks, pavers)   Device 2 Rollator   Assistance 2 Stand by assistance;Contact guard assistance   Quality of Gait 2 excess speed with downhill, increased scuffing of R foot with cues to be more deliberate with hip flex and DF   Stairs   # Steps  4  (performed stairs and curb)   Stairs Height 4\"   Rails Bilateral   Curbs 6\"  (performed up/dwn x 6)   Device 4 wheeled walker   Assistance Minimal assistance;Contact guard assistance   Comment STEPS: step to pattern ascending with L and descending with R (recalls technique w/o cues); CURB: cues to remember sequence: approach curb, lock brakes, lift front of walker onto curb, step closer, lift back of walker onto curb, step up leading with L   Assessment   Assessment Pt did fairly
Devices   Type of Devices Call light within reach;Gait belt  (Given to PT.)   PT Individual Minutes   Time In 1400 [facility]   Time Out 1430   Minutes 45            Electronically signed by Merari Felipe PTA on 4/18/2023 at 2:32 PM               Cosigned by: Leopoldo Rodriguez PT at 4/19/2023  7:42 AM     Revision History                            RECORD REVIEW: Previous medical records, medications were reviewed at today's visit    IMPRESSION:   1. Left thalamic hemorrhage-monitor  2. COPD-on inhalers/Flonase  3. Ulcerative colitis-on medications  4. Anemia-on iron  5. BPH-on medications monitor  6. Diabetes-on insulin-monitor blood sugar  7. Hypertension-on medications monitor  8. Insomnia-on medications monitor  9. Hyperlipidemia-on statin monitor  10. Pain control-Norco as needed  11.   PT/OT    Continue present care    KAITLIN April 26 th    Expected duration and frequency therapy: 180 minutes per day, 5 days per week    [address] [facility]  [phone] CELL  Dr Maddie Kerr
to impairments. In addition, therapy treatments will begin within 36 hours from midnight of the day of the patient's admission to the inpatient rehabilitation facility     Expected duration and frequency therapy: 180 minutes per day, 5 days per week     Interdisciplinary team: The patient demonstrates the need for an interdisciplinary team for active management of the following medical issues including ataxia, motor planning, balance, disease management, elimination, endurance, family training, education, independent ADLs, pain management, precautions, range of motion, safety, strength, and transfers     I have reviewed the preadmission screening documents and concur with the findings. I believe the patient meets criteria and is sufficiently stable to allow participation in the program. This requires an intensive level of therapy, close medical supervision, and an interdisciplinary team approach provided through an individualized plan of care. I approve admitting this patient for an intensive inpatient rehabilitation program.     Please feel free to call with any questions   [phone] (cell phone)     Dr Janice Wright certified in Neurology  Board Certified in Clinical Neurophysiology  Fellowship Trained in [place] [address] Neurophysiology        EMR Dragon/transcription disclaimer:Significant part of this  encounter note is electronic transcription/translation of spoken language to printed text. The electronic translation of spoken language may be erroneous, or at times, nonsensical words or phrases may be inadvertently transcribed.  Although I have reviewed the note for such errors, some may still exist.                _________ _____________________ ________________   Physician Signature      Physician Name (print)   Physician NPI          Date

## 2023-04-22 VITALS
HEIGHT: 69 IN | RESPIRATION RATE: 16 BRPM | BODY MASS INDEX: 33.25 KG/M2 | WEIGHT: 224.5 LBS | TEMPERATURE: 99 F | SYSTOLIC BLOOD PRESSURE: 148 MMHG | OXYGEN SATURATION: 96 % | HEART RATE: 60 BPM | DIASTOLIC BLOOD PRESSURE: 82 MMHG

## 2023-04-22 PROCEDURE — 6370000000 HC RX 637 (ALT 250 FOR IP): Performed by: PSYCHIATRY & NEUROLOGY

## 2023-04-22 PROCEDURE — 94760 N-INVAS EAR/PLS OXIMETRY 1: CPT

## 2023-04-22 PROCEDURE — 94640 AIRWAY INHALATION TREATMENT: CPT

## 2023-04-22 RX ADMIN — LISINOPRIL 20 MG: 20 TABLET ORAL at 07:48

## 2023-04-22 RX ADMIN — METOPROLOL TARTRATE 25 MG: 25 TABLET, FILM COATED ORAL at 07:48

## 2023-04-22 RX ADMIN — Medication 1000 UNITS: at 07:48

## 2023-04-22 RX ADMIN — Medication 2 PUFF: at 07:20

## 2023-04-22 RX ADMIN — FLUTICASONE PROPIONATE 1 SPRAY: 50 SPRAY, METERED NASAL at 07:50

## 2023-04-22 RX ADMIN — ACETAMINOPHEN 500 MG: 500 TABLET ORAL at 06:04

## 2023-04-22 RX ADMIN — CYANOCOBALAMIN TAB 500 MCG 1000 MCG: 500 TAB at 07:48

## 2023-04-22 RX ADMIN — INSULIN LISPRO 5 UNITS: 100 INJECTION, SOLUTION INTRAVENOUS; SUBCUTANEOUS at 07:51

## 2023-04-22 RX ADMIN — MESALAMINE 4 G: 4 ENEMA RECTAL at 07:45

## 2023-04-22 RX ADMIN — FINASTERIDE 5 MG: 5 TABLET, FILM COATED ORAL at 07:48

## 2023-04-22 RX ADMIN — TRIAMCINOLONE ACETONIDE: 1 CREAM TOPICAL at 07:44

## 2023-04-22 RX ADMIN — FERROUS SULFATE TAB 325 MG (65 MG ELEMENTAL FE) 325 MG: 325 (65 FE) TAB at 07:48

## 2023-04-22 ASSESSMENT — PAIN DESCRIPTION - DESCRIPTORS: DESCRIPTORS: OTHER (COMMENT)

## 2023-04-22 ASSESSMENT — PAIN SCALES - GENERAL
PAINLEVEL_OUTOF10: 0

## 2023-04-22 ASSESSMENT — PAIN - FUNCTIONAL ASSESSMENT: PAIN_FUNCTIONAL_ASSESSMENT: PREVENTS OR INTERFERES SOME ACTIVE ACTIVITIES AND ADLS

## 2023-04-22 NOTE — PLAN OF CARE
Problem: Discharge Planning  Goal: Discharge to home or other facility with appropriate resources  4/17/2023 1145 by Ines Ro LPN  Outcome: Progressing  Flowsheets (Taken 4/17/2023 0743)  Discharge to home or other facility with appropriate resources: Refer to discharge planning if patient needs post-hospital services based on physician order or complex needs related to functional status, cognitive ability or social support system  4/17/2023 0027 by Tereso Kayser, RN  Outcome: Progressing  Flowsheets (Taken 4/16/2023 2115)  Discharge to home or other facility with appropriate resources: Refer to discharge planning if patient needs post-hospital services based on physician order or complex needs related to functional status, cognitive ability or social support system     Problem: ABCDS Injury Assessment  Goal: Absence of physical injury  4/17/2023 1145 by Ines Ro LPN  Outcome: Progressing  4/17/2023 0027 by Tereso Kayser, RN  Outcome: Progressing     Problem: Safety - Adult  Goal: Free from fall injury  4/17/2023 1145 by Ines Ro LPN  Outcome: Progressing  4/17/2023 0027 by Tereso Kayser, RN  Outcome: Progressing     Problem: Pain  Goal: Verbalizes/displays adequate comfort level or baseline comfort level  4/17/2023 1145 by Ines Ro LPN  Outcome: Progressing  4/17/2023 0027 by Tereso Kayser, RN  Outcome: Progressing     Problem: Chronic Conditions and Co-morbidities  Goal: Patient's chronic conditions and co-morbidity symptoms are monitored and maintained or improved  4/17/2023 1145 by Ines Ro LPN  Outcome: Progressing  Flowsheets (Taken 4/17/2023 0743)  Care Plan - Patient's Chronic Conditions and Co-Morbidity Symptoms are Monitored and Maintained or Improved: Monitor and assess patient's chronic conditions and comorbid symptoms for stability, deterioration, or improvement  4/17/2023 0027 by Tereso Kayser, RN  Outcome: Progressing  Flowsheets (Taken 4/16/2023 2115)  Care Plan -
Problem: Discharge Planning  Goal: Discharge to home or other facility with appropriate resources  4/18/2023 0003 by Pretty Pandya RN  Outcome: Progressing  Flowsheets (Taken 4/17/2023 2107)  Discharge to home or other facility with appropriate resources: Refer to discharge planning if patient needs post-hospital services based on physician order or complex needs related to functional status, cognitive ability or social support system  4/17/2023 1145 by Casey Bliss LPN  Outcome: Progressing  Flowsheets (Taken 4/17/2023 0743)  Discharge to home or other facility with appropriate resources: Refer to discharge planning if patient needs post-hospital services based on physician order or complex needs related to functional status, cognitive ability or social support system     Problem: ABCDS Injury Assessment  Goal: Absence of physical injury  4/18/2023 0003 by Pretty Pandya RN  Outcome: Progressing  4/17/2023 1145 by Casey Bliss LPN  Outcome: Progressing     Problem: Safety - Adult  Goal: Free from fall injury  4/18/2023 0003 by Pretty Pandya RN  Outcome: Progressing  4/17/2023 1145 by Casey Bliss LPN  Outcome: Progressing     Problem: Pain  Goal: Verbalizes/displays adequate comfort level or baseline comfort level  4/18/2023 0003 by Pretty Pandya RN  Outcome: Progressing  4/17/2023 1145 by Casey Bliss LPN  Outcome: Progressing     Problem: Chronic Conditions and Co-morbidities  Goal: Patient's chronic conditions and co-morbidity symptoms are monitored and maintained or improved  4/18/2023 0003 by Pretty Pandya RN  Outcome: Progressing  Flowsheets (Taken 4/17/2023 2107)  Care Plan - Patient's Chronic Conditions and Co-Morbidity Symptoms are Monitored and Maintained or Improved: Monitor and assess patient's chronic conditions and comorbid symptoms for stability, deterioration, or improvement  4/17/2023 1145 by Casey Bliss LPN  Outcome: Progressing  Flowsheets (Taken 4/17/2023 0743)  Care Plan -
Problem: Discharge Planning  Goal: Discharge to home or other facility with appropriate resources  4/19/2023 1156 by Debby Lieberman, RN  Outcome: Progressing  Flowsheets (Taken 4/19/2023 8247)  Discharge to home or other facility with appropriate resources: Refer to discharge planning if patient needs post-hospital services based on physician order or complex needs related to functional status, cognitive ability or social support system  4/18/2023 2316 by Laura Crawford RN  Outcome: Progressing  Flowsheets (Taken 4/18/2023 2040)  Discharge to home or other facility with appropriate resources: Refer to discharge planning if patient needs post-hospital services based on physician order or complex needs related to functional status, cognitive ability or social support system
Problem: Discharge Planning  Goal: Discharge to home or other facility with appropriate resources  Recent Flowsheet Documentation  Taken 4/20/2023 1146 by Debby Lieberman RN  Discharge to home or other facility with appropriate resources: Refer to discharge planning if patient needs post-hospital services based on physician order or complex needs related to functional status, cognitive ability or social support system
Electrolytes - Adult  Goal: Electrolytes maintained within normal limits  Outcome: Progressing  Flowsheets (Taken 4/16/2023 2115)  Electrolytes maintained within normal limits: Monitor labs and assess patient for signs and symptoms of electrolyte imbalances     Problem: Skin/Tissue Integrity  Goal: Absence of new skin breakdown  Description: 1. Monitor for areas of redness and/or skin breakdown  2. Assess vascular access sites hourly  3. Every 4-6 hours minimum:  Change oxygen saturation probe site  4. Every 4-6 hours:  If on nasal continuous positive airway pressure, respiratory therapy assess nares and determine need for appliance change or resting period.   Outcome: Progressing
and/or skin breakdown     Problem: Musculoskeletal - Adult  Goal: Return mobility to safest level of function  Outcome: Completed     Problem: Metabolic/Fluid and Electrolytes - Adult  Goal: Electrolytes maintained within normal limits  Outcome: Completed     Problem: Gastrointestinal - Adult  Goal: Maintains or returns to baseline bowel function  Outcome: Completed  Flowsheets  Taken 4/22/2023 0757 by Lisseth Redmond LPN  Maintains or returns to baseline bowel function: Assess bowel function  Taken 4/21/2023 2110 by Roland Ma LPN  Maintains or returns to baseline bowel function:   Assess bowel function   Encourage oral fluids to ensure adequate hydration     Problem: Skin/Tissue Integrity  Goal: Absence of new skin breakdown  Description: 1. Monitor for areas of redness and/or skin breakdown  2. Assess vascular access sites hourly  3. Every 4-6 hours minimum:  Change oxygen saturation probe site  4. Every 4-6 hours:  If on nasal continuous positive airway pressure, respiratory therapy assess nares and determine need for appliance change or resting period.   Outcome: Completed
2040)  Electrolytes maintained within normal limits: Monitor labs and assess patient for signs and symptoms of electrolyte imbalances  4/18/2023 1045 by Lashay Ambrosio LPN  Outcome: Progressing  Flowsheets (Taken 4/18/2023 0834)  Electrolytes maintained within normal limits: Monitor labs and assess patient for signs and symptoms of electrolyte imbalances     Problem: Skin/Tissue Integrity  Goal: Absence of new skin breakdown  Description: 1. Monitor for areas of redness and/or skin breakdown  2. Assess vascular access sites hourly  3. Every 4-6 hours minimum:  Change oxygen saturation probe site  4. Every 4-6 hours:  If on nasal continuous positive airway pressure, respiratory therapy assess nares and determine need for appliance change or resting period.   4/18/2023 2316 by Adrienne Tillman RN  Outcome: Progressing  4/18/2023 1045 by Lashay Ambrosio LPN  Outcome: Progressing     Problem: Gastrointestinal - Adult  Goal: Maintains or returns to baseline bowel function  4/18/2023 2316 by Adrienne Tillman RN  Outcome: Progressing  Flowsheets (Taken 4/18/2023 2040)  Maintains or returns to baseline bowel function: Assess bowel function  4/18/2023 1045 by Lashay Ambrosio LPN  Outcome: Marsudha Chew (Taken 4/18/2023 0834)  Maintains or returns to baseline bowel function: Assess bowel function
7604)  Electrolytes maintained within normal limits: Monitor labs and assess patient for signs and symptoms of electrolyte imbalances  4/18/2023 0003 by Clarke Olivo RN  Outcome: Progressing  Flowsheets (Taken 4/17/2023 2107)  Electrolytes maintained within normal limits: Monitor labs and assess patient for signs and symptoms of electrolyte imbalances     Problem: Gastrointestinal - Adult  Goal: Maintains or returns to baseline bowel function  4/18/2023 1045 by Ale Graves LPN  Outcome: Jackelyn Mcintyre (Taken 4/18/2023 0834)  Maintains or returns to baseline bowel function: Assess bowel function  4/18/2023 0003 by Clarke Olivo RN  Outcome: Progressing  Flowsheets (Taken 4/17/2023 2107)  Maintains or returns to baseline bowel function: Assess bowel function     Problem: Skin/Tissue Integrity  Goal: Absence of new skin breakdown  Description: 1. Monitor for areas of redness and/or skin breakdown  2. Assess vascular access sites hourly  3. Every 4-6 hours minimum:  Change oxygen saturation probe site  4. Every 4-6 hours:  If on nasal continuous positive airway pressure, respiratory therapy assess nares and determine need for appliance change or resting period.   4/18/2023 1045 by Ale Graves LPN  Outcome: Progressing  4/18/2023 0003 by Clarke Olivo RN  Outcome: Progressing

## 2023-04-25 ENCOUNTER — HOSPITAL ENCOUNTER (OUTPATIENT)
Dept: PHYSICAL THERAPY | Age: 83
Setting detail: THERAPIES SERIES
Discharge: HOME | End: 2023-04-25
Payer: MEDICARE

## 2023-04-25 VITALS
OXYGEN SATURATION: 96 % | HEART RATE: 60 BPM | SYSTOLIC BLOOD PRESSURE: 148 MMHG | HEIGHT: 69 IN | TEMPERATURE: 99 F | RESPIRATION RATE: 16 BRPM | WEIGHT: 224.5 LBS | BODY MASS INDEX: 33.25 KG/M2 | DIASTOLIC BLOOD PRESSURE: 82 MMHG

## 2023-04-25 PROCEDURE — 97110 THERAPEUTIC EXERCISES: CPT

## 2023-04-25 PROCEDURE — 97162 PT EVAL MOD COMPLEX 30 MIN: CPT

## 2023-04-25 NOTE — PROGRESS NOTES
Physical Therapy: Initial Evaluation    Patient: Kayleen Iglesias (24 y.o. male)   Examination Date:   Plan of Care Certification [id number]: to 23      :  1940 ;    MRN: [id number]  CSN: [id number]   Insurance: Payor: MEDICARE / Plan: MEDICARE PART A AND B / Product Type: *No Product type* /   Insurance ID: [id number] - (Medicare) Secondary Insurance (if applicable): Сергей Roth   Referring Physician: MD Aminata Howard MD   PCP: NIXON Silvestre Visits to Date/Visits Approved:     No Show/Cancelled Appts:   /       Medical Diagnosis: Weakness [R53.1]  Nontraumatic intracranial hemorrhage, unspecified [I62.9] CVA  Treatment Diagnosis: CVA     PERTINENT MEDICAL HISTORY   Patient Assessed for Rehabilitation Services: Yes       Medical History: Chart Reviewed: Yes        SUBJECTIVE EXAMINATION     History obtained from[de-identified] Chart Review, Patient,           Subjective History: Onset Date: 23  Pt presents after CVA on 23. He was admitted to acute care in an outside facility and reports RUE and RLE weakness, as well as difficulty with mobility. He was admitted to inpatient rehab at [facility] on  and d/c home with his wife on . He has been trying to perform some exercises since that time. Reports difficulty with motor control of UE and LE, as well as weakness and limited activity tolerance. Has chronic diplopia and states he does have eye patches, but is managing now by keeping one eye closed. Additional Pertinent Hx (if applicable): [de-identified] y/o M presents after CVA.   PMH includes CABG, R THR, DM, MI, back sx          Pain Screening    Pain Screening  Patient Currently in Pain: No    Functional Status       Social History:    Social History  Lives With: Spouse  Type of Home: House  Home Layout: One level  Home Access: Stairs to enter without rails  Entrance Stairs - Number of Steps: 1 step  Bathroom Equipment: 3-in-1 commode  Home Equipment:

## 2023-04-25 NOTE — DISCHARGE SUMMARY
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; Primary HTN; Type 2 DM w/ hyperglycemia; CKD stage 3; Cardiac Cath stents x6; CABG triple bypass; R ADITHYA; Obstructive sleep apnea (uses cpap); penile prosthesis implant; asthma  RESTRICTIONS/PRECAUTIONS:    Restrictions/Precautions  Restrictions/Precautions: Up Ad Renate     OVERALL  ORIENTATION STATUS:  Overall Orientation Status: Within Functional Limits  PAIN:  Pain Level: 0       Pain Location: Back     Pain Orientation: Mid      GROSS ASSESSMENT  Chief Reason for Therapy  Chief Reason: Neurologic     POSTURE/BALANCE  Balance  Standing - Dynamic: Fair, -  Comments: navigate cones and figure 8's with walker (RLE fatigues and requires cues to keep feet inside walker and increase RLE foot clearance)       ACTIVITY TOLERANCE  Activity Tolerance  Activity Tolerance: Patient tolerated treatment well      BED MOBILITY  Bed mobility  Bridging: Independent  Rolling to Left: Independent  Rolling to Right: Independent  Supine to Sit: Independent  Sit to Supine: Independent  Scooting: Independent  Bed Mobility Comments: bed in OT apt        TRANSFERS  Transfers  Sit to Stand: Independent  Stand to Sit: Independent  Bed to Chair: Modified independent  Stand Pivot Transfers: Stand by assistance  Lateral Transfers: Contact guard assistance, Stand by assistance (Pt R w/c >< R side of mat table, Pt R w/c >< recliner chair - W/o AD)  Car Transfer: Stand by assistance (simulator to mock truck with step side with cues to sequence: backing to step, locking brakes, stepping up onto step side and reaching back for seat and handle, lowering bottom into seat prior to

## 2023-04-25 NOTE — DISCHARGE SUMMARY
Occupational Therapy Discharge Summary         Date: 2023  Patient Name: Kayleen Iglesias        MRN: [id number]    : 1940  ([de-identified] y.o.)  Gender: male      Diagnosis: Nontraumatic Intracerebral hemorrhage (L side, R body involved)  Restrictions/Precautions  Restrictions/Precautions: Up Ad Renate      Discharge Date:23      UE Functioning:  WFLs    Home Management:  Functional Mobility  Functional - Mobility Device:Four wheeled walker  Activity: To/From therapy gym, Retrieve items  Assist Level: Modified independent   Functional Mobility Comments: SBA without device short distances    Adaptive Equipment/DME Status:  Bathroom Equipment: Shower chair, 3-in-1 commode   Arranged Rollator and BSC      Pain Assessment:          Remaining Problems:  Decreased functional mobility ; Decreased vision/visual deficit; Decreased endurance;Decreased high-level IADLs;Decreased strength    STGs:  Short Term Goals  Time Frame for Short Term Goals: 1 week  Short Term Goal 1: complete LB dressing with CGA  Short Term Goal 2: compete overall toileting with CGA  Short Term Goal 3: complete overall bathing with CGA  Short Term Goal 4: complete simple ambulatory home making task with CGA  Short Term Goal 5: complete 1-2 handed standing level acts for 3 mins with supervision  Short Term Goal 6: complete R FM acts x 5 occasions to improve coordination during ADL/IADL acts  All STGs met    LTGs:  Long Term Goals  Time Frame for Long Term Goals : 3 weeks  Long Term Goal 1: complete overall toileting with modified independence  Long Term Goal 2: complete overall bathing with modified independence  Long Term Goal 3: complete overall dressing with modified independence  Long Term Goal 4: complete HEP with independence  Long Term Goal 5: complete simple ambulatory home making task with modified independence  Additional Goals?: Yes  Long Term Goal 6: pt/fam verbalize DME for home  All LTGs met    Discharge Setting and

## 2023-04-27 ENCOUNTER — HOSPITAL ENCOUNTER (OUTPATIENT)
Dept: PHYSICAL THERAPY | Age: 83
Setting detail: THERAPIES SERIES
Discharge: HOME | End: 2023-04-27
Payer: MEDICARE

## 2023-04-27 PROCEDURE — 97112 NEUROMUSCULAR REEDUCATION: CPT

## 2023-04-27 PROCEDURE — 97110 THERAPEUTIC EXERCISES: CPT

## 2023-04-27 NOTE — PROGRESS NOTES
Physical Therapy: Daily Note   Patient: Chery Wolfe (54 y.o. male)   Examination Date:   Plan of Care/Certification Expiration Date: 23    No data recorded   :  1940 # of Visits since [facility]:   2   MRN: [id number]  CSN: [id number] Start of Care Date:   2023   Insurance: Payor: MEDICARE / Plan: MEDICARE PART A AND B / Product Type: *No Product type* /   Insurance ID: [id number] - (Medicare) Secondary Insurance (if applicable): [address]   Referring Physician: MD Iván Harris Arm, MD   PCP: NIXON Ramsey Visits to Date/Visits Approved:     No Show/Cancelled Appts:   /       Medical Diagnosis: Weakness [R53.1]  Nontraumatic intracranial hemorrhage, unspecified [I62.9] CVA  Treatment Diagnosis: CVA        SUBJECTIVE EXAMINATION       Patient Comments: Subjective: patient states he felt fine after the evaluation. he says today he has his usual symptoms but nothing out of the ordinary. OBJECTIVE EXAMINATION   Restrictions:  Restrictions/Precautions: Up Ad Renate   No data recorded No data recorded          TREATMENT     Exercises:      Treatment Reasoning    Exercise 1: Ambulation up to 6MWT with Rollator (progress towards less restrictive AD)- 612' in 6MWT  Exercise 2: Repeated sit to stand w/o UE use (start from higher surface, progress to chair) 1 x 10 red mat  Exercise 3: Box steps cw/ccw- not today  Exercise 4: Fwd/back on line- not today  Exercise 5: Sidestepping on line x 1  Exercise 6: Cone weaves 1 x 2  Exercise 7: Figure 8s 1 x 5  Exercise 8: Stepping over curbs in  bars (fwd/lateral) 1 x 5  Exercise 9: Toe taps on cone in  bars 1 x 10 with ue use  Exercise 10: Sittings BAPS board RLE 1 x 10 ea  Exercise 11: R LAQ 1 x 10/HS Curl red 1 x 10  Exercise 12: R ankle inversion/eversion with t-band red 1 x 15 ea  Exercise 13:  Mini squats 1 x 10  Exercise 14: Standing march 1 x 10  Exercise 15: Standing heel/toe raise 1 x 10 ea  Exercise 16:

## 2023-05-02 ENCOUNTER — HOSPITAL ENCOUNTER (OUTPATIENT)
Dept: OCCUPATIONAL THERAPY | Age: 83
Setting detail: THERAPIES SERIES
Discharge: HOME | End: 2023-05-02
Payer: MEDICARE

## 2023-05-02 ENCOUNTER — HOSPITAL ENCOUNTER (OUTPATIENT)
Dept: PHYSICAL THERAPY | Age: 83
Setting detail: THERAPIES SERIES
Discharge: HOME | End: 2023-05-02
Payer: MEDICARE

## 2023-05-02 PROCEDURE — 97165 OT EVAL LOW COMPLEX 30 MIN: CPT

## 2023-05-02 PROCEDURE — 97110 THERAPEUTIC EXERCISES: CPT

## 2023-05-02 ASSESSMENT — 9 HOLE PEG TEST
TESTTIME_SECONDS: 24.56
TESTTIME_SECONDS: 27.43
TEST_RESULT: IMPAIRED
TEST_RESULT: FUNCTIONAL

## 2023-05-02 NOTE — PROGRESS NOTES
Physical Therapy: Daily Note   Patient: Vandana Suarez (65 y.o. male)   Examination Date:   Plan of Care/Certification Expiration Date: 23    No data recorded   :  1940 # of Visits since [facility]:   3   MRN: [id number]  CSN: [id number] Start of Care Date:   2023   Insurance: Payor: MEDICARE / Plan: MEDICARE PART A AND B / Product Type: *No Product type* /   Insurance ID: [id number] - (Medicare) Secondary Insurance (if applicable): [address]   Referring Physician: MD Chen Griggs MD   PCP: NIXON Flores Visits to Date/Visits Approved: 3 / 12    No Show/Cancelled Appts:   /       Medical Diagnosis: Weakness [R53.1]  Nontraumatic intracranial hemorrhage, unspecified [I62.9]    Treatment Diagnosis: CVA        SUBJECTIVE EXAMINATION   Pain Level: Pain Screening  Patient Currently in Pain: No    Patient Comments: Subjective: Nothing new to report today. Denies falls. A little tired after OT. OBJECTIVE EXAMINATION   Restrictions:  Restrictions/Precautions: Up Ad Renate   No data recorded No data recorded      TREATMENT     Exercises:      Treatment Reasoning    Exercise 1: Ambulation up to 6MWT with Rollator (progress towards less restrictive AD)- 665' in 6MWT  Exercise 2: Repeated sit to stand w/o UE use (start from higher surface, progress to chair) 1 x 10 red mat  Exercise 3: Box steps cw/ccw- x 5 ea (CGA)  Exercise 4: Fwd/back on line- x 2  Exercise 5: Sidestepping on line x 2  Exercise 6: Cone weaves 1 x 2  Exercise 7: Figure 8s 1 x 5- not today  Exercise 8: Stepping over curbs in  bars (fwd/lateral) 1 x 5- not today  Exercise 9: Toe taps on cone in  bars 1 x 10 with ue use- not today  Exercise 10: Sittings BAPS board RLE 1 x 10 ea  Exercise 11: R LAQ 2# 2 x 10/HS Curl red 1 x 10, green 1 x 10  Exercise 12: R ankle inversion/eversion/DF with t-band red 1 x 20 ea  Exercise 13:  Mini squats 1 x 10  Exercise 14: Standing march 1 x 10  Exercise 15:

## 2023-05-02 NOTE — PROGRESS NOTES
Occupational Therapy: Initial Evaluation   Patient: Allyn Hoang (36 y.o. male)   Examination Date:   Plan of Care Certification Period: 2023 to 23      :  1940  MRN: [id number]  CSN: [id number]   Insurance: Payor: MEDICARE / Plan: MEDICARE PART A AND B / Product Type: *No Product type* /   Insurance ID: [id number] - (Medicare) Secondary Insurance (if applicable): [facility]   Insurance Information: Medicare and [facility] Kristopher Erickson   Referring Physician: MD Rene Oviedo MD   PCP: NIXON Barlow Visits to Date/Visits Approved: 1 /      No Show/Cancelled Appts:   /       Medical Diagnosis: Weakness [R53.1]  Nontraumatic intracranial hemorrhage, unspecified [I62.9] R53.1 weakness, I62.9 intracranial blees  Treatment Diagnosis: M62.81 muscle weakness, R27.8 impaired coordination         PERTINENT MEDICAL HISTORY   Patient assessed for rehabilitation services?: Yes  Self reported health status[de-identified] Excellent    Medical History: Chart Reviewed: Yes   Past Medical History:   Diagnosis Date    CAD (coronary artery disease)     Constipation     Diabetes mellitus ([facility] [address]     Hematuria     Hypertension     NSTEMI (non-ST elevated myocardial infarction) ([facility] [address]     Ulcerative colitis ([facility] [address]      Surgical History:   Past Surgical History:   Procedure Laterality Date    BACK SURGERY      CORONARY ANGIOPLASTY WITH STENT PLACEMENT      CORONARY ARTERY BYPASS GRAFT      HIP SURGERY      SHOULDER SURGERY            SUBJECTIVE EXAMINATION     History obtained from[de-identified] Chart Review, Patient,      Family/Caregiver Present: No    Subjective History:  Onset Date: 23      Previous treatments prior to current episode?: Inpatient rehab, Outpatient PT      Learning/Language: Learning  Does the patient/guardian have any barriers to learning?: No barriers  Will there be a co-learner?: No  What is the preferred language of the patient/guardian?: English  Is an  required?: No     Pain

## 2023-05-03 ENCOUNTER — APPOINTMENT (OUTPATIENT)
Dept: CT IMAGING | Facility: HOSPITAL | Age: 83
End: 2023-05-03
Payer: MEDICARE

## 2023-05-03 ENCOUNTER — HOSPITAL ENCOUNTER (OUTPATIENT)
Dept: CT IMAGING | Facility: HOSPITAL | Age: 83
Discharge: HOME | End: 2023-05-03
Payer: MEDICARE

## 2023-05-03 ENCOUNTER — OFFICE VISIT (OUTPATIENT)
Dept: FAMILY MEDICINE CLINIC | Facility: CLINIC | Age: 83
End: 2023-05-03
Payer: MEDICARE

## 2023-05-03 ENCOUNTER — OFFICE VISIT (OUTPATIENT)
Dept: NEUROSURGERY | Facility: CLINIC | Age: 83
End: 2023-05-03
Payer: MEDICARE

## 2023-05-03 VITALS
HEART RATE: 64 BPM | OXYGEN SATURATION: 96 % | HEIGHT: 69 IN | DIASTOLIC BLOOD PRESSURE: 70 MMHG | BODY MASS INDEX: 34.21 KG/M2 | SYSTOLIC BLOOD PRESSURE: 128 MMHG | TEMPERATURE: 97.8 F | WEIGHT: 231 LBS

## 2023-05-03 VITALS — HEIGHT: 69 IN | BODY MASS INDEX: 34.21 KG/M2 | WEIGHT: 231 LBS

## 2023-05-03 DIAGNOSIS — Z78.9 NON-SMOKER: ICD-10-CM

## 2023-05-03 DIAGNOSIS — I61.9 NONTRAUMATIC INTRACEREBRAL HEMORRHAGE, UNSPECIFIED CEREBRAL LOCATION, UNSPECIFIED LATERALITY: Primary | ICD-10-CM

## 2023-05-03 DIAGNOSIS — K51.90 ULCERATIVE COLITIS WITHOUT COMPLICATIONS, UNSPECIFIED LOCATION: Chronic | ICD-10-CM

## 2023-05-03 DIAGNOSIS — E11.65 TYPE 2 DIABETES MELLITUS WITH HYPERGLYCEMIA, WITH LONG-TERM CURRENT USE OF INSULIN: Chronic | ICD-10-CM

## 2023-05-03 DIAGNOSIS — I10 ESSENTIAL HYPERTENSION: Chronic | ICD-10-CM

## 2023-05-03 DIAGNOSIS — Z79.4 TYPE 2 DIABETES MELLITUS WITH HYPERGLYCEMIA, WITH LONG-TERM CURRENT USE OF INSULIN: Chronic | ICD-10-CM

## 2023-05-03 DIAGNOSIS — I61.9 INTRAPARENCHYMAL HEMORRHAGE OF BRAIN: Primary | ICD-10-CM

## 2023-05-03 DIAGNOSIS — I61.9 INTRAPARENCHYMAL HEMORRHAGE OF BRAIN: ICD-10-CM

## 2023-05-03 DIAGNOSIS — R91.1 LUNG NODULE: Primary | ICD-10-CM

## 2023-05-03 DIAGNOSIS — E66.09 CLASS 1 OBESITY DUE TO EXCESS CALORIES WITH SERIOUS COMORBIDITY AND BODY MASS INDEX (BMI) OF 34.0 TO 34.9 IN ADULT: ICD-10-CM

## 2023-05-03 DIAGNOSIS — J44.9 CHRONIC OBSTRUCTIVE PULMONARY DISEASE, UNSPECIFIED COPD TYPE: Chronic | ICD-10-CM

## 2023-05-03 DIAGNOSIS — J98.4 PULMONARY SCARRING: ICD-10-CM

## 2023-05-03 DIAGNOSIS — E66.9 OBESITY (BMI 30-39.9): Chronic | ICD-10-CM

## 2023-05-03 PROCEDURE — 71250 CT THORAX DX C-: CPT

## 2023-05-03 PROCEDURE — 70450 CT HEAD/BRAIN W/O DYE: CPT

## 2023-05-03 RX ORDER — LISINOPRIL 20 MG/1
TABLET ORAL
Qty: 180 TABLET | Refills: 1 | Status: SHIPPED | OUTPATIENT
Start: 2023-05-03

## 2023-05-03 RX ORDER — METFORMIN HYDROCHLORIDE 500 MG/1
500 TABLET, EXTENDED RELEASE ORAL
Qty: 30 TABLET | Refills: 2 | Status: SHIPPED | OUTPATIENT
Start: 2023-05-03 | End: 2023-06-02

## 2023-05-03 NOTE — PROGRESS NOTES
"    Chief complaint:   Chief Complaint   Patient presents with   • Intraparenchymal hemorrhage of brain     Pt here for 3k HS F/U. Pt states he's been doing well.        Subjective     HPI: This is a 82-year-old male gentleman who we saw in the hospital in early April for a left thalamic hemorrhagic CVA.  He did present with right upper and lower extremity weakness which was still present upon his discharge from the hospital.  The patient was discharged to inpatient rehab at [facility].  The patient has been discharged from [facility] rehab and is now doing outpatient rehab.  The patient does feel like he is still having some difficulty with walking but he does feel that this is improving.  Around the house he says he can use a cane but prolonged distances he is using a walker.  Denies any headaches nausea vomiting or dizziness.  He does have issues with double vision but says this has been present for over 4 years.  His blood pressures been under good control lately.    Review of Systems   Musculoskeletal: Positive for gait problem.   Neurological: Negative for dizziness, seizures, syncope, weakness and headaches.   Psychiatric/Behavioral: Negative.          Objective      Vital Signs  Ht 175.3 cm (69\")   Wt 105 kg (231 lb)   BMI 34.11 kg/m²     Physical Exam  Constitutional:       Appearance: He is well-developed.   HENT:      Head: Normocephalic.   Eyes:      Extraocular Movements: EOM normal.      Pupils: Pupils are equal, round, and reactive to light.   Pulmonary:      Effort: Pulmonary effort is normal.   Musculoskeletal:         General: Normal range of motion.      Cervical back: Normal range of motion.   Skin:     General: Skin is warm.   Neurological:      Mental Status: He is alert and oriented to person, place, and time.      GCS: GCS eye subscore is 4. GCS verbal subscore is 5. GCS motor subscore is 6.      Cranial Nerves: No cranial nerve deficit.      Sensory: No sensory deficit.      Motor: " Motor strength is normal.      Gait: Gait is intact. Gait normal.      Deep Tendon Reflexes: Reflexes are normal and symmetric.   Psychiatric:         Speech: Speech normal.         Behavior: Behavior normal.         Thought Content: Thought content normal.         Neurologic Exam     Mental Status   Oriented to person, place, and time.   Attention: normal. Concentration: normal.   Speech: speech is normal   Level of consciousness: alert  Normal comprehension.     Cranial Nerves     CN II   Visual fields full to confrontation.     CN III, IV, VI   Pupils are equal, round, and reactive to light.  Extraocular motions are normal.     CN V   Facial sensation intact.     CN VII   Facial expression full, symmetric.     CN VIII   CN VIII normal.     CN IX, X   CN IX normal.   CN X normal.     CN XI   CN XI normal.     CN XII   CN XII normal.     Motor Exam   Muscle bulk: normal    Strength   Strength 5/5 throughout.     Sensory Exam   Light touch normal.     Gait, Coordination, and Reflexes     Gait  Gait: normal    Reflexes   Reflexes 2+ except as noted.       Imaging review: CT scan of the head that was done on May 3, 2023 was compared to a CT scan that was done on April 12, 2023 shows near complete resolution of the intraparenchymal hemorrhage.  No mass effect or midline shift associated with this.  There is a developing area of encephalomalacia.  May 3, 2023      April 12, 2023      Assessment/Plan: Patient has had complete resolution of the intraparenchymal hemorrhage.  At this time we will need to see him on an as-needed basis.  He can continue working with rehab at this time.  He was told to call us if any further problems or concerns    Patient is a nonsmoker  The patient's Body mass index is 34.11 kg/m².. BMI is above normal parameters. Recommendations include: educational material and nutrition counseling  Advance Care Planning   ACP discussion was held with the patient during this visit. Patient does not have an  advance directive, information provided.   STEADI Fall Risk Assessment was completed, and patient is at MODERATE risk for falls. Assessment completed on:5/3/2023     Diagnoses and all orders for this visit:    1. Intraparenchymal hemorrhage of brain (Primary)    2. Class 1 obesity due to excess calories with serious comorbidity and body mass index (BMI) of 34.0 to 34.9 in adult    3. Non-smoker        I discussed the patients findings and my recommendations with patient  El Adamson, APRN  05/03/23  14:37 CDT

## 2023-05-03 NOTE — PATIENT INSTRUCTIONS
Advance Care Planning and Advance Directives     You make decisions on a daily basis - decisions about where you want to live, your career, your home, your life. Perhaps one of the most important decisions you face is your choice for future medical care. Take time to talk with your family and your healthcare team and start planning today.  Advance Care Planning is a process that can help you:  Understand possible future healthcare decisions in light of your own experiences  Reflect on those decision in light of your goals and values  Discuss your decisions with those closest to you and the healthcare professionals that care for you  Make a plan by creating a document that reflects your wishes    Surrogate Decision Maker  In the event of a medical emergency, which has left you unable to communicate or to make your own decisions, you would need someone to make decisions for you.  It is important to discuss your preferences for medical treatment with this person while you are in good health.     Qualities of a surrogate decision maker:  Willing to take on this role and responsibility  Knows what you want for future medical care  Willing to follow your wishes even if they don't agree with them  Able to make difficult medical decisions under stressful circumstances    Advance Directives  These are legal documents you can create that will guide your healthcare team and decision maker(s) when needed. These documents can be stored in the electronic medical record.    Living Will - a legal document to guide your care if you have a terminal condition or a serious illness and are unable to communicate. States vary by statute in document names/types, but most forms may include one or more of the following:        -  Directions regarding life-prolonging treatments        -  Directions regarding artificially provided nutrition/hydration        -  Choosing a healthcare decision maker        -  Direction regarding organ/tissue  donation    Durable Power of  for Healthcare - this document names an -in-fact to make medical decisions for you, but it may also allow this person to make personal and financial decisions for you. Please seek the advice of an  if you need this type of document.    **Advance Directives are not required and no one may discriminate against you if you do not sign one.    Medical Orders  Many states allow specific forms/orders signed by your physician to record your wishes for medical treatment in your current state of health. This form, signed in personal communication with your physician, addresses resuscitation and other medical interventions that you may or may not want.      For more information or to schedule a time with a Psychiatric Advance Care Planning Facilitator contact: [facility] or call [phone] and someone will contact you directly.BMI for Adults  What is BMI?  Body mass index (BMI) is a number that is calculated from a person's weight and height. BMI can help estimate how much of a person's weight is composed of fat. BMI does not measure body fat directly. Rather, it is an alternative to procedures that directly measure body fat, which can be difficult and expensive.  BMI can help identify people who may be at higher risk for certain medical problems.  What are BMI measurements used for?  BMI is used as a screening tool to identify possible weight problems. It helps determine whether a person is obese, overweight, a healthy weight, or underweight.  BMI is useful for:  Identifying a weight problem that may be related to a medical condition or may increase the risk for medical problems.  Promoting changes, such as changes in diet and exercise, to help reach a healthy weight. BMI screening can be repeated to see if these changes are working.  How is BMI calculated?  BMI involves measuring your weight in relation to your height. Both height and weight are measured,  "and the BMI is calculated from those numbers. This can be done either in English (U.S[place] or metric measurements. Note that charts and online BMI calculators are available to help you find your BMI quickly and easily without having to do these calculations yourself.  To calculate your BMI in English (U.S.) measurements:    Measure your weight in pounds (lb).  Multiply the number of pounds by 703.  For example, for a person who weighs 180 lb, multiply that number by 703, which equals 126,540.  Measure your height in inches. Then multiply that number by itself to get a measurement called \"inches squared.\"  For example, for a person who is 70 inches tall, the \"inches squared\" measurement is 70 inches x 70 inches, which equals 4,900 inches squared.  Divide the total from step 2 (number of lb x 703) by the total from step 3 (inches squared): 126,540 ÷ 4,900 = 25.8. This is your BMI.    To calculate your BMI in metric measurements:  Measure your weight in kilograms (kg).  Measure your height in meters (m). Then multiply that number by itself to get a measurement called \"meters squared.\"  For example, for a person who is 1.75 m tall, the \"meters squared\" measurement is 1.75 m x 1.75 m, which is equal to 3.1 meters squared.  Divide the number of kilograms (your weight) by the meters squared number. In this example: 70 ÷ 3.1 = 22.6. This is your BMI.  What do the results mean?  BMI charts are used to identify whether you are underweight, normal weight, overweight, or obese. The following guidelines will be used:  Underweight: BMI less than 18.5.  Normal weight: BMI between 18.5 and 24.9.  Overweight: BMI between 25 and 29.9.  Obese: BMI of 30 or above.  Keep these notes in mind:  Weight includes both fat and muscle, so someone with a muscular build, such as an athlete, may have a BMI that is higher than 24.9. In cases like these, BMI is not an accurate measure of body fat.  To determine if excess body fat is the cause of a BMI " of 25 or higher, further assessments may need to be done by a health care provider.  BMI is usually interpreted in the same way for men and women.  Where to find more information  For more information about BMI, including tools to quickly calculate your BMI, go to these websites:  Centers for Disease Control and Prevention: www.cdc.gov  American Heart Association: www.heart.org  National Heart, Lung, and Blood [place]: www.nhlbi.nih.gov  Summary  Body mass index (BMI) is a number that is calculated from a person's weight and height.  BMI may help estimate how much of a person's weight is composed of fat. BMI can help identify those who may be at higher risk for certain medical problems.  BMI can be measured using English measurements or metric measurements.  BMI charts are used to identify whether you are underweight, normal weight, overweight, or obese.  This information is not intended to replace advice given to you by your health care provider. Make sure you discuss any questions you have with your health care provider.  Document Revised: 09/09/2020 Document Reviewed: 07/17/2020  [employer] Patient Education © 2021 [employer]

## 2023-05-03 NOTE — PROGRESS NOTES
Jacob Mejias MD  P [facility] Clinical Pool  Please call the patient regarding his abnormal result.  Please let him know he has bibasilar scarring and a new 4 mm right lower lobe lung nodule.  I have ordered a follow-up CT scan in 6 months.  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  Thank you.  I will see him back in the office as scheduled.       Spoke with patient and relayed test results. Patient voiced understanding.

## 2023-05-03 NOTE — PROGRESS NOTES
BORIS Pérez  [facility]   Family Medicine  [address]  [place] [address]  Phone: [phone]  Fax: [phone]         Chief Complaint:  Chief Complaint   Patient presents with   • Hospital Follow Up Visit        History:  Zachariah Acosta is a 82 y.o. male that is an established patient. He  is here for evaluation of the above complaint.    HPI     Mr. Acosta was admitted on 04/09/2023 for hemorrhagic CVA upon waking with speech difficulty, right arm weakness, and right leg weakness significant enough he could not sit up out of bed. He was then transported to the hospital via ambulance. He denies any symptoms the night before or the morning of until being unable to get out of bed. While admitted, aspirin 81 mg was discontinued due to brain hemorrhage and he saw BORIS Del Rosario. Follow-up with Mr. Adamson and CT scan of the head scheduled today.    Regarding ambulation, he reports using a walker outside of the home but notes improvement getting around his home with the use of a walking cane as well as using the walls and tables to balance himself.    For hypertension, he takes lisinopril 20 mg every morning and metoprolol 25 mg twice per day. He states his systolic blood pressure has remained below 160 mmHg and reports his blood pressure at home this morning was 138/74 mmHg. Today in the office, his systolic blood pressure was 128 mmHg and the patient confirms both readings were taken from the same arm.    Currently, he administers 10 units of Lantus at night and 5 units of Novolog 10 minutes before each meal, which he states has kept his blood glucose well controlled. He also takes metformin 500 mg and states he has to half it due to its size and being difficult to swallow all at once. Patient confirms utilizing a Dexcom continuous glucose monitor.    Mr. Acosta does see pulmonologist, Dr. Jacob Mejias and is scheduled for a CT scan of the  left lung today with follow-up next week. The patient reports using Breztri and Xopenex as needed.    Additional current medications include Finasteride 5 mg daily and Crestor every night.            ROS:  Review of Systems   Constitutional: Negative for fatigue, fever and unexpected weight change.   HENT: Negative for congestion, ear pain, rhinorrhea, sinus pressure, sinus pain and voice change.    Eyes: Negative for visual disturbance.   Respiratory: Negative for shortness of breath and wheezing.    Cardiovascular: Negative for chest pain and palpitations.   Gastrointestinal: Negative for abdominal pain, nausea and vomiting.   Endocrine: Negative for polydipsia, polyphagia and polyuria.   Genitourinary: Negative for dysuria and flank pain.   Musculoskeletal: Negative for back pain, myalgias and neck pain.   Skin: Negative for color change, pallor and rash.   Neurological: Negative for dizziness, tremors, seizures, speech difficulty, weakness, numbness and headaches.   Psychiatric/Behavioral: Negative for behavioral problems, confusion, dysphoric mood, self-injury and sleep disturbance. The patient is not nervous/anxious.         reports that he has never smoked. He has never used smokeless tobacco. He reports that he does not drink alcohol and does not use drugs.    Current Outpatient Medications   Medication Instructions   • acetaminophen (TYLENOL) 500 mg, Oral, Every 6 Hours PRN   • Budeson-Glycopyrrol-Formoterol (Breztri Aerosphere) 160-9-4.8 MCG/ACT aerosol inhaler 2 puffs, Inhalation, 2 Times Daily   • Continuous Blood Gluc Sensor (Dexcom G6 Sensor) Does not apply, Every 10 Days, This should be changed out   • Continuous Blood Gluc Transmit (Dexcom G6 Transmitter) misc 1 Device, Does not apply, Every 3 Months, As directed   • Dupilumab 300 MG/2ML solution prefilled syringe Subcutaneous, Every 14 Days   • Ferrous Sulfate (IRON PO) 65 mg, Oral, Daily   • finasteride (PROSCAR) 5 MG tablet Take 1 tablet by mouth  "once daily   • fluticasone (FLONASE) 50 MCG/ACT nasal spray 1 spray, Nasal, Daily   • GARLIC PO 1 tablet, Oral, Daily   • glucose blood test strip 1 each, Other, 3 Times Daily PRN, Use as instructed One Touch Ultra   • Insulin Glargine (Lantus SoloStar) 100 UNIT/ML injection pen Begin taking 15 units at bedtime, increase by one unit every night that fasting blood sugar is above 130.   • levalbuterol (Xopenex HFA) 45 MCG/ACT inhaler 1-2 puffs, Inhalation, Every 4 Hours PRN   • lisinopril (PRINIVIL,ZESTRIL) 20 MG tablet 1 tab am and 1/2 at bedtime.   • meclizine (ANTIVERT) 12.5 mg, Oral, 3 Times Daily PRN   • melatonin 5 mg, Oral   • Mesalamine 4 GM/60ML SF enema 1 application, Rectal, Every Other Day   • metFORMIN ER (GLUCOPHAGE-XR) 500 mg, Oral, Daily With Breakfast   • metoprolol tartrate (LOPRESSOR) 25 mg, Oral, 2 Times Daily   • nitroglycerin (NITROSTAT) 0.4 MG SL tablet 1 under the tongue as needed for angina, may repeat q5mins for up three doses   • NovoLOG FlexPen 5 Units, Subcutaneous, 3 Times Daily With Meals   • Pediatric Multiple Vit-C-FA (CHILDRENS CHEWABLE MULTI VITS PO) Oral   • rosuvastatin (CRESTOR) 20 mg, Oral, Nightly   • triamcinolone (KENALOG) 0.1 % cream Topical, 2 Times Daily   • Ustekinumab (STELARA) 90 mg, Subcutaneous, Every 28 Days   • vitamin B-12 (CYANOCOBALAMIN) 1,000 mcg, Oral, Daily   • Vitamin D, Cholecalciferol, 25 MCG (1000 UT) capsule Oral       OBJECTIVE:  /70 (BP Location: Left arm, Patient Position: Sitting, Cuff Size: Adult)   Pulse 64   Temp 97.8 °F (36.6 °C) (Temporal)   Ht 175.3 cm (69\")   Wt 105 kg (231 lb)   SpO2 96%   BMI 34.11 kg/m²    Physical Exam  Vitals and nursing note reviewed.   Constitutional:       Appearance: Normal appearance. He is well-developed.   HENT:      Head: Normocephalic and atraumatic.      Right Ear: Tympanic membrane, ear canal and external ear normal.      Left Ear: Tympanic membrane, ear canal and external ear normal.      Nose: Nose " normal. No septal deviation, nasal tenderness or congestion.      Mouth/Throat:      Lips: Pink. No lesions.      Mouth: Mucous membranes are moist. No oral lesions.      Dentition: Normal dentition.      Pharynx: Oropharynx is clear. No pharyngeal swelling, oropharyngeal exudate or posterior oropharyngeal erythema.   Eyes:      General: Lids are normal. Vision grossly intact. No scleral icterus.        Right eye: No discharge.         Left eye: No discharge.      Extraocular Movements: Extraocular movements intact.      Conjunctiva/sclera: Conjunctivae normal.      Right eye: Right conjunctiva is not injected.      Left eye: Left conjunctiva is not injected.      Pupils: Pupils are equal, round, and reactive to light.   Neck:      Thyroid: No thyroid mass.      Trachea: Trachea normal.   Cardiovascular:      Rate and Rhythm: Normal rate and regular rhythm.      Heart sounds: Normal heart sounds. No murmur heard.    No gallop.   Pulmonary:      Effort: Pulmonary effort is normal.      Breath sounds: Normal breath sounds and air entry. No wheezing, rhonchi or rales.   Musculoskeletal:         General: No tenderness or deformity. Normal range of motion.      Cervical back: Full passive range of motion without pain, normal range of motion and neck supple.      Thoracic back: Normal.      Right lower leg: No edema.      Left lower leg: No edema.   Skin:     General: Skin is warm and dry.      Coloration: Skin is not jaundiced.      Findings: No rash.   Neurological:      Mental Status: He is alert and oriented to person, place, and time.      Sensory: Sensation is intact.      Motor: Motor function is intact.      Coordination: Coordination is intact.      Gait: Gait is intact.      Deep Tendon Reflexes: Reflexes are normal and symmetric.   Psychiatric:         Mood and Affect: Mood and affect normal.         Behavior: Behavior normal.         Judgment: Judgment normal.     COMPREHENSIVE METABOLIC PANEL (04/20/2023  04:36 EDT)  Hemoglobin A1c (04/10/2023 02:38)      Procedures    Assessment/Plan:     Diagnoses and all orders for this visit:    1. Nontraumatic intracerebral hemorrhage, unspecified cerebral location, unspecified laterality (Primary)  Comments:  new dx.     2. Type 2 diabetes mellitus with hyperglycemia, with long-term current use of insulin  Comments:  stable. Will begin increasing on lantus and decreasing on novolog throughout the day.   Orders:  -     metFORMIN ER (GLUCOPHAGE-XR) 500 MG 24 hr tablet; Take 1 tablet by mouth Daily With Breakfast for 30 days.  Dispense: 30 tablet; Refill: 2    3. Essential hypertension  Comments:  unstalbe. adjusting lisinopril increase to 20mg am and 10mg at bedtime.  Orders:  -     lisinopril (PRINIVIL,ZESTRIL) 20 MG tablet; 1 tab am and 1/2 at bedtime.  Dispense: 180 tablet; Refill: 1    4. Obesity (BMI 30-39.9)  Comments:  stable.     5. Ulcerative colitis without complications, unspecified location  Comments:  stable.     6. Chronic obstructive pulmonary disease, unspecified COPD type  Comments:  stable.     Diabetes  - Mr. Acosta will begin increasing Lantus by 1 unit at bedtime if AM fasting blood glucose remains above 130 mg/dL. We discussed using a sliding scale insulin as needed for blood glucose readings above 200 mg/dL and counseled on the formula for proper dosing, which is the blood glucose reading minus 100 divided by 20.    Hypertension  - The patient will increase lisinopril to a full 20 mg in the morning and 10 mg at bedtime by splitting some of his pills. He will continue to monitor his blood pressure and follow-up in 1 month. If blood pressure remains elevated, we may increase to lisinopril 20 mg twice per day.     BMI is >= 30 and <35. (Class 1 Obesity). The following options were offered after discussion;: nutrition counseling/recommendations    An After Visit Summary was printed and given to the patient at discharge.  Return in about 4 weeks (around  5/31/2023).       Patient Instructions   Increase lisinopril to 20mg am and 10mg at bedtime.     Follow up in one month and may further increase to 20mg twice daily if blood pressure still slightly elevated.     Begin increasing on lantus at bedtime by one unit when fasting am blood sugar is above 130.                     Discussion:     I spent 30 minutes caring for Zachariah on this date of service. This time includes time spent by me in the following activities: preparing for the visit, reviewing tests, obtaining and/or reviewing a separately obtained history, performing a medically appropriate examination and/or evaluation, counseling and educating the patient/family/caregiver, ordering medications, tests, or procedures, documenting information in the medical record, independently interpreting results and communicating that information with the patient/family/caregiver and care coordination     Amaya ESCALANTE 5/3/2023   Electronically signed.  Answers for HPI/ROS submitted by the patient on 4/26/2023  What is the primary reason for your visit?: Diabetes    Transcribed from ambient dictation for Amaya Mix, APRN by Monique Winters.  05/03/23   12:23 CDT    Patient or patient representative verbalized consent to the visit recording.

## 2023-05-03 NOTE — PATIENT INSTRUCTIONS
Increase lisinopril to 20mg am and 10mg at bedtime.     Follow up in one month and may further increase to 20mg twice daily if blood pressure still slightly elevated.     Begin increasing on lantus at bedtime by one unit when fasting am blood sugar is above 130.

## 2023-05-04 ENCOUNTER — HOSPITAL ENCOUNTER (OUTPATIENT)
Dept: PHYSICAL THERAPY | Age: 83
Setting detail: THERAPIES SERIES
Discharge: HOME | End: 2023-05-04
Payer: MEDICARE

## 2023-05-04 ENCOUNTER — APPOINTMENT (OUTPATIENT)
Dept: CT IMAGING | Facility: HOSPITAL | Age: 83
End: 2023-05-04
Payer: MEDICARE

## 2023-05-04 ENCOUNTER — HOSPITAL ENCOUNTER (OUTPATIENT)
Facility: HOSPITAL | Age: 83
Setting detail: OBSERVATION
Discharge: HOME | End: 2023-05-06
Attending: STUDENT IN AN ORGANIZED HEALTH CARE EDUCATION/TRAINING PROGRAM | Admitting: SPECIALIST
Payer: MEDICARE

## 2023-05-04 ENCOUNTER — HOSPITAL ENCOUNTER (OUTPATIENT)
Dept: OCCUPATIONAL THERAPY | Age: 83
Setting detail: THERAPIES SERIES
Discharge: HOME | End: 2023-05-04
Payer: MEDICARE

## 2023-05-04 DIAGNOSIS — K35.80 ACUTE APPENDICITIS, UNSPECIFIED ACUTE APPENDICITIS TYPE: Primary | ICD-10-CM

## 2023-05-04 DIAGNOSIS — K37 APPENDICITIS: ICD-10-CM

## 2023-05-04 LAB
ALBUMIN SERPL-MCNC: 3.9 G/DL (ref 3.5–5.2)
ALBUMIN/GLOB SERPL: 1.3 G/DL
ALP SERPL-CCNC: 71 U/L (ref 39–117)
ALT SERPL W P-5'-P-CCNC: 15 U/L (ref 1–41)
ANION GAP SERPL CALCULATED.3IONS-SCNC: 9 MMOL/L (ref 5–15)
AST SERPL-CCNC: 14 U/L (ref 1–40)
BASOPHILS # BLD AUTO: 0.06 10*3/MM3 (ref 0–0.2)
BASOPHILS NFR BLD AUTO: 0.5 % (ref 0–1.5)
BILIRUB SERPL-MCNC: 0.4 MG/DL (ref 0–1.2)
BILIRUB UR QL STRIP: NEGATIVE
BUN SERPL-MCNC: 14 MG/DL (ref 8–23)
BUN/CREAT SERPL: 15.4 (ref 7–25)
CALCIUM SPEC-SCNC: 8.9 MG/DL (ref 8.6–10.5)
CHLORIDE SERPL-SCNC: 104 MMOL/L (ref 98–107)
CLARITY UR: CLEAR
CO2 SERPL-SCNC: 25 MMOL/L (ref 22–29)
COLOR UR: ABNORMAL
CREAT SERPL-MCNC: 0.91 MG/DL (ref 0.76–1.27)
D-LACTATE SERPL-SCNC: 2 MMOL/L (ref 0.5–2)
DEPRECATED RDW RBC AUTO: 43.1 FL (ref 37–54)
EGFRCR SERPLBLD CKD-EPI 2021: 85.2 ML/MIN/1.73
EOSINOPHIL # BLD AUTO: 0.57 10*3/MM3 (ref 0–0.4)
EOSINOPHIL NFR BLD AUTO: 4.8 % (ref 0.3–6.2)
ERYTHROCYTE [DISTWIDTH] IN BLOOD BY AUTOMATED COUNT: 12.3 % (ref 12.3–15.4)
GLOBULIN UR ELPH-MCNC: 2.9 GM/DL
GLUCOSE SERPL-MCNC: 181 MG/DL (ref 65–99)
GLUCOSE UR STRIP-MCNC: ABNORMAL MG/DL
HCT VFR BLD AUTO: 44.5 % (ref 37.5–51)
HGB BLD-MCNC: 14.6 G/DL (ref 13–17.7)
HGB UR QL STRIP.AUTO: NEGATIVE
IMM GRANULOCYTES # BLD AUTO: 0.05 10*3/MM3 (ref 0–0.05)
IMM GRANULOCYTES NFR BLD AUTO: 0.4 % (ref 0–0.5)
KETONES UR QL STRIP: ABNORMAL
LEUKOCYTE ESTERASE UR QL STRIP.AUTO: NEGATIVE
LIPASE SERPL-CCNC: 15 U/L (ref 13–60)
LYMPHOCYTES # BLD AUTO: 1 10*3/MM3 (ref 0.7–3.1)
LYMPHOCYTES NFR BLD AUTO: 8.5 % (ref 19.6–45.3)
MCH RBC QN AUTO: 30.9 PG (ref 26.6–33)
MCHC RBC AUTO-ENTMCNC: 32.8 G/DL (ref 31.5–35.7)
MCV RBC AUTO: 94.1 FL (ref 79–97)
MONOCYTES # BLD AUTO: 0.46 10*3/MM3 (ref 0.1–0.9)
MONOCYTES NFR BLD AUTO: 3.9 % (ref 5–12)
NEUTROPHILS NFR BLD AUTO: 81.9 % (ref 42.7–76)
NEUTROPHILS NFR BLD AUTO: 9.67 10*3/MM3 (ref 1.7–7)
NITRITE UR QL STRIP: NEGATIVE
NRBC BLD AUTO-RTO: 0 /100 WBC (ref 0–0.2)
PH UR STRIP.AUTO: 5.5 [PH] (ref 5–8)
PLATELET # BLD AUTO: 208 10*3/MM3 (ref 140–450)
PMV BLD AUTO: 9.6 FL (ref 6–12)
POTASSIUM SERPL-SCNC: 4.1 MMOL/L (ref 3.5–5.2)
PROT SERPL-MCNC: 6.8 G/DL (ref 6–8.5)
PROT UR QL STRIP: NEGATIVE
RBC # BLD AUTO: 4.73 10*6/MM3 (ref 4.14–5.8)
SODIUM SERPL-SCNC: 138 MMOL/L (ref 136–145)
SP GR UR STRIP: >=1.03 (ref 1–1.03)
UROBILINOGEN UR QL STRIP: ABNORMAL
WBC NRBC COR # BLD: 11.81 10*3/MM3 (ref 3.4–10.8)

## 2023-05-04 PROCEDURE — 25010000002 ONDANSETRON PER 1 MG

## 2023-05-04 PROCEDURE — 25510000001 IOPAMIDOL 61 % SOLUTION

## 2023-05-04 PROCEDURE — 99284 EMERGENCY DEPT VISIT MOD MDM: CPT

## 2023-05-04 PROCEDURE — 85025 COMPLETE CBC W/AUTO DIFF WBC: CPT

## 2023-05-04 PROCEDURE — 80053 COMPREHEN METABOLIC PANEL: CPT

## 2023-05-04 PROCEDURE — 74177 CT ABD & PELVIS W/CONTRAST: CPT

## 2023-05-04 PROCEDURE — 83605 ASSAY OF LACTIC ACID: CPT

## 2023-05-04 PROCEDURE — 81003 URINALYSIS AUTO W/O SCOPE: CPT

## 2023-05-04 PROCEDURE — 25010000002 MORPHINE PER 10 MG: Performed by: STUDENT IN AN ORGANIZED HEALTH CARE EDUCATION/TRAINING PROGRAM

## 2023-05-04 PROCEDURE — 96375 TX/PRO/DX INJ NEW DRUG ADDON: CPT

## 2023-05-04 PROCEDURE — 83690 ASSAY OF LIPASE: CPT

## 2023-05-04 PROCEDURE — 96374 THER/PROPH/DIAG INJ IV PUSH: CPT

## 2023-05-04 PROCEDURE — 97110 THERAPEUTIC EXERCISES: CPT

## 2023-05-04 RX ORDER — MORPHINE SULFATE 2 MG/ML
2 INJECTION, SOLUTION INTRAMUSCULAR; INTRAVENOUS ONCE
Status: COMPLETED | OUTPATIENT
Start: 2023-05-04 | End: 2023-05-04

## 2023-05-04 RX ORDER — ONDANSETRON 2 MG/ML
4 INJECTION INTRAMUSCULAR; INTRAVENOUS ONCE
Status: COMPLETED | OUTPATIENT
Start: 2023-05-04 | End: 2023-05-04

## 2023-05-04 RX ORDER — SODIUM CHLORIDE 0.9 % (FLUSH) 0.9 %
10 SYRINGE (ML) INJECTION AS NEEDED
Status: DISCONTINUED | OUTPATIENT
Start: 2023-05-04 | End: 2023-05-06 | Stop reason: HOSPADM

## 2023-05-04 RX ADMIN — MORPHINE SULFATE 2 MG: 2 INJECTION, SOLUTION INTRAMUSCULAR; INTRAVENOUS at 23:37

## 2023-05-04 RX ADMIN — IOPAMIDOL 100 ML: 612 INJECTION, SOLUTION INTRAVENOUS at 22:40

## 2023-05-04 RX ADMIN — ONDANSETRON 4 MG: 2 INJECTION INTRAMUSCULAR; INTRAVENOUS at 21:02

## 2023-05-04 NOTE — PROGRESS NOTES
Physical Therapy: Daily Note   Patient: Marie Lawson (82 y.o. male)   Examination Date:   Plan of Care/Certification Expiration Date: 23    No data recorded   :  1940 # of Visits since [facility]:   4   MRN: [id number]  CSN: [id number] Start of Care Date:   2023   Insurance: Payor: MEDICARE / Plan: MEDICARE PART A AND B / Product Type: *No Product type* /   Insurance ID: [id number] - (Medicare) Secondary Insurance (if applicable): [address]   Referring Physician: MD Ishan Zavaleta MD   PCP: NIXON Langston Visits to Date/Visits Approved:     No Show/Cancelled Appts:   /       Medical Diagnosis: Weakness [R53.1]  Nontraumatic intracranial hemorrhage, unspecified [I62.9]    Treatment Diagnosis: CVA        SUBJECTIVE EXAMINATION   Pain Level: Pain Screening  Patient Currently in Pain: No    Patient Comments: Subjective: I have been up since 130. I had trouble sleeping last night. OBJECTIVE EXAMINATION       TREATMENT     Exercises:      Treatment Reasoning    Exercise 1: Ambulation up to 6MWT with Rollator (progress towards less restrictive AD)- 651' in 6MWT  Exercise 2: Repeated sit to stand w/o UE use (start from higher surface, progress to chair) 1 x 10 red mat  Exercise 3: Box steps cw/ccw- x 5 ea (CGA)-no AD  Exercise 4: Fwd/back on line- x 2-no AD  Exercise 5: Sidestepping on line x 2-no AD  Exercise 6: Cone weaves 1 x 2-no AD  Exercise 7: Figure 8s 1 x 5- not today  Exercise 8: Stepping over curbs in  bars (fwd/lateral) 1 x 5- fwd only with one hand  Exercise 9: Toe taps on cone in  bars 1 x 10 with ue use- not today  Exercise 10: Sittings BAPS board RLE 1 x 10 ea  Exercise 11: R LAQ 2# 2 x 10/HS Curl red 2 x 10  Exercise 12: R ankle inversion/eversion/DF with t-band red 1 x 20 ea  Exercise 13:  Mini squats 1 x 10  Exercise 14: Standing march 1 x 10  Exercise 15: Standing heel/toe raise 1 x 10 ea  Exercise 16: Standing hip abd/ext 1

## 2023-05-04 NOTE — PROGRESS NOTES
Occupational Therapy  Daily Treatment Note  Date: 2023  Patient Name: Miguel Garcia  :  1940  MRN: [id number]       Subjective   General  Patient assessed for rehabilitation services?: Yes  Diagnosis: R53.1 weakness, I62.9 intracranial blees  OT Visit Information  Onset Date: 23  OT Insurance Information: Medicare and AARP  Total # of Visits to Date: 2  Certification Period Expiration Date: 23  Progress Note Due Date: 23      Treatment Activities:       Exercises  OT Exercise 1: RUE Wrist flexion/extension with 2# dumbbell, 2 sets of 15  OT Exercise 2: RUE Supination/pronation with 2# dumbbell, 2 sets of 15. OT Exercise 3: RUE digigrip (red) 2 minutes  OT Exercise 4: RUE digit extension with purple rubberband  1 minute  OT Exercise 5: Clothespins activity  OT Exercise 6: Nuts & bolts activity  OT Exercise 7: #1 &2, 8 reps. OT Exercise 8: Pipe building activity  Add additional exercise rows?: Yes  OT Exercise 9: Picking up money, paper clips, bingo chips activity  OT Exercise 10: Small multicolor peg activity  OT Exercise 11: Handwriting sheet  OT Exercise 12: Purdue pegboard. Assessment   Performance deficits / Impairments: Decreased endurance;Decreased coordination;Decreased ADL status; Decreased strength;Decreased high-level IADLs;Decreased fine motor control  Assessment: Pt tolerated OT tx well. Pt progressing well with exercises and activities. Pt continues to benefit from skilled OT services. Treatment Diagnosis: M62.81 muscle weakness, R27.8 impaired coordination  Prognosis: Good  History: Patient hospitalized after CVA on . He spent 10 days in inpatient rehab before d/c home with his spouse. 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   Exam: .  REQUIRES OT FOLLOW-UP: Yes  Timed Code Treatment Minutes: 60 Minutes       Plan   Occupational Therapy Plan  Plan Weeks: x4-6 weeks  Specific Instructions for Next Treatment: begin coordination activities  Current

## 2023-05-05 ENCOUNTER — ANESTHESIA (OUTPATIENT)
Dept: PERIOP | Facility: HOSPITAL | Age: 83
End: 2023-05-05
Payer: MEDICARE

## 2023-05-05 ENCOUNTER — ANESTHESIA EVENT (OUTPATIENT)
Dept: PERIOP | Facility: HOSPITAL | Age: 83
End: 2023-05-05
Payer: MEDICARE

## 2023-05-05 PROBLEM — K35.80 ACUTE APPENDICITIS, UNSPECIFIED ACUTE APPENDICITIS TYPE: Status: ACTIVE | Noted: 2023-05-05

## 2023-05-05 PROBLEM — K35.80 ACUTE APPENDICITIS: Status: ACTIVE | Noted: 2023-05-05

## 2023-05-05 LAB
GLUCOSE BLDC GLUCOMTR-MCNC: 133 MG/DL (ref 70–130)
GLUCOSE BLDC GLUCOMTR-MCNC: 143 MG/DL (ref 70–130)
GLUCOSE BLDC GLUCOMTR-MCNC: 154 MG/DL (ref 70–130)
GLUCOSE BLDC GLUCOMTR-MCNC: 179 MG/DL (ref 70–130)
GLUCOSE BLDC GLUCOMTR-MCNC: 190 MG/DL (ref 70–130)

## 2023-05-05 PROCEDURE — 96361 HYDRATE IV INFUSION ADD-ON: CPT

## 2023-05-05 PROCEDURE — 25010000002 PROPOFOL 10 MG/ML EMULSION: Performed by: NURSE ANESTHETIST, CERTIFIED REGISTERED

## 2023-05-05 PROCEDURE — 63710000001 METOPROLOL TARTRATE 25 MG TABLET: Performed by: SPECIALIST

## 2023-05-05 PROCEDURE — A9270 NON-COVERED ITEM OR SERVICE: HCPCS | Performed by: SPECIALIST

## 2023-05-05 PROCEDURE — 88304 TISSUE EXAM BY PATHOLOGIST: CPT | Performed by: SPECIALIST

## 2023-05-05 PROCEDURE — G0378 HOSPITAL OBSERVATION PER HR: HCPCS

## 2023-05-05 PROCEDURE — 82948 REAGENT STRIP/BLOOD GLUCOSE: CPT

## 2023-05-05 PROCEDURE — 63710000001 METFORMIN ER 500 MG TABLET SUSTAINED-RELEASE 24 HOUR: Performed by: SPECIALIST

## 2023-05-05 PROCEDURE — 25010000002 CEFOXITIN PER 1 G: Performed by: SPECIALIST

## 2023-05-05 PROCEDURE — 36415 COLL VENOUS BLD VENIPUNCTURE: CPT

## 2023-05-05 PROCEDURE — 25010000002 HYDROMORPHONE PER 4 MG: Performed by: ANESTHESIOLOGY

## 2023-05-05 PROCEDURE — 63710000001 LISINOPRIL 20 MG TABLET: Performed by: SPECIALIST

## 2023-05-05 PROCEDURE — 63710000001 FLUTICASONE 50 MCG/ACT SUSPENSION 16 G BOTTLE: Performed by: SPECIALIST

## 2023-05-05 PROCEDURE — 25010000002 CEFTRIAXONE PER 250 MG: Performed by: NURSE ANESTHETIST, CERTIFIED REGISTERED

## 2023-05-05 PROCEDURE — 25010000002 MORPHINE PER 10 MG: Performed by: SPECIALIST

## 2023-05-05 PROCEDURE — 87040 BLOOD CULTURE FOR BACTERIA: CPT | Performed by: STUDENT IN AN ORGANIZED HEALTH CARE EDUCATION/TRAINING PROGRAM

## 2023-05-05 PROCEDURE — 63710000001 INSULIN REGULAR HUMAN PER 5 UNITS: Performed by: SPECIALIST

## 2023-05-05 PROCEDURE — 25010000002 ONDANSETRON PER 1 MG: Performed by: SPECIALIST

## 2023-05-05 PROCEDURE — 25010000002 PIPERACILLIN SOD-TAZOBACTAM PER 1 G: Performed by: STUDENT IN AN ORGANIZED HEALTH CARE EDUCATION/TRAINING PROGRAM

## 2023-05-05 PROCEDURE — 25010000002 FENTANYL CITRATE (PF) 100 MCG/2ML SOLUTION: Performed by: NURSE ANESTHETIST, CERTIFIED REGISTERED

## 2023-05-05 PROCEDURE — 63710000001 OXYCODONE-ACETAMINOPHEN 5-325 MG TABLET: Performed by: SPECIALIST

## 2023-05-05 DEVICE — ETS45 RELOAD STANDARD 45MM
Type: IMPLANTABLE DEVICE | Site: ABDOMEN | Status: FUNCTIONAL
Brand: ENDOPATH

## 2023-05-05 RX ORDER — SODIUM CHLORIDE 9 MG/ML
40 INJECTION, SOLUTION INTRAVENOUS AS NEEDED
Status: DISCONTINUED | OUTPATIENT
Start: 2023-05-05 | End: 2023-05-05 | Stop reason: HOSPADM

## 2023-05-05 RX ORDER — LEVALBUTEROL TARTRATE 45 UG/1
2 AEROSOL, METERED ORAL EVERY 6 HOURS PRN
COMMUNITY

## 2023-05-05 RX ORDER — FENTANYL CITRATE 50 UG/ML
INJECTION, SOLUTION INTRAMUSCULAR; INTRAVENOUS AS NEEDED
Status: DISCONTINUED | OUTPATIENT
Start: 2023-05-05 | End: 2023-05-05 | Stop reason: SURG

## 2023-05-05 RX ORDER — DROPERIDOL 2.5 MG/ML
0.62 INJECTION, SOLUTION INTRAMUSCULAR; INTRAVENOUS ONCE AS NEEDED
Status: DISCONTINUED | OUTPATIENT
Start: 2023-05-05 | End: 2023-05-05 | Stop reason: HOSPADM

## 2023-05-05 RX ORDER — BUPIVACAINE HCL/0.9 % NACL/PF 0.125 %
PLASTIC BAG, INJECTION (ML) EPIDURAL AS NEEDED
Status: DISCONTINUED | OUTPATIENT
Start: 2023-05-05 | End: 2023-05-05 | Stop reason: SURG

## 2023-05-05 RX ORDER — ONDANSETRON 2 MG/ML
4 INJECTION INTRAMUSCULAR; INTRAVENOUS EVERY 4 HOURS PRN
Status: DISCONTINUED | OUTPATIENT
Start: 2023-05-05 | End: 2023-05-06 | Stop reason: HOSPADM

## 2023-05-05 RX ORDER — NALOXONE HCL 0.4 MG/ML
0.04 VIAL (ML) INJECTION AS NEEDED
Status: DISCONTINUED | OUTPATIENT
Start: 2023-05-05 | End: 2023-05-05 | Stop reason: HOSPADM

## 2023-05-05 RX ORDER — DEXTROSE MONOHYDRATE 25 G/50ML
25 INJECTION, SOLUTION INTRAVENOUS
Status: DISCONTINUED | OUTPATIENT
Start: 2023-05-05 | End: 2023-05-06 | Stop reason: HOSPADM

## 2023-05-05 RX ORDER — OXYCODONE HYDROCHLORIDE AND ACETAMINOPHEN 5; 325 MG/1; MG/1
1 TABLET ORAL EVERY 4 HOURS PRN
Status: DISCONTINUED | OUTPATIENT
Start: 2023-05-05 | End: 2023-05-06 | Stop reason: HOSPADM

## 2023-05-05 RX ORDER — INSULIN GLARGINE 100 [IU]/ML
15 INJECTION, SOLUTION SUBCUTANEOUS NIGHTLY
COMMUNITY

## 2023-05-05 RX ORDER — METFORMIN HYDROCHLORIDE 500 MG/1
500 TABLET, EXTENDED RELEASE ORAL
Status: DISCONTINUED | OUTPATIENT
Start: 2023-05-05 | End: 2023-05-06 | Stop reason: HOSPADM

## 2023-05-05 RX ORDER — FLUMAZENIL 0.1 MG/ML
0.2 INJECTION INTRAVENOUS AS NEEDED
Status: DISCONTINUED | OUTPATIENT
Start: 2023-05-05 | End: 2023-05-05 | Stop reason: HOSPADM

## 2023-05-05 RX ORDER — SODIUM CHLORIDE, SODIUM LACTATE, POTASSIUM CHLORIDE, CALCIUM CHLORIDE 600; 310; 30; 20 MG/100ML; MG/100ML; MG/100ML; MG/100ML
125 INJECTION, SOLUTION INTRAVENOUS CONTINUOUS
Status: DISCONTINUED | OUTPATIENT
Start: 2023-05-05 | End: 2023-05-06 | Stop reason: HOSPADM

## 2023-05-05 RX ORDER — SODIUM CHLORIDE 0.9 % (FLUSH) 0.9 %
3 SYRINGE (ML) INJECTION EVERY 12 HOURS SCHEDULED
Status: DISCONTINUED | OUTPATIENT
Start: 2023-05-05 | End: 2023-05-05 | Stop reason: HOSPADM

## 2023-05-05 RX ORDER — LIDOCAINE HYDROCHLORIDE 20 MG/ML
INJECTION, SOLUTION EPIDURAL; INFILTRATION; INTRACAUDAL; PERINEURAL AS NEEDED
Status: DISCONTINUED | OUTPATIENT
Start: 2023-05-05 | End: 2023-05-05 | Stop reason: SURG

## 2023-05-05 RX ORDER — HYDROMORPHONE HYDROCHLORIDE 1 MG/ML
0.2 INJECTION, SOLUTION INTRAMUSCULAR; INTRAVENOUS; SUBCUTANEOUS
Status: DISCONTINUED | OUTPATIENT
Start: 2023-05-05 | End: 2023-05-05 | Stop reason: HOSPADM

## 2023-05-05 RX ORDER — LISINOPRIL 20 MG/1
20 TABLET ORAL
Status: DISCONTINUED | OUTPATIENT
Start: 2023-05-05 | End: 2023-05-06 | Stop reason: HOSPADM

## 2023-05-05 RX ORDER — SODIUM CHLORIDE, SODIUM LACTATE, POTASSIUM CHLORIDE, CALCIUM CHLORIDE 600; 310; 30; 20 MG/100ML; MG/100ML; MG/100ML; MG/100ML
100 INJECTION, SOLUTION INTRAVENOUS CONTINUOUS
Status: DISCONTINUED | OUTPATIENT
Start: 2023-05-05 | End: 2023-05-05

## 2023-05-05 RX ORDER — BUPIVACAINE HYDROCHLORIDE AND EPINEPHRINE 2.5; 5 MG/ML; UG/ML
INJECTION, SOLUTION INFILTRATION; PERINEURAL AS NEEDED
Status: DISCONTINUED | OUTPATIENT
Start: 2023-05-05 | End: 2023-05-05 | Stop reason: HOSPADM

## 2023-05-05 RX ORDER — ROSUVASTATIN CALCIUM 40 MG/1
20 TABLET, COATED ORAL NIGHTLY
COMMUNITY

## 2023-05-05 RX ORDER — ONDANSETRON 2 MG/ML
4 INJECTION INTRAMUSCULAR; INTRAVENOUS
Status: DISCONTINUED | OUTPATIENT
Start: 2023-05-05 | End: 2023-05-05 | Stop reason: HOSPADM

## 2023-05-05 RX ORDER — PROPOFOL 10 MG/ML
VIAL (ML) INTRAVENOUS AS NEEDED
Status: DISCONTINUED | OUTPATIENT
Start: 2023-05-05 | End: 2023-05-05 | Stop reason: SURG

## 2023-05-05 RX ORDER — MAGNESIUM HYDROXIDE 1200 MG/15ML
LIQUID ORAL AS NEEDED
Status: DISCONTINUED | OUTPATIENT
Start: 2023-05-05 | End: 2023-05-05 | Stop reason: HOSPADM

## 2023-05-05 RX ORDER — FLUTICASONE PROPIONATE 50 MCG
1 SPRAY, SUSPENSION (ML) NASAL DAILY
Status: DISCONTINUED | OUTPATIENT
Start: 2023-05-05 | End: 2023-05-06 | Stop reason: HOSPADM

## 2023-05-05 RX ORDER — SODIUM CHLORIDE, SODIUM LACTATE, POTASSIUM CHLORIDE, CALCIUM CHLORIDE 600; 310; 30; 20 MG/100ML; MG/100ML; MG/100ML; MG/100ML
125 INJECTION, SOLUTION INTRAVENOUS CONTINUOUS
Status: DISCONTINUED | OUTPATIENT
Start: 2023-05-05 | End: 2023-05-05

## 2023-05-05 RX ORDER — CEFTRIAXONE 1 G/1
INJECTION, POWDER, FOR SOLUTION INTRAMUSCULAR; INTRAVENOUS AS NEEDED
Status: DISCONTINUED | OUTPATIENT
Start: 2023-05-05 | End: 2023-05-05 | Stop reason: SURG

## 2023-05-05 RX ORDER — LABETALOL HYDROCHLORIDE 5 MG/ML
5 INJECTION, SOLUTION INTRAVENOUS
Status: DISCONTINUED | OUTPATIENT
Start: 2023-05-05 | End: 2023-05-05 | Stop reason: HOSPADM

## 2023-05-05 RX ORDER — ONDANSETRON 2 MG/ML
4 INJECTION INTRAMUSCULAR; INTRAVENOUS EVERY 6 HOURS PRN
Status: DISCONTINUED | OUTPATIENT
Start: 2023-05-05 | End: 2023-05-05

## 2023-05-05 RX ORDER — NITROGLYCERIN 0.4 MG/1
0.4 TABLET SUBLINGUAL
Status: DISCONTINUED | OUTPATIENT
Start: 2023-05-05 | End: 2023-05-06 | Stop reason: HOSPADM

## 2023-05-05 RX ORDER — SODIUM CHLORIDE 0.9 % (FLUSH) 0.9 %
3-10 SYRINGE (ML) INJECTION AS NEEDED
Status: DISCONTINUED | OUTPATIENT
Start: 2023-05-05 | End: 2023-05-05 | Stop reason: HOSPADM

## 2023-05-05 RX ORDER — ROCURONIUM BROMIDE 10 MG/ML
INJECTION, SOLUTION INTRAVENOUS AS NEEDED
Status: DISCONTINUED | OUTPATIENT
Start: 2023-05-05 | End: 2023-05-05 | Stop reason: SURG

## 2023-05-05 RX ORDER — FENTANYL CITRATE 50 UG/ML
25 INJECTION, SOLUTION INTRAMUSCULAR; INTRAVENOUS
Status: DISCONTINUED | OUTPATIENT
Start: 2023-05-05 | End: 2023-05-05 | Stop reason: HOSPADM

## 2023-05-05 RX ORDER — NICOTINE POLACRILEX 4 MG
15 LOZENGE BUCCAL
Status: DISCONTINUED | OUTPATIENT
Start: 2023-05-05 | End: 2023-05-06 | Stop reason: HOSPADM

## 2023-05-05 RX ORDER — LIDOCAINE HYDROCHLORIDE 10 MG/ML
0.5 INJECTION, SOLUTION EPIDURAL; INFILTRATION; INTRACAUDAL; PERINEURAL ONCE AS NEEDED
Status: DISCONTINUED | OUTPATIENT
Start: 2023-05-05 | End: 2023-05-05 | Stop reason: HOSPADM

## 2023-05-05 RX ADMIN — INSULIN HUMAN 2 UNITS: 100 INJECTION, SOLUTION PARENTERAL at 17:00

## 2023-05-05 RX ADMIN — CEFOXITIN 1 G: 1 INJECTION, POWDER, FOR SOLUTION INTRAVENOUS at 20:34

## 2023-05-05 RX ADMIN — SODIUM CHLORIDE, POTASSIUM CHLORIDE, SODIUM LACTATE AND CALCIUM CHLORIDE 125 ML/HR: 600; 310; 30; 20 INJECTION, SOLUTION INTRAVENOUS at 09:32

## 2023-05-05 RX ADMIN — ROCURONIUM BROMIDE 35 MG: 10 INJECTION, SOLUTION INTRAVENOUS at 07:07

## 2023-05-05 RX ADMIN — METOPROLOL TARTRATE 25 MG: 25 TABLET, FILM COATED ORAL at 20:39

## 2023-05-05 RX ADMIN — SODIUM CHLORIDE, POTASSIUM CHLORIDE, SODIUM LACTATE AND CALCIUM CHLORIDE 100 ML/HR: 600; 310; 30; 20 INJECTION, SOLUTION INTRAVENOUS at 06:44

## 2023-05-05 RX ADMIN — HYDROMORPHONE HYDROCHLORIDE 0.2 MG: 1 INJECTION, SOLUTION INTRAMUSCULAR; INTRAVENOUS; SUBCUTANEOUS at 08:33

## 2023-05-05 RX ADMIN — INSULIN HUMAN 2 UNITS: 100 INJECTION, SOLUTION PARENTERAL at 11:39

## 2023-05-05 RX ADMIN — Medication 100 MCG: at 07:24

## 2023-05-05 RX ADMIN — OXYCODONE HYDROCHLORIDE AND ACETAMINOPHEN 1 TABLET: 5; 325 TABLET ORAL at 22:25

## 2023-05-05 RX ADMIN — ONDANSETRON 4 MG: 2 INJECTION INTRAMUSCULAR; INTRAVENOUS at 20:39

## 2023-05-05 RX ADMIN — PROPOFOL 80 MG: 10 INJECTION, EMULSION INTRAVENOUS at 07:07

## 2023-05-05 RX ADMIN — METFORMIN HYDROCHLORIDE 500 MG: 500 TABLET, EXTENDED RELEASE ORAL at 11:37

## 2023-05-05 RX ADMIN — MORPHINE SULFATE 4 MG: 4 INJECTION, SOLUTION INTRAMUSCULAR; INTRAVENOUS at 16:10

## 2023-05-05 RX ADMIN — CEFTRIAXONE 1 G: 1 INJECTION, POWDER, FOR SOLUTION INTRAMUSCULAR; INTRAVENOUS at 07:20

## 2023-05-05 RX ADMIN — PIPERACILLIN SODIUM AND TAZOBACTAM SODIUM 3.38 G: 3; .375 INJECTION, POWDER, LYOPHILIZED, FOR SOLUTION INTRAVENOUS at 02:05

## 2023-05-05 RX ADMIN — HYDROMORPHONE HYDROCHLORIDE 0.2 MG: 1 INJECTION, SOLUTION INTRAMUSCULAR; INTRAVENOUS; SUBCUTANEOUS at 08:43

## 2023-05-05 RX ADMIN — SODIUM CHLORIDE, POTASSIUM CHLORIDE, SODIUM LACTATE AND CALCIUM CHLORIDE 125 ML/HR: 600; 310; 30; 20 INJECTION, SOLUTION INTRAVENOUS at 02:07

## 2023-05-05 RX ADMIN — LISINOPRIL 20 MG: 20 TABLET ORAL at 11:38

## 2023-05-05 RX ADMIN — SODIUM CHLORIDE, POTASSIUM CHLORIDE, SODIUM LACTATE AND CALCIUM CHLORIDE 125 ML/HR: 600; 310; 30; 20 INJECTION, SOLUTION INTRAVENOUS at 20:34

## 2023-05-05 RX ADMIN — METOPROLOL TARTRATE 25 MG: 25 TABLET, FILM COATED ORAL at 11:37

## 2023-05-05 RX ADMIN — LIDOCAINE HYDROCHLORIDE 100 MG: 20 INJECTION, SOLUTION EPIDURAL; INFILTRATION; INTRACAUDAL; PERINEURAL at 07:07

## 2023-05-05 RX ADMIN — CEFOXITIN 1 G: 1 INJECTION, POWDER, FOR SOLUTION INTRAVENOUS at 11:36

## 2023-05-05 RX ADMIN — FLUTICASONE PROPIONATE 1 SPRAY: 50 SPRAY, METERED NASAL at 11:38

## 2023-05-05 RX ADMIN — MORPHINE SULFATE 4 MG: 4 INJECTION, SOLUTION INTRAMUSCULAR; INTRAVENOUS at 11:49

## 2023-05-05 RX ADMIN — FENTANYL CITRATE 100 MCG: 50 INJECTION INTRAMUSCULAR; INTRAVENOUS at 07:07

## 2023-05-05 NOTE — ANESTHESIA POSTPROCEDURE EVALUATION
"Patient: Zachariah Acosta    Procedure Summary       Date: 05/05/23 Room / Location:  [facility] OR 09 /  [facility] OR    Anesthesia Start: 0701 Anesthesia Stop: 0804    Procedure: APPENDECTOMY LAPAROSCOPIC (Abdomen) Diagnosis: (acute appendicitis)    Surgeons: Katherine Carranza MD Provider: Timothy Beltran CRNA    Anesthesia Type: general ASA Status: 4 - Emergent            Anesthesia Type: general    Vitals  Vitals Value Taken Time   /67 05/05/23 0912   Temp 98.7 °F (37.1 °C) 05/05/23 0910   Pulse 75 05/05/23 0912   Resp 14 05/05/23 0910   SpO2 93 % 05/05/23 0912   Vitals shown include unvalidated device data.        Post Anesthesia Care and Evaluation    Patient location during evaluation: PACU  Patient participation: complete - patient participated  Level of consciousness: awake and alert  Pain management: adequate    Airway patency: patent  Anesthetic complications: No anesthetic complications    Cardiovascular status: acceptable  Respiratory status: acceptable  Hydration status: acceptable    Comments: Blood pressure 132/63, pulse 84, temperature 98.7 °F (37.1 °C), resp. rate 14, height 175.3 cm (69\"), weight 103 kg (226 lb), SpO2 94 %.    Pt discharged from PACU based on tiffanie score >8    "

## 2023-05-05 NOTE — ANESTHESIA PREPROCEDURE EVALUATION
Anesthesia Evaluation     Patient summary reviewed   no history of anesthetic complications:  NPO Solid Status: > 6 hours  NPO Liquid Status: > 6 hours           Airway   Mallampati: II  TM distance: >3 FB  Neck ROM: full  No difficulty expected  Dental - normal exam     Pulmonary    (+) asthma,shortness of breath, sleep apnea on CPAP,   Cardiovascular   Exercise tolerance: poor (<4 METS)    (+) hypertension, past MI , CAD, CABG (2012) >6 Months, cardiac stents (6 stents 2020) more than 12 months ago hyperlipidemia,   (-) dysrhythmias      Neuro/Psych  (+) CVA residual symptoms,    (-) seizures    ROS Comment: Hemorrhagic stroke 3 weeks prior, ongoing right sided weakness  GI/Hepatic/Renal/Endo    (+) obesity,   renal disease CRI, diabetes mellitus type 2 well controlled using insulin,   (-) liver disease    ROS Comment: UC    Musculoskeletal     Abdominal   (+) obese,    Substance History      OB/GYN          Other                          Anesthesia Plan    ASA 4 - emergent     general     (Pt has been off low dose aspirin since hemorrhagic stroke 3 weeks prior, felt to be related to hypertension. Reports ongoing right sided weakness. I am concerned about cardiac risk in setting on discontinuation of aspirin given 5 FRANKY. Increased risk of cardiac complications discussed with patient. Will continue to hold aspirin in setting of recent brain bleed, proceed with urgent surgery.   5 lead ekg)  intravenous induction     Anesthetic plan, risks, benefits, and alternatives have been provided, discussed and informed consent has been obtained with: patient.

## 2023-05-05 NOTE — PLAN OF CARE
Goal Outcome Evaluation:  A&O. RA. Pt has been resting throughout the day. Ambulated in hallway with walker, tolerated well. Urinal in use, clear yellow urine noted. Tolerated clear liquid diet, no complaints of N/V. Dressing on abdomen clean, dry and intact. Complained of pain on left side of abdomen, prn pain medication per chart was given. SCDs. IV BG monitored. IV fluid infusing. Wife at bedside. VSS.

## 2023-05-05 NOTE — ED NOTES
Nursing report ED to floor  Zachariah Acosta  82 y.o.  male    HPI:   Chief Complaint   Patient presents with    Abdominal Pain       Admitting doctor:   Katherine Carranza MD    Consulting provider(s):  Consults       No orders found for last 30 day(s).             Admitting diagnosis:   The encounter diagnosis was Acute appendicitis, unspecified acute appendicitis type.    Code status:   Current Code Status       Date Active Code Status Order ID Comments User Context       Prior            Allergies:   Albuterol, Adhesive tape, Azithromycin, and Fentanyl    Intake and Output  No intake or output data in the 24 hours ending 05/05/23 0013    Weight:       05/04/23 1954   Weight: 104 kg (230 lb)       Most recent vitals:   Vitals:    05/04/23 2157 05/04/23 2301 05/05/23 0001 05/05/23 0012   BP: 142/69 137/72 125/77    BP Location:       Patient Position:       Pulse: 67 67 70    Resp: 18      Temp:    97.9 °F (36.6 °C)   TempSrc:    Oral   SpO2: 96% 97% 97%    Weight:       Height:         Oxygen Therapy: .    Active LDAs/IV Access:   Lines, Drains & Airways       Active LDAs       Name Placement date Placement time Site Days    Peripheral IV 05/04/23 2101 Anterior;Left Forearm 05/04/23 2101  Forearm  less than 1                    Labs (abnormal labs have a star):   Labs Reviewed   COMPREHENSIVE METABOLIC PANEL - Abnormal; Notable for the following components:       Result Value    Glucose 181 (*)     All other components within normal limits    Narrative:     GFR Normal >60  Chronic Kidney Disease <60  Kidney Failure <15    The GFR formula is only valid for adults with stable renal function between ages 18 and 70.   URINALYSIS W/ CULTURE IF INDICATED - Abnormal; Notable for the following components:    Color, UA Dark Yellow (*)     Glucose,  mg/dL (Trace) (*)     Ketones, UA Trace (*)     All other components within normal limits    Narrative:     In absence of clinical symptoms, the presence of pyuria,  bacteria, and/or nitrites on the urinalysis result does not correlate with infection.  Urine microscopic not indicated.   CBC WITH AUTO DIFFERENTIAL - Abnormal; Notable for the following components:    WBC 11.81 (*)     Neutrophil % 81.9 (*)     Lymphocyte % 8.5 (*)     Monocyte % 3.9 (*)     Neutrophils, Absolute 9.67 (*)     Eosinophils, Absolute 0.57 (*)     All other components within normal limits   LIPASE - Normal   LACTIC ACID, PLASMA - Normal   BLOOD CULTURE   BLOOD CULTURE   CBC AND DIFFERENTIAL    Narrative:     The following orders were created for panel order CBC & Differential.  Procedure                               Abnormality         Status                     ---------                               -----------         ------                     CBC Auto Differential[[id number]]        Abnormal            Final result                 Please view results for these tests on the individual orders.       Meds given in ED:   Medications   sodium chloride 0.9 % flush 10 mL (has no administration in time range)   piperacillin-tazobactam (ZOSYN) IVPB 3.375 g in 100 mL NS (CD) (has no administration in time range)   ondansetron (ZOFRAN) injection 4 mg (4 mg Intravenous Given 5/4/23 2102)   iopamidol (ISOVUE-300) 61 % injection 100 mL (100 mL Intravenous Given 5/4/23 2240)   Morphine sulfate (PF) injection 2 mg (2 mg Intravenous Given 5/4/23 2337)           NIH Stroke Scale:       Isolation/Infection(s):  No active isolations   No active infections     COVID Testing  Collected .  Resulted .    Nursing report ED to floor:  Mental status: .  Ambulatory status: .  Precautions: .    ED nurse phone extentsion- ..

## 2023-05-05 NOTE — H&P
Katherine Carranza MD FACS History and Physical     Referring Provider: Mesfin Sherman MD    Patient Care Team:  Mari Perdue APRN as PCP - General (Family Medicine)  Yung Polanco MD as Cardiologist (Cardiology)  Monique Gusman MD as Referring Physician (Family Medicine)  Zachariah Menjivar DO as Consulting Physician (Gastroenterology)  Oren Brambila MD as Consulting Physician (Internal Medicine)  Mari Perdue APRN as Nurse Practitioner (Family Medicine)  Jacob Mejias MD as Consulting Physician (Pulmonary Disease)    Chief complaint:  Abdominal pain    Subjective .   History of present illness:  The patient is a 82 y.o. male who presents complaining of a two day history of bilateral lower quadrant pain with associated nausea and bloating.  He denies any associated fevers or chills..    History:    Past Medical History:   Diagnosis Date   • Asthma    • Coronary artery disease    • Diabetes mellitus    • Elevated cholesterol    • HL (hearing loss) 2012   • Hyperlipidemia    • Hypertension    • Inflammatory bowel disease 2001   • Myocardial infarct     [facility] 2020   • Sleep apnea     cpap at    • Sleep apnea, obstructive    • Visual impairment 2016    Double vision   ,   Past Surgical History:   Procedure Laterality Date   • ADENOIDECTOMY  1947   • BACK SURGERY     • CARDIAC CATHETERIZATION      stents x 6   • CARDIAC SURGERY      CABG TRIPLE BYPASS 2012   • CATARACT EXTRACTION     • COLONOSCOPY     • COLONOSCOPY N/A 06/04/2021    Procedure: COLONOSCOPY WITH ANESTHESIA;  Surgeon: Zachariah Menjivar DO;  Location: [facility] ENDOSCOPY;  Service: Gastroenterology;  Laterality: N/A;  pre hx ulcerative colitis  post ulcerative colitis  dr monique gusman   • CORONARY ANGIOPLASTY WITH STENT PLACEMENT     • CORONARY ARTERY BYPASS GRAFT  December 2012   • CORONARY STENT PLACEMENT     • HIP SURGERY Right     total hip   • JOINT REPLACEMENT  2015    Right hip   • PENILE PROSTHESIS IMPLANT N/A 12/13/2021     Procedure: 3-PIECE INFLATABLE PENILE PROSTHESIS PLACEMENT;  Surgeon: Jose Tellez MD;  Location: [facility] OR;  Service: Urology;  Laterality: N/A;   • TONSILLECTOMY  1947   ,   Family History   Problem Relation Age of Onset   • Colon cancer Brother    • Colon polyps Neg Hx    • Esophageal cancer Neg Hx    ,   Social History     Tobacco Use   • Smoking status: Never   • Smokeless tobacco: Never   Vaping Use   • Vaping Use: Never used   Substance Use Topics   • Alcohol use: Never   • Drug use: Never   ,   Medications Prior to Admission   Medication Sig Dispense Refill Last Dose   • acetaminophen (TYLENOL) 500 MG tablet Take 1 tablet by mouth Every 6 (Six) Hours As Needed for Mild Pain.      • Budeson-Glycopyrrol-Formoterol (Breztri Aerosphere) 160-9-4.8 MCG/ACT aerosol inhaler Inhale 2 puffs 2 (Two) Times a Day.      • Continuous Blood Gluc Sensor (Dexcom G6 Sensor) Every 10 (Ten) Days. This should be changed out 3 each 11    • Continuous Blood Gluc Transmit (Dexcom G6 Transmitter) misc 1 Device Every 3 (Three) Months. As directed 1 each 3    • Dupilumab 300 MG/2ML solution prefilled syringe Inject  under the skin into the appropriate area as directed Every 14 (Fourteen) Days.      • Ferrous Sulfate (IRON PO) Take 65 mg by mouth Daily.      • finasteride (PROSCAR) 5 MG tablet Take 1 tablet by mouth once daily 90 tablet 0    • fluticasone (FLONASE) 50 MCG/ACT nasal spray 1 spray into the nostril(s) as directed by provider Daily. 16 g 5    • GARLIC PO Take 1 tablet by mouth Daily.      • glucose blood test strip 1 each by Other route 3 (Three) Times a Day As Needed (hypoglycemia). Use as instructed One Touch Ultra 100 each 11    • insulin aspart (NovoLOG FlexPen) 100 UNIT/ML solution pen-injector sc pen Inject 5 Units under the skin into the appropriate area as directed 3 (Three) Times a Day With Meals. 5 pen 2    • Insulin Glargine (Lantus SoloStar) 100 UNIT/ML injection pen Begin taking 15 units at bedtime,  increase by one unit every night that fasting blood sugar is above 130. (Patient taking differently: 20 Units Every Night. Begin taking 15 units at bedtime, increase by one unit every night that fasting blood sugar is above 130.) 15 mL 0    • levalbuterol (Xopenex HFA) 45 MCG/ACT inhaler Inhale 1-2 puffs Every 4 (Four) Hours As Needed for Wheezing or Shortness of Air. 15 g 11    • lisinopril (PRINIVIL,ZESTRIL) 20 MG tablet 1 tab am and 1/2 at bedtime. 180 tablet 1    • meclizine (ANTIVERT) 12.5 MG tablet Take 1 tablet by mouth 3 (Three) Times a Day As Needed for Dizziness. 30 tablet 0    • melatonin 5 MG tablet tablet Take 1 tablet by mouth.      • Mesalamine 4 GM/60ML SF enema Insert 1 application into the rectum Every Other Day.      • metFORMIN ER (GLUCOPHAGE-XR) 500 MG 24 hr tablet Take 1 tablet by mouth Daily With Breakfast for 30 days. 30 tablet 2    • metoprolol tartrate (LOPRESSOR) 25 MG tablet Take 1 tablet by mouth 2 (Two) Times a Day. 180 tablet 3    • nitroglycerin (NITROSTAT) 0.4 MG SL tablet 1 under the tongue as needed for angina, may repeat q5mins for up three doses 25 tablet 1    • Pediatric Multiple Vit-C-FA (CHILDRENS CHEWABLE MULTI VITS PO) Take  by mouth.      • rosuvastatin (CRESTOR) 20 MG tablet Take 1 tablet by mouth Every Night. 90 tablet 3    • triamcinolone (KENALOG) 0.1 % cream Apply  topically to the appropriate area as directed 2 (Two) Times a Day.      • Ustekinumab (STELARA) 90 MG/ML solution prefilled syringe Injection Inject 90 mg under the skin into the appropriate area as directed Every 28 (Twenty-Eight) Days. 1 mL 6    • vitamin B-12 (CYANOCOBALAMIN) 1000 MCG tablet Take 1 tablet by mouth Daily.      • Vitamin D, Cholecalciferol, 25 MCG (1000 UT) capsule Take  by mouth.       and Allergies:  Albuterol, Adhesive tape, Azithromycin, and Fentanyl    Current Facility-Administered Medications:   •  [MAR Hold] dextrose (D50W) (25 g/50 mL) IV injection 25 g, 25 g, Intravenous, Q15 Min  PRN, Katherine Carranza MD  •  [MAR Hold] dextrose (GLUTOSE) oral gel 15 g, 15 g, Oral, Q15 Min PRN, Katherine Carranza MD  •  [MAR Hold] glucagon (human recombinant) (GLUCAGEN DIAGNOSTIC) injection 1 mg, 1 mg, Intramuscular, Q15 Min PRN, Katherine Carranza MD  •  [MAR Hold] insulin regular (humuLIN R,novoLIN R) injection 2-9 Units, 2-9 Units, Subcutaneous, Q6H, Katherine Carranza MD  •  lactated ringers infusion, 125 mL/hr, Intravenous, Continuous, Katherine Carranza MD, Last Rate: 125 mL/hr at 05/05/23 0207, 125 mL/hr at 05/05/23 0207  •  [MAR Hold] morphine injection 4 mg, 4 mg, Intravenous, Q4H PRN, Katherine Carranza MD  •  [MAR Hold] ondansetron (ZOFRAN) injection 4 mg, 4 mg, Intravenous, Q4H PRN, Katherine Carranza MD  •  [COMPLETED] Insert Peripheral IV, , , Once **AND** [MAR Hold] sodium chloride 0.9 % flush 10 mL, 10 mL, Intravenous, PRN, Angela Howell, BORIS    Review of Systems:    All organ systems were evaluated and found negative except those which are mentioned in the History of Present Illness.      Objective     Physical Exam:    Vital Signs:  Temp:  [97.7 °F (36.5 °C)-98.6 °F (37 °C)] 98.6 °F (37 °C)  Heart Rate:  [67-78] 78  Resp:  [16-18] 16  BP: (111-144)/(56-77) 111/56    Constitutional:    Well-developed, well-nourished in no acute distress  Eyes:     Extraocular movements intact; pupils equal, round, and reactive  Ears, Nose, Mouth, Throat:  Hearing intact, nose midline, no mucosal lesions  Cardiovascular:   Regular rate and rhythm   Respiratory:    Clear to auscultation bilaterally  Gastrointestinal:   Soft, point tenderness RLQ, no masses, no herniation  Genitourinary:    Deferred  Musculoskeletal:   Full range of motion, no muscle wasting, no weakness  Skin:     No rashes or excoriations  Neurological:    Moves all extremities, sensation intact  Psychiatric:    Alert and oriented to person, place, and time  Hematologic/Lymphatic/Immune: No lymphadenopathy      Results Review:     Lab Results (last  24 hours)     Procedure Component Value Units Date/Time    POC Glucose Once [[id number]]  (Abnormal) Collected: 05/05/23 0536    Specimen: Blood Updated: 05/05/23 0547     Glucose 133 mg/dL      Comment: : raleigh RiversaMeter ID: [id number]       Blood Culture - Blood, Arm, Right [[id number]] Collected: 05/05/23 0008    Specimen: Blood from Arm, Right Updated: 05/05/23 0014    Blood Culture - Blood, Arm, Left [[id number]] Collected: 05/05/23 0008    Specimen: Blood from Arm, Left Updated: 05/05/23 0014    Comprehensive Metabolic Panel [[id number]]  (Abnormal) Collected: 05/04/23 2101    Specimen: Blood Updated: 05/04/23 2133     Glucose 181 mg/dL      BUN 14 mg/dL      Creatinine 0.91 mg/dL      Sodium 138 mmol/L      Potassium 4.1 mmol/L      Chloride 104 mmol/L      CO2 25.0 mmol/L      Calcium 8.9 mg/dL      Total Protein 6.8 g/dL      Albumin 3.9 g/dL      ALT (SGPT) 15 U/L      AST (SGOT) 14 U/L      Alkaline Phosphatase 71 U/L      Total Bilirubin 0.4 mg/dL      Globulin 2.9 gm/dL      A/G Ratio 1.3 g/dL      BUN/Creatinine Ratio 15.4     Anion Gap 9.0 mmol/L      eGFR 85.2 mL/min/1.73     Narrative:      GFR Normal >60  Chronic Kidney Disease <60  Kidney Failure <15    The GFR formula is only valid for adults with stable renal function between ages 18 and 70.    Lactic Acid, Plasma [[id number]]  (Normal) Collected: 05/04/23 2101    Specimen: Blood Updated: 05/04/23 2131     Lactate 2.0 mmol/L     Lipase [[id number]]  (Normal) Collected: 05/04/23 2101    Specimen: Blood Updated: 05/04/23 2128     Lipase 15 U/L     CBC & Differential [[id number]]  (Abnormal) Collected: 05/04/23 2101    Specimen: Blood Updated: 05/04/23 2116    Narrative:      The following orders were created for panel order CBC & Differential.  Procedure                               Abnormality         Status                     ---------                               -----------         ------                     CBC Auto  Differential[[id number]]        Abnormal            Final result                 Please view results for these tests on the individual orders.    CBC Auto Differential [[id number]]  (Abnormal) Collected: 05/04/23 2101    Specimen: Blood Updated: 05/04/23 2116     WBC 11.81 10*3/mm3      RBC 4.73 10*6/mm3      Hemoglobin 14.6 g/dL      Hematocrit 44.5 %      MCV 94.1 fL      MCH 30.9 pg      MCHC 32.8 g/dL      RDW 12.3 %      RDW-SD 43.1 fl      MPV 9.6 fL      Platelets 208 10*3/mm3      Neutrophil % 81.9 %      Lymphocyte % 8.5 %      Monocyte % 3.9 %      Eosinophil % 4.8 %      Basophil % 0.5 %      Immature Grans % 0.4 %      Neutrophils, Absolute 9.67 10*3/mm3      Lymphocytes, Absolute 1.00 10*3/mm3      Monocytes, Absolute 0.46 10*3/mm3      Eosinophils, Absolute 0.57 10*3/mm3      Basophils, Absolute 0.06 10*3/mm3      Immature Grans, Absolute 0.05 10*3/mm3      nRBC 0.0 /100 WBC     Urinalysis With Culture If Indicated - Urine, Clean Catch [[id number]]  (Abnormal) Collected: 05/04/23 2047    Specimen: Urine, Clean Catch Updated: 05/04/23 2058     Color, UA Dark Yellow     Appearance, UA Clear     pH, UA 5.5     Specific Gravity, UA >=1.030     Glucose,  mg/dL (Trace)     Ketones, UA Trace     Bilirubin, UA Negative     Blood, UA Negative     Protein, UA Negative     Leuk Esterase, UA Negative     Nitrite, UA Negative     Urobilinogen, UA 0.2 E.U./dL    Narrative:      In absence of clinical symptoms, the presence of pyuria, bacteria, and/or nitrites on the urinalysis result does not correlate with infection.  Urine microscopic not indicated.        Imaging Results (Last 24 Hours)     Procedure Component Value Units Date/Time    CT Abdomen Pelvis With Contrast [[id number]] Resulted: 05/04/23 2235     Updated: 05/04/23 2241            Assessment & Plan     Acute appendicitis    He will be admitted, hydrated, and treated with iv abx.  Laparoscopic appendectomy with possible need for conversion to open will  be performed.  The risks, benefits, complications, and possible alternatives were discussed with the patient who agreed to proceed.    The patient will be scheduled for prework testing including:  COVID; cbc, cmp, EKG, and CXR will be performed dependent upon anesthesia requirements.    Katherine Carranza MD  05/05/23  06:36 CDT

## 2023-05-05 NOTE — OP NOTE
Preoperative diagnosis:     Acute appendicitis  Postoperative diagnosis:    same  Surgeon:     Katherine Carranza MD FACS  Procedure:    Laparoscopic appendectomy  Anesthesia:    Get loc   EBL:     minimal  IVF:     See anesthesia notes  Indications:     The patient is a 82 y.o. -year-old male who presents for appendectomy  The risks, benefits, complications, and possible alternatives of the procedure were discussed with the patient who agreed to proceed.  Description of procedure:  The patient was laid supine.  The abdomen was prepped and draped in the usual sterile fashion.  The abdomen was entered with veress.  Trocars were placed.  The base of the cecum was noninflamed.  The appendix was dilated and mildly inflamed.  An opening was created thru the mesoappendix.  An endogia 45mm blue stapler with two reloads was used to transect the appendix.  It was placed in an endocatch bag and removed.  The right lower quadrant was irrigated with sterile saline. The fascia of the left lower quadrant port site was closed with 0 vicryl using an endostitch passer. The skin was closed with 3-0 and 4-0 vicryl.  Mastisol, steri-strips, and dry dressings were applied.  The sponge, needle, and instrument counts were correct at the end of the case.  Findings:    Dilated and inflamed, nonperforated  Complications:   None  Disposition:    Good to PACU

## 2023-05-05 NOTE — ANESTHESIA PROCEDURE NOTES
Airway  Urgency: elective    Date/Time: 5/5/2023 7:08 AM  Airway not difficult    General Information and Staff    Patient location during procedure: OR  CRNA/CAA: Timothy Beltran CRNA    Indications and Patient Condition  Indications for airway management: airway protection    Preoxygenated: yes  Mask difficulty assessment: 1 - vent by mask    Final Airway Details  Final airway type: endotracheal airway      Successful airway: ETT  Cuffed: yes   Successful intubation technique: direct laryngoscopy  Facilitating devices/methods: intubating stylet  Endotracheal tube insertion site: oral  Blade: Valverde  Blade size: 2  ETT size (mm): 7.5  Cormack-Lehane Classification: grade I - full view of glottis  Placement verified by: chest auscultation and capnometry   Cuff volume (mL): 8  Measured from: teeth  ETT/EBT  to teeth (cm): 22  Number of attempts at approach: 1  Assessment: lips, teeth, and gum same as pre-op and atraumatic intubation

## 2023-05-05 NOTE — PLAN OF CARE
Goal Outcome Evaluation:   Admitted to  for abd pain with nausea and vomiting.  Dx of appendicitis.  NPO for possible surgery.  IV infusing without problems.  Denies need for pain meds since transfer to room.  Oriented to room and surroundings wife at bedside.  Safety maintained.        Problem: Adult Inpatient Plan of Care  Goal: Plan of Care Review  Outcome: Ongoing, Progressing  Goal: Patient-Specific Goal (Individualized)  Outcome: Ongoing, Progressing  Goal: Absence of Hospital-Acquired Illness or Injury  Outcome: Ongoing, Progressing  Intervention: Identify and Manage Fall Risk  Recent Flowsheet Documentation  Taken 5/5/2023 0400 by Mony Lindo RN  Safety Promotion/Fall Prevention: safety round/check completed  Taken 5/5/2023 0200 by Mony Lindo RN  Safety Promotion/Fall Prevention: safety round/check completed  Goal: Optimal Comfort and Wellbeing  Outcome: Ongoing, Progressing  Goal: Readiness for Transition of Care  Outcome: Ongoing, Progressing  Intervention: Mutually Develop Transition Plan  Recent Flowsheet Documentation  Taken 5/5/2023 0124 by Mony Lindo RN  Transportation Anticipated: family or friend will provide  Patient/Family Anticipated Services at Transition: none  Patient/Family Anticipates Transition to: home with family  Taken 5/5/2023 0119 by Mony Lindo RN  Equipment Currently Used at Home:   cane, quad tip   bath bench   walker, steven   dressing device  Goal: Plan of Care Review  Outcome: Ongoing, Progressing  Goal: Patient-Specific Goal (Individualized)  Outcome: Ongoing, Progressing  Goal: Absence of Hospital-Acquired Illness or Injury  Outcome: Ongoing, Progressing  Intervention: Identify and Manage Fall Risk  Recent Flowsheet Documentation  Taken 5/5/2023 0400 by Mony Lindo RN  Safety Promotion/Fall Prevention: safety round/check completed  Taken 5/5/2023 0200 by Mony Lindo RN  Safety Promotion/Fall Prevention: safety round/check completed  Goal: Optimal Comfort and  Wellbeing  Outcome: Ongoing, Progressing  Goal: Readiness for Transition of Care  Outcome: Ongoing, Progressing  Intervention: Mutually Develop Transition Plan  Recent Flowsheet Documentation  Taken 5/5/2023 0124 by Mony Lindo, RN  Transportation Anticipated: family or friend will provide  Patient/Family Anticipated Services at Transition: none  Patient/Family Anticipates Transition to: home with family  Taken 5/5/2023 0119 by Mony Lindo, RN  Equipment Currently Used at Home:   cane, quad tip   bath bench   walker, steven   dressing device     Problem: Pain Acute  Goal: Acceptable Pain Control and Functional Ability  Outcome: Ongoing, Progressing

## 2023-05-06 VITALS
WEIGHT: 226 LBS | SYSTOLIC BLOOD PRESSURE: 159 MMHG | DIASTOLIC BLOOD PRESSURE: 70 MMHG | RESPIRATION RATE: 16 BRPM | OXYGEN SATURATION: 96 % | BODY MASS INDEX: 33.47 KG/M2 | TEMPERATURE: 97.7 F | HEIGHT: 69 IN | HEART RATE: 63 BPM

## 2023-05-06 LAB — GLUCOSE BLDC GLUCOMTR-MCNC: 136 MG/DL (ref 70–130)

## 2023-05-06 PROCEDURE — 63710000001 LISINOPRIL 20 MG TABLET: Performed by: SPECIALIST

## 2023-05-06 PROCEDURE — A9270 NON-COVERED ITEM OR SERVICE: HCPCS | Performed by: SPECIALIST

## 2023-05-06 PROCEDURE — 63710000001 METFORMIN ER 500 MG TABLET SUSTAINED-RELEASE 24 HOUR: Performed by: SPECIALIST

## 2023-05-06 PROCEDURE — 82948 REAGENT STRIP/BLOOD GLUCOSE: CPT

## 2023-05-06 PROCEDURE — 63710000001 OXYCODONE-ACETAMINOPHEN 5-325 MG TABLET: Performed by: SPECIALIST

## 2023-05-06 PROCEDURE — G0378 HOSPITAL OBSERVATION PER HR: HCPCS

## 2023-05-06 PROCEDURE — 25010000002 ONDANSETRON PER 1 MG: Performed by: SPECIALIST

## 2023-05-06 PROCEDURE — 25010000002 CEFOXITIN PER 1 G: Performed by: SPECIALIST

## 2023-05-06 PROCEDURE — 63710000001 METOPROLOL TARTRATE 25 MG TABLET: Performed by: SPECIALIST

## 2023-05-06 RX ORDER — OXYCODONE AND ACETAMINOPHEN 10; 325 MG/1; MG/1
1 TABLET ORAL EVERY 6 HOURS PRN
Qty: 20 TABLET | Refills: 0 | Status: SHIPPED | OUTPATIENT
Start: 2023-05-06

## 2023-05-06 RX ORDER — ONDANSETRON 4 MG/1
4 TABLET, FILM COATED ORAL EVERY 4 HOURS PRN
Qty: 20 TABLET | Refills: 0 | Status: SHIPPED | OUTPATIENT
Start: 2023-05-06

## 2023-05-06 RX ORDER — NALOXONE HYDROCHLORIDE 4 MG/.1ML
SPRAY NASAL
Qty: 2 EACH | Refills: 0 | Status: SHIPPED | OUTPATIENT
Start: 2023-05-06

## 2023-05-06 RX ADMIN — LISINOPRIL 20 MG: 20 TABLET ORAL at 09:10

## 2023-05-06 RX ADMIN — SODIUM CHLORIDE, POTASSIUM CHLORIDE, SODIUM LACTATE AND CALCIUM CHLORIDE 125 ML/HR: 600; 310; 30; 20 INJECTION, SOLUTION INTRAVENOUS at 05:52

## 2023-05-06 RX ADMIN — METFORMIN HYDROCHLORIDE 500 MG: 500 TABLET, EXTENDED RELEASE ORAL at 09:10

## 2023-05-06 RX ADMIN — CEFOXITIN 1 G: 1 INJECTION, POWDER, FOR SOLUTION INTRAVENOUS at 03:39

## 2023-05-06 RX ADMIN — ONDANSETRON 4 MG: 2 INJECTION INTRAMUSCULAR; INTRAVENOUS at 05:23

## 2023-05-06 RX ADMIN — FLUTICASONE PROPIONATE 1 SPRAY: 50 SPRAY, METERED NASAL at 09:10

## 2023-05-06 RX ADMIN — METOPROLOL TARTRATE 25 MG: 25 TABLET, FILM COATED ORAL at 09:10

## 2023-05-06 RX ADMIN — OXYCODONE HYDROCHLORIDE AND ACETAMINOPHEN 1 TABLET: 5; 325 TABLET ORAL at 05:23

## 2023-05-06 NOTE — PLAN OF CARE
Goal Outcome Evaluation:  A&O. RA. Pt has been resting throughout the night while up in chair. Ambulated in hallway with walker, tolerated well. Urinal in use, clear yellow urine noted. complaints of N/V when closing his eye, Zofran admin as requested . Dressing on abdomen clean, dry and intact. Complained of pain on left side of abdomen, prn pain medication per chart was given. SCDs. IV BG monitored. IV fluid infusing.  VSS.         Problem: Adult Inpatient Plan of Care  Goal: Plan of Care Review  Outcome: Ongoing, Progressing  Goal: Patient-Specific Goal (Individualized)  Outcome: Ongoing, Progressing  Goal: Absence of Hospital-Acquired Illness or Injury  Outcome: Ongoing, Progressing  Intervention: Identify and Manage Fall Risk  Recent Flowsheet Documentation  Taken 5/6/2023 0400 by Mony Lindo RN  Safety Promotion/Fall Prevention: safety round/check completed  Taken 5/6/2023 0200 by Mony Lindo RN  Safety Promotion/Fall Prevention: safety round/check completed  Taken 5/6/2023 0000 by Mony Lindo RN  Safety Promotion/Fall Prevention: safety round/check completed  Taken 5/5/2023 2200 by Mony Lindo RN  Safety Promotion/Fall Prevention: safety round/check completed  Taken 5/5/2023 1945 by Mony Lindo RN  Safety Promotion/Fall Prevention:   safety round/check completed   nonskid shoes/slippers when out of bed   lighting adjusted   clutter free environment maintained   assistive device/personal items within reach  Intervention: Prevent and Manage VTE (Venous Thromboembolism) Risk  Recent Flowsheet Documentation  Taken 5/6/2023 0200 by Mony Lindo RN  Activity Management: up in chair  Taken 5/6/2023 0000 by Mony Lindo RN  Activity Management: up in chair  Taken 5/5/2023 2200 by Mony Lindo RN  Activity Management:   ambulated in room   up in chair  Goal: Optimal Comfort and Wellbeing  Outcome: Ongoing, Progressing  Goal: Readiness for Transition of Care  Outcome: Ongoing, Progressing  Goal: Plan of  Care Review  Outcome: Ongoing, Progressing  Goal: Patient-Specific Goal (Individualized)  Outcome: Ongoing, Progressing  Goal: Absence of Hospital-Acquired Illness or Injury  Outcome: Ongoing, Progressing  Intervention: Identify and Manage Fall Risk  Recent Flowsheet Documentation  Taken 5/6/2023 0400 by Mony Lindo RN  Safety Promotion/Fall Prevention: safety round/check completed  Taken 5/6/2023 0200 by Mony Lindo RN  Safety Promotion/Fall Prevention: safety round/check completed  Taken 5/6/2023 0000 by Mony Lindo RN  Safety Promotion/Fall Prevention: safety round/check completed  Taken 5/5/2023 2200 by Mony Lindo RN  Safety Promotion/Fall Prevention: safety round/check completed  Taken 5/5/2023 1945 by Mony Lindo RN  Safety Promotion/Fall Prevention:   safety round/check completed   nonskid shoes/slippers when out of bed   lighting adjusted   clutter free environment maintained   assistive device/personal items within reach  Intervention: Prevent and Manage VTE (Venous Thromboembolism) Risk  Recent Flowsheet Documentation  Taken 5/6/2023 0200 by Mony Lindo RN  Activity Management: up in chair  Taken 5/6/2023 0000 by Mony Lindo RN  Activity Management: up in chair  Taken 5/5/2023 2200 by Mony Lindo RN  Activity Management:   ambulated in room   up in chair  Goal: Optimal Comfort and Wellbeing  Outcome: Ongoing, Progressing  Goal: Readiness for Transition of Care  Outcome: Ongoing, Progressing     Problem: Pain Acute  Goal: Acceptable Pain Control and Functional Ability  Outcome: Ongoing, Progressing     Problem: Fall Injury Risk  Goal: Absence of Fall and Fall-Related Injury  Outcome: Ongoing, Progressing  Intervention: Promote Injury-Free Environment  Recent Flowsheet Documentation  Taken 5/6/2023 0400 by Mony Lindo RN  Safety Promotion/Fall Prevention: safety round/check completed  Taken 5/6/2023 0200 by Mony Lindo RN  Safety Promotion/Fall Prevention: safety round/check  completed  Taken 5/6/2023 0000 by Mony Lindo, RN  Safety Promotion/Fall Prevention: safety round/check completed  Taken 5/5/2023 2200 by Mony Lindo, RN  Safety Promotion/Fall Prevention: safety round/check completed  Taken 5/5/2023 1945 by Mony Lindo, RN  Safety Promotion/Fall Prevention:   safety round/check completed   nonskid shoes/slippers when out of bed   lighting adjusted   clutter free environment maintained   assistive device/personal items within reach

## 2023-05-07 ENCOUNTER — READMISSION MANAGEMENT (OUTPATIENT)
Dept: CALL CENTER | Facility: HOSPITAL | Age: 83
End: 2023-05-07
Payer: MEDICARE

## 2023-05-07 NOTE — OUTREACH NOTE
Prep Survey    Flowsheet Row Responses   [facility] facility patient discharged from? [facility]   Is LACE score < 7 ? No   Eligibility Readm Mgmt   Discharge diagnosis Laparoscopic appendectomy   Does the patient have one of the following disease processes/diagnoses(primary or secondary)? General Surgery   Does the patient have Home health ordered? No   Is there a DME ordered? No   Prep survey completed? Yes          Tianna CAN - Registered Nurse

## 2023-05-08 LAB
CYTO UR: NORMAL
LAB AP CASE REPORT: NORMAL
Lab: NORMAL
PATH REPORT.FINAL DX SPEC: NORMAL
PATH REPORT.GROSS SPEC: NORMAL

## 2023-05-09 ENCOUNTER — APPOINTMENT (OUTPATIENT)
Dept: PHYSICAL THERAPY | Age: 83
End: 2023-05-09
Payer: MEDICARE

## 2023-05-09 ENCOUNTER — APPOINTMENT (OUTPATIENT)
Dept: OCCUPATIONAL THERAPY | Age: 83
End: 2023-05-09
Payer: MEDICARE

## 2023-05-09 NOTE — DISCHARGE SUMMARY
Consults     No orders found from 4/5/2023 to 5/5/2023.      Katherine Carranza MD FACS Discharge Summary    Date of Discharge:  5/9/2023    Discharge Diagnosis: acute appendicitis    Presenting Problem/History of Present Illness  Acute appendicitis [K35.80]  Acute appendicitis, unspecified acute appendicitis type [K35.80]     Hospital Course  Patient is a 82 y.o. male presented with bilateral lower quadrant pain.  Imaging demonstrated acute appendicitis.  He underwent laparoscopic appendectomy.  Diet was advanced.      Procedures Performed  Procedure(s):  APPENDECTOMY LAPAROSCOPIC       Consults:   Consults     No orders found from 4/5/2023 to 5/5/2023.          Condition on Discharge:  good    Vital Signs       Physical Exam:   See History and Physical found in chart.    Discharge Disposition  Home or Self Care    Discharge Medications     Discharge Medications      New Medications      Instructions Start Date   naloxone 4 MG/0.1ML nasal spray  Commonly known as: NARCAN   Call 911. Don't prime. [place] in 1 nostril for overdose. Repeat in 2-3 minutes in other nostril if no or minimal breathing/responsiveness.      ondansetron 4 MG tablet  Commonly known as: Zofran   4 mg, Oral, Every 4 Hours PRN      oxyCODONE-acetaminophen  MG per tablet  Commonly known as: Percocet   1 tablet, Oral, Every 6 Hours PRN         Continue These Medications      Instructions Start Date   acetaminophen 500 MG tablet  Commonly known as: TYLENOL   500 mg, Oral, Every 6 Hours PRN      Breztri Aerosphere 160-9-4.8 MCG/ACT aerosol inhaler  Generic drug: Budeson-Glycopyrrol-Formoterol   2 puffs, Inhalation, 2 Times Daily      CHILDRENS CHEWABLE MULTI VITS PO   1 tablet, Oral, Daily      Dupilumab 300 MG/2ML solution prefilled syringe   Subcutaneous, Every 14 Days      finasteride 5 MG tablet  Commonly known as: PROSCAR   Take 1 tablet by mouth once daily      fluticasone 50 MCG/ACT nasal spray  Commonly known as: FLONASE   1 spray, Nasal,  Daily      GARLIC PO   1 tablet, Oral, Daily      insulin glargine 100 UNIT/ML injection  Commonly known as: LANTUS, SEMGLEE   15 Units, Subcutaneous, Nightly      IRON PO   65 mg, Oral, Daily      levalbuterol 45 MCG/ACT inhaler  Commonly known as: XOPENEX HFA   2 puffs, Inhalation, Every 6 Hours PRN      lisinopril 20 MG tablet  Commonly known as: PRINIVIL,ZESTRIL   1 tab am and 1/2 at bedtime.      meclizine 12.5 MG tablet  Commonly known as: ANTIVERT   12.5 mg, Oral, 3 Times Daily PRN      melatonin 5 MG tablet tablet   5 mg, Oral, Nightly PRN      Mesalamine (Sulfite-Free) 4 GM/60ML enema   1 application, Rectal, Every Other Day      metFORMIN  MG 24 hr tablet  Commonly known as: GLUCOPHAGE-XR   500 mg, Oral, Daily With Breakfast      metoprolol tartrate 25 MG tablet  Commonly known as: LOPRESSOR   25 mg, Oral, 2 Times Daily      nitroglycerin 0.4 MG SL tablet  Commonly known as: NITROSTAT   1 under the tongue as needed for angina, may repeat q5mins for up three doses      NovoLOG FlexPen 100 UNIT/ML solution pen-injector sc pen  Generic drug: insulin aspart   5 Units, Subcutaneous, 3 Times Daily With Meals      rosuvastatin 40 MG tablet  Commonly known as: CRESTOR   20 mg, Oral, Nightly      triamcinolone 0.1 % cream  Commonly known as: KENALOG   1 application, Topical, 2 Times Daily      Ustekinumab 90 MG/ML solution prefilled syringe Injection  Commonly known as: STELARA   90 mg, Subcutaneous, Every 28 Days      vitamin B-12 1000 MCG tablet  Commonly known as: CYANOCOBALAMIN   1,000 mcg, Oral, Daily      Vitamin D (Cholecalciferol) 25 MCG (1000 UT) capsule   1 capsule, Oral, Daily             Discharge Diet:     Activity at Discharge:     Follow-up Appointments  Future Appointments   Date Time Provider Department Center   5/22/2023 10:30 AM Katherine Carranza MD [facility]   5/22/2023  3:30 PM Jacob Mejias MD [facility]   6/7/2023 10:15 AM Amaya Mix APRN [facility]    7/26/2023  1:45 PM Yung Polanco MD [facility]     Additional Instructions for the Follow-ups that You Need to Schedule     Discharge Follow-up with Specified Provider: [facility]; 3 Weeks   As directed      To: [facility]    Follow Up: 3 Weeks               Test Results Pending at Discharge  Pending Labs     Order Current Status    Blood Culture - Blood, Arm, Left Preliminary result    Blood Culture - Blood, Arm, Right Preliminary result           April L MD [facility]  05/09/23  09:47 CDT

## 2023-05-10 ENCOUNTER — READMISSION MANAGEMENT (OUTPATIENT)
Dept: CALL CENTER | Facility: HOSPITAL | Age: 83
End: 2023-05-10
Payer: MEDICARE

## 2023-05-10 LAB
BACTERIA SPEC AEROBE CULT: NORMAL
BACTERIA SPEC AEROBE CULT: NORMAL

## 2023-05-10 NOTE — OUTREACH NOTE
General Surgery Week 1 Survey    Flowsheet Row Responses   [facility] patient discharged from? [place]   Does the patient have one of the following disease processes/diagnoses(primary or secondary)? General Surgery   Week 1 attempt successful? Yes   Call start time 1147   Call end time 1155   Discharge diagnosis Laparoscopic appendectomy   Meds reviewed with patient/caregiver? Yes   Is the patient having any side effects they believe may be caused by any medication additions or changes? No   Does the patient have all medications related to this admission filled (includes all antibiotics, pain medications, etc.) Yes   Is the patient taking all medications as directed (includes completed medication regime)? Yes   Does the patient have a follow up appointment scheduled with their surgeon? Yes   Has the patient kept scheduled appointments due by today? N/A   Has home health visited the patient within 72 hours of discharge? N/A   Psychosocial issues? No   Did the patient receive a copy of their discharge instructions? Yes   Nursing interventions Reviewed instructions with patient   What is the patient's perception of their health status since discharge? Improving   Nursing interventions Nurse provided patient education   Is the patient /caregiver able to teach back basic post-op care? Continue use of incentive spirometry at least 1 week post discharge, Practice 'cough and deep breath', Drive as instructed by MD in discharge instructions, Take showers only when approved by MD-sponge bathe until then, No tub bath, swimming, or hot tub until instructed by MD, Keep incision areas clean,dry and protected, Do not remove steri-strips, Lifting as instructed by MD in discharge instructions   Is the patient/caregiver able to teach back signs and symptoms of incisional infection? Increased redness, swelling or pain at the incisonal site, Increased drainage or bleeding, Incisional warmth, Pus or odor from incision, Fever   Is  the patient/caregiver able to teach back steps to recovery at home? Set small, achievable goals for return to baseline health, Rest and rebuild strength, gradually increase activity, Eat a well-balance diet   Is the patient/caregiver able to teach back the hierarchy of who to call/visit for symptoms/problems? PCP, Specialist, Home health nurse, Urgent Care, ED, 911 Yes   Week 1 call completed? Yes          Neha SANTAMARIA - Registered Nurse

## 2023-05-11 ENCOUNTER — APPOINTMENT (OUTPATIENT)
Dept: OCCUPATIONAL THERAPY | Age: 83
End: 2023-05-11
Payer: MEDICARE

## 2023-05-11 ENCOUNTER — APPOINTMENT (OUTPATIENT)
Dept: PHYSICAL THERAPY | Age: 83
End: 2023-05-11
Payer: MEDICARE

## 2023-05-16 ENCOUNTER — APPOINTMENT (OUTPATIENT)
Dept: OCCUPATIONAL THERAPY | Age: 83
End: 2023-05-16
Payer: MEDICARE

## 2023-05-16 ENCOUNTER — APPOINTMENT (OUTPATIENT)
Dept: PHYSICAL THERAPY | Age: 83
End: 2023-05-16
Payer: MEDICARE

## 2023-05-18 ENCOUNTER — APPOINTMENT (OUTPATIENT)
Dept: PHYSICAL THERAPY | Age: 83
End: 2023-05-18
Payer: MEDICARE

## 2023-05-18 ENCOUNTER — APPOINTMENT (OUTPATIENT)
Dept: OCCUPATIONAL THERAPY | Age: 83
End: 2023-05-18
Payer: MEDICARE

## 2023-05-19 ENCOUNTER — READMISSION MANAGEMENT (OUTPATIENT)
Dept: CALL CENTER | Facility: HOSPITAL | Age: 83
End: 2023-05-19
Payer: MEDICARE

## 2023-05-19 NOTE — OUTREACH NOTE
General Surgery Week 2 Survey    Flowsheet Row Responses   [facility] patient discharged from? [place]   Does the patient have one of the following disease processes/diagnoses(primary or secondary)? General Surgery   Week 2 attempt successful? Yes   Call start time 0907   Call end time 0910   Discharge diagnosis Laparoscopic appendectomy   Meds reviewed with patient/caregiver? Yes   Is the patient having any side effects they believe may be caused by any medication additions or changes? No   Does the patient have all medications related to this admission filled (includes all antibiotics, pain medications, etc.) Yes   Is the patient taking all medications as directed (includes completed medication regime)? No   What is preventing the patient from taking all medications as directed? Other  [Pt no longer requires use of Percocet and has changed to tylenol]   Does the patient have a follow up appointment scheduled with their surgeon? Yes  [Pt will see surgeon 5/22/23]   Has the patient kept scheduled appointments due by today? N/A   Comments PCP on 6/5/23   Has home health visited the patient within 72 hours of discharge? N/A   Psychosocial issues? No   Did the patient receive a copy of their discharge instructions? Yes   Nursing interventions Reviewed instructions with patient   What is the patient's perception of their health status since discharge? Improving  [Doing well, pain managed with OTC, no issues with po intake and having regular BMs]   Nursing interventions Nurse provided patient education  [Routine post op care reviewed]   Is the patient /caregiver able to teach back basic post-op care? Lifting as instructed by MD in discharge instructions, Keep incision areas clean,dry and protected, No tub bath, swimming, or hot tub until instructed by MD   Is the patient/caregiver able to teach back signs and symptoms of incisional infection? Increased redness, swelling or pain at the incisonal site, Increased  drainage or bleeding, Incisional warmth, Pus or odor from incision, Fever  [No issues with incisions]   Is the patient/caregiver able to teach back steps to recovery at home? Rest and rebuild strength, gradually increase activity, Eat a well-balance diet   Is the patient/caregiver able to teach back the hierarchy of who to call/visit for symptoms/problems? PCP, Specialist, Home health nurse, Urgent Care, ED, 911 Yes   Week 2 call completed? Yes          WENDY ALDRICH - Registered Nurse

## 2023-05-22 ENCOUNTER — APPOINTMENT (OUTPATIENT)
Dept: PHYSICAL THERAPY | Age: 83
End: 2023-05-22
Payer: MEDICARE

## 2023-05-22 ENCOUNTER — OFFICE VISIT (OUTPATIENT)
Dept: SURGERY | Facility: CLINIC | Age: 83
End: 2023-05-22
Payer: MEDICARE

## 2023-05-22 ENCOUNTER — OFFICE VISIT (OUTPATIENT)
Dept: PULMONOLOGY | Facility: CLINIC | Age: 83
End: 2023-05-22
Payer: MEDICARE

## 2023-05-22 ENCOUNTER — APPOINTMENT (OUTPATIENT)
Dept: OCCUPATIONAL THERAPY | Age: 83
End: 2023-05-22
Payer: MEDICARE

## 2023-05-22 VITALS
OXYGEN SATURATION: 95 % | BODY MASS INDEX: 33.47 KG/M2 | WEIGHT: 226 LBS | HEART RATE: 71 BPM | DIASTOLIC BLOOD PRESSURE: 76 MMHG | HEIGHT: 69 IN | SYSTOLIC BLOOD PRESSURE: 135 MMHG

## 2023-05-22 VITALS
SYSTOLIC BLOOD PRESSURE: 154 MMHG | WEIGHT: 229 LBS | BODY MASS INDEX: 33.82 KG/M2 | DIASTOLIC BLOOD PRESSURE: 64 MMHG | OXYGEN SATURATION: 98 %

## 2023-05-22 DIAGNOSIS — Z90.49 S/P APPENDECTOMY: ICD-10-CM

## 2023-05-22 DIAGNOSIS — Z78.9 NON-SMOKER: ICD-10-CM

## 2023-05-22 DIAGNOSIS — Z48.89 POSTOPERATIVE VISIT: Primary | ICD-10-CM

## 2023-05-22 DIAGNOSIS — J30.89 NON-SEASONAL ALLERGIC RHINITIS, UNSPECIFIED TRIGGER: ICD-10-CM

## 2023-05-22 DIAGNOSIS — J45.20 MILD INTERMITTENT ASTHMA WITHOUT COMPLICATION: Primary | ICD-10-CM

## 2023-05-22 DIAGNOSIS — Z48.89 AFTERCARE FOLLOWING SURGERY: ICD-10-CM

## 2023-05-22 DIAGNOSIS — G47.33 OSA ON CPAP: ICD-10-CM

## 2023-05-22 DIAGNOSIS — E66.9 OBESITY (BMI 30-39.9): ICD-10-CM

## 2023-05-22 DIAGNOSIS — Z99.89 OSA ON CPAP: ICD-10-CM

## 2023-05-22 DIAGNOSIS — J44.9 CHRONIC OBSTRUCTIVE PULMONARY DISEASE, UNSPECIFIED COPD TYPE: ICD-10-CM

## 2023-05-22 RX ORDER — AZELASTINE HYDROCHLORIDE 137 UG/1
2 SPRAY, METERED NASAL 2 TIMES DAILY
Qty: 30 ML | Refills: 5 | Status: SHIPPED | OUTPATIENT
Start: 2023-05-22

## 2023-05-22 NOTE — PROGRESS NOTES
"Katherine Carranza MD FACS Progress Note    Referring Provider: No ref. provider found      Chief complaint   Chief Complaint   Patient presents with    Post-op        Subjective .     History of present illness:  Zachariah Acosta  is a 82 y.o. male who presents status post laparoscopic appendectomy.  Diet is tolerated and bowel function is formed.    History  The following portions of the patient's history were reviewed and updated as appropriate: allergies, current medications, past family history, past medical history, past social history, past surgical history, and problem list.    Objective     Vital Signs   /76   Pulse 71   Ht 175.3 cm (69\")   Wt 103 kg (226 lb)   SpO2 95%   BMI 33.37 kg/m²      Physical Exam:  General The patient is well-developed, well-nourished, and in no acute distress.    Abdomen The port site incisions are well-approximated and healing without signs of infection.        BMI is >= 30 and <35. (Class 1 Obesity). The following options were offered after discussion;: referral to primary care    Assessment & Plan       Diagnoses and all orders for this visit:    1. Postoperative visit (Primary)    2. Aftercare following surgery    3. S/P appendectomy           The patient will return prn.      Katherine Carranza MD              "

## 2023-05-22 NOTE — PROGRESS NOTES
[facility] OUTPATIENT PROGRESS NOTE    Patient: Zachariah Acosta  : 1940  Age: 82 y.o.  Date of Service: May 22, 2023    REASON FOR CLINIC VISIT:  Chief Complaint   Patient presents with    Asthma       Subjective:    History of Present Illness:  Zachariah Acosta is a 82 y.o. male who presents to the office today to be seen for    Diagnosis Plan   1. Mild intermittent asthma without complication        2. Chronic obstructive pulmonary disease, unspecified COPD type        3. Non-smoker        4. BREANN on CPAP        5. Obesity (BMI 30-39.9)        6. Non-seasonal allergic rhinitis, unspecified trigger        .  Other problems per record.    History:  Patient is a very pleasant elderly  gentleman who returns the pulmonary clinic today with his wife for a follow-up visit.     Patient is a non-smoker and he has obstructive sleep apnea and obesity and uses CPAP every night without any problem.  Patient had a stroke 2 months ago during his start time and needed hospitalizations for few days.  He also had rehab for few weeks..  Recently he presented to the ER with abdominal pain and was noted to have appendicitis and he had appenix surgery..  He did recover after the surgery well.  He is currently using only albuterol rescue inhaler for shortness of breath and occasionally gets shortness of breath.  Although he is PFT done 2 years ago was normal he was using Breztri and albuterol rescue inhaler but does not use it regularly.  He has nasal allergy and currently using Flonase Nasal Spray and Claritin but not regularly.    Patient did have his COVID vaccination and also had influenza vaccine and pneumonia vaccine.  He lives at home with his wife.  He only uses CPAP but not on oxygen.  He looks little tired and lethargic but overall doing well.  He reported having some worsening allergy symptoms during summertime.    PFT done today:  Not done today      Results for orders placed in  visit on 01/19/21    Pulmonary Function Test    [facility] - Pulmonary Function Test    [address]  [place]  [place]  [address]  [phone]    Patient : Zachariah Acosta  MRN : [id number]  CSN : [id number]  Pulmonologist : Gilberto Bennett MD  Date : 4/15/2021    ______________________________________________________________________    Interpretation :  1.  Spirometry is within normal limits.  2.  Lung volumes are within normal limits.  3.  There is a mild diffusion impairment even when corrected for alveolar volume.      Gilberto Bennett MD         Bronchodilator therapy: Xopenox HFA,     Smoking Status:   Social History     Tobacco Use   Smoking Status Never    Passive exposure: Never   Smokeless Tobacco Never     Pulm Rehab: no  Sleep: yes on CPAP.    Support System: lives with their spouse    Code Status:   There are no questions and answers to display.        Review of Systems:  A complete review of systems is performed and all other systems were reviewed and negative as note above in the HPI.  Review of Systems   Constitutional:  Positive for fatigue.   HENT:  Positive for congestion, postnasal drip and sinus pressure.    Eyes: Negative.    Respiratory:  Positive for chest tightness and shortness of breath.    Cardiovascular: Negative.    Gastrointestinal: Negative.    Endocrine: Negative.    Genitourinary: Negative.    Musculoskeletal:  Positive for arthralgias and back pain.   Skin: Negative.    Allergic/Immunologic: Positive for environmental allergies.   Neurological: Negative.    Hematological: Negative.    Psychiatric/Behavioral: Negative.       CAT/ACT Score:  Not done today    Medications:  Outpatient Encounter Medications as of 5/22/2023   Medication Sig Dispense Refill    Budeson-Glycopyrrol-Formoterol (Breztri Aerosphere) 160-9-4.8 MCG/ACT aerosol inhaler Inhale 2 puffs 2 (Two) Times a Day.      Dupilumab 300 MG/2ML solution prefilled syringe Inject  under the skin  into the appropriate area as directed Every 14 (Fourteen) Days.      Ferrous Sulfate (IRON PO) Take 65 mg by mouth Daily.      finasteride (PROSCAR) 5 MG tablet Take 1 tablet by mouth once daily 90 tablet 0    fluticasone (FLONASE) 50 MCG/ACT nasal spray 1 spray into the nostril(s) as directed by provider Daily. 16 g 5    GARLIC PO Take 1 tablet by mouth Daily.      insulin aspart (NovoLOG FlexPen) 100 UNIT/ML solution pen-injector sc pen Inject 5 Units under the skin into the appropriate area as directed 3 (Three) Times a Day With Meals. 5 pen 2    insulin glargine (LANTUS, SEMGLEE) 100 UNIT/ML injection Inject 15 Units under the skin into the appropriate area as directed Every Night.      levalbuterol (XOPENEX HFA) 45 MCG/ACT inhaler Inhale 2 puffs Every 6 (Six) Hours As Needed for Wheezing.      lisinopril (PRINIVIL,ZESTRIL) 20 MG tablet 1 tab am and 1/2 at bedtime. 180 tablet 1    meclizine (ANTIVERT) 12.5 MG tablet Take 1 tablet by mouth 3 (Three) Times a Day As Needed for Dizziness. 30 tablet 0    melatonin 5 MG tablet tablet Take 1 tablet by mouth At Night As Needed.      Mesalamine 4 GM/60ML SF enema Insert 1 application into the rectum Every Other Day.      metFORMIN ER (GLUCOPHAGE-XR) 500 MG 24 hr tablet Take 1 tablet by mouth Daily With Breakfast for 30 days. 30 tablet 2    metoprolol tartrate (LOPRESSOR) 25 MG tablet Take 1 tablet by mouth 2 (Two) Times a Day. 180 tablet 3    nitroglycerin (NITROSTAT) 0.4 MG SL tablet 1 under the tongue as needed for angina, may repeat q5mins for up three doses 25 tablet 1    Pediatric Multiple Vit-C-FA (CHILDRENS CHEWABLE MULTI VITS PO) Take 1 tablet by mouth Daily.      rosuvastatin (CRESTOR) 40 MG tablet Take 20 mg by mouth Every Night.      triamcinolone (KENALOG) 0.1 % cream Apply 1 application topically to the appropriate area as directed 2 (Two) Times a Day.      Ustekinumab (STELARA) 90 MG/ML solution prefilled syringe Injection Inject 90 mg under the skin into  the appropriate area as directed Every 28 (Twenty-Eight) Days. 1 mL 6    vitamin B-12 (CYANOCOBALAMIN) 1000 MCG tablet Take 1 tablet by mouth Daily.      Vitamin D, Cholecalciferol, 25 MCG (1000 UT) capsule Take 1 capsule by mouth Daily.      acetaminophen (TYLENOL) 500 MG tablet Take 1 tablet by mouth Every 6 (Six) Hours As Needed for Mild Pain.      naloxone (NARCAN) 4 MG/0.1ML nasal spray Call 911. Don't prime. [place] in 1 nostril for overdose. Repeat in 2-3 minutes in other nostril if no or minimal breathing/responsiveness. 2 each 0    ondansetron (Zofran) 4 MG tablet Take 1 tablet by mouth Every 4 (Four) Hours As Needed for Nausea or Vomiting for up to 20 doses. 20 tablet 0    oxyCODONE-acetaminophen (Percocet)  MG per tablet Take 1 tablet by mouth Every 6 (Six) Hours As Needed for Moderate Pain. (Patient not taking: Reported on 5/22/2023) 20 tablet 0     No facility-administered encounter medications on file as of 5/22/2023.       Allergies:  Allergies   Allergen Reactions    Albuterol Other (See Comments)    Adhesive Tape Rash    Azithromycin Rash    Fentanyl Nausea And Vomiting       Immunizations:  Immunization History   Administered Date(s) Administered    COVID-19 ([employer]) 04/12/2021    Flu Vaccine Quad PF >36MO 11/13/2017    Fluzone High Dose =>65 Years ([facility] ONLY) 11/01/2020    Fluzone High-Dose 65+yrs 10/24/2022    Influenza TIV (IM) 11/16/2016, 10/26/2018    Influenza, Unspecified 10/21/2021    Pneumococcal Conjugate 13-Valent (PCV13) 11/11/2013, 04/01/2019    Pneumococcal Polysaccharide (PPSV23) 08/10/2020       Objective:    Vitals:  /64 (BP Location: Right arm)   Wt 104 kg (229 lb)   SpO2 98%   BMI 33.82 kg/m²     Physical Exam:  General: Patient is a 82 y.o. pleasant elderly  male. Looks stated age. Appears to be in no acute distress.  Eyes: EOMI. PERRLA. Vision intact. No scleral icterus.  Ear, Nose, Mouth and Throat: Hearing is grossly intact. No Leukoplakia,  pharyngitis, stomatitis or thrush. Swollen nasal mucosa with post nasal drop.  Neck: Range of motion of neck normal. No thyromegaly or masses. Mallampati Class 3  Respiratory: Clear to auscultation bilaterally. No use of accessory muscles. Decreased breath sounds.  Cardiovascular: Normal heart sounds. Regularly regular rhythm without murmur.  Gastrointestinal: Non tender, non distended, soft. Bowel sounds positive in all four quadrants. No organomegaly.  Skin: No obvious rashes, lesions, ulcers or large amount of bruising. No edema.   Neurological: No new motor deficits. Cranial nerves appear intact.  Psychiatric: Patient is alert and oriented to person, place and time.    Chest Imaging:    Study Result    Narrative & Impression   EXAMINATION: XR CHEST 1 VW-     4/9/2023 8:58 AM CDT     HISTORY: Stroke Protocol (Onset > 12 hrs). Right-sided weakness. Acute  stroke.     One view chest x-ray.     Comparison is made with 02/14/2023.     CABG changes.  Heart size is normal.  The mediastinum is within normal limits.      The lungs are normally expanded with no pneumonia or pneumothorax.  Mild chronic appearing interstitial disease with a few calcified  granulomas.  No congestive failure changes.                                                                     IMPRESSION:  1. No acute disease.           This report was finalized on 04/09/2023 09:01 by Dr. Russell Armijo MD.       Assessment:  1. Mild intermittent asthma without complication    2. Chronic obstructive pulmonary disease, unspecified COPD type    3. Non-smoker    4. BREANN on CPAP    5. Obesity (BMI 30-39.9)    6. Non-seasonal allergic rhinitis, unspecified trigger        Plan/Recommendations:    1.  Patient is doing well from the pulmonary standpoint.  His last pulmonary function test was unremarkable and I believe he will probably not need Breztri and may use only albuterol rescue inhaler.  He can continue seeing albuterol and I ordered a pulmonary function  test before his next clinic visit for further evaluation.  2.  His shortness of breath could be from worsening allergy symptoms and is currently using only Flonase and Claritin but not regularly.  I advised him to continue seeing Flonase and Claritin regularly and also added him on Astelin nasal spray and prescription was sent to the pharmacy.  3.  For his sleep apnea who continues in the CPAP in the current setting.  He is sleeping well with the CPAP on and did not have any problem with the tolerance.  He will need another chest x-ray before his next clinic visit in 6 months time.  4.  Continue follow-up with the primary care provider unfortunately he had multiple hospitalizations recently for his stroke and is recovering well and he also had appendix surgery and getting follow-up by the surgery team.  He will return to pulmonary clinic for follow-up visit in 6 months time or earlier if needed.    Follow up:  6 Months    Time Spent:  30 minutes    I appreciate the opportunity of participating in this patient's care. I would like to thank the PCP for the referral.  Please feel free to contact me with any other questions.    Jacob Mejias MD   Pulmonologist/Intensivist     Electronically signed by: Jacob Mejias MD, 5/22/2023 16:11 CDT

## 2023-05-23 ENCOUNTER — APPOINTMENT (OUTPATIENT)
Dept: OCCUPATIONAL THERAPY | Age: 83
End: 2023-05-23
Payer: MEDICARE

## 2023-05-23 ENCOUNTER — APPOINTMENT (OUTPATIENT)
Dept: PHYSICAL THERAPY | Age: 83
End: 2023-05-23
Payer: MEDICARE

## 2023-05-24 ENCOUNTER — APPOINTMENT (OUTPATIENT)
Dept: OCCUPATIONAL THERAPY | Age: 83
End: 2023-05-24
Payer: MEDICARE

## 2023-05-24 ENCOUNTER — APPOINTMENT (OUTPATIENT)
Dept: PHYSICAL THERAPY | Age: 83
End: 2023-05-24
Payer: MEDICARE

## 2023-06-07 ENCOUNTER — OFFICE VISIT (OUTPATIENT)
Dept: FAMILY MEDICINE CLINIC | Facility: CLINIC | Age: 83
End: 2023-06-07
Payer: MEDICARE

## 2023-06-07 VITALS
OXYGEN SATURATION: 97 % | DIASTOLIC BLOOD PRESSURE: 70 MMHG | SYSTOLIC BLOOD PRESSURE: 120 MMHG | WEIGHT: 229 LBS | BODY MASS INDEX: 33.92 KG/M2 | HEART RATE: 65 BPM | RESPIRATION RATE: 18 BRPM | HEIGHT: 69 IN | TEMPERATURE: 97.9 F

## 2023-06-07 DIAGNOSIS — I10 ESSENTIAL HYPERTENSION: ICD-10-CM

## 2023-06-07 DIAGNOSIS — E11.65 TYPE 2 DIABETES MELLITUS WITH HYPERGLYCEMIA, WITH LONG-TERM CURRENT USE OF INSULIN: Primary | ICD-10-CM

## 2023-06-07 DIAGNOSIS — E66.9 OBESITY (BMI 30-39.9): ICD-10-CM

## 2023-06-07 DIAGNOSIS — Z79.4 TYPE 2 DIABETES MELLITUS WITH HYPERGLYCEMIA, WITH LONG-TERM CURRENT USE OF INSULIN: Primary | ICD-10-CM

## 2023-06-07 RX ORDER — INSULIN GLARGINE 100 [IU]/ML
INJECTION, SOLUTION SUBCUTANEOUS
Qty: 18 ML | Refills: 2 | Status: SHIPPED | OUTPATIENT
Start: 2023-06-07

## 2023-06-07 NOTE — PROGRESS NOTES
Subjective    Mr. Acosta is 82 y.o. male    Chief Complaint: Erectile Dysfunction    History of Present Illness    82-year-old male established patient  follow-up for enlarged prostate.  LUTS well-controlled on finasteride monotherapy.   He is pleased with his Titan touch 3 piece inflatable penile implant placed 12/13/21.  He denies gross hematuria or flank pain.    The following portions of the patient's history were reviewed and updated as appropriate: allergies, current medications, past family history, past medical history, past social history, past surgical history and problem list.    Review of Systems      Current Outpatient Medications:   •  acetaminophen (TYLENOL) 500 MG tablet, Take 1 tablet by mouth Every 6 (Six) Hours As Needed for Mild Pain., Disp: , Rfl:   •  Azelastine HCl 137 MCG/SPRAY solution, 2 sprays into the nostril(s) as directed by provider 2 (Two) Times a Day., Disp: 30 mL, Rfl: 5  •  Budeson-Glycopyrrol-Formoterol (Breztri Aerosphere) 160-9-4.8 MCG/ACT aerosol inhaler, Inhale 2 puffs 2 (Two) Times a Day., Disp: , Rfl:   •  Dupilumab 300 MG/2ML solution prefilled syringe, Inject  under the skin into the appropriate area as directed Every 14 (Fourteen) Days., Disp: , Rfl:   •  Ferrous Sulfate (IRON PO), Take 65 mg by mouth Daily., Disp: , Rfl:   •  finasteride (PROSCAR) 5 MG tablet, Take 1 tablet by mouth Daily., Disp: 90 tablet, Rfl: 3  •  fluticasone (FLONASE) 50 MCG/ACT nasal spray, 1 spray into the nostril(s) as directed by provider Daily., Disp: 16 g, Rfl: 5  •  GARLIC PO, Take 1 tablet by mouth Daily., Disp: , Rfl:   •  insulin aspart (NovoLOG FlexPen) 100 UNIT/ML solution pen-injector sc pen, Inject 5 Units under the skin into the appropriate area as directed 3 (Three) Times a Day With Meals., Disp: 5 pen, Rfl: 2  •  insulin glargine (LANTUS, SEMGLEE) 100 UNIT/ML injection, 10 units in the morning and 40 units at bedtime., Disp: 18 mL, Rfl: 2  •  levalbuterol (XOPENEX HFA) 45  MCG/ACT inhaler, Inhale 2 puffs Every 6 (Six) Hours As Needed for Wheezing., Disp: , Rfl:   •  lisinopril (PRINIVIL,ZESTRIL) 20 MG tablet, 1 tab am and 1/2 at bedtime., Disp: 180 tablet, Rfl: 1  •  meclizine (ANTIVERT) 12.5 MG tablet, Take 1 tablet by mouth 3 (Three) Times a Day As Needed for Dizziness., Disp: 30 tablet, Rfl: 0  •  melatonin 5 MG tablet tablet, Take 1 tablet by mouth At Night As Needed., Disp: , Rfl:   •  Mesalamine 4 GM/60ML SF enema, Insert 1 application into the rectum Every Other Day., Disp: , Rfl:   •  metFORMIN (GLUCOPHAGE) 500 MG tablet, Take 1 tablet by mouth 2 (Two) Times a Day With Meals., Disp: , Rfl:   •  metoprolol tartrate (LOPRESSOR) 25 MG tablet, Take 1 tablet by mouth 2 (Two) Times a Day., Disp: 180 tablet, Rfl: 3  •  rosuvastatin (CRESTOR) 40 MG tablet, Take 20 mg by mouth Every Night., Disp: , Rfl:   •  triamcinolone (KENALOG) 0.1 % cream, Apply 1 application topically to the appropriate area as directed 2 (Two) Times a Day., Disp: , Rfl:   •  Ustekinumab (STELARA) 90 MG/ML solution prefilled syringe Injection, Inject 90 mg under the skin into the appropriate area as directed Every 28 (Twenty-Eight) Days., Disp: 1 mL, Rfl: 6  •  vitamin B-12 (CYANOCOBALAMIN) 1000 MCG tablet, Take 1 tablet by mouth Daily., Disp: , Rfl:   •  Vitamin D, Cholecalciferol, 25 MCG (1000 UT) capsule, Take 1 capsule by mouth Daily., Disp: , Rfl:   •  metFORMIN ER (GLUCOPHAGE-XR) 500 MG 24 hr tablet, Take 1 tablet by mouth Daily With Breakfast for 30 days., Disp: 30 tablet, Rfl: 2  •  naloxone (NARCAN) 4 MG/0.1ML nasal spray, Call 911. Don't prime. [place] in 1 nostril for overdose. Repeat in 2-3 minutes in other nostril if no or minimal breathing/responsiveness. (Patient not taking: Reported on 6/7/2023), Disp: 2 each, Rfl: 0  •  nitroglycerin (NITROSTAT) 0.4 MG SL tablet, 1 under the tongue as needed for angina, may repeat q5mins for up three doses, Disp: 25 tablet, Rfl: 1  •  ondansetron (Zofran) 4 MG  tablet, Take 1 tablet by mouth Every 4 (Four) Hours As Needed for Nausea or Vomiting for up to 20 doses. (Patient not taking: Reported on 6/9/2023), Disp: 20 tablet, Rfl: 0  •  oxyCODONE-acetaminophen (Percocet)  MG per tablet, Take 1 tablet by mouth Every 6 (Six) Hours As Needed for Moderate Pain. (Patient not taking: Reported on 5/22/2023), Disp: 20 tablet, Rfl: 0  •  Pediatric Multiple Vit-C-FA (CHILDRENS CHEWABLE MULTI VITS PO), Take 1 tablet by mouth Daily. (Patient not taking: Reported on 6/9/2023), Disp: , Rfl:     Past Medical History:   Diagnosis Date   • Asthma    • Coronary artery disease    • Diabetes mellitus    • Elevated cholesterol    • HL (hearing loss) 2012   • Hyperlipidemia    • Hypertension    • Inflammatory bowel disease 2001   • Myocardial infarct     [facility] 2020   • Sleep apnea     cpap at    • Sleep apnea, obstructive    • Visual impairment 2016    Double vision       Past Surgical History:   Procedure Laterality Date   • ADENOIDECTOMY  1947   • APPENDECTOMY N/A 5/5/2023    Procedure: APPENDECTOMY LAPAROSCOPIC;  Surgeon: Katherine Carranza MD;  Location: [facility] OR;  Service: General;  Laterality: N/A;   • BACK SURGERY     • CARDIAC CATHETERIZATION      stents x 6   • CARDIAC SURGERY      CABG TRIPLE BYPASS 2012   • CATARACT EXTRACTION     • COLONOSCOPY     • COLONOSCOPY N/A 06/04/2021    Procedure: COLONOSCOPY WITH ANESTHESIA;  Surgeon: Zachariah Menjivar DO;  Location: [facility] ENDOSCOPY;  Service: Gastroenterology;  Laterality: N/A;  pre hx ulcerative colitis  post ulcerative colitis  dr collins gusman   • CORONARY ANGIOPLASTY WITH STENT PLACEMENT     • CORONARY ARTERY BYPASS GRAFT  December 2012   • CORONARY STENT PLACEMENT     • HIP SURGERY Right     total hip   • JOINT REPLACEMENT  2015    Right hip   • PENILE PROSTHESIS IMPLANT N/A 12/13/2021    Procedure: 3-PIECE INFLATABLE PENILE PROSTHESIS PLACEMENT;  Surgeon: Jose Tellez MD;  Location: [facility] OR;  Service: Urology;   "Laterality: N/A;   • TONSILLECTOMY  1947       Social History     Socioeconomic History   • Marital status:    Tobacco Use   • Smoking status: Never     Passive exposure: Never   • Smokeless tobacco: Never   Vaping Use   • Vaping Use: Never used   Substance and Sexual Activity   • Alcohol use: Never   • Drug use: Never   • Sexual activity: Defer       Family History   Problem Relation Age of Onset   • Colon cancer Brother    • Colon polyps Neg Hx    • Esophageal cancer Neg Hx        Objective    Temp 97.8 °F (36.6 °C)   Ht 175.3 cm (69\")   Wt 105 kg (230 lb 9.6 oz)   BMI 34.05 kg/m²     Physical Exam  Penile implant in excellent position and inflates and deflates normally with good result      Results for orders placed or performed in visit on 06/09/23   POC Urinalysis Dipstick, Multipro    Specimen: Urine   Result Value Ref Range    Color Yellow Yellow, Straw, Dark Yellow, Vicky    Clarity, UA Clear Clear    Glucose, UA Negative Negative mg/dL    Bilirubin Negative Negative    Ketones, UA Negative Negative    Specific Gravity  1.030 1.005 - 1.030    Blood, UA Trace (A) Negative    pH, Urine 5.5 5.0 - 8.0    Protein, POC Negative Negative mg/dL    Urobilinogen, UA 0.2 E.U./dL Normal, 0.2 E.U./dL    Nitrite, UA Negative Negative    Leukocytes Negative Negative     Assessment and Plan    Diagnoses and all orders for this visit:    1. Benign prostatic hyperplasia without lower urinary tract symptoms (Primary)  -     finasteride (PROSCAR) 5 MG tablet; Take 1 tablet by mouth Daily.  Dispense: 90 tablet; Refill: 3    2. Erectile dysfunction, unspecified erectile dysfunction type  -     POC Urinalysis Dipstick, Multipro      Stable LUTS on finasteride monotherapy-continue finasteride.  Follow-up with me in 1 year or sooner as needed.        This document has been signed by ANTHONY Tellez MD on June 9, 2023 18:34 CDT          "

## 2023-06-07 NOTE — PROGRESS NOTES
BORIS Pérez  [facility]   Family Medicine  [address]  [place] [address]  Phone: [phone]  Fax: [phone]         Chief Complaint:  Chief Complaint   Patient presents with    Follow-up     1-month         History:  Zachariah Acosta is a 82 y.o. male that is an established patient. He  is here for evaluation of the above complaint.    HPI     HTN:   Increased lisinopril to 20mg am and 10mg at bedtime. Pt b/p well controlled at 120/70.     DM 2:   40 UNITS AT BEDTIME. Take 5-10 units throughout the day if needed. Pt reports that if blood sugar gets above 180-200 he will take short acting insulin.       ROS:  Review of Systems   Constitutional:  Negative for fatigue, fever and unexpected weight change.   HENT:  Negative for congestion, ear pain, rhinorrhea, sinus pressure, sinus pain and voice change.    Eyes:  Negative for visual disturbance.   Respiratory:  Negative for shortness of breath and wheezing.    Cardiovascular:  Negative for chest pain and palpitations.   Gastrointestinal:  Negative for abdominal pain, nausea and vomiting.   Genitourinary:  Negative for dysuria and flank pain.   Musculoskeletal:  Negative for back pain, myalgias and neck pain.   Skin:  Negative for color change and rash.   Neurological:  Negative for dizziness, weakness, numbness and headaches.   Psychiatric/Behavioral:  Negative for behavioral problems, dysphoric mood, self-injury and sleep disturbance.       reports that he has never smoked. He has never been exposed to tobacco smoke. He has never used smokeless tobacco. He reports that he does not drink alcohol and does not use drugs.    Current Outpatient Medications   Medication Instructions    acetaminophen (TYLENOL) 500 mg, Oral, Every 6 Hours PRN    Azelastine HCl 274 mcg, Nasal, 2 Times Daily    Budeson-Glycopyrrol-Formoterol (Breztri Aerosphere) 160-9-4.8 MCG/ACT aerosol inhaler 2 puffs, Inhalation, 2 Times Daily     Dupilumab 300 MG/2ML solution prefilled syringe Subcutaneous, Every 14 Days    Ferrous Sulfate (IRON PO) 65 mg, Oral, Daily    finasteride (PROSCAR) 5 mg, Oral, Daily    fluticasone (FLONASE) 50 MCG/ACT nasal spray 1 spray, Nasal, Daily    GARLIC PO 1 tablet, Oral, Daily    insulin glargine (LANTUS, SEMGLEE) 100 UNIT/ML injection 10 units in the morning and 40 units at bedtime.    levalbuterol (XOPENEX HFA) 45 MCG/ACT inhaler 2 puffs, Inhalation, Every 6 Hours PRN    lisinopril (PRINIVIL,ZESTRIL) 20 MG tablet 1 tab am and 1/2 at bedtime.    meclizine (ANTIVERT) 12.5 mg, Oral, 3 Times Daily PRN    melatonin 5 mg, Oral, Nightly PRN    Mesalamine 4 GM/60ML SF enema 1 application, Rectal, Every Other Day    metFORMIN (GLUCOPHAGE) 500 mg, Oral, 2 Times Daily With Meals    metFORMIN ER (GLUCOPHAGE-XR) 500 mg, Oral, Daily With Breakfast    metoprolol tartrate (LOPRESSOR) 25 mg, Oral, 2 Times Daily    naloxone (NARCAN) 4 MG/0.1ML nasal spray Call 911. Don't prime. [place] in 1 nostril for overdose. Repeat in 2-3 minutes in other nostril if no or minimal breathing/responsiveness.    nitroglycerin (NITROSTAT) 0.4 MG SL tablet 1 under the tongue as needed for angina, may repeat q5mins for up three doses    NovoLOG FlexPen 5 Units, Subcutaneous, 3 Times Daily With Meals    ondansetron (ZOFRAN) 4 mg, Oral, Every 4 Hours PRN    oxyCODONE-acetaminophen (Percocet)  MG per tablet 1 tablet, Oral, Every 6 Hours PRN    Pediatric Multiple Vit-C-FA (CHILDRENS CHEWABLE MULTI VITS PO) 1 tablet, Daily    rosuvastatin (CRESTOR) 20 mg, Oral, Nightly    triamcinolone (KENALOG) 0.1 % cream 1 application, Topical, 2 Times Daily    Ustekinumab (STELARA) 90 mg, Subcutaneous, Every 28 Days    vitamin B-12 (CYANOCOBALAMIN) 1,000 mcg, Oral, Daily    Vitamin D, Cholecalciferol, 25 MCG (1000 UT) capsule 1 capsule, Oral, Daily       OBJECTIVE:  /70 (BP Location: Right arm, Patient Position: Sitting, Cuff Size: Adult)   Pulse 65   Temp 97.9  "°F (36.6 °C) (Infrared)   Resp 18   Ht 175.3 cm (69\")   Wt 104 kg (229 lb)   SpO2 97%   BMI 33.82 kg/m²    Physical Exam  Vitals and nursing note reviewed.   Constitutional:       Appearance: Normal appearance. He is well-developed.   HENT:      Head: Normocephalic and atraumatic.      Right Ear: Tympanic membrane, ear canal and external ear normal.      Left Ear: Tympanic membrane, ear canal and external ear normal.      Nose: Nose normal. No septal deviation, nasal tenderness or congestion.      Mouth/Throat:      Lips: Pink. No lesions.      Mouth: Mucous membranes are moist. No oral lesions.      Dentition: Normal dentition.      Pharynx: Oropharynx is clear. No pharyngeal swelling, oropharyngeal exudate or posterior oropharyngeal erythema.   Eyes:      General: Lids are normal. Vision grossly intact. No scleral icterus.        Right eye: No discharge.         Left eye: No discharge.      Extraocular Movements: Extraocular movements intact.      Conjunctiva/sclera: Conjunctivae normal.      Right eye: Right conjunctiva is not injected.      Left eye: Left conjunctiva is not injected.      Pupils: Pupils are equal, round, and reactive to light.   Neck:      Thyroid: No thyroid mass.      Trachea: Trachea normal.   Cardiovascular:      Rate and Rhythm: Normal rate and regular rhythm.      Heart sounds: Normal heart sounds. No murmur heard.    No gallop.   Pulmonary:      Effort: Pulmonary effort is normal.      Breath sounds: Normal breath sounds and air entry. No wheezing, rhonchi or rales.   Musculoskeletal:         General: No tenderness or deformity. Normal range of motion.      Cervical back: Full passive range of motion without pain, normal range of motion and neck supple.      Thoracic back: Normal.      Right lower leg: No edema.      Left lower leg: No edema.   Skin:     General: Skin is warm and dry.      Coloration: Skin is not jaundiced.      Findings: No rash.   Neurological:      Mental Status: " He is alert and oriented to person, place, and time.      Sensory: Sensation is intact.      Motor: Motor function is intact.      Coordination: Coordination is intact.      Gait: Gait is intact.      Deep Tendon Reflexes: Reflexes are normal and symmetric.   Psychiatric:         Mood and Affect: Mood and affect normal.         Behavior: Behavior normal.         Judgment: Judgment normal.       Procedures    Assessment/Plan:     Diagnoses and all orders for this visit:    1. Type 2 diabetes mellitus with hyperglycemia, with long-term current use of insulin (Primary)  -     insulin glargine (LANTUS, SEMGLEE) 100 UNIT/ML injection; 10 units in the morning and 40 units at bedtime.  Dispense: 18 mL; Refill: 2    2. Essential hypertension    3. Obesity (BMI 30-39.9)           An After Visit Summary was printed and given to the patient at discharge.  Return in about 4 weeks (around 7/5/2023).       Patient Instructions   Begin taking lantux 10 units in the morning and continue lantus 40 units at bedtime.     Monitor blood pressure and return with readings.       Discussion:     I spent 33 minutes caring for Zachariah on this date of service. This time includes time spent by me in the following activities: preparing for the visit, reviewing tests, obtaining and/or reviewing a separately obtained history, performing a medically appropriate examination and/or evaluation, counseling and educating the patient/family/caregiver, ordering medications, tests, or procedures, documenting information in the medical record, and independently interpreting results and communicating that information with the patient/family/caregiver     Amaya ESCALANTE 6/12/2023   Electronically signed.

## 2023-06-07 NOTE — PATIENT INSTRUCTIONS
Begin taking lantux 10 units in the morning and continue lantus 40 units at bedtime.     Monitor blood pressure and return with readings.

## 2023-06-09 ENCOUNTER — OFFICE VISIT (OUTPATIENT)
Dept: UROLOGY | Facility: CLINIC | Age: 83
End: 2023-06-09
Payer: MEDICARE

## 2023-06-09 VITALS — BODY MASS INDEX: 34.16 KG/M2 | TEMPERATURE: 97.8 F | WEIGHT: 230.6 LBS | HEIGHT: 69 IN

## 2023-06-09 DIAGNOSIS — N52.9 ERECTILE DYSFUNCTION, UNSPECIFIED ERECTILE DYSFUNCTION TYPE: ICD-10-CM

## 2023-06-09 DIAGNOSIS — N40.0 BENIGN PROSTATIC HYPERPLASIA WITHOUT LOWER URINARY TRACT SYMPTOMS: Primary | ICD-10-CM

## 2023-06-09 LAB
BILIRUB BLD-MCNC: NEGATIVE MG/DL
CLARITY, POC: CLEAR
COLOR UR: YELLOW
GLUCOSE UR STRIP-MCNC: NEGATIVE MG/DL
KETONES UR QL: NEGATIVE
LEUKOCYTE EST, POC: NEGATIVE
NITRITE UR-MCNC: NEGATIVE MG/ML
PH UR: 5.5 [PH] (ref 5–8)
PROT UR STRIP-MCNC: NEGATIVE MG/DL
RBC # UR STRIP: ABNORMAL /UL
SP GR UR: 1.03 (ref 1–1.03)
UROBILINOGEN UR QL: ABNORMAL

## 2023-06-09 RX ORDER — FINASTERIDE 5 MG/1
5 TABLET, FILM COATED ORAL DAILY
Qty: 90 TABLET | Refills: 3 | Status: SHIPPED | OUTPATIENT
Start: 2023-06-09

## 2023-06-09 NOTE — LETTER
June 9, 2023     BORIS Pérez  [address]  [facility] [address]    Patient: Zachariah Acosta   YOB: 1940   Date of Visit: 6/9/2023     Dear BORIS Goel:    Thank you for referring Zachariah Acosta to me for evaluation. Below are the relevant portions of my assessment and plan of care.    If you have questions, please do not hesitate to call me. I look forward to following Zachariah along with you.         Sincerely,        Jose Tellez MD        CC: No Recipients      Progress Notes:  Subjective    Mr. Acosta is 82 y.o. male    Chief Complaint: Erectile Dysfunction    History of Present Illness    82-year-old male established patient  follow-up for enlarged prostate.  LUTS well-controlled on finasteride monotherapy.   He is pleased with his Titan touch 3 piece inflatable penile implant placed 12/13/21.  He denies gross hematuria or flank pain.    The following portions of the patient's history were reviewed and updated as appropriate: allergies, current medications, past family history, past medical history, past social history, past surgical history and problem list.    Review of Systems      Current Outpatient Medications:   •  acetaminophen (TYLENOL) 500 MG tablet, Take 1 tablet by mouth Every 6 (Six) Hours As Needed for Mild Pain., Disp: , Rfl:   •  Azelastine HCl 137 MCG/SPRAY solution, 2 sprays into the nostril(s) as directed by provider 2 (Two) Times a Day., Disp: 30 mL, Rfl: 5  •  Budeson-Glycopyrrol-Formoterol (Breztri Aerosphere) 160-9-4.8 MCG/ACT aerosol inhaler, Inhale 2 puffs 2 (Two) Times a Day., Disp: , Rfl:   •  Dupilumab 300 MG/2ML solution prefilled syringe, Inject  under the skin into the appropriate area as directed Every 14 (Fourteen) Days., Disp: , Rfl:   •  Ferrous Sulfate (IRON PO), Take 65 mg by mouth Daily., Disp: , Rfl:   •  finasteride (PROSCAR) 5 MG tablet, Take 1 tablet by mouth Daily., Disp: 90 tablet,  Rfl: 3  •  fluticasone (FLONASE) 50 MCG/ACT nasal spray, 1 spray into the nostril(s) as directed by provider Daily., Disp: 16 g, Rfl: 5  •  GARLIC PO, Take 1 tablet by mouth Daily., Disp: , Rfl:   •  insulin aspart (NovoLOG FlexPen) 100 UNIT/ML solution pen-injector sc pen, Inject 5 Units under the skin into the appropriate area as directed 3 (Three) Times a Day With Meals., Disp: 5 pen, Rfl: 2  •  insulin glargine (LANTUS, SEMGLEE) 100 UNIT/ML injection, 10 units in the morning and 40 units at bedtime., Disp: 18 mL, Rfl: 2  •  levalbuterol (XOPENEX HFA) 45 MCG/ACT inhaler, Inhale 2 puffs Every 6 (Six) Hours As Needed for Wheezing., Disp: , Rfl:   •  lisinopril (PRINIVIL,ZESTRIL) 20 MG tablet, 1 tab am and 1/2 at bedtime., Disp: 180 tablet, Rfl: 1  •  meclizine (ANTIVERT) 12.5 MG tablet, Take 1 tablet by mouth 3 (Three) Times a Day As Needed for Dizziness., Disp: 30 tablet, Rfl: 0  •  melatonin 5 MG tablet tablet, Take 1 tablet by mouth At Night As Needed., Disp: , Rfl:   •  Mesalamine 4 GM/60ML SF enema, Insert 1 application into the rectum Every Other Day., Disp: , Rfl:   •  metFORMIN (GLUCOPHAGE) 500 MG tablet, Take 1 tablet by mouth 2 (Two) Times a Day With Meals., Disp: , Rfl:   •  metoprolol tartrate (LOPRESSOR) 25 MG tablet, Take 1 tablet by mouth 2 (Two) Times a Day., Disp: 180 tablet, Rfl: 3  •  rosuvastatin (CRESTOR) 40 MG tablet, Take 20 mg by mouth Every Night., Disp: , Rfl:   •  triamcinolone (KENALOG) 0.1 % cream, Apply 1 application topically to the appropriate area as directed 2 (Two) Times a Day., Disp: , Rfl:   •  Ustekinumab (STELARA) 90 MG/ML solution prefilled syringe Injection, Inject 90 mg under the skin into the appropriate area as directed Every 28 (Twenty-Eight) Days., Disp: 1 mL, Rfl: 6  •  vitamin B-12 (CYANOCOBALAMIN) 1000 MCG tablet, Take 1 tablet by mouth Daily., Disp: , Rfl:   •  Vitamin D, Cholecalciferol, 25 MCG (1000 UT) capsule, Take 1 capsule by mouth Daily., Disp: , Rfl:   •   metFORMIN ER (GLUCOPHAGE-XR) 500 MG 24 hr tablet, Take 1 tablet by mouth Daily With Breakfast for 30 days., Disp: 30 tablet, Rfl: 2  •  naloxone (NARCAN) 4 MG/0.1ML nasal spray, Call 911. Don't prime. [place] in 1 nostril for overdose. Repeat in 2-3 minutes in other nostril if no or minimal breathing/responsiveness. (Patient not taking: Reported on 6/7/2023), Disp: 2 each, Rfl: 0  •  nitroglycerin (NITROSTAT) 0.4 MG SL tablet, 1 under the tongue as needed for angina, may repeat q5mins for up three doses, Disp: 25 tablet, Rfl: 1  •  ondansetron (Zofran) 4 MG tablet, Take 1 tablet by mouth Every 4 (Four) Hours As Needed for Nausea or Vomiting for up to 20 doses. (Patient not taking: Reported on 6/9/2023), Disp: 20 tablet, Rfl: 0  •  oxyCODONE-acetaminophen (Percocet)  MG per tablet, Take 1 tablet by mouth Every 6 (Six) Hours As Needed for Moderate Pain. (Patient not taking: Reported on 5/22/2023), Disp: 20 tablet, Rfl: 0  •  Pediatric Multiple Vit-C-FA (CHILDRENS CHEWABLE MULTI VITS PO), Take 1 tablet by mouth Daily. (Patient not taking: Reported on 6/9/2023), Disp: , Rfl:     Past Medical History:   Diagnosis Date   • Asthma    • Coronary artery disease    • Diabetes mellitus    • Elevated cholesterol    • HL (hearing loss) 2012   • Hyperlipidemia    • Hypertension    • Inflammatory bowel disease 2001   • Myocardial infarct     E[facility] 2020   • Sleep apnea     cpap at    • Sleep apnea, obstructive    • Visual impairment 2016    Double vision       Past Surgical History:   Procedure Laterality Date   • ADENOIDECTOMY  1947   • APPENDECTOMY N/A 5/5/2023    Procedure: APPENDECTOMY LAPAROSCOPIC;  Surgeon: Katherine Carranza MD;  Location: [facility];  Service: General;  Laterality: N/A;   • BACK SURGERY     • CARDIAC CATHETERIZATION      stents x 6   • CARDIAC SURGERY      CABG TRIPLE BYPASS 2012   • CATARACT EXTRACTION     • COLONOSCOPY     • COLONOSCOPY N/A 06/04/2021    Procedure: COLONOSCOPY WITH ANESTHESIA;   "Surgeon: Zachariah Menjivar DO;  Location:  [facility] ENDOSCOPY;  Service: Gastroenterology;  Laterality: N/A;  pre hx ulcerative colitis  post ulcerative colitis  dr collins gusman   • CORONARY ANGIOPLASTY WITH STENT PLACEMENT     • CORONARY ARTERY BYPASS GRAFT  December 2012   • CORONARY STENT PLACEMENT     • HIP SURGERY Right     total hip   • JOINT REPLACEMENT  2015    Right hip   • PENILE PROSTHESIS IMPLANT N/A 12/13/2021    Procedure: 3-PIECE INFLATABLE PENILE PROSTHESIS PLACEMENT;  Surgeon: Jose Tellez MD;  Location:  [facility] OR;  Service: Urology;  Laterality: N/A;   • TONSILLECTOMY  1947       Social History     Socioeconomic History   • Marital status:    Tobacco Use   • Smoking status: Never     Passive exposure: Never   • Smokeless tobacco: Never   Vaping Use   • Vaping Use: Never used   Substance and Sexual Activity   • Alcohol use: Never   • Drug use: Never   • Sexual activity: Defer       Family History   Problem Relation Age of Onset   • Colon cancer Brother    • Colon polyps Neg Hx    • Esophageal cancer Neg Hx        Objective    Temp 97.8 °F (36.6 °C)   Ht 175.3 cm (69\")   Wt 105 kg (230 lb 9.6 oz)   BMI 34.05 kg/m²     Physical Exam  Penile implant in excellent position and inflates and deflates normally with good result      Results for orders placed or performed in visit on 06/09/23   POC Urinalysis Dipstick, Multipro    Specimen: Urine   Result Value Ref Range    Color Yellow Yellow, Straw, Dark Yellow, Vicky    Clarity, UA Clear Clear    Glucose, UA Negative Negative mg/dL    Bilirubin Negative Negative    Ketones, UA Negative Negative    Specific Gravity  1.030 1.005 - 1.030    Blood, UA Trace (A) Negative    pH, Urine 5.5 5.0 - 8.0    Protein, POC Negative Negative mg/dL    Urobilinogen, UA 0.2 E.U./dL Normal, 0.2 E.U./dL    Nitrite, UA Negative Negative    Leukocytes Negative Negative     Assessment and Plan    Diagnoses and all orders for this visit:    1. Benign prostatic " hyperplasia without lower urinary tract symptoms (Primary)  -     finasteride (PROSCAR) 5 MG tablet; Take 1 tablet by mouth Daily.  Dispense: 90 tablet; Refill: 3    2. Erectile dysfunction, unspecified erectile dysfunction type  -     POC Urinalysis Dipstick, Multipro      Stable LUTS on finasteride monotherapy-continue finasteride.  Follow-up with me in 1 year or sooner as needed.        This document has been signed by ANTHONY Tellez MD on June 9, 2023 18:34 CDT

## 2023-07-26 ENCOUNTER — OFFICE VISIT (OUTPATIENT)
Dept: CARDIOLOGY | Facility: CLINIC | Age: 83
End: 2023-07-26
Payer: MEDICARE

## 2023-07-26 VITALS
OXYGEN SATURATION: 98 % | HEART RATE: 68 BPM | BODY MASS INDEX: 33.77 KG/M2 | WEIGHT: 228 LBS | SYSTOLIC BLOOD PRESSURE: 108 MMHG | DIASTOLIC BLOOD PRESSURE: 71 MMHG | HEIGHT: 69 IN

## 2023-07-26 DIAGNOSIS — I10 ESSENTIAL HYPERTENSION: ICD-10-CM

## 2023-07-26 DIAGNOSIS — I25.810 CORONARY ARTERY DISEASE INVOLVING CORONARY BYPASS GRAFT OF NATIVE HEART WITHOUT ANGINA PECTORIS: Primary | ICD-10-CM

## 2023-07-26 DIAGNOSIS — E78.2 MIXED HYPERLIPIDEMIA: ICD-10-CM

## 2023-07-26 PROCEDURE — 93000 ELECTROCARDIOGRAM COMPLETE: CPT | Performed by: INTERNAL MEDICINE

## 2023-07-26 RX ORDER — ASPIRIN 81 MG/1
81 TABLET ORAL DAILY
COMMUNITY

## 2023-07-26 NOTE — PROGRESS NOTES
Subjective:     Encounter Date:07/26/2023      Patient ID: Zachariah Acosta is a 82 y.o. male.    Chief Complaint: Follow-up coronary disease, and shortness of breath    History of Present Illness    Mr. Acosta has coronary disease including CABG in 2012, and an MI in 04/2020 that required PCI x6. After initially transitioning care to us, there was concern regarding potential GI bleed, so he was instructed to stop Plavix, but to continue aspirin. When he was last seen here in the office on 01/13/2023, no changes were made. He continued to complain of dyspnea that was felt to not be an ischemic equivalent since a nuclear stress test done to investigate this possibility was low-risk for ischemia.    Mr. Acosta comes to the clinic today stating that he is doing well. He denies any particular concerns or complaints. He denies any change in his breathing. He reports that Dr. Mejias has him scheduled for a breathing test in a couple of months. He states that he has always been short of breath, but it has not gotten any worse in the last 6 months. He reports that he has not needed nitroglycerin since 01/2023.    The following portions of the patient's history were reviewed and updated as appropriate: allergies, current medications, past family history, past medical history, past social history, past surgical history, and problem list.    Review of Systems   Constitutional: Negative for malaise/fatigue.   Cardiovascular:  Negative for chest pain, claudication, dyspnea on exertion, leg swelling, near-syncope, orthopnea, palpitations, paroxysmal nocturnal dyspnea and syncope.   Respiratory:  Positive for shortness of breath.    Hematologic/Lymphatic: Does not bruise/bleed easily.         Current Outpatient Medications:     acetaminophen (TYLENOL) 500 MG tablet, Take 1 tablet by mouth Every 6 (Six) Hours As Needed for Mild Pain., Disp: , Rfl:     aspirin 81 MG EC tablet, Take 1 tablet by mouth Daily.,  Disp: , Rfl:     Azelastine HCl 137 MCG/SPRAY solution, 2 sprays into the nostril(s) as directed by provider 2 (Two) Times a Day., Disp: 30 mL, Rfl: 5    Budeson-Glycopyrrol-Formoterol (Breztri Aerosphere) 160-9-4.8 MCG/ACT aerosol inhaler, Inhale 2 puffs 2 (Two) Times a Day., Disp: , Rfl:     Dupilumab 300 MG/2ML solution prefilled syringe, Inject  under the skin into the appropriate area as directed Every 14 (Fourteen) Days., Disp: , Rfl:     Ferrous Sulfate (IRON PO), Take 65 mg by mouth Daily., Disp: , Rfl:     finasteride (PROSCAR) 5 MG tablet, Take 1 tablet by mouth Daily., Disp: 90 tablet, Rfl: 3    fluticasone (FLONASE) 50 MCG/ACT nasal spray, 1 spray into the nostril(s) as directed by provider Daily., Disp: 16 g, Rfl: 5    GARLIC PO, Take 1 tablet by mouth Daily., Disp: , Rfl:     insulin aspart (NovoLOG FlexPen) 100 UNIT/ML solution pen-injector sc pen, Inject 5 Units under the skin into the appropriate area as directed 3 (Three) Times a Day With Meals., Disp: 5 pen, Rfl: 2    insulin glargine (LANTUS, SEMGLEE) 100 UNIT/ML injection, 10 units in the morning and 40 units at bedtime., Disp: 18 mL, Rfl: 2    levalbuterol (XOPENEX HFA) 45 MCG/ACT inhaler, Inhale 2 puffs Every 6 (Six) Hours As Needed for Wheezing., Disp: , Rfl:     lisinopril (PRINIVIL,ZESTRIL) 20 MG tablet, 1 tab am and 1/2 at bedtime., Disp: 180 tablet, Rfl: 1    meclizine (ANTIVERT) 12.5 MG tablet, Take 1 tablet by mouth 3 (Three) Times a Day As Needed for Dizziness., Disp: 30 tablet, Rfl: 0    melatonin 5 MG tablet tablet, Take 1 tablet by mouth At Night As Needed., Disp: , Rfl:     Mesalamine 4 GM/60ML SF enema, Insert 1 application into the rectum Every Other Day., Disp: , Rfl:     metFORMIN (GLUCOPHAGE) 500 MG tablet, Take 1 tablet by mouth 2 (Two) Times a Day With Meals., Disp: , Rfl:     metoprolol tartrate (LOPRESSOR) 25 MG tablet, Take 1 tablet by mouth 2 (Two) Times a Day., Disp: 180 tablet, Rfl: 3    naloxone (NARCAN) 4 MG/0.1ML  nasal spray, Call 911. Don't prime. Dewar in 1 nostril for overdose. Repeat in 2-3 minutes in other nostril if no or minimal breathing/responsiveness., Disp: 2 each, Rfl: 0    nitroglycerin (NITROSTAT) 0.4 MG SL tablet, 1 under the tongue as needed for angina, may repeat q5mins for up three doses, Disp: 25 tablet, Rfl: 1    ondansetron (Zofran) 4 MG tablet, Take 1 tablet by mouth Every 4 (Four) Hours As Needed for Nausea or Vomiting for up to 20 doses., Disp: 20 tablet, Rfl: 0    Pediatric Multiple Vit-C-FA (CHILDRENS CHEWABLE MULTI VITS PO), Take 1 tablet by mouth Daily., Disp: , Rfl:     rosuvastatin (CRESTOR) 40 MG tablet, Take 0.5 tablets by mouth Every Night., Disp: , Rfl:     triamcinolone (KENALOG) 0.1 % cream, Apply 1 application  topically to the appropriate area as directed 2 (Two) Times a Day., Disp: , Rfl:     Ustekinumab (STELARA) 90 MG/ML solution prefilled syringe Injection, Inject 90 mg under the skin into the appropriate area as directed Every 28 (Twenty-Eight) Days., Disp: 1 mL, Rfl: 6    vitamin B-12 (CYANOCOBALAMIN) 1000 MCG tablet, Take 1 tablet by mouth Daily., Disp: , Rfl:     Vitamin D, Cholecalciferol, 25 MCG (1000 UT) capsule, Take 1 capsule by mouth Daily., Disp: , Rfl:     metFORMIN ER (GLUCOPHAGE-XR) 500 MG 24 hr tablet, Take 1 tablet by mouth Daily With Breakfast for 30 days., Disp: 30 tablet, Rfl: 2    oxyCODONE-acetaminophen (Percocet)  MG per tablet, Take 1 tablet by mouth Every 6 (Six) Hours As Needed for Moderate Pain. (Patient not taking: Reported on 5/22/2023), Disp: 20 tablet, Rfl: 0       Objective:      Vitals:    07/26/23 1411   BP: 108/71   Pulse: 68   SpO2: 98%     Vitals and nursing note reviewed.   Constitutional:       General: Not in acute distress.     Appearance: Not in distress.   Neck:      Vascular: No JVD or JVR. JVD normal.   Pulmonary:      Effort: Pulmonary effort is normal.      Breath sounds: Normal breath sounds.   Cardiovascular:      Normal rate.  Regular rhythm.      Murmurs: There is no murmur.      No gallop.  No rub.   Pulses:     Intact distal pulses.   Edema:     Peripheral edema absent.   Skin:     General: Skin is warm and dry.   Neurological:      Mental Status: Alert, oriented to person, place, and time and oriented to person, place and time.       Lab Review:         ECG 12 Lead    Date/Time: 7/26/2023 2:23 PM  Performed by: Yung Polanco MD  Authorized by: Yung Polanco MD   Comparison: compared with previous ECG from 4/4/2023  Comparison to previous ECG: Nonspecific T wave abnormality is more evident in the precordial leads  Rhythm: sinus rhythm  Conduction: 1st degree AV block  Other findings: T wave abnormality    Clinical impression: abnormal EKG      Labs reviewed:     Lipid Panel [LAB18] (Order [id number])  Order  Date: 4/9/2023 Department: [address] Released By/Authorizing: Jonny Lazo MD (auto-released)     Reprint Order Requisition    Lipid Panel (Order #[id number]) on 4/9/23         Contains abnormal data Lipid Panel  Order: [id number]  Status: Final result       Visible to patient: Yes (not seen)       Next appt: 09/07/2023 at 01:00 PM in Family Medicine ([facility], [facility])    Specimen Information: Blood   0 Result Notes       1  Topic         Component  Ref Range & Units 3 mo ago 7 mo ago 1 yr ago 2 yr ago   Total Cholesterol  0 - 200 mg/dL 92 101 Low  R 111 141 CM   Triglycerides  0 - 150 mg/dL 109 126 R 82 114   HDL Cholesterol  40 - 60 mg/dL 31 Low  32 Low  R 52 34 Low    LDL Cholesterol  0 - 100 mg/dL 41 46 R 43 86   VLDL Cholesterol  5 - 40 mg/dL 20 23 16    LDL/HDL Ratio 1.26 1.37 0.82    Resulting Agency  [facility]  [facility]                  LAST STRESS TEST (2021):  Zachariah Acosta  Regadenoson Stress Test with Myocardial Perfusion SPECT (Multi Study)  Order# [id number]  Reading physician: Yung Polanco MD Ordering physician: Mari Perdue APRN Study date:  10/25/21     Patient Information    Patient Name  Zachariah Acosta MRN  [id number] Legal Sex  Male  (Age)  1940 (82 y.o.)     Interpretation Summary    Impressions are consistent with a low risk study.  Myocardial perfusion imaging indicates a normal myocardial perfusion study with no evidence of ischemia.  Left ventricular ejection fraction is normal.  There is no prior study available for comparison.  Findings consistent with a normal ECG stress test.         Assessment/Plan:     Problem List Items Addressed This Visit (all established and stable)         Cardiac and Vasculature    CAD (coronary artery disease) - Primary    Overview     3v CABG in   Non-STEMI 2020 diagnostic cath 2020 showed LIMA to LAD atretic, SVG to diagonal patent with significant backflow from diagonal graft into the LAD and down the LAD, SVG to PDA patent but not providing flow to PL branch because of proximal and mid native vessel (R-PDA) disease.  Left main 60-70% diffuse disease compromising flow into ungrafted circumflex territory.  Subsequently referred for consideration of redo CABG versus complex PCI.    -Return to Cath Lab 2020 with 4.0 x 8 mm drug-eluting stent placement to the left main (for purpose of improving flow into ungrafted circumflex territory), as well as for overlapping Julianne drug-eluting stents from the ostium into mid-RCA (3.5 x 12, 3.5 x 15, 3.5 x 12, 3.5 x 8) along with 3.5 x 12 mm Julianne FRANKY at the takeoff of the right-PL branch.  80-90% stenosis at the ostium of the R-PDA not treated since vein graft to PDA open, but not providing any retrograde filling beyond this lesion into the PL system.  There was PTCA alone to area in the distal RCA between stented segments for reported inability to deliver a stent to the segment.  Balloon angioplasty was performed with a 3.5 mm balloon with reported residual stenosis of less than 10%.             Essential hypertension    Mixed  hyperlipidemia         Recommendations/plans:    The patient is doing exceptionally well, and is essentially asymptomatic with regards to his coronary disease. His risk factors are well-controlled. No changes in therapy recommended at this time, and plan routine follow-up in 1 year or sooner with new or worsening symptoms.    Transcribed from ambient dictation for Yung Polanco MD by Reina Morgan  07/26/23   16:17 CDT    Patient or patient representative verbalized consent to the visit recording.  I Yung Polanco MD have personally performed the services described in this document as scribed by the above individual, and it is both accurate and complete.   I have edited each component as needed.    Yung Polanco MD  7/27/2023  21:34 CDT

## 2023-08-01 DIAGNOSIS — E11.65 TYPE 2 DIABETES MELLITUS WITH HYPERGLYCEMIA, WITH LONG-TERM CURRENT USE OF INSULIN: Chronic | ICD-10-CM

## 2023-08-01 DIAGNOSIS — Z79.4 TYPE 2 DIABETES MELLITUS WITH HYPERGLYCEMIA, WITH LONG-TERM CURRENT USE OF INSULIN: Chronic | ICD-10-CM

## 2023-08-01 RX ORDER — METFORMIN HYDROCHLORIDE 500 MG/1
TABLET, EXTENDED RELEASE ORAL
Qty: 30 TABLET | Refills: 0 | Status: SHIPPED | OUTPATIENT
Start: 2023-08-01

## 2023-08-21 DIAGNOSIS — E11.65 TYPE 2 DIABETES MELLITUS WITH HYPERGLYCEMIA, WITH LONG-TERM CURRENT USE OF INSULIN: Primary | ICD-10-CM

## 2023-08-21 DIAGNOSIS — Z79.4 TYPE 2 DIABETES MELLITUS WITH HYPERGLYCEMIA, WITH LONG-TERM CURRENT USE OF INSULIN: Primary | ICD-10-CM

## 2023-08-22 ENCOUNTER — OFFICE VISIT (OUTPATIENT)
Dept: ENT CLINIC | Age: 83
End: 2023-08-22
Payer: MEDICARE

## 2023-08-22 VITALS
WEIGHT: 226 LBS | BODY MASS INDEX: 33.47 KG/M2 | DIASTOLIC BLOOD PRESSURE: 76 MMHG | SYSTOLIC BLOOD PRESSURE: 134 MMHG | HEIGHT: 69 IN

## 2023-08-22 DIAGNOSIS — H90.3 SENSORINEURAL HEARING LOSS (SNHL) OF BOTH EARS: ICD-10-CM

## 2023-08-22 DIAGNOSIS — H60.543 DERMATITIS OF EAR CANAL, BILATERAL: Primary | ICD-10-CM

## 2023-08-22 PROCEDURE — G8417 CALC BMI ABV UP PARAM F/U: HCPCS | Performed by: OTOLARYNGOLOGY

## 2023-08-22 PROCEDURE — 4130F TOPICAL PREP RX AOE: CPT | Performed by: OTOLARYNGOLOGY

## 2023-08-22 PROCEDURE — G8427 DOCREV CUR MEDS BY ELIG CLIN: HCPCS | Performed by: OTOLARYNGOLOGY

## 2023-08-22 PROCEDURE — 1036F TOBACCO NON-USER: CPT | Performed by: OTOLARYNGOLOGY

## 2023-08-22 PROCEDURE — 99213 OFFICE O/P EST LOW 20 MIN: CPT | Performed by: OTOLARYNGOLOGY

## 2023-08-22 PROCEDURE — 1123F ACP DISCUSS/DSCN MKR DOCD: CPT | Performed by: OTOLARYNGOLOGY

## 2023-08-22 NOTE — PROGRESS NOTES
2023    Prakash Huerta (:  1940) is a 82 y.o. male, Established patient, here for evaluation of the following chief complaint(s):  Follow-up (Ears)      Vitals:    23 1014   BP: 134/76   Weight: 226 lb (102.5 kg)   Height: 5' 9\" (1.753 m)       Wt Readings from Last 3 Encounters:   23 226 lb (102.5 kg)   23 224 lb 8 oz (101.8 kg)   23 236 lb (107 kg)       BP Readings from Last 3 Encounters:   23 134/76   23 (!) 148/82   23 128/78         SUBJECTIVE/OBJECTIVE:    Patient seen today for his ears. 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 Uses cortisone his ears and does fairly well. Today reports no issues. Review of Systems   Constitutional: Negative. HENT:  Positive for hearing loss. Eyes: Negative. Respiratory: Negative. Cardiovascular: Negative. Gastrointestinal: Negative. Endocrine: Negative. Musculoskeletal: Negative. Skin: Negative. Allergic/Immunologic: Negative. Neurological: Negative. Hematological: Negative. Psychiatric/Behavioral: Negative. Physical Exam  Vitals reviewed. Constitutional:       Appearance: Normal appearance. He is normal weight. HENT:      Head: Normocephalic and atraumatic. Right Ear: Tympanic membrane, ear canal and external ear normal.      Left Ear: Tympanic membrane, ear canal and external ear normal.      Nose: Nose normal.      Mouth/Throat:      Mouth: Mucous membranes are moist.      Pharynx: Oropharynx is clear. Eyes:      Extraocular Movements: Extraocular movements intact. Pupils: Pupils are equal, round, and reactive to light. Cardiovascular:      Rate and Rhythm: Normal rate and regular rhythm. Pulmonary:      Effort: Pulmonary effort is normal.      Breath sounds: Normal breath sounds. Musculoskeletal:      Cervical back: Normal range of motion. Skin:     General: Skin is warm and dry.    Neurological:

## 2023-08-23 RX ORDER — ACYCLOVIR 400 MG/1
1 TABLET ORAL
Qty: 9 EACH | Refills: 3 | Status: SHIPPED | OUTPATIENT
Start: 2023-08-23

## 2023-08-23 RX ORDER — ACYCLOVIR 400 MG/1
1 TABLET ORAL ONCE
Qty: 1 EACH | Refills: 0 | Status: SHIPPED | OUTPATIENT
Start: 2023-08-23 | End: 2023-08-23

## 2023-08-25 ENCOUNTER — TELEPHONE (OUTPATIENT)
Dept: FAMILY MEDICINE CLINIC | Facility: CLINIC | Age: 83
End: 2023-08-25
Payer: MEDICARE

## 2023-08-25 NOTE — TELEPHONE ENCOUNTER
Spoke with a rep and clarified some information for patient to be able to recieve he's dexcom G 7.

## 2023-08-31 ENCOUNTER — LAB (OUTPATIENT)
Dept: LAB | Facility: HOSPITAL | Age: 83
End: 2023-08-31
Payer: MEDICARE

## 2023-08-31 DIAGNOSIS — E11.65 TYPE 2 DIABETES MELLITUS WITH HYPERGLYCEMIA, WITH LONG-TERM CURRENT USE OF INSULIN: ICD-10-CM

## 2023-08-31 DIAGNOSIS — Z79.4 TYPE 2 DIABETES MELLITUS WITH HYPERGLYCEMIA, WITH LONG-TERM CURRENT USE OF INSULIN: ICD-10-CM

## 2023-08-31 DIAGNOSIS — E03.9 HYPOTHYROIDISM, UNSPECIFIED TYPE: ICD-10-CM

## 2023-08-31 LAB
ALBUMIN SERPL-MCNC: 4 G/DL (ref 3.5–5.2)
ALBUMIN UR-MCNC: 7.7 MG/DL
ALBUMIN/GLOB SERPL: 1.3 G/DL
ALP SERPL-CCNC: 73 U/L (ref 39–117)
ALT SERPL W P-5'-P-CCNC: 16 U/L (ref 1–41)
ANION GAP SERPL CALCULATED.3IONS-SCNC: 9 MMOL/L (ref 5–15)
AST SERPL-CCNC: 22 U/L (ref 1–40)
BACTERIA UR QL AUTO: ABNORMAL /HPF
BASOPHILS # BLD AUTO: 0.08 10*3/MM3 (ref 0–0.2)
BASOPHILS NFR BLD AUTO: 0.8 % (ref 0–1.5)
BILIRUB SERPL-MCNC: 0.6 MG/DL (ref 0–1.2)
BILIRUB UR QL STRIP: NEGATIVE
BUN SERPL-MCNC: 18 MG/DL (ref 8–23)
BUN/CREAT SERPL: 19.4 (ref 7–25)
CALCIUM SPEC-SCNC: 8.8 MG/DL (ref 8.6–10.5)
CHLORIDE SERPL-SCNC: 104 MMOL/L (ref 98–107)
CHOLEST SERPL-MCNC: 96 MG/DL (ref 0–200)
CLARITY UR: CLEAR
CO2 SERPL-SCNC: 27 MMOL/L (ref 22–29)
COLOR UR: ABNORMAL
CREAT SERPL-MCNC: 0.93 MG/DL (ref 0.76–1.27)
DEPRECATED RDW RBC AUTO: 45 FL (ref 37–54)
EGFRCR SERPLBLD CKD-EPI 2021: 83 ML/MIN/1.73
EOSINOPHIL # BLD AUTO: 0.98 10*3/MM3 (ref 0–0.4)
EOSINOPHIL NFR BLD AUTO: 10.4 % (ref 0.3–6.2)
ERYTHROCYTE [DISTWIDTH] IN BLOOD BY AUTOMATED COUNT: 13.1 % (ref 12.3–15.4)
GLOBULIN UR ELPH-MCNC: 3 GM/DL
GLUCOSE SERPL-MCNC: 135 MG/DL (ref 65–99)
GLUCOSE UR STRIP-MCNC: NEGATIVE MG/DL
HBA1C MFR BLD: 6.9 % (ref 4.8–5.6)
HCT VFR BLD AUTO: 44.3 % (ref 37.5–51)
HDLC SERPL-MCNC: 33 MG/DL (ref 40–60)
HGB BLD-MCNC: 14.3 G/DL (ref 13–17.7)
HGB UR QL STRIP.AUTO: NEGATIVE
HYALINE CASTS UR QL AUTO: ABNORMAL /LPF
IMM GRANULOCYTES # BLD AUTO: 0.04 10*3/MM3 (ref 0–0.05)
IMM GRANULOCYTES NFR BLD AUTO: 0.4 % (ref 0–0.5)
KETONES UR QL STRIP: NEGATIVE
LDLC SERPL CALC-MCNC: 42 MG/DL (ref 0–100)
LDLC/HDLC SERPL: 1.24 {RATIO}
LEUKOCYTE ESTERASE UR QL STRIP.AUTO: ABNORMAL
LYMPHOCYTES # BLD AUTO: 1.46 10*3/MM3 (ref 0.7–3.1)
LYMPHOCYTES NFR BLD AUTO: 15.5 % (ref 19.6–45.3)
MCH RBC QN AUTO: 30.2 PG (ref 26.6–33)
MCHC RBC AUTO-ENTMCNC: 32.3 G/DL (ref 31.5–35.7)
MCV RBC AUTO: 93.5 FL (ref 79–97)
MONOCYTES # BLD AUTO: 0.8 10*3/MM3 (ref 0.1–0.9)
MONOCYTES NFR BLD AUTO: 8.5 % (ref 5–12)
NEUTROPHILS NFR BLD AUTO: 6.06 10*3/MM3 (ref 1.7–7)
NEUTROPHILS NFR BLD AUTO: 64.4 % (ref 42.7–76)
NITRITE UR QL STRIP: NEGATIVE
NRBC BLD AUTO-RTO: 0 /100 WBC (ref 0–0.2)
PH UR STRIP.AUTO: <=5 [PH] (ref 5–8)
PLATELET # BLD AUTO: 227 10*3/MM3 (ref 140–450)
PMV BLD AUTO: 9.9 FL (ref 6–12)
POTASSIUM SERPL-SCNC: 4.1 MMOL/L (ref 3.5–5.2)
PROT SERPL-MCNC: 7 G/DL (ref 6–8.5)
PROT UR QL STRIP: ABNORMAL
RBC # BLD AUTO: 4.74 10*6/MM3 (ref 4.14–5.8)
RBC # UR STRIP: ABNORMAL /HPF
REF LAB TEST METHOD: ABNORMAL
SODIUM SERPL-SCNC: 140 MMOL/L (ref 136–145)
SP GR UR STRIP: >1.03 (ref 1–1.03)
SQUAMOUS #/AREA URNS HPF: ABNORMAL /HPF
TRIGL SERPL-MCNC: 111 MG/DL (ref 0–150)
TSH SERPL DL<=0.05 MIU/L-ACNC: 5.47 UIU/ML (ref 0.27–4.2)
UROBILINOGEN UR QL STRIP: ABNORMAL
VLDLC SERPL-MCNC: 21 MG/DL (ref 5–40)
WBC # UR STRIP: ABNORMAL /HPF
WBC NRBC COR # BLD: 9.42 10*3/MM3 (ref 3.4–10.8)

## 2023-08-31 PROCEDURE — 80061 LIPID PANEL: CPT

## 2023-08-31 PROCEDURE — 84443 ASSAY THYROID STIM HORMONE: CPT

## 2023-08-31 PROCEDURE — 84439 ASSAY OF FREE THYROXINE: CPT

## 2023-08-31 PROCEDURE — 81001 URINALYSIS AUTO W/SCOPE: CPT

## 2023-08-31 PROCEDURE — 85025 COMPLETE CBC W/AUTO DIFF WBC: CPT

## 2023-08-31 PROCEDURE — 84481 FREE ASSAY (FT-3): CPT

## 2023-08-31 PROCEDURE — 82043 UR ALBUMIN QUANTITATIVE: CPT

## 2023-08-31 PROCEDURE — 36415 COLL VENOUS BLD VENIPUNCTURE: CPT

## 2023-08-31 PROCEDURE — 83036 HEMOGLOBIN GLYCOSYLATED A1C: CPT

## 2023-08-31 PROCEDURE — 80053 COMPREHEN METABOLIC PANEL: CPT

## 2023-09-01 DIAGNOSIS — E03.9 HYPOTHYROIDISM, UNSPECIFIED TYPE: Primary | ICD-10-CM

## 2023-09-01 LAB — T4 FREE SERPL-MCNC: 1 NG/DL (ref 0.93–1.7)

## 2023-09-02 LAB — T3FREE SERPL-MCNC: 3.47 PG/ML (ref 2–4.4)

## 2023-09-07 ENCOUNTER — OFFICE VISIT (OUTPATIENT)
Dept: FAMILY MEDICINE CLINIC | Facility: CLINIC | Age: 83
End: 2023-09-07
Payer: MEDICARE

## 2023-09-07 VITALS
HEIGHT: 69 IN | RESPIRATION RATE: 18 BRPM | HEART RATE: 65 BPM | OXYGEN SATURATION: 97 % | SYSTOLIC BLOOD PRESSURE: 130 MMHG | TEMPERATURE: 97.3 F | DIASTOLIC BLOOD PRESSURE: 70 MMHG | BODY MASS INDEX: 33.92 KG/M2 | WEIGHT: 229 LBS

## 2023-09-07 DIAGNOSIS — E78.2 MIXED HYPERLIPIDEMIA: ICD-10-CM

## 2023-09-07 DIAGNOSIS — E11.65 TYPE 2 DIABETES MELLITUS WITH HYPERGLYCEMIA, WITH LONG-TERM CURRENT USE OF INSULIN: Primary | ICD-10-CM

## 2023-09-07 DIAGNOSIS — I10 ESSENTIAL HYPERTENSION: Chronic | ICD-10-CM

## 2023-09-07 DIAGNOSIS — Z79.4 TYPE 2 DIABETES MELLITUS WITH HYPERGLYCEMIA, WITH LONG-TERM CURRENT USE OF INSULIN: Primary | ICD-10-CM

## 2023-09-07 DIAGNOSIS — Z79.4 TYPE 2 DIABETES MELLITUS WITH HYPERGLYCEMIA, WITH LONG-TERM CURRENT USE OF INSULIN: Chronic | ICD-10-CM

## 2023-09-07 DIAGNOSIS — J44.9 CHRONIC OBSTRUCTIVE PULMONARY DISEASE, UNSPECIFIED COPD TYPE: ICD-10-CM

## 2023-09-07 DIAGNOSIS — I10 ESSENTIAL HYPERTENSION: ICD-10-CM

## 2023-09-07 DIAGNOSIS — E66.9 OBESITY (BMI 30-39.9): ICD-10-CM

## 2023-09-07 DIAGNOSIS — E11.65 TYPE 2 DIABETES MELLITUS WITH HYPERGLYCEMIA, WITH LONG-TERM CURRENT USE OF INSULIN: Chronic | ICD-10-CM

## 2023-09-07 DIAGNOSIS — E03.9 HYPOTHYROIDISM, UNSPECIFIED TYPE: ICD-10-CM

## 2023-09-07 PROCEDURE — 3078F DIAST BP <80 MM HG: CPT | Performed by: NURSE PRACTITIONER

## 2023-09-07 PROCEDURE — 99214 OFFICE O/P EST MOD 30 MIN: CPT | Performed by: NURSE PRACTITIONER

## 2023-09-07 PROCEDURE — 3075F SYST BP GE 130 - 139MM HG: CPT | Performed by: NURSE PRACTITIONER

## 2023-09-07 RX ORDER — INSULIN GLARGINE 100 [IU]/ML
INJECTION, SOLUTION SUBCUTANEOUS
Qty: 18 ML | Refills: 2 | Status: SHIPPED | OUTPATIENT
Start: 2023-09-07

## 2023-09-07 RX ORDER — METFORMIN HYDROCHLORIDE 500 MG/1
TABLET, EXTENDED RELEASE ORAL
Qty: 90 TABLET | Refills: 4 | Status: SHIPPED | OUTPATIENT
Start: 2023-09-07

## 2023-09-07 RX ORDER — BUDESONIDE, GLYCOPYRROLATE, AND FORMOTEROL FUMARATE 160; 9; 4.8 UG/1; UG/1; UG/1
2 AEROSOL, METERED RESPIRATORY (INHALATION) 2 TIMES DAILY
Qty: 3 EACH | Refills: 4 | Status: SHIPPED | OUTPATIENT
Start: 2023-09-07

## 2023-09-07 RX ORDER — ROSUVASTATIN CALCIUM 20 MG/1
20 TABLET, COATED ORAL NIGHTLY
Qty: 90 TABLET | Refills: 4 | Status: SHIPPED | OUTPATIENT
Start: 2023-09-07

## 2023-09-07 RX ORDER — LEVOTHYROXINE SODIUM 0.05 MG/1
50 TABLET ORAL DAILY
Qty: 90 TABLET | Refills: 4 | Status: SHIPPED | OUTPATIENT
Start: 2023-09-07

## 2023-09-07 RX ORDER — LISINOPRIL 20 MG/1
20 TABLET ORAL 2 TIMES DAILY
Qty: 180 TABLET | Refills: 1 | Status: SHIPPED | OUTPATIENT
Start: 2023-09-07

## 2023-09-07 NOTE — PROGRESS NOTES
BORIS Pérez  [facility]   Family Medicine  [address]  [place] [address]  Phone: [phone]  Fax: [phone]         Chief Complaint:  Chief Complaint   Patient presents with    Follow-up     2-month         History:  Zachariah Acosta is a 82 y.o. male that is an established patient. He  is here for management of chronic conditions.    Diabetes  He has type 2 diabetes mellitus. No MedicAlert identification noted. The initial diagnosis of diabetes was made 5 years ago. Pertinent negatives for hypoglycemia include no confusion, dizziness, headaches, hunger, mood changes, nervousness/anxiousness, pallor, seizures, sleepiness, speech difficulty, sweats or tremors. Pertinent negatives for diabetes include no blurred vision, no chest pain, no fatigue, no foot paresthesias, no foot ulcerations, no polydipsia, no polyphagia, no polyuria, no visual change, no weakness and no weight loss. Pertinent negatives for hypoglycemia complications include no blackouts, no hospitalization, no nocturnal hypoglycemia, no required assistance and no required glucagon injection. Symptoms are stable. Pertinent negatives for diabetic complications include no CVA, heart disease, impotence, nephropathy, peripheral neuropathy, PVD or retinopathy. There are no known risk factors for coronary artery disease. Current diabetic treatment includes insulin injections. He is compliant with treatment all of the time. He is currently taking insulin pre-breakfast and at bedtime. Insulin injections are given by patient. Rotation sites for injection include the abdominal wall. His weight is stable. He is following a generally healthy diet. Meal planning includes avoidance of concentrated sweets. He has not had a previous visit with a dietitian. He participates in exercise daily. He monitors blood glucose at home 5+ x per day. He monitors urine at home <1 x per month. Blood glucose monitoring  compliance is excellent. There is no change in his home blood glucose trend. His breakfast blood glucose is taken between 5-6 am. His breakfast blood glucose range is generally 110-130 mg/dl. His lunch blood glucose is taken between 10-11 am. His lunch blood glucose range is generally 130-140 mg/dl. His dinner blood glucose is taken between 3-4 pm. His dinner blood glucose range is generally 110-130 mg/dl. His overall blood glucose range is 130-140 mg/dl. He does not see a podiatrist.Eye exam is current.      HTN/ DM2:   9/5/2023: Pt has b/p and glucose readings with him today. Will transpose below. Pt reports that b/p has been slightly elevated. States that he has been compliant with Lisinopril 10mg am and 20mg at bedtime. Pt reports using lantus 15 units in the morning and 40units at bedtime. Pt reports that his dexcom monitor has allowed him to keep tighter control of his blood sugar and not having to use as much short acting insulin.            ROS:  Review of Systems   Constitutional:  Negative for fatigue, fever, unexpected weight change and weight loss.   HENT:  Negative for congestion, ear pain, rhinorrhea, sinus pressure, sinus pain and voice change.    Eyes:  Negative for blurred vision and visual disturbance.   Respiratory:  Negative for shortness of breath and wheezing.    Cardiovascular:  Negative for chest pain and palpitations.   Gastrointestinal:  Negative for abdominal pain, nausea and vomiting.   Endocrine: Negative for polydipsia, polyphagia and polyuria.   Genitourinary:  Negative for dysuria, flank pain and impotence.   Musculoskeletal:  Negative for back pain, myalgias and neck pain.   Skin:  Negative for color change, pallor and rash.   Neurological:  Negative for dizziness, tremors, seizures, speech difficulty, weakness, numbness and headaches.   Psychiatric/Behavioral:  Negative for behavioral problems, confusion, dysphoric mood, self-injury and sleep disturbance. The patient is not  nervous/anxious.       reports that he has never smoked. He has never been exposed to tobacco smoke. He has never used smokeless tobacco. He reports that he does not drink alcohol and does not use drugs.    Current Outpatient Medications   Medication Instructions    acetaminophen (TYLENOL) 500 mg, Oral, Every 6 Hours PRN    aspirin 81 mg, Oral, Daily    Azelastine HCl 274 mcg, Nasal, 2 Times Daily    Budeson-Glycopyrrol-Formoterol (Breztri Aerosphere) 160-9-4.8 MCG/ACT aerosol inhaler 2 puffs, Inhalation, 2 Times Daily    Continuous Blood Gluc Sensor (Dexcom G7 Sensor) misc 1 each, Does not apply, Every 10 Days    Dupilumab 300 MG/2ML solution prefilled syringe Subcutaneous, Every 14 Days    Ferrous Sulfate (IRON PO) 65 mg, Oral, Daily    finasteride (PROSCAR) 5 mg, Oral, Daily    fluticasone (FLONASE) 50 MCG/ACT nasal spray 1 spray, Nasal, Daily    GARLIC PO 1 tablet, Daily    insulin glargine (LANTUS, SEMGLEE) 100 UNIT/ML injection 15 units in the morning and 40 units at bedtime.    levalbuterol (XOPENEX HFA) 45 MCG/ACT inhaler 2 puffs, Inhalation, Every 6 Hours PRN    levothyroxine (SYNTHROID) 50 mcg, Oral, Daily    lisinopril (PRINIVIL,ZESTRIL) 20 mg, Oral, 2 Times Daily, 1 tab am and 1/2 at bedtime.    meclizine (ANTIVERT) 12.5 mg, Oral, 3 Times Daily PRN    melatonin 5 mg, Oral, Nightly PRN    Mesalamine 4 GM/60ML SF enema 1 application , Rectal, Every Other Day    metFORMIN ER (GLUCOPHAGE-XR) 500 MG 24 hr tablet Daily as directed.    metoprolol tartrate (LOPRESSOR) 25 mg, Oral, 2 Times Daily    naloxone (NARCAN) 4 MG/0.1ML nasal spray Call 911. Don't prime. [place] in 1 nostril for overdose. Repeat in 2-3 minutes in other nostril if no or minimal breathing/responsiveness.    nitroglycerin (NITROSTAT) 0.4 MG SL tablet 1 under the tongue as needed for angina, may repeat q5mins for up three doses    ondansetron (ZOFRAN) 4 mg, Oral, Every 4 Hours PRN    Pediatric Multiple Vit-C-FA (CHILDRENS CHEWABLE MULTI VITS PO)  "1 tablet, Oral, Daily    rosuvastatin (CRESTOR) 20 mg, Oral, Nightly    triamcinolone (KENALOG) 0.1 % cream 1 application , Topical, 2 Times Daily    Ustekinumab (STELARA) 90 mg, Subcutaneous, Every 28 Days    vitamin B-12 (CYANOCOBALAMIN) 1,000 mcg, Oral, Daily    Vitamin D, Cholecalciferol, 25 MCG (1000 UT) capsule 1 capsule, Oral, Daily       OBJECTIVE:  /70 (BP Location: Left arm, Patient Position: Sitting, Cuff Size: Adult)   Pulse 65   Temp 97.3 °F (36.3 °C) (Infrared)   Resp 18   Ht 175.3 cm (69.02\")   Wt 104 kg (229 lb)   SpO2 97%   BMI 33.80 kg/m²    Physical Exam  Vitals and nursing note reviewed.   Constitutional:       Appearance: Normal appearance. He is well-developed.   HENT:      Head: Normocephalic and atraumatic.      Right Ear: Tympanic membrane, ear canal and external ear normal.      Left Ear: Tympanic membrane, ear canal and external ear normal.      Nose: Nose normal. No septal deviation, nasal tenderness or congestion.      Mouth/Throat:      Lips: Pink. No lesions.      Mouth: Mucous membranes are moist. No oral lesions.      Dentition: Normal dentition.      Pharynx: Oropharynx is clear. No pharyngeal swelling, oropharyngeal exudate or posterior oropharyngeal erythema.   Eyes:      General: Lids are normal. Vision grossly intact. No scleral icterus.        Right eye: No discharge.         Left eye: No discharge.      Extraocular Movements: Extraocular movements intact.      Conjunctiva/sclera: Conjunctivae normal.      Right eye: Right conjunctiva is not injected.      Left eye: Left conjunctiva is not injected.      Pupils: Pupils are equal, round, and reactive to light.   Neck:      Thyroid: No thyroid mass.      Trachea: Trachea normal.   Cardiovascular:      Rate and Rhythm: Normal rate and regular rhythm.      Heart sounds: Normal heart sounds. No murmur heard.    No gallop.   Pulmonary:      Effort: Pulmonary effort is normal.      Breath sounds: Normal breath sounds and " air entry. No wheezing, rhonchi or rales.   Musculoskeletal:         General: No tenderness or deformity. Normal range of motion.      Cervical back: Full passive range of motion without pain, normal range of motion and neck supple.      Thoracic back: Normal.      Right lower leg: No edema.      Left lower leg: No edema.   Skin:     General: Skin is warm and dry.      Coloration: Skin is not jaundiced.      Findings: No rash.   Neurological:      Mental Status: He is alert and oriented to person, place, and time.      Sensory: Sensation is intact.      Motor: Motor function is intact.      Coordination: Coordination is intact.      Gait: Gait is intact.      Deep Tendon Reflexes: Reflexes are normal and symmetric.   Psychiatric:         Mood and Affect: Mood and affect normal.         Behavior: Behavior normal.         Judgment: Judgment normal.       Procedures    Assessment/Plan:     Diagnoses and all orders for this visit:    1. Type 2 diabetes mellitus with hyperglycemia, with long-term current use of insulin (Primary)  -     insulin glargine (LANTUS, SEMGLEE) 100 UNIT/ML injection; 15 units in the morning and 40 units at bedtime.  Dispense: 18 mL; Refill: 2  -     metFORMIN ER (GLUCOPHAGE-XR) 500 MG 24 hr tablet; Daily as directed.  Dispense: 90 tablet; Refill: 4    2. Essential hypertension  Comments:  unstalbe. adjusting lisinopril increase to 20mg am and 10mg at bedtime.  Orders:  -     lisinopril (PRINIVIL,ZESTRIL) 20 MG tablet; Take 1 tablet by mouth 2 (Two) Times a Day. 1 tab am and 1/2 at bedtime.  Dispense: 180 tablet; Refill: 1  -     metoprolol tartrate (LOPRESSOR) 25 MG tablet; Take 1 tablet by mouth 2 (Two) Times a Day.  Dispense: 180 tablet; Refill: 3    3. Hypothyroidism, unspecified type  -     levothyroxine (Synthroid) 50 MCG tablet; Take 1 tablet by mouth Daily.  Dispense: 90 tablet; Refill: 4    4. Obesity (BMI 30-39.9)    5. Chronic obstructive pulmonary disease, unspecified COPD type  -      Budeson-Glycopyrrol-Formoterol (Breztri Aerosphere) 160-9-4.8 MCG/ACT aerosol inhaler; Inhale 2 puffs 2 (Two) Times a Day.  Dispense: 3 each; Refill: 4    6. Essential hypertension  -     lisinopril (PRINIVIL,ZESTRIL) 20 MG tablet; Take 1 tablet by mouth 2 (Two) Times a Day. 1 tab am and 1/2 at bedtime.  Dispense: 180 tablet; Refill: 1  -     metoprolol tartrate (LOPRESSOR) 25 MG tablet; Take 1 tablet by mouth 2 (Two) Times a Day.  Dispense: 180 tablet; Refill: 3    7. Type 2 diabetes mellitus with hyperglycemia, with long-term current use of insulin  Comments:  stable. Will begin increasing on lantus and decreasing on novolog throughout the day.   Orders:  -     insulin glargine (LANTUS, SEMGLEE) 100 UNIT/ML injection; 15 units in the morning and 40 units at bedtime.  Dispense: 18 mL; Refill: 2  -     metFORMIN ER (GLUCOPHAGE-XR) 500 MG 24 hr tablet; Daily as directed.  Dispense: 90 tablet; Refill: 4    8. Mixed hyperlipidemia  -     rosuvastatin (CRESTOR) 20 MG tablet; Take 1 tablet by mouth Every Night.  Dispense: 90 tablet; Refill: 4           An After Visit Summary was printed and given to the patient at discharge.  Return in about 3 months (around 12/7/2023).       Patient Instructions   Increase lisinopril to 20mg twice daily.     Follow up in 3 months.     Begin synthroid 50 mcg daily on an empty stomach 30 min before food.     Fax notes to [phone]      Discussion:     I spent 30 minutes caring for Zachariah on this date of service. This time includes time spent by me in the following activities: preparing for the visit, reviewing tests, obtaining and/or reviewing a separately obtained history, performing a medically appropriate examination and/or evaluation, counseling and educating the patient/family/caregiver, ordering medications, tests, or procedures, documenting information in the medical record, independently interpreting results and communicating that information with the patient/family/caregiver,  and care coordination     Amaya Mix APRN 9/10/2023   Electronically signed.

## 2023-09-07 NOTE — PATIENT INSTRUCTIONS
Increase lisinopril to 20mg twice daily.     Follow up in 3 months.     Begin synthroid 50 mcg daily on an empty stomach 30 min before food.     Fax notes to [phone]

## 2023-09-08 ENCOUNTER — TELEPHONE (OUTPATIENT)
Dept: FAMILY MEDICINE CLINIC | Facility: CLINIC | Age: 83
End: 2023-09-08
Payer: MEDICARE

## 2023-09-08 NOTE — TELEPHONE ENCOUNTER
I faxed most recent visit of patient's 9/7/23 to the number provided by Edy at [phone], fax was sent on 9/8/2023.

## 2023-09-11 ENCOUNTER — OFFICE VISIT (OUTPATIENT)
Dept: NEUROLOGY | Age: 83
End: 2023-09-11
Payer: MEDICARE

## 2023-09-11 VITALS
BODY MASS INDEX: 36.32 KG/M2 | OXYGEN SATURATION: 96 % | WEIGHT: 226 LBS | HEIGHT: 66 IN | HEART RATE: 64 BPM | DIASTOLIC BLOOD PRESSURE: 84 MMHG | SYSTOLIC BLOOD PRESSURE: 133 MMHG

## 2023-09-11 DIAGNOSIS — G47.33 OBSTRUCTIVE SLEEP APNEA: Primary | ICD-10-CM

## 2023-09-11 DIAGNOSIS — Z76.89 ESTABLISHING CARE WITH NEW DOCTOR, ENCOUNTER FOR: ICD-10-CM

## 2023-09-11 DIAGNOSIS — Z99.89 CPAP (CONTINUOUS POSITIVE AIRWAY PRESSURE) DEPENDENCE: ICD-10-CM

## 2023-09-11 PROCEDURE — G8427 DOCREV CUR MEDS BY ELIG CLIN: HCPCS | Performed by: PHYSICIAN ASSISTANT

## 2023-09-11 PROCEDURE — 99203 OFFICE O/P NEW LOW 30 MIN: CPT | Performed by: PHYSICIAN ASSISTANT

## 2023-09-11 PROCEDURE — G8417 CALC BMI ABV UP PARAM F/U: HCPCS | Performed by: PHYSICIAN ASSISTANT

## 2023-09-11 PROCEDURE — 1036F TOBACCO NON-USER: CPT | Performed by: PHYSICIAN ASSISTANT

## 2023-09-11 PROCEDURE — 1123F ACP DISCUSS/DSCN MKR DOCD: CPT | Performed by: PHYSICIAN ASSISTANT

## 2023-09-11 NOTE — PROGRESS NOTES
REVIEW OF SYSTEMS    Constitutional: []Fever []Sweats []Chills [] Recent Injury   [x] Denies all unless marked  HENT:[]Headache  [] Head Injury  [] Sore Throat  [] Ear Pain  [] Dizziness [] Hearing Loss   [x] Denies all unless marked  Musculoskeletal: [] Arthralgia  [] Myalgias [] Muscle cramps  [] Muscle twitches   [x] Denies all unless marked   Spine:  [] Neck pain  [] Back pain  [] Sciatica  [x] Denies all unless marked  Neurological:[] Visual Disturbance [] Double Vision [] Slurred Speech [] Trouble swallowing  [] Vertigo [] Tingling [] Numbness [] Weakness [] Loss of Balance   [] Loss of Consciousness [] Memory Loss [] Seizures  [x] Denies all unless marked  Psychiatric/Behavioral:[] Depression [] Anxiety  [x] Denies all unless marked  Sleep: []  Insomnia [] Sleep Disturbance [] Snoring [] Restless Legs [] Daytime Sleepiness [x] Sleep Apnea  [] Denies all unless marked
in this encounter. No orders of the defined types were placed in this encounter. 2. We had a discussion about the clinical issues and went over the various important aspects to consider. Treatment plan and potential diagnostic studies were discussed. Patient advised of the etiology,  pathophysiology, signs, symptoms, diagnosis, treatment options, and risks of untreated WAQAR. Risks may include, but are not limited to  hypertension, coronary artery disease, atrial fibrillation, CHF, diabetes, stroke, weight gain, impaired cognition, daytime somnolence,  and motor vehicle accidents. Advised to abstain from driving or operating heavy machinery when drowsy and the use of respiratory suppressants. Counseled on multimodal approach to treatment of sleep apnea to include but not limited to diet, exercise, sleep hygiene, and compliance with pap therapy, if indicated. All questions were answered. Pt voiced understanding and agrees with treatment plan. 3.  The following educational material has been included in this visit after visit summary for your review:  WAQAR/PAP guidelines/sleep studies/CPAP-discussed with pt.  4.  Order-auto CPAP with a pressure range of 8cm to 18cm  5. Will review compliance download when received from [address]  6. Follow up per protocol to reassess symptomatology, PAP tolerance, efficacy and compliance       The patient indicates understanding of the above issues and agrees with the care plan. I spent 35 minutes caring for Nolan Gregory  on this date of service.  This time includes time spent by me in the following activities:preparing for the visit, reviewing tests, performing a medically appropriate examination and/or evaluation , counseling and educating the patient/family/caregiver, ordering medications, tests, or procedures, documenting information in the medical record and care coordination       DME Equipment HPCPS Code Setting        Auto Adjusting CPAP device with flex or

## 2023-09-29 ENCOUNTER — TRANSCRIBE ORDERS (OUTPATIENT)
Dept: ADMINISTRATIVE | Facility: HOSPITAL | Age: 83
End: 2023-09-29
Payer: MEDICARE

## 2023-09-29 DIAGNOSIS — K51.019 ULCERATIVE COLITIS, UNIVERSAL, UNSPECIFIED COMPLICATION: ICD-10-CM

## 2023-09-29 DIAGNOSIS — Z79.60 LONG-TERM USE OF IMMUNOSUPPRESSANT MEDICATION: Primary | ICD-10-CM

## 2023-10-06 ENCOUNTER — LAB (OUTPATIENT)
Dept: LAB | Facility: HOSPITAL | Age: 83
End: 2023-10-06
Payer: MEDICARE

## 2023-10-06 DIAGNOSIS — Z79.4 TYPE 2 DIABETES MELLITUS WITH HYPERGLYCEMIA, WITH LONG-TERM CURRENT USE OF INSULIN: ICD-10-CM

## 2023-10-06 DIAGNOSIS — E11.65 TYPE 2 DIABETES MELLITUS WITH HYPERGLYCEMIA, WITH LONG-TERM CURRENT USE OF INSULIN: ICD-10-CM

## 2023-10-06 DIAGNOSIS — Z79.60 LONG-TERM USE OF IMMUNOSUPPRESSANT MEDICATION: ICD-10-CM

## 2023-10-06 DIAGNOSIS — K51.019 ULCERATIVE COLITIS, UNIVERSAL, UNSPECIFIED COMPLICATION: ICD-10-CM

## 2023-10-06 LAB — ERYTHROCYTE [SEDIMENTATION RATE] IN BLOOD: 8 MM/HR (ref 0–20)

## 2023-10-06 PROCEDURE — 86480 TB TEST CELL IMMUN MEASURE: CPT

## 2023-10-06 PROCEDURE — 85652 RBC SED RATE AUTOMATED: CPT

## 2023-10-06 PROCEDURE — 36415 COLL VENOUS BLD VENIPUNCTURE: CPT

## 2023-10-06 PROCEDURE — 86140 C-REACTIVE PROTEIN: CPT

## 2023-10-06 PROCEDURE — 84443 ASSAY THYROID STIM HORMONE: CPT

## 2023-10-06 PROCEDURE — 82306 VITAMIN D 25 HYDROXY: CPT

## 2023-10-07 LAB
25(OH)D3 SERPL-MCNC: 37.2 NG/ML (ref 30–100)
CRP SERPL-MCNC: <0.3 MG/DL (ref 0–0.5)
TSH SERPL DL<=0.05 MIU/L-ACNC: 3.45 UIU/ML (ref 0.27–4.2)

## 2023-10-09 ENCOUNTER — LAB (OUTPATIENT)
Dept: LAB | Facility: HOSPITAL | Age: 83
End: 2023-10-09
Payer: MEDICARE

## 2023-10-09 LAB
GAMMA INTERFERON BACKGROUND BLD IA-ACNC: 0 IU/ML
M TB IFN-G BLD-IMP: NEGATIVE
M TB IFN-G CD4+ T-CELLS BLD-ACNC: 0.02 IU/ML
M TBIFN-G CD4+ CD8+T-CELLS BLD-ACNC: 0 IU/ML
MITOGEN IGNF BLD-ACNC: >10 IU/ML
QUANTIFERON INCUBATION: NORMAL
SERVICE CMNT-IMP: NORMAL

## 2023-10-09 PROCEDURE — 83993 ASSAY FOR CALPROTECTIN FECAL: CPT

## 2023-10-14 LAB — CALPROTECTIN STL-MCNT: 603 UG/G (ref 0–120)

## 2023-10-24 ENCOUNTER — APPOINTMENT (OUTPATIENT)
Dept: CT IMAGING | Facility: HOSPITAL | Age: 83
End: 2023-10-24
Payer: MEDICARE

## 2023-10-24 ENCOUNTER — HOSPITAL ENCOUNTER (EMERGENCY)
Facility: HOSPITAL | Age: 83
Discharge: HOME | End: 2023-10-24
Attending: EMERGENCY MEDICINE | Admitting: EMERGENCY MEDICINE
Payer: MEDICARE

## 2023-10-24 VITALS
SYSTOLIC BLOOD PRESSURE: 137 MMHG | DIASTOLIC BLOOD PRESSURE: 54 MMHG | RESPIRATION RATE: 18 BRPM | OXYGEN SATURATION: 97 % | BODY MASS INDEX: 32.88 KG/M2 | HEART RATE: 58 BPM | WEIGHT: 222 LBS | HEIGHT: 69 IN | TEMPERATURE: 98.7 F

## 2023-10-24 DIAGNOSIS — K57.92 DIVERTICULITIS: ICD-10-CM

## 2023-10-24 DIAGNOSIS — K52.9 COLITIS: Primary | ICD-10-CM

## 2023-10-24 DIAGNOSIS — N20.0 KIDNEY STONE: ICD-10-CM

## 2023-10-24 LAB
ALBUMIN SERPL-MCNC: 4.1 G/DL (ref 3.5–5.2)
ALBUMIN/GLOB SERPL: 1.2 G/DL
ALP SERPL-CCNC: 74 U/L (ref 39–117)
ALT SERPL W P-5'-P-CCNC: 17 U/L (ref 1–41)
ANION GAP SERPL CALCULATED.3IONS-SCNC: 12 MMOL/L (ref 5–15)
AST SERPL-CCNC: 24 U/L (ref 1–40)
BASOPHILS # BLD AUTO: 0.09 10*3/MM3 (ref 0–0.2)
BASOPHILS NFR BLD AUTO: 0.9 % (ref 0–1.5)
BILIRUB SERPL-MCNC: 0.3 MG/DL (ref 0–1.2)
BILIRUB UR QL STRIP: NEGATIVE
BUN SERPL-MCNC: 12 MG/DL (ref 8–23)
BUN/CREAT SERPL: 14.8 (ref 7–25)
CALCIUM SPEC-SCNC: 9.1 MG/DL (ref 8.6–10.5)
CHLORIDE SERPL-SCNC: 101 MMOL/L (ref 98–107)
CLARITY UR: CLEAR
CO2 SERPL-SCNC: 24 MMOL/L (ref 22–29)
COLOR UR: YELLOW
CREAT SERPL-MCNC: 0.81 MG/DL (ref 0.76–1.27)
CRP SERPL-MCNC: <0.3 MG/DL (ref 0–0.5)
D-LACTATE SERPL-SCNC: 1.6 MMOL/L (ref 0.5–2)
DEPRECATED RDW RBC AUTO: 43.8 FL (ref 37–54)
EGFRCR SERPLBLD CKD-EPI 2021: 89.1 ML/MIN/1.73
EOSINOPHIL # BLD AUTO: 0.98 10*3/MM3 (ref 0–0.4)
EOSINOPHIL NFR BLD AUTO: 10.3 % (ref 0.3–6.2)
ERYTHROCYTE [DISTWIDTH] IN BLOOD BY AUTOMATED COUNT: 12.8 % (ref 12.3–15.4)
GLOBULIN UR ELPH-MCNC: 3.4 GM/DL
GLUCOSE SERPL-MCNC: 131 MG/DL (ref 65–99)
GLUCOSE UR STRIP-MCNC: NEGATIVE MG/DL
HCT VFR BLD AUTO: 47.6 % (ref 37.5–51)
HGB BLD-MCNC: 15.4 G/DL (ref 13–17.7)
HGB UR QL STRIP.AUTO: NEGATIVE
HOLD SPECIMEN: NORMAL
HOLD SPECIMEN: NORMAL
IMM GRANULOCYTES # BLD AUTO: 0.04 10*3/MM3 (ref 0–0.05)
IMM GRANULOCYTES NFR BLD AUTO: 0.4 % (ref 0–0.5)
KETONES UR QL STRIP: NEGATIVE
LEUKOCYTE ESTERASE UR QL STRIP.AUTO: NEGATIVE
LIPASE SERPL-CCNC: 21 U/L (ref 13–60)
LYMPHOCYTES # BLD AUTO: 1.47 10*3/MM3 (ref 0.7–3.1)
LYMPHOCYTES NFR BLD AUTO: 15.5 % (ref 19.6–45.3)
MCH RBC QN AUTO: 30.1 PG (ref 26.6–33)
MCHC RBC AUTO-ENTMCNC: 32.4 G/DL (ref 31.5–35.7)
MCV RBC AUTO: 93 FL (ref 79–97)
MONOCYTES # BLD AUTO: 0.51 10*3/MM3 (ref 0.1–0.9)
MONOCYTES NFR BLD AUTO: 5.4 % (ref 5–12)
NEUTROPHILS NFR BLD AUTO: 6.41 10*3/MM3 (ref 1.7–7)
NEUTROPHILS NFR BLD AUTO: 67.5 % (ref 42.7–76)
NITRITE UR QL STRIP: NEGATIVE
NRBC BLD AUTO-RTO: 0 /100 WBC (ref 0–0.2)
PH UR STRIP.AUTO: 5.5 [PH] (ref 5–8)
PLATELET # BLD AUTO: 257 10*3/MM3 (ref 140–450)
PMV BLD AUTO: 9.9 FL (ref 6–12)
POTASSIUM SERPL-SCNC: 3.9 MMOL/L (ref 3.5–5.2)
PROT SERPL-MCNC: 7.5 G/DL (ref 6–8.5)
PROT UR QL STRIP: NEGATIVE
RBC # BLD AUTO: 5.12 10*6/MM3 (ref 4.14–5.8)
SODIUM SERPL-SCNC: 137 MMOL/L (ref 136–145)
SP GR UR STRIP: 1.02 (ref 1–1.03)
TROPONIN T SERPL HS-MCNC: 13 NG/L
UROBILINOGEN UR QL STRIP: NORMAL
WBC NRBC COR # BLD: 9.5 10*3/MM3 (ref 3.4–10.8)
WHOLE BLOOD HOLD COAG: NORMAL
WHOLE BLOOD HOLD SPECIMEN: NORMAL

## 2023-10-24 PROCEDURE — 36415 COLL VENOUS BLD VENIPUNCTURE: CPT

## 2023-10-24 PROCEDURE — 83690 ASSAY OF LIPASE: CPT

## 2023-10-24 PROCEDURE — 99285 EMERGENCY DEPT VISIT HI MDM: CPT

## 2023-10-24 PROCEDURE — 25510000001 IOPAMIDOL 61 % SOLUTION

## 2023-10-24 PROCEDURE — 84484 ASSAY OF TROPONIN QUANT: CPT

## 2023-10-24 PROCEDURE — 80053 COMPREHEN METABOLIC PANEL: CPT

## 2023-10-24 PROCEDURE — 83605 ASSAY OF LACTIC ACID: CPT

## 2023-10-24 PROCEDURE — 86140 C-REACTIVE PROTEIN: CPT

## 2023-10-24 PROCEDURE — 85025 COMPLETE CBC W/AUTO DIFF WBC: CPT

## 2023-10-24 PROCEDURE — 74177 CT ABD & PELVIS W/CONTRAST: CPT

## 2023-10-24 PROCEDURE — 81003 URINALYSIS AUTO W/O SCOPE: CPT

## 2023-10-24 PROCEDURE — 25810000003 LACTATED RINGERS SOLUTION

## 2023-10-24 RX ORDER — SODIUM CHLORIDE 0.9 % (FLUSH) 0.9 %
10 SYRINGE (ML) INJECTION AS NEEDED
Status: DISCONTINUED | OUTPATIENT
Start: 2023-10-24 | End: 2023-10-24 | Stop reason: HOSPADM

## 2023-10-24 RX ORDER — METRONIDAZOLE 500 MG/1
500 TABLET ORAL 2 TIMES DAILY
Qty: 14 TABLET | Refills: 0 | Status: SHIPPED | OUTPATIENT
Start: 2023-10-24 | End: 2023-10-31

## 2023-10-24 RX ORDER — AMOXICILLIN AND CLAVULANATE POTASSIUM 875; 125 MG/1; MG/1
1 TABLET, FILM COATED ORAL 2 TIMES DAILY
Qty: 14 TABLET | Refills: 0 | Status: SHIPPED | OUTPATIENT
Start: 2023-10-24 | End: 2023-10-31

## 2023-10-24 RX ADMIN — IOPAMIDOL 100 ML: 612 INJECTION, SOLUTION INTRAVENOUS at 16:22

## 2023-10-24 RX ADMIN — SODIUM CHLORIDE, POTASSIUM CHLORIDE, SODIUM LACTATE AND CALCIUM CHLORIDE 1000 ML: 600; 310; 30; 20 INJECTION, SOLUTION INTRAVENOUS at 16:13

## 2023-10-24 NOTE — DISCHARGE INSTRUCTIONS
It was very nice to meet you, Zachariah. Thank you for allowing us to take care of you today at [facility].    Your evaluation today did not show any emergent findings or have any emergent indications for admission to the hospital.     Please understand that an ER evaluation is just the start of your evaluation. We will do what we can, but we are often unable to fully figure out what is causing your symptoms from one evaluation. Thus, our primary goal is to determine whether you need to be evaluated in the hospital or if it is safe for you to go home and see other doctors such as a primary care physician or a specialist on an outpatient basis.     Like we discussed, it is VERY IMPORTANT that you follow up with your primary care doctor (call them to set up an appointment) within the next few days or as soon as possible so that you can be re-evaluated for improvement in your symptoms or for any other questions.     Please return to the emergency room within 12-48 hours if you experience any new or worsening symptoms.

## 2023-10-24 NOTE — ED PROVIDER NOTES
Subjective   History of Present Illness  Patient is an 82-year-old male who presents emergency department with complaints of abdominal pain.  States that he has had dull left lower quadrant pain for a week.  He states that he has not had a bowel movement since Saturday.  States that he is had 5 bowel movements on Friday.  He denies any blood in his stool.  Denies any urinary symptoms.  Denies blood in urine.  He states that he has history of ulcerative colitis and he follows with the [facility].  States that for the last week he has been eating soft food and taking Tylenol.  He states that he went to urgent care who encouraged him to go to the emergency department for further evaluation.  He denies nausea or vomiting.  Denies fevers.  Denies any further symptoms.        Review of Systems   Gastrointestinal:  Positive for abdominal pain.   All other systems reviewed and are negative.      Past Medical History:   Diagnosis Date    Asthma     Coronary artery disease     Diabetes mellitus     Elevated cholesterol     HL (hearing loss) 2012    Hyperlipidemia     Hypertension     Inflammatory bowel disease 2001    Myocardial infarct     EASTER 2020    Sleep apnea     cpap at     Sleep apnea, obstructive     Stroke     Visual impairment 2016    Double vision       Allergies   Allergen Reactions    Albuterol Other (See Comments)    Adhesive Tape Rash    Azithromycin Rash    Fentanyl Nausea And Vomiting       Past Surgical History:   Procedure Laterality Date    ADENOIDECTOMY  1947    APPENDECTOMY N/A 5/5/2023    Procedure: APPENDECTOMY LAPAROSCOPIC;  Surgeon: Katherine Carranza MD;  Location: [facility];  Service: General;  Laterality: N/A;    BACK SURGERY      CARDIAC CATHETERIZATION      stents x 6    CARDIAC SURGERY      CABG TRIPLE BYPASS 2012    CATARACT EXTRACTION      COLONOSCOPY      COLONOSCOPY N/A 06/04/2021    Procedure: COLONOSCOPY WITH ANESTHESIA;  Surgeon: Zachariah Menjivar, DO;   Location:  [facility];  Service: Gastroenterology;  Laterality: N/A;  pre hx ulcerative colitis  post ulcerative colitis  dr collins gusman    CORONARY ANGIOPLASTY WITH STENT PLACEMENT      CORONARY ARTERY BYPASS GRAFT  December 2012    CORONARY STENT PLACEMENT      HIP SURGERY Right     total hip    JOINT REPLACEMENT  2015    Right hip    PENILE PROSTHESIS IMPLANT N/A 12/13/2021    Procedure: 3-PIECE INFLATABLE PENILE PROSTHESIS PLACEMENT;  Surgeon: Jose Tellez MD;  Location:  [facility];  Service: Urology;  Laterality: N/A;    TONSILLECTOMY  1947       Family History   Problem Relation Age of Onset    Colon cancer Brother     Colon polyps Neg Hx     Esophageal cancer Neg Hx        Social History     Socioeconomic History    Marital status:    Tobacco Use    Smoking status: Never     Passive exposure: Never    Smokeless tobacco: Never   Vaping Use    Vaping Use: Never used   Substance and Sexual Activity    Alcohol use: Never    Drug use: Never    Sexual activity: Not Currently     Partners: Female           Objective   Physical Exam  Vitals and nursing note reviewed.   Constitutional:       General: He is not in acute distress.     Appearance: He is well-developed and normal weight. He is not ill-appearing or toxic-appearing.   HENT:      Head: Normocephalic.   Cardiovascular:      Rate and Rhythm: Normal rate and regular rhythm.   Pulmonary:      Effort: Pulmonary effort is normal.      Breath sounds: Normal breath sounds.   Abdominal:      General: Abdomen is flat. Bowel sounds are normal.      Palpations: Abdomen is soft.      Tenderness: There is abdominal tenderness in the left lower quadrant.   Skin:     General: Skin is warm and dry.   Neurological:      General: No focal deficit present.      Mental Status: He is alert and oriented to person, place, and time.   Psychiatric:         Mood and Affect: Mood normal.         Behavior: Behavior normal.         Procedures           ED Course  ED  Course as of 10/24/23 1945   Tue Oct 24, 2023   1656 This patient is 82 years old came with abdominal pain he is got a chronic renal calculus.  Along with that he got chronic colitis inflammatory markers are negative white succumbs negative.  He has had a colonoscopy done at [place] which is showing chronic colitis.  We will empirically treat him with antibiotics and have him follow-up for repeat colonoscopy. [TS]   1703 Colonoscopy reviewed from a year ago.  He does have chronic colitis.  CT abdomen pelvis today reviewed which revealed acute colitis/diverticulitis.  Lab work is unremarkable.  He also has a 14 mm kidney stone which appears to be unchanged from previous CT scan.  Patient is not having any difficulty urinating.  No blood in his urine.  We will plan to have him follow-up outpatient with his primary care provider. [KR]      ED Course User Index  [KR] Anegla Howell APRN  [TS] Homero Thompson MD                                           Medical Decision Making  Zachariah Acosta is a very pleasant 82 y.o. male who presents to the emergency department for abdominal pain.     Patient was non-toxic and not-ill appearing on arrival. Vital signs stable.     Patient's presentation raises suspicion for differentials including, but not limited to, colitis, diverticulitis, urinary tract infection, kidney stone.     External (non-ED) record review: None    Given this, laboratory studies and imaging studies were ordered including CBC, CMP, lipase, lactic acid, troponin, CRP, urinalysis, CT abdomen pelvis with contrast.     Zachariah was given IV fluids for symptomatic relief.    Imaging was reviewed by radiologist.  CT abdomen pelvis revealed Acute sigmoid colitis/diverticulitis involving the proximal sigmoid colon over a length of 14 cm. The appearance is similar to the CT from 5 months ago, no perforation or abscess is identified. Chronic stable mesenteric panniculitis. Bilateral parapelvic cysts, there is  also mild dilation of the collecting structures of the left kidney, a 14 mm stone is noted in the left renal pelvis, unchanged.    Labs were reviewed.  CBC and CMP unremarkable.  Lipase normal.  Troponin unremarkable.  CRP normal.  Lactic acid normal.  Urinalysis unremarkable for infection.  No blood seen.  On re-evaluation, patient remained hemodynamically stable and appeared to be comfortable and in no acute distress.    Given findings described above, patient's presentation is most likely related to chronic colitis and diverticulitis.  Patient does have a kidney stone noted on his CT abdomen pelvis, this is unchanged from previous CT scan.  Patient is not having any difficulty urinating.  No blood seen in his urine.  Urinalysis was unremarkable for infection.  We will plan to send patient home on oral antibiotics and treat outpatient.    I discussed all of the lab and imaging results with the patient during this visit in the emergency department. I answered all the questions regarding the emergency department evaluation, diagnosis, and treatment plan. We talked about how crucial it is for the patient to follow up by calling their primary care provider as soon as possible to schedule an appointment for within the next few days or as soon as possible so that the symptoms can be reassessed to see if they have improved or to answer any additional questions. I also provided the patient with advice on returning safely and urged the patient to visit the emergency department right away if any worsening or new symptoms appeared. The patient verbalized understanding of the discharge instructions and agreed with them. Zachariah was discharged in stable condition.    Signed by:   BORIS Liz 10/24/2023 19:43 CDT     Dragon disclaimer:  Part of this note may be an electronic transcription/translation of spoken language to printed text using the Dragon Dictation System.    Problems Addressed:  Colitis: acute illness or  injury  Diverticulitis: acute illness or injury  Kidney stone: acute illness or injury    Amount and/or Complexity of Data Reviewed  Radiology: ordered.    Risk  Prescription drug management.        Final diagnoses:   Colitis   Diverticulitis   Kidney stone       ED Disposition  ED Disposition       ED Disposition   Discharge    Condition   Stable    Comment   --               Amaya Mix, APRN  [address] [facility] [address]  [place] [address]  [phone]    Schedule an appointment as soon as possible for a visit in 1 day      [facility] EMERGENCY DEPARTMENT  [address] [place] [place] [id number] [phone]  Go to   If symptoms worsen    Neeraj Huggins MD  [address] [facility] [address]  [place] [address]  [phone]    Schedule an appointment as soon as possible for a visit in 1 day           Medication List        New Prescriptions      amoxicillin-clavulanate 875-125 MG per tablet  Commonly known as: AUGMENTIN  Take 1 tablet by mouth 2 (Two) Times a Day for 7 days.     metroNIDAZOLE 500 MG tablet  Commonly known as: FLAGYL  Take 1 tablet by mouth 2 (Two) Times a Day for 7 days.               Where to Get Your Medications        These medications were sent to [facility] [address] [place] [address] [address] [facility] - [phone] [facility] [phone] [place] [id number]      Phone: [phone]   amoxicillin-clavulanate 875-125 MG per tablet  metroNIDAZOLE 500 MG tablet            Angela Howell, BORIS  10/24/23 1945

## 2023-11-05 NOTE — PROGRESS NOTES
Chief Complaint   Patient presents with    Ulcerative Colitis     Was in er diverticulitis was told to follow up with gi not able to go to [place] any longer       PCP: Amaya Mix APRN  REFER: Meli Brandt APRN    Subjective     HPI    Zachariah Acosta has history of Ulcerative Colitis (diagnosis 1997).  He has failed Remicade, Entyvio,  in the past.  Currently maintained on Stelara every 4 weeks and Rowasa nightly.   He has been following [facility] and wishes to resume care locally.      Zachariah Acosta sought treatment at urgent care [facility] 10/24/23 for complains of abdominal pain, no bowel movement x 4 days.  He was sent to ER for CT scan which was indicative of diverticulitis  Acute sigmoid colitis/diverticulitis involving the proximal sigmoid colon over a length of 14 cm.  CT scan dated 5/4/23 showed thickening to wall of sigmoid colon.  He was treated with antibiotic with improvement in pain.      Today, Zachariah Acosta continues to experience pressure to LLQ.  He reports this feeling similar to prior occurrence of diverticulitis and not the discomfort that he has felt with prior Ulcerative Colitis flares.  No signs of GI blood loss.  Bowels have improved and moving 1-2 times per day.  It is not uncommon for him to have more bowel movement on days he goes to [place] or dr office.  Continues to deny fever or chills.     Calprotectin, Fecal - Stool, Per Rectum (10/09/2023 09:13)       Findings:   Colonoscopy: - 4/5/2022: 1) COLON, RIGHT, BIOPSY: MODERATELY ACTIVE CHRONIC COLITIS AND FOCAL EROSION; SEE  COMMENT.  2) SMALL BOWEL, TERMINAL ILEUM, BIOPSY: MILDLY ACTIVE CHRONIC ILEITIS; SEE COMMENT.  3) COLON, TRANSVERSE, BIOPSY: MILDLY ACTIVE CHRONIC COLITIS; SEE COMMENT.  4) COLON, LEFT, BIOPSY: MODERATELY ACTIVE CHRONIC COLITIS AND FOCAL EROSION; SEE  COMMENT.  5) STRICTURE, BIOPSY: MILDLY ACTIVE CHRONIC COLITIS; SEE COMMENT.  6) RECTUM, BIOPSY:  MILDLY ACTIVE CHRONIC PROCTITIS; SEE COMMENT.  - 6/2021: Diffuse colitis with friable, inflamed, nodular, scared, pseudopolypoid mucosa in the entire colon; pathology from segmental colon bx with active inflammation no dysplasia.     Past Medical History:   Diagnosis Date    Asthma     Coronary artery disease     Diabetes mellitus     Elevated cholesterol     HL (hearing loss) 2012    Hyperlipidemia     Hypertension     Inflammatory bowel disease 2001    Myocardial infarct     EASTER 2020    Sleep apnea     cpap at [facility]    Sleep apnea, obstructive     Stroke     Visual impairment 2016    Double vision       Past Surgical History:   Procedure Laterality Date    ADENOIDECTOMY  1947    APPENDECTOMY N/A 5/5/2023    Procedure: APPENDECTOMY LAPAROSCOPIC;  Surgeon: Katherine Carranza MD;  Location:  [facility];  Service: General;  Laterality: N/A;    BACK SURGERY      CARDIAC CATHETERIZATION      stents x 6    CARDIAC SURGERY      CABG TRIPLE BYPASS 2012    CATARACT EXTRACTION      COLONOSCOPY      COLONOSCOPY N/A 06/04/2021    Procedure: COLONOSCOPY WITH ANESTHESIA;  Surgeon: Zachariah Menjivar DO;  Location: [facility] END[facility]COPY;  Service: Gastroenterology;  Laterality: N/A;  pre hx ulcerative colitis  post ulcerative colitis  dr collins gusman    CORONARY ANGIOPLASTY WITH STENT PLACEMENT      CORONARY ARTERY BYPASS GRAFT  December 2012    CORONARY STENT PLACEMENT      HIP SURGERY Right     total hip    JOINT REPLACEMENT  2015    Right hip    PENILE PROSTHESIS IMPLANT N/A 12/13/2021    Procedure: 3-PIECE INFLATABLE PENILE PROSTHESIS PLACEMENT;  Surgeon: Jose Tellez MD;  Location:  [facility];  Service: Urology;  Laterality: N/A;    TONSILLECTOMY  1947       Outpatient Medications Marked as Taking for the 11/6/23 encounter (Office Visit) with Salo Nascimento APRN   Medication Sig Dispense Refill    acetaminophen (TYLENOL) 500 MG tablet Take 1 tablet by mouth Every 6 (Six) Hours As Needed for Mild Pain.      aspirin 81 MG  EC tablet Take 1 tablet by mouth Daily.      Azelastine HCl 137 MCG/SPRAY solution 2 sprays into the nostril(s) as directed by provider 2 (Two) Times a Day. 30 mL 5    Budeson-Glycopyrrol-Formoterol (Breztri Aerosphere) 160-9-4.8 MCG/ACT aerosol inhaler Inhale 2 puffs 2 (Two) Times a Day. 3 each 4    ciclopirox (LOPROX) 0.77 % cream APPLY A THIN LAYER UNDER ARMS TWICE DAILY AFTER USING CLINDAMYCIN SOLUTION      clindamycin (CLEOCIN T) 1 % external solution APPLY SOLUTION TOPICALLY TO UNDERARMS TWICE DAILY      Dupilumab 300 MG/2ML solution prefilled syringe Inject  under the skin into the appropriate area as directed Every 14 (Fourteen) Days.      Ferrous Sulfate (IRON PO) Take 65 mg by mouth Daily.      finasteride (PROSCAR) 5 MG tablet Take 1 tablet by mouth Daily. 90 tablet 3    fluticasone (FLONASE) 50 MCG/ACT nasal spray 1 spray into the nostril(s) as directed by provider Daily. 16 g 5    GARLIC PO Take 1 tablet by mouth Daily.      insulin glargine (LANTUS, SEMGLEE) 100 UNIT/ML injection 15 units in the morning and 40 units at bedtime. 18 mL 2    levalbuterol (XOPENEX HFA) 45 MCG/ACT inhaler Inhale 2 puffs Every 6 (Six) Hours As Needed for Wheezing.      levothyroxine (Synthroid) 50 MCG tablet Take 1 tablet by mouth Daily. 90 tablet 4    lisinopril (PRINIVIL,ZESTRIL) 20 MG tablet Take 1 tablet by mouth 2 (Two) Times a Day. 1 tab am and 1/2 at bedtime. 180 tablet 1    meclizine (ANTIVERT) 12.5 MG tablet Take 1 tablet by mouth 3 (Three) Times a Day As Needed for Dizziness. 30 tablet 0    melatonin 5 MG tablet tablet Take 1 tablet by mouth At Night As Needed.      Mesalamine 4 GM/60ML SF enema Insert 1 application into the rectum Every Other Day.      metFORMIN ER (GLUCOPHAGE-XR) 500 MG 24 hr tablet Daily as directed. 90 tablet 4    metoprolol tartrate (LOPRESSOR) 25 MG tablet Take 1 tablet by mouth 2 (Two) Times a Day. 180 tablet 3    nitroglycerin (NITROSTAT) 0.4 MG SL tablet 1 under the tongue as needed for  "angina, may repeat q5mins for up three doses 25 tablet 1    ondansetron (Zofran) 4 MG tablet Take 1 tablet by mouth Every 4 (Four) Hours As Needed for Nausea or Vomiting for up to 20 doses. 20 tablet 0    Pediatric Multiple Vit-C-FA (CHILDRENS CHEWABLE MULTI VITS PO) Take 1 tablet by mouth Daily.      rosuvastatin (CRESTOR) 20 MG tablet Take 1 tablet by mouth Every Night. 90 tablet 4    triamcinolone (KENALOG) 0.1 % cream Apply 1 application  topically to the appropriate area as directed 2 (Two) Times a Day.      Ustekinumab (STELARA) 90 MG/ML solution prefilled syringe Injection Inject 90 mg under the skin into the appropriate area as directed Every 28 (Twenty-Eight) Days. 1 mL 6    vitamin B-12 (CYANOCOBALAMIN) 1000 MCG tablet Take 1 tablet by mouth Daily.      Vitamin D, Cholecalciferol, 25 MCG (1000 UT) capsule Take 1 capsule by mouth Daily.         Allergies   Allergen Reactions    Albuterol Other (See Comments)    Adhesive Tape Rash    Azithromycin Rash    Fentanyl Nausea And Vomiting       Social History     Socioeconomic History    Marital status:    Tobacco Use    Smoking status: Never     Passive exposure: Never    Smokeless tobacco: Never   Vaping Use    Vaping Use: Never used   Substance and Sexual Activity    Alcohol use: Never    Drug use: Never    Sexual activity: Not Currently     Partners: Female       Review of Systems   Constitutional:  Negative for fever and unexpected weight change.   HENT:  Negative for trouble swallowing.    Respiratory:  Negative for shortness of breath.    Cardiovascular:  Negative for chest pain.   Gastrointestinal:  Positive for abdominal pain. Negative for anal bleeding.       Objective     Vitals:    11/06/23 0942   BP: 134/60   Pulse: 60   Temp: 97.4 °F (36.3 °C)   SpO2: 98%   Weight: 102 kg (225 lb)   Height: 175.3 cm (69\")     Body mass index is 33.23 kg/m².    Physical Exam  Constitutional:       Appearance: Normal appearance. He is well-developed.   Eyes:     "  General: No scleral icterus.  Cardiovascular:      Heart sounds: Normal heart sounds. No murmur heard.  Pulmonary:      Effort: Pulmonary effort is normal.   Abdominal:      General: Bowel sounds are normal. There is no distension.      Palpations: Abdomen is soft.      Tenderness: There is no abdominal tenderness. There is no guarding.   Skin:     General: Skin is warm and dry.      Coloration: Skin is not jaundiced.   Neurological:      Mental Status: He is alert.   Psychiatric:         Behavior: Behavior is cooperative.         Imaging Results (Most Recent)       None            Body mass index is 33.23 kg/m².    Assessment & Plan     Diagnoses and all orders for this visit:    1. Left lower quadrant abdominal pain (Primary)    2. Ulcerative pancolitis with other complication  -     Ustekinumab and Anti-Ustek Ab; Future    Other orders  -     amoxicillin-clavulanate (AUGMENTIN) 875-125 MG per tablet; Take 1 tablet by mouth 2 (Two) Times a Day.  Dispense: 10 tablet; Refill: 0         * Surgery not found *      Due to Zachariah Acosta continued pressure and symptoms similar to prior diverticulitis occurrence I will go ahead and treat him with 5 day coarse of Augmentin.  I discussed that he is more susceptible to C. difficile and repeat antibiotic could result in a C. difficile infection.  I did ask him to please call our office with new onset of pain, increase in frequency of bowel movement, observation of blood.    He had a CT scan in October and May 2023 both similar with thickening to the sigmoid colon.  October 2023 fecal Joel is elevated.  He is not exhibiting any signs of GI blood loss.  Dr. Menjivar has not performed colonoscopy since 2021.  If pressure to abdomen does not go away with repeat antibiotic will need to proceed with [facility] versus colonoscopy for Dr. Menjivar to assess tissue.  I do recommend Stelara level, he believes his next dose is due next week.  I explained he would need to obtain lab work  the day before injection or the morning prior to taking the injection.  If injection is due on a Sunday okay to obtain lab work on Friday but no earlier.  Patient verbalized understanding and will have lab work drawn at [facility].  Last TB testing was beginning of October therefore this is current and order was not placed.  Timing of repeat colonoscopy pending results of lab work as well as patient's progress with repeat antibiotic.        Salo Nascimento, APRN  11/09/23          There are no Patient Instructions on file for this visit.

## 2023-11-06 ENCOUNTER — OFFICE VISIT (OUTPATIENT)
Dept: GASTROENTEROLOGY | Facility: CLINIC | Age: 83
End: 2023-11-06
Payer: MEDICARE

## 2023-11-06 VITALS
OXYGEN SATURATION: 98 % | HEIGHT: 69 IN | TEMPERATURE: 97.4 F | WEIGHT: 225 LBS | HEART RATE: 60 BPM | BODY MASS INDEX: 33.33 KG/M2 | DIASTOLIC BLOOD PRESSURE: 60 MMHG | SYSTOLIC BLOOD PRESSURE: 134 MMHG

## 2023-11-06 DIAGNOSIS — R10.32 LEFT LOWER QUADRANT ABDOMINAL PAIN: Primary | ICD-10-CM

## 2023-11-06 DIAGNOSIS — K51.018 ULCERATIVE PANCOLITIS WITH OTHER COMPLICATION: ICD-10-CM

## 2023-11-06 PROCEDURE — 99214 OFFICE O/P EST MOD 30 MIN: CPT | Performed by: NURSE PRACTITIONER

## 2023-11-06 PROCEDURE — 3075F SYST BP GE 130 - 139MM HG: CPT | Performed by: NURSE PRACTITIONER

## 2023-11-06 PROCEDURE — 1160F RVW MEDS BY RX/DR IN RCRD: CPT | Performed by: NURSE PRACTITIONER

## 2023-11-06 PROCEDURE — 3078F DIAST BP <80 MM HG: CPT | Performed by: NURSE PRACTITIONER

## 2023-11-06 PROCEDURE — 1159F MED LIST DOCD IN RCRD: CPT | Performed by: NURSE PRACTITIONER

## 2023-11-06 RX ORDER — AMOXICILLIN AND CLAVULANATE POTASSIUM 875; 125 MG/1; MG/1
1 TABLET, FILM COATED ORAL 2 TIMES DAILY
Qty: 10 TABLET | Refills: 0 | Status: SHIPPED | OUTPATIENT
Start: 2023-11-06

## 2023-11-07 ENCOUNTER — LAB (OUTPATIENT)
Dept: LAB | Facility: HOSPITAL | Age: 83
End: 2023-11-07
Payer: MEDICARE

## 2023-11-07 DIAGNOSIS — K51.018 ULCERATIVE PANCOLITIS WITH OTHER COMPLICATION: ICD-10-CM

## 2023-11-07 PROCEDURE — 36415 COLL VENOUS BLD VENIPUNCTURE: CPT

## 2023-11-07 PROCEDURE — 80299 QUANTITATIVE ASSAY DRUG: CPT

## 2023-11-07 PROCEDURE — 82397 CHEMILUMINESCENT ASSAY: CPT

## 2023-11-14 LAB
USTEKINUMAB AB SERPL IA-MCNC: <40 NG/ML
USTEKINUMAB SERPL IA-MCNC: 4.3 UG/ML

## 2023-11-20 ENCOUNTER — TELEPHONE (OUTPATIENT)
Dept: GASTROENTEROLOGY | Facility: CLINIC | Age: 83
End: 2023-11-20
Payer: MEDICARE

## 2023-11-20 DIAGNOSIS — R10.32 LEFT LOWER QUADRANT ABDOMINAL PAIN: Primary | ICD-10-CM

## 2023-11-20 DIAGNOSIS — K51.018 ULCERATIVE PANCOLITIS WITH OTHER COMPLICATION: ICD-10-CM

## 2023-11-20 NOTE — TELEPHONE ENCOUNTER
Spoke with Zachariah Acosta via phone    He completed antibiotic    He continues to experience pressure to LLQ    Reviewed thickening on previous CT scans    Stelara level acceptable (reviewed with Dr Menjivar)      Discussed colonoscopy v [facility] v no procedure due to persistent pressure and thickening on previous scans    Dr Menjivar has not looked at tissue in colon in over a year    Zachariah Acosta agreeable to [facility]    6 doses Miralax mixed with 32 oz gatorade for prep

## 2023-11-21 ENCOUNTER — HOSPITAL ENCOUNTER (OUTPATIENT)
Dept: CT IMAGING | Facility: HOSPITAL | Age: 83
Discharge: HOME | End: 2023-11-21
Admitting: INTERNAL MEDICINE
Payer: MEDICARE

## 2023-11-21 DIAGNOSIS — R91.1 LUNG NODULE: ICD-10-CM

## 2023-11-21 PROCEDURE — 71250 CT THORAX DX C-: CPT

## 2023-11-28 ENCOUNTER — OFFICE VISIT (OUTPATIENT)
Dept: PULMONOLOGY | Facility: CLINIC | Age: 83
End: 2023-11-28
Payer: MEDICARE

## 2023-11-28 VITALS
OXYGEN SATURATION: 96 % | SYSTOLIC BLOOD PRESSURE: 142 MMHG | HEART RATE: 70 BPM | BODY MASS INDEX: 33.98 KG/M2 | HEIGHT: 68 IN | DIASTOLIC BLOOD PRESSURE: 88 MMHG | WEIGHT: 224.2 LBS

## 2023-11-28 DIAGNOSIS — G47.33 OSA ON CPAP: ICD-10-CM

## 2023-11-28 DIAGNOSIS — E66.9 OBESITY (BMI 30-39.9): ICD-10-CM

## 2023-11-28 DIAGNOSIS — J98.4 PULMONARY SCARRING: ICD-10-CM

## 2023-11-28 DIAGNOSIS — R91.1 PULMONARY NODULE SEEN ON IMAGING STUDY: ICD-10-CM

## 2023-11-28 DIAGNOSIS — J30.89 NON-SEASONAL ALLERGIC RHINITIS, UNSPECIFIED TRIGGER: ICD-10-CM

## 2023-11-28 DIAGNOSIS — J44.9 CHRONIC OBSTRUCTIVE PULMONARY DISEASE, UNSPECIFIED COPD TYPE: ICD-10-CM

## 2023-11-28 DIAGNOSIS — Z78.9 NON-SMOKER: ICD-10-CM

## 2023-11-28 DIAGNOSIS — J45.20 MILD INTERMITTENT ASTHMA WITHOUT COMPLICATION: Primary | ICD-10-CM

## 2023-11-28 DIAGNOSIS — J45.20 MILD INTERMITTENT ASTHMA WITHOUT COMPLICATION: ICD-10-CM

## 2023-11-28 NOTE — PROCEDURES
Pulmonary Function Test    Performed by: Cherie Walsh, RRT  Authorized by: Jacob Mejias MD     Pre Drug % Predicted    FVC: 94%   FEV1: 90%   FEF 25-75%: 80%   FEV1/FVC: 71%   T%   RV: 113%   DLCO: 82%   D/VAsb: 86%    Interpretation   Overall comments:   Above test results are acceptable and repeatable by ATS criteria  From analysis of the above test results the patient showed evidence of mild to moderate obstructive airway dysfunction.  Diffusion capacity corrected for alveolar volume is mildly decreased.  Obstructive dysfunction is confirmed by decrease in FEV1 and FEF 25 to 75%.  There was also increases pleural volume suggesting air trapping supporting obstructive dysfunction.  No bronchodilator challenge was done.    In comparison with the prior test results patient showed evidence of mild obstructive airway dysfunction which was not seen in the prior test done a few years ago.  Clinical correlation is indicated.    Jacob Mejias MD  Pulmonologist/Intensivist  2023 14:17 CST

## 2023-11-28 NOTE — PROGRESS NOTES
[facility] OUTPATIENT PROGRESS NOTE    Patient: Zachariah Acosta  : 1940  Age: 83 y.o.  Date of Service: 2023    REASON FOR CLINIC VISIT:  Chief Complaint   Patient presents with    Asthma       Subjective:    History of Present Illness:  Zachariah Acosta is a 83 y.o. male who presents to the office today to be seen for    Diagnosis Plan   1. Mild intermittent asthma without complication        2. Pulmonary nodule seen on imaging study  CT Chest Without Contrast Diagnostic      3. Pulmonary scarring  CT Chest Without Contrast Diagnostic      4. BREANN on CPAP        5. Non-smoker        6. Non-seasonal allergic rhinitis, unspecified trigger        7. Obesity (BMI 30-39.9)        8. Chronic obstructive pulmonary disease, unspecified COPD type        .  Other problems per record.    History:    Patient is a very pleasant elderly  gentleman was seen in the pulmonary clinic for a follow-up visit.    Patient is a non-smoker and he is a  and gets his medications from the [facility].  He has obstructive sleep apnea and uses CPAP at night.  He told me he had the new CPAP machine pending from the DME company.  He did not get it yet and his orders have been seen by the provider in the [facility] and he wanted me to talk to the DME company to get it sooner.  The patient had a pulmonary function test done today which showed he had mild obstructive airway dysfunction.  He had a test done few years ago which did not show any obstructive dysfunction.  He has diagnosis of asthma and COPD.  He is currently using Breztri and albuterol rescue inhaler as needed.    He had pulmonary scarring and lung nodule noted in the prior CT scan of the chest and had a recent CT scan of the chest done last week.  This new CT scan showed mild pulmonary fibrosis in the subpleural area no new mediastinal or hilar lymphadenopathy and no new lung nodules.  Patient had no recent hospital  admissions and ER visit and urgent care visit or any other new complaints continue doing well overall and at his baseline.  He lives at home with his wife.    PFT done today:  Not done today    PFT Values          2023    10:30   Pre Drug PFT Results   FVC 94   FEV1 90   FEF 25-75% 80   FEV1/FVC 71   Other Tests PFT Results   TLC 94      DLCO 82   D/VAsb 86     Results for orders placed in visit on 23    Pulmonary Function Test    Narrative  Pulmonary Function Test    Performed by: Cherie Walsh, RRT  Authorized by: Jacob Mejias MD  Pre Drug % Predicted  FVC: 94%  FEV1: 90%  FEF 25-75%: 80%  FEV1/FVC: 71%  T%  RV: 113%  DLCO: 82%  D/VAsb: 86%    Interpretation  Overall comments:  Above test results are acceptable and repeatable by ATS criteria  From analysis of the above test results the patient showed evidence of mild to moderate obstructive airway dysfunction.  Diffusion capacity corrected for alveolar volume is mildly decreased.  Obstructive dysfunction is confirmed by decrease in FEV1 and FEF 25 to 75%.  There was also increases pleural volume suggesting air trapping supporting obstructive dysfunction.  No bronchodilator challenge was done.    In comparison with the prior test results patient showed evidence of mild obstructive airway dysfunction which was not seen in the prior test done a few years ago.  Clinical correlation is indicated.    Jacob Mejias MD  Pulmonologist/Intensivist  2023 14:17 CST      Results for orders placed in visit on 21    Pulmonary Function Test    Narrative  [facility] - Pulmonary Function Test    [address] [place] [address]  [id number]  [phone]    Patient : Zachariah Acosta  MRN : [id number]  CSN : [id number]  Pulmonologist : Gilberto Bennett MD  Date : 4/15/2021    ______________________________________________________________________    Interpretation :  1.  Spirometry is within normal limits.  2.  Lung volumes  are within normal limits.  3.  There is a mild diffusion impairment even when corrected for alveolar volume.      Gilberto Bennett MD         Bronchodilator therapy: Breztri inhaler and Xopenox rescue inhaler      Smoking Status:   Social History     Tobacco Use   Smoking Status Never    Passive exposure: Never   Smokeless Tobacco Never     Pulm Rehab: no  Sleep: yes    Support System: lives with their spouse    Code Status:   There are no questions and answers to display.        Review of Systems:  A complete review of systems is performed and all other systems were reviewed and negative as note above in the HPI.  Review of Systems   Constitutional:  Negative for fatigue.   HENT:  Positive for congestion, postnasal drip and sinus pressure.    Eyes: Negative.    Respiratory:  Positive for chest tightness and shortness of breath.    Cardiovascular: Negative.    Gastrointestinal: Negative.    Endocrine: Negative.    Genitourinary: Negative.    Musculoskeletal:  Positive for arthralgias and back pain.   Skin: Negative.    Allergic/Immunologic: Positive for environmental allergies.   Neurological: Negative.    Hematological: Negative.    Psychiatric/Behavioral: Negative.         CAT/ACT Score:  Not done today    Medications:  Outpatient Encounter Medications as of 11/28/2023   Medication Sig Dispense Refill    acetaminophen (TYLENOL) 500 MG tablet Take 1 tablet by mouth Every 6 (Six) Hours As Needed for Mild Pain.      amoxicillin-clavulanate (AUGMENTIN) 875-125 MG per tablet Take 1 tablet by mouth 2 (Two) Times a Day. 10 tablet 0    aspirin 81 MG EC tablet Take 1 tablet by mouth Daily.      Azelastine HCl 137 MCG/SPRAY solution 2 sprays into the nostril(s) as directed by provider 2 (Two) Times a Day. 30 mL 5    Budeson-Glycopyrrol-Formoterol (Breztri Aerosphere) 160-9-4.8 MCG/ACT aerosol inhaler Inhale 2 puffs 2 (Two) Times a Day. 3 each 4    ciclopirox (LOPROX) 0.77 % cream APPLY A THIN LAYER UNDER ARMS TWICE  DAILY AFTER USING CLINDAMYCIN SOLUTION      clindamycin (CLEOCIN T) 1 % external solution APPLY SOLUTION TOPICALLY TO UNDERARMS TWICE DAILY      Continuous Blood Gluc Sensor (Dexcom G7 Sensor) misc 1 each Every 10 (Ten) Days. 9 each 3    Dupilumab 300 MG/2ML solution prefilled syringe Inject  under the skin into the appropriate area as directed Every 14 (Fourteen) Days.      Ferrous Sulfate (IRON PO) Take 65 mg by mouth Daily.      finasteride (PROSCAR) 5 MG tablet Take 1 tablet by mouth Daily. 90 tablet 3    fluticasone (FLONASE) 50 MCG/ACT nasal spray 1 spray into the nostril(s) as directed by provider Daily. 16 g 5    GARLIC PO Take 1 tablet by mouth Daily.      insulin glargine (LANTUS, SEMGLEE) 100 UNIT/ML injection 15 units in the morning and 40 units at bedtime. 18 mL 2    levalbuterol (XOPENEX HFA) 45 MCG/ACT inhaler Inhale 2 puffs Every 6 (Six) Hours As Needed for Wheezing.      levothyroxine (Synthroid) 50 MCG tablet Take 1 tablet by mouth Daily. 90 tablet 4    lisinopril (PRINIVIL,ZESTRIL) 20 MG tablet Take 1 tablet by mouth 2 (Two) Times a Day. 1 tab am and 1/2 at bedtime. 180 tablet 1    meclizine (ANTIVERT) 12.5 MG tablet Take 1 tablet by mouth 3 (Three) Times a Day As Needed for Dizziness. 30 tablet 0    melatonin 5 MG tablet tablet Take 1 tablet by mouth At Night As Needed.      Mesalamine 4 GM/60ML SF enema Insert 1 application into the rectum Every Other Day.      metFORMIN ER (GLUCOPHAGE-XR) 500 MG 24 hr tablet Daily as directed. 90 tablet 4    metoprolol tartrate (LOPRESSOR) 25 MG tablet Take 1 tablet by mouth 2 (Two) Times a Day. 180 tablet 3    naloxone (NARCAN) 4 MG/0.1ML nasal spray Call 911. Don't prime. [place] in 1 nostril for overdose. Repeat in 2-3 minutes in other nostril if no or minimal breathing/responsiveness. 2 each 0    nitroglycerin (NITROSTAT) 0.4 MG SL tablet 1 under the tongue as needed for angina, may repeat q5mins for up three doses 25 tablet 1    ondansetron (Zofran) 4 MG  tablet Take 1 tablet by mouth Every 4 (Four) Hours As Needed for Nausea or Vomiting for up to 20 doses. 20 tablet 0    Pediatric Multiple Vit-C-FA (CHILDRENS CHEWABLE MULTI VITS PO) Take 1 tablet by mouth Daily.      rosuvastatin (CRESTOR) 20 MG tablet Take 1 tablet by mouth Every Night. 90 tablet 4    triamcinolone (KENALOG) 0.1 % cream Apply 1 application  topically to the appropriate area as directed 2 (Two) Times a Day.      Ustekinumab (STELARA) 90 MG/ML solution prefilled syringe Injection Inject 90 mg under the skin into the appropriate area as directed Every 28 (Twenty-Eight) Days. 1 mL 6    vitamin B-12 (CYANOCOBALAMIN) 1000 MCG tablet Take 1 tablet by mouth Daily.      Vitamin D, Cholecalciferol, 25 MCG (1000 UT) capsule Take 1 capsule by mouth Daily.       No facility-administered encounter medications on file as of 11/28/2023.       Allergies:  Allergies   Allergen Reactions    Albuterol Other (See Comments)    Adhesive Tape Rash    Azithromycin Rash    Fentanyl Nausea And Vomiting       Immunizations:  Immunization History   Administered Date(s) Administered    COVID-19 (Intelligent Fingerprinting) 04/12/2021    Flu Vaccine Quad PF >36MO 11/13/2017    Fluzone (or Fluarix & Flulaval for VFC) >6mos 10/25/2019, 09/17/2020, 10/21/2021    Fluzone High Dose =>65 Years ([facility] ONLY) 11/01/2020    Fluzone High-Dose 65+yrs 10/24/2022, 10/16/2023    H1N1 All Forms 01/05/2010    Influenza Quad Vaccine (Inpatient) 09/04/2018    Influenza TIV (IM) 11/16/2016, 10/26/2018    Influenza, Unspecified 10/09/1996, 10/08/1997, 10/30/2006, 09/27/2007, 10/20/2008, 10/20/2009, 11/01/2010, 11/01/2011, 11/01/2012, 10/12/2015, 11/01/2015, 11/16/2016, 11/13/2017, 10/21/2021, 10/01/2022    Pneumococcal Conjugate 13-Valent (PCV13) 02/01/2013, 11/11/2013, 04/01/2019    Pneumococcal Polysaccharide (PPSV23) 11/11/2013, 08/10/2020    Pneumococcal, Unspecified 10/30/2006    Tdap 10/30/2006, 07/01/2011       Objective:    Vitals:  /88   Pulse 70    "Ht 172.7 cm (68\")   Wt 102 kg (224 lb 3.2 oz)   SpO2 96% Comment: RA  BMI 34.09 kg/m²     Physical Exam:  General: Patient is a 83 y.o. pleasant elderly  male. Looks stated age. Appears to be in no acute distress.  Eyes: EOMI. PERRLA. Vision intact. No scleral icterus.  Ear, Nose, Mouth and Throat: Hearing is grossly intact. No Leukoplakia, pharyngitis, stomatitis or thrush. Swollen nasal mucosa with post nasal drop.  Neck: Range of motion of neck normal. No thyromegaly or masses. Mallampati Class 3  Respiratory: Clear to auscultation bilaterally. No use of accessory muscles. Decreased breath sounds.  Cardiovascular: Normal heart sounds. Regularly regular rhythm without murmur.  Gastrointestinal: Non tender, non distended, soft. Bowel sounds positive in all four quadrants. No organomegaly.  Skin: No obvious rashes, lesions, ulcers or large amount of bruising. No edema.   Neurological: No new motor deficits. Cranial nerves appear intact.  Psychiatric: Patient is alert and oriented to person, place and time.    Chest Imaging:    Study Result    Narrative & Impression   EXAM/TECHNIQUE: CT chest without contrast     INDICATION: Lung nodule, 6-8mm; R91.1-Solitary pulmonary nodule     COMPARISON: 5/3/2023     DLP: 568 mGy cm. Automated exposure control was also utilized to  decrease patient radiation dose.     FINDINGS:     CENTRAL AIRWAYS: Unremarkable.     LUNGS AND PLEURA: No consolidation. No change in mild right lower lobe  subpleural reticulation/fibrosis and mild atelectasis in both lung  bases. No pleural effusion.      The pulmonary nodule in the right upper lobe on the prior study has  resolved.   Unchanged 3 mm right upper lobe pulmonary nodule image 38.   Unchanged 3 mm right upper lobe pulmonary nodule image 78.  No new or enlarging pulmonary nodule.     LYMPH NODES: No enlarged noncalcified lymph nodes.     VASCULAR: The main pulmonary artery is nondilated. Thoracic aorta is  normal in caliber. " No pericardial effusion. Postoperative change of  median sternotomy and CABG.     SOFT TISSUES: Redemonstrated 4.3 cm left thyroid nodule, previously  characterized with thyroid ultrasound. No acute chest wall soft tissue  abnormality.     UPPER ABDOMEN: Bilateral parapelvic cysts and mild dilatation of both  renal pelves again noted, similar to recent CT abdomen. Redemonstrated  chronic mild fat stranding in the central mesentery likely related to  chronic mesenteritis.     BONES: No acute osseus findings.     IMPRESSION:     1.  No new or enlarging pulmonary nodule. The right upper lobe pulmonary  nodule that was new on the prior study has now resolved.   2.  No change in bibasilar atelectasis and mild subpleural interstitial  reticulation/fibrosis, greatest in the right lung base.     This report was signed and finalized on 11/21/2023 11:49 AM CST by Dr. Joshua Arteaga MD.          Assessment:  1. Mild intermittent asthma without complication    2. Pulmonary nodule seen on imaging study    3. Pulmonary scarring    4. BREANN on CPAP    5. Non-smoker    6. Non-seasonal allergic rhinitis, unspecified trigger    7. Obesity (BMI 30-39.9)    8. Chronic obstructive pulmonary disease, unspecified COPD type        Plan/Recommendations:    1.  I explained the PFT and CT scan results to the patient.  Routine follow-up CT scan is ordered in a year time.  The lung changes appear to be chronic.  2.  For his underlying mild asthma/COPD and continues on Breztri and albuterol rescue inhaler.  No prescription refill was needed.  3.  For his obstructive sleep apnea he will continue the CPAP and the current settings and will talk to the DME company to get him a new device.  He is not requiring oxygen with the CPAP.  4.  He will continue follow-up with the primary care provider for his other medical issues and he is also vaccinated for all the routine vaccinations needed from the hospital.  5.  He will return to pulmonary clinic for  follow-up visit in 6 months time or earlier if needed.    Follow up:  6 Months    Time Spent:  30 minutes    I appreciate the opportunity of participating in this patient's care. I would like to thank the PCP for the referral.  Please feel free to contact me with any other questions.    Jacob Mejias MD   Pulmonologist/Intensivist     Electronically signed by: Jacob Mejias MD, 11/28/2023 14:24 CST

## 2023-11-29 ENCOUNTER — TELEPHONE (OUTPATIENT)
Dept: PULMONOLOGY | Facility: CLINIC | Age: 83
End: 2023-11-29
Payer: MEDICARE

## 2023-11-29 ENCOUNTER — ANESTHESIA EVENT (OUTPATIENT)
Dept: GASTROENTEROLOGY | Facility: HOSPITAL | Age: 83
End: 2023-11-29
Payer: MEDICARE

## 2023-11-29 ENCOUNTER — ANESTHESIA (OUTPATIENT)
Dept: GASTROENTEROLOGY | Facility: HOSPITAL | Age: 83
End: 2023-11-29
Payer: MEDICARE

## 2023-11-29 ENCOUNTER — HOSPITAL ENCOUNTER (OUTPATIENT)
Facility: HOSPITAL | Age: 83
Setting detail: HOSPITAL OUTPATIENT SURGERY
Discharge: HOME | End: 2023-11-29
Attending: INTERNAL MEDICINE | Admitting: INTERNAL MEDICINE
Payer: MEDICARE

## 2023-11-29 VITALS
HEIGHT: 69 IN | HEART RATE: 75 BPM | OXYGEN SATURATION: 100 % | RESPIRATION RATE: 19 BRPM | SYSTOLIC BLOOD PRESSURE: 153 MMHG | BODY MASS INDEX: 32.29 KG/M2 | TEMPERATURE: 96.7 F | WEIGHT: 218 LBS | DIASTOLIC BLOOD PRESSURE: 77 MMHG

## 2023-11-29 DIAGNOSIS — K51.018 ULCERATIVE PANCOLITIS WITH OTHER COMPLICATION: ICD-10-CM

## 2023-11-29 DIAGNOSIS — R10.32 LEFT LOWER QUADRANT ABDOMINAL PAIN: ICD-10-CM

## 2023-11-29 LAB — GLUCOSE BLDC GLUCOMTR-MCNC: 129 MG/DL (ref 70–130)

## 2023-11-29 PROCEDURE — 45378 DIAGNOSTIC COLONOSCOPY: CPT | Performed by: INTERNAL MEDICINE

## 2023-11-29 PROCEDURE — 25810000003 SODIUM CHLORIDE 0.9 % SOLUTION: Performed by: ANESTHESIOLOGY

## 2023-11-29 PROCEDURE — 82948 REAGENT STRIP/BLOOD GLUCOSE: CPT

## 2023-11-29 PROCEDURE — 25010000002 PROPOFOL 10 MG/ML EMULSION: Performed by: NURSE ANESTHETIST, CERTIFIED REGISTERED

## 2023-11-29 RX ORDER — PROPOFOL 10 MG/ML
VIAL (ML) INTRAVENOUS AS NEEDED
Status: DISCONTINUED | OUTPATIENT
Start: 2023-11-29 | End: 2023-11-29 | Stop reason: SURG

## 2023-11-29 RX ORDER — SODIUM CHLORIDE 0.9 % (FLUSH) 0.9 %
10 SYRINGE (ML) INJECTION EVERY 12 HOURS SCHEDULED
Status: CANCELLED | OUTPATIENT
Start: 2023-11-29

## 2023-11-29 RX ORDER — SODIUM CHLORIDE 9 MG/ML
100 INJECTION, SOLUTION INTRAVENOUS CONTINUOUS
Status: DISCONTINUED | OUTPATIENT
Start: 2023-11-29 | End: 2023-11-29 | Stop reason: HOSPADM

## 2023-11-29 RX ORDER — SODIUM CHLORIDE 0.9 % (FLUSH) 0.9 %
10 SYRINGE (ML) INJECTION AS NEEDED
Status: DISCONTINUED | OUTPATIENT
Start: 2023-11-29 | End: 2023-11-29 | Stop reason: HOSPADM

## 2023-11-29 RX ORDER — SODIUM CHLORIDE 9 MG/ML
40 INJECTION, SOLUTION INTRAVENOUS AS NEEDED
Status: CANCELLED | OUTPATIENT
Start: 2023-11-29

## 2023-11-29 RX ADMIN — PROPOFOL INJECTABLE EMULSION 40 MG: 10 INJECTION, EMULSION INTRAVENOUS at 09:16

## 2023-11-29 RX ADMIN — SODIUM CHLORIDE 100 ML/HR: 9 INJECTION, SOLUTION INTRAVENOUS at 08:42

## 2023-11-29 RX ADMIN — PROPOFOL INJECTABLE EMULSION 80 MG: 10 INJECTION, EMULSION INTRAVENOUS at 09:12

## 2023-11-29 NOTE — TELEPHONE ENCOUNTER
----- Message from Cherie Walsh, RRT sent at 11/29/2023  7:47 AM CST -----  PER DR SMITH, Please check with [facility] about this patient. Apparently the patient's provider in the [facility] had already sent to orders for the CPAP device but the told him that they did not get the orders. I am not sure if I have to put a new order in and what will be the setting. Please let me know. Thank you.

## 2023-11-29 NOTE — TELEPHONE ENCOUNTER
I spoke to both [employer] and Patient.  He had an order from [employer] for a new device in Sept.  [employer] can get in [facility] and pull that order.  They will call me back if they can not for Dr Mejias to order a new device.  Left voicemail for patient detailing this.

## 2023-11-29 NOTE — ANESTHESIA PREPROCEDURE EVALUATION
Anesthesia Evaluation     Patient summary reviewed   no history of anesthetic complications:   NPO Solid Status: > 6 hours  NPO Liquid Status: > 6 hours           Airway   Mallampati: II  TM distance: >3 FB  Neck ROM: full  No difficulty expected  Dental - normal exam     Pulmonary    (+) asthma,shortness of breath, sleep apnea on CPAP  Cardiovascular   Exercise tolerance: poor (<4 METS)    (+) hypertension, past MI , CAD, CABG >6 Months, cardiac stents (6 stents 2020) more than 12 months ago , hyperlipidemia  (-) dysrhythmias      Neuro/Psych  (+) CVA residual symptoms  (-) seizures    ROS Comment: Hemorrhagic stroke 3 weeks prior, ongoing right sided weakness  GI/Hepatic/Renal/Endo    (+) obesity, renal disease- CRI, diabetes mellitus type 2 well controlled using insulin, thyroid problem   (-) liver disease    ROS Comment: UC    Musculoskeletal     Abdominal   (+) obese   Substance History      OB/GYN          Other                      Anesthesia Plan    ASA 4     MAC     (Hemorrhagic stroke with change to antiplatelet agents; aspirin yesterday )  intravenous induction     Anesthetic plan, risks, benefits, and alternatives have been provided, discussed and informed consent has been obtained with: patient.

## 2023-11-29 NOTE — ANESTHESIA POSTPROCEDURE EVALUATION
Patient: Zachariah Acosta    Procedure Summary       Date: 11/29/23 Room / Location: [facility] ENDOSCOPY 5 / [facility] ENDOSCOPY    Anesthesia Start: 0908 Anesthesia Stop: 0921    Procedure: COLONOSCOPY LOWER LIMITED Diagnosis:       Left lower quadrant abdominal pain      Ulcerative pancolitis with other complication      (Left lower quadrant abdominal pain [R10.32])      (Ulcerative pancolitis with other complication [K51.018])    Surgeons: Zachariah Menjivar DO Provider: Rc Parks CRNA    Anesthesia Type: MAC ASA Status: 4            Anesthesia Type: MAC    Vitals  Vitals Value Taken Time   /63 11/29/23 0921   Temp     Pulse 63 11/29/23 0924   Resp     SpO2 96 % 11/29/23 0924   Vitals shown include unfiled device data.        Post Anesthesia Care and Evaluation    Patient location during evaluation: PHASE II  Patient participation: complete - patient participated  Level of consciousness: awake and alert  Pain management: adequate    Airway patency: patent  Anesthetic complications: No anesthetic complications  PONV Status: none  Cardiovascular status: acceptable and stable  Respiratory status: acceptable and unassisted  Hydration status: acceptable

## 2023-12-02 ENCOUNTER — APPOINTMENT (OUTPATIENT)
Dept: CT IMAGING | Facility: HOSPITAL | Age: 83
End: 2023-12-02
Payer: MEDICARE

## 2023-12-02 ENCOUNTER — HOSPITAL ENCOUNTER (OUTPATIENT)
Facility: HOSPITAL | Age: 83
Setting detail: OBSERVATION
Discharge: HOME | End: 2023-12-04
Attending: INTERNAL MEDICINE | Admitting: INTERNAL MEDICINE
Payer: MEDICARE

## 2023-12-02 DIAGNOSIS — K51.918 ACUTE ULCERATIVE COLITIS WITH OTHER COMPLICATION: Primary | ICD-10-CM

## 2023-12-02 PROBLEM — K51.90 ACUTE ULCERATIVE COLITIS: Status: ACTIVE | Noted: 2023-12-02

## 2023-12-02 LAB
ALBUMIN SERPL-MCNC: 3.8 G/DL (ref 3.5–5.2)
ALBUMIN/GLOB SERPL: 1.2 G/DL
ALP SERPL-CCNC: 70 U/L (ref 39–117)
ALT SERPL W P-5'-P-CCNC: 17 U/L (ref 1–41)
ANION GAP SERPL CALCULATED.3IONS-SCNC: 9 MMOL/L (ref 5–15)
AST SERPL-CCNC: 18 U/L (ref 1–40)
BASOPHILS # BLD AUTO: 0.05 10*3/MM3 (ref 0–0.2)
BASOPHILS NFR BLD AUTO: 0.5 % (ref 0–1.5)
BILIRUB SERPL-MCNC: 0.5 MG/DL (ref 0–1.2)
BUN SERPL-MCNC: 10 MG/DL (ref 8–23)
BUN/CREAT SERPL: 12.7 (ref 7–25)
CALCIUM SPEC-SCNC: 8.7 MG/DL (ref 8.6–10.5)
CHLORIDE SERPL-SCNC: 101 MMOL/L (ref 98–107)
CO2 SERPL-SCNC: 28 MMOL/L (ref 22–29)
CREAT SERPL-MCNC: 0.79 MG/DL (ref 0.76–1.27)
CRP SERPL-MCNC: 1.99 MG/DL (ref 0–0.5)
D-LACTATE SERPL-SCNC: 1.1 MMOL/L (ref 0.5–2)
DEPRECATED RDW RBC AUTO: 43.3 FL (ref 37–54)
EGFRCR SERPLBLD CKD-EPI 2021: 89.2 ML/MIN/1.73
EOSINOPHIL # BLD AUTO: 0.78 10*3/MM3 (ref 0–0.4)
EOSINOPHIL NFR BLD AUTO: 7.6 % (ref 0.3–6.2)
ERYTHROCYTE [DISTWIDTH] IN BLOOD BY AUTOMATED COUNT: 12.9 % (ref 12.3–15.4)
ERYTHROCYTE [SEDIMENTATION RATE] IN BLOOD: 24 MM/HR (ref 0–20)
GLOBULIN UR ELPH-MCNC: 3.2 GM/DL
GLUCOSE BLDC GLUCOMTR-MCNC: 117 MG/DL (ref 70–130)
GLUCOSE SERPL-MCNC: 121 MG/DL (ref 65–99)
HBA1C MFR BLD: 6.8 % (ref 4.8–5.6)
HCT VFR BLD AUTO: 44 % (ref 37.5–51)
HGB BLD-MCNC: 14.6 G/DL (ref 13–17.7)
IMM GRANULOCYTES # BLD AUTO: 0.03 10*3/MM3 (ref 0–0.05)
IMM GRANULOCYTES NFR BLD AUTO: 0.3 % (ref 0–0.5)
LYMPHOCYTES # BLD AUTO: 1.05 10*3/MM3 (ref 0.7–3.1)
LYMPHOCYTES NFR BLD AUTO: 10.3 % (ref 19.6–45.3)
MCH RBC QN AUTO: 30.5 PG (ref 26.6–33)
MCHC RBC AUTO-ENTMCNC: 33.2 G/DL (ref 31.5–35.7)
MCV RBC AUTO: 91.9 FL (ref 79–97)
MONOCYTES # BLD AUTO: 0.78 10*3/MM3 (ref 0.1–0.9)
MONOCYTES NFR BLD AUTO: 7.6 % (ref 5–12)
NEUTROPHILS NFR BLD AUTO: 7.51 10*3/MM3 (ref 1.7–7)
NEUTROPHILS NFR BLD AUTO: 73.7 % (ref 42.7–76)
NRBC BLD AUTO-RTO: 0 /100 WBC (ref 0–0.2)
PLATELET # BLD AUTO: 244 10*3/MM3 (ref 140–450)
PMV BLD AUTO: 10.3 FL (ref 6–12)
POTASSIUM SERPL-SCNC: 4 MMOL/L (ref 3.5–5.2)
PROT SERPL-MCNC: 7 G/DL (ref 6–8.5)
RBC # BLD AUTO: 4.79 10*6/MM3 (ref 4.14–5.8)
SODIUM SERPL-SCNC: 138 MMOL/L (ref 136–145)
WBC NRBC COR # BLD AUTO: 10.2 10*3/MM3 (ref 3.4–10.8)

## 2023-12-02 PROCEDURE — 80053 COMPREHEN METABOLIC PANEL: CPT | Performed by: INTERNAL MEDICINE

## 2023-12-02 PROCEDURE — 83036 HEMOGLOBIN GLYCOSYLATED A1C: CPT | Performed by: INTERNAL MEDICINE

## 2023-12-02 PROCEDURE — G0378 HOSPITAL OBSERVATION PER HR: HCPCS

## 2023-12-02 PROCEDURE — 94640 AIRWAY INHALATION TREATMENT: CPT

## 2023-12-02 PROCEDURE — 96375 TX/PRO/DX INJ NEW DRUG ADDON: CPT

## 2023-12-02 PROCEDURE — 25010000002 ONDANSETRON PER 1 MG: Performed by: INTERNAL MEDICINE

## 2023-12-02 PROCEDURE — 96376 TX/PRO/DX INJ SAME DRUG ADON: CPT

## 2023-12-02 PROCEDURE — 25010000002 HYDROMORPHONE PER 4 MG: Performed by: INTERNAL MEDICINE

## 2023-12-02 PROCEDURE — 86140 C-REACTIVE PROTEIN: CPT | Performed by: INTERNAL MEDICINE

## 2023-12-02 PROCEDURE — 82948 REAGENT STRIP/BLOOD GLUCOSE: CPT

## 2023-12-02 PROCEDURE — 25810000003 SODIUM CHLORIDE 0.9 % SOLUTION: Performed by: INTERNAL MEDICINE

## 2023-12-02 PROCEDURE — 25810000003 LACTATED RINGERS SOLUTION: Performed by: INTERNAL MEDICINE

## 2023-12-02 PROCEDURE — 99285 EMERGENCY DEPT VISIT HI MDM: CPT

## 2023-12-02 PROCEDURE — 25010000002 METHYLPREDNISOLONE PER 40 MG: Performed by: INTERNAL MEDICINE

## 2023-12-02 PROCEDURE — 74177 CT ABD & PELVIS W/CONTRAST: CPT

## 2023-12-02 PROCEDURE — 85025 COMPLETE CBC W/AUTO DIFF WBC: CPT | Performed by: INTERNAL MEDICINE

## 2023-12-02 PROCEDURE — 85652 RBC SED RATE AUTOMATED: CPT | Performed by: INTERNAL MEDICINE

## 2023-12-02 PROCEDURE — 25510000001 IOPAMIDOL 61 % SOLUTION: Performed by: INTERNAL MEDICINE

## 2023-12-02 PROCEDURE — 83605 ASSAY OF LACTIC ACID: CPT | Performed by: INTERNAL MEDICINE

## 2023-12-02 PROCEDURE — 96361 HYDRATE IV INFUSION ADD-ON: CPT

## 2023-12-02 PROCEDURE — 96374 THER/PROPH/DIAG INJ IV PUSH: CPT

## 2023-12-02 RX ORDER — ONDANSETRON 2 MG/ML
4 INJECTION INTRAMUSCULAR; INTRAVENOUS ONCE
Status: COMPLETED | OUTPATIENT
Start: 2023-12-02 | End: 2023-12-02

## 2023-12-02 RX ORDER — SODIUM CHLORIDE 0.9 % (FLUSH) 0.9 %
10 SYRINGE (ML) INJECTION AS NEEDED
Status: DISCONTINUED | OUTPATIENT
Start: 2023-12-02 | End: 2023-12-04 | Stop reason: HOSPADM

## 2023-12-02 RX ORDER — METHYLPREDNISOLONE SODIUM SUCCINATE 40 MG/ML
40 INJECTION, POWDER, LYOPHILIZED, FOR SOLUTION INTRAMUSCULAR; INTRAVENOUS EVERY 6 HOURS
Status: DISCONTINUED | OUTPATIENT
Start: 2023-12-02 | End: 2023-12-04 | Stop reason: HOSPADM

## 2023-12-02 RX ORDER — SODIUM CHLORIDE 0.9 % (FLUSH) 0.9 %
10 SYRINGE (ML) INJECTION EVERY 12 HOURS SCHEDULED
Status: DISCONTINUED | OUTPATIENT
Start: 2023-12-02 | End: 2023-12-04 | Stop reason: HOSPADM

## 2023-12-02 RX ORDER — FLUTICASONE PROPIONATE 50 MCG
1 SPRAY, SUSPENSION (ML) NASAL DAILY
Status: DISCONTINUED | OUTPATIENT
Start: 2023-12-03 | End: 2023-12-04 | Stop reason: HOSPADM

## 2023-12-02 RX ORDER — ONDANSETRON 2 MG/ML
4 INJECTION INTRAMUSCULAR; INTRAVENOUS EVERY 6 HOURS PRN
Status: DISCONTINUED | OUTPATIENT
Start: 2023-12-02 | End: 2023-12-04 | Stop reason: HOSPADM

## 2023-12-02 RX ORDER — LEVALBUTEROL INHALATION SOLUTION 0.63 MG/3ML
0.63 SOLUTION RESPIRATORY (INHALATION) EVERY 6 HOURS PRN
Status: DISCONTINUED | OUTPATIENT
Start: 2023-12-02 | End: 2023-12-04 | Stop reason: HOSPADM

## 2023-12-02 RX ORDER — ACETAMINOPHEN 325 MG/1
650 TABLET ORAL EVERY 4 HOURS PRN
Status: DISCONTINUED | OUTPATIENT
Start: 2023-12-02 | End: 2023-12-04 | Stop reason: HOSPADM

## 2023-12-02 RX ORDER — LISINOPRIL 10 MG/1
10 TABLET ORAL 2 TIMES DAILY
Status: DISCONTINUED | OUTPATIENT
Start: 2023-12-02 | End: 2023-12-04 | Stop reason: HOSPADM

## 2023-12-02 RX ORDER — CHOLECALCIFEROL (VITAMIN D3) 125 MCG
5 CAPSULE ORAL NIGHTLY PRN
Status: DISCONTINUED | OUTPATIENT
Start: 2023-12-02 | End: 2023-12-04 | Stop reason: HOSPADM

## 2023-12-02 RX ORDER — DEXTROSE MONOHYDRATE 25 G/50ML
25 INJECTION, SOLUTION INTRAVENOUS
Status: DISCONTINUED | OUTPATIENT
Start: 2023-12-02 | End: 2023-12-04 | Stop reason: HOSPADM

## 2023-12-02 RX ORDER — NITROGLYCERIN 0.4 MG/1
0.4 TABLET SUBLINGUAL
Status: DISCONTINUED | OUTPATIENT
Start: 2023-12-02 | End: 2023-12-04 | Stop reason: HOSPADM

## 2023-12-02 RX ORDER — IBUPROFEN 600 MG/1
1 TABLET ORAL
Status: DISCONTINUED | OUTPATIENT
Start: 2023-12-02 | End: 2023-12-04 | Stop reason: HOSPADM

## 2023-12-02 RX ORDER — INSULIN LISPRO 100 [IU]/ML
3-14 INJECTION, SOLUTION INTRAVENOUS; SUBCUTANEOUS
Status: DISCONTINUED | OUTPATIENT
Start: 2023-12-02 | End: 2023-12-03

## 2023-12-02 RX ORDER — ROSUVASTATIN CALCIUM 20 MG/1
20 TABLET, COATED ORAL NIGHTLY
Status: DISCONTINUED | OUTPATIENT
Start: 2023-12-02 | End: 2023-12-04 | Stop reason: HOSPADM

## 2023-12-02 RX ORDER — SODIUM CHLORIDE 9 MG/ML
75 INJECTION, SOLUTION INTRAVENOUS CONTINUOUS
Status: DISCONTINUED | OUTPATIENT
Start: 2023-12-02 | End: 2023-12-04

## 2023-12-02 RX ORDER — FINASTERIDE 5 MG/1
5 TABLET, FILM COATED ORAL DAILY
Status: DISCONTINUED | OUTPATIENT
Start: 2023-12-03 | End: 2023-12-04 | Stop reason: HOSPADM

## 2023-12-02 RX ORDER — SODIUM CHLORIDE 9 MG/ML
40 INJECTION, SOLUTION INTRAVENOUS AS NEEDED
Status: DISCONTINUED | OUTPATIENT
Start: 2023-12-02 | End: 2023-12-04 | Stop reason: HOSPADM

## 2023-12-02 RX ORDER — MECLIZINE HYDROCHLORIDE 25 MG/1
12.5 TABLET ORAL 3 TIMES DAILY PRN
Status: DISCONTINUED | OUTPATIENT
Start: 2023-12-02 | End: 2023-12-04 | Stop reason: HOSPADM

## 2023-12-02 RX ORDER — BUDESONIDE AND FORMOTEROL FUMARATE DIHYDRATE 160; 4.5 UG/1; UG/1
2 AEROSOL RESPIRATORY (INHALATION)
Status: DISCONTINUED | OUTPATIENT
Start: 2023-12-02 | End: 2023-12-04 | Stop reason: HOSPADM

## 2023-12-02 RX ORDER — HYDROMORPHONE HYDROCHLORIDE 1 MG/ML
0.5 INJECTION, SOLUTION INTRAMUSCULAR; INTRAVENOUS; SUBCUTANEOUS ONCE
Status: COMPLETED | OUTPATIENT
Start: 2023-12-02 | End: 2023-12-02

## 2023-12-02 RX ORDER — HYDROMORPHONE HYDROCHLORIDE 1 MG/ML
0.5 INJECTION, SOLUTION INTRAMUSCULAR; INTRAVENOUS; SUBCUTANEOUS
Status: DISCONTINUED | OUTPATIENT
Start: 2023-12-02 | End: 2023-12-04 | Stop reason: HOSPADM

## 2023-12-02 RX ORDER — LEVOTHYROXINE SODIUM 0.05 MG/1
50 TABLET ORAL DAILY
Status: DISCONTINUED | OUTPATIENT
Start: 2023-12-03 | End: 2023-12-04 | Stop reason: HOSPADM

## 2023-12-02 RX ORDER — NICOTINE POLACRILEX 4 MG
15 LOZENGE BUCCAL
Status: DISCONTINUED | OUTPATIENT
Start: 2023-12-02 | End: 2023-12-04 | Stop reason: HOSPADM

## 2023-12-02 RX ORDER — ASPIRIN 81 MG/1
81 TABLET ORAL DAILY
Status: DISCONTINUED | OUTPATIENT
Start: 2023-12-03 | End: 2023-12-04 | Stop reason: HOSPADM

## 2023-12-02 RX ADMIN — ROSUVASTATIN CALCIUM 20 MG: 20 TABLET, FILM COATED ORAL at 21:24

## 2023-12-02 RX ADMIN — LISINOPRIL 10 MG: 10 TABLET ORAL at 21:24

## 2023-12-02 RX ADMIN — METOPROLOL TARTRATE 25 MG: 25 TABLET, FILM COATED ORAL at 21:26

## 2023-12-02 RX ADMIN — IOPAMIDOL 100 ML: 612 INJECTION, SOLUTION INTRAVENOUS at 17:05

## 2023-12-02 RX ADMIN — ACETAMINOPHEN 650 MG: 325 TABLET, FILM COATED ORAL at 21:27

## 2023-12-02 RX ADMIN — METHYLPREDNISOLONE SODIUM SUCCINATE 40 MG: 40 INJECTION INTRAMUSCULAR; INTRAVENOUS at 19:35

## 2023-12-02 RX ADMIN — HYDROMORPHONE HYDROCHLORIDE 0.5 MG: 1 INJECTION, SOLUTION INTRAMUSCULAR; INTRAVENOUS; SUBCUTANEOUS at 17:44

## 2023-12-02 RX ADMIN — SODIUM CHLORIDE 75 ML/HR: 9 INJECTION, SOLUTION INTRAVENOUS at 21:23

## 2023-12-02 RX ADMIN — Medication 5 MG: at 22:42

## 2023-12-02 RX ADMIN — ONDANSETRON 4 MG: 2 INJECTION INTRAMUSCULAR; INTRAVENOUS at 15:33

## 2023-12-02 RX ADMIN — SODIUM CHLORIDE, POTASSIUM CHLORIDE, SODIUM LACTATE AND CALCIUM CHLORIDE 500 ML: 600; 310; 30; 20 INJECTION, SOLUTION INTRAVENOUS at 15:32

## 2023-12-02 RX ADMIN — Medication 10 ML: at 21:24

## 2023-12-02 RX ADMIN — HYDROMORPHONE HYDROCHLORIDE 0.5 MG: 1 INJECTION, SOLUTION INTRAMUSCULAR; INTRAVENOUS; SUBCUTANEOUS at 15:33

## 2023-12-02 RX ADMIN — BUDESONIDE AND FORMOTEROL FUMARATE DIHYDRATE 2 PUFF: 160; 4.5 AEROSOL RESPIRATORY (INHALATION) at 23:14

## 2023-12-02 NOTE — ED PROVIDER NOTES
Subjective   History of Present Illness  83-year-old male who presents emergency department with complaints of abdominal pain.  He states left lower quadrant pain developed about 2 weeks ago.  He states he is hurting all day today.  He has history of a recent colonoscopy with Dr. Menjivar.  The patient has known ulcerative colitis and takes chronic medications for this.  The patient states he has not had a bowel movement since his colonoscopy.  He said no noted blood in his stool.  No nausea no vomiting.    11/29/23 colonoscopy:  - Inflamed mucosa from sigmoid to descending colon.  - Stricture at 30 cm proximal to the anus.  - The distal rectum and anal verge are normal on retroflexion view.  - No specimens collected.    Review of Systems   Gastrointestinal:  Positive for abdominal pain. Negative for blood in stool, diarrhea, nausea and vomiting.       Past Medical History:   Diagnosis Date    Asthma     Coronary artery disease     Diabetes mellitus     Elevated cholesterol     HL (hearing loss) 2012    Hyperlipidemia     Hypertension     Inflammatory bowel disease 2001    Myocardial infarct     EASTER 2020    Primary central sleep apnea 2009    Using Cpap    Sleep apnea     cpap at     Sleep apnea, obstructive     Stroke     Visual impairment 2016    Double vision       Allergies   Allergen Reactions    Albuterol Other (See Comments)    Adhesive Tape Rash    Azithromycin Rash    Fentanyl Nausea And Vomiting       Past Surgical History:   Procedure Laterality Date    ADENOIDECTOMY  1947    APPENDECTOMY N/A 05/05/2023    Procedure: APPENDECTOMY LAPAROSCOPIC;  Surgeon: Katherine Carranza MD;  Location: [facility] OR;  Service: General;  Laterality: N/A;    BACK SURGERY      CARDIAC CATHETERIZATION      stents x 6    CARDIAC SURGERY      CABG TRIPLE BYPASS 2012    CATARACT EXTRACTION      COLONOSCOPY      COLONOSCOPY N/A 06/04/2021    Procedure: COLONOSCOPY WITH ANESTHESIA;  Surgeon: Zachariah Menjivar DO;  Location: [facility]  ENDOSCOPY;  Service: Gastroenterology;  Laterality: N/A;  pre hx ulcerative colitis  post ulcerative colitis  dr collins gusman    COLONOSCOPY N/A 11/29/2023    Procedure: COLONOSCOPY LOWER LIMITED;  Surgeon: Zachariah Menjivar DO;  Location: [facility] ENDOSCOPY;  Service: Gastroenterology;  Laterality: N/A;  preop; hx of colitis  postop colitis - questionable colonic stricture at 30cm   pcp [facility]    CORONARY ANGIOPLASTY WITH STENT PLACEMENT      CORONARY ARTERY BYPASS GRAFT  December 2012    CORONARY STENT PLACEMENT      HIP SURGERY Right     total hip    JOINT REPLACEMENT  2015    Right hip    PENILE PROSTHESIS IMPLANT N/A 12/13/2021    Procedure: 3-PIECE INFLATABLE PENILE PROSTHESIS PLACEMENT;  Surgeon: Jose Tellez MD;  Location: [facility] OR;  Service: Urology;  Laterality: N/A;    TONSILLECTOMY  1947       Family History   Problem Relation Age of Onset    Colon cancer Brother     Colon polyps Neg Hx     Esophageal cancer Neg Hx        Social History     Socioeconomic History    Marital status:    Tobacco Use    Smoking status: Never     Passive exposure: Never    Smokeless tobacco: Never   Vaping Use    Vaping Use: Never used   Substance and Sexual Activity    Alcohol use: Never    Drug use: Never    Sexual activity: Not Currently     Partners: Female           Objective   Physical Exam  Vitals reviewed.   Constitutional:       Appearance: He is well-developed. He is obese.   HENT:      Head: Normocephalic and atraumatic.      Nose: Nose normal.   Eyes:      General: No scleral icterus.     Extraocular Movements: Extraocular movements intact.      Conjunctiva/sclera: Conjunctivae normal.   Cardiovascular:      Rate and Rhythm: Normal rate and regular rhythm.      Heart sounds: Normal heart sounds.   Pulmonary:      Effort: Pulmonary effort is normal.      Breath sounds: Normal breath sounds.   Abdominal:      General: Bowel sounds are normal.      Palpations: Abdomen is soft.       Tenderness: There is abdominal tenderness in the left lower quadrant.   Musculoskeletal:         General: No tenderness.      Cervical back: Normal range of motion and neck supple.   Skin:     General: Skin is warm and dry.      Coloration: Skin is not cyanotic or mottled.   Neurological:      General: No focal deficit present.      Mental Status: He is alert.      Cranial Nerves: No cranial nerve deficit.   Psychiatric:         Mood and Affect: Mood normal. Mood is not anxious.         Procedures           ED Course  ED Course as of 12/02/23 1925   Sat Dec 02, 2023   1724 Ongoing pain.  [AJ]      ED Course User Index  [AJ] Gabriele Macdonald,       IV fluids, Zofran, and pain medication ordered.                           Lab Results (last 24 hours)       Procedure Component Value Units Date/Time    CBC & Differential [[id number]]  (Abnormal) Collected: 12/02/23 1530    Specimen: Blood Updated: 12/02/23 1610    Narrative:      The following orders were created for panel order CBC & Differential.  Procedure                               Abnormality         Status                     ---------                               -----------         ------                     CBC Auto Differential[[id number]]        Abnormal            Final result                 Please view results for these tests on the individual orders.    Comprehensive Metabolic Panel [[id number]]  (Abnormal) Collected: 12/02/23 1530    Specimen: Blood Updated: 12/02/23 1628     Glucose 121 mg/dL      BUN 10 mg/dL      Creatinine 0.79 mg/dL      Sodium 138 mmol/L      Potassium 4.0 mmol/L      Chloride 101 mmol/L      CO2 28.0 mmol/L      Calcium 8.7 mg/dL      Total Protein 7.0 g/dL      Albumin 3.8 g/dL      ALT (SGPT) 17 U/L      AST (SGOT) 18 U/L      Alkaline Phosphatase 70 U/L      Total Bilirubin 0.5 mg/dL      Globulin 3.2 gm/dL      A/G Ratio 1.2 g/dL      BUN/Creatinine Ratio 12.7     Anion Gap 9.0 mmol/L      eGFR 89.2 mL/min/1.73      Narrative:      GFR Normal >60  Chronic Kidney Disease <60  Kidney Failure <15    The GFR formula is only valid for adults with stable renal function between ages 18 and 70.    Sedimentation Rate [[id number]]  (Abnormal) Collected: 12/02/23 1530    Specimen: Blood Updated: 12/02/23 1617     Sed Rate 24 mm/hr     Lactic Acid, Plasma [[id number]]  (Normal) Collected: 12/02/23 1530    Specimen: Blood Updated: 12/02/23 1624     Lactate 1.1 mmol/L     C-reactive Protein [[id number]]  (Abnormal) Collected: 12/02/23 1530    Specimen: Blood Updated: 12/02/23 1628     C-Reactive Protein 1.99 mg/dL     CBC Auto Differential [[id number]]  (Abnormal) Collected: 12/02/23 1530    Specimen: Blood Updated: 12/02/23 1610     WBC 10.20 10*3/mm3      RBC 4.79 10*6/mm3      Hemoglobin 14.6 g/dL      Hematocrit 44.0 %      MCV 91.9 fL      MCH 30.5 pg      MCHC 33.2 g/dL      RDW 12.9 %      RDW-SD 43.3 fl      MPV 10.3 fL      Platelets 244 10*3/mm3      Neutrophil % 73.7 %      Lymphocyte % 10.3 %      Monocyte % 7.6 %      Eosinophil % 7.6 %      Basophil % 0.5 %      Immature Grans % 0.3 %      Neutrophils, Absolute 7.51 10*3/mm3      Lymphocytes, Absolute 1.05 10*3/mm3      Monocytes, Absolute 0.78 10*3/mm3      Eosinophils, Absolute 0.78 10*3/mm3      Basophils, Absolute 0.05 10*3/mm3      Immature Grans, Absolute 0.03 10*3/mm3      nRBC 0.0 /100 WBC           CT Abdomen Pelvis With Contrast   Final Result   1. There are inflammatory changes identified along a fairly long segment   of the mid sigmoid colon. This appears to be acute inflammation in the   setting of underlying inflammatory bowel disease. No viscus perforation   or obstruction.       This report was signed and finalized on 12/2/2023 5:21 PM by Dr Tato Smith.                        Medical Decision Making  Problems Addressed:  Acute ulcerative colitis with other complication: complicated acute illness or injury    Amount and/or Complexity of Data Reviewed  Labs:  ordered.     Details: CBC CMP lactic  Radiology: ordered.     Details: CT abdomen pelvis    Risk  Prescription drug management.  Decision regarding hospitalization.        Final diagnoses:   Acute ulcerative colitis with other complication       ED Disposition  ED Disposition       ED Disposition   Decision to Admit    Condition   --    Comment   Level of Care: Med/Surg [1]   Diagnosis: Acute ulcerative colitis [353259]   Admitting Physician: ROBIN SILVER [[id number]]                 No follow-up provider specified.       Medication List      No changes were made to your prescriptions during this visit.            Gabriele Macdonald, DO  12/02/23 1505       Gabriele Macdonald, DO  12/02/23 1506       Gabriele Macdonald, DO  12/02/23 1925

## 2023-12-03 LAB
ALBUMIN SERPL-MCNC: 3.6 G/DL (ref 3.5–5.2)
ALBUMIN/GLOB SERPL: 1.1 G/DL
ALP SERPL-CCNC: 64 U/L (ref 39–117)
ALT SERPL W P-5'-P-CCNC: 14 U/L (ref 1–41)
ANION GAP SERPL CALCULATED.3IONS-SCNC: 9 MMOL/L (ref 5–15)
AST SERPL-CCNC: 14 U/L (ref 1–40)
BASOPHILS # BLD AUTO: 0.01 10*3/MM3 (ref 0–0.2)
BASOPHILS NFR BLD AUTO: 0.1 % (ref 0–1.5)
BILIRUB SERPL-MCNC: 0.5 MG/DL (ref 0–1.2)
BUN SERPL-MCNC: 12 MG/DL (ref 8–23)
BUN/CREAT SERPL: 14.3 (ref 7–25)
CALCIUM SPEC-SCNC: 8.5 MG/DL (ref 8.6–10.5)
CHLORIDE SERPL-SCNC: 101 MMOL/L (ref 98–107)
CO2 SERPL-SCNC: 25 MMOL/L (ref 22–29)
CREAT SERPL-MCNC: 0.84 MG/DL (ref 0.76–1.27)
CRP SERPL-MCNC: 5.47 MG/DL (ref 0–0.5)
DEPRECATED RDW RBC AUTO: 45 FL (ref 37–54)
EGFRCR SERPLBLD CKD-EPI 2021: 87.6 ML/MIN/1.73
EOSINOPHIL # BLD AUTO: 0 10*3/MM3 (ref 0–0.4)
EOSINOPHIL NFR BLD AUTO: 0 % (ref 0.3–6.2)
ERYTHROCYTE [DISTWIDTH] IN BLOOD BY AUTOMATED COUNT: 13.1 % (ref 12.3–15.4)
ERYTHROCYTE [SEDIMENTATION RATE] IN BLOOD: 52 MM/HR (ref 0–20)
GLOBULIN UR ELPH-MCNC: 3.2 GM/DL
GLUCOSE BLDC GLUCOMTR-MCNC: 177 MG/DL (ref 70–130)
GLUCOSE BLDC GLUCOMTR-MCNC: 184 MG/DL (ref 70–130)
GLUCOSE BLDC GLUCOMTR-MCNC: 186 MG/DL (ref 70–130)
GLUCOSE BLDC GLUCOMTR-MCNC: 242 MG/DL (ref 70–130)
GLUCOSE SERPL-MCNC: 194 MG/DL (ref 65–99)
HCT VFR BLD AUTO: 43.1 % (ref 37.5–51)
HGB BLD-MCNC: 14.2 G/DL (ref 13–17.7)
IMM GRANULOCYTES # BLD AUTO: 0.03 10*3/MM3 (ref 0–0.05)
IMM GRANULOCYTES NFR BLD AUTO: 0.4 % (ref 0–0.5)
LYMPHOCYTES # BLD AUTO: 0.54 10*3/MM3 (ref 0.7–3.1)
LYMPHOCYTES NFR BLD AUTO: 6.4 % (ref 19.6–45.3)
MAGNESIUM SERPL-MCNC: 2.1 MG/DL (ref 1.6–2.4)
MCH RBC QN AUTO: 30.7 PG (ref 26.6–33)
MCHC RBC AUTO-ENTMCNC: 32.9 G/DL (ref 31.5–35.7)
MCV RBC AUTO: 93.1 FL (ref 79–97)
MONOCYTES # BLD AUTO: 0.05 10*3/MM3 (ref 0.1–0.9)
MONOCYTES NFR BLD AUTO: 0.6 % (ref 5–12)
NEUTROPHILS NFR BLD AUTO: 7.78 10*3/MM3 (ref 1.7–7)
NEUTROPHILS NFR BLD AUTO: 92.5 % (ref 42.7–76)
NRBC BLD AUTO-RTO: 0 /100 WBC (ref 0–0.2)
PHOSPHATE SERPL-MCNC: 3.3 MG/DL (ref 2.5–4.5)
PLATELET # BLD AUTO: 209 10*3/MM3 (ref 140–450)
PMV BLD AUTO: 9.9 FL (ref 6–12)
POTASSIUM SERPL-SCNC: 4.8 MMOL/L (ref 3.5–5.2)
PROT SERPL-MCNC: 6.8 G/DL (ref 6–8.5)
RBC # BLD AUTO: 4.63 10*6/MM3 (ref 4.14–5.8)
SODIUM SERPL-SCNC: 135 MMOL/L (ref 136–145)
WBC NRBC COR # BLD AUTO: 8.41 10*3/MM3 (ref 3.4–10.8)

## 2023-12-03 PROCEDURE — 94799 UNLISTED PULMONARY SVC/PX: CPT

## 2023-12-03 PROCEDURE — 85025 COMPLETE CBC W/AUTO DIFF WBC: CPT | Performed by: INTERNAL MEDICINE

## 2023-12-03 PROCEDURE — 99214 OFFICE O/P EST MOD 30 MIN: CPT | Performed by: INTERNAL MEDICINE

## 2023-12-03 PROCEDURE — 82948 REAGENT STRIP/BLOOD GLUCOSE: CPT

## 2023-12-03 PROCEDURE — 63710000001 INSULIN DETEMIR PER 5 UNITS: Performed by: INTERNAL MEDICINE

## 2023-12-03 PROCEDURE — 84100 ASSAY OF PHOSPHORUS: CPT | Performed by: INTERNAL MEDICINE

## 2023-12-03 PROCEDURE — 96361 HYDRATE IV INFUSION ADD-ON: CPT

## 2023-12-03 PROCEDURE — 85652 RBC SED RATE AUTOMATED: CPT | Performed by: INTERNAL MEDICINE

## 2023-12-03 PROCEDURE — 96376 TX/PRO/DX INJ SAME DRUG ADON: CPT

## 2023-12-03 PROCEDURE — G0378 HOSPITAL OBSERVATION PER HR: HCPCS

## 2023-12-03 PROCEDURE — 25810000003 SODIUM CHLORIDE 0.9 % SOLUTION: Performed by: INTERNAL MEDICINE

## 2023-12-03 PROCEDURE — 25010000002 METHYLPREDNISOLONE PER 40 MG: Performed by: INTERNAL MEDICINE

## 2023-12-03 PROCEDURE — 63710000001 INSULIN LISPRO (HUMAN) PER 5 UNITS: Performed by: INTERNAL MEDICINE

## 2023-12-03 PROCEDURE — 83735 ASSAY OF MAGNESIUM: CPT | Performed by: INTERNAL MEDICINE

## 2023-12-03 PROCEDURE — 80053 COMPREHEN METABOLIC PANEL: CPT | Performed by: INTERNAL MEDICINE

## 2023-12-03 PROCEDURE — 86140 C-REACTIVE PROTEIN: CPT | Performed by: INTERNAL MEDICINE

## 2023-12-03 RX ORDER — NITROGLYCERIN 0.4 MG/1
0.4 TABLET SUBLINGUAL
COMMUNITY

## 2023-12-03 RX ORDER — ROSUVASTATIN CALCIUM 40 MG/1
20 TABLET, COATED ORAL DAILY
COMMUNITY

## 2023-12-03 RX ORDER — METFORMIN HYDROCHLORIDE 500 MG/1
500 TABLET, EXTENDED RELEASE ORAL
COMMUNITY

## 2023-12-03 RX ORDER — FERROUS SULFATE 325(65) MG
325 TABLET ORAL
COMMUNITY

## 2023-12-03 RX ORDER — INSULIN LISPRO 100 [IU]/ML
2-9 INJECTION, SOLUTION INTRAVENOUS; SUBCUTANEOUS
Status: DISCONTINUED | OUTPATIENT
Start: 2023-12-03 | End: 2023-12-04 | Stop reason: HOSPADM

## 2023-12-03 RX ADMIN — SODIUM CHLORIDE 75 ML/HR: 9 INJECTION, SOLUTION INTRAVENOUS at 07:55

## 2023-12-03 RX ADMIN — Medication 5 MG: at 22:07

## 2023-12-03 RX ADMIN — LISINOPRIL 10 MG: 10 TABLET ORAL at 20:47

## 2023-12-03 RX ADMIN — ACETAMINOPHEN 650 MG: 325 TABLET, FILM COATED ORAL at 20:50

## 2023-12-03 RX ADMIN — BUDESONIDE AND FORMOTEROL FUMARATE DIHYDRATE 2 PUFF: 160; 4.5 AEROSOL RESPIRATORY (INHALATION) at 07:22

## 2023-12-03 RX ADMIN — FINASTERIDE 5 MG: 5 TABLET, FILM COATED ORAL at 09:03

## 2023-12-03 RX ADMIN — METHYLPREDNISOLONE SODIUM SUCCINATE 40 MG: 40 INJECTION INTRAMUSCULAR; INTRAVENOUS at 18:05

## 2023-12-03 RX ADMIN — ASPIRIN 81 MG: 81 TABLET, COATED ORAL at 09:03

## 2023-12-03 RX ADMIN — ROSUVASTATIN CALCIUM 20 MG: 20 TABLET, FILM COATED ORAL at 20:47

## 2023-12-03 RX ADMIN — INSULIN LISPRO 2 UNITS: 100 INJECTION, SOLUTION INTRAVENOUS; SUBCUTANEOUS at 20:46

## 2023-12-03 RX ADMIN — INSULIN LISPRO 2 UNITS: 100 INJECTION, SOLUTION INTRAVENOUS; SUBCUTANEOUS at 08:05

## 2023-12-03 RX ADMIN — METHYLPREDNISOLONE SODIUM SUCCINATE 40 MG: 40 INJECTION INTRAMUSCULAR; INTRAVENOUS at 11:40

## 2023-12-03 RX ADMIN — LEVOTHYROXINE SODIUM 50 MCG: 50 TABLET ORAL at 09:03

## 2023-12-03 RX ADMIN — INSULIN LISPRO 2 UNITS: 100 INJECTION, SOLUTION INTRAVENOUS; SUBCUTANEOUS at 16:50

## 2023-12-03 RX ADMIN — INSULIN DETEMIR 10 UNITS: 100 INJECTION, SOLUTION SUBCUTANEOUS at 08:05

## 2023-12-03 RX ADMIN — METOPROLOL TARTRATE 25 MG: 25 TABLET, FILM COATED ORAL at 20:47

## 2023-12-03 RX ADMIN — METHYLPREDNISOLONE SODIUM SUCCINATE 40 MG: 40 INJECTION INTRAMUSCULAR; INTRAVENOUS at 06:20

## 2023-12-03 RX ADMIN — LISINOPRIL 10 MG: 10 TABLET ORAL at 09:03

## 2023-12-03 RX ADMIN — INSULIN DETEMIR 20 UNITS: 100 INJECTION, SOLUTION SUBCUTANEOUS at 20:46

## 2023-12-03 RX ADMIN — METHYLPREDNISOLONE SODIUM SUCCINATE 40 MG: 40 INJECTION INTRAMUSCULAR; INTRAVENOUS at 00:40

## 2023-12-03 RX ADMIN — BUDESONIDE AND FORMOTEROL FUMARATE DIHYDRATE 2 PUFF: 160; 4.5 AEROSOL RESPIRATORY (INHALATION) at 23:50

## 2023-12-03 RX ADMIN — INSULIN LISPRO 4 UNITS: 100 INJECTION, SOLUTION INTRAVENOUS; SUBCUTANEOUS at 11:04

## 2023-12-03 RX ADMIN — ACETAMINOPHEN 650 MG: 325 TABLET, FILM COATED ORAL at 06:22

## 2023-12-03 RX ADMIN — FLUTICASONE PROPIONATE 1 SPRAY: 50 SPRAY, METERED NASAL at 09:03

## 2023-12-03 RX ADMIN — METOPROLOL TARTRATE 25 MG: 25 TABLET, FILM COATED ORAL at 09:03

## 2023-12-03 NOTE — PLAN OF CARE
Goal Outcome Evaluation:  Plan of Care Reviewed With: patient        Progress: improving  Outcome Evaluation: PATIENT VOICED HE WAS IMPROVING, MILD PAIN TO LEFT SIDE OF ABDOMEN. LUNGS CLEAR, POSITIVE BOWEL SOUNDS, NOTED S1 AND S2 HEART SOUNDS. PATIENT HAS BEEN UP IN HIS CHAIR, TOLERATING A CLEAR LIQUID DIET. HÉCTOR BUTLER RN

## 2023-12-03 NOTE — PROGRESS NOTES
[facility] Medicine Services  INPATIENT PROGRESS NOTE    Patient Name: Zachariah Acosta  Date of Admission: 12/2/2023  Today's Date: 12/03/23  Length of Stay: 0  Primary Care Physician: Amaya Mix APRN    Subjective   Chief Complaint: f/u abd pain/UC    HPI   Patient seen resting in bed.  He is on his home nasal CPAP.  Says overall he is feeling pretty well.  Slightly better than he did on arrival.  States he has some pain in the left sided abdominal pain when he takes a deep breath but otherwise is not too bad.  He has not had any nausea or vomiting since arrival to the floor.  He has not needed any repeat dosing of IV pain medication since arrival to the floor.  Did have a temperature of 100.2 at 8 PM last night but nothing since.  No other adverse events noted.  Vital stable.      Review of Systems   All pertinent negatives and positives are as above. All other systems have been reviewed and are negative unless otherwise stated.     Objective    Temp:  [98.5 °F (36.9 °C)-100.2 °F (37.9 °C)] 98.5 °F (36.9 °C)  Heart Rate:  [64-87] 64  Resp:  [16-20] 18  BP: (116-159)/() 116/62  Physical Exam  GEN: Awake, alert, interactive, in NAD  HEENT: PERRLA, EOMI, Anicteric, +Nasal CPAP  Lungs:  no wheezing/rales/rhonchi  Heart: RRR, +S1/s2, no rub  ABD: soft, no overt tenderness on palpation, +BS, no guarding/rebound  Extremities: atraumatic, no cyanosis, no pitting edema  Skin: no rashes or petechiae  Neuro: AAOx3, no focal deficits  Psych: normal mood & affect      Results Review:  I have reviewed the labs, radiology results, and diagnostic studies.    Laboratory Data:   Results from last 7 days   Lab Units 12/03/23  0348 12/02/23  1530   WBC 10*3/mm3 8.41 10.20   HEMOGLOBIN g/dL 14.2 14.6   HEMATOCRIT % 43.1 44.0   PLATELETS 10*3/mm3 209 244        Results from last 7 days   Lab Units 12/03/23  0348 12/02/23  1530   SODIUM mmol/L 135* 138   POTASSIUM mmol/L  "4.8 4.0   CHLORIDE mmol/L 101 101   CO2 mmol/L 25.0 28.0   BUN mg/dL 12 10   CREATININE mg/dL 0.84 0.79   CALCIUM mg/dL 8.5* 8.7   BILIRUBIN mg/dL 0.5 0.5   ALK PHOS U/L 64 70   ALT (SGPT) U/L 14 17   AST (SGOT) U/L 14 18   GLUCOSE mg/dL 194* 121*       Culture Data:   No results found for: \"BLOODCX\", \"URINECX\", \"WOUNDCX\", \"MRSACX\", \"RESPCX\", \"STOOLCX\"    Radiology Data:   Imaging Results (Last 24 Hours)       Procedure Component Value Units Date/Time    CT Abdomen Pelvis With Contrast [[id number]] Collected: 12/02/23 1712     Updated: 12/02/23 1724    Narrative:      CT ABDOMEN PELVIS W CONTRAST- 12/2/2023 4:56 PM     HISTORY: LLQ abdominal pain with recent colonoscopy and UC.       COMPARISON: CT scan dated 10/24/2023.     DOSE LENGTH PRODUCT: 510 mGy cm. Automated exposure control was also  utilized to decrease patient radiation dose.     TECHNIQUE: Following the intravenous administration of contrast, helical  CT tomographic images of the abdomen and pelvis were acquired.  Multiplanar reformatted images were provided for review.     FINDINGS:  LOWER CHEST: The lung bases and included mediastinal structures are  unremarkable.     LIVER: No suspicious liver lesion. The portal veins are patent..     BILIARY SYSTEM: The gallbladder is unremarkable. No intrahepatic or  extrahepatic ductal dilatation.     PANCREAS: No focal pancreatic lesion.     SPLEEN: Unremarkable.     KIDNEYS AND ADRENALS: Adrenal glands are unremarkable. 1.6 cm  nonobstructing left-sided renal stone in the left renal pelvis. There  are bilateral incidental parapelvic cysts. No suspicious solid or cystic  solid renal masses. Ureters are decompressed.     RETROPERITONEUM: No mass, lymphadenopathy or hemorrhage.     GI TRACT: The stomach is nondistended. Small bowel is nondilated. There  is a circumferential wall thickening along an approximately 12 cm  segment of the mid sigmoid colon with surrounding edema and mesenteric  hyperemia (series 3-image " 27). Prior appendectomy.     OTHER: No peritoneal abscess. Atheromatous vascular calcification.  Incidentally noted penile prosthesis. Right hip arthroplasty. Advanced  lumbar spine osteoarthritis change. No acute bony abnormality.     PELVIS: No mass lesion, fluid collection or significant lymphadenopathy  is seen in the pelvis. The urinary bladder is normal in appearance.       Impression:      1. There are inflammatory changes identified along a fairly long segment  of the mid sigmoid colon. This appears to be acute inflammation in the  setting of underlying inflammatory bowel disease. No viscus perforation  or obstruction.     This report was signed and finalized on 12/2/2023 5:21 PM by Dr Tato Smith.               I have reviewed the patient's current medications.     Assessment/Plan   Assessment  Active Hospital Problems    Diagnosis     **Acute ulcerative colitis     COPD (chronic obstructive pulmonary disease)     Mixed hyperlipidemia     Essential hypertension     Type 2 diabetes mellitus with hyperglycemia, with long-term current use of insulin     S/P CABG (coronary artery bypass graft)        Treatment Plan  #1 ulcerative colitis flare -required 2 doses of IV pain meds in the ER but has not required any since arrival to the floor.  No further nausea since admission either.  Denies vomiting.  Denies any blood/bleeding.  Continue IV steroids, IV fluids, supportive care.  Defer ongoing use of mesalamine enemas to GI when they evaluate today.       #2 COPD -without exacerbation.  Uses Breztri at home which we do not keep on formulary.  Patient told to bring his if he wants to use it.  In the meantime Symbicort twice daily and as needed Xopenex.  Patient has an allergy to albuterol      #3 hypertension -BP did well overnight with resumption of metoprolol and lisinopril.  Monitor closely.     #4 CAD -prior CABG.  No active symptoms or chest pain.  Continue beta-blocker, aspirin, statin.     #5 DM2 -uses  Lantus at home 15 in the a.m. and 40 at night.  Was getting started on high-dose steroids here but unsure how much he would be eating.  I started with 30 of Levemir last evening but it was not given due to his sugar being 117.  Will go ahead and give 10 of Levemir this a.m.  Decrease Levemir to 20 units this p.m.  Further insulin adjustments based on sugar trend today.  Sliding scale.  Hypoglycemia protocol.     #6 hyperlipidemia -statin    Medical Decision Making  Number and Complexity of problems: 1 acute on chronic, multiple chronic  Differential Diagnosis: As above    Conditions and Status        Stable, slightly improved, not yet at goal     MDM Data  External documents reviewed: None  Cardiac tracing (EKG, telemetry) interpretation: None  Radiology interpretation: No new images  Labs reviewed: As above  Any tests that were considered but not ordered: None     Decision rules/scores evaluated (example [id number], Wells, etc): None     Discussed with: Patient and nursing staff     Care Planning  Shared decision making: Patient apprised of current labs, vitals, imaging and treatment plan.  They are agreeable with proceeding with plans as discussed.    Code status and discussions: dnr/dni    Disposition  Social Determinants of Health that impact treatment or disposition: none  I expect the patient to be discharged to home in 1-2 days.     Electronically signed by Neeraj Blunt DO, 12/03/23, 04:25 CST.

## 2023-12-03 NOTE — ED NOTES
Nursing report ED to floor  Zachariah Acosta  83 y.o.  male    HPI:   Chief Complaint   Patient presents with    Abdominal Pain       Admitting doctor:   Neeraj Blunt DO    Consulting provider(s):  Consults       No orders found from 11/3/2023 to 12/3/2023.             Admitting diagnosis:   The encounter diagnosis was Acute ulcerative colitis with other complication.    Code status:   Current Code Status       Date Active Code Status Order ID Comments User Context       Prior            Allergies:   Albuterol, Adhesive tape, Azithromycin, and Fentanyl    Intake and Output    Intake/Output Summary (Last 24 hours) at 12/2/2023 1943  Last data filed at 12/2/2023 1623  Gross per 24 hour   Intake 500 ml   Output --   Net 500 ml       Weight:       12/02/23  1447   Weight: 101 kg (222 lb)       Most recent vitals:   Vitals:    12/02/23 1616 12/02/23 1647 12/02/23 1916 12/02/23 1931   BP: 156/88 137/79 144/75 142/82   Pulse: 67 68 78 87   Resp:       Temp:       SpO2: 97% 100% 91% 94%   Weight:       Height:         Oxygen Therapy: .    Active LDAs/IV Access:   Lines, Drains & Airways       Active LDAs       Name Placement date Placement time Site Days    Peripheral IV 12/02/23 1531 Anterior;Left Forearm 12/02/23  1531  Forearm  less than 1                    Labs (abnormal labs have a star):   Labs Reviewed   COMPREHENSIVE METABOLIC PANEL - Abnormal; Notable for the following components:       Result Value    Glucose 121 (*)     All other components within normal limits    Narrative:     GFR Normal >60  Chronic Kidney Disease <60  Kidney Failure <15    The GFR formula is only valid for adults with stable renal function between ages 18 and 70.   SEDIMENTATION RATE - Abnormal; Notable for the following components:    Sed Rate 24 (*)     All other components within normal limits   C-REACTIVE PROTEIN - Abnormal; Notable for the following components:    C-Reactive Protein 1.99 (*)     All other components within  normal limits   CBC WITH AUTO DIFFERENTIAL - Abnormal; Notable for the following components:    Lymphocyte % 10.3 (*)     Eosinophil % 7.6 (*)     Neutrophils, Absolute 7.51 (*)     Eosinophils, Absolute 0.78 (*)     All other components within normal limits   LACTIC ACID, PLASMA - Normal   CBC AND DIFFERENTIAL    Narrative:     The following orders were created for panel order CBC & Differential.  Procedure                               Abnormality         Status                     ---------                               -----------         ------                     CBC Auto Differential[[id number]]        Abnormal            Final result                 Please view results for these tests on the individual orders.       Meds given in ED:   Medications   methylPREDNISolone sodium succinate (SOLU-Medrol) injection 40 mg (40 mg Intravenous Given 12/2/23 1935)   HYDROmorphone (DILAUDID) injection 0.5 mg (0.5 mg Intravenous Given 12/2/23 1533)   ondansetron (ZOFRAN) injection 4 mg (4 mg Intravenous Given 12/2/23 1533)   lactated ringers bolus 500 mL (0 mL Intravenous Stopped 12/2/23 1623)   iopamidol (ISOVUE-300) 61 % injection 100 mL (100 mL Intravenous Given 12/2/23 1705)   HYDROmorphone (DILAUDID) injection 0.5 mg (0.5 mg Intravenous Given 12/2/23 1744)           NIH Stroke Scale:       Isolation/Infection(s):  No active isolations   No active infections     COVID Testing  Collected .  Resulted .    Nursing report ED to floor:  Mental status: .  Ambulatory status: .  Precautions: .    ED nurse phone extentsion- ..

## 2023-12-03 NOTE — H&P
[facility] Medicine Services  HISTORY AND PHYSICAL    Date of Admission: 12/2/2023  Primary Care Physician: Amaya Mix APRN    Subjective   Primary Historian: patient    Chief Complaint: abd pain    History of Present Illness  Patient is an 83-year-old male with a history of CAD post CABG, COPD, hypertension, hyperlipidemia, diabetes, ulcerative colitis and is on Stelara for his UC.  He apparently was having abdominal pain about 2 weeks ago and was put on a round of antibiotics.  He was still having discomfort though so he went to see his GI doctor and he had a colonoscopy performed on 11/29 which showed inflamed mucosa from the sigmoid to descending colon.  He had a stricture at about 30 cm proximal to the anus.  He apparently has been using mesalamine enemas to try to treat his disease at home.  Patient presented to the ER today due to ongoing abdominal pain.  He has been having some intermittent nausea as well although he was able to eat a sandwich today per report and has been drinking a little bit.  No vomiting.  No diarrhea or bright red blood per rectum patient states.  Otherwise in normal state of health.  Denies fevers or chills.  No chest pain or shortness of breath.  No focal weakness.  CT of his abdomen pelvis in the ER shows inflammatory changes along his sigmoid without perforation or obstruction.  Vitals are normal.  He has required 2 doses of IV pain meds in the ER.  Asked to admit for further care.      Review of Systems   Otherwise complete ROS reviewed and negative except as mentioned in the HPI.    Past Medical History:   Past Medical History:   Diagnosis Date    Asthma     Coronary artery disease     Diabetes mellitus     Elevated cholesterol     HL (hearing loss) 2012    Hyperlipidemia     Hypertension     Inflammatory bowel disease 2001    Myocardial infarct     EASTER 2020    Primary central sleep apnea 2009    Using Cpap    Sleep apnea      cpap at hs    Sleep apnea, obstructive     Stroke     Visual impairment 2016    Double vision     Past Surgical History:  Past Surgical History:   Procedure Laterality Date    ADENOIDECTOMY  1947    APPENDECTOMY N/A 05/05/2023    Procedure: APPENDECTOMY LAPAROSCOPIC;  Surgeon: Katherine Carranza MD;  Location: [facility] OR;  Service: General;  Laterality: N/A;    BACK SURGERY      CARDIAC CATHETERIZATION      stents x 6    CARDIAC SURGERY      CABG TRIPLE BYPASS 2012    CATARACT EXTRACTION      COLONOSCOPY      COLONOSCOPY N/A 06/04/2021    Procedure: COLONOSCOPY WITH ANESTHESIA;  Surgeon: Zachariah Menjivar DO;  Location: [facility] ENDOSCOPY;  Service: Gastroenterology;  Laterality: N/A;  pre hx ulcerative colitis  post ulcerative colitis  dr collins gusman    COLONOSCOPY N/A 11/29/2023    Procedure: COLONOSCOPY LOWER LIMITED;  Surgeon: Zachariah Menjivar DO;  Location: [facility] ENDOSCOPY;  Service: Gastroenterology;  Laterality: N/A;  preop; hx of colitis  postop colitis - questionable colonic stricture at 30cm   [facility] bryanna garrisontreet    CORONARY ANGIOPLASTY WITH STENT PLACEMENT      CORONARY ARTERY BYPASS GRAFT  December 2012    CORONARY STENT PLACEMENT      HIP SURGERY Right     total hip    JOINT REPLACEMENT  2015    Right hip    PENILE PROSTHESIS IMPLANT N/A 12/13/2021    Procedure: 3-PIECE INFLATABLE PENILE PROSTHESIS PLACEMENT;  Surgeon: Jose Tellez MD;  Location: [facility] OR;  Service: Urology;  Laterality: N/A;    TONSILLECTOMY  1947     Social History:  reports that he has never smoked. He has never been exposed to tobacco smoke. He has never used smokeless tobacco. He reports that he does not drink alcohol and does not use drugs.    Family History: family history includes Colon cancer in his brother.       Allergies:  Allergies   Allergen Reactions    Albuterol Other (See Comments)    Adhesive Tape Rash    Azithromycin Rash    Fentanyl Nausea And Vomiting       Medications:  Prior to Admission medications     Medication Sig Start Date End Date Taking? Authorizing Provider   acetaminophen (TYLENOL) 500 MG tablet Take 1 tablet by mouth Every 6 (Six) Hours As Needed for Mild Pain. 4/12/23   El Adamson APRN   amoxicillin-clavulanate (AUGMENTIN) 875-125 MG per tablet Take 1 tablet by mouth 2 (Two) Times a Day. 11/6/23   Salo Nascimento APRN   aspirin 81 MG EC tablet Take 1 tablet by mouth Daily.    Ce Foy MD   Azelastine HCl 137 MCG/SPRAY solution 2 sprays into the nostril(s) as directed by provider 2 (Two) Times a Day. 5/22/23   Jacob Mejias MD   Budeson-Glycopyrrol-Formoterol (Breztri Aerosphere) 160-9-4.8 MCG/ACT aerosol inhaler Inhale 2 puffs 2 (Two) Times a Day. 9/7/23   Amaya Mix APRN   ciclopirox (LOPROX) 0.77 % cream APPLY A THIN LAYER UNDER ARMS TWICE DAILY AFTER USING CLINDAMYCIN SOLUTION 10/23/23   Ce Foy MD   clindamycin (CLEOCIN T) 1 % external solution APPLY SOLUTION TOPICALLY TO UNDERARMS TWICE DAILY 10/23/23   Ce Foy MD   Continuous Blood Gluc Sensor (Dexcom G7 Sensor) misc 1 each Every 10 (Ten) Days. 8/23/23   Yesi Siegel APRN   Dupilumab 300 MG/2ML solution prefilled syringe Inject  under the skin into the appropriate area as directed Every 14 (Fourteen) Days.    Ce Foy MD   Ferrous Sulfate (IRON PO) Take 65 mg by mouth Daily.    Ce Foy MD   finasteride (PROSCAR) 5 MG tablet Take 1 tablet by mouth Daily. 6/9/23   Jose Tellez MD   fluticasone (FLONASE) 50 MCG/ACT nasal spray 1 spray into the nostril(s) as directed by provider Daily. 11/9/22   Jacob Mejias MD   GARLIC PO Take 1 tablet by mouth Daily.    Ce Foy MD   insulin glargine (LANTUS, SEMGLEE) 100 UNIT/ML injection 15 units in the morning and 40 units at bedtime. 9/7/23   Amaya Mix APRN   levalbuterol (XOPENEX HFA) 45 MCG/ACT inhaler Inhale 2 puffs Every 6 (Six) Hours As Needed for Wheezing.     ProviderCe MD   levothyroxine (Synthroid) 50 MCG tablet Take 1 tablet by mouth Daily. 9/7/23   Amaya Mix APRN   lisinopril (PRINIVIL,ZESTRIL) 20 MG tablet Take 1 tablet by mouth 2 (Two) Times a Day. 1 tab am and 1/2 at bedtime. 9/7/23   Amaya Mix APRN   meclizine (ANTIVERT) 12.5 MG tablet Take 1 tablet by mouth 3 (Three) Times a Day As Needed for Dizziness. 1/7/22   Monique Carlos MD   melatonin 5 MG tablet tablet Take 1 tablet by mouth At Night As Needed.    ProviderCe MD   Mesalamine 4 GM/60ML SF enema Insert 1 application into the rectum Every Other Day.    ProviderCe MD   metFORMIN ER (GLUCOPHAGE-XR) 500 MG 24 hr tablet Daily as directed. 9/7/23   Amaya Mix APRN   metoprolol tartrate (LOPRESSOR) 25 MG tablet Take 1 tablet by mouth 2 (Two) Times a Day. 9/7/23   Amaya Mix APRN   naloxone (NARCAN) 4 MG/0.1ML nasal spray Call 911. Don't prime. [place] in 1 nostril for overdose. Repeat in 2-3 minutes in other nostril if no or minimal breathing/responsiveness. 5/6/23   Eva Rajput MD   nitroglycerin (NITROSTAT) 0.4 MG SL tablet 1 under the tongue as needed for angina, may repeat q5mins for up three doses 1/12/23   Mari Anderson APRN   ondansetron (Zofran) 4 MG tablet Take 1 tablet by mouth Every 4 (Four) Hours As Needed for Nausea or Vomiting for up to 20 doses. 5/6/23   Eva Rajput MD   Pediatric Multiple Vit-C-FA (CHILDRENS CHEWABLE MULTI VITS PO) Take 1 tablet by mouth Daily.    ProviderCe MD   rosuvastatin (CRESTOR) 20 MG tablet Take 1 tablet by mouth Every Night. 9/7/23   Amaya Mix APRN   triamcinolone (KENALOG) 0.1 % cream Apply 1 application  topically to the appropriate area as directed 2 (Two) Times a Day.    Provider, MD Ce   Ustekinumab (STELARA) 90 MG/ML solution prefilled syringe Injection Inject 90 mg under the skin into the appropriate area as directed  "Every 28 (Twenty-Eight) Days. 7/15/21   Slao Nascimento APRN   vitamin B-12 (CYANOCOBALAMIN) 1000 MCG tablet Take 1 tablet by mouth Daily.    Provider, MD Ce   Vitamin D, Cholecalciferol, 25 MCG (1000 UT) capsule Take 1 capsule by mouth Daily.    Provider, MD Ce     I have utilized all available immediate resources to obtain, update, or review the patient's current medications (including all prescriptions, over-the-counter products, herbals, cannabis/cannabidiol products, and vitamin/mineral/dietary (nutritional) supplements).    Objective     Vital Signs: /75   Pulse 78   Temp 98.9 °F (37.2 °C)   Resp 16   Ht 175.3 cm (69\")   Wt 101 kg (222 lb)   SpO2 91%   BMI 32.78 kg/m²   Physical Exam   GEN: Awake, alert, interactive, in NAD  HEENT: PERRLA, EOMI, Anicteric, Trachea midline  Lungs:  no wheezing/rales/rhonchi  Heart: RRR, +S1/s2, no rub  ABD: soft, surprisingly no overt tenderness on palpation, +BS, no guarding/rebound  Extremities: atraumatic, no cyanosis, no edema  Skin: no rashes or petechiae  Neuro: AAOx3, no focal deficits  Psych: normal mood & affect        Results Reviewed:  Lab Results (last 24 hours)       Procedure Component Value Units Date/Time    Comprehensive Metabolic Panel [[id number]]  (Abnormal) Collected: 12/02/23 1530    Specimen: Blood Updated: 12/02/23 1628     Glucose 121 mg/dL      BUN 10 mg/dL      Creatinine 0.79 mg/dL      Sodium 138 mmol/L      Potassium 4.0 mmol/L      Chloride 101 mmol/L      CO2 28.0 mmol/L      Calcium 8.7 mg/dL      Total Protein 7.0 g/dL      Albumin 3.8 g/dL      ALT (SGPT) 17 U/L      AST (SGOT) 18 U/L      Alkaline Phosphatase 70 U/L      Total Bilirubin 0.5 mg/dL      Globulin 3.2 gm/dL      A/G Ratio 1.2 g/dL      BUN/Creatinine Ratio 12.7     Anion Gap 9.0 mmol/L      eGFR 89.2 mL/min/1.73     Narrative:      GFR Normal >60  Chronic Kidney Disease <60  Kidney Failure <15    The GFR formula is only valid for adults with " stable renal function between ages 18 and 70.    C-reactive Protein [[id number]]  (Abnormal) Collected: 12/02/23 1530    Specimen: Blood Updated: 12/02/23 1628     C-Reactive Protein 1.99 mg/dL     Lactic Acid, Plasma [[id number]]  (Normal) Collected: 12/02/23 1530    Specimen: Blood Updated: 12/02/23 1624     Lactate 1.1 mmol/L     Sedimentation Rate [[id number]]  (Abnormal) Collected: 12/02/23 1530    Specimen: Blood Updated: 12/02/23 1617     Sed Rate 24 mm/hr     CBC & Differential [[id number]]  (Abnormal) Collected: 12/02/23 1530    Specimen: Blood Updated: 12/02/23 1610    Narrative:      The following orders were created for panel order CBC & Differential.  Procedure                               Abnormality         Status                     ---------                               -----------         ------                     CBC Auto Differential[[id number]]        Abnormal            Final result                 Please view results for these tests on the individual orders.    CBC Auto Differential [[id number]]  (Abnormal) Collected: 12/02/23 1530    Specimen: Blood Updated: 12/02/23 1610     WBC 10.20 10*3/mm3      RBC 4.79 10*6/mm3      Hemoglobin 14.6 g/dL      Hematocrit 44.0 %      MCV 91.9 fL      MCH 30.5 pg      MCHC 33.2 g/dL      RDW 12.9 %      RDW-SD 43.3 fl      MPV 10.3 fL      Platelets 244 10*3/mm3      Neutrophil % 73.7 %      Lymphocyte % 10.3 %      Monocyte % 7.6 %      Eosinophil % 7.6 %      Basophil % 0.5 %      Immature Grans % 0.3 %      Neutrophils, Absolute 7.51 10*3/mm3      Lymphocytes, Absolute 1.05 10*3/mm3      Monocytes, Absolute 0.78 10*3/mm3      Eosinophils, Absolute 0.78 10*3/mm3      Basophils, Absolute 0.05 10*3/mm3      Immature Grans, Absolute 0.03 10*3/mm3      nRBC 0.0 /100 WBC           Imaging Results (Last 24 Hours)       Procedure Component Value Units Date/Time    CT Abdomen Pelvis With Contrast [[id number]] Collected: 12/02/23 1712     Updated: 12/02/23 1724     Narrative:      CT ABDOMEN PELVIS W CONTRAST- 12/2/2023 4:56 PM     HISTORY: LLQ abdominal pain with recent colonoscopy and UC.       COMPARISON: CT scan dated 10/24/2023.     DOSE LENGTH PRODUCT: 510 mGy cm. Automated exposure control was also  utilized to decrease patient radiation dose.     TECHNIQUE: Following the intravenous administration of contrast, helical  CT tomographic images of the abdomen and pelvis were acquired.  Multiplanar reformatted images were provided for review.     FINDINGS:  LOWER CHEST: The lung bases and included mediastinal structures are  unremarkable.     LIVER: No suspicious liver lesion. The portal veins are patent..     BILIARY SYSTEM: The gallbladder is unremarkable. No intrahepatic or  extrahepatic ductal dilatation.     PANCREAS: No focal pancreatic lesion.     SPLEEN: Unremarkable.     KIDNEYS AND ADRENALS: Adrenal glands are unremarkable. 1.6 cm  nonobstructing left-sided renal stone in the left renal pelvis. There  are bilateral incidental parapelvic cysts. No suspicious solid or cystic  solid renal masses. Ureters are decompressed.     RETROPERITONEUM: No mass, lymphadenopathy or hemorrhage.     GI TRACT: The stomach is nondistended. Small bowel is nondilated. There  is a circumferential wall thickening along an approximately 12 cm  segment of the mid sigmoid colon with surrounding edema and mesenteric  hyperemia (series 3-image 27). Prior appendectomy.     OTHER: No peritoneal abscess. Atheromatous vascular calcification.  Incidentally noted penile prosthesis. Right hip arthroplasty. Advanced  lumbar spine osteoarthritis change. No acute bony abnormality.     PELVIS: No mass lesion, fluid collection or significant lymphadenopathy  is seen in the pelvis. The urinary bladder is normal in appearance.       Impression:      1. There are inflammatory changes identified along a fairly long segment  of the mid sigmoid colon. This appears to be acute inflammation in the  setting of  underlying inflammatory bowel disease. No viscus perforation  or obstruction.     This report was signed and finalized on 12/2/2023 5:21 PM by Dr Tato Smith.             I have personally reviewed and interpreted the radiology studies and ECG obtained at time of admission.     Assessment / Plan   Assessment:   Active Hospital Problems    Diagnosis     **Acute ulcerative colitis     COPD (chronic obstructive pulmonary disease)     Mixed hyperlipidemia     Essential hypertension     Type 2 diabetes mellitus with hyperglycemia, with long-term current use of insulin     S/P CABG (coronary artery bypass graft)        Treatment Plan  The patient will be admitted to my service here at [facility].     #1 ulcerative colitis flare -required 2 doses of IV pain meds in the ER.  Abdomen was fairly nontender on exam when I saw him but he had received pain meds.  Currently not nauseous but had Zofran.  Case was discussed between ER provider and GI and recommendations were for admission and IV steroids 40 every 6.  Those have been started already.  Inflammatory markers were not that high in the ER.  CRP of 1.99 and ESR 24.  Recheck in the morning.  Will continue with gentle hydration, steroids, monitoring.  Supportive care.  GI eval.    #2 COPD -without exacerbation.  Uses Breztri at home which we do not have here.  He can bring it if he likes.  The meantime we will use Symbicort twice daily.  Patient has an allergy to albuterol and uses Xopenex.  Will add as needed.    #3 hypertension -resume metoprolol and lisinopril.  Blood pressure stable on arrival.    #4 CAD -prior CABG.  No active symptoms or chest pain.  Continue beta-blocker, aspirin, statin.    #5 DM2 -uses Lantus at home 15 in the a.m. and 40 at night.  Getting started on high-dose steroids here but unsure how much she will be.  Will start with 30 of Levemir tonight and moderate sliding scale with hypoglycemia protocol.  Will have to reevaluate in a.m.  for further titration.    #6 hyperlipidemia -statin    Medical Decision Making  Number and Complexity of problems: 1 acute on chronic, multiple chronic  Differential Diagnosis: As above    Conditions and Status        New to me.  Does not appear toxic     MDM Data  External documents reviewed: Recent GI note and colonoscopy report  Cardiac tracing (EKG, telemetry) interpretation: Sinus on monitor  Radiology interpretation: CT report reviewed  Labs reviewed: As above  Any tests that were considered but not ordered: None     Decision rules/scores evaluated (example [id number], [place], etc): none     Discussed with: patient, nursing staff, ER provider     Care Planning  Shared decision making: Patient apprised of current labs, vitals, imaging and treatment plan.  They are agreeable with proceeding with plans as discussed.    Code status and discussions: full code    Disposition  Social Determinants of Health that impact treatment or disposition: none  Estimated length of stay is 2 days.     I confirmed that the patient's advanced care plan is present, code status is documented, and a surrogate decision maker is listed in the patient's medical record.     The patient's surrogate decision maker is his wife Nannette.     The patient was seen and examined by me on 12/2/23 at 7:45 PM.    Electronically signed by Neeraj Blunt DO, 12/02/23, 21:02 CST.

## 2023-12-03 NOTE — CONSULTS
[facility] Gastroenterology  Inpatient Consult Note  Today's date:  12/03/23    Zachariah Acosta  1940       Referring Provider: No ref. provider found  Primary Physician: Amaya Mix APRN   Primary Gastroenterologist: Dr. Zachariah Menjivar,     Date of Admission: 12/2/2023  Date of Service:  12/03/23    Reason for Consultation/Chief Complaint: Ulcerative colitis flare.    History of present illness: 83-year-old man with a long history of ulcerative pancolitis, presented to the emergency room last night with complaints of left-sided abdominal pain.  Patient was diagnosed with ulcerative colitis approximately 30 years ago and underwent variety of medical regimen for management of UC over the years.  Over the last 3 to 4 years patient was treated with Remicade and Entyvio without significant response.  Over the last 2 years patient has been on Rowasa enemas and Stelara, which seems to be working quite well, as patient did not have any flare-ups up until 2 weeks ago when he started to experience left-sided abdominal pain.  4 days ago patient attempted colonoscopy by Dr. Menjivar.  There was moderate inflammation in the sigmoid and descending colon along with the benign-appearing inflammatory stricture at 30 cm from the anal verge, which was not traversed.  Since then patient did not have any bowel movements, but continued to experience left-sided abdominal crampy pain, that got worse yesterday so patient presented to the emergency room and subsequently admitted.  Treatment with Solu-Medrol was initiated and this morning patient already noticed that the pain subsided quite a bit.  He still did not have any bowel movements.  No nausea or vomiting.  Patient did have low-grade fever prior to admission.  No previous flareups were manifested by abdominal pain associated with diarrhea, quite frequently bloody diarrhea.  However this flareup was mostly manifested by left-sided abdominal pain,  but no significant diarrhea and no blood in stool.  Patient denies any recent weight loss.  CBC on admission and this morning were quite unremarkable with exception of left shift on differential this morning, which could be due to steroid therapy.  Sed rate was elevated at 52 mm an hour.  CMP was remarkable only for mild elevated glucose at 121.  Hemoglobin A1c was 6.8.  C-reactive protein was 1.99 on admission and is 5.47 this morning.  Abdominal CT revealed inflammatory changes along the fairly long segment of the mid sigmoid colon, but without perforation or obstruction.  Patient is under care of Dr. Zachariah Menjivar here in [place] and also under care of of IBD clinic in [place].    Past Medical History:   Diagnosis Date    Asthma     Coronary artery disease     Diabetes mellitus     Elevated cholesterol     HL (hearing loss) 2012    Hyperlipidemia     Hypertension     Inflammatory bowel disease 2001    Myocardial infarct     EASTER 2020    Primary central sleep apnea 2009    Using Cpap    Sleep apnea     cpap at     Sleep apnea, obstructive     Stroke     Visual impairment 2016    Double vision       Past Surgical History:   Procedure Laterality Date    ADENOIDECTOMY  1947    APPENDECTOMY N/A 05/05/2023    Procedure: APPENDECTOMY LAPAROSCOPIC;  Surgeon: Katherine Carranza MD;  Location: [facility] OR;  Service: General;  Laterality: N/A;    BACK SURGERY      CARDIAC CATHETERIZATION      stents x 6    CARDIAC SURGERY      CABG TRIPLE BYPASS 2012    CATARACT EXTRACTION      COLONOSCOPY      COLONOSCOPY N/A 06/04/2021    Procedure: COLONOSCOPY WITH ANESTHESIA;  Surgeon: Zachariah Menjivar DO;  Location: [facility] ENDOSCOPY;  Service: Gastroenterology;  Laterality: N/A;  pre hx ulcerative colitis  post ulcerative colitis  dr collins gusman    COLONOSCOPY N/A 11/29/2023    Procedure: COLONOSCOPY LOWER LIMITED;  Surgeon: Zachariah Menjivar DO;  Location: [facility] ENDOSCOPY;  Service: Gastroenterology;  Laterality: N/A;   preop; hx of colitis  postop colitis - questionable colonic stricture at 30cm   pcp [facility]    CORONARY ANGIOPLASTY WITH STENT PLACEMENT      CORONARY ARTERY BYPASS GRAFT  December 2012    CORONARY STENT PLACEMENT      HIP SURGERY Right     total hip    JOINT REPLACEMENT  2015    Right hip    PENILE PROSTHESIS IMPLANT N/A 12/13/2021    Procedure: 3-PIECE INFLATABLE PENILE PROSTHESIS PLACEMENT;  Surgeon: Jose Tellez MD;  Location: [facility] OR;  Service: Urology;  Laterality: N/A;    TONSILLECTOMY  1947        Allergies   Allergen Reactions    Albuterol Other (See Comments)    Adhesive Tape Rash    Azithromycin Rash    Fentanyl Nausea And Vomiting       Medications Prior to Admission   Medication Sig Dispense Refill Last Dose    acetaminophen (TYLENOL) 500 MG tablet Take 1 tablet by mouth Every 6 (Six) Hours As Needed for Mild Pain.       aspirin 81 MG EC tablet Take 1 tablet by mouth Daily.       Budeson-Glycopyrrol-Formoterol (Breztri Aerosphere) 160-9-4.8 MCG/ACT aerosol inhaler Inhale 2 puffs 2 (Two) Times a Day. 3 each 4     ciclopirox (LOPROX) 0.77 % cream APPLY A THIN LAYER UNDER ARMS TWICE DAILY AFTER USING CLINDAMYCIN SOLUTION       clindamycin (CLEOCIN T) 1 % external solution APPLY SOLUTION TOPICALLY TO UNDERARMS TWICE DAILY       Continuous Blood Gluc Sensor (Dexcom G7 Sensor) misc 1 each Every 10 (Ten) Days. 9 each 3     Dupilumab 300 MG/2ML solution prefilled syringe Inject  under the skin into the appropriate area as directed Every 14 (Fourteen) Days.       Ferrous Sulfate (IRON PO) Take 65 mg by mouth Daily.       finasteride (PROSCAR) 5 MG tablet Take 1 tablet by mouth Daily. 90 tablet 3     fluticasone (FLONASE) 50 MCG/ACT nasal spray 1 spray into the nostril(s) as directed by provider Daily. 16 g 5     insulin glargine (LANTUS, SEMGLEE) 100 UNIT/ML injection 15 units in the morning and 40 units at bedtime. 18 mL 2     levalbuterol (XOPENEX HFA) 45 MCG/ACT inhaler Inhale 2 puffs Every  6 (Six) Hours As Needed for Wheezing.       levothyroxine (Synthroid) 50 MCG tablet Take 1 tablet by mouth Daily. 90 tablet 4     lisinopril (PRINIVIL,ZESTRIL) 20 MG tablet Take 1 tablet by mouth 2 (Two) Times a Day. 1 tab am and 1/2 at bedtime. 180 tablet 1     meclizine (ANTIVERT) 12.5 MG tablet Take 1 tablet by mouth 3 (Three) Times a Day As Needed for Dizziness. 30 tablet 0     melatonin 5 MG tablet tablet Take 1 tablet by mouth At Night As Needed.       Mesalamine 4 GM/60ML SF enema Insert 1 application into the rectum Every Other Day.       metFORMIN ER (GLUCOPHAGE-XR) 500 MG 24 hr tablet Daily as directed. 90 tablet 4     metoprolol tartrate (LOPRESSOR) 25 MG tablet Take 1 tablet by mouth 2 (Two) Times a Day. 180 tablet 3     nitroglycerin (NITROSTAT) 0.4 MG SL tablet 1 under the tongue as needed for angina, may repeat q5mins for up three doses 25 tablet 1     Pediatric Multiple Vit-C-FA (CHILDRENS CHEWABLE MULTI VITS PO) Take 1 tablet by mouth Daily.       rosuvastatin (CRESTOR) 20 MG tablet Take 1 tablet by mouth Every Night. 90 tablet 4     triamcinolone (KENALOG) 0.1 % cream Apply 1 application  topically to the appropriate area as directed 2 (Two) Times a Day.       Ustekinumab (STELARA) 90 MG/ML solution prefilled syringe Injection Inject 90 mg under the skin into the appropriate area as directed Every 28 (Twenty-Eight) Days. 1 mL 6     vitamin B-12 (CYANOCOBALAMIN) 1000 MCG tablet Take 1 tablet by mouth Daily.       Vitamin D, Cholecalciferol, 25 MCG (1000 UT) capsule Take 1 capsule by mouth Daily.          Hospital Medications (active)         Dose Frequency Start End    acetaminophen (TYLENOL) tablet 650 mg 650 mg Every 4 Hours PRN 12/2/2023 --    Admin Instructions: If given for fever, use fever parameter: fever greater than 100.4 °F  Based on patient request - if ordered for moderate or severe pain, provider allows for administration of a medication prescribed for a lower pain scale.    Do not  exceed 4 grams of acetaminophen in a 24 hr period. Max dose of 2gm for AST/ALT greater than 120 units/L.    If given for pain, use the following pain scale:   Mild Pain = Pain Score of 1-3, CPOT 1-2  Moderate Pain = Pain Score of 4-6, CPOT 3-4  Severe Pain = Pain Score of 7-10, CPOT 5-8    Route: Oral    aspirin EC tablet 81 mg 81 mg Daily 12/3/2023 --    Admin Instructions: Do not crush or chew the capsules or tablets. The drug may not work as designed if the capsule or tablet is crushed or chewed. Swallow whole.  Do not exceed 4 grams of aspirin in a 24 hr period.    If given for pain, use the following pain scale:   Mild Pain = Pain Score of 1-3, CPOT 1-2  Moderate Pain = Pain Score of 4-6, CPOT 3-4  Severe Pain = Pain Score of 7-10, CPOT 5-8    Route: Oral    budesonide-formoterol (SYMBICORT) 160-4.5 MCG/ACT inhaler 2 puff 2 puff 2 Times Daily - RT 12/2/2023 --    Admin Instructions: Include Respiratory Treatment Education   Shake well.  Rinse mouth after use, do not swallow water.  Send aerosols to pharmacy in ziplock bag for proper disposal.    Route: Inhalation    dextrose (D50W) (25 g/50 mL) IV injection 25 g 25 g Every 15 Minutes PRN 12/2/2023 --    Admin Instructions: Blood sugar less than 70; patient has IV access - Unresponsive, NPO or Unable To Safely Swallow    Route: Intravenous    dextrose (GLUTOSE) oral gel 15 g 15 g Every 15 Minutes PRN 12/2/2023 --    Admin Instructions: BS<70, Patient Alert, Is not NPO, Can safely swallow.    Route: Oral    finasteride (PROSCAR) tablet 5 mg 5 mg Daily 12/3/2023 --    Admin Instructions: Do not crush or chew the capsules or tablets. Contact Pharmacy if needed.  Group 2 ([place]) Hazardous Drug - Reproductive Risk Only - See Handling Guide    Route: Oral    fluticasone (FLONASE) 50 MCG/ACT nasal spray 1 spray 1 spray Daily 12/3/2023 --    Route: Nasal    glucagon (GLUCAGEN) injection 1 mg 1 mg Every 15 Minutes PRN 12/2/2023 --    Admin Instructions: Blood Glucose Less  Than 70 - Patient Without IV Access - Unresponsive, NPO or Unable To Safely Swallow  Reconstitute powder for injection by adding 1 mL of -supplied sterile diluent or sterile water for injection to a vial containing 1 mg of the drug, to provide solutions containing 1 mg/mL. Shake vial gently to dissolve.    Route: Intramuscular    HYDROmorphone (DILAUDID) injection 0.5 mg 0.5 mg Every 3 Hours PRN 12/2/2023 12/9/2023    Admin Instructions: Based on patient request - if ordered for moderate or severe pain, provider allows for administration of a medication prescribed for a lower pain scale.  If given for pain, use the following pain scale:  Mild Pain = Pain Score of 1-3, CPOT 1-2  Moderate Pain = Pain Score of 4-6, CPOT 3-4  Severe Pain = Pain Score of 7-10, CPOT 5-8    Route: Intravenous    insulin detemir (LEVEMIR) injection 10 Units 10 Units Daily 12/3/2023 --    Admin Instructions: Do not hold basal insulin without an order. Consider requesting a dose edit, if needed.      Route: Subcutaneous    insulin detemir (LEVEMIR) injection 20 Units 20 Units Nightly 12/3/2023 --    Admin Instructions: Do not hold basal insulin without an order. Consider requesting a dose edit, if needed.      Route: Subcutaneous    Insulin Lispro (humaLOG) injection 2-9 Units 2-9 Units 4 Times Daily Before Meals & Nightly 12/3/2023 --    Admin Instructions: Correction Insulin - Moderate Dose (Total Insulin Dose 40-60 units/day, Average Weight Patient, Patient Taking Oral Hypoglycemic)    Blood Glucose 150-199 mg/dL - 2 units  Blood Glucose 200-249 mg/dL - 4 units  Blood Glucose 250-299 mg/dL - 6 units  Blood Glucose 300-349 mg/dL - 7 units  Blood Glucose 350-400 mg/dL - 8 units  Blood Glucose greater than 400 mg/dL - 9 units & Call Provider   Caution: Look alike/sound alike drug alert    Route: Subcutaneous    levalbuterol (XOPENEX) nebulizer solution 0.63 mg 0.63 mg Every 6 Hours PRN 12/2/2023 --    Admin Instructions: Include  "Respiratory Treatment Education    Route: Nebulization    Cosign for Ordering: Accepted by Neeraj Blunt DO on 12/2/2023  9:17 PM    levothyroxine (SYNTHROID, LEVOTHROID) tablet 50 mcg 50 mcg Daily 12/3/2023 --    Admin Instructions: Take on empty stomach.    Route: Oral    lisinopril (PRINIVIL,ZESTRIL) tablet 10 mg 10 mg 2 Times Daily 12/2/2023 --    Admin Instructions: Hold for SBP less than 100, DBP less than 60    Route: Oral    meclizine (ANTIVERT) tablet 12.5 mg 12.5 mg 3 Times Daily PRN 12/2/2023 --    Route: Oral    melatonin tablet 5 mg 5 mg Nightly PRN 12/2/2023 --    Route: Oral    methylPREDNISolone sodium succinate (SOLU-Medrol) injection 40 mg 40 mg Every 6 Hours 12/2/2023 --    Admin Instructions: Caution: Look alike/sound alike drug alert    Route: Intravenous    metoprolol tartrate (LOPRESSOR) tablet 25 mg 25 mg 2 Times Daily 12/2/2023 --    Admin Instructions: Hold for SBP less than 100, DBP less than 60, or heart rate less than 50    Route: Oral    nitroglycerin (NITROSTAT) SL tablet 0.4 mg 0.4 mg Every 5 Minutes PRN 12/2/2023 --    Admin Instructions: Do not crush or chew the capsules or tablets. The drug may not work as designed if the capsule or tablet is crushed or chewed. Swallow whole.  May administer up to 3 doses per episode.    Route: Sublingual    ondansetron (ZOFRAN) injection 4 mg 4 mg Every 6 Hours PRN 12/2/2023 --    Admin Instructions: \"If multiple N/V medications ordered, use in the following order: Ondansetron, Prochlorperazine, Promethazine. Use PO unless patient refuses or patient unable to swallow.\"    Route: Intravenous    rosuvastatin (CRESTOR) tablet 20 mg 20 mg Nightly 12/2/2023 --    Admin Instructions: Avoid grapefruit juice.    Route: Oral    sodium chloride 0.9 % flush 10 mL 10 mL Every 12 Hours Scheduled 12/2/2023 --    Route: Intravenous    sodium chloride 0.9 % flush 10 mL 10 mL As Needed 12/2/2023 --    Route: Intravenous    sodium chloride 0.9 % infusion 40 mL " 40 mL As Needed 12/2/2023 --    Admin Instructions: Following administration of an IV intermittent medication, flush line with 40mL NS at 100mL/hr.    Route: Intravenous    sodium chloride 0.9 % infusion 75 mL/hr Continuous 12/2/2023 --    Route: Intravenous            Social History     Tobacco Use    Smoking status: Never     Passive exposure: Never    Smokeless tobacco: Never   Substance Use Topics    Alcohol use: Never        Past Family History:  Family History   Problem Relation Age of Onset    Colon cancer Brother     Colon polyps Neg Hx     Esophageal cancer Neg Hx        Review of Systems:  Constitutional: No unexpected weight change, no fatigue, no unexplained fever, no sweats or chills.   HEENT: No icteric sclera.  No hearing or visual deficits.  No sore throat.  No chronic nasal discharge.  Pulmonary: No chronic cough.  No hemoptysis.  No shortness of breath.  Cardiovascular: No chest pain.  No palpitations.  No shortness of breath.  Gastrointestinal: As above.  Musculoskeletal/extremities: No peripheral edema.  No cyanosis.  No claudications.  No back pain.  Genitourinary: No dysuria.  No blood in stool.  No urethral discharges.  Neurologic: No seizures.  No headaches.  No dizziness.  No gait problems.  Skin: No rash.  No icterus.  Mental: No psychosis.  No confusions.  No hallucinations.      Physical Exam:  Temp:  [97.4 °F (36.3 °C)-100.2 °F (37.9 °C)] 97.4 °F (36.3 °C)  Heart Rate:  [63-87] 64  Resp:  [16-20] 18  BP: (116-159)/() 122/70  Body mass index is 32.78 kg/m².    Intake/Output Summary (Last 24 hours) at 12/3/2023 1055  Last data filed at 12/3/2023 0854  Gross per 24 hour   Intake 2080 ml   Output 225 ml   Net 1855 ml     I/O this shift:  In: 1580 [P.O.:580; I.V.:1000]  Out: -     General appearance: 83-years old man in no acute distress. Awake, alert, oriented x3.  HEENT: Nonicteric sclerae.  Moist oral mucosa.  PERRLA.  EOMI.  Clear pharynx.  Lungs: Clear to auscultation bilaterally.   No wheezing, rales or rhonchi.  Heart: Regular rate and rhythm.  Normal S1 and S2, no S3, S4 or murmur.  Abdomen: Soft, nondistended, mildly tender to deep palpation in the LLQ, but without guarding or rebound, with normoactive bowel sounds, no hepatosplenomegaly, no palpable masses.  Extremities: No cyanosis, edema or pulse deficits.  Skin: No rash or jaundice.    Results Review:  Lab Results (last 24 hours)       Procedure Component Value Units Date/Time    POC Glucose Once [[id number]]  (Abnormal) Collected: 12/03/23 0754    Specimen: Blood Updated: 12/03/23 0815     Glucose 177 mg/dL      Comment: : [id number] Belt JenniferMeter ID: [id number]       Sedimentation Rate [[id number]]  (Abnormal) Collected: 12/03/23 0348    Specimen: Blood Updated: 12/03/23 0422     Sed Rate 52 mm/hr     Comprehensive Metabolic Panel [[id number]]  (Abnormal) Collected: 12/03/23 0348    Specimen: Blood Updated: 12/03/23 0420     Glucose 194 mg/dL      BUN 12 mg/dL      Creatinine 0.84 mg/dL      Sodium 135 mmol/L      Potassium 4.8 mmol/L      Chloride 101 mmol/L      CO2 25.0 mmol/L      Calcium 8.5 mg/dL      Total Protein 6.8 g/dL      Albumin 3.6 g/dL      ALT (SGPT) 14 U/L      AST (SGOT) 14 U/L      Alkaline Phosphatase 64 U/L      Total Bilirubin 0.5 mg/dL      Globulin 3.2 gm/dL      A/G Ratio 1.1 g/dL      BUN/Creatinine Ratio 14.3     Anion Gap 9.0 mmol/L      eGFR 87.6 mL/min/1.73     Narrative:      GFR Normal >60  Chronic Kidney Disease <60  Kidney Failure <15    The GFR formula is only valid for adults with stable renal function between ages 18 and 70.    Magnesium [[id number]]  (Normal) Collected: 12/03/23 0348    Specimen: Blood Updated: 12/03/23 0420     Magnesium 2.1 mg/dL     Phosphorus [[id number]]  (Normal) Collected: 12/03/23 0348    Specimen: Blood Updated: 12/03/23 0420     Phosphorus 3.3 mg/dL     C-reactive Protein [[id number]]  (Abnormal) Collected: 12/03/23 0348    Specimen: Blood Updated: 12/03/23 0428      C-Reactive Protein 5.47 mg/dL     CBC Auto Differential [[id number]]  (Abnormal) Collected: 12/03/23 0348    Specimen: Blood Updated: 12/03/23 0401     WBC 8.41 10*3/mm3      RBC 4.63 10*6/mm3      Hemoglobin 14.2 g/dL      Hematocrit 43.1 %      MCV 93.1 fL      MCH 30.7 pg      MCHC 32.9 g/dL      RDW 13.1 %      RDW-SD 45.0 fl      MPV 9.9 fL      Platelets 209 10*3/mm3      Neutrophil % 92.5 %      Lymphocyte % 6.4 %      Monocyte % 0.6 %      Eosinophil % 0.0 %      Basophil % 0.1 %      Immature Grans % 0.4 %      Neutrophils, Absolute 7.78 10*3/mm3      Lymphocytes, Absolute 0.54 10*3/mm3      Monocytes, Absolute 0.05 10*3/mm3      Eosinophils, Absolute 0.00 10*3/mm3      Basophils, Absolute 0.01 10*3/mm3      Immature Grans, Absolute 0.03 10*3/mm3      nRBC 0.0 /100 WBC     POC Glucose Once [[id number]]  (Normal) Collected: 12/02/23 2054    Specimen: Blood Updated: 12/02/23 2105     Glucose 117 mg/dL      Comment: : [id number] Monika Fontana AmberMeter ID: [id number]       Hemoglobin A1c [[id number]]  (Abnormal) Collected: 12/02/23 1530    Specimen: Blood Updated: 12/02/23 2018     Hemoglobin A1C 6.80 %     Narrative:      Hemoglobin A1C Ranges:    Increased Risk for Diabetes  5.7% to 6.4%  Diabetes                     >= 6.5%  Diabetic Goal                < 7.0%    Comprehensive Metabolic Panel [[id number]]  (Abnormal) Collected: 12/02/23 1530    Specimen: Blood Updated: 12/02/23 1628     Glucose 121 mg/dL      BUN 10 mg/dL      Creatinine 0.79 mg/dL      Sodium 138 mmol/L      Potassium 4.0 mmol/L      Chloride 101 mmol/L      CO2 28.0 mmol/L      Calcium 8.7 mg/dL      Total Protein 7.0 g/dL      Albumin 3.8 g/dL      ALT (SGPT) 17 U/L      AST (SGOT) 18 U/L      Alkaline Phosphatase 70 U/L      Total Bilirubin 0.5 mg/dL      Globulin 3.2 gm/dL      A/G Ratio 1.2 g/dL      BUN/Creatinine Ratio 12.7     Anion Gap 9.0 mmol/L      eGFR 89.2 mL/min/1.73     Narrative:      GFR Normal >60  Chronic Kidney  Disease <60  Kidney Failure <15    The GFR formula is only valid for adults with stable renal function between ages 18 and 70.    C-reactive Protein [[id number]]  (Abnormal) Collected: 12/02/23 1530    Specimen: Blood Updated: 12/02/23 1628     C-Reactive Protein 1.99 mg/dL     Lactic Acid, Plasma [[id number]]  (Normal) Collected: 12/02/23 1530    Specimen: Blood Updated: 12/02/23 1624     Lactate 1.1 mmol/L     Sedimentation Rate [[id number]]  (Abnormal) Collected: 12/02/23 1530    Specimen: Blood Updated: 12/02/23 1617     Sed Rate 24 mm/hr     CBC & Differential [[id number]]  (Abnormal) Collected: 12/02/23 1530    Specimen: Blood Updated: 12/02/23 1610    Narrative:      The following orders were created for panel order CBC & Differential.  Procedure                               Abnormality         Status                     ---------                               -----------         ------                     CBC Auto Differential[[id number]]        Abnormal            Final result                 Please view results for these tests on the individual orders.    CBC Auto Differential [[id number]]  (Abnormal) Collected: 12/02/23 1530    Specimen: Blood Updated: 12/02/23 1610     WBC 10.20 10*3/mm3      RBC 4.79 10*6/mm3      Hemoglobin 14.6 g/dL      Hematocrit 44.0 %      MCV 91.9 fL      MCH 30.5 pg      MCHC 33.2 g/dL      RDW 12.9 %      RDW-SD 43.3 fl      MPV 10.3 fL      Platelets 244 10*3/mm3      Neutrophil % 73.7 %      Lymphocyte % 10.3 %      Monocyte % 7.6 %      Eosinophil % 7.6 %      Basophil % 0.5 %      Immature Grans % 0.3 %      Neutrophils, Absolute 7.51 10*3/mm3      Lymphocytes, Absolute 1.05 10*3/mm3      Monocytes, Absolute 0.78 10*3/mm3      Eosinophils, Absolute 0.78 10*3/mm3      Basophils, Absolute 0.05 10*3/mm3      Immature Grans, Absolute 0.03 10*3/mm3      nRBC 0.0 /100 WBC             Radiology Review:  Imaging Results (Last 72 Hours)       Procedure Component Value Units Date/Time     CT Abdomen Pelvis With Contrast [[id number]] Collected: 12/02/23 1712     Updated: 12/02/23 1724    Narrative:      CT ABDOMEN PELVIS W CONTRAST- 12/2/2023 4:56 PM     HISTORY: LLQ abdominal pain with recent colonoscopy and UC.       COMPARISON: CT scan dated 10/24/2023.     DOSE LENGTH PRODUCT: 510 mGy cm. Automated exposure control was also  utilized to decrease patient radiation dose.     TECHNIQUE: Following the intravenous administration of contrast, helical  CT tomographic images of the abdomen and pelvis were acquired.  Multiplanar reformatted images were provided for review.     FINDINGS:  LOWER CHEST: The lung bases and included mediastinal structures are  unremarkable.     LIVER: No suspicious liver lesion. The portal veins are patent..     BILIARY SYSTEM: The gallbladder is unremarkable. No intrahepatic or  extrahepatic ductal dilatation.     PANCREAS: No focal pancreatic lesion.     SPLEEN: Unremarkable.     KIDNEYS AND ADRENALS: Adrenal glands are unremarkable. 1.6 cm  nonobstructing left-sided renal stone in the left renal pelvis. There  are bilateral incidental parapelvic cysts. No suspicious solid or cystic  solid renal masses. Ureters are decompressed.     RETROPERITONEUM: No mass, lymphadenopathy or hemorrhage.     GI TRACT: The stomach is nondistended. Small bowel is nondilated. There  is a circumferential wall thickening along an approximately 12 cm  segment of the mid sigmoid colon with surrounding edema and mesenteric  hyperemia (series [id number] 27). Prior appendectomy.     OTHER: No peritoneal abscess. Atheromatous vascular calcification.  Incidentally noted penile prosthesis. Right hip arthroplasty. Advanced  lumbar spine osteoarthritis change. No acute bony abnormality.     PELVIS: No mass lesion, fluid collection or significant lymphadenopathy  is seen in the pelvis. The urinary bladder is normal in appearance.       Impression:      1. There are inflammatory changes identified along a fairly  long segment  of the mid sigmoid colon. This appears to be acute inflammation in the  setting of underlying inflammatory bowel disease. No viscus perforation  or obstruction.     This report was signed and finalized on 12/2/2023 5:21 PM by Dr Tato Smith.               Impression/Plan:    Acute ulcerative colitis    Essential hypertension    S/P CABG (coronary artery bypass graft)    Type 2 diabetes mellitus with hyperglycemia, with long-term current use of insulin    Mixed hyperlipidemia    COPD (chronic obstructive pulmonary disease)    83-year-old man with approximately 30-year long history of ulcerative colitis, over the years been treated with a variety of medical regimens, including Remicade and Entyvio approximately between 3 and 4 years ago, last 2 years on Stelara and Rowasa enemas.  This regimen appeared to work quite well for couple of years up until 2 weeks ago when patient started to experience left-sided abdominal pain, although this time without diarrhea or blood in stool.  Patient attempted colonoscopy 4 days ago.  It revealed inflammation in the descending and sigmoid colon along with benign-appearing inflammatory stenosis at approximately 30 cm from the anal verge which was not traversed by the scope.  Patient presented to the emergency room yesterday with complaints of worsening left-sided abdominal pain.  Blood work was quite unremarkable with exception of elevated C-reactive protein, while abdominal CT revealed an inflamed segment of the sigmoid colon.  Treatment with Solu-Medrol was initiated with clinical improvement overnight.  Patient's presentation is not typical for UC flareup, as patient did not experience any bloody diarrhea or even nonbloody diarrhea.  However, considering that he is responding to therapy with Solu-Medrol, I would continue this therapy along with close clinical monitoring.  Will follow.       Lei Sims MD  12/03/23   10:55 CST

## 2023-12-03 NOTE — PLAN OF CARE
Goal Outcome Evaluation:  Plan of Care Reviewed With: patient        Progress: no change          Pt admitted this shift from the ED with UC flare-up. IVF infusing per order. IV steroids given per order. Accu check. Clear liquid diet. Up ad camille. Voiding per urinal. SCDs for VTE prevention. VSS. Safety maintained. GI consult for later today.

## 2023-12-04 ENCOUNTER — READMISSION MANAGEMENT (OUTPATIENT)
Dept: CALL CENTER | Facility: HOSPITAL | Age: 83
End: 2023-12-04
Payer: MEDICARE

## 2023-12-04 VITALS
RESPIRATION RATE: 16 BRPM | TEMPERATURE: 97.7 F | OXYGEN SATURATION: 96 % | SYSTOLIC BLOOD PRESSURE: 153 MMHG | BODY MASS INDEX: 32.88 KG/M2 | HEART RATE: 87 BPM | DIASTOLIC BLOOD PRESSURE: 73 MMHG | WEIGHT: 222 LBS | HEIGHT: 69 IN

## 2023-12-04 LAB
ANION GAP SERPL CALCULATED.3IONS-SCNC: 9 MMOL/L (ref 5–15)
BASOPHILS # BLD AUTO: 0.02 10*3/MM3 (ref 0–0.2)
BASOPHILS NFR BLD AUTO: 0.1 % (ref 0–1.5)
BUN SERPL-MCNC: 17 MG/DL (ref 8–23)
BUN/CREAT SERPL: 23 (ref 7–25)
CALCIUM SPEC-SCNC: 8.6 MG/DL (ref 8.6–10.5)
CHLORIDE SERPL-SCNC: 105 MMOL/L (ref 98–107)
CO2 SERPL-SCNC: 23 MMOL/L (ref 22–29)
CREAT SERPL-MCNC: 0.74 MG/DL (ref 0.76–1.27)
CRP SERPL-MCNC: 3.11 MG/DL (ref 0–0.5)
DEPRECATED RDW RBC AUTO: 44.5 FL (ref 37–54)
EGFRCR SERPLBLD CKD-EPI 2021: 91 ML/MIN/1.73
EOSINOPHIL # BLD AUTO: 0 10*3/MM3 (ref 0–0.4)
EOSINOPHIL NFR BLD AUTO: 0 % (ref 0.3–6.2)
ERYTHROCYTE [DISTWIDTH] IN BLOOD BY AUTOMATED COUNT: 13.1 % (ref 12.3–15.4)
ERYTHROCYTE [SEDIMENTATION RATE] IN BLOOD: 39 MM/HR (ref 0–20)
GLUCOSE BLDC GLUCOMTR-MCNC: 151 MG/DL (ref 70–130)
GLUCOSE BLDC GLUCOMTR-MCNC: 202 MG/DL (ref 70–130)
GLUCOSE SERPL-MCNC: 181 MG/DL (ref 65–99)
HCT VFR BLD AUTO: 41.1 % (ref 37.5–51)
HGB BLD-MCNC: 13.7 G/DL (ref 13–17.7)
IMM GRANULOCYTES # BLD AUTO: 0.1 10*3/MM3 (ref 0–0.05)
IMM GRANULOCYTES NFR BLD AUTO: 0.6 % (ref 0–0.5)
LYMPHOCYTES # BLD AUTO: 0.59 10*3/MM3 (ref 0.7–3.1)
LYMPHOCYTES NFR BLD AUTO: 3.4 % (ref 19.6–45.3)
MCH RBC QN AUTO: 30.9 PG (ref 26.6–33)
MCHC RBC AUTO-ENTMCNC: 33.3 G/DL (ref 31.5–35.7)
MCV RBC AUTO: 92.6 FL (ref 79–97)
MONOCYTES # BLD AUTO: 0.38 10*3/MM3 (ref 0.1–0.9)
MONOCYTES NFR BLD AUTO: 2.2 % (ref 5–12)
NEUTROPHILS NFR BLD AUTO: 16.45 10*3/MM3 (ref 1.7–7)
NEUTROPHILS NFR BLD AUTO: 93.7 % (ref 42.7–76)
NRBC BLD AUTO-RTO: 0 /100 WBC (ref 0–0.2)
PLATELET # BLD AUTO: 243 10*3/MM3 (ref 140–450)
PMV BLD AUTO: 9.9 FL (ref 6–12)
POTASSIUM SERPL-SCNC: 4.6 MMOL/L (ref 3.5–5.2)
RBC # BLD AUTO: 4.44 10*6/MM3 (ref 4.14–5.8)
SODIUM SERPL-SCNC: 137 MMOL/L (ref 136–145)
WBC NRBC COR # BLD AUTO: 17.54 10*3/MM3 (ref 3.4–10.8)

## 2023-12-04 PROCEDURE — 82948 REAGENT STRIP/BLOOD GLUCOSE: CPT

## 2023-12-04 PROCEDURE — 94799 UNLISTED PULMONARY SVC/PX: CPT

## 2023-12-04 PROCEDURE — 99214 OFFICE O/P EST MOD 30 MIN: CPT | Performed by: NURSE PRACTITIONER

## 2023-12-04 PROCEDURE — 86140 C-REACTIVE PROTEIN: CPT | Performed by: INTERNAL MEDICINE

## 2023-12-04 PROCEDURE — 63710000001 INSULIN LISPRO (HUMAN) PER 5 UNITS: Performed by: INTERNAL MEDICINE

## 2023-12-04 PROCEDURE — 96376 TX/PRO/DX INJ SAME DRUG ADON: CPT

## 2023-12-04 PROCEDURE — 25810000003 SODIUM CHLORIDE 0.9 % SOLUTION: Performed by: INTERNAL MEDICINE

## 2023-12-04 PROCEDURE — 25010000002 METHYLPREDNISOLONE PER 40 MG: Performed by: INTERNAL MEDICINE

## 2023-12-04 PROCEDURE — 63710000001 INSULIN DETEMIR PER 5 UNITS: Performed by: INTERNAL MEDICINE

## 2023-12-04 PROCEDURE — 85025 COMPLETE CBC W/AUTO DIFF WBC: CPT | Performed by: INTERNAL MEDICINE

## 2023-12-04 PROCEDURE — 80048 BASIC METABOLIC PNL TOTAL CA: CPT | Performed by: INTERNAL MEDICINE

## 2023-12-04 PROCEDURE — 85652 RBC SED RATE AUTOMATED: CPT | Performed by: INTERNAL MEDICINE

## 2023-12-04 PROCEDURE — G0378 HOSPITAL OBSERVATION PER HR: HCPCS

## 2023-12-04 RX ORDER — PREDNISONE 20 MG/1
TABLET ORAL
Qty: 50 TABLET | Refills: 0 | Status: SHIPPED | OUTPATIENT
Start: 2023-12-04

## 2023-12-04 RX ADMIN — ASPIRIN 81 MG: 81 TABLET, COATED ORAL at 08:43

## 2023-12-04 RX ADMIN — METOPROLOL TARTRATE 25 MG: 25 TABLET, FILM COATED ORAL at 08:43

## 2023-12-04 RX ADMIN — INSULIN DETEMIR 10 UNITS: 100 INJECTION, SOLUTION SUBCUTANEOUS at 11:29

## 2023-12-04 RX ADMIN — FLUTICASONE PROPIONATE 1 SPRAY: 50 SPRAY, METERED NASAL at 08:43

## 2023-12-04 RX ADMIN — SODIUM CHLORIDE 75 ML/HR: 9 INJECTION, SOLUTION INTRAVENOUS at 06:14

## 2023-12-04 RX ADMIN — BUDESONIDE AND FORMOTEROL FUMARATE DIHYDRATE 2 PUFF: 160; 4.5 AEROSOL RESPIRATORY (INHALATION) at 06:48

## 2023-12-04 RX ADMIN — METHYLPREDNISOLONE SODIUM SUCCINATE 40 MG: 40 INJECTION INTRAMUSCULAR; INTRAVENOUS at 06:14

## 2023-12-04 RX ADMIN — Medication 10 ML: at 08:46

## 2023-12-04 RX ADMIN — LEVOTHYROXINE SODIUM 50 MCG: 50 TABLET ORAL at 08:43

## 2023-12-04 RX ADMIN — INSULIN LISPRO 2 UNITS: 100 INJECTION, SOLUTION INTRAVENOUS; SUBCUTANEOUS at 08:42

## 2023-12-04 RX ADMIN — FINASTERIDE 5 MG: 5 TABLET, FILM COATED ORAL at 08:43

## 2023-12-04 RX ADMIN — METHYLPREDNISOLONE SODIUM SUCCINATE 40 MG: 40 INJECTION INTRAMUSCULAR; INTRAVENOUS at 00:34

## 2023-12-04 RX ADMIN — LISINOPRIL 10 MG: 10 TABLET ORAL at 08:43

## 2023-12-04 NOTE — PROGRESS NOTES
"[facility]  Inpatient Progress Note      Date of Admission: 12/2/2023  Date of Service:  12/04/23    Reason for Follow Up: Ulcerative colitis    Subjective     Subjective:   Patient is lying in bed, no family present at bedside.  He reports improvement in left-sided abdominal pain.  Pain is consistent with previous pain he has complained of in the office.  Pain described as \"pressure.\"  He has not had a bowel movement since admission.  No signs of GI blood loss.  He has tolerated liquid diet without difficulty.    Current Facility-Administered Medications:     acetaminophen (TYLENOL) tablet 650 mg, 650 mg, Oral, Q4H PRN, Neeraj Blunt DO, 650 mg at 12/03/23 2050    aspirin EC tablet 81 mg, 81 mg, Oral, Daily, Neeraj Blunt DO, 81 mg at 12/04/23 0843    budesonide-formoterol (SYMBICORT) 160-4.5 MCG/ACT inhaler 2 puff, 2 puff, Inhalation, BID - RT, Neeraj Blunt DO, 2 puff at 12/04/23 0648    dextrose (D50W) (25 g/50 mL) IV injection 25 g, 25 g, Intravenous, Q15 Min PRN, Neeraj Blunt DO    dextrose (GLUTOSE) oral gel 15 g, 15 g, Oral, Q15 Min PRN, Neeraj Blunt DO    finasteride (PROSCAR) tablet 5 mg, 5 mg, Oral, Daily, Neeraj Blunt DO, 5 mg at 12/04/23 0843    fluticasone (FLONASE) 50 MCG/ACT nasal spray 1 spray, 1 spray, Nasal, Daily, Neeraj Blunt DO, 1 spray at 12/04/23 0843    glucagon (GLUCAGEN) injection 1 mg, 1 mg, Intramuscular, Q15 Min PRN, Neeraj Blunt DO    HYDROmorphone (DILAUDID) injection 0.5 mg, 0.5 mg, Intravenous, Q3H PRN, Neeraj Blunt DO    insulin detemir (LEVEMIR) injection 10 Units, 10 Units, Subcutaneous, Daily, Neeraj Blunt DO, 10 Units at 12/03/23 0805    insulin detemir (LEVEMIR) injection 20 Units, 20 Units, Subcutaneous, Nightly, Neeraj Blunt DO, 20 Units at 12/03/23 2046    Insulin Lispro (humaLOG) injection 2-9 Units, 2-9 Units, Subcutaneous, 4x Daily AC & at Bedtime, Neeraj Blunt DO, 2 Units at 12/04/23 " 0842    levalbuterol (XOPENEX) nebulizer solution 0.63 mg, 0.63 mg, Nebulization, Q6H PRN, Neeraj Blunt DO    levothyroxine (SYNTHROID, LEVOTHROID) tablet 50 mcg, 50 mcg, Oral, Daily, Neeraj Blunt DO, 50 mcg at 12/04/23 0843    lisinopril (PRINIVIL,ZESTRIL) tablet 10 mg, 10 mg, Oral, BID, Neeraj Blunt DO, 10 mg at 12/04/23 0843    meclizine (ANTIVERT) tablet 12.5 mg, 12.5 mg, Oral, TID PRN, Neeraj Blunt DO    melatonin tablet 5 mg, 5 mg, Oral, Nightly PRN, Neeraj Blunt DO, 5 mg at 12/03/23 2207    methylPREDNISolone sodium succinate (SOLU-Medrol) injection 40 mg, 40 mg, Intravenous, Q6H, Gabriele Macdonald, , 40 mg at 12/04/23 0614    metoprolol tartrate (LOPRESSOR) tablet 25 mg, 25 mg, Oral, BID, Neeraj Blunt DO, 25 mg at 12/04/23 0843    nitroglycerin (NITROSTAT) SL tablet 0.4 mg, 0.4 mg, Sublingual, Q5 Min PRN, Neeraj Blunt DO    ondansetron (ZOFRAN) injection 4 mg, 4 mg, Intravenous, Q6H PRN, Neeraj Blunt DO    rosuvastatin (CRESTOR) tablet 20 mg, 20 mg, Oral, Nightly, Neeraj Blunt DO, 20 mg at 12/03/23 2047    sodium chloride 0.9 % flush 10 mL, 10 mL, Intravenous, Q12H, Neeraj Blunt DO, 10 mL at 12/04/23 0846    sodium chloride 0.9 % flush 10 mL, 10 mL, Intravenous, PRN, Neeraj Blunt DO    sodium chloride 0.9 % infusion 40 mL, 40 mL, Intravenous, PRN, Neeraj Blunt DO    Review of Systems     Constitution:  negative for chills, fatigue and fevers  Eyes:  negative for blurriness and change of vision  ENT:   negative for sore throat and voice change  Respiratory: negative for  cough and shortness of air  Cardiovascular:  Negative for chest pain or palpitations  Gastrointestinal:  negative for  See HPI  Genitourinary:  negative for  blood in urine and painful urination  Integument: negative for  rash and redness  Hematologic / Lymphatic: negative for  excessive bleeding and easy bruising  Musculoskeletal: negative for  joint pain and joint stiffness out  of the ordinary  Neurological:  negative for  seizures and speech abnormality  Behavioral/Psych:  negative for  anxiety and depression out of the ordinary  Endocrine: Positive for  diabetes and negative weight loss, unintended  Allergies / Immunologic:  negative for  anaphylaxis and rash        Objective     Vital Signs  Temp:  [97.4 °F (36.3 °C)-98.3 °F (36.8 °C)] 97.7 °F (36.5 °C)  Heart Rate:  [60-87] 87  Resp:  [16-18] 16  BP: (120-153)/(59-73) 153/73  Body mass index is 32.78 kg/m².    Intake/Output Summary (Last 24 hours) at 12/4/2023 0947  Last data filed at 12/4/2023 0939  Gross per 24 hour   Intake 1444.45 ml   Output 1800 ml   Net -355.55 ml     I/O this shift:  In: 240 [P.O.:240]  Out: 350 [Urine:350]       Physical Exam:   General: patient awake, alert and cooperative   Eyes: Normal lids and lashes, no scleral icterus   Neck: supple, normal ROM   Skin: warm and dry, not jaundiced   Cardiovascular: regular rhythm and rate, no murmurs auscultated   Pulm: clear to auscultation bilaterally, regular and unlabored   Abdomen: soft, nontender, nondistended; normal bowel sounds   Rectal: deferred   Extremities: no rash or edema   Psychiatric: Normal mood and behavior; memory intact         Results Review:    I have reviewed all of the patients current test results  Results from last 7 days   Lab Units 12/04/23  0614 12/03/23  0348 12/02/23  1530   WBC 10*3/mm3 17.54* 8.41 10.20   HEMOGLOBIN g/dL 13.7 14.2 14.6   HEMATOCRIT % 41.1 43.1 44.0   PLATELETS 10*3/mm3 243 209 244       Results from last 7 days   Lab Units 12/04/23  0614 12/03/23  0348 12/02/23  1530   SODIUM mmol/L 137 135* 138   POTASSIUM mmol/L 4.6 4.8 4.0   CHLORIDE mmol/L 105 101 101   CO2 mmol/L 23.0 25.0 28.0   BUN mg/dL 17 12 10   CREATININE mg/dL 0.74* 0.84 0.79   CALCIUM mg/dL 8.6 8.5* 8.7   BILIRUBIN mg/dL  --  0.5 0.5   ALK PHOS U/L  --  64 70   ALT (SGPT) U/L  --  14 17   AST (SGOT) U/L  --  14 18   GLUCOSE mg/dL 181* 194* 121*              Lab Results   Lab Value Date/Time    LIPASE 21 10/24/2023 1458    LIPASE 15 05/04/2023 2101    LIPASE 20 06/26/2021 2000    LIPASE 18 06/18/2021 1112    LIPASE 29 06/16/2021 1732    LIPASE 27 06/12/2021 0621       Radiology:    Imaging Results (Last 24 Hours)       ** No results found for the last 24 hours. **              Assessment & Plan       Acute ulcerative colitis    Essential hypertension    S/P CABG (coronary artery bypass graft)    Type 2 diabetes mellitus with hyperglycemia, with long-term current use of insulin    Mixed hyperlipidemia    COPD (chronic obstructive pulmonary disease)      Impression/Plan    Ulcerative colitis  Abdominal pain  Colonic stricture    [facility] last week for evaluation of abnormality present on CT scan as well as complaints of abdominal discomfort.  Stricture was identified that could not be transversed.  Patient has previously followed with [facility], they have observed this stricture on previous colonoscopies.  Question if stricture has worsened as it could not be transversed on procedure last week.  Suspect pain is related to his stricture and not his UC as he is not having diarrhea or rectal bleeding.  Okay to discharge home from [facility] when primary feels appropriate.  He will need to be discharged home on oral prednisone taper (20 mg tablets with following instructions-2 pills p.o. x 10 days, 1-1/2 pills p.o. x 10 days, 1 pill p.o. x 10 days, 1-1/2 pills p.o. x 10 days).  I have asked patient to follow-up with [facility] postdis[facility] for further discussion regarding strictured area.  If he continues to experience pain after completion of steroids he may need to have surgical evaluation to remove this area.  However I will defer this to [facility].  I have asked patient to stay in contact with our office and we would help facilitate as we could.  He will need to continue Stelara and Rowasa post discharge as previously prescribed.        Electronically  signed by BORIS Rice, 12/04/23, 9:47 AM CST.       BORIS Rice  12/04/23  09:47 CST

## 2023-12-04 NOTE — OUTREACH NOTE
Prep Survey      Flowsheet Row Responses   [facility] facility patient discharged from? [place]   Is LACE score < 7 ? No   Eligibility [facility]   Date of Admission 12/02/23   Date of Discharge 12/04/23   Discharge Disposition Home or Self Care   Discharge diagnosis Acute ulcerative colitis,   Does the patient have one of the following disease processes/diagnoses(primary or secondary)? Other   Does the patient have Home health ordered? No   Is there a DME ordered? No   Prep survey completed? Yes            Tori SANTAMARIA - Registered Nurse

## 2023-12-04 NOTE — PLAN OF CARE
Goal Outcome Evaluation:  Plan of Care Reviewed With: patient        Progress: improving          Pt with minimal complaints of pain so far during shift; see MAR. IVF infusing per order. IV steroids given per order. Tolerating clear liquid diet. Accu check. Voiding per urinal. SCDs on. Wearing home CPAP at night. VSS. Safety maintained. Possible discharge later today.

## 2023-12-04 NOTE — DISCHARGE SUMMARY
[facility] Medicine Services  DISCHARGE SUMMARY     Date of Admission: 12/2/2023  Date of Discharge:  12/4/2023  Primary Care Physician: Amaya Mix APRN    Presenting Problem/History of Present Illness:  Abdominal pain    Final Discharge Diagnoses:  Active Hospital Problems    Diagnosis     **Acute ulcerative colitis     COPD (chronic obstructive pulmonary disease)     Mixed hyperlipidemia     Essential hypertension     Type 2 diabetes mellitus with hyperglycemia, with long-term current use of insulin     S/P CABG (coronary artery bypass graft)      Consults: Dr. Sims, gastroenterology    Procedures Performed: None    Pertinent Test Results:       Imaging Results (All)       Procedure Component Value Units Date/Time    CT Abdomen Pelvis With Contrast [[id number]] Collected: 12/02/23 1712     Updated: 12/02/23 1724    Narrative:      CT ABDOMEN PELVIS W CONTRAST- 12/2/2023 4:56 PM     HISTORY: LLQ abdominal pain with recent colonoscopy and UC.       COMPARISON: CT scan dated 10/24/2023.     DOSE LENGTH PRODUCT: 510 mGy cm. Automated exposure control was also  utilized to decrease patient radiation dose.     TECHNIQUE: Following the intravenous administration of contrast, helical  CT tomographic images of the abdomen and pelvis were acquired.  Multiplanar reformatted images were provided for review.     FINDINGS:  LOWER CHEST: The lung bases and included mediastinal structures are  unremarkable.     LIVER: No suspicious liver lesion. The portal veins are patent..     BILIARY SYSTEM: The gallbladder is unremarkable. No intrahepatic or  extrahepatic ductal dilatation.     PANCREAS: No focal pancreatic lesion.     SPLEEN: Unremarkable.     KIDNEYS AND ADRENALS: Adrenal glands are unremarkable. 1.6 cm  nonobstructing left-sided renal stone in the left renal pelvis. There  are bilateral incidental parapelvic cysts. No suspicious solid or cystic  solid renal masses.  Ureters are decompressed.     RETROPERITONEUM: No mass, lymphadenopathy or hemorrhage.     GI TRACT: The stomach is nondistended. Small bowel is nondilated. There  is a circumferential wall thickening along an approximately 12 cm  segment of the mid sigmoid colon with surrounding edema and mesenteric  hyperemia (series 3-image 27). Prior appendectomy.     OTHER: No peritoneal abscess. Atheromatous vascular calcification.  Incidentally noted penile prosthesis. Right hip arthroplasty. Advanced  lumbar spine osteoarthritis change. No acute bony abnormality.     PELVIS: No mass lesion, fluid collection or significant lymphadenopathy  is seen in the pelvis. The urinary bladder is normal in appearance.       Impression:      1. There are inflammatory changes identified along a fairly long segment  of the mid sigmoid colon. This appears to be acute inflammation in the  setting of underlying inflammatory bowel disease. No viscus perforation  or obstruction.     This report was signed and finalized on 12/2/2023 5:21 PM by Dr Tato Smith.             LAB RESULTS:      Lab 12/04/23  0614 12/03/23  0348 12/02/23  1530   WBC 17.54* 8.41 10.20   HEMOGLOBIN 13.7 14.2 14.6   HEMATOCRIT 41.1 43.1 44.0   PLATELETS 243 209 244   NEUTROS ABS 16.45* 7.78* 7.51*   IMMATURE GRANS (ABS) 0.10* 0.03 0.03   LYMPHS ABS 0.59* 0.54* 1.05   MONOS ABS 0.38 0.05* 0.78   EOS ABS 0.00 0.00 0.78*   MCV 92.6 93.1 91.9   SED RATE 39* 52* 24*   CRP 3.11* 5.47* 1.99*   LACTATE  --   --  1.1         Lab 12/04/23  0614 12/03/23  0348 12/02/23  1530   SODIUM 137 135* 138   POTASSIUM 4.6 4.8 4.0   CHLORIDE 105 101 101   CO2 23.0 25.0 28.0   ANION GAP 9.0 9.0 9.0   BUN 17 12 10   CREATININE 0.74* 0.84 0.79   EGFR 91.0 87.6 89.2   GLUCOSE 181* 194* 121*   CALCIUM 8.6 8.5* 8.7   MAGNESIUM  --  2.1  --    PHOSPHORUS  --  3.3  --    HEMOGLOBIN A1C  --   --  6.80*         Lab 12/03/23  0348 12/02/23  1530   TOTAL PROTEIN 6.8 7.0   ALBUMIN 3.6 3.8   GLOBULIN 3.2  3.2   ALT (SGPT) 14 17   AST (SGOT) 14 18   BILIRUBIN 0.5 0.5   ALK PHOS 64 70                     Brief Urine Lab Results  (Last result in the past 365 days)        Color   Clarity   Blood   Leuk Est   Nitrite   Protein   CREAT   Urine HCG        10/24/23 1450 Yellow   Clear   Negative   Negative   Negative   Negative                 Microbiology Results (last 10 days)       ** No results found for the last 240 hours. **          Hospital Course: Mr. Acosta presented to [facility] emergency room 12/2/2023 with complaints of left-sided abdominal pain.  He has a history of ulcerative pancolitis diagnosed approximately 30 years ago and has had a variety of medical regimens over the last several years.  Previously patient treated with Remicade and Entyvio without significant response.  For the past 2 years he has been on Rowasa enemas and Stelara.  Patient had recent flare 2 weeks ago when he started experiencing left-sided abdominal pains.  On 11/29/2023, Dr. Menjivar attempted colonoscopy.  There was moderate inflammation in the sigmoid and descending colon along the benign appearing inflammatory stricture at 30 cm from the anal verge which was not transverse.  Since that time.  Patient did not have any bowel movements but continued to experience left-sided abdominal cramping pain that worsened on the day of admission.  He presented to the emergency room and received Solu-Medrol IV.  Patient denied nausea or vomiting.  Patient reported low-grade temperature prior to admission.  Previous flareups manifested with abdominal pain, diarrhea and frequent bloody diarrhea.  However, patient presented with left-sided abdominal pain but no bloody stools.  Patient denies recent weight loss.  Sed rate 52, CRP 1.99.  CT abdomen showed inflammatory changes along the fairly long segment of the mid sigmoid colon but without perforation or obstruction.  Patient has been followed by IBD D clinic in [place].  Patient  "did report abdominal pain 2 weeks ago and started on antibiotics by gastroenterology.  Patient received Dilaudid IV, Zofran, lactated Ringer's fluid bolus, Solu-Medrol IV in ER.  ER provider discussed with gastroenterology and recommendations were for admission and treat with IV steroids Solu-Medrol 40 mg IV every 6 hours.    He was admitted to the medical floor with ulcerative colitis suspect flare.  Solu-Medrol 40 mg IV every 6 hours ordered per recommendations of gastroenterology.  IV fluids ordered.  CRP 1.99, ESR 24.  Patient started on clear liquid diet.  Patient denied bloody diarrhea.  No additional nausea or vomiting.  Left-sided abdominal pain improved.  Patient described pain as \"pressure\".  No additional bowel movement since admission.  Per gastroenterology attempted colonoscopy and stricture was identified that could not be transversed.  Patient previously followed by [facility] and they have observed the stricture on previous colonoscopies.  There is question if stricture has worsened as it could not be transversed on last procedure.  Suspect pain related to stricture and not ulcerative colitis as he is not having diarrhea or rectal bleeding.  Patient was cleared for discharge by gastroenterology on oral prednisone tapering dose  (20 mg tablets with following instructions-2 pills p.o. x 10 days, 1-1/2 pills p.o. x 10 days, 1 pill p.o. x 10 days, 1-1/2 pills p.o. x 10 days).  Gastroenterology has advised patient to follow-up with [facility] after discharge for further discussion regarding strictured area.  If patient continues to experience pain after completion of steroids he may need surgical evaluation.  This will be deferred to [facility] gastroenterology.  Patient will contact Dr. Menjivar's office if he needs help with assisting to [facility].  Patient to continue Stelara and Rowasa after discharge.    Patient has history of COPD without exacerbation and uses Bretzti.  Symbicort " "substituted for Breztri.  Xopenex as needed.  Patient reports allergy to albuterol.    Blood pressure 153/73.  Continue metoprolol and lisinopril.    Beta-blocker, aspirin, statin continued for history of prior CABG.  Patient denies any complaints of chest pain, palpitations or shortness of breath.    Patient has history of diabetes mellitus type 2 with insulin 15 units in the morning and 40 units at night.  Patient was getting high-dose steroids and started Levemir 30 units nightly.  Metformin held.  Accu-Cheks with sliding scale insulin coverage ordered.    On 12/4/2023, he has been evaluated by gastroenterology and cleared for discharge with prolonged tapering dose of prednisone.  Patient has been advised to follow-up with [facility] as prior to admission.  Patient will follow-up with BOR[facility] Goel on 12/7/2023.    Physical Exam on Discharge:  /73 (BP Location: Left arm, Patient Position: Lying)   Pulse 87   Temp 97.7 °F (36.5 °C) (Oral)   Resp 16   Ht 175.3 cm (69\")   Wt 101 kg (222 lb)   SpO2 96%   BMI 32.78 kg/m²   Physical Exam  Vitals and nursing note reviewed.   Constitutional:       Appearance: He is obese.      Comments: Sitting up in bed.  No oxygen use.  No visitors in room.   HENT:      Head: Normocephalic and atraumatic.      Nose: No congestion.      Mouth/Throat:      Pharynx: Oropharynx is clear. No oropharyngeal exudate or posterior oropharyngeal erythema.   Eyes:      Extraocular Movements: Extraocular movements intact.      Pupils: Pupils are equal, round, and reactive to light.   Cardiovascular:      Rate and Rhythm: Normal rate and regular rhythm.      Heart sounds: No murmur heard.  Pulmonary:      Breath sounds: No wheezing, rhonchi or rales.      Comments: No oxygen in use.  Abdominal:      General: There is no distension.      Tenderness: There is no abdominal tenderness.      Comments: Denies bloody diarrhea.  No abdominal pain today.   Genitourinary:     " Comments: Voiding.  Musculoskeletal:         General: No swelling or tenderness.      Cervical back: Normal range of motion and neck supple.   Skin:     General: Skin is warm and dry.   Neurological:      General: No focal deficit present.      Mental Status: He is alert and oriented to person, place, and time.   Psychiatric:         Mood and Affect: Mood normal.         Behavior: Behavior normal.         Thought Content: Thought content normal.         Judgment: Judgment normal.       Condition on Discharge: Stable for discharge home    Discharge Disposition:  Home or Self Care    Discharge Medications:     Discharge Medications        New Medications        Instructions Start Date   predniSONE 20 MG tablet  Commonly known as: DELTASONE   20 mg tablets.  Take 2 pills daily for 10 days, then 1-1/2 tablet for 10 days, then 1 tablet for 10 days, then 1/2 tablet for 10 days.             Continue These Medications        Instructions Start Date   acetaminophen 500 MG tablet  Commonly known as: TYLENOL   500 mg, Oral, Every 6 Hours PRN      aspirin 81 MG EC tablet   81 mg, Oral, Daily      Breztri Aerosphere 160-9-4.8 MCG/ACT aerosol inhaler  Generic drug: Budeson-Glycopyrrol-Formoterol   2 puffs, Inhalation, 2 Times Daily      CHILDRENS CHEWABLE MULTI VITS PO   1 tablet, Oral, Daily      ciclopirox 0.77 % cream  Commonly known as: LOPROX   Apply 1 application  topically to the appropriate area as directed 2 (Two) Times a Day. To under arms after using Clindamycin Solution      clindamycin 1 % external solution  Commonly known as: CLEOCIN T   1 application  2 (Two) Times a Day. To under arms      Dexcom G7 Sensor misc   1 each, Does not apply, Every 10 Days      Dupilumab 300 MG/2ML solution prefilled syringe   Subcutaneous, Every 14 Days      ferrous sulfate 325 (65 FE) MG tablet   325 mg, Oral, Daily With Breakfast      finasteride 5 MG tablet  Commonly known as: PROSCAR   5 mg, Oral, Daily      fluticasone 50 MCG/ACT  nasal spray  Commonly known as: FLONASE   1 spray, Nasal, Daily      insulin glargine 100 UNIT/ML injection  Commonly known as: LANJESUS SEMGLEE   15 units in the morning and 40 units at bedtime.      levalbuterol 45 MCG/ACT inhaler  Commonly known as: XOPENEX HFA   2 puffs, Inhalation, Every 6 Hours PRN      levothyroxine 50 MCG tablet  Commonly known as: Synthroid   50 mcg, Oral, Daily      lisinopril 20 MG tablet  Commonly known as: PRINIVIL,ZESTRIL   20 mg, Oral, 2 Times Daily, 1 tab am and 1/2 at bedtime.      meclizine 12.5 MG tablet  Commonly known as: ANTIVERT   12.5 mg, Oral, 3 Times Daily PRN      melatonin 5 MG tablet tablet   5 mg, Oral, Nightly PRN      Mesalamine (Sulfite-Free) 4 GM/60ML enema   1 application , Rectal, Every Other Day      metFORMIN  MG 24 hr tablet  Commonly known as: GLUCOPHAGE-XR   500 mg, Oral, Daily With Breakfast      metoprolol tartrate 25 MG tablet  Commonly known as: LOPRESSOR   25 mg, Oral, 2 Times Daily      nitroglycerin 0.4 MG SL tablet  Commonly known as: NITROSTAT   0.4 mg, Sublingual, Every 5 Minutes PRN, Take no more than 3 doses in 15 minutes.      rosuvastatin 40 MG tablet  Commonly known as: CRESTOR   20 mg, Oral, Daily      triamcinolone 0.1 % cream  Commonly known as: KENALOG   1 application , Topical, 2 Times Daily      Ustekinumab 90 MG/ML solution prefilled syringe Injection  Commonly known as: STELARA   90 mg, Subcutaneous, Every 28 Days      vitamin B-12 1000 MCG tablet  Commonly known as: CYANOCOBALAMIN   1,000 mcg, Oral, Daily      Vitamin D (Cholecalciferol) 25 MCG (1000 UT) capsule   1 capsule, Oral, Daily             Discharge Diet:   Diet Instructions       Diet: Diabetic Diets; Consistent Carbohydrate; Regular Texture (IDDSI 7); Thin (IDDSI 0)      Discharge Diet: Diabetic Diets    Diabetic Diet: Consistent Carbohydrate    Texture: Regular Texture (IDDSI 7)    Fluid Consistency: Thin (IDDSI 0)    Diet: Diabetic Diets; Consistent Carbohydrate;  Regular Texture (IDDSI 7); Thin (IDDSI 0)      Discharge Diet: Diabetic Diets    Diabetic Diet: Consistent Carbohydrate    Texture: Regular Texture (IDDSI 7)    Fluid Consistency: Thin (IDDSI 0)          Activity at Discharge:   Activity Instructions       Activity as Tolerated            Discharge instructions:  1.  For worsening abdominal pain seek medical attention.  2.  Prednisone taper per gastroenterology.  Prednisone 20 mg tablets.  2 pills daily for 10 days, then 1-1/2 tablet for 10 days, then 1 tablet for 10 days, then 1/2 tablet for 10 days.  3.  Follow-up with [facility] postdischarge for further discussion regarding stricture area per recommendation of gastroenterology.  4.  Continue Stelara and Rowasa per gastroenterology.      Follow-up Appointments:   Future Appointments   Date Time Provider Department Center   12/7/2023  2:00 PM Amaya Mix APRN [facility]   12/12/2023 11:30 AM Amaya Mix APRN [facility]   6/4/2024  9:45 AM Jacob Mejias MD [facility]   8/7/2024  1:15 PM Yung Polanco MD [facility]     Test Results Pending at Discharge: None    Electronically signed by BORIS Amato, 12/04/23, 10:28 CST.    Time: 35 minutes.  Discussed with , Salo Nascimento, [facility] gastroenterology, and patient.    The above documentation resulted from a face-to-face encounter by me Gale ESCALANTE, [facility].

## 2023-12-05 ENCOUNTER — TRANSITIONAL CARE MANAGEMENT TELEPHONE ENCOUNTER (OUTPATIENT)
Dept: CALL CENTER | Facility: HOSPITAL | Age: 83
End: 2023-12-05
Payer: MEDICARE

## 2023-12-05 NOTE — OUTREACH NOTE
Call Center TCM Note      Flowsheet Row Responses   [facility] patient discharged from? [place]   Does the patient have one of the following disease processes/diagnoses(primary or secondary)? Other   TCM attempt successful? Yes   Call start time 1106   Call end time 1107   Discharge diagnosis Acute ulcerative colitis,   Meds reviewed with patient/caregiver? Yes   Is the patient having any side effects they believe may be caused by any medication additions or changes? No   Does the patient have all medications ordered at discharge? Yes   Is the patient taking all medications as directed (includes completed medication regime)? Yes   Comments Hospital f/u with PCP scheduled for 12/12   Does the patient have an appointment with their PCP within 7-14 days of discharge? Yes   Has home health visited the patient within 72 hours of discharge? N/A   Psychosocial issues? No   Did the patient receive a copy of their discharge instructions? Yes   Nursing interventions Reviewed instructions with patient   What is the patient's perception of their health status since discharge? Improving   Is the patient/caregiver able to teach back signs and symptoms related to disease process for when to call PCP? Yes   Is the patient/caregiver able to teach back signs and symptoms related to disease process for when to call 911? Yes   Is the patient/caregiver able to teach back the hierarchy of who to call/visit for symptoms/problems? PCP, Specialist, Home health nurse, Urgent Care, ED, 911 Yes   TCM call completed? Yes   Wrap up additional comments Doing well, no questions, confirmed upcoming appt with PCP.   Call end time 1107   Would this patient benefit from a Referral to Amb Social Work? No   Is the patient interested in additional calls from an ambulatory ? No            Deanna Maxwell RN    12/5/2023, 11:07 CST

## 2023-12-07 ENCOUNTER — OFFICE VISIT (OUTPATIENT)
Dept: FAMILY MEDICINE CLINIC | Facility: CLINIC | Age: 83
End: 2023-12-07
Payer: MEDICARE

## 2023-12-07 VITALS
WEIGHT: 219 LBS | RESPIRATION RATE: 18 BRPM | HEART RATE: 75 BPM | HEIGHT: 69 IN | SYSTOLIC BLOOD PRESSURE: 130 MMHG | BODY MASS INDEX: 32.44 KG/M2 | TEMPERATURE: 96.7 F | OXYGEN SATURATION: 95 % | DIASTOLIC BLOOD PRESSURE: 80 MMHG

## 2023-12-07 DIAGNOSIS — I10 ESSENTIAL HYPERTENSION: ICD-10-CM

## 2023-12-07 DIAGNOSIS — K51.919 ACUTE ULCERATIVE COLITIS WITH COMPLICATION: Primary | ICD-10-CM

## 2023-12-07 DIAGNOSIS — E11.65 TYPE 2 DIABETES MELLITUS WITH HYPERGLYCEMIA, WITH LONG-TERM CURRENT USE OF INSULIN: ICD-10-CM

## 2023-12-07 DIAGNOSIS — Z79.4 TYPE 2 DIABETES MELLITUS WITH HYPERGLYCEMIA, WITH LONG-TERM CURRENT USE OF INSULIN: ICD-10-CM

## 2023-12-07 NOTE — PROGRESS NOTES
BORIS Pérez  [facility]   Family Medicine  [address]  [place] [address]  Phone: [phone]  Fax: [phone]        CC: hospital follow-up for     History:  Zachariah Acosta is a 83 y.o. male who presents today for transitional care management after hospitalization.    Admitted 12-2-2023  Date of Discharge: 12-4-2023  TCM call successfully made by Deanna Maxwell RN on 12/5/2023    Final Discharge Diagnoses:       Active Hospital Problems     Diagnosis      **Acute ulcerative colitis      COPD (chronic obstructive pulmonary disease)      Mixed hyperlipidemia      Essential hypertension      Type 2 diabetes mellitus with hyperglycemia, with long-term current use of insulin      S/P CABG (coronary artery bypass graft)           Discharge instructions:  1.  For worsening abdominal pain seek medical attention.  2.  Prednisone taper per gastroenterology.  Prednisone 20 mg tablets.  2 pills daily for 10 days, then 1-1/2 tablet for 10 days, then 1 tablet for 10 days, then 1/2 tablet for 10 days.  3.  Follow-up with [facility] postdischarge for further discussion regarding stricture area per recommendation of gastroenterology.  4.  Continue Stelara and Rowasa per gastroenterology.      He was given steroids for 2 days. His GI, Salo Nascimento, at Dr. Menjivar's office sent him home on prednisone taper 20 mg every 10 days. The patient was asked to follow up with [facility] for IBS. He has not called from them yet for an appointment. The patient had a colonoscopy there. He has been having telehealth visits every 3 months with Meli Ta in Dr. Vargas's office related to his colon. The patient will message Ms. Ta to see if he needs to come in for a follow-up visit. He was asked to continue Stelara and Rowasa enemas. The patient takes 1 enema every day. He has a dull aching pain and is unsure if it is caused by the enema. The patient has not had an enema since he  has been home in the hospital. He has not had any major discomfort since he came back from the hospital. The patient is still taking the prednisone. He has been trying to eat light foods. His abdominal pain is improving. The patient denies any diarrhea or bloody diarrhea. He takes Stelara every 4 weeks and thinks that has improved the diarrhea. The patient has been taking it every 4 weeks for 2 years and has not had any problems until now. He denies any constipation.    The patient's blood pressure was slightly elevated when he left the hospital, but seems to okay now. His blood pressure at home is doing good on lisinopril 20 mg twice daily.    His blood glucose has been a little elevated. He has been taking Lantus Solostar 40 units at night and 15 units in the morning, which was keeping it down pretty well until he went to the hospital and was given steroids. His blood glucose has been close to 200 mg/dL in the evening after supper. He kept a log of his blood glucose prior to going to the hospital.  It was 112 mg/dL at 8 AM on 15 units and 172 mg/dL on 40 units in the evening. His blood glucose the next morning was 115 mg/dL, even with the steroids. His blood glucose in the evening was 180 mg/dL after supper. He has short-acting NovoLog, but he is not using it now. When he takes the Lantus in the evening his blood glucose will jump up before it starts bringing it down. His glucose monitor alarm is set for 80 mg/dL and over 300 mg/dL and has not been alarming him.         ROS:  Review of Systems   Constitutional:  Negative for fatigue, fever and unexpected weight change.   HENT:  Negative for congestion, ear pain, rhinorrhea, sinus pressure, sinus pain and voice change.    Eyes:  Negative for visual disturbance.   Respiratory:  Negative for shortness of breath and wheezing.    Cardiovascular:  Negative for chest pain and palpitations.   Gastrointestinal:  Positive for abdominal pain. Negative for nausea and  vomiting.   Genitourinary:  Negative for dysuria and flank pain.   Musculoskeletal:  Negative for back pain, myalgias and neck pain.   Skin:  Negative for color change and rash.   Neurological:  Negative for dizziness, weakness, numbness and headaches.   Psychiatric/Behavioral:  Negative for behavioral problems, dysphoric mood, self-injury and sleep disturbance.        Mr. Acosta  reports that he has never smoked. He has never been exposed to tobacco smoke. He has never used smokeless tobacco. He reports that he does not drink alcohol and does not use drugs.      Current Outpatient Medications:     acetaminophen (TYLENOL) 500 MG tablet, Take 1 tablet by mouth Every 6 (Six) Hours As Needed for Mild Pain., Disp: , Rfl:     aspirin 81 MG EC tablet, Take 1 tablet by mouth Daily., Disp: , Rfl:     Budeson-Glycopyrrol-Formoterol (Breztri Aerosphere) 160-9-4.8 MCG/ACT aerosol inhaler, Inhale 2 puffs 2 (Two) Times a Day., Disp: 3 each, Rfl: 4    ciclopirox (LOPROX) 0.77 % cream, Apply 1 application  topically to the appropriate area as directed 2 (Two) Times a Day. To under arms after using Clindamycin Solution, Disp: , Rfl:     clindamycin (CLEOCIN T) 1 % external solution, 1 application  2 (Two) Times a Day. To under arms, Disp: , Rfl:     Continuous Blood Gluc Sensor (Dexcom G7 Sensor) misc, 1 each Every 10 (Ten) Days., Disp: 9 each, Rfl: 3    Dupilumab 300 MG/2ML solution prefilled syringe, Inject  under the skin into the appropriate area as directed Every 14 (Fourteen) Days., Disp: , Rfl:     ferrous sulfate 325 (65 FE) MG tablet, Take 1 tablet by mouth Daily With Breakfast., Disp: , Rfl:     finasteride (PROSCAR) 5 MG tablet, Take 1 tablet by mouth Daily., Disp: 90 tablet, Rfl: 3    fluticasone (FLONASE) 50 MCG/ACT nasal spray, 1 spray into the nostril(s) as directed by provider Daily., Disp: 16 g, Rfl: 5    insulin glargine (LANTUS, SEMGLEE) 100 UNIT/ML injection, 15 units in the morning and 40 units at bedtime.,  Disp: 18 mL, Rfl: 2    levalbuterol (XOPENEX HFA) 45 MCG/ACT inhaler, Inhale 2 puffs Every 6 (Six) Hours As Needed for Wheezing., Disp: , Rfl:     levothyroxine (Synthroid) 50 MCG tablet, Take 1 tablet by mouth Daily., Disp: 90 tablet, Rfl: 4    lisinopril (PRINIVIL,ZESTRIL) 20 MG tablet, Take 1 tablet by mouth 2 (Two) Times a Day. 1 tab am and 1/2 at bedtime., Disp: 180 tablet, Rfl: 1    meclizine (ANTIVERT) 12.5 MG tablet, Take 1 tablet by mouth 3 (Three) Times a Day As Needed for Dizziness., Disp: 30 tablet, Rfl: 0    melatonin 5 MG tablet tablet, Take 1 tablet by mouth At Night As Needed., Disp: , Rfl:     Mesalamine 4 GM/60ML SF enema, Insert 1 application into the rectum Every Other Day., Disp: , Rfl:     metFORMIN ER (GLUCOPHAGE-XR) 500 MG 24 hr tablet, Take 1 tablet by mouth Daily With Breakfast., Disp: , Rfl:     metoprolol tartrate (LOPRESSOR) 25 MG tablet, Take 1 tablet by mouth 2 (Two) Times a Day., Disp: 180 tablet, Rfl: 3    Pediatric Multiple Vit-C-FA (CHILDRENS CHEWABLE MULTI VITS PO), Take 1 tablet by mouth Daily., Disp: , Rfl:     predniSONE (DELTASONE) 20 MG tablet, 20 mg tablets.  Take 2 pills daily for 10 days, then 1-1/2 tablet for 10 days, then 1 tablet for 10 days, then 1/2 tablet for 10 days., Disp: 50 tablet, Rfl: 0    rosuvastatin (CRESTOR) 40 MG tablet, Take 0.5 tablets by mouth Daily., Disp: , Rfl:     triamcinolone (KENALOG) 0.1 % cream, Apply 1 application  topically to the appropriate area as directed 2 (Two) Times a Day., Disp: , Rfl:     Ustekinumab (STELARA) 90 MG/ML solution prefilled syringe Injection, Inject 90 mg under the skin into the appropriate area as directed Every 28 (Twenty-Eight) Days., Disp: 1 mL, Rfl: 6    vitamin B-12 (CYANOCOBALAMIN) 1000 MCG tablet, Take 1 tablet by mouth Daily., Disp: , Rfl:     Vitamin D, Cholecalciferol, 25 MCG (1000 UT) capsule, Take 1 capsule by mouth Daily., Disp: , Rfl:     nitroglycerin (NITROSTAT) 0.4 MG SL tablet, Place 1 tablet under  "the tongue Every 5 (Five) Minutes As Needed for Chest Pain. Take no more than 3 doses in 15 minutes. (Patient not taking: Reported on 12/7/2023), Disp: , Rfl:       OBJECTIVE:  /80 (BP Location: Left arm, Patient Position: Sitting, Cuff Size: Adult)   Pulse 75   Temp 96.7 °F (35.9 °C) (Infrared)   Resp 18   Ht 175.3 cm (69.02\")   Wt 99.3 kg (219 lb)   SpO2 95%   BMI 32.33 kg/m²    Physical Exam  Vitals and nursing note reviewed.   Constitutional:       Appearance: Normal appearance. He is well-developed.   HENT:      Head: Normocephalic and atraumatic.      Right Ear: Tympanic membrane, ear canal and external ear normal.      Left Ear: Tympanic membrane, ear canal and external ear normal.      Nose: Nose normal. No septal deviation, nasal tenderness or congestion.      Mouth/Throat:      Lips: Pink. No lesions.      Mouth: Mucous membranes are moist. No oral lesions.      Dentition: Normal dentition.      Pharynx: Oropharynx is clear. No pharyngeal swelling, oropharyngeal exudate or posterior oropharyngeal erythema.   Eyes:      General: Lids are normal. Vision grossly intact. No scleral icterus.        Right eye: No discharge.         Left eye: No discharge.      Extraocular Movements: Extraocular movements intact.      Conjunctiva/sclera: Conjunctivae normal.      Right eye: Right conjunctiva is not injected.      Left eye: Left conjunctiva is not injected.      Pupils: Pupils are equal, round, and reactive to light.   Neck:      Thyroid: No thyroid mass.      Trachea: Trachea normal.   Cardiovascular:      Rate and Rhythm: Normal rate and regular rhythm.      Heart sounds: Normal heart sounds. No murmur heard.     No gallop.   Pulmonary:      Effort: Pulmonary effort is normal.      Breath sounds: Normal breath sounds and air entry. No wheezing, rhonchi or rales.   Musculoskeletal:         General: No tenderness or deformity. Normal range of motion.      Cervical back: Full passive range of motion " without pain, normal range of motion and neck supple.      Thoracic back: Normal.      Right lower leg: No edema.      Left lower leg: No edema.   Skin:     General: Skin is warm and dry.      Coloration: Skin is not jaundiced.      Findings: No rash.   Neurological:      Mental Status: He is alert and oriented to person, place, and time.      Sensory: Sensation is intact.      Motor: Motor function is intact.      Coordination: Coordination is intact.      Gait: Gait is intact.      Deep Tendon Reflexes: Reflexes are normal and symmetric.   Psychiatric:         Mood and Affect: Mood and affect normal.         Behavior: Behavior normal.         Judgment: Judgment normal.         Procedures    Assessment/Plan    Diagnoses and all orders for this visit:    1. Acute ulcerative colitis with complication (Primary)    2. Type 2 diabetes mellitus with hyperglycemia, with long-term current use of insulin    3. Essential hypertension    His abdominal pain is improving. He will call [facility] to set up an appointment. The patient was advised to be careful with his sugar while on steroids.    2. Diabetes.  He was advised to take a couple of units of the quick-acting NovoLog insulin if his blood sugar is above 200 mg/dL. For example, if his blood glucose at lunchtime is 300 mg/dL, subtract it by 200 and then divide by 20, so that would be 5 units. That is for while he is on the steroids.    3. Hypertension   Blood pressure is well controlled.     An After Visit Summary was printed and given to the patient at discharge.  Return in about 1 week (around 12/14/2023) for Medicare Wellness. Sooner if problems arise.         Patient Instructions   Continue prednisone taper. If abdominal pain returns contact GI to discuss plan of care.     Call [facility] to set up appointment and further discussion regarding stricture area per recommendation of gastroenterology.            Discussion:     I spent 30 minutes caring for Jessica on this  date of service. This time includes time spent by me in the following activities: preparing for the visit, reviewing tests, obtaining and/or reviewing a separately obtained history, performing a medically appropriate examination and/or evaluation, counseling and educating the patient/family/caregiver, ordering medications, tests, or procedures, documenting information in the medical record, independently interpreting results and communicating that information with the patient/family/caregiver, and care coordination     Amaya ESCALANTE 12/8/2023     Transcribed from ambient dictation for BORIS Pérez by Nilam Bee.  12/07/23   15:16 CST    Patient or patient representative verbalized consent to the visit recording.  I have personally performed the services described in this document as transcribed by the above individual, and it is both accurate and complete.

## 2023-12-07 NOTE — PATIENT INSTRUCTIONS
Continue prednisone taper. If abdominal pain returns contact GI to discuss plan of care.     Call [facility] to set up appointment and further discussion regarding stricture area per recommendation of gastroenterology.

## 2023-12-14 ENCOUNTER — OFFICE VISIT (OUTPATIENT)
Dept: FAMILY MEDICINE CLINIC | Facility: CLINIC | Age: 83
End: 2023-12-14
Payer: MEDICARE

## 2023-12-14 VITALS
HEIGHT: 69 IN | BODY MASS INDEX: 32.03 KG/M2 | HEART RATE: 69 BPM | OXYGEN SATURATION: 96 % | WEIGHT: 216.25 LBS | RESPIRATION RATE: 18 BRPM | SYSTOLIC BLOOD PRESSURE: 120 MMHG | DIASTOLIC BLOOD PRESSURE: 65 MMHG | TEMPERATURE: 97.9 F

## 2023-12-14 DIAGNOSIS — G47.30 SLEEP APNEA, UNSPECIFIED TYPE: Chronic | ICD-10-CM

## 2023-12-14 DIAGNOSIS — K51.919 ACUTE ULCERATIVE COLITIS WITH COMPLICATION: Chronic | ICD-10-CM

## 2023-12-14 DIAGNOSIS — Z00.00 MEDICARE ANNUAL WELLNESS VISIT, SUBSEQUENT: Primary | ICD-10-CM

## 2023-12-14 NOTE — PROGRESS NOTES
The ABCs of the Annual Wellness Visit  [facility]    Subjective     Zachariah Acosta is a 83 y.o. male who presents for a  Welcome to Medicare Visit.    The following portions of the patient's history were reviewed and   updated as appropriate: allergies, current medications, past family history, past medical history, past social history, past surgical history, and problem list.     Compared to one year ago, the patient feels his physical   health is the same.    Compared to one year ago, the patient feels his mental   health is the same.    Recent Hospitalizations:  This patient has had a [facility], [place], operated by [facility] admission record on file within the last 365 days.    Current Medical Providers:  Patient Care Team:  Amaya Mix APRN as PCP - General (Family Medicine)  Yung Polanco MD as Cardiologist (Cardiology)  Monique Carlos MD as Referring Physician (Family Medicine)  Zachariah Menjivar DO as Consulting Physician (Gastroenterology)  Oren Brambila MD as Consulting Physician (Internal Medicine)  Mari Anderson APRN as Nurse Practitioner (Family Medicine)  Jacob Mejias MD as Consulting Physician (Pulmonary Disease)    Outpatient Medications Prior to Visit   Medication Sig Dispense Refill    acetaminophen (TYLENOL) 500 MG tablet Take 1 tablet by mouth Every 6 (Six) Hours As Needed for Mild Pain.      aspirin 81 MG EC tablet Take 1 tablet by mouth Daily.      Budeson-Glycopyrrol-Formoterol (Breztri Aerosphere) 160-9-4.8 MCG/ACT aerosol inhaler Inhale 2 puffs 2 (Two) Times a Day. 3 each 4    ciclopirox (LOPROX) 0.77 % cream Apply 1 application  topically to the appropriate area as directed 2 (Two) Times a Day. To under arms after using Clindamycin Solution      clindamycin (CLEOCIN T) 1 % external solution 1 application  2 (Two) Times a Day. To under arms      Continuous Blood Gluc Sensor (Dexcom G7 Sensor) misc 1 each Every 10 (Ten) Days. 9 each 3    Dupilumab 300 MG/2ML  solution prefilled syringe Inject  under the skin into the appropriate area as directed Every 14 (Fourteen) Days.      ferrous sulfate 325 (65 FE) MG tablet Take 1 tablet by mouth Daily With Breakfast.      finasteride (PROSCAR) 5 MG tablet Take 1 tablet by mouth Daily. 90 tablet 3    fluticasone (FLONASE) 50 MCG/ACT nasal spray 1 spray into the nostril(s) as directed by provider Daily. 16 g 5    insulin glargine (LANTUS, SEMGLEE) 100 UNIT/ML injection 15 units in the morning and 40 units at bedtime. 18 mL 2    levalbuterol (XOPENEX HFA) 45 MCG/ACT inhaler Inhale 2 puffs Every 6 (Six) Hours As Needed for Wheezing.      levothyroxine (Synthroid) 50 MCG tablet Take 1 tablet by mouth Daily. 90 tablet 4    lisinopril (PRINIVIL,ZESTRIL) 20 MG tablet Take 1 tablet by mouth 2 (Two) Times a Day. 1 tab am and 1/2 at bedtime. 180 tablet 1    meclizine (ANTIVERT) 12.5 MG tablet Take 1 tablet by mouth 3 (Three) Times a Day As Needed for Dizziness. 30 tablet 0    melatonin 5 MG tablet tablet Take 1 tablet by mouth At Night As Needed.      Mesalamine 4 GM/60ML SF enema Insert 1 application into the rectum Every Other Day.      metFORMIN ER (GLUCOPHAGE-XR) 500 MG 24 hr tablet Take 1 tablet by mouth Daily With Breakfast.      metoprolol tartrate (LOPRESSOR) 25 MG tablet Take 1 tablet by mouth 2 (Two) Times a Day. 180 tablet 3    Pediatric Multiple Vit-C-FA (CHILDRENS CHEWABLE MULTI VITS PO) Take 1 tablet by mouth Daily.      predniSONE (DELTASONE) 20 MG tablet 20 mg tablets.  Take 2 pills daily for 10 days, then 1-1/2 tablet for 10 days, then 1 tablet for 10 days, then 1/2 tablet for 10 days. 50 tablet 0    rosuvastatin (CRESTOR) 40 MG tablet Take 0.5 tablets by mouth Daily.      triamcinolone (KENALOG) 0.1 % cream Apply 1 application  topically to the appropriate area as directed 2 (Two) Times a Day.      Ustekinumab (STELARA) 90 MG/ML solution prefilled syringe Injection Inject 90 mg under the skin into the appropriate area as  directed Every 28 (Twenty-Eight) Days. 1 mL 6    vitamin B-12 (CYANOCOBALAMIN) 1000 MCG tablet Take 1 tablet by mouth Daily.      Vitamin D, Cholecalciferol, 25 MCG (1000 UT) capsule Take 1 capsule by mouth Daily.      nitroglycerin (NITROSTAT) 0.4 MG SL tablet Place 1 tablet under the tongue Every 5 (Five) Minutes As Needed for Chest Pain. Take no more than 3 doses in 15 minutes. (Patient not taking: Reported on 12/7/2023)       No facility-administered medications prior to visit.       No opioid medication identified on active medication list. I have reviewed chart for other potential  high risk medication/s and harmful drug interactions in the elderly.        Aspirin is on active medication list. Aspirin use is indicated based on review of current medical condition/s. Pros and cons of this therapy have been discussed today. Benefits of this medication outweigh potential harm.  Patient has been encouraged to continue taking this medication.  .      Patient Active Problem List   Diagnosis    Sleep apnea    CAD (coronary artery disease)    Chronic kidney disease (CKD), stage III (moderate)    Disorder of lung    Essential hypertension    Mild intermittent asthma without complication    S/P CABG (coronary artery bypass graft)    Type 2 diabetes mellitus with hyperglycemia, with long-term current use of insulin    Ulcerative colitis    Obesity (BMI 30-39.9)    Non-seasonal allergic rhinitis    SOB (shortness of breath)    Pulmonary scarring    Pulmonary nodule seen on imaging study    Mixed hyperlipidemia    Non-smoker    Ulcerative colitis with complication    Intractable abdominal pain    Constipation    KE (acute kidney injury)    Hematuria    Left lower quadrant abdominal pain    Erectile dysfunction    Class 1 obesity due to excess calories with serious comorbidity and body mass index (BMI) of 34.0 to 34.9 in adult    Thyroid nodule    Double vision    Elevated PSA    Iron deficiency anemia due to chronic blood  "loss    Long-term use of immunosuppressant medication    Nontraumatic complete tear of left rotator cuff    Rosacea    Nontraumatic intracerebral hemorrhage    COPD (chronic obstructive pulmonary disease)    Acute appendicitis    Acute appendicitis, unspecified acute appendicitis type    Acute ulcerative colitis     Advance Care Planning   Advance Care Planning     Advance Directive is not on file.  ACP discussion was held with the patient during this visit. Patient has an advance directive (not in EMR), copy requested.       Objective   Vitals:    23 1303   BP: 120/65   BP Location: Right arm   Patient Position: Sitting   Cuff Size: Adult   Pulse: 69   Resp: 18   Temp: 97.9 °F (36.6 °C)   TempSrc: Infrared   SpO2: 96%   Weight: 98.1 kg (216 lb 4 oz)   Height: 175.3 cm (69.02\")     Estimated body mass index is 31.92 kg/m² as calculated from the following:    Height as of this encounter: 175.3 cm (69.02\").    Weight as of this encounter: 98.1 kg (216 lb 4 oz).           Does the patient have evidence of cognitive impairment?   No    Lab Results   Component Value Date    HGBA1C 6.80 (H) 2023       Procedures       HEALTH RISK ASSESSMENT    Smoking Status:  Social History     Tobacco Use   Smoking Status Never    Passive exposure: Never   Smokeless Tobacco Never     Alcohol Consumption:  Social History     Substance and Sexual Activity   Alcohol Use Never       Fall Risk Screen:    LUX Fall Risk Assessment was completed, and patient is at LOW risk for falls.Assessment completed on:2023    Depression Screen:       2023     1:06 PM   PHQ-2/PHQ-9 Depression Screening   Little Interest or Pleasure in Doing Things 0-->not at all   Feeling Down, Depressed or Hopeless 0-->not at all   PHQ-9: Brief Depression Severity Measure Score 0       Health Habits and Functional and Cognitive Screenin/14/2023     1:07 PM   Functional & Cognitive Status   Do you have difficulty preparing food and " eating? No   Do you have difficulty bathing yourself, getting dressed or grooming yourself? No   Do you have difficulty using the toilet? No   Do you have difficulty moving around from place to place? No   Do you have trouble with steps or getting out of a bed or a chair? No   Current Diet Limited Junk Food   Dental Exam Up to date   Eye Exam Up to date   Exercise (times per week) 3 times per week   Current Exercises Include Other   Do you need help using the phone?  No   Are you deaf or do you have serious difficulty hearing?  No   Do you need help to go to places out of walking distance? No   Do you need help shopping? No   Do you need help preparing meals?  No   Do you need help with housework?  No   Do you need help with laundry? No   Do you need help taking your medications? No   Do you need help managing money? No   Do you ever drive or ride in a car without wearing a seat belt? No   Have you felt unusual stress, anger or loneliness in the last month? No   Who do you live with? Spouse   If you need help, do you have trouble finding someone available to you? No   Have you been bothered in the last four weeks by sexual problems? No   Do you have difficulty concentrating, remembering or making decisions? No       Visual Acuity:    No results found.    Age-appropriate Screening Schedule:  Refer to the list below for future screening recommendations based on patient's age, sex and/or medical conditions. Orders for these recommended tests are listed in the plan section. The patient has been provided with a written plan.    Health Maintenance   Topic Date Due    TDAP/TD VACCINES (3 - Td or Tdap) 07/01/2021    COVID-19 Vaccine (2 - 2023-24 season) 09/01/2023    ZOSTER VACCINE (1 of 2) 06/07/2024 (Originally 11/1/1990)    BMI FOLLOWUP  05/22/2024    HEMOGLOBIN A1C  06/02/2024    DIABETIC EYE EXAM  07/05/2024    LIPID PANEL  08/31/2024    URINE MICROALBUMIN  08/31/2024    ANNUAL WELLNESS VISIT  12/14/2024    INFLUENZA  VACCINE  Completed    Pneumococcal Vaccine 65+  Completed        CMS Preventative Services Quick Reference  Risk Factors Identified During Encounter    Immunizations Discussed/Encouraged: Tdap and COVID19  The above risks/problems have been discussed with the patient.  Pertinent information has been shared with the patient in the After Visit Summary.    Follow Up:   Initial Medicare Visit in one year    An After Visit Summary and PPPS were made available to the patient.      Additional E&M Note during same encounter follows:  Patient has multiple medical problems which are significant and separately identifiable that require additional work above and beyond the Medicare Wellness Visit.      Chief Complaint  Medicare Wellness-subsequent    Subjective        HPI  Zachariah Acosta is also being seen today for abdominal pain has improved since HFU.Has appointment Tuesday at [facility] with Dr. Vargas gastroenterologist who cares for his ulcerative colitis.     New cpap this week.     Review of Systems   Constitutional:  Negative for activity change and fatigue.   HENT:  Negative for congestion, rhinorrhea, sinus pressure, sore throat and trouble swallowing.    Eyes:  Negative for visual disturbance.   Respiratory:  Negative for chest tightness and shortness of breath.    Cardiovascular:  Negative for chest pain and palpitations.   Gastrointestinal:  Negative for abdominal pain, constipation, diarrhea and nausea.   Genitourinary:  Negative for dysuria and frequency.   Musculoskeletal:  Negative for back pain and neck pain.   Skin:  Negative for rash and wound.   Neurological:  Negative for weakness and confusion.   Psychiatric/Behavioral:  Negative for agitation, self-injury and sleep disturbance. The patient is not nervous/anxious.        Objective   Vital Signs:  /65 (BP Location: Right arm, Patient Position: Sitting, Cuff Size: Adult)   Pulse 69   Temp 97.9 °F (36.6 °C) (Infrared)   Resp 18   Ht 175.3  "cm (69.02\")   Wt 98.1 kg (216 lb 4 oz)   SpO2 96%   BMI 31.92 kg/m²     Physical Exam  Vitals and nursing note reviewed.   Constitutional:       Appearance: Normal appearance. He is well-developed.   HENT:      Head: Normocephalic and atraumatic.      Right Ear: Tympanic membrane, ear canal and external ear normal.      Left Ear: Tympanic membrane, ear canal and external ear normal.      Nose: Nose normal. No septal deviation, nasal tenderness or congestion.      Mouth/Throat:      Lips: Pink. No lesions.      Mouth: Mucous membranes are moist. No oral lesions.      Dentition: Normal dentition.      Pharynx: Oropharynx is clear. No pharyngeal swelling, oropharyngeal exudate or posterior oropharyngeal erythema.   Eyes:      General: Lids are normal. Vision grossly intact. No scleral icterus.        Right eye: No discharge.         Left eye: No discharge.      Extraocular Movements: Extraocular movements intact.      Conjunctiva/sclera: Conjunctivae normal.      Right eye: Right conjunctiva is not injected.      Left eye: Left conjunctiva is not injected.      Pupils: Pupils are equal, round, and reactive to light.   Neck:      Thyroid: No thyroid mass.      Trachea: Trachea normal.   Cardiovascular:      Rate and Rhythm: Normal rate and regular rhythm.      Heart sounds: Normal heart sounds. No murmur heard.     No gallop.   Pulmonary:      Effort: Pulmonary effort is normal.      Breath sounds: Normal breath sounds and air entry. No wheezing, rhonchi or rales.   Musculoskeletal:         General: No tenderness or deformity. Normal range of motion.      Cervical back: Full passive range of motion without pain, normal range of motion and neck supple.      Thoracic back: Normal.      Right lower leg: No edema.      Left lower leg: No edema.   Skin:     General: Skin is warm and dry.      Coloration: Skin is not jaundiced.      Findings: No rash.   Neurological:      Mental Status: He is alert and oriented to person, " place, and time.      Sensory: Sensation is intact.      Motor: Motor function is intact.      Coordination: Coordination is intact.      Gait: Gait is intact.      Deep Tendon Reflexes: Reflexes are normal and symmetric.   Psychiatric:         Mood and Affect: Mood and affect normal.         Behavior: Behavior normal.         Judgment: Judgment normal.          The following data was reviewed by: BORIS Pérez on 12/14/2023:  Common labs          12/2/2023    15:30 12/3/2023    03:48 12/4/2023    06:14   Common Labs   Glucose 121  194  181    BUN 10  12  17    Creatinine 0.79  0.84  0.74    Sodium 138  135  137    Potassium 4.0  4.8  4.6    Chloride 101  101  105    Calcium 8.7  8.5  8.6    Albumin 3.8  3.6     Total Bilirubin 0.5  0.5     Alkaline Phosphatase 70  64     AST (SGOT) 18  14     ALT (SGPT) 17  14     WBC 10.20  8.41  17.54    Hemoglobin 14.6  14.2  13.7    Hematocrit 44.0  43.1  41.1    Platelets 244  209  243    Hemoglobin A1C 6.80        Data reviewed : Radiologic studies           Assessment and Plan   Diagnoses and all orders for this visit:    1. Medicare annual wellness visit, subsequent (Primary)    2. Sleep apnea, unspecified type  Comments:  stable and tolerating new cpap machine. No issues.    3. Acute ulcerative colitis with complication           I spent 20 minutes caring for Zachariah on this date of service. This time includes time spent by me in the following activities:preparing for the visit, reviewing tests, obtaining and/or reviewing a separately obtained history, performing a medically appropriate examination and/or evaluation , counseling and educating the patient/family/caregiver, ordering medications, tests, or procedures, documenting information in the medical record, independently interpreting results and communicating that information with the patient/family/caregiver, and care coordination  Follow Up   Return in about 1 year (around 12/14/2024) for Medicare  Wellness.  Patient was given instructions and counseling regarding his condition or for health maintenance advice. Please see specific information pulled into the AVS if appropriate.

## 2024-02-28 NOTE — PROGRESS NOTES
[facility]  [address], [address]  [place] [address]  Phone [phone]  Fax [phone]       [facility] Sleep Follow Up Encounter      Information:   Patient Name: Jesus Lynch  :   1940  Age:   83 y.o.  MRN:   [id number]  Account #:  [id number]  Today:                24    Provider:  Joann Murrell PA-C    Chief Complaint   Patient presents with    Follow-up    Sleep Apnea     Patient got on a CPAP 3 months ago, he states he is tolerating it well, DME download in chart.         Subjective:   Jesus Lynch is a 83 y.o. male who has  has a past medical history of Asthma, CAD (coronary artery disease), Chronic kidney disease, Constipation, Diabetes mellitus (HCC), Hematuria, Hyperlipidemia, Hypertension, NSTEMI (non-ST elevated myocardial infarction) (HCC), WAQAR (obstructive sleep apnea), and Ulcerative colitis (HCC).    Jesus Lynch comes in for a sleep clinic follow up. We manage WAQAR on CPAP.  He was diagnosed with WAQAR in  in [place] and prescribe CPAP.  He had a repeat PSG/titration in .  At his last office visit, 2023 he received orders for a new APAP with a pressure range of 8cm to 18cm. Today he reports that he is doing well with the use of PAP. He reports that he sleeps well and his sleep is restorative. He denies snoring over therapy. He denies excessive daytime somnolence. The ESS score is 7. The compliance report indicates that he is averaging 8.5 hours of PAP use per day. He used his old PAP when he was hospitalized. He reports that consistent PAP use has alleviated the previous WAQAR symptoms. He desires to continue present therapy.      Objective:     Past Medical History:   Diagnosis Date    Asthma     CAD (coronary artery disease)     Chronic kidney disease     Constipation     Diabetes mellitus (HCC)     Hematuria     Hyperlipidemia     Hypertension     NSTEMI (non-ST elevated myocardial infarction) (HCC)     WAQAR (obstructive sleep

## 2024-02-29 ENCOUNTER — OFFICE VISIT (OUTPATIENT)
Dept: NEUROLOGY | Age: 84
End: 2024-02-29
Payer: MEDICARE

## 2024-02-29 VITALS
BODY MASS INDEX: 32.29 KG/M2 | HEART RATE: 60 BPM | WEIGHT: 218 LBS | HEIGHT: 69 IN | SYSTOLIC BLOOD PRESSURE: 138 MMHG | DIASTOLIC BLOOD PRESSURE: 78 MMHG | OXYGEN SATURATION: 95 %

## 2024-02-29 DIAGNOSIS — Z99.89 CPAP (CONTINUOUS POSITIVE AIRWAY PRESSURE) DEPENDENCE: ICD-10-CM

## 2024-02-29 DIAGNOSIS — G47.33 OBSTRUCTIVE SLEEP APNEA: Primary | ICD-10-CM

## 2024-02-29 PROCEDURE — G8417 CALC BMI ABV UP PARAM F/U: HCPCS | Performed by: PHYSICIAN ASSISTANT

## 2024-02-29 PROCEDURE — G8484 FLU IMMUNIZE NO ADMIN: HCPCS | Performed by: PHYSICIAN ASSISTANT

## 2024-02-29 PROCEDURE — G8427 DOCREV CUR MEDS BY ELIG CLIN: HCPCS | Performed by: PHYSICIAN ASSISTANT

## 2024-02-29 PROCEDURE — 1036F TOBACCO NON-USER: CPT | Performed by: PHYSICIAN ASSISTANT

## 2024-02-29 PROCEDURE — 1123F ACP DISCUSS/DSCN MKR DOCD: CPT | Performed by: PHYSICIAN ASSISTANT

## 2024-02-29 PROCEDURE — 99212 OFFICE O/P EST SF 10 MIN: CPT | Performed by: PHYSICIAN ASSISTANT

## 2024-02-29 RX ORDER — ASPIRIN 81 MG/1
1 TABLET ORAL DAILY
COMMUNITY
Start: 2021-06-11

## 2024-02-29 RX ORDER — METFORMIN HYDROCHLORIDE 500 MG/1
500 TABLET, EXTENDED RELEASE ORAL DAILY
COMMUNITY
Start: 2023-06-14

## 2024-02-29 RX ORDER — BUDESONIDE, GLYCOPYRROLATE, AND FORMOTEROL FUMARATE 160; 9; 4.8 UG/1; UG/1; UG/1
2 AEROSOL, METERED RESPIRATORY (INHALATION) 2 TIMES DAILY
COMMUNITY
Start: 2022-06-07

## 2024-02-29 RX ORDER — ONDANSETRON 4 MG/1
4 TABLET, FILM COATED ORAL EVERY 6 HOURS PRN
COMMUNITY
Start: 2024-02-28 | End: 2024-03-01

## 2024-02-29 RX ORDER — CIPROFLOXACIN 500 MG/1
500 TABLET, FILM COATED ORAL EVERY 12 HOURS
COMMUNITY
Start: 2024-02-08

## 2024-02-29 RX ORDER — LEVOTHYROXINE SODIUM 0.05 MG/1
50 TABLET ORAL
COMMUNITY
Start: 2023-09-07

## 2024-02-29 RX ORDER — METRONIDAZOLE 500 MG/1
TABLET ORAL
COMMUNITY
Start: 2024-02-28

## 2024-02-29 RX ORDER — NITROGLYCERIN 0.4 MG/1
0.4 TABLET SUBLINGUAL
COMMUNITY
Start: 2020-05-18

## 2024-02-29 NOTE — PATIENT INSTRUCTIONS
Patient education: Sleep apnea in adults       INTRODUCTION -- Normally during sleep, air moves through the throat and in and out of the lungs at a regular rhythm. In a person with sleep apnea, air movement is periodically diminished or stopped. There are two types of sleep apnea: obstructive sleep apnea and central sleep apnea. In obstructive sleep apnea, breathing is abnormal because of narrowing or closure of the throat. In central sleep apnea, breathing is abnormal because of a change in the breathing control and rhythm.  Sleep apnea is a serious condition that can affect a person's ability to safely perform normal daily activities and can affect long term health. Approximately 25 percent of adults are at risk for sleep apnea of some degree.  Men are more commonly affected than women. Other risk factors include middle and older age, being overweight or obese, and having a small mouth and throat.  This topic review focuses on the most common type of sleep apnea in adults, obstructive sleep apnea (WAQAR).    HOW SLEEP APNEA OCCURS -- The throat is surrounded by muscles that control the airway for speaking, swallowing, and breathing. During sleep, these muscles are less active, and this causes the throat to narrow.  In most people, this narrowing does not affect breathing. In others, it can cause snoring, sometimes with reduced or completely blocked airflow.  A completely blocked airway without airflow is called an obstructive apnea. Partial obstruction with diminished airflow is called a hypopnea. A person may have apnea and hypopnea during sleep.  Insufficient breathing due to apnea or hypopnea causes oxygen levels to fall and carbon dioxide to rise. Because the airway is blocked, breathing faster or harder does not help to improve oxygen levels until the airway is reopened. Typically, the obstruction requires the person to awaken to activate the upper airway muscles. Once the airway is opened, the person then

## 2024-02-29 NOTE — PROGRESS NOTES

## 2024-03-06 ENCOUNTER — HOSPITAL ENCOUNTER (EMERGENCY)
Facility: HOSPITAL | Age: 84
Discharge: HOME | End: 2024-03-06
Attending: EMERGENCY MEDICINE
Payer: MEDICARE

## 2024-03-06 ENCOUNTER — APPOINTMENT (OUTPATIENT)
Dept: CT IMAGING | Facility: HOSPITAL | Age: 84
End: 2024-03-06
Payer: MEDICARE

## 2024-03-06 VITALS
SYSTOLIC BLOOD PRESSURE: 117 MMHG | BODY MASS INDEX: 32.14 KG/M2 | TEMPERATURE: 98.4 F | RESPIRATION RATE: 18 BRPM | DIASTOLIC BLOOD PRESSURE: 76 MMHG | HEIGHT: 69 IN | WEIGHT: 217 LBS | OXYGEN SATURATION: 93 % | HEART RATE: 94 BPM

## 2024-03-06 DIAGNOSIS — R10.9 LEFT SIDED ABDOMINAL PAIN: ICD-10-CM

## 2024-03-06 DIAGNOSIS — K51.50 LEFT SIDED COLITIS WITHOUT COMPLICATIONS: Primary | ICD-10-CM

## 2024-03-06 LAB
ALBUMIN SERPL-MCNC: 3.8 G/DL (ref 3.5–5.2)
ALBUMIN/GLOB SERPL: 1.2 G/DL
ALP SERPL-CCNC: 68 U/L (ref 39–117)
ALT SERPL W P-5'-P-CCNC: 21 U/L (ref 1–41)
ANION GAP SERPL CALCULATED.3IONS-SCNC: 10 MMOL/L (ref 5–15)
AST SERPL-CCNC: 21 U/L (ref 1–40)
BASOPHILS # BLD AUTO: 0.1 10*3/MM3 (ref 0–0.2)
BASOPHILS NFR BLD AUTO: 1 % (ref 0–1.5)
BILIRUB SERPL-MCNC: 0.3 MG/DL (ref 0–1.2)
BUN SERPL-MCNC: 17 MG/DL (ref 8–23)
BUN/CREAT SERPL: 18.9 (ref 7–25)
CALCIUM SPEC-SCNC: 9 MG/DL (ref 8.6–10.5)
CHLORIDE SERPL-SCNC: 106 MMOL/L (ref 98–107)
CO2 SERPL-SCNC: 24 MMOL/L (ref 22–29)
CREAT SERPL-MCNC: 0.9 MG/DL (ref 0.76–1.27)
DEPRECATED RDW RBC AUTO: 46.2 FL (ref 37–54)
EGFRCR SERPLBLD CKD-EPI 2021: 85.8 ML/MIN/1.73
EOSINOPHIL # BLD AUTO: 1.42 10*3/MM3 (ref 0–0.4)
EOSINOPHIL NFR BLD AUTO: 13.8 % (ref 0.3–6.2)
ERYTHROCYTE [DISTWIDTH] IN BLOOD BY AUTOMATED COUNT: 13.5 % (ref 12.3–15.4)
GLOBULIN UR ELPH-MCNC: 3.3 GM/DL
GLUCOSE SERPL-MCNC: 165 MG/DL (ref 65–99)
HCT VFR BLD AUTO: 44.9 % (ref 37.5–51)
HGB BLD-MCNC: 14.9 G/DL (ref 13–17.7)
HOLD SPECIMEN: NORMAL
HOLD SPECIMEN: NORMAL
IMM GRANULOCYTES # BLD AUTO: 0.04 10*3/MM3 (ref 0–0.05)
IMM GRANULOCYTES NFR BLD AUTO: 0.4 % (ref 0–0.5)
LYMPHOCYTES # BLD AUTO: 1.82 10*3/MM3 (ref 0.7–3.1)
LYMPHOCYTES NFR BLD AUTO: 17.7 % (ref 19.6–45.3)
MCH RBC QN AUTO: 31 PG (ref 26.6–33)
MCHC RBC AUTO-ENTMCNC: 33.2 G/DL (ref 31.5–35.7)
MCV RBC AUTO: 93.5 FL (ref 79–97)
MONOCYTES # BLD AUTO: 0.85 10*3/MM3 (ref 0.1–0.9)
MONOCYTES NFR BLD AUTO: 8.2 % (ref 5–12)
NEUTROPHILS NFR BLD AUTO: 58.9 % (ref 42.7–76)
NEUTROPHILS NFR BLD AUTO: 6.08 10*3/MM3 (ref 1.7–7)
NRBC BLD AUTO-RTO: 0 /100 WBC (ref 0–0.2)
PLATELET # BLD AUTO: 273 10*3/MM3 (ref 140–450)
PMV BLD AUTO: 9.9 FL (ref 6–12)
POTASSIUM SERPL-SCNC: 3.9 MMOL/L (ref 3.5–5.2)
PROT SERPL-MCNC: 7.1 G/DL (ref 6–8.5)
RBC # BLD AUTO: 4.8 10*6/MM3 (ref 4.14–5.8)
SODIUM SERPL-SCNC: 140 MMOL/L (ref 136–145)
WBC NRBC COR # BLD AUTO: 10.31 10*3/MM3 (ref 3.4–10.8)
WHOLE BLOOD HOLD COAG: NORMAL
WHOLE BLOOD HOLD SPECIMEN: NORMAL

## 2024-03-06 PROCEDURE — 36415 COLL VENOUS BLD VENIPUNCTURE: CPT

## 2024-03-06 PROCEDURE — 25010000002 ONDANSETRON PER 1 MG: Performed by: EMERGENCY MEDICINE

## 2024-03-06 PROCEDURE — 25510000001 IOPAMIDOL 61 % SOLUTION: Performed by: EMERGENCY MEDICINE

## 2024-03-06 PROCEDURE — 85025 COMPLETE CBC W/AUTO DIFF WBC: CPT | Performed by: EMERGENCY MEDICINE

## 2024-03-06 PROCEDURE — 96376 TX/PRO/DX INJ SAME DRUG ADON: CPT

## 2024-03-06 PROCEDURE — 99285 EMERGENCY DEPT VISIT HI MDM: CPT

## 2024-03-06 PROCEDURE — 96374 THER/PROPH/DIAG INJ IV PUSH: CPT

## 2024-03-06 PROCEDURE — 25010000002 MORPHINE PER 10 MG: Performed by: EMERGENCY MEDICINE

## 2024-03-06 PROCEDURE — 74177 CT ABD & PELVIS W/CONTRAST: CPT

## 2024-03-06 PROCEDURE — 80053 COMPREHEN METABOLIC PANEL: CPT | Performed by: EMERGENCY MEDICINE

## 2024-03-06 PROCEDURE — 96375 TX/PRO/DX INJ NEW DRUG ADDON: CPT

## 2024-03-06 RX ORDER — METRONIDAZOLE 500 MG/1
TABLET ORAL
COMMUNITY
Start: 2024-02-28

## 2024-03-06 RX ORDER — SODIUM CHLORIDE 0.9 % (FLUSH) 0.9 %
10 SYRINGE (ML) INJECTION AS NEEDED
Status: DISCONTINUED | OUTPATIENT
Start: 2024-03-06 | End: 2024-03-06 | Stop reason: HOSPADM

## 2024-03-06 RX ORDER — ONDANSETRON 2 MG/ML
4 INJECTION INTRAMUSCULAR; INTRAVENOUS ONCE
Status: COMPLETED | OUTPATIENT
Start: 2024-03-06 | End: 2024-03-06

## 2024-03-06 RX ORDER — OXYCODONE AND ACETAMINOPHEN 10; 325 MG/1; MG/1
1 TABLET ORAL ONCE
Status: COMPLETED | OUTPATIENT
Start: 2024-03-06 | End: 2024-03-06

## 2024-03-06 RX ORDER — AMOXICILLIN AND CLAVULANATE POTASSIUM 875; 125 MG/1; MG/1
1 TABLET, FILM COATED ORAL EVERY 12 HOURS
COMMUNITY
Start: 2024-02-28 | End: 2024-03-10

## 2024-03-06 RX ADMIN — OXYCODONE HYDROCHLORIDE AND ACETAMINOPHEN 1 TABLET: 10; 325 TABLET ORAL at 08:18

## 2024-03-06 RX ADMIN — ONDANSETRON 4 MG: 2 INJECTION INTRAMUSCULAR; INTRAVENOUS at 03:19

## 2024-03-06 RX ADMIN — MORPHINE SULFATE 4 MG: 4 INJECTION, SOLUTION INTRAMUSCULAR; INTRAVENOUS at 05:17

## 2024-03-06 RX ADMIN — IOPAMIDOL 100 ML: 612 INJECTION, SOLUTION INTRAVENOUS at 06:38

## 2024-03-06 RX ADMIN — MORPHINE SULFATE 4 MG: 4 INJECTION, SOLUTION INTRAMUSCULAR; INTRAVENOUS at 03:19

## 2024-03-06 NOTE — ED PROVIDER NOTES
Subjective   History of Present Illness  Zachariah is a 83-year-old male who presents to the ED for chief complaint of abdominal pain.  Patient is a history of ulcerative colitis.  Patient is stating he is having intense pain that started about 10:00, been taking Tylenol at home, patient does not have appointment anytime soon with his doctor, he was just seen by his colorectal surgeon to discuss a total colectomy versus hemicolectomy and was started on Augmentin.        Review of Systems   All other systems reviewed and are negative.      Past Medical History:   Diagnosis Date    Asthma     Colitis     Coronary artery disease     Diabetes mellitus     Diverticulitis     Elevated cholesterol     HL (hearing loss) 2012    Hyperlipidemia     Hypertension     Inflammatory bowel disease 2001    Myocardial infarct     EASTER 2020    Primary central sleep apnea 2009    Using Cpap    Sleep apnea     cpap at     Sleep apnea, obstructive     Stroke     Visual impairment 2016    Double vision       Allergies   Allergen Reactions    Albuterol Other (See Comments)    Adhesive Tape Rash    Azithromycin Rash    Fentanyl Nausea And Vomiting       Past Surgical History:   Procedure Laterality Date    ADENOIDECTOMY  1947    APPENDECTOMY N/A 05/05/2023    Procedure: APPENDECTOMY LAPAROSCOPIC;  Surgeon: Katherine Carranza MD;  Location: [facility] OR;  Service: General;  Laterality: N/A;    BACK SURGERY      CARDIAC CATHETERIZATION      stents x 6    CARDIAC SURGERY      CABG TRIPLE BYPASS 2012    CATARACT EXTRACTION      COLONOSCOPY      COLONOSCOPY N/A 06/04/2021    Procedure: COLONOSCOPY WITH ANESTHESIA;  Surgeon: Zachariah Menjivar DO;  Location: [facility] ENDOSCOPY;  Service: Gastroenterology;  Laterality: N/A;  pre hx ulcerative colitis  post ulcerative colitis  dr collins gusman    COLONOSCOPY N/A 11/29/2023    Procedure: COLONOSCOPY LOWER LIMITED;  Surgeon: Zachariah Menjivar DO;  Location: [facility] ENDOSCOPY;  Service: Gastroenterology;   Laterality: N/A;  preop; hx of colitis  postop colitis - questionable colonic stricture at 30cm   pcp [facility]    CORONARY ANGIOPLASTY WITH STENT PLACEMENT      CORONARY ARTERY BYPASS GRAFT  December 2012    CORONARY STENT PLACEMENT      HIP SURGERY Right     total hip    JOINT REPLACEMENT  2015    Right hip    PENILE PROSTHESIS IMPLANT N/A 12/13/2021    Procedure: 3-PIECE INFLATABLE PENILE PROSTHESIS PLACEMENT;  Surgeon: Jose Tellez MD;  Location: [facility] OR;  Service: Urology;  Laterality: N/A;    TONSILLECTOMY  1947       Family History   Problem Relation Age of Onset    Colon cancer Brother     Colon polyps Neg Hx     Esophageal cancer Neg Hx        Social History     Socioeconomic History    Marital status:    Tobacco Use    Smoking status: Never     Passive exposure: Never    Smokeless tobacco: Never   Vaping Use    Vaping status: Never Used   Substance and Sexual Activity    Alcohol use: Never    Drug use: Never    Sexual activity: Not Currently     Partners: Female           Objective   Physical Exam  Vitals reviewed.   Constitutional:       Appearance: Normal appearance. He is normal weight.   HENT:      Head: Normocephalic and atraumatic.      Right Ear: External ear normal.      Left Ear: External ear normal.      Nose: Nose normal.      Mouth/Throat:      Mouth: Mucous membranes are moist. Mucous membranes are dry.      Pharynx: Oropharynx is clear.   Eyes:      Extraocular Movements: Extraocular movements intact.      Conjunctiva/sclera: Conjunctivae normal.      Pupils: Pupils are equal, round, and reactive to light.   Cardiovascular:      Rate and Rhythm: Normal rate and regular rhythm.      Pulses: Normal pulses.      Heart sounds: Normal heart sounds.   Pulmonary:      Effort: Pulmonary effort is normal.      Breath sounds: Normal breath sounds.   Abdominal:      General: Bowel sounds are normal. There is distension.      Palpations: Abdomen is soft. There is no mass.       Tenderness: There is no abdominal tenderness. There is no rebound.      Hernia: No hernia is present.   Musculoskeletal:         General: Normal range of motion.      Cervical back: Normal range of motion and neck supple. No rigidity.   Skin:     General: Skin is warm and dry.      Capillary Refill: Capillary refill takes less than 2 seconds.   Neurological:      General: No focal deficit present.      Mental Status: He is alert and oriented to person, place, and time.   Psychiatric:         Mood and Affect: Mood normal.         Procedures           ED Course                                             Medical Decision Making  Zachariah is an 83-year-old male who presents to the ED for left lower quadrant abdominal pain in the setting of known ulcerative colitis.  Patient is already on Augmentin, we will given for morphine and reevaluate, he is doing the enemas for ulcerative colitis.    Problems Addressed:  Left sided abdominal pain: complicated acute illness or injury  Left sided colitis without complications: complicated acute illness or injury    Amount and/or Complexity of Data Reviewed  Labs: ordered.  Radiology: ordered.    Risk  Prescription drug management.        Final diagnoses:   Left sided colitis without complications   Left sided abdominal pain       ED Disposition  ED Disposition       ED Disposition   Discharge    Condition   Stable    Comment   --               Amaya Mix, APRN  [address] [facility] [address], [place] [address] [place] [id number] [phone]      As needed         Medication List      No changes were made to your prescriptions during this visit.         COURSE & MEDICAL DECISION MAKING     Assumed care of the patient from ER attending Dr. Ring.  Awaiting the patient CT scan of the abdomen and pelvis for final disposition.  Patient is comfortable.  Has been no additional vomiting here in the emergency room.  Physical exam is unchanged from the exam performed by   Jhonathan.    Discussed with the patient the results of his CT scan and offers admission to the hospital for IV antibiotics.  The patient states he is already on oral antibiotics and would like to have the remaining of his care taken care of by his surgeon down at [facility].  Patient states he has an appointment in April but does not believe he is going to be able to make it to April.  He states that once he is discharged he will likely call the surgeon and [facility] to have them make a sooner decision on surgical intervention and pain control.  Currently the only thing needed      Patient's level of risk: Moderate        CRITICAL CARE    CRITICAL CARE: No    CRITICAL CARE TIME: None      Recent Results (from the past 24 hour(s))   Green Top (Gel)    Collection Time: 03/06/24  2:55 AM   Result Value Ref Range    Extra Tube Hold for add-ons.    Lavender Top    Collection Time: 03/06/24  2:55 AM   Result Value Ref Range    Extra Tube hold for add-on    Red Top    Collection Time: 03/06/24  2:55 AM   Result Value Ref Range    Extra Tube Hold for add-ons.    Light Blue Top    Collection Time: 03/06/24  2:55 AM   Result Value Ref Range    Extra Tube Hold for add-ons.    Comprehensive Metabolic Panel    Collection Time: 03/06/24  2:55 AM    Specimen: Blood   Result Value Ref Range    Glucose 165 (H) 65 - 99 mg/dL    BUN 17 8 - 23 mg/dL    Creatinine 0.90 0.76 - 1.27 mg/dL    Sodium 140 136 - 145 mmol/L    Potassium 3.9 3.5 - 5.2 mmol/L    Chloride 106 98 - 107 mmol/L    CO2 24.0 22.0 - 29.0 mmol/L    Calcium 9.0 8.6 - 10.5 mg/dL    Total Protein 7.1 6.0 - 8.5 g/dL    Albumin 3.8 3.5 - 5.2 g/dL    ALT (SGPT) 21 1 - 41 U/L    AST (SGOT) 21 1 - 40 U/L    Alkaline Phosphatase 68 39 - 117 U/L    Total Bilirubin 0.3 0.0 - 1.2 mg/dL    Globulin 3.3 gm/dL    A/G Ratio 1.2 g/dL    BUN/Creatinine Ratio 18.9 7.0 - 25.0    Anion Gap 10.0 5.0 - 15.0 mmol/L    eGFR 85.8 >60.0 mL/min/1.73    CBC Auto Differential    Collection Time: 03/06/24  2:55 AM    Specimen: Blood   Result Value Ref Range    WBC 10.31 3.40 - 10.80 10*3/mm3    RBC 4.80 4.14 - 5.80 10*6/mm3    Hemoglobin 14.9 13.0 - 17.7 g/dL    Hematocrit 44.9 37.5 - 51.0 %    MCV 93.5 79.0 - 97.0 fL    MCH 31.0 26.6 - 33.0 pg    MCHC 33.2 31.5 - 35.7 g/dL    RDW 13.5 12.3 - 15.4 %    RDW-SD 46.2 37.0 - 54.0 fl    MPV 9.9 6.0 - 12.0 fL    Platelets 273 140 - 450 10*3/mm3    Neutrophil % 58.9 42.7 - 76.0 %    Lymphocyte % 17.7 (L) 19.6 - 45.3 %    Monocyte % 8.2 5.0 - 12.0 %    Eosinophil % 13.8 (H) 0.3 - 6.2 %    Basophil % 1.0 0.0 - 1.5 %    Immature Grans % 0.4 0.0 - 0.5 %    Neutrophils, Absolute 6.08 1.70 - 7.00 10*3/mm3    Lymphocytes, Absolute 1.82 0.70 - 3.10 10*3/mm3    Monocytes, Absolute 0.85 0.10 - 0.90 10*3/mm3    Eosinophils, Absolute 1.42 (H) 0.00 - 0.40 10*3/mm3    Basophils, Absolute 0.10 0.00 - 0.20 10*3/mm3    Immature Grans, Absolute 0.04 0.00 - 0.05 10*3/mm3    nRBC 0.0 0.0 - 0.2 /100 WBC            Old charts were [facility] per [facility] E[facility].  Pertinent details are summarized above.  All laboratory, radiologic, and EKG studies that were performed in the Emergency Department were a necessary part of the evaluation needed to exclude unstable or  emergent medical conditions.     Patient was hemodynamically and neurologically stable in the ED.   Pertinent studies were reviewed as above.     The patient received:  Medications   sodium chloride 0.9 % flush 10 mL (has no administration in time range)   oxyCODONE-acetaminophen (PERCOCET)  MG per tablet 1 tablet (has no administration in time range)   morphine injection 4 mg (4 mg Intravenous Given 3/6/24 0319)   ondansetron (ZOFRAN) injection 4 mg (4 mg Intravenous Given 3/6/24 0319)   morphine injection 4 mg (4 mg Intravenous Given 3/6/24 6348)   iopamidol (ISOVUE-300) 61 % injection 100 mL (100 mL Intravenous Given 3/6/24 0641)            ED Disposition       ED Disposition    Discharge    Condition   Stable    Comment   --                 Recent Results (from the past 24 hour(s))   Green Top (Gel)    Collection Time: 03/06/24  2:55 AM   Result Value Ref Range    Extra Tube Hold for add-ons.    Lavender Top    Collection Time: 03/06/24  2:55 AM   Result Value Ref Range    Extra Tube hold for add-on    Red Top    Collection Time: 03/06/24  2:55 AM   Result Value Ref Range    Extra Tube Hold for add-ons.    Light Blue Top    Collection Time: 03/06/24  2:55 AM   Result Value Ref Range    Extra Tube Hold for add-ons.    Comprehensive Metabolic Panel    Collection Time: 03/06/24  2:55 AM    Specimen: Blood   Result Value Ref Range    Glucose 165 (H) 65 - 99 mg/dL    BUN 17 8 - 23 mg/dL    Creatinine 0.90 0.76 - 1.27 mg/dL    Sodium 140 136 - 145 mmol/L    Potassium 3.9 3.5 - 5.2 mmol/L    Chloride 106 98 - 107 mmol/L    CO2 24.0 22.0 - 29.0 mmol/L    Calcium 9.0 8.6 - 10.5 mg/dL    Total Protein 7.1 6.0 - 8.5 g/dL    Albumin 3.8 3.5 - 5.2 g/dL    ALT (SGPT) 21 1 - 41 U/L    AST (SGOT) 21 1 - 40 U/L    Alkaline Phosphatase 68 39 - 117 U/L    Total Bilirubin 0.3 0.0 - 1.2 mg/dL    Globulin 3.3 gm/dL    A/G Ratio 1.2 g/dL    BUN/Creatinine Ratio 18.9 7.0 - 25.0    Anion Gap 10.0 5.0 - 15.0 mmol/L    eGFR 85.8 >60.0 mL/min/1.73   CBC Auto Differential    Collection Time: 03/06/24  2:55 AM    Specimen: Blood   Result Value Ref Range    WBC 10.31 3.40 - 10.80 10*3/mm3    RBC 4.80 4.14 - 5.80 10*6/mm3    Hemoglobin 14.9 13.0 - 17.7 g/dL    Hematocrit 44.9 37.5 - 51.0 %    MCV 93.5 79.0 - 97.0 fL    MCH 31.0 26.6 - 33.0 pg    MCHC 33.2 31.5 - 35.7 g/dL    RDW 13.5 12.3 - 15.4 %    RDW-SD 46.2 37.0 - 54.0 fl    MPV 9.9 6.0 - 12.0 fL    Platelets 273 140 - 450 10*3/mm3    Neutrophil % 58.9 42.7 - 76.0 %    Lymphocyte % 17.7 (L) 19.6 - 45.3 %    Monocyte % 8.2 5.0 - 12.0 %    Eosinophil % 13.8 (H) 0.3 - 6.2 %    Basophil % 1.0 0.0 - 1.5 %    Immature Grans % 0.4 0.0 - 0.5 %    Neutrophils, Absolute  6.08 1.70 - 7.00 10*3/mm3    Lymphocytes, Absolute 1.82 0.70 - 3.10 10*3/mm3    Monocytes, Absolute 0.85 0.10 - 0.90 10*3/mm3    Eosinophils, Absolute 1.42 (H) 0.00 - 0.40 10*3/mm3    Basophils, Absolute 0.10 0.00 - 0.20 10*3/mm3    Immature Grans, Absolute 0.04 0.00 - 0.05 10*3/mm3    nRBC 0.0 0.0 - 0.2 /100 WBC       The patient received:      Dragon disclaimer:  Part of this note may be an electronic transcription/translation of spoken language to printed text using the Dragon Dictation System.       I have reviewed the patient’s prescription history via a prescription monitoring program.  This information is consistent with my knowledge of the patient’s controlled substance use history.    Patient evaluated during Coronavirus Pandemic. Isolation practices followed according to [facility] policy.    FINAL IMPRESSION   Diagnosis Plan   1. Left sided colitis without complications        2. Left sided abdominal pain              MD Adrian Sorensen Jr, Thomas Mark Jr., MD  03/06/24 0742

## 2024-03-06 NOTE — DISCHARGE INSTRUCTIONS
You have opted to be discharged and to call your surgeon down at [facility].  You were diagnosed with a left-sided worsening colitis.  You are currently on antibiotics as you stated an offer for IV antibiotics here was given.  However it is understood that you have an upcoming surgery in April and feels that the care needs to be performed by your surgeon down at [facility].  Would recommend giving a call down to [facility] and contact your surgeon to inform them that you were here in the emergency room due to pain.  Your white blood cell count was normal and your CT scan did not show any signs of perforation.  However due to your continued pain I do believe that your surgical appointment for the first and second week of April will be too far off with the pain that you have been having.  Would also discuss with them further pain control.  Currently you explained that they recommended Tylenol.  If this does not control your pain would discuss this with them.

## 2024-03-22 ENCOUNTER — READMISSION MANAGEMENT (OUTPATIENT)
Dept: CALL CENTER | Facility: HOSPITAL | Age: 84
End: 2024-03-22
Payer: MEDICARE

## 2024-03-22 ENCOUNTER — APPOINTMENT (OUTPATIENT)
Dept: GENERAL RADIOLOGY | Facility: HOSPITAL | Age: 84
End: 2024-03-22
Payer: MEDICARE

## 2024-03-22 ENCOUNTER — HOSPITAL ENCOUNTER (EMERGENCY)
Facility: HOSPITAL | Age: 84
Discharge: OTHER INSTITUTION | End: 2024-03-23
Payer: MEDICARE

## 2024-03-22 DIAGNOSIS — Z90.49 S/P COLON RESECTION: Primary | ICD-10-CM

## 2024-03-22 DIAGNOSIS — R50.9 FEVER, UNSPECIFIED FEVER CAUSE: ICD-10-CM

## 2024-03-22 LAB
ALBUMIN SERPL-MCNC: 3.4 G/DL (ref 3.5–5.2)
ALBUMIN/GLOB SERPL: 1.2 G/DL
ALP SERPL-CCNC: 53 U/L (ref 39–117)
ALT SERPL W P-5'-P-CCNC: 27 U/L (ref 1–41)
ANION GAP SERPL CALCULATED.3IONS-SCNC: 10 MMOL/L (ref 5–15)
AST SERPL-CCNC: 29 U/L (ref 1–40)
BASOPHILS # BLD AUTO: 0.05 10*3/MM3 (ref 0–0.2)
BASOPHILS NFR BLD AUTO: 0.3 % (ref 0–1.5)
BILIRUB SERPL-MCNC: 0.3 MG/DL (ref 0–1.2)
BILIRUB UR QL STRIP: NEGATIVE
BUN SERPL-MCNC: 19 MG/DL (ref 8–23)
BUN/CREAT SERPL: 23.8 (ref 7–25)
CALCIUM SPEC-SCNC: 8.2 MG/DL (ref 8.6–10.5)
CHLORIDE SERPL-SCNC: 104 MMOL/L (ref 98–107)
CLARITY UR: CLEAR
CO2 SERPL-SCNC: 25 MMOL/L (ref 22–29)
COLOR UR: YELLOW
CREAT SERPL-MCNC: 0.8 MG/DL (ref 0.76–1.27)
CRP SERPL-MCNC: 3.91 MG/DL (ref 0–0.5)
D-LACTATE SERPL-SCNC: 1.5 MMOL/L (ref 0.5–2)
DEPRECATED RDW RBC AUTO: 45.9 FL (ref 37–54)
EGFRCR SERPLBLD CKD-EPI 2021: 88.9 ML/MIN/1.73
EOSINOPHIL # BLD AUTO: 0.74 10*3/MM3 (ref 0–0.4)
EOSINOPHIL NFR BLD AUTO: 4.6 % (ref 0.3–6.2)
ERYTHROCYTE [DISTWIDTH] IN BLOOD BY AUTOMATED COUNT: 13.4 % (ref 12.3–15.4)
FLUAV RNA RESP QL NAA+PROBE: NOT DETECTED
FLUBV RNA RESP QL NAA+PROBE: NOT DETECTED
GLOBULIN UR ELPH-MCNC: 2.8 GM/DL
GLUCOSE SERPL-MCNC: 178 MG/DL (ref 65–99)
GLUCOSE UR STRIP-MCNC: NEGATIVE MG/DL
HCT VFR BLD AUTO: 36.4 % (ref 37.5–51)
HGB BLD-MCNC: 12.1 G/DL (ref 13–17.7)
HGB UR QL STRIP.AUTO: NEGATIVE
IMM GRANULOCYTES # BLD AUTO: 0.12 10*3/MM3 (ref 0–0.05)
IMM GRANULOCYTES NFR BLD AUTO: 0.8 % (ref 0–0.5)
KETONES UR QL STRIP: NEGATIVE
LEUKOCYTE ESTERASE UR QL STRIP.AUTO: NEGATIVE
LYMPHOCYTES # BLD AUTO: 0.72 10*3/MM3 (ref 0.7–3.1)
LYMPHOCYTES NFR BLD AUTO: 4.5 % (ref 19.6–45.3)
MCH RBC QN AUTO: 31.2 PG (ref 26.6–33)
MCHC RBC AUTO-ENTMCNC: 33.2 G/DL (ref 31.5–35.7)
MCV RBC AUTO: 93.8 FL (ref 79–97)
MONOCYTES # BLD AUTO: 1.01 10*3/MM3 (ref 0.1–0.9)
MONOCYTES NFR BLD AUTO: 6.3 % (ref 5–12)
NEUTROPHILS NFR BLD AUTO: 13.29 10*3/MM3 (ref 1.7–7)
NEUTROPHILS NFR BLD AUTO: 83.5 % (ref 42.7–76)
NITRITE UR QL STRIP: NEGATIVE
NRBC BLD AUTO-RTO: 0 /100 WBC (ref 0–0.2)
PH UR STRIP.AUTO: 8 [PH] (ref 5–8)
PLATELET # BLD AUTO: 229 10*3/MM3 (ref 140–450)
PMV BLD AUTO: 9.9 FL (ref 6–12)
POTASSIUM SERPL-SCNC: 4.4 MMOL/L (ref 3.5–5.2)
PROCALCITONIN SERPL-MCNC: 0.11 NG/ML (ref 0–0.25)
PROT SERPL-MCNC: 6.2 G/DL (ref 6–8.5)
PROT UR QL STRIP: NEGATIVE
RBC # BLD AUTO: 3.88 10*6/MM3 (ref 4.14–5.8)
RSV RNA RESP QL NAA+PROBE: NOT DETECTED
SARS-COV-2 RNA RESP QL NAA+PROBE: NOT DETECTED
SODIUM SERPL-SCNC: 139 MMOL/L (ref 136–145)
SP GR UR STRIP: 1.02 (ref 1–1.03)
UROBILINOGEN UR QL STRIP: NORMAL
WBC NRBC COR # BLD AUTO: 15.93 10*3/MM3 (ref 3.4–10.8)

## 2024-03-22 PROCEDURE — 86140 C-REACTIVE PROTEIN: CPT | Performed by: NURSE PRACTITIONER

## 2024-03-22 PROCEDURE — 83605 ASSAY OF LACTIC ACID: CPT | Performed by: NURSE PRACTITIONER

## 2024-03-22 PROCEDURE — 80053 COMPREHEN METABOLIC PANEL: CPT | Performed by: NURSE PRACTITIONER

## 2024-03-22 PROCEDURE — 96365 THER/PROPH/DIAG IV INF INIT: CPT

## 2024-03-22 PROCEDURE — 81003 URINALYSIS AUTO W/O SCOPE: CPT | Performed by: NURSE PRACTITIONER

## 2024-03-22 PROCEDURE — 84145 PROCALCITONIN (PCT): CPT | Performed by: NURSE PRACTITIONER

## 2024-03-22 PROCEDURE — 99285 EMERGENCY DEPT VISIT HI MDM: CPT

## 2024-03-22 PROCEDURE — 85025 COMPLETE CBC W/AUTO DIFF WBC: CPT | Performed by: NURSE PRACTITIONER

## 2024-03-22 PROCEDURE — 25010000002 PIPERACILLIN SOD-TAZOBACTAM PER 1 G: Performed by: NURSE PRACTITIONER

## 2024-03-22 PROCEDURE — 87637 SARSCOV2&INF A&B&RSV AMP PRB: CPT | Performed by: NURSE PRACTITIONER

## 2024-03-22 PROCEDURE — 25810000003 LACTATED RINGERS SOLUTION: Performed by: NURSE PRACTITIONER

## 2024-03-22 PROCEDURE — 71045 X-RAY EXAM CHEST 1 VIEW: CPT

## 2024-03-22 PROCEDURE — 87040 BLOOD CULTURE FOR BACTERIA: CPT | Performed by: NURSE PRACTITIONER

## 2024-03-22 PROCEDURE — 36415 COLL VENOUS BLD VENIPUNCTURE: CPT

## 2024-03-22 RX ORDER — ACETAMINOPHEN 500 MG
1000 TABLET ORAL ONCE
Status: COMPLETED | OUTPATIENT
Start: 2024-03-22 | End: 2024-03-22

## 2024-03-22 RX ADMIN — SODIUM CHLORIDE, POTASSIUM CHLORIDE, SODIUM LACTATE AND CALCIUM CHLORIDE 1000 ML: 600; 310; 30; 20 INJECTION, SOLUTION INTRAVENOUS at 17:52

## 2024-03-22 RX ADMIN — ACETAMINOPHEN 1000 MG: 500 TABLET ORAL at 18:03

## 2024-03-22 RX ADMIN — PIPERACILLIN AND TAZOBACTAM 4.5 G: 4; .5 INJECTION, POWDER, LYOPHILIZED, FOR SOLUTION INTRAVENOUS; PARENTERAL at 19:01

## 2024-03-22 NOTE — OUTREACH NOTE
Prep Survey      Flowsheet Row Responses   [facility] facility patient discharged from? Non-BH   Is LACE score < 7 ? Non-BH Discharge   Eligibility Not Eligible   What are the reasons patient is not eligible? Readmitted  [currently in ER at [facility]]   Does the patient have one of the following disease processes/diagnoses(primary or secondary)? Other   Prep survey completed? Yes            BRITT ADAMES - Registered Nurse

## 2024-03-22 NOTE — ED PROVIDER NOTES
Subjective   History of Present Illness  Patient is a 83-year-old male presents emergency department per EMS with complaints of fever and altered mental status.  Patient was just sent home from [place] to day around noon after having bowel resection with colectomy 4 days ago.  He has a history of ulcerative colitis and diverticulitis with sigmoid stricture.  He just came home today and got home around 1230 this afternoon.  Family states that he laid down for a nap and when he woke up he was confused and hot to touch.  They checked his temperature it was 103.  Patient denies any increase in abdominal pain.  Denies any nausea or vomiting.  He denies any other symptoms.  Family states that he had generalized weakness and was hardly able to walk when he woke from his nap because he was so weak.    History provided by:  Patient and relative   used: No        Review of Systems   Constitutional:         Patient is a 83-year-old male presents emergency department per EMS with complaints of fever and altered mental status.  Patient was just sent home from [place] to day around noon after having bowel resection with colectomy 4 days ago.  He has a history of ulcerative colitis and diverticulitis with sigmoid stricture.  He just came home today and got home around 1230 this afternoon.  Family states that he laid down for a nap and when he woke up he was confused and hot to touch.  They checked his temperature it was 103.  Patient denies any increase in abdominal pain.  Denies any nausea or vomiting.  He denies any other symptoms.  Family states that he had generalized weakness and was hardly able to walk when he woke from his nap because he was so weak.     HENT: Negative.     Eyes: Negative.    Respiratory: Negative.     Cardiovascular: Negative.    Gastrointestinal: Negative.    Endocrine: Negative.    Genitourinary: Negative.    Musculoskeletal: Negative.    Skin: Negative.    Allergic/Immunologic:  Negative.    Neurological: Negative.    Hematological: Negative.    Psychiatric/Behavioral: Negative.     All other systems reviewed and are negative.      Past Medical History:   Diagnosis Date    Asthma     Colitis     Coronary artery disease     Diabetes mellitus     Diverticulitis     Elevated cholesterol     HL (hearing loss) 2012    Hyperlipidemia     Hypertension     Inflammatory bowel disease 2001    Myocardial infarct     EASTER 2020    Primary central sleep apnea 2009    Using Cpap    Sleep apnea     cpap at hs    Sleep apnea, obstructive     Stroke     Visual impairment 2016    Double vision       Allergies   Allergen Reactions    Albuterol Other (See Comments)    Adhesive Tape Rash    Azithromycin Rash    Fentanyl Nausea And Vomiting       Past Surgical History:   Procedure Laterality Date    ADENOIDECTOMY  1947    APPENDECTOMY N/A 05/05/2023    Procedure: APPENDECTOMY LAPAROSCOPIC;  Surgeon: Katherine Carranza MD;  Location: [facility] OR;  Service: General;  Laterality: N/A;    BACK SURGERY      CARDIAC CATHETERIZATION      stents x 6    CARDIAC SURGERY      CABG TRIPLE BYPASS 2012    CATARACT EXTRACTION      COLONOSCOPY      COLONOSCOPY N/A 06/04/2021    Procedure: COLONOSCOPY WITH ANESTHESIA;  Surgeon: Zachariah Menjivar DO;  Location: [facility] ENDOSCOPY;  Service: Gastroenterology;  Laterality: N/A;  pre hx ulcerative colitis  post ulcerative colitis  dr collins gusman    COLONOSCOPY N/A 11/29/2023    Procedure: COLONOSCOPY LOWER LIMITED;  Surgeon: Zachariah Menjivar DO;  Location: [facility] ENDOSCOPY;  Service: Gastroenterology;  Laterality: N/A;  preop; hx of colitis  postop colitis - questionable colonic stricture at 30cm   pcp [facility]    CORONARY ANGIOPLASTY WITH STENT PLACEMENT      CORONARY ARTERY BYPASS GRAFT  December 2012    CORONARY STENT PLACEMENT      HIP SURGERY Right     total hip    JOINT REPLACEMENT  2015    Right hip    PENILE PROSTHESIS IMPLANT N/A 12/13/2021    Procedure: 3-PIECE  INFLATABLE PENILE PROSTHESIS PLACEMENT;  Surgeon: Jose Tellez MD;  Location: [facility] OR;  Service: Urology;  Laterality: N/A;    TONSILLECTOMY  1947       Family History   Problem Relation Age of Onset    Colon cancer Brother     Colon polyps Neg Hx     Esophageal cancer Neg Hx        Social History     Socioeconomic History    Marital status:    Tobacco Use    Smoking status: Never     Passive exposure: Never    Smokeless tobacco: Never   Vaping Use    Vaping status: Never Used   Substance and Sexual Activity    Alcohol use: Never    Drug use: Never    Sexual activity: Not Currently     Partners: Female       Prior to Admission medications    Medication Sig Start Date End Date Taking? Authorizing Provider   acetaminophen (TYLENOL) 500 MG tablet Take 1 tablet by mouth Every 6 (Six) Hours As Needed for Mild Pain. 4/12/23   El Adamson APRN   aspirin 81 MG EC tablet Take 1 tablet by mouth Daily.    ProviderCe MD   Budeson-Glycopyrrol-Formoterol (Breztri Aerosphere) 160-9-4.8 MCG/ACT aerosol inhaler Inhale 2 puffs 2 (Two) Times a Day. 9/7/23   Amaya Mix APRN   ciclopirox (LOPROX) 0.77 % cream Apply 1 application  topically to the appropriate area as directed 2 (Two) Times a Day. To under arms after using Clindamycin Solution 10/23/23   Ce Foy MD   clindamycin (CLEOCIN T) 1 % external solution 1 application  2 (Two) Times a Day. To under arms 10/23/23   Ce Foy MD   Continuous Blood Gluc Sensor (Dexcom G7 Sensor) misc 1 each Every 10 (Ten) Days. 8/23/23   Yesi Siegel APRN   Dupilumab 300 MG/2ML solution prefilled syringe Inject  under the skin into the appropriate area as directed Every 14 (Fourteen) Days.    Ce Foy MD   ferrous sulfate 325 (65 FE) MG tablet Take 1 tablet by mouth Daily With Breakfast.    Ce Foy MD   finasteride (PROSCAR) 5 MG tablet Take 1 tablet by mouth Daily. 6/9/23   Jose Tellez,  MD   fluticasone (FLONASE) 50 MCG/ACT nasal spray 1 spray into the nostril(s) as directed by provider Daily. 11/9/22   Jacob Mejias MD   insulin glargine (LANTUS, SEMGLEE) 100 UNIT/ML injection 15 units in the morning and 40 units at bedtime. 9/7/23   Amaya Mix APRN   levalbuterol (XOPENEX HFA) 45 MCG/ACT inhaler Inhale 2 puffs Every 6 (Six) Hours As Needed for Wheezing.    Ce Foy MD   levothyroxine (Synthroid) 50 MCG tablet Take 1 tablet by mouth Daily. 9/7/23   Amaya Mix APRN   lisinopril (PRINIVIL,ZESTRIL) 20 MG tablet Take 1 tablet by mouth 2 (Two) Times a Day. 1 tab am and 1/2 at bedtime. 9/7/23   Amaya Mix APRN   meclizine (ANTIVERT) 12.5 MG tablet Take 1 tablet by mouth 3 (Three) Times a Day As Needed for Dizziness. 1/7/22   Monique Carlos MD   melatonin 5 MG tablet tablet Take 1 tablet by mouth At Night As Needed.    Ce Foy MD   Mesalamine 4 GM/60ML SF enema Insert 1 application into the rectum Every Other Day.    Ce Foy MD   metFORMIN ER (GLUCOPHAGE-XR) 500 MG 24 hr tablet Take 1 tablet by mouth Daily With Breakfast.    Ce Foy MD   metoprolol tartrate (LOPRESSOR) 25 MG tablet Take 1 tablet by mouth 2 (Two) Times a Day. 9/7/23   Amaya Mix APRN   metroNIDAZOLE (FLAGYL) 500 MG tablet Take 1 gram by mouth the day before surgery at 2pm, 4pm and 10pm. 2/28/24   Ce Foy MD   nitroglycerin (NITROSTAT) 0.4 MG SL tablet Place 1 tablet under the tongue Every 5 (Five) Minutes As Needed for Chest Pain. Take no more than 3 doses in 15 minutes.  Patient not taking: Reported on 12/7/2023    Ce Foy MD   Pediatric Multiple Vit-C-FA (CHILDRENS CHEWABLE MULTI VITS PO) Take 1 tablet by mouth Daily.    Ce Foy MD   predniSONE (DELTASONE) 20 MG tablet 20 mg tablets.  Take 2 pills daily for 10 days, then 1-1/2 tablet for 10 days, then 1 tablet for 10  "days, then 1/2 tablet for 10 days. 12/4/23   Gale Crump APRN   rosuvastatin (CRESTOR) 40 MG tablet Take 0.5 tablets by mouth Daily.    ProviderCe MD   triamcinolone (KENALOG) 0.1 % cream Apply 1 application  topically to the appropriate area as directed 2 (Two) Times a Day.    Ce Foy MD   Ustekinumab (STELARA) 90 MG/ML solution prefilled syringe Injection Inject 90 mg under the skin into the appropriate area as directed Every 28 (Twenty-Eight) Days. 7/15/21   Salo Nascimento APRN   vitamin B-12 (CYANOCOBALAMIN) 1000 MCG tablet Take 1 tablet by mouth Daily.    Ce Foy MD   Vitamin D, Cholecalciferol, 25 MCG (1000 UT) capsule Take 1 capsule by mouth Daily.    Ce Foy MD       /71   Pulse 117   Temp 99.7 °F (37.6 °C)   Resp 18   Ht 175.3 cm (69\")   Wt 94.8 kg (208 lb 14.4 oz)   SpO2 97%   BMI 30.85 kg/m²     Objective   Physical Exam  Vitals and nursing note reviewed.   Constitutional:       Appearance: He is well-developed.      Comments: Nontoxic-appearing.  Patient is alert and oriented x 3.   HENT:      Head: Normocephalic and atraumatic.   Eyes:      Conjunctiva/sclera: Conjunctivae normal.      Pupils: Pupils are equal, round, and reactive to light.   Cardiovascular:      Rate and Rhythm: Normal rate and regular rhythm.      Heart sounds: Normal heart sounds.   Pulmonary:      Effort: Pulmonary effort is normal.      Breath sounds: Normal breath sounds.   Abdominal:      General: Bowel sounds are normal.      Palpations: Abdomen is soft.      Comments: Abdomen is soft, nondistended.  He has colostomy noted to left lower quadrant abdomen with brown stool noted in the back.  No abdominal tenderness. Puncture wounds intact. No drainage or eryth from the wounds    Musculoskeletal:         General: Normal range of motion.      Cervical back: Normal range of motion and neck supple.   Skin:     General: Skin is warm and dry.   Neurological: "      Mental Status: He is alert and oriented to person, place, and time.      Deep Tendon Reflexes: Reflexes are normal and symmetric.   Psychiatric:         Behavior: Behavior normal.         Thought Content: Thought content normal.         Judgment: Judgment normal.         Procedures         Lab Results (last 24 hours)       Procedure Component Value Units Date/Time    Urinalysis With Culture If Indicated - Urine, Clean Catch [[id number]]  (Normal) Collected: 03/22/24 1747    Specimen: Urine, Clean Catch Updated: 03/22/24 1756     Color, UA Yellow     Appearance, UA Clear     pH, UA 8.0     Specific Gravity, UA 1.019     Glucose, UA Negative     Ketones, UA Negative     Bilirubin, UA Negative     Blood, UA Negative     Protein, UA Negative     Leuk Esterase, UA Negative     Nitrite, UA Negative     Urobilinogen, UA 0.2 E.U./dL    Narrative:      In absence of clinical symptoms, the presence of pyuria, bacteria, and/or nitrites on the urinalysis result does not correlate with infection.  Urine microscopic not indicated.    CBC & Differential [[id number]]  (Abnormal) Collected: 03/22/24 1752    Specimen: Blood Updated: 03/22/24 1803    Narrative:      The following orders were created for panel order CBC & Differential.  Procedure                               Abnormality         Status                     ---------                               -----------         ------                     CBC Auto Differential[[id number]]        Abnormal            Final result                 Please view results for these tests on the individual orders.    Comprehensive Metabolic Panel [[id number]]  (Abnormal) Collected: 03/22/24 1752    Specimen: Blood Updated: 03/22/24 1822     Glucose 178 mg/dL      BUN 19 mg/dL      Creatinine 0.80 mg/dL      Sodium 139 mmol/L      Potassium 4.4 mmol/L      Chloride 104 mmol/L      CO2 25.0 mmol/L      Calcium 8.2 mg/dL      Total Protein 6.2 g/dL      Albumin 3.4 g/dL      ALT (SGPT) 27 U/L   "    AST (SGOT) 29 U/L      Alkaline Phosphatase 53 U/L      Total Bilirubin 0.3 mg/dL      Globulin 2.8 gm/dL      A/G Ratio 1.2 g/dL      BUN/Creatinine Ratio 23.8     Anion Gap 10.0 mmol/L      eGFR 88.9 mL/min/1.73     Narrative:      GFR Normal >60  Chronic Kidney Disease <60  Kidney Failure <15    The GFR formula is only valid for adults with stable renal function between ages 18 and 70.    Blood Culture - Blood, Arm, Left [[id number]] Collected: 03/22/24 1752    Specimen: Blood from Arm, Left Updated: 03/22/24 1802    Blood Culture - Blood, Arm, Left [[id number]] Collected: 03/22/24 1752    Specimen: Blood from Arm, Left Updated: 03/22/24 1802    C-reactive Protein [[id number]]  (Abnormal) Collected: 03/22/24 1752    Specimen: Blood Updated: 03/22/24 1823     C-Reactive Protein 3.91 mg/dL     Lactic Acid, Plasma [[id number]]  (Normal) Collected: 03/22/24 1752    Specimen: Blood Updated: 03/22/24 1819     Lactate 1.5 mmol/L     Procalcitonin [[id number]]  (Normal) Collected: 03/22/24 1752    Specimen: Blood Updated: 03/22/24 1828     Procalcitonin 0.11 ng/mL     Narrative:      As a Marker for Sepsis (Non-Neonates):    1. <0.5 ng/mL represents a low risk of severe sepsis and/or septic shock.  2. >2 ng/mL represents a high risk of severe sepsis and/or septic shock.    As a Marker for Lower Respiratory Tract Infections that require antibiotic therapy:    PCT on Admission    Antibiotic Therapy       6-12 Hrs later    >0.5                Strongly Recommended  >0.25 - <0.5        Recommended   0.1 - 0.25          Discouraged              Remeasure/reassess PCT  <0.1                Strongly Discouraged     Remeasure/reassess PCT    As 28 day mortality risk marker: \"Change in Procalcitonin Result\" (>80% or <=80%) if Day 0 (or Day 1) and Day 4 values are available. Refer to http://www.[facility]-pct-calculator.com    Change in PCT <=80%  A decrease of PCT levels below or equal to 80% defines a positive change in PCT test result " representing a higher risk for 28-day all-cause mortality of patients diagnosed with severe sepsis for septic shock.    Change in PCT >80%  A decrease of PCT levels of more than 80% defines a negative change in PCT result representing a lower risk for 28-day all-cause mortality of patients diagnosed with severe sepsis or septic shock.       CBC Auto Differential [[id number]]  (Abnormal) Collected: 03/22/24 1752    Specimen: Blood Updated: 03/22/24 1803     WBC 15.93 10*3/mm3      RBC 3.88 10*6/mm3      Hemoglobin 12.1 g/dL      Hematocrit 36.4 %      MCV 93.8 fL      MCH 31.2 pg      MCHC 33.2 g/dL      RDW 13.4 %      RDW-SD 45.9 fl      MPV 9.9 fL      Platelets 229 10*3/mm3      Neutrophil % 83.5 %      Lymphocyte % 4.5 %      Monocyte % 6.3 %      Eosinophil % 4.6 %      Basophil % 0.3 %      Immature Grans % 0.8 %      Neutrophils, Absolute 13.29 10*3/mm3      Lymphocytes, Absolute 0.72 10*3/mm3      Monocytes, Absolute 1.01 10*3/mm3      Eosinophils, Absolute 0.74 10*3/mm3      Basophils, Absolute 0.05 10*3/mm3      Immature Grans, Absolute 0.12 10*3/mm3      nRBC 0.0 /100 WBC     COVID PRE-OP / PRE-PROCEDURE SCREENING ORDER (NO ISOLATION) - Swab, Nasopharynx [[id number]]  (Normal) Collected: 03/22/24 1808    Specimen: Swab from Nasopharynx Updated: 03/22/24 1902    Narrative:      The following orders were created for panel order COVID PRE-OP / PRE-PROCEDURE SCREENING ORDER (NO ISOLATION) - Swab, Nasopharynx.  Procedure                               Abnormality         Status                     ---------                               -----------         ------                     COVID-19, FLU A/B, RSV P...[[id number]]  Normal              Final result                 Please view results for these tests on the individual orders.    COVID-19, FLU A/B, RSV PCR 1 HR TAT - Swab, Nasopharynx [[id number]]  (Normal) Collected: 03/22/24 1808    Specimen: Swab from Nasopharynx Updated: 03/22/24 1902     COVID19 Not  Detected     Influenza A PCR Not Detected     Influenza B PCR Not Detected     RSV, PCR Not Detected    Narrative:      Fact sheet for providers: https://www.fda.gov/media/912768/download    Fact sheet for patients: https://www.fda.gov/media/286594/download    Test performed by PCR.            XR Chest 1 View   Final Result   Low lung volumes with mild bibasilar atelectasis. Otherwise, no acute   finding.       This report was signed and finalized on 3/22/2024 5:29 PM by Dr. Joshua Arteaga MD.              ED Course  ED Course as of 03/23/24 1545   Fri Mar 22, 2024   1711 Spoke with [facility] with transfer center from [place]. Will speak with  and call back.  [CW]   1717 Spoke with [facility] ESC[facility]ANTE in [place] er - has graciously accepted for transfer under Dr. Johann Schmitt.  Will obtain basic labs and advised would get repeat imaging when pt arrives in the er. Advised to give zosyn and will go ahead and give ivf as well. Reviewed care plan with pt and pt family- in agreement with care plan.  [CW]   1926 Temp 100.5 at this time. Pt is feeling better at this time . Blood pressure 150/67; hr 87; resp 18; 02 sat 97% on ra [CW]   1942 Pending ems transport to [place] at this time  [CW]      ED Course User Index  [CW] Amaya Stokes APRN        Medical Decision Making  Patient is a 83-year-old male presents emergency department per EMS with complaints of fever and altered mental status.  Patient was just sent home from [place] to day around noon after having bowel resection with colectomy 4 days ago.  He has a history of ulcerative colitis and diverticulitis with sigmoid stricture.  He just came home today and got home around 1230 this afternoon.  Family states that he laid down for a nap and when he woke up he was confused and hot to touch.  They checked his temperature it was 103.  Patient denies any increase in abdominal pain.  Denies any nausea or vomiting.  He denies any other  symptoms.  Family states that he had generalized weakness and was hardly able to walk when he woke from his nap because he was so weak.  Course of treatment in the er: 83-year-old male presents per EMS with fever and altered mental status.  Patient is status post patient is colon resection 4 days ago ago with colectomy at [facility] per Dr. Gross patient went home today and got home around noon took a nap and woke up with fever.  Family states he was a little disoriented as well.  They state his temp was 103.  Patient denies any nausea or vomiting.  No abdominal pain.  He is nontoxic-appearing.  His vitals are stable except for temp of 102.0.  Blood pressure is 157/89, heart rate 99, respirations 18, O2 sats 97% on room air.  Spoke with [place] transfer center and they have graciously accepted the patient ER to ER transfer.  Did basic laboratory studies and gave Zosyn and IV fluids and Tylenol.  Advised they would repeat scans as needed when the patient arrives at the emergency department there.  Reviewed the care plan with the patient and his family and they are in agreement with the care plan.  Patient be transferred soon in stable condition.  Differential dx: sepsis; uti; pneumonia; covid; uti; post op infection     Problems Addressed:  Fever, unspecified fever cause: complicated acute illness or injury  S/P colon resection: complicated acute illness or injury    Amount and/or Complexity of Data Reviewed  Labs: ordered. Decision-making details documented in ED Course.  Radiology: ordered. Decision-making details documented in ED Course.    Risk  OTC drugs.         Final diagnoses:   S/P colon resection   Fever, unspecified fever cause          Amaya Stokes, BORIS  03/22/24 1909       Amaya Stokes, APRKATHERINE  03/23/24 [id number]

## 2024-03-23 VITALS
OXYGEN SATURATION: 97 % | HEART RATE: 117 BPM | HEIGHT: 69 IN | SYSTOLIC BLOOD PRESSURE: 140 MMHG | TEMPERATURE: 99.7 F | RESPIRATION RATE: 18 BRPM | BODY MASS INDEX: 30.94 KG/M2 | DIASTOLIC BLOOD PRESSURE: 71 MMHG | WEIGHT: 208.9 LBS

## 2024-03-27 LAB
BACTERIA SPEC AEROBE CULT: NORMAL
BACTERIA SPEC AEROBE CULT: NORMAL

## 2024-04-05 ENCOUNTER — HOSPITAL ENCOUNTER (EMERGENCY)
Facility: HOSPITAL | Age: 84
Discharge: HOME | End: 2024-04-05
Payer: MEDICARE

## 2024-04-05 ENCOUNTER — APPOINTMENT (OUTPATIENT)
Dept: CT IMAGING | Facility: HOSPITAL | Age: 84
End: 2024-04-05
Payer: MEDICARE

## 2024-04-05 VITALS
TEMPERATURE: 97.4 F | SYSTOLIC BLOOD PRESSURE: 132 MMHG | HEIGHT: 69 IN | RESPIRATION RATE: 18 BRPM | BODY MASS INDEX: 31.1 KG/M2 | DIASTOLIC BLOOD PRESSURE: 74 MMHG | HEART RATE: 59 BPM | WEIGHT: 210 LBS | OXYGEN SATURATION: 96 %

## 2024-04-05 DIAGNOSIS — K59.00 CONSTIPATION, UNSPECIFIED CONSTIPATION TYPE: Primary | ICD-10-CM

## 2024-04-05 LAB
ALBUMIN SERPL-MCNC: 4.1 G/DL (ref 3.5–5.2)
ALBUMIN/GLOB SERPL: 1.2 G/DL
ALP SERPL-CCNC: 67 U/L (ref 39–117)
ALT SERPL W P-5'-P-CCNC: 21 U/L (ref 1–41)
ANION GAP SERPL CALCULATED.3IONS-SCNC: 10 MMOL/L (ref 5–15)
AST SERPL-CCNC: 21 U/L (ref 1–40)
BASOPHILS # BLD AUTO: 0.07 10*3/MM3 (ref 0–0.2)
BASOPHILS NFR BLD AUTO: 0.8 % (ref 0–1.5)
BILIRUB SERPL-MCNC: 0.5 MG/DL (ref 0–1.2)
BUN SERPL-MCNC: 11 MG/DL (ref 8–23)
BUN/CREAT SERPL: 15.3 (ref 7–25)
CALCIUM SPEC-SCNC: 9.4 MG/DL (ref 8.6–10.5)
CHLORIDE SERPL-SCNC: 101 MMOL/L (ref 98–107)
CO2 SERPL-SCNC: 26 MMOL/L (ref 22–29)
CREAT SERPL-MCNC: 0.72 MG/DL (ref 0.76–1.27)
DEPRECATED RDW RBC AUTO: 44.4 FL (ref 37–54)
EGFRCR SERPLBLD CKD-EPI 2021: 91.8 ML/MIN/1.73
EOSINOPHIL # BLD AUTO: 1.19 10*3/MM3 (ref 0–0.4)
EOSINOPHIL NFR BLD AUTO: 13.1 % (ref 0.3–6.2)
ERYTHROCYTE [DISTWIDTH] IN BLOOD BY AUTOMATED COUNT: 13 % (ref 12.3–15.4)
GLOBULIN UR ELPH-MCNC: 3.4 GM/DL
GLUCOSE SERPL-MCNC: 112 MG/DL (ref 65–99)
HCT VFR BLD AUTO: 41.5 % (ref 37.5–51)
HGB BLD-MCNC: 13.8 G/DL (ref 13–17.7)
IMM GRANULOCYTES # BLD AUTO: 0.05 10*3/MM3 (ref 0–0.05)
IMM GRANULOCYTES NFR BLD AUTO: 0.6 % (ref 0–0.5)
LYMPHOCYTES # BLD AUTO: 1.58 10*3/MM3 (ref 0.7–3.1)
LYMPHOCYTES NFR BLD AUTO: 17.5 % (ref 19.6–45.3)
MCH RBC QN AUTO: 30.9 PG (ref 26.6–33)
MCHC RBC AUTO-ENTMCNC: 33.3 G/DL (ref 31.5–35.7)
MCV RBC AUTO: 93 FL (ref 79–97)
MONOCYTES # BLD AUTO: 0.49 10*3/MM3 (ref 0.1–0.9)
MONOCYTES NFR BLD AUTO: 5.4 % (ref 5–12)
NEUTROPHILS NFR BLD AUTO: 5.67 10*3/MM3 (ref 1.7–7)
NEUTROPHILS NFR BLD AUTO: 62.6 % (ref 42.7–76)
NRBC BLD AUTO-RTO: 0 /100 WBC (ref 0–0.2)
PLATELET # BLD AUTO: 417 10*3/MM3 (ref 140–450)
PMV BLD AUTO: 9.2 FL (ref 6–12)
POTASSIUM SERPL-SCNC: 4 MMOL/L (ref 3.5–5.2)
PROT SERPL-MCNC: 7.5 G/DL (ref 6–8.5)
RBC # BLD AUTO: 4.46 10*6/MM3 (ref 4.14–5.8)
SODIUM SERPL-SCNC: 137 MMOL/L (ref 136–145)
WBC NRBC COR # BLD AUTO: 9.05 10*3/MM3 (ref 3.4–10.8)

## 2024-04-05 PROCEDURE — 74177 CT ABD & PELVIS W/CONTRAST: CPT

## 2024-04-05 PROCEDURE — 85025 COMPLETE CBC W/AUTO DIFF WBC: CPT

## 2024-04-05 PROCEDURE — 25510000001 IOPAMIDOL 61 % SOLUTION

## 2024-04-05 PROCEDURE — 80053 COMPREHEN METABOLIC PANEL: CPT

## 2024-04-05 PROCEDURE — 99285 EMERGENCY DEPT VISIT HI MDM: CPT

## 2024-04-05 RX ADMIN — IOPAMIDOL 100 ML: 612 INJECTION, SOLUTION INTRAVENOUS at 18:48

## 2024-04-05 NOTE — ED PROVIDER NOTES
"Subjective   History of Present Illness  Patient is an 83-year-old male who presents emergency department with a colostomy issue.  Patient states that he got a colostomy placed 2 and half weeks ago at [facility].  He does have history of ulcerative colitis and diverticulitis.  He states that his sigmoid close and was \"closed up\" and they had to place a colostomy.  Patient denies any abdominal pain.  Denies nausea, vomiting.  Denies fevers.  His stoma is intact and red in color.        Review of Systems   All other systems reviewed and are negative.      Past Medical History:   Diagnosis Date    Asthma     Colitis     Coronary artery disease     Diabetes mellitus     Diverticulitis     Elevated cholesterol     HL (hearing loss) 2012    Hyperlipidemia     Hypertension     Inflammatory bowel disease 2001    Myocardial infarct     EASTER 2020    Primary central sleep apnea 2009    Using Cpap    Sleep apnea     cpap at     Sleep apnea, obstructive     Stroke     Visual impairment 2016    Double vision       Allergies   Allergen Reactions    Albuterol Other (See Comments)    Adhesive Tape Rash    Azithromycin Rash    Fentanyl Nausea And Vomiting       Past Surgical History:   Procedure Laterality Date    ADENOIDECTOMY  1947    APPENDECTOMY N/A 05/05/2023    Procedure: APPENDECTOMY LAPAROSCOPIC;  Surgeon: Katherine Carranza MD;  Location: [facility] OR;  Service: General;  Laterality: N/A;    BACK SURGERY      CARDIAC CATHETERIZATION      stents x 6    CARDIAC SURGERY      CABG TRIPLE BYPASS 2012    CATARACT EXTRACTION      COLONOSCOPY      COLONOSCOPY N/A 06/04/2021    Procedure: COLONOSCOPY WITH ANESTHESIA;  Surgeon: Zachariah Menjivar DO;  Location: [facility] ENDOSCOPY;  Service: Gastroenterology;  Laterality: N/A;  pre hx ulcerative colitis  post ulcerative colitis  dr collins gusman    COLONOSCOPY N/A 11/29/2023    Procedure: COLONOSCOPY LOWER LIMITED;  Surgeon: Zachariah Menjivar DO;  Location: [facility] ENDOSCOPY;  Service: " Gastroenterology;  Laterality: N/A;  preop; hx of colitis  postop colitis - questionable colonic stricture at 30cm   pcp bryanna garrisontreet    COLOSTOMY      CORONARY ANGIOPLASTY WITH STENT PLACEMENT      CORONARY ARTERY BYPASS GRAFT  December 2012    CORONARY STENT PLACEMENT      HIP SURGERY Right     total hip    JOINT REPLACEMENT  2015    Right hip    PENILE PROSTHESIS IMPLANT N/A 12/13/2021    Procedure: 3-PIECE INFLATABLE PENILE PROSTHESIS PLACEMENT;  Surgeon: Jose Tellez MD;  Location: [facility] OR;  Service: Urology;  Laterality: N/A;    TONSILLECTOMY  1947       Family History   Problem Relation Age of Onset    Colon cancer Brother     Colon polyps Neg Hx     Esophageal cancer Neg Hx        Social History     Socioeconomic History    Marital status:    Tobacco Use    Smoking status: Never     Passive exposure: Never    Smokeless tobacco: Never   Vaping Use    Vaping status: Never Used   Substance and Sexual Activity    Alcohol use: Never    Drug use: Never    Sexual activity: Not Currently     Partners: Female           Objective   Physical Exam  Vitals and nursing note reviewed.   Constitutional:       General: He is not in acute distress.     Appearance: Normal appearance. He is normal weight. He is not ill-appearing or toxic-appearing.   HENT:      Head: Normocephalic.   Cardiovascular:      Rate and Rhythm: Normal rate and regular rhythm.      Pulses: Normal pulses.      Heart sounds: Normal heart sounds.   Pulmonary:      Effort: Pulmonary effort is normal.      Breath sounds: Normal breath sounds.   Abdominal:      General: Abdomen is flat. Bowel sounds are normal. There is no distension.      Palpations: Abdomen is soft.      Tenderness: There is no abdominal tenderness.      Comments: Left colostomy present.  Stoma is red and intact.   Musculoskeletal:         General: Normal range of motion.      Cervical back: Normal range of motion and neck supple.   Skin:     General: Skin is warm and  dry.   Neurological:      General: No focal deficit present.      Mental Status: He is alert and oriented to person, place, and time. Mental status is at baseline.   Psychiatric:         Mood and Affect: Mood normal.         Behavior: Behavior normal.         Thought Content: Thought content normal.         Procedures           ED Course                                             Medical Decision Making  Zachariah Acosta is a very pleasant 83 y.o. male who presents to the emergency department for decreased colostomy output.     Patient was non-toxic and not-ill appearing on arrival. Vital signs stable.     Patient's presentation raises suspicion for differentials including, but not limited to, obstruction, constipation, dehydration.     External (non-ED) record review: None    Given this, Zachariah was placed on the monitor. Laboratory studies and imaging studies were ordered including CBC, CMP, CT abdomen pelvis with contrast.     Imaging was reviewed by radiologist.  CT abdomen pelvis revealed Since the previous exam the patient has undergone colon resection with a left lower quadrant diverting colostomy and Lobo's pouch in the pelvis. There is upstream moderate constipation with increased stool within the transverse and right colon. A discrete transition point is not identified. There is mild stranding of the subcutaneous fat at the colostomy site felt to be postoperative in nature. The patient has undergone prior appendectomy. A penile implant is in place with the reservoir in the left perivesical space. No evidence of complications. Mild prostatic enlargement. Degenerative disc disease within the mid and lower lumbar spine with disc osteophyte complex contributing to central and foraminal stenosis at multiple levels. No acute or suspicious bony abnormalities demonstrated. Bilateral peripelvic cysts. There is a cortical cyst of the left kidney. There is a calculus within the left renal pelvis stable from  the previous exam. No evidence of additional ureteral stone or obstructive uropathy.    Labs were reviewed and are unremarkable.  On re-evaluation, patient remained hemodynamically stable and appeared to be comfortable and in no acute distress. Given findings described above, patient's presentation is most likely related to constipation. At this point, after reviewing the workup, I have a low suspicion for obstruction.  No evidence of obstruction seen on CT scan.  Patient denied any abdominal pain or vomiting.  On exam, stoma was intact and red in color.  Patient was advised to start a good bowel regimen. He states he has MiraLAX at home.  He was instructed to take this 2 or 3 times a day.  He also has been taking Colace daily.  He was advised to start taking this twice today.  Advised to follow-up with his surgeon as soon as possible to reassess symptoms.  Patient was given strict return precautions, such as abdominal pain or vomiting.    I discussed all of the lab and imaging results with the patient during this visit in the emergency department. I answered all the questions regarding the emergency department evaluation, diagnosis, and treatment plan. We talked about how crucial it is for the patient to follow up by calling their primary care provider and surgeon as soon as possible to schedule an appointment for within the next few days or as soon as possible so that the symptoms can be reassessed to see if they have improved or to answer any additional questions. I also provided the patient with advice on returning safely and urged the patient to visit the emergency department right away if any worsening or new symptoms appeared. The patient verbalized understanding of the discharge instructions and agreed with them. Zachariah was discharged in stable condition.    Signed by:   BORIS Liz 4/5/2024 19:36 CDT     Dragon disclaimer:  Part of this note may be an electronic transcription/translation of spoken  language to printed text using the Dragon Dictation System.    Problems Addressed:  Constipation, unspecified constipation type: complicated acute illness or injury    Amount and/or Complexity of Data Reviewed  Labs: ordered.  Radiology: ordered.    Risk  Prescription drug management.        Final diagnoses:   Constipation, unspecified constipation type       ED Disposition  ED Disposition       ED Disposition   Discharge    Condition   Stable    Comment   --               Amaya Mix, APRN  [address]  [facility] [id number] [phone]    Schedule an appointment as soon as possible for a visit in 1 day      [facility] EMERGENCY DEPARTMENT  [address] [address] [phone]  Go to   If symptoms worsen         Medication List      No changes were made to your prescriptions during this visit.            Angela Howell, APRN  04/1940

## 2024-04-06 NOTE — DISCHARGE INSTRUCTIONS
Today you are seen in the ER for your symptoms.  Your CT scan revealed constipation.  You will need to do MiraLAX 2 or 3 times a day.  Okay to do your Colace twice a day.  Please stay well-hydrated.  You will need to return to the ER if you are to develop any new or worsening symptoms especially abdominal pain and vomiting.  Please follow-up with primary care provider and surgeon as soon as possible to reassess symptoms.

## 2024-04-11 NOTE — H&P (VIEW-ONLY)
Chief Complaint   Patient presents with    Ulcerative Colitis     Was in er diverticulitis was told to follow up with gi not able to go to [facility] any longer       PCP: Amaya Mix APRN  REFER: Meli Brandt APRN    Subjective     HPI    Zachariah Acosta has history of Ulcerative Colitis (diagnosis 1997).  He has failed Remicade, Entyvio,  in the past.  Currently maintained on Stelara every 4 weeks and Rowasa nightly.   He has been following [facility] and wishes to resume care locally.      Zachariah Acosta sought treatment at urgent care [facility] 10/24/23 for complains of abdominal pain, no bowel movement x 4 days.  He was sent to ER for CT scan which was indicative of diverticulitis  Acute sigmoid colitis/diverticulitis involving the proximal sigmoid colon over a length of 14 cm.  CT scan dated 5/4/23 showed thickening to wall of sigmoid colon.  He was treated with antibiotic with improvement in pain.      Today, Zachariah Acosta continues to experience pressure to LLQ.  He reports this feeling similar to prior occurrence of diverticulitis and not the discomfort that he has felt with prior Ulcerative Colitis flares.  No signs of GI blood loss.  Bowels have improved and moving 1-2 times per day.  It is not uncommon for him to have more bowel movement on days he goes to [facility] or dr office.  Continues to deny fever or chills.     Calprotectin, Fecal - Stool, Per Rectum (10/09/2023 09:13)       Findings:   Colonoscopy: - 4/5/2022: 1) COLON, RIGHT, BIOPSY: MODERATELY ACTIVE CHRONIC COLITIS AND FOCAL EROSION; SEE  COMMENT.  2) SMALL BOWEL, TERMINAL ILEUM, BIOPSY: MILDLY ACTIVE CHRONIC ILEITIS; SEE COMMENT.  3) COLON, TRANSVERSE, BIOPSY: MILDLY ACTIVE CHRONIC COLITIS; SEE COMMENT.  4) COLON, LEFT, BIOPSY: MODERATELY ACTIVE CHRONIC COLITIS AND FOCAL EROSION; SEE  COMMENT.  5) STRICTURE, BIOPSY: MILDLY ACTIVE CHRONIC COLITIS; SEE COMMENT.  6) RECTUM, BIOPSY:  Convert from heparin to Eliquis per hospital Medicine   MILDLY ACTIVE CHRONIC PROCTITIS; SEE COMMENT.  - 6/2021: Diffuse colitis with friable, inflamed, nodular, scared, pseudopolypoid mucosa in the entire colon; pathology from segmental colon bx with active inflammation no dysplasia.     Past Medical History:   Diagnosis Date    Asthma     Coronary artery disease     Diabetes mellitus     Elevated cholesterol     HL (hearing loss) 2012    Hyperlipidemia     Hypertension     Inflammatory bowel disease 2001    Myocardial infarct     EASTER 2020    Sleep apnea     cpap at [facility]    Sleep apnea, obstructive     Stroke     Visual impairment 2016    Double vision       Past Surgical History:   Procedure Laterality Date    ADENOIDECTOMY  1947    APPENDECTOMY N/A 5/5/2023    Procedure: APPENDECTOMY LAPAROSCOPIC;  Surgeon: Katherine Carranza MD;  Location:  [facility] OR;  Service: General;  Laterality: N/A;    BACK SURGERY      CARDIAC CATHETERIZATION      stents x 6    CARDIAC SURGERY      CABG TRIPLE BYPASS 2012    CATARACT EXTRACTION      COLONOSCOPY      COLONOSCOPY N/A 06/04/2021    Procedure: COLONOSCOPY WITH ANESTHESIA;  Surgeon: Zachariah Menjivar DO;  Location: [facility] ENDOSCOPY;  Service: Gastroenterology;  Laterality: N/A;  pre hx ulcerative colitis  post ulcerative colitis  dr collins gusman    CORONARY ANGIOPLASTY WITH STENT PLACEMENT      CORONARY ARTERY BYPASS GRAFT  December 2012    CORONARY STENT PLACEMENT      HIP SURGERY Right     total hip    JOINT REPLACEMENT  2015    Right hip    PENILE PROSTHESIS IMPLANT N/A 12/13/2021    Procedure: 3-PIECE INFLATABLE PENILE PROSTHESIS PLACEMENT;  Surgeon: Jose Tellez MD;  Location:  [facility] OR;  Service: Urology;  Laterality: N/A;    TONSILLECTOMY  1947       Outpatient Medications Marked as Taking for the 11/6/23 encounter (Office Visit) with Salo Nascimento APRN   Medication Sig Dispense Refill    acetaminophen (TYLENOL) 500 MG tablet Take 1 tablet by mouth Every 6 (Six) Hours As Needed for Mild Pain.      aspirin 81 MG  EC tablet Take 1 tablet by mouth Daily.      Azelastine HCl 137 MCG/SPRAY solution 2 sprays into the nostril(s) as directed by provider 2 (Two) Times a Day. 30 mL 5    Budeson-Glycopyrrol-Formoterol (Breztri Aerosphere) 160-9-4.8 MCG/ACT aerosol inhaler Inhale 2 puffs 2 (Two) Times a Day. 3 each 4    ciclopirox (LOPROX) 0.77 % cream APPLY A THIN LAYER UNDER ARMS TWICE DAILY AFTER USING CLINDAMYCIN SOLUTION      clindamycin (CLEOCIN T) 1 % external solution APPLY SOLUTION TOPICALLY TO UNDERARMS TWICE DAILY      Dupilumab 300 MG/2ML solution prefilled syringe Inject  under the skin into the appropriate area as directed Every 14 (Fourteen) Days.      Ferrous Sulfate (IRON PO) Take 65 mg by mouth Daily.      finasteride (PROSCAR) 5 MG tablet Take 1 tablet by mouth Daily. 90 tablet 3    fluticasone (FLONASE) 50 MCG/ACT nasal spray 1 spray into the nostril(s) as directed by provider Daily. 16 g 5    GARLIC PO Take 1 tablet by mouth Daily.      insulin glargine (LANTUS, SEMGLEE) 100 UNIT/ML injection 15 units in the morning and 40 units at bedtime. 18 mL 2    levalbuterol (XOPENEX HFA) 45 MCG/ACT inhaler Inhale 2 puffs Every 6 (Six) Hours As Needed for Wheezing.      levothyroxine (Synthroid) 50 MCG tablet Take 1 tablet by mouth Daily. 90 tablet 4    lisinopril (PRINIVIL,ZESTRIL) 20 MG tablet Take 1 tablet by mouth 2 (Two) Times a Day. 1 tab am and 1/2 at bedtime. 180 tablet 1    meclizine (ANTIVERT) 12.5 MG tablet Take 1 tablet by mouth 3 (Three) Times a Day As Needed for Dizziness. 30 tablet 0    melatonin 5 MG tablet tablet Take 1 tablet by mouth At Night As Needed.      Mesalamine 4 GM/60ML SF enema Insert 1 application into the rectum Every Other Day.      metFORMIN ER (GLUCOPHAGE-XR) 500 MG 24 hr tablet Daily as directed. 90 tablet 4    metoprolol tartrate (LOPRESSOR) 25 MG tablet Take 1 tablet by mouth 2 (Two) Times a Day. 180 tablet 3    nitroglycerin (NITROSTAT) 0.4 MG SL tablet 1 under the tongue as needed for  "angina, may repeat q5mins for up three doses 25 tablet 1    ondansetron (Zofran) 4 MG tablet Take 1 tablet by mouth Every 4 (Four) Hours As Needed for Nausea or Vomiting for up to 20 doses. 20 tablet 0    Pediatric Multiple Vit-C-FA (CHILDRENS CHEWABLE MULTI VITS PO) Take 1 tablet by mouth Daily.      rosuvastatin (CRESTOR) 20 MG tablet Take 1 tablet by mouth Every Night. 90 tablet 4    triamcinolone (KENALOG) 0.1 % cream Apply 1 application  topically to the appropriate area as directed 2 (Two) Times a Day.      Ustekinumab (STELARA) 90 MG/ML solution prefilled syringe Injection Inject 90 mg under the skin into the appropriate area as directed Every 28 (Twenty-Eight) Days. 1 mL 6    vitamin B-12 (CYANOCOBALAMIN) 1000 MCG tablet Take 1 tablet by mouth Daily.      Vitamin D, Cholecalciferol, 25 MCG (1000 UT) capsule Take 1 capsule by mouth Daily.         Allergies   Allergen Reactions    Albuterol Other (See Comments)    Adhesive Tape Rash    Azithromycin Rash    Fentanyl Nausea And Vomiting       Social History     Socioeconomic History    Marital status:    Tobacco Use    Smoking status: Never     Passive exposure: Never    Smokeless tobacco: Never   Vaping Use    Vaping Use: Never used   Substance and Sexual Activity    Alcohol use: Never    Drug use: Never    Sexual activity: Not Currently     Partners: Female       Review of Systems   Constitutional:  Negative for fever and unexpected weight change.   HENT:  Negative for trouble swallowing.    Respiratory:  Negative for shortness of breath.    Cardiovascular:  Negative for chest pain.   Gastrointestinal:  Positive for abdominal pain. Negative for anal bleeding.       Objective     Vitals:    11/06/23 0942   BP: 134/60   Pulse: 60   Temp: 97.4 °F (36.3 °C)   SpO2: 98%   Weight: 102 kg (225 lb)   Height: 175.3 cm (69\")     Body mass index is 33.23 kg/m².    Physical Exam  Constitutional:       Appearance: Normal appearance. He is well-developed.   Eyes:     "  General: No scleral icterus.  Cardiovascular:      Heart sounds: Normal heart sounds. No murmur heard.  Pulmonary:      Effort: Pulmonary effort is normal.   Abdominal:      General: Bowel sounds are normal. There is no distension.      Palpations: Abdomen is soft.      Tenderness: There is no abdominal tenderness. There is no guarding.   Skin:     General: Skin is warm and dry.      Coloration: Skin is not jaundiced.   Neurological:      Mental Status: He is alert.   Psychiatric:         Behavior: Behavior is cooperative.         Imaging Results (Most Recent)       None            Body mass index is 33.23 kg/m².    Assessment & Plan     Diagnoses and all orders for this visit:    1. Left lower quadrant abdominal pain (Primary)    2. Ulcerative pancolitis with other complication  -     Ustekinumab and Anti-Ustek Ab; Future    Other orders  -     amoxicillin-clavulanate (AUGMENTIN) 875-125 MG per tablet; Take 1 tablet by mouth 2 (Two) Times a Day.  Dispense: 10 tablet; Refill: 0         * Surgery not found *      Due to Zachariah Acosta continued pressure and symptoms similar to prior diverticulitis occurrence I will go ahead and treat him with 5 day coarse of Augmentin.  I discussed that he is more susceptible to C. difficile and repeat antibiotic could result in a C. difficile infection.  I did ask him to please call our office with new onset of pain, increase in frequency of bowel movement, observation of blood.    He had a CT scan in October and May 2023 both similar with thickening to the sigmoid colon.  October 2023 fecal Joel is elevated.  He is not exhibiting any signs of GI blood loss.  Dr. Menjivar has not performed colonoscopy since 2021.  If pressure to abdomen does not go away with repeat antibiotic will need to proceed with [facility] versus colonoscopy for Dr. Menjivar to assess tissue.  I do recommend Stelara level, he believes his next dose is due next week.  I explained he would need to obtain lab work  the day before injection or the morning prior to taking the injection.  If injection is due on a Sunday okay to obtain lab work on Friday but no earlier.  Patient verbalized understanding and will have lab work drawn at [facility].  Last TB testing was beginning of October therefore this is current and order was not placed.  Timing of repeat colonoscopy pending results of lab work as well as patient's progress with repeat antibiotic.        Salo Nascimento, APRN  11/09/23          There are no Patient Instructions on file for this visit.

## 2024-04-16 ENCOUNTER — OFFICE VISIT (OUTPATIENT)
Dept: WOUND CARE | Facility: HOSPITAL | Age: 84
End: 2024-04-16
Payer: MEDICARE

## 2024-04-16 PROCEDURE — G0463 HOSPITAL OUTPT CLINIC VISIT: HCPCS

## 2024-04-17 DIAGNOSIS — K51.919 ACUTE ULCERATIVE COLITIS WITH COMPLICATION: Primary | ICD-10-CM

## 2024-04-23 RX ORDER — ACETAMINOPHEN 500 MG
500 TABLET ORAL EVERY 6 HOURS PRN
COMMUNITY

## 2024-04-23 RX ORDER — LANOLIN ALCOHOL/MO/W.PET/CERES
1000 CREAM (GRAM) TOPICAL DAILY
COMMUNITY

## 2024-04-23 RX ORDER — MECLIZINE HCL 12.5 MG/1
12.5 TABLET ORAL 3 TIMES DAILY PRN
COMMUNITY
End: 2024-05-02

## 2024-04-23 RX ORDER — PREDNISONE 20 MG/1
20 TABLET ORAL DAILY
COMMUNITY
End: 2024-05-02

## 2024-05-02 ENCOUNTER — OFFICE VISIT (OUTPATIENT)
Dept: GASTROENTEROLOGY | Age: 84
End: 2024-05-02
Payer: MEDICARE

## 2024-05-02 VITALS
SYSTOLIC BLOOD PRESSURE: 120 MMHG | BODY MASS INDEX: 31.1 KG/M2 | WEIGHT: 210 LBS | HEIGHT: 69 IN | OXYGEN SATURATION: 97 % | HEART RATE: 66 BPM | DIASTOLIC BLOOD PRESSURE: 80 MMHG

## 2024-05-02 DIAGNOSIS — K51.312 ULCERATIVE RECTOSIGMOIDITIS, WITH INTESTINAL OBSTRUCTION (HCC): Primary | ICD-10-CM

## 2024-05-02 PROCEDURE — G8427 DOCREV CUR MEDS BY ELIG CLIN: HCPCS | Performed by: NURSE PRACTITIONER

## 2024-05-02 PROCEDURE — 1123F ACP DISCUSS/DSCN MKR DOCD: CPT | Performed by: NURSE PRACTITIONER

## 2024-05-02 PROCEDURE — G8417 CALC BMI ABV UP PARAM F/U: HCPCS | Performed by: NURSE PRACTITIONER

## 2024-05-02 PROCEDURE — 99203 OFFICE O/P NEW LOW 30 MIN: CPT | Performed by: NURSE PRACTITIONER

## 2024-05-02 PROCEDURE — 1036F TOBACCO NON-USER: CPT | Performed by: NURSE PRACTITIONER

## 2024-05-02 RX ORDER — CLINDAMYCIN PHOSPHATE 11.9 MG/ML
SOLUTION TOPICAL 2 TIMES DAILY
COMMUNITY

## 2024-05-02 NOTE — PROGRESS NOTES
Relation Age of Onset    Colon Cancer Brother 60    Colon Polyps Neg Hx     Liver Cancer Neg Hx        Social History     Socioeconomic History    Marital status:    Tobacco Use    Smoking status: Never    Smokeless tobacco: Never   Vaping Use    Vaping Use: Never used   Substance and Sexual Activity    Alcohol use: Never    Drug use: Never       Current Outpatient Medications   Medication Sig Dispense Refill    clindamycin (CLEOCIN T) 1 % external solution Apply topically 2 times daily Apply topically 2 times daily under arm      acetaminophen (TYLENOL) 500 MG tablet Take 1 tablet by mouth every 6 hours as needed for Pain      ciclopirox (LOPROX) 0.77 % cream Apply topically 2 times daily Apply topically 2 times daily under arm after using Clindamycin Soultion      vitamin B-12 (CYANOCOBALAMIN) 1000 MCG tablet Take 1 tablet by mouth daily      aspirin 81 MG EC tablet Take 1 tablet by mouth daily      BREZTRI AEROSPHERE 160-9-4.8 MCG/ACT AERO Inhale 2 puffs into the lungs 2 times daily      levothyroxine (SYNTHROID) 50 MCG tablet Take 1 tablet by mouth Daily      metFORMIN (GLUCOPHAGE-XR) 500 MG extended release tablet Take 1 tablet by mouth daily      nitroGLYCERIN (NITROSTAT) 0.4 MG SL tablet Place 1 tablet under the tongue      levalbuterol (XOPENEX HFA) 45 MCG/ACT inhaler Inhale 1 puff into the lungs every 4 hours as needed for Shortness of Breath or Wheezing 1 each 0    insulin glargine (LANTUS) 100 UNIT/ML injection vial Inject 15 Units into the skin nightly 10 mL 0    rosuvastatin (CRESTOR) 40 MG tablet Take 0.5 tablets by mouth at bedtime 15 tablet 0    lisinopril (PRINIVIL;ZESTRIL) 20 MG tablet Take 1 tablet by mouth daily 30 tablet 0    metoprolol tartrate (LOPRESSOR) 25 MG tablet Take 1 tablet by mouth 2 times daily 60 tablet 0    fluticasone (FLONASE) 50 MCG/ACT nasal spray 1 spray by Nasal route daily 16 g 0    dupilumab (DUPIXENT) 300 MG/2ML SOSY injection Inject 2 mLs into the skin every 14

## 2024-05-07 ENCOUNTER — HOSPITAL ENCOUNTER (EMERGENCY)
Facility: HOSPITAL | Age: 84
Discharge: HOME | End: 2024-05-07
Payer: MEDICARE

## 2024-05-07 VITALS
OXYGEN SATURATION: 96 % | RESPIRATION RATE: 18 BRPM | SYSTOLIC BLOOD PRESSURE: 139 MMHG | WEIGHT: 208 LBS | HEIGHT: 69 IN | HEART RATE: 63 BPM | TEMPERATURE: 98 F | DIASTOLIC BLOOD PRESSURE: 77 MMHG | BODY MASS INDEX: 30.81 KG/M2

## 2024-05-07 DIAGNOSIS — K94.01 BLEEDING FROM COLOSTOMY STOMA: Primary | ICD-10-CM

## 2024-05-07 LAB
BASOPHILS # BLD AUTO: 0.08 10*3/MM3 (ref 0–0.2)
BASOPHILS NFR BLD AUTO: 0.8 % (ref 0–1.5)
DEPRECATED RDW RBC AUTO: 43 FL (ref 37–54)
EOSINOPHIL # BLD AUTO: 1.04 10*3/MM3 (ref 0–0.4)
EOSINOPHIL NFR BLD AUTO: 10.8 % (ref 0.3–6.2)
ERYTHROCYTE [DISTWIDTH] IN BLOOD BY AUTOMATED COUNT: 12.7 % (ref 12.3–15.4)
HCT VFR BLD AUTO: 46.8 % (ref 37.5–51)
HGB BLD-MCNC: 15.7 G/DL (ref 13–17.7)
HOLD SPECIMEN: NORMAL
IMM GRANULOCYTES # BLD AUTO: 0.04 10*3/MM3 (ref 0–0.05)
IMM GRANULOCYTES NFR BLD AUTO: 0.4 % (ref 0–0.5)
LYMPHOCYTES # BLD AUTO: 2.08 10*3/MM3 (ref 0.7–3.1)
LYMPHOCYTES NFR BLD AUTO: 21.6 % (ref 19.6–45.3)
MCH RBC QN AUTO: 30.7 PG (ref 26.6–33)
MCHC RBC AUTO-ENTMCNC: 33.5 G/DL (ref 31.5–35.7)
MCV RBC AUTO: 91.6 FL (ref 79–97)
MONOCYTES # BLD AUTO: 0.6 10*3/MM3 (ref 0.1–0.9)
MONOCYTES NFR BLD AUTO: 6.2 % (ref 5–12)
NEUTROPHILS NFR BLD AUTO: 5.78 10*3/MM3 (ref 1.7–7)
NEUTROPHILS NFR BLD AUTO: 60.2 % (ref 42.7–76)
NRBC BLD AUTO-RTO: 0 /100 WBC (ref 0–0.2)
PLATELET # BLD AUTO: 280 10*3/MM3 (ref 140–450)
PMV BLD AUTO: 10 FL (ref 6–12)
RBC # BLD AUTO: 5.11 10*6/MM3 (ref 4.14–5.8)
WBC NRBC COR # BLD AUTO: 9.62 10*3/MM3 (ref 3.4–10.8)
WHOLE BLOOD HOLD COAG: NORMAL
WHOLE BLOOD HOLD SPECIMEN: NORMAL

## 2024-05-07 PROCEDURE — 99283 EMERGENCY DEPT VISIT LOW MDM: CPT

## 2024-05-07 PROCEDURE — 85025 COMPLETE CBC W/AUTO DIFF WBC: CPT

## 2024-05-07 NOTE — ED PROVIDER NOTES
"Subjective   History of Present Illness  Patient is an 83-year-old male who presents emergency department due to his colostomy bleeding.  Patient had a colon resection on March 19 and got a colostomy at [place].  Stoma is red.  There is a scant amount of bleeding present to the stoma.  There is no blood coming out of the stoma site.  There is also some scabbing present around the stoma.  Patient denies any abdominal pain.  He denies fevers.  Patient follows with our wound care here and has an appointment on Friday for this.        Review of Systems   All other systems reviewed and are negative.      Past Medical History:   Diagnosis Date    Asthma     Colitis     Coronary artery disease     Diabetes mellitus     Diverticulitis     Elevated cholesterol     HL (hearing loss) 2012    Hyperlipidemia     Hypertension     Inflammatory bowel disease 2001    Myocardial infarct     EASTER 2020    Primary central sleep apnea 2009    Using Cpap    Sleep apnea     cpap at     Sleep apnea, obstructive     Stroke     Visual impairment 2016    Double vision       Allergies   Allergen Reactions    Albuterol Other (See Comments)     \"Makes my throat spasm and swell\"    Adhesive Tape Rash    Azithromycin Rash    Fentanyl Nausea And Vomiting       Past Surgical History:   Procedure Laterality Date    ADENOIDECTOMY  1947    APPENDECTOMY N/A 05/05/2023    Procedure: APPENDECTOMY LAPAROSCOPIC;  Surgeon: Katherine Carranza MD;  Location: [facility] OR;  Service: General;  Laterality: N/A;    BACK SURGERY      CARDIAC CATHETERIZATION      stents x 6    CARDIAC SURGERY      CABG TRIPLE BYPASS 2012    CATARACT EXTRACTION      COLONOSCOPY      COLONOSCOPY N/A 06/04/2021    Procedure: COLONOSCOPY WITH ANESTHESIA;  Surgeon: Zachariah Menjivar DO;  Location: [facility] ENDOSCOPY;  Service: Gastroenterology;  Laterality: N/A;  pre hx ulcerative colitis  post ulcerative colitis  dr collins gusman    COLONOSCOPY N/A 11/29/2023    Procedure: COLONOSCOPY " LOWER LIMITED;  Surgeon: Zachariah Menjivar DO;  Location:  [facility] ENDOSCOPY;  Service: Gastroenterology;  Laterality: N/A;  preop; hx of colitis  postop colitis - questionable colonic stricture at 30cm   pcp bryanna salty    COLOSTOMY      CORONARY ANGIOPLASTY WITH STENT PLACEMENT      CORONARY ARTERY BYPASS GRAFT  December 2012    CORONARY STENT PLACEMENT      HIP SURGERY Right     total hip    JOINT REPLACEMENT  2015    Right hip    PENILE PROSTHESIS IMPLANT N/A 12/13/2021    Procedure: 3-PIECE INFLATABLE PENILE PROSTHESIS PLACEMENT;  Surgeon: Jose Tellez MD;  Location:  [facility] OR;  Service: Urology;  Laterality: N/A;    TONSILLECTOMY  1947       Family History   Problem Relation Age of Onset    Colon cancer Brother     Colon polyps Neg Hx     Esophageal cancer Neg Hx        Social History     Socioeconomic History    Marital status:    Tobacco Use    Smoking status: Never     Passive exposure: Never    Smokeless tobacco: Never   Vaping Use    Vaping status: Never Used   Substance and Sexual Activity    Alcohol use: Never    Drug use: Never    Sexual activity: Not Currently     Partners: Female           Objective   Physical Exam  Vitals and nursing note reviewed.   Constitutional:       General: He is not in acute distress.     Appearance: Normal appearance. He is normal weight. He is not ill-appearing or toxic-appearing.   HENT:      Head: Normocephalic.   Cardiovascular:      Rate and Rhythm: Normal rate and regular rhythm.      Pulses: Normal pulses.      Heart sounds: Normal heart sounds.   Pulmonary:      Effort: Pulmonary effort is normal.      Breath sounds: Normal breath sounds.   Abdominal:      General: Abdomen is flat. Bowel sounds are normal. There is no distension.      Palpations: Abdomen is soft.      Tenderness: There is no abdominal tenderness.      Comments: Left ostomy present.  Scant amount of bleeding present to the stoma, no blood coming out of the stoma site.  There is  scabbing present around the stoma that is intact.  Patient denies abdominal tenderness.   Musculoskeletal:         General: Normal range of motion.      Cervical back: Normal range of motion and neck supple.   Skin:     General: Skin is warm and dry.      Findings: No bruising or erythema.   Neurological:      General: No focal deficit present.      Mental Status: He is alert and oriented to person, place, and time. Mental status is at baseline.   Psychiatric:         Mood and Affect: Mood normal.         Behavior: Behavior normal.         Thought Content: Thought content normal.         Judgment: Judgment normal.         Procedures           ED Course                                             Medical Decision Making  Patient is an 83-year-old male who presents emergency department due to his colostomy bleeding.  Patient had a colon resection on March 19 and got a colostomy at [facility].  Stoma is red.  There is a scant amount of bleeding present to the stoma.  There is no blood coming out of the stoma site.  There is also some scabbing present around the stoma.  Patient denies any abdominal pain.  He denies fevers.  Patient follows with our wound care here and has an appointment on Friday for this.    Patient nontoxic-appearing on arrival.  Vital signs stable.  Differential diagnoses include anemia, ostomy complication, other.  CBC ordered and was reviewed.  This was unremarkable.  No leukocytosis and stable H&H.  Patient did not have any abdominal pain or tenderness.  Did not feel that a CT scan was necessary at this point.  He states that he is still having output from his ostomy.  Case was discussed with Dr. Soni.  We will have patient follow closely with wound care for further recommendations.  Patient does have an upcoming appointment on Friday.  He was encouraged to keep this appointment.  Advised return the ER for any new or worsening symptoms.  Patient verbalized understanding of discharge  instructions and agreed with them.  Patient discharged in stable condition.    Amount and/or Complexity of Data Reviewed  Labs: ordered.        Final diagnoses:   Bleeding from colostomy stoma       ED Disposition  ED Disposition       ED Disposition   Discharge    Condition   Stable    Comment   --               Amaya Mix, APRN  [address]  [facility] [address]  [phone]    Schedule an appointment as soon as possible for a visit in 1 day      [facility] EMERGENCY DEPARTMENT  [address] [address] [phone]  Go to   If symptoms worsen         Medication List      No changes were made to your prescriptions during this visit.            Angela Howell, APRN  05/07/24 [id number]

## 2024-05-07 NOTE — DISCHARGE INSTRUCTIONS
Today you are seen in the ER for your symptoms.  Your lab work is reassuring.  You will need to keep your scheduled appointment at wound care on Friday unless she can get in any sooner.  Please follow-up with primary care provider as well.  Please return the ER for any new or worsening symptoms.

## 2024-05-10 ENCOUNTER — OFFICE VISIT (OUTPATIENT)
Dept: WOUND CARE | Facility: HOSPITAL | Age: 84
End: 2024-05-10
Payer: MEDICARE

## 2024-05-10 DIAGNOSIS — K57.32 DIVERTICULITIS OF LARGE INTESTINE WITHOUT PERFORATION OR ABSCESS WITHOUT BLEEDING: ICD-10-CM

## 2024-05-10 DIAGNOSIS — Z71.89 OTHER SPECIFIED COUNSELING: ICD-10-CM

## 2024-05-10 DIAGNOSIS — Z43.3 ENCOUNTER FOR ATTENTION TO COLOSTOMY: Primary | ICD-10-CM

## 2024-05-10 DIAGNOSIS — K51.019 ULCERATIVE (CHRONIC) PANCOLITIS WITH UNSPECIFIED COMPLICATIONS: ICD-10-CM

## 2024-05-10 PROCEDURE — G0463 HOSPITAL OUTPT CLINIC VISIT: HCPCS

## 2024-05-15 ENCOUNTER — OFFICE VISIT (OUTPATIENT)
Dept: FAMILY MEDICINE CLINIC | Facility: CLINIC | Age: 84
End: 2024-05-15
Payer: MEDICARE

## 2024-05-15 VITALS
BODY MASS INDEX: 31.55 KG/M2 | WEIGHT: 213 LBS | TEMPERATURE: 98.2 F | RESPIRATION RATE: 20 BRPM | HEIGHT: 69 IN | DIASTOLIC BLOOD PRESSURE: 55 MMHG | OXYGEN SATURATION: 97 % | HEART RATE: 59 BPM | SYSTOLIC BLOOD PRESSURE: 100 MMHG

## 2024-05-15 DIAGNOSIS — E11.65 TYPE 2 DIABETES MELLITUS WITH HYPERGLYCEMIA, WITH LONG-TERM CURRENT USE OF INSULIN: Primary | ICD-10-CM

## 2024-05-15 DIAGNOSIS — Z79.4 TYPE 2 DIABETES MELLITUS WITH HYPERGLYCEMIA, WITH LONG-TERM CURRENT USE OF INSULIN: Primary | ICD-10-CM

## 2024-05-15 DIAGNOSIS — I10 ESSENTIAL HYPERTENSION: ICD-10-CM

## 2024-05-15 DIAGNOSIS — I10 ESSENTIAL HYPERTENSION: Chronic | ICD-10-CM

## 2024-05-15 DIAGNOSIS — E03.9 HYPOTHYROIDISM, UNSPECIFIED TYPE: ICD-10-CM

## 2024-05-15 PROCEDURE — 3074F SYST BP LT 130 MM HG: CPT | Performed by: NURSE PRACTITIONER

## 2024-05-15 PROCEDURE — 1126F AMNT PAIN NOTED NONE PRSNT: CPT | Performed by: NURSE PRACTITIONER

## 2024-05-15 PROCEDURE — 99214 OFFICE O/P EST MOD 30 MIN: CPT | Performed by: NURSE PRACTITIONER

## 2024-05-15 PROCEDURE — 3078F DIAST BP <80 MM HG: CPT | Performed by: NURSE PRACTITIONER

## 2024-05-15 RX ORDER — LISINOPRIL 20 MG/1
20 TABLET ORAL 2 TIMES DAILY
Qty: 180 TABLET | Refills: 4 | Status: SHIPPED | OUTPATIENT
Start: 2024-05-15

## 2024-05-15 RX ORDER — LEVOTHYROXINE SODIUM 0.05 MG/1
50 TABLET ORAL DAILY
Qty: 90 TABLET | Refills: 4 | Status: SHIPPED | OUTPATIENT
Start: 2024-05-15

## 2024-05-15 RX ORDER — ROSUVASTATIN CALCIUM 40 MG/1
20 TABLET, COATED ORAL DAILY
Qty: 90 TABLET | Refills: 4 | Status: SHIPPED | OUTPATIENT
Start: 2024-05-15

## 2024-05-15 RX ORDER — METFORMIN HYDROCHLORIDE 500 MG/1
500 TABLET, EXTENDED RELEASE ORAL
Qty: 90 TABLET | Refills: 4 | Status: SHIPPED | OUTPATIENT
Start: 2024-05-15

## 2024-05-15 NOTE — PROGRESS NOTES
BORIS Pérez  [facility]   Family Medicine  [address]  [place] [address]  Phone: [phone]  Fax: [phone]         Chief Complaint:  Chief Complaint   Patient presents with    Follow-up     Follow up on chronic conditions        History:  Zachariah Acosta is a 83 y.o. male.  History of Present Illness  The patient is an 83-year-old male who presents for evaluation of multiple medical concerns.    The patient was previously evaluated in the emergency department due to complications with his stoma, characterized by bleeding. He reported a minor wound located on the skin of his stoma, which subsequently opened up post-surgery. The stoma was initially oozing, however, the ostomy nurse, who conducts regular visits, has reassured him that the oozing is indicative of adequate blood supply. The bleeding has since resolved, albeit moist with blood. His blood count was reported as normal during his emergency room visit. He has a follow-up appointment with the ostomy nurse on 05//22/2024 for the healing of his wound.    The patient's diabetes is well-managed.  Answers submitted by the patient for this visit:  Primary Reason for Visit (Submitted on 5/8/2024)  What is the primary reason for your visit?: Diabetes  Diabetes Questionnaire (Submitted on 5/8/2024)  Chief Complaint: Diabetes problem  Diabetes type: type 2  MedicAlert ID: No  Disease duration: 6 Years  Treatment compliance: all of the time  Home blood tests: 5+ x per day  High score: 130-140  Below 70: never  foot paresthesias: No  foot ulcerations: No  weight loss: No  sweats: Yes  Dose schedule: pre-breakfast, at bedtime  Given by: patient  Injection sites: abdominal wall  Current diet: generally healthy  Meal planning: avoidance of concentrated sweets  Exercise: three times a week  Eye exam current: Yes  Sees podiatrist: No           ROS:  Review of Systems   Endocrine: Negative for polydipsia and  polyuria.   Neurological:  Positive for tremors. Negative for speech difficulty and headaches.   Psychiatric/Behavioral:  Negative for confusion.         reports that he has never smoked. He has never been exposed to tobacco smoke. He has never used smokeless tobacco. He reports that he does not drink alcohol and does not use drugs.    Current Outpatient Medications   Medication Instructions    acetaminophen (TYLENOL) 500 mg, Oral, Every 6 Hours PRN    aspirin 81 mg, Oral, Daily    Budeson-Glycopyrrol-Formoterol (Breztri Aerosphere) 160-9-4.8 MCG/ACT aerosol inhaler 2 puffs, Inhalation, 2 Times Daily    ciclopirox (LOPROX) 0.77 % cream Apply 1 Application topically to the appropriate area as directed 2 (Two) Times a Day. To under arms after using Clindamycin Solution    clindamycin (CLEOCIN T) 1 % external solution 1 application  2 (Two) Times a Day. To under arms    Continuous Blood Gluc Sensor (Dexcom G7 Sensor) misc 1 each, Does not apply, Every 10 Days    Dupilumab 300 MG/2ML solution prefilled syringe Subcutaneous, Every 14 Days    ferrous sulfate 325 mg, Oral, Daily With Breakfast    finasteride (PROSCAR) 5 mg, Oral, Daily    fluticasone (FLONASE) 50 MCG/ACT nasal spray 1 spray, Nasal, Daily    insulin glargine (LANTUS, SEMGLEE) 100 UNIT/ML injection 15 units in the morning and 40 units at bedtime.    levalbuterol (XOPENEX HFA) 45 MCG/ACT inhaler 2 puffs, Inhalation, Every 6 Hours PRN    levothyroxine (SYNTHROID) 50 mcg, Oral, Daily    lisinopril (PRINIVIL,ZESTRIL) 20 mg, Oral, 2 Times Daily, 1 tab am and 1/2 at bedtime.    meclizine (ANTIVERT) 12.5 mg, Oral, 3 Times Daily PRN    melatonin 5 mg, Oral, Nightly PRN    Mesalamine 4 GM/60ML SF enema 1 application , Rectal, Every Other Day    metFORMIN ER (GLUCOPHAGE-XR) 500 mg, Oral, Daily With Breakfast    metoprolol tartrate (LOPRESSOR) 25 mg, Oral, 2 Times Daily    nitroglycerin (NITROSTAT) 0.4 mg, Every 5 Minutes PRN    Pediatric Multiple Vit-C-FA (CHILDRENS  "CHEWABLE MULTI VITS PO) 1 tablet, Oral, Daily    rosuvastatin (CRESTOR) 20 mg, Oral, Daily    triamcinolone (KENALOG) 0.1 % cream 1 application , Topical, 2 Times Daily    Ustekinumab (STELARA) 90 mg, Subcutaneous, Every 28 Days    vitamin B-12 (CYANOCOBALAMIN) 1,000 mcg, Oral, Daily    Vitamin D, Cholecalciferol, 25 MCG (1000 UT) capsule 1 capsule, Oral, Daily       OBJECTIVE:  /55 (BP Location: Left arm, Patient Position: Sitting, Cuff Size: Adult)   Pulse 59   Temp 98.2 °F (36.8 °C) (Infrared)   Resp 20   Ht 175.3 cm (69.02\")   Wt 96.6 kg (213 lb)   SpO2 97%   BMI 31.44 kg/m²    Physical Exam  Vitals and nursing note reviewed.   Constitutional:       Appearance: Normal appearance. He is well-developed.   HENT:      Head: Normocephalic and atraumatic.      Right Ear: Tympanic membrane, ear canal and external ear normal.      Left Ear: Tympanic membrane, ear canal and external ear normal.      Nose: Nose normal. No septal deviation, nasal tenderness or congestion.      Mouth/Throat:      Lips: Pink. No lesions.      Mouth: Mucous membranes are moist. No oral lesions.      Dentition: Normal dentition.      Pharynx: Oropharynx is clear. No pharyngeal swelling, oropharyngeal exudate or posterior oropharyngeal erythema.   Eyes:      General: Lids are normal. Vision grossly intact. No scleral icterus.        Right eye: No discharge.         Left eye: No discharge.      Extraocular Movements: Extraocular movements intact.      Conjunctiva/sclera: Conjunctivae normal.      Right eye: Right conjunctiva is not injected.      Left eye: Left conjunctiva is not injected.      Pupils: Pupils are equal, round, and reactive to light.   Neck:      Thyroid: No thyroid mass.      Trachea: Trachea normal.   Cardiovascular:      Rate and Rhythm: Normal rate and regular rhythm.      Heart sounds: Normal heart sounds. No murmur heard.     No gallop.   Pulmonary:      Effort: Pulmonary effort is normal.      Breath sounds: " Normal breath sounds and air entry. No wheezing, rhonchi or rales.   Musculoskeletal:         General: No tenderness or deformity. Normal range of motion.      Cervical back: Full passive range of motion without pain, normal range of motion and neck supple.      Thoracic back: Normal.      Right lower leg: No edema.      Left lower leg: No edema.   Skin:     General: Skin is warm and dry.      Coloration: Skin is not jaundiced.      Findings: No rash.   Neurological:      Mental Status: He is alert and oriented to person, place, and time.      Sensory: Sensation is intact.      Motor: Motor function is intact.      Coordination: Coordination is intact.      Gait: Gait is intact.      Deep Tendon Reflexes: Reflexes are normal and symmetric.   Psychiatric:         Mood and Affect: Mood and affect normal.         Behavior: Behavior normal.         Judgment: Judgment normal.       Physical Exam           Procedures    Results  Laboratory Studies  Blood count was good.    Assessment/Plan:     Diagnoses and all orders for this visit:    1. Type 2 diabetes mellitus with hyperglycemia, with long-term current use of insulin (Primary)  -     CBC Auto Differential; Standing  -     Comprehensive Metabolic Panel; Standing  -     Hemoglobin A1c; Standing  -     Lipid Panel; Standing  -     MicroAlbumin, Urine, Random - Urine, Clean Catch; Standing  -     Urinalysis With Culture If Indicated -; Standing  -     TSH; Standing    2. Hypothyroidism, unspecified type  -     levothyroxine (Synthroid) 50 MCG tablet; Take 1 tablet by mouth Daily.  Dispense: 90 tablet; Refill: 4    3. Essential hypertension  Comments:  unstalbe. adjusting lisinopril increase to 20mg am and 10mg at bedtime.  Orders:  -     lisinopril (PRINIVIL,ZESTRIL) 20 MG tablet; Take 1 tablet by mouth 2 (Two) Times a Day. 1 tab am and 1/2 at bedtime.  Dispense: 180 tablet; Refill: 4  -     metoprolol tartrate (LOPRESSOR) 25 MG tablet; Take 1 tablet by mouth 2 (Two)  Times a Day.  Dispense: 180 tablet; Refill: 4    4. Essential hypertension  -     lisinopril (PRINIVIL,ZESTRIL) 20 MG tablet; Take 1 tablet by mouth 2 (Two) Times a Day. 1 tab am and 1/2 at bedtime.  Dispense: 180 tablet; Refill: 4  -     metoprolol tartrate (LOPRESSOR) 25 MG tablet; Take 1 tablet by mouth 2 (Two) Times a Day.  Dispense: 180 tablet; Refill: 4    Other orders  -     metFORMIN ER (GLUCOPHAGE-XR) 500 MG 24 hr tablet; Take 1 tablet by mouth Daily With Breakfast.  Dispense: 90 tablet; Refill: 4  -     rosuvastatin (CRESTOR) 40 MG tablet; Take 0.5 tablets by mouth Daily.  Dispense: 90 tablet; Refill: 4          An After Visit Summary was printed and given to the patient at discharge.  Return in about 3 months (around 8/15/2024).       Assessment & Plan  1. Diabetes Mellitus.  A follow-up hemoglobin A1c test has been scheduled for 08/2024. Additionally, refills for his current medications have been provided.    I spent 32 minutes caring for Zachariah on this date of service. This time includes time spent by me in the following activities: preparing for the visit, reviewing tests, obtaining and/or reviewing a separately obtained history, performing a medically appropriate examination and/or evaluation, counseling and educating the patient/family/caregiver, ordering medications, tests, or procedures, documenting information in the medical record, independently interpreting results and communicating that information with the patient/family/caregiver, and care coordination     Amaya ESCALANTE 5/15/2024   Electronically signed.    Patient or patient representative verbalized consent for the use of Ambient Listening during the visit with  BORIS Pérez for chart documentation. 5/15/2024  13:40 CDT

## 2024-05-20 NOTE — PROGRESS NOTES
Subjective    Mr. Acosta is 83 y.o. male    Chief Complaint: BPH    History of Present Illness    83-year-old male established patient  follow-up for enlarged prostate and ED.  He is having some increasing obstructive LUTS on finasteride monotherapy.  He was found to have 1.7 cm left renal pelvis kidney stone on recent CT scans.  He denies flank pain or hematuria.  This appears to be more like 2 adjacent stones on  film in the CT reviewed by me with the patient today.  Kidneys otherwise normal without hydronephrosis or ureteral stone.  He is pleased with his Titan touch 3 piece inflatable penile implant placed 12/13/21.  He denies gross hematuria or flank pain.     I independently visualized and reviewed the patient's prior imaging studies today in clinic and discussed the imaging findings with the patient.      The following portions of the patient's history were reviewed and updated as appropriate: allergies, current medications, past family history, past medical history, past social history, past surgical history and problem list.    Review of Systems      Current Outpatient Medications:     acetaminophen (TYLENOL) 500 MG tablet, Take 1 tablet by mouth Every 6 (Six) Hours As Needed for Mild Pain., Disp: , Rfl:     aspirin 81 MG EC tablet, Take 1 tablet by mouth Daily., Disp: , Rfl:     Budeson-Glycopyrrol-Formoterol (Breztri Aerosphere) 160-9-4.8 MCG/ACT aerosol inhaler, Inhale 2 puffs 2 (Two) Times a Day., Disp: 3 each, Rfl: 4    ciclopirox (LOPROX) 0.77 % cream, Apply 1 Application topically to the appropriate area as directed 2 (Two) Times a Day. To under arms after using Clindamycin Solution, Disp: , Rfl:     clindamycin (CLEOCIN T) 1 % external solution, 1 application  2 (Two) Times a Day. To under arms, Disp: , Rfl:     Continuous Blood Gluc Sensor (Dexcom G7 Sensor) misc, 1 each Every 10 (Ten) Days., Disp: 9 each, Rfl: 3    Dupilumab 300 MG/2ML solution prefilled syringe, Inject  under the skin  into the appropriate area as directed Every 14 (Fourteen) Days., Disp: , Rfl:     ferrous sulfate 325 (65 FE) MG tablet, Take 1 tablet by mouth Daily With Breakfast., Disp: , Rfl:     finasteride (PROSCAR) 5 MG tablet, Take 1 tablet by mouth once daily, Disp: 90 tablet, Rfl: 0    fluticasone (FLONASE) 50 MCG/ACT nasal spray, 1 spray into the nostril(s) as directed by provider Daily., Disp: 16 g, Rfl: 5    gabapentin (NEURONTIN) 100 MG capsule, , Disp: , Rfl:     insulin glargine (LANTUS, SEMGLEE) 100 UNIT/ML injection, 15 units in the morning and 40 units at bedtime., Disp: 18 mL, Rfl: 2    levalbuterol (XOPENEX HFA) 45 MCG/ACT inhaler, Inhale 2 puffs Every 6 (Six) Hours As Needed for Wheezing., Disp: , Rfl:     levothyroxine (Synthroid) 50 MCG tablet, Take 1 tablet by mouth Daily., Disp: 90 tablet, Rfl: 4    lisinopril (PRINIVIL,ZESTRIL) 20 MG tablet, Take 1 tablet by mouth 2 (Two) Times a Day. 1 tab am and 1/2 at bedtime., Disp: 180 tablet, Rfl: 4    meclizine (ANTIVERT) 12.5 MG tablet, Take 1 tablet by mouth 3 (Three) Times a Day As Needed for Dizziness., Disp: 30 tablet, Rfl: 0    melatonin 5 MG tablet tablet, Take 1 tablet by mouth At Night As Needed., Disp: , Rfl:     Mesalamine 4 GM/60ML SF enema, Insert 1 application into the rectum Every Other Day., Disp: , Rfl:     metFORMIN ER (GLUCOPHAGE-XR) 500 MG 24 hr tablet, Take 1 tablet by mouth Daily With Breakfast., Disp: 90 tablet, Rfl: 4    metoprolol tartrate (LOPRESSOR) 25 MG tablet, Take 1 tablet by mouth 2 (Two) Times a Day., Disp: 180 tablet, Rfl: 4    naloxone (NARCAN) 4 MG/0.1ML nasal spray, 1 spray into the nostril(s) as directed by provider As Needed., Disp: , Rfl:     nitroglycerin (NITROSTAT) 0.4 MG SL tablet, Place 1 tablet under the tongue Every 5 (Five) Minutes As Needed for Chest Pain. Take no more than 3 doses in 15 minutes., Disp: , Rfl:     Pediatric Multiple Vit-C-FA (CHILDRENS CHEWABLE MULTI VITS PO), Take 1 tablet by mouth Daily., Disp: ,  Rfl:     rosuvastatin (CRESTOR) 40 MG tablet, Take 0.5 tablets by mouth Daily., Disp: 90 tablet, Rfl: 4    triamcinolone (KENALOG) 0.1 % cream, Apply 1 Application topically to the appropriate area as directed 2 (Two) Times a Day., Disp: , Rfl:     Ustekinumab (STELARA) 90 MG/ML solution prefilled syringe Injection, Inject 90 mg under the skin into the appropriate area as directed Every 28 (Twenty-Eight) Days., Disp: 1 mL, Rfl: 6    vitamin B-12 (CYANOCOBALAMIN) 1000 MCG tablet, Take 1 tablet by mouth Daily., Disp: , Rfl:     Vitamin D, Cholecalciferol, 25 MCG (1000 UT) capsule, Take 1 capsule by mouth Daily., Disp: , Rfl:     tamsulosin (FLOMAX) 0.4 MG capsule 24 hr capsule, Take 1 capsule by mouth Every Night., Disp: 90 capsule, Rfl: 3    Past Medical History:   Diagnosis Date    Asthma     Colitis     Coronary artery disease     Diabetes mellitus     Diverticulitis     Elevated cholesterol     HL (hearing loss) 2012    Hyperlipidemia     Hypertension     Inflammatory bowel disease 2001    Myocardial infarct     EASTER 2020    Primary central sleep apnea 2009    Using Cpap    Sleep apnea     cpap at     Sleep apnea, obstructive     Stroke     Visual impairment 2016    Double vision       Past Surgical History:   Procedure Laterality Date    ADENOIDECTOMY  1947    APPENDECTOMY N/A 05/05/2023    Procedure: APPENDECTOMY LAPAROSCOPIC;  Surgeon: Katherine Carranza MD;  Location: [facility] OR;  Service: General;  Laterality: N/A;    BACK SURGERY      CARDIAC CATHETERIZATION      stents x 6    CARDIAC SURGERY      CABG TRIPLE BYPASS 2012    CATARACT EXTRACTION      COLONOSCOPY      COLONOSCOPY N/A 06/04/2021    Procedure: COLONOSCOPY WITH ANESTHESIA;  Surgeon: Zachariah Menjivar DO;  Location: [facility] ENDOSCOPY;  Service: Gastroenterology;  Laterality: N/A;  pre hx ulcerative colitis  post ulcerative colitis  dr collins gusman    COLONOSCOPY N/A 11/29/2023    Procedure: COLONOSCOPY LOWER LIMITED;  Surgeon: Zachariah Menjivar  "DO;  Location:  [facility] ENDOSCOPY;  Service: Gastroenterology;  Laterality: N/A;  preop; hx of colitis  postop colitis - questionable colonic stricture at 30cm   pcp bryanna garrisontreet    COLOSTOMY      CORONARY ANGIOPLASTY WITH STENT PLACEMENT      CORONARY ARTERY BYPASS GRAFT  December 2012    CORONARY STENT PLACEMENT      HIP SURGERY Right     total hip    JOINT REPLACEMENT  2015    Right hip    PENILE PROSTHESIS IMPLANT N/A 12/13/2021    Procedure: 3-PIECE INFLATABLE PENILE PROSTHESIS PLACEMENT;  Surgeon: Jose Tellez MD;  Location: [facility] OR;  Service: Urology;  Laterality: N/A;    TONSILLECTOMY  1947       Social History     Socioeconomic History    Marital status:    Tobacco Use    Smoking status: Never     Passive exposure: Never    Smokeless tobacco: Never   Vaping Use    Vaping status: Never Used   Substance and Sexual Activity    Alcohol use: Never    Drug use: Never    Sexual activity: Not Currently     Partners: Female       Family History   Problem Relation Age of Onset    Colon cancer Brother     Colon polyps Neg Hx     Esophageal cancer Neg Hx        Objective    Temp 96.7 °F (35.9 °C)   Ht 175.3 cm (69\")   Wt 95.7 kg (211 lb)   BMI 31.16 kg/m²     Physical Exam        Results for orders placed or performed in visit on 06/05/24   POC Urinalysis Dipstick, Multipro    Specimen: Urine   Result Value Ref Range    Color Yellow Yellow, Straw, Dark Yellow, Vicky    Clarity, UA Clear Clear    Glucose, UA Negative Negative mg/dL    Bilirubin Negative Negative    Ketones, UA Negative Negative    Specific Gravity  1.020 1.005 - 1.030    Blood, UA Negative Negative    pH, Urine 6.5 5.0 - 8.0    Protein, POC Negative Negative mg/dL    Urobilinogen, UA Normal Normal, 0.2 E.U./dL    Nitrite, UA Negative Negative    Leukocytes Negative Negative     Assessment and Plan    Diagnoses and all orders for this visit:    1. BPH with obstruction/lower urinary tract symptoms (Primary)  -     POC Urinalysis " Dipstick, Multipro  -     tamsulosin (FLOMAX) 0.4 MG capsule 24 hr capsule; Take 1 capsule by mouth Every Night.  Dispense: 90 capsule; Refill: 3    2. Kidney stone  -     XR abdomen kub; Future      Worsening bothersome LUTS on finasteride-recommend adding trial of tamsulosin nightly.  We discussed options for stone management including observation, laser lithotripsy, or ESWL.  Given his coronary artery disease and aspirin requirement, we discussed it would likely be safer to have left ureteroscopy laser lithotripsy if/when he begins having pain or hematuria.  He will follow-up with me in November with pre-clinic KUB or sooner as needed.        This document has been signed by ANTHONY Tellez MD on June 5, 2024 14:26 CDT

## 2024-05-22 ENCOUNTER — OFFICE VISIT (OUTPATIENT)
Dept: WOUND CARE | Facility: HOSPITAL | Age: 84
End: 2024-05-22
Payer: MEDICARE

## 2024-05-22 DIAGNOSIS — N40.0 BENIGN PROSTATIC HYPERPLASIA WITHOUT LOWER URINARY TRACT SYMPTOMS: ICD-10-CM

## 2024-05-22 PROCEDURE — G0463 HOSPITAL OUTPT CLINIC VISIT: HCPCS

## 2024-05-22 RX ORDER — FINASTERIDE 5 MG/1
5 TABLET, FILM COATED ORAL DAILY
Qty: 90 TABLET | Refills: 0 | Status: SHIPPED | OUTPATIENT
Start: 2024-05-22

## 2024-05-24 RX ORDER — GABAPENTIN 100 MG/1
CAPSULE ORAL
COMMUNITY
Start: 2024-03-21

## 2024-05-24 RX ORDER — NALOXONE HYDROCHLORIDE 4 MG/.1ML
1 SPRAY NASAL AS NEEDED
COMMUNITY

## 2024-06-05 ENCOUNTER — OFFICE VISIT (OUTPATIENT)
Dept: UROLOGY | Facility: CLINIC | Age: 84
End: 2024-06-05
Payer: MEDICARE

## 2024-06-05 VITALS — WEIGHT: 211 LBS | BODY MASS INDEX: 31.25 KG/M2 | TEMPERATURE: 96.7 F | HEIGHT: 69 IN

## 2024-06-05 DIAGNOSIS — N20.0 KIDNEY STONE: ICD-10-CM

## 2024-06-05 DIAGNOSIS — N40.1 BPH WITH OBSTRUCTION/LOWER URINARY TRACT SYMPTOMS: Primary | ICD-10-CM

## 2024-06-05 DIAGNOSIS — N13.8 BPH WITH OBSTRUCTION/LOWER URINARY TRACT SYMPTOMS: Primary | ICD-10-CM

## 2024-06-05 LAB
BILIRUB BLD-MCNC: NEGATIVE MG/DL
CLARITY, POC: CLEAR
COLOR UR: YELLOW
GLUCOSE UR STRIP-MCNC: NEGATIVE MG/DL
KETONES UR QL: NEGATIVE
LEUKOCYTE EST, POC: NEGATIVE
NITRITE UR-MCNC: NEGATIVE MG/ML
PH UR: 6.5 [PH] (ref 5–8)
PROT UR STRIP-MCNC: NEGATIVE MG/DL
RBC # UR STRIP: NEGATIVE /UL
SP GR UR: 1.02 (ref 1–1.03)
UROBILINOGEN UR QL: NORMAL

## 2024-06-05 RX ORDER — TAMSULOSIN HYDROCHLORIDE 0.4 MG/1
1 CAPSULE ORAL NIGHTLY
Qty: 90 CAPSULE | Refills: 3 | Status: SHIPPED | OUTPATIENT
Start: 2024-06-05

## 2024-06-07 ENCOUNTER — OFFICE VISIT (OUTPATIENT)
Dept: PULMONOLOGY | Facility: CLINIC | Age: 84
End: 2024-06-07
Payer: MEDICARE

## 2024-06-07 VITALS
BODY MASS INDEX: 30.84 KG/M2 | HEIGHT: 69 IN | OXYGEN SATURATION: 97 % | SYSTOLIC BLOOD PRESSURE: 120 MMHG | WEIGHT: 208.2 LBS | HEART RATE: 72 BPM | DIASTOLIC BLOOD PRESSURE: 72 MMHG

## 2024-06-07 DIAGNOSIS — J45.20 MILD INTERMITTENT ASTHMA WITHOUT COMPLICATION: Primary | ICD-10-CM

## 2024-06-07 DIAGNOSIS — R91.1 PULMONARY NODULE SEEN ON IMAGING STUDY: ICD-10-CM

## 2024-06-07 DIAGNOSIS — G47.33 OSA ON CPAP: ICD-10-CM

## 2024-06-07 DIAGNOSIS — J98.4 PULMONARY SCARRING: ICD-10-CM

## 2024-06-07 DIAGNOSIS — J30.89 NON-SEASONAL ALLERGIC RHINITIS, UNSPECIFIED TRIGGER: ICD-10-CM

## 2024-06-07 DIAGNOSIS — E66.9 OBESITY (BMI 30-39.9): ICD-10-CM

## 2024-06-07 DIAGNOSIS — J44.9 CHRONIC OBSTRUCTIVE PULMONARY DISEASE, UNSPECIFIED COPD TYPE: ICD-10-CM

## 2024-06-07 DIAGNOSIS — Z78.9 NON-SMOKER: ICD-10-CM

## 2024-06-07 PROCEDURE — 99214 OFFICE O/P EST MOD 30 MIN: CPT | Performed by: INTERNAL MEDICINE

## 2024-06-07 PROCEDURE — 3074F SYST BP LT 130 MM HG: CPT | Performed by: INTERNAL MEDICINE

## 2024-06-07 PROCEDURE — 3078F DIAST BP <80 MM HG: CPT | Performed by: INTERNAL MEDICINE

## 2024-06-07 NOTE — PROGRESS NOTES
[facility] OUTPATIENT PROGRESS NOTE    Patient: Zachariah Acosta  : 1940  Age: 83 y.o.  Date of Service: 2024    REASON FOR CLINIC VISIT:  Chief Complaint   Patient presents with    Asthma       Subjective:    History of Present Illness:  Zachariah Acosta is a 83 y.o. male who presents to the office today to be seen for    Diagnosis Plan   1. Mild intermittent asthma without complication        2. Pulmonary nodule seen on imaging study        3. Pulmonary scarring        4. Chronic obstructive pulmonary disease, unspecified COPD type        5. BREANN on CPAP        6. Non-smoker        7. Non-seasonal allergic rhinitis, unspecified trigger        8. Obesity (BMI 30-39.9)        .  Other problems per record.    History:    Patient is a very pleasant elderly  gentleman who was seen for a follow-up visit in pulmonary clinic today.  He attended a clinic with his wife.  He usually comes for follow-up once a year.    Patient is a lifelong non-smoker and has history of mild asthma and his symptoms are well-controlled with Xopenex rescue med as needed.  He used Breztri in the past but stopped using it and not requiring any inhalers lately.  He did not need any medication refills.  The patient also had ulcerative colitis and had a colectomy and done with a colostomy placement in the [facility] few months ago.  He is using Dupixent and also using Stelara which is immunosuppressant for his rosacea.    Patient has nasal allergy and currently using fluticasone nasal spray and still having some nasal drainage and Claritin was added.  The patient has obstructive sleep apnea and he uses CPAP at night for many years without any difficulty and he feels very well with the CPAP usage and is very compliant with that.  He is overall stable and did not have any other acute complaints.  He had history of asthma and COPD overlap and probably his Dupixent is also helping his  respiratory issues.  He had no recent imaging studies done or no recent pulmonary function test done.  He is currently using fluticasone nasal spray for nasal allergy but not using any Claritin routinely.  He is up-to-date on vaccinations.    PFT done today:  Not done today    PFT Values          2023    10:30   Pre Drug PFT Results   FVC 94   FEV1 90   FEF 25-75% 80   FEV1/FVC 71   Other Tests PFT Results   TLC 94      DLCO 82   D/VAsb 86     Results for orders placed in visit on 23    Pulmonary Function Test    Narrative  Pulmonary Function Test    Performed by: Cherie Walsh, RRT  Authorized by: Jacob Mejias MD  Pre Drug % Predicted  FVC: 94%  FEV1: 90%  FEF 25-75%: 80%  FEV1/FVC: 71%  T%  RV: 113%  DLCO: 82%  D/VAsb: 86%    Interpretation  Overall comments:  Above test results are acceptable and repeatable by ATS criteria  From analysis of the above test results the patient showed evidence of mild to moderate obstructive airway dysfunction.  Diffusion capacity corrected for alveolar volume is mildly decreased.  Obstructive dysfunction is confirmed by decrease in FEV1 and FEF 25 to 75%.  There was also increases pleural volume suggesting air trapping supporting obstructive dysfunction.  No bronchodilator challenge was done.    In comparison with the prior test results patient showed evidence of mild obstructive airway dysfunction which was not seen in the prior test done a few years ago.  Clinical correlation is indicated.    Jacob Mejias MD  Pulmonologist/Intensivist  2023 14:17 CST      Results for orders placed in visit on 21    Pulmonary Function Test    Narrative  [facility] - Pulmonary Function Test    [address] [place] [address]  [place]  [address]  [phone]    Patient : Zachariah Acosta  MRN : [id number]  CSN : [id number]  Pulmonologist : Gilberto Benntet MD  Date :  4/15/2021    ______________________________________________________________________    Interpretation :  1.  Spirometry is within normal limits.  2.  Lung volumes are within normal limits.  3.  There is a mild diffusion impairment even when corrected for alveolar volume.      Gilberto Bennett MD         Bronchodilator therapy: Stopped using Breztri . Uses Xopenox rescue inhaler    Smoking Status:   Social History     Tobacco Use   Smoking Status Never    Passive exposure: Never   Smokeless Tobacco Never     Pulm Rehab: no  Sleep: yes    Support System: lives with their spouse    Code Status:   There are no questions and answers to display.        Review of Systems:  A complete review of systems is performed and all other systems were reviewed and negative as note above in the HPI.  Review of Systems   Constitutional: Negative.  Negative for fatigue.   HENT:  Positive for congestion, postnasal drip and sinus pressure.    Eyes: Negative.    Respiratory:  Negative for chest tightness and shortness of breath.    Cardiovascular: Negative.    Gastrointestinal: Negative.  Negative for constipation and diarrhea.        Had a colostomy placement done for ulcerative colitis.   Endocrine: Negative.    Genitourinary: Negative.    Musculoskeletal:  Positive for arthralgias and back pain.   Skin: Negative.    Allergic/Immunologic: Positive for environmental allergies.   Neurological: Negative.    Hematological: Negative.    Psychiatric/Behavioral: Negative.         CAT/ACT Score:  Not done today    Medications:  Outpatient Encounter Medications as of 6/7/2024   Medication Sig Dispense Refill    acetaminophen (TYLENOL) 500 MG tablet Take 1 tablet by mouth Every 6 (Six) Hours As Needed for Mild Pain.      aspirin 81 MG EC tablet Take 1 tablet by mouth Daily.      Budeson-Glycopyrrol-Formoterol (Breztri Aerosphere) 160-9-4.8 MCG/ACT aerosol inhaler Inhale 2 puffs 2 (Two) Times a Day. 3 each 4    ciclopirox (LOPROX) 0.77 % cream  Apply 1 Application topically to the appropriate area as directed 2 (Two) Times a Day. To under arms after using Clindamycin Solution      clindamycin (CLEOCIN T) 1 % external solution 1 application  2 (Two) Times a Day. To under arms      Continuous Blood Gluc Sensor (Dexcom G7 Sensor) misc 1 each Every 10 (Ten) Days. 9 each 3    Dupilumab 300 MG/2ML solution prefilled syringe Inject  under the skin into the appropriate area as directed Every 14 (Fourteen) Days.      ferrous sulfate 325 (65 FE) MG tablet Take 1 tablet by mouth Daily With Breakfast.      finasteride (PROSCAR) 5 MG tablet Take 1 tablet by mouth once daily 90 tablet 0    fluticasone (FLONASE) 50 MCG/ACT nasal spray 1 spray into the nostril(s) as directed by provider Daily. 16 g 5    gabapentin (NEURONTIN) 100 MG capsule       insulin glargine (LANTUS, SEMGLEE) 100 UNIT/ML injection 15 units in the morning and 40 units at bedtime. 18 mL 2    levalbuterol (XOPENEX HFA) 45 MCG/ACT inhaler Inhale 2 puffs Every 6 (Six) Hours As Needed for Wheezing.      levothyroxine (Synthroid) 50 MCG tablet Take 1 tablet by mouth Daily. 90 tablet 4    lisinopril (PRINIVIL,ZESTRIL) 20 MG tablet Take 1 tablet by mouth 2 (Two) Times a Day. 1 tab am and 1/2 at bedtime. 180 tablet 4    meclizine (ANTIVERT) 12.5 MG tablet Take 1 tablet by mouth 3 (Three) Times a Day As Needed for Dizziness. 30 tablet 0    melatonin 5 MG tablet tablet Take 1 tablet by mouth At Night As Needed.      Mesalamine 4 GM/60ML SF enema Insert 1 application into the rectum Every Other Day.      metFORMIN ER (GLUCOPHAGE-XR) 500 MG 24 hr tablet Take 1 tablet by mouth Daily With Breakfast. 90 tablet 4    metoprolol tartrate (LOPRESSOR) 25 MG tablet Take 1 tablet by mouth 2 (Two) Times a Day. 180 tablet 4    mupirocin (BACTROBAN) 2 % ointment Apply 1 Application topically to the appropriate area as directed Daily.      naloxone (NARCAN) 4 MG/0.1ML nasal spray 1 spray into the nostril(s) as directed by  "provider As Needed.      nitroglycerin (NITROSTAT) 0.4 MG SL tablet Place 1 tablet under the tongue Every 5 (Five) Minutes As Needed for Chest Pain. Take no more than 3 doses in 15 minutes.      Pediatric Multiple Vit-C-FA (CHILDRENS CHEWABLE MULTI VITS PO) Take 1 tablet by mouth Daily.      rosuvastatin (CRESTOR) 40 MG tablet Take 0.5 tablets by mouth Daily. 90 tablet 4    tamsulosin (FLOMAX) 0.4 MG capsule 24 hr capsule Take 1 capsule by mouth Every Night. 90 capsule 3    triamcinolone (KENALOG) 0.1 % cream Apply 1 Application topically to the appropriate area as directed 2 (Two) Times a Day.      Ustekinumab (STELARA) 90 MG/ML solution prefilled syringe Injection Inject 90 mg under the skin into the appropriate area as directed Every 28 (Twenty-Eight) Days. 1 mL 6    vitamin B-12 (CYANOCOBALAMIN) 1000 MCG tablet Take 1 tablet by mouth Daily.      Vitamin D, Cholecalciferol, 25 MCG (1000 UT) capsule Take 1 capsule by mouth Daily.       No facility-administered encounter medications on file as of 6/7/2024.       Allergies:  Allergies   Allergen Reactions    Albuterol Other (See Comments)     \"Makes my throat spasm and swell\"    Adhesive Tape Rash    Azithromycin Rash    Fentanyl Nausea And Vomiting       Immunizations:  Immunization History   Administered Date(s) Administered    COVID-19 (Geni) 04/12/2021    Flu Vaccine Quad PF >36MO 11/13/2017    Fluzone (or Fluarix & Flulaval for VFC) >6mos 10/25/2019, 09/17/2020, 10/21/2021    Fluzone High Dose =>65 Years ([facility] ONLY) 11/01/2020    Fluzone High-Dose 65+yrs 10/24/2022, 10/16/2023    H1N1 All Forms 01/05/2010    Influenza Quad Vaccine (Inpatient) 09/04/2018    Influenza TIV (IM) 11/16/2016, 10/26/2018    Influenza, Unspecified 10/09/1996, 10/08/1997, 10/30/2006, 09/27/2007, 10/20/2008, 10/20/2009, 11/01/2010, 11/01/2011, 11/01/2012, 10/12/2015, 11/01/2015, 11/16/2016, 11/13/2017, 09/04/2018, 10/26/2018, 10/25/2019, 09/17/2020, 10/21/2021, 10/01/2022    " "Pneumococcal Conjugate 13-Valent (PCV13) 02/01/2013, 11/11/2013, 04/01/2019    Pneumococcal Polysaccharide (PPSV23) 11/11/2013, 08/10/2020    Pneumococcal, Unspecified 10/30/2006    Tdap 10/30/2006, 07/01/2011       Objective:    Vitals:  /72   Pulse 72   Ht 175.3 cm (69\")   Wt 94.4 kg (208 lb 3.2 oz)   SpO2 97% Comment: RA  BMI 30.75 kg/m²     Physical Exam:  General: Patient is a 83 y.o. pleasant elderly  male. Looks stated age. Appears to be in no acute distress.  Eyes: EOMI. PERRLA. Vision intact. No scleral icterus.  Ear, Nose, Mouth and Throat: Hearing is grossly intact. No Leukoplakia, pharyngitis, stomatitis or thrush. Swollen nasal mucosa with post nasal drop.  Neck: Range of motion of neck normal. No thyromegaly or masses. Mallampati Class 3  Respiratory: Clear to auscultation bilaterally. No use of accessory muscles. Decreased breath sounds.  Cardiovascular: Normal heart sounds. Regularly regular rhythm without murmur.  Gastrointestinal: Non tender, non distended, soft. Bowel sounds positive in all four quadrants. No organomegaly.  Skin: No obvious rashes, lesions, ulcers or large amount of bruising. No edema.   Neurological: No new motor deficits. Cranial nerves appear intact.  Psychiatric: Patient is alert and oriented to person, place and time.    Chest Imaging:      X-ray AP chest portable  Order: [id number] [place]   Impression    1.  Retrocardiac consolidation, which may represent pneumonia in the setting of leukocytosis.  2.  Left lung base subsegmental atelectasis.  3.  Unchanged slight rightward deviation of the upper trachea likely secondary to thyroid nodule on prior CT 11/21/2023. This can be further assessed on an outpatient basis with nonemergent thyroid sonogram if not already performed.    Electronically signed by Neeraj Meonn MD on 3/23/2024 10:24 AM    Akila BARBOUR MD, have reviewed the images and verify the above interpretation on 3/23/2024 10:55 " AM.    Electronically signed by  Akila Campuzano MD on 3/23/2024 10:55 AM      Assessment:  1. Mild intermittent asthma without complication    2. Pulmonary nodule seen on imaging study    3. Pulmonary scarring    4. Chronic obstructive pulmonary disease, unspecified COPD type    5. BREANN on CPAP    6. Non-smoker    7. Non-seasonal allergic rhinitis, unspecified trigger    8. Obesity (BMI 30-39.9)        Plan/Recommendations:    1.  Patient had a chest x-ray done in [place] in March 2024 when he had the colon surgery which showed some retrocardiac consolidation and left lung atelectasis and effusion of the upper trachea likely from thyroid nodule.  A follow-up CT scan of the chest done again next year's visit.  2.  His last pulmonary function test shows mild to moderate obstructive airway dysfunction.  He was using Breztri from pulmonary and was also using albuterol at this point but currently not requiring Breztri but not using albuterol rescue inhaler.  3.  He is currently taking loratadine and fluticasone nasal spray for better control of allergy symptoms.  He is currently using Stelara and also using Dupixent and mesalamine which will be continued.  He is not on any oxygen or CPAP  For.  He is up-to-date on his vaccinations.    4.  He will continue his follow-up with the primary care provider and I advised him to return to pulmonary clinic for a follow-up visit in a year time with a PFT and a CT scan of the chest.    Follow up:  12 Months    Time Spent:  30 minutes    I appreciate the opportunity of participating in this patient's care. I would like to thank the PCP for the referral.  Please feel free to contact me with any other questions.    Jacob Mejias MD   Pulmonologist/Intensivist     Electronically signed by: Jacob Mejias MD, 6/7/2024 11:15 CDT

## 2024-06-12 ENCOUNTER — OFFICE VISIT (OUTPATIENT)
Dept: WOUND CARE | Facility: HOSPITAL | Age: 84
End: 2024-06-12
Payer: MEDICARE

## 2024-06-12 PROCEDURE — G0463 HOSPITAL OUTPT CLINIC VISIT: HCPCS

## 2024-06-26 ENCOUNTER — OFFICE VISIT (OUTPATIENT)
Dept: WOUND CARE | Facility: HOSPITAL | Age: 84
End: 2024-06-26
Payer: MEDICARE

## 2024-06-26 PROCEDURE — G0463 HOSPITAL OUTPT CLINIC VISIT: HCPCS

## 2024-07-01 DIAGNOSIS — I10 ESSENTIAL HYPERTENSION: Chronic | ICD-10-CM

## 2024-07-01 RX ORDER — LISINOPRIL 20 MG/1
TABLET ORAL
Qty: 180 TABLET | Refills: 0 | Status: SHIPPED | OUTPATIENT
Start: 2024-07-01

## 2024-07-16 ENCOUNTER — OFFICE VISIT (OUTPATIENT)
Dept: WOUND CARE | Facility: HOSPITAL | Age: 84
End: 2024-07-16
Payer: MEDICARE

## 2024-07-16 PROCEDURE — G0463 HOSPITAL OUTPT CLINIC VISIT: HCPCS

## 2024-08-01 ENCOUNTER — OFFICE VISIT (OUTPATIENT)
Dept: GASTROENTEROLOGY | Age: 84
End: 2024-08-01
Payer: MEDICARE

## 2024-08-01 VITALS
SYSTOLIC BLOOD PRESSURE: 130 MMHG | DIASTOLIC BLOOD PRESSURE: 70 MMHG | WEIGHT: 214 LBS | HEART RATE: 67 BPM | BODY MASS INDEX: 31.7 KG/M2 | OXYGEN SATURATION: 97 % | HEIGHT: 69 IN

## 2024-08-01 DIAGNOSIS — K51.312 ULCERATIVE RECTOSIGMOIDITIS, WITH INTESTINAL OBSTRUCTION (HCC): Primary | ICD-10-CM

## 2024-08-01 PROCEDURE — 1036F TOBACCO NON-USER: CPT | Performed by: NURSE PRACTITIONER

## 2024-08-01 PROCEDURE — G8417 CALC BMI ABV UP PARAM F/U: HCPCS | Performed by: NURSE PRACTITIONER

## 2024-08-01 PROCEDURE — 99213 OFFICE O/P EST LOW 20 MIN: CPT | Performed by: NURSE PRACTITIONER

## 2024-08-01 PROCEDURE — 1123F ACP DISCUSS/DSCN MKR DOCD: CPT | Performed by: NURSE PRACTITIONER

## 2024-08-01 PROCEDURE — G8427 DOCREV CUR MEDS BY ELIG CLIN: HCPCS | Performed by: NURSE PRACTITIONER

## 2024-08-01 ASSESSMENT — ENCOUNTER SYMPTOMS
VOMITING: 0
BLOOD IN STOOL: 0
CHOKING: 0
ANAL BLEEDING: 0
NAUSEA: 0
COUGH: 0
ABDOMINAL DISTENTION: 0
TROUBLE SWALLOWING: 0
DIARRHEA: 0
SHORTNESS OF BREATH: 0
CONSTIPATION: 0
ABDOMINAL PAIN: 0
RECTAL PAIN: 0

## 2024-08-01 NOTE — PROGRESS NOTES
mouth 2 times daily 60 tablet 0    fluticasone (FLONASE) 50 MCG/ACT nasal spray 1 spray by Nasal route daily 16 g 0    dupilumab (DUPIXENT) 300 MG/2ML SOSY injection Inject 2 mLs into the skin every 14 days 2 each 0    triamcinolone (KENALOG) 0.1 % cream Apply topically 2 times daily. 1 each 0    ustekinumab (STELARA) 90 MG/ML SOSY prefilled syringe Inject 1 mL into the skin every 28 days 1 mL 0    ferrous sulfate (IRON 325) 325 (65 Fe) MG tablet Take 1 tablet by mouth daily (with breakfast) 30 tablet 0    melatonin 5 MG TBDP disintegrating tablet Take 1 tablet by mouth nightly 30 tablet 0    finasteride (PROSCAR) 5 MG tablet Take 1 tablet by mouth daily 30 tablet 0    mesalamine (ROWASA) 4 g enema Place 60 mLs rectally every other day 15 enema 0    Cholecalciferol (VITAMIN D) 25 MCG TABS Take 1 tablet by mouth daily 30 tablet 0    Continuous Blood Gluc Sensor (DEXCOM G6 SENSOR) MISC by Does not apply route Every 10 days should be changed out      Continuous Blood Gluc Transmit (DEXCOM G6 TRANSMITTER) MISC by Does not apply route Every 3 months as directed       No current facility-administered medications for this visit.       Allergies   Allergen Reactions    Albuterol Anaphylaxis and Other (See Comments)     Other reaction(s): Chest Pain, Chest tightness, Edema of oral soft tissues    Adhesive Tape Rash    Azithromycin Rash    Fentanyl Nausea And Vomiting       Review of Systems   Constitutional:  Negative for activity change, appetite change, fatigue, fever and unexpected weight change.   HENT:  Negative for trouble swallowing.    Respiratory:  Negative for cough, choking and shortness of breath.    Cardiovascular:  Negative for chest pain.   Gastrointestinal:  Negative for abdominal distention, abdominal pain, anal bleeding, blood in stool, constipation, diarrhea, nausea, rectal pain and vomiting.   Allergic/Immunologic: Negative for food allergies.   All other systems reviewed and are

## 2024-08-07 ENCOUNTER — LAB (OUTPATIENT)
Dept: LAB | Facility: HOSPITAL | Age: 84
End: 2024-08-07
Payer: MEDICARE

## 2024-08-07 DIAGNOSIS — Z79.4 TYPE 2 DIABETES MELLITUS WITH HYPERGLYCEMIA, WITH LONG-TERM CURRENT USE OF INSULIN: ICD-10-CM

## 2024-08-07 DIAGNOSIS — E11.65 TYPE 2 DIABETES MELLITUS WITH HYPERGLYCEMIA, WITH LONG-TERM CURRENT USE OF INSULIN: ICD-10-CM

## 2024-08-07 LAB
ALBUMIN SERPL-MCNC: 3.9 G/DL (ref 3.5–5.2)
ALBUMIN UR-MCNC: 2.6 MG/DL
ALBUMIN/GLOB SERPL: 1.3 G/DL
ALP SERPL-CCNC: 82 U/L (ref 39–117)
ALT SERPL W P-5'-P-CCNC: 15 U/L (ref 1–41)
ANION GAP SERPL CALCULATED.3IONS-SCNC: 7 MMOL/L (ref 5–15)
AST SERPL-CCNC: 18 U/L (ref 1–40)
BASOPHILS # BLD AUTO: 0.08 10*3/MM3 (ref 0–0.2)
BASOPHILS NFR BLD AUTO: 1 % (ref 0–1.5)
BILIRUB SERPL-MCNC: 0.4 MG/DL (ref 0–1.2)
BILIRUB UR QL STRIP: NEGATIVE
BUN SERPL-MCNC: 15 MG/DL (ref 8–23)
BUN/CREAT SERPL: 17.4 (ref 7–25)
CALCIUM SPEC-SCNC: 8.6 MG/DL (ref 8.6–10.5)
CHLORIDE SERPL-SCNC: 105 MMOL/L (ref 98–107)
CHOLEST SERPL-MCNC: 113 MG/DL (ref 0–200)
CLARITY UR: CLEAR
CO2 SERPL-SCNC: 27 MMOL/L (ref 22–29)
COLOR UR: YELLOW
CREAT SERPL-MCNC: 0.86 MG/DL (ref 0.76–1.27)
DEPRECATED RDW RBC AUTO: 46.8 FL (ref 37–54)
EGFRCR SERPLBLD CKD-EPI 2021: 87 ML/MIN/1.73
EOSINOPHIL # BLD AUTO: 1.17 10*3/MM3 (ref 0–0.4)
EOSINOPHIL NFR BLD AUTO: 15 % (ref 0.3–6.2)
ERYTHROCYTE [DISTWIDTH] IN BLOOD BY AUTOMATED COUNT: 14 % (ref 12.3–15.4)
GLOBULIN UR ELPH-MCNC: 3.1 GM/DL
GLUCOSE SERPL-MCNC: 116 MG/DL (ref 65–99)
GLUCOSE UR STRIP-MCNC: NEGATIVE MG/DL
HBA1C MFR BLD: 6.9 % (ref 4.8–5.6)
HCT VFR BLD AUTO: 43.9 % (ref 37.5–51)
HDLC SERPL-MCNC: 39 MG/DL (ref 40–60)
HGB BLD-MCNC: 14.7 G/DL (ref 13–17.7)
HGB UR QL STRIP.AUTO: NEGATIVE
IMM GRANULOCYTES # BLD AUTO: 0.04 10*3/MM3 (ref 0–0.05)
IMM GRANULOCYTES NFR BLD AUTO: 0.5 % (ref 0–0.5)
KETONES UR QL STRIP: NEGATIVE
LDLC SERPL CALC-MCNC: 50 MG/DL (ref 0–100)
LDLC/HDLC SERPL: 1.21 {RATIO}
LEUKOCYTE ESTERASE UR QL STRIP.AUTO: NEGATIVE
LYMPHOCYTES # BLD AUTO: 1.38 10*3/MM3 (ref 0.7–3.1)
LYMPHOCYTES NFR BLD AUTO: 17.7 % (ref 19.6–45.3)
MCH RBC QN AUTO: 30.5 PG (ref 26.6–33)
MCHC RBC AUTO-ENTMCNC: 33.5 G/DL (ref 31.5–35.7)
MCV RBC AUTO: 91.1 FL (ref 79–97)
MONOCYTES # BLD AUTO: 0.58 10*3/MM3 (ref 0.1–0.9)
MONOCYTES NFR BLD AUTO: 7.5 % (ref 5–12)
NEUTROPHILS NFR BLD AUTO: 4.53 10*3/MM3 (ref 1.7–7)
NEUTROPHILS NFR BLD AUTO: 58.3 % (ref 42.7–76)
NITRITE UR QL STRIP: NEGATIVE
NRBC BLD AUTO-RTO: 0 /100 WBC (ref 0–0.2)
PH UR STRIP.AUTO: 6 [PH] (ref 5–8)
PLATELET # BLD AUTO: 228 10*3/MM3 (ref 140–450)
PMV BLD AUTO: 9.7 FL (ref 6–12)
POTASSIUM SERPL-SCNC: 4.3 MMOL/L (ref 3.5–5.2)
PROT SERPL-MCNC: 7 G/DL (ref 6–8.5)
PROT UR QL STRIP: NEGATIVE
RBC # BLD AUTO: 4.82 10*6/MM3 (ref 4.14–5.8)
SODIUM SERPL-SCNC: 139 MMOL/L (ref 136–145)
SP GR UR STRIP: 1.02 (ref 1–1.03)
TRIGL SERPL-MCNC: 134 MG/DL (ref 0–150)
TSH SERPL DL<=0.05 MIU/L-ACNC: 2.93 UIU/ML (ref 0.27–4.2)
UROBILINOGEN UR QL STRIP: NORMAL
VLDLC SERPL-MCNC: 24 MG/DL (ref 5–40)
WBC NRBC COR # BLD AUTO: 7.78 10*3/MM3 (ref 3.4–10.8)

## 2024-08-07 PROCEDURE — 80053 COMPREHEN METABOLIC PANEL: CPT

## 2024-08-07 PROCEDURE — 85025 COMPLETE CBC W/AUTO DIFF WBC: CPT

## 2024-08-07 PROCEDURE — 83036 HEMOGLOBIN GLYCOSYLATED A1C: CPT

## 2024-08-07 PROCEDURE — 84443 ASSAY THYROID STIM HORMONE: CPT

## 2024-08-07 PROCEDURE — 82043 UR ALBUMIN QUANTITATIVE: CPT

## 2024-08-07 PROCEDURE — 80061 LIPID PANEL: CPT

## 2024-08-07 PROCEDURE — 81003 URINALYSIS AUTO W/O SCOPE: CPT

## 2024-08-07 PROCEDURE — 36415 COLL VENOUS BLD VENIPUNCTURE: CPT

## 2024-08-13 ENCOUNTER — OFFICE VISIT (OUTPATIENT)
Dept: CARDIOLOGY | Facility: CLINIC | Age: 84
End: 2024-08-13
Payer: MEDICARE

## 2024-08-13 VITALS
HEART RATE: 67 BPM | HEIGHT: 69 IN | SYSTOLIC BLOOD PRESSURE: 118 MMHG | DIASTOLIC BLOOD PRESSURE: 72 MMHG | OXYGEN SATURATION: 98 % | WEIGHT: 218 LBS | BODY MASS INDEX: 32.29 KG/M2

## 2024-08-13 DIAGNOSIS — I10 ESSENTIAL HYPERTENSION: ICD-10-CM

## 2024-08-13 DIAGNOSIS — E78.2 MIXED HYPERLIPIDEMIA: ICD-10-CM

## 2024-08-13 DIAGNOSIS — I25.810 CORONARY ARTERY DISEASE INVOLVING CORONARY BYPASS GRAFT OF NATIVE HEART WITHOUT ANGINA PECTORIS: Primary | ICD-10-CM

## 2024-08-13 PROCEDURE — 1160F RVW MEDS BY RX/DR IN RCRD: CPT | Performed by: NURSE PRACTITIONER

## 2024-08-13 PROCEDURE — 99214 OFFICE O/P EST MOD 30 MIN: CPT | Performed by: NURSE PRACTITIONER

## 2024-08-13 PROCEDURE — 93000 ELECTROCARDIOGRAM COMPLETE: CPT | Performed by: NURSE PRACTITIONER

## 2024-08-13 PROCEDURE — 3074F SYST BP LT 130 MM HG: CPT | Performed by: NURSE PRACTITIONER

## 2024-08-13 PROCEDURE — 1159F MED LIST DOCD IN RCRD: CPT | Performed by: NURSE PRACTITIONER

## 2024-08-13 PROCEDURE — 3078F DIAST BP <80 MM HG: CPT | Performed by: NURSE PRACTITIONER

## 2024-08-13 RX ORDER — NITROGLYCERIN 0.4 MG/1
0.4 TABLET SUBLINGUAL
Qty: 25 TABLET | Refills: 1 | Status: SHIPPED | OUTPATIENT
Start: 2024-08-13

## 2024-08-13 NOTE — PROGRESS NOTES
Subjective:     Encounter Date: 8/13/2024      Patient ID: Zachariah Acosta is a 83 y.o. male.    Chief Complaint: Follow-up coronary disease    History of Present Illness    Mr. Acosta has coronary disease including CABG in 2012, and an MI in 04/2020 that required PCI x6. After initially transitioning care to us, there was concern regarding potential GI bleed, so he was instructed to stop Plavix, but to continue aspirin.  Late 2021 he did have a nuclear stress test to investigate shortness of breath, which was low risk for ischemia.  He followed up here July 2023 and he was doing well.  Breathing was stable.  He was not having any chest pain.  He was following with pulmonology for his lung disease.  He stated he had always been short of breath but it was not worsening.  No changes were made in medical therapy at that time.    Today the patient presents for follow-up and states he has had some abdominal/GI issues since his last visit here.  He states due to diverticulitis and ulcerative colitis scar tissue formed and he ended up having to have a section of his colon removed.  He now has a colostomy.  He states at this point he feels much better.  He has chronic exertional dyspnea which is stable and not worsening.  He denies any chest pain, orthopnea, PND, edema, palpitations, syncope or presyncope.  He is compliant with all of his medications.  His blood pressure is well-controlled.  Overall he feels well.    The following portions of the patient's history were reviewed and updated as appropriate: allergies, current medications, past family history, past medical history, past social history, past surgical history, and problem list.    Review of Systems   Constitutional: Negative for malaise/fatigue.   Cardiovascular:  Positive for dyspnea on exertion. Negative for chest pain, claudication, leg swelling, near-syncope, orthopnea, palpitations, paroxysmal nocturnal dyspnea and syncope.   Respiratory:   Negative for cough.    Hematologic/Lymphatic: Does not bruise/bleed easily.   Musculoskeletal:  Negative for falls.   Gastrointestinal:  Negative for bloating.   Neurological:  Negative for dizziness, light-headedness and weakness.           Current Outpatient Medications:     acetaminophen (TYLENOL) 500 MG tablet, Take 1 tablet by mouth Every 6 (Six) Hours As Needed for Mild Pain., Disp: , Rfl:     aspirin 81 MG EC tablet, Take 1 tablet by mouth Daily., Disp: , Rfl:     Budeson-Glycopyrrol-Formoterol (Breztri Aerosphere) 160-9-4.8 MCG/ACT aerosol inhaler, Inhale 2 puffs 2 (Two) Times a Day., Disp: 3 each, Rfl: 4    ciclopirox (LOPROX) 0.77 % cream, Apply 1 Application topically to the appropriate area as directed 2 (Two) Times a Day. To under arms after using Clindamycin Solution, Disp: , Rfl:     clindamycin (CLEOCIN T) 1 % external solution, 1 application  2 (Two) Times a Day. To under arms, Disp: , Rfl:     Continuous Blood Gluc Sensor (Dexcom G7 Sensor) misc, 1 each Every 10 (Ten) Days., Disp: 9 each, Rfl: 3    Dupilumab 300 MG/2ML solution prefilled syringe, Inject  under the skin into the appropriate area as directed Every 14 (Fourteen) Days., Disp: , Rfl:     ferrous sulfate 325 (65 FE) MG tablet, Take 1 tablet by mouth Daily With Breakfast., Disp: , Rfl:     finasteride (PROSCAR) 5 MG tablet, Take 1 tablet by mouth once daily, Disp: 90 tablet, Rfl: 0    fluticasone (FLONASE) 50 MCG/ACT nasal spray, 1 spray into the nostril(s) as directed by provider Daily., Disp: 16 g, Rfl: 5    gabapentin (NEURONTIN) 100 MG capsule, , Disp: , Rfl:     insulin glargine (LANTUS, SEMGLEE) 100 UNIT/ML injection, 15 units in the morning and 40 units at bedtime., Disp: 18 mL, Rfl: 2    levalbuterol (XOPENEX HFA) 45 MCG/ACT inhaler, Inhale 2 puffs Every 6 (Six) Hours As Needed for Wheezing., Disp: , Rfl:     levothyroxine (Synthroid) 50 MCG tablet, Take 1 tablet by mouth Daily., Disp: 90 tablet, Rfl: 4    lisinopril  (PRINIVIL,ZESTRIL) 20 MG tablet, TAKE 1 TABLET BY MOUTH IN THE MORNING AND 1/2 (ONE-HALF) AT BEDTIME, Disp: 180 tablet, Rfl: 0    meclizine (ANTIVERT) 12.5 MG tablet, Take 1 tablet by mouth 3 (Three) Times a Day As Needed for Dizziness., Disp: 30 tablet, Rfl: 0    melatonin 5 MG tablet tablet, Take 1 tablet by mouth At Night As Needed., Disp: , Rfl:     metFORMIN ER (GLUCOPHAGE-XR) 500 MG 24 hr tablet, Take 1 tablet by mouth Daily With Breakfast., Disp: 90 tablet, Rfl: 4    metoprolol tartrate (LOPRESSOR) 25 MG tablet, Take 1 tablet by mouth 2 (Two) Times a Day., Disp: 180 tablet, Rfl: 4    mupirocin (BACTROBAN) 2 % ointment, Apply 1 Application topically to the appropriate area as directed Daily., Disp: , Rfl:     naloxone (NARCAN) 4 MG/0.1ML nasal spray, 1 spray into the nostril(s) as directed by provider As Needed., Disp: , Rfl:     nitroglycerin (NITROSTAT) 0.4 MG SL tablet, Place 1 tablet under the tongue Every 5 (Five) Minutes As Needed for Chest Pain. Take no more than 3 doses in 15 minutes., Disp: 25 tablet, Rfl: 1    Pediatric Multiple Vit-C-FA (CHILDRENS CHEWABLE MULTI VITS PO), Take 1 tablet by mouth Daily., Disp: , Rfl:     rosuvastatin (CRESTOR) 40 MG tablet, Take 0.5 tablets by mouth Daily., Disp: 90 tablet, Rfl: 4    tamsulosin (FLOMAX) 0.4 MG capsule 24 hr capsule, Take 1 capsule by mouth Every Night., Disp: 90 capsule, Rfl: 3    triamcinolone (KENALOG) 0.1 % cream, Apply 1 Application topically to the appropriate area as directed 2 (Two) Times a Day., Disp: , Rfl:     Ustekinumab (STELARA) 90 MG/ML solution prefilled syringe Injection, Inject 90 mg under the skin into the appropriate area as directed Every 28 (Twenty-Eight) Days., Disp: 1 mL, Rfl: 6    vitamin B-12 (CYANOCOBALAMIN) 1000 MCG tablet, Take 1 tablet by mouth Daily., Disp: , Rfl:     Vitamin D, Cholecalciferol, 25 MCG (1000 UT) capsule, Take 1 capsule by mouth Daily., Disp: , Rfl:        Objective:      Vitals:    08/13/24 1000   BP:  118/72   Pulse: 67   SpO2: 98%     Weight - 218 lbs    Vitals and nursing note reviewed.   Constitutional:       General: Not in acute distress.     Appearance: Well-developed and not in distress. Not diaphoretic.   Neck:      Vascular: No JVD or JVR. JVD normal.   Pulmonary:      Effort: Pulmonary effort is normal. No respiratory distress.      Breath sounds: Normal breath sounds.   Cardiovascular:      Normal rate. Regular rhythm.      Murmurs: There is no murmur.   Edema:     Peripheral edema absent.   Abdominal:      Tenderness: There is no abdominal tenderness.   Skin:     General: Skin is warm and dry.   Neurological:      Mental Status: Alert, oriented to person, place, and time and oriented to person, place and time.         Lab Review:   Lab Results   Component Value Date    GLUCOSE 116 (H) 08/07/2024    BUN 15 08/07/2024    CREATININE 0.86 08/07/2024     08/07/2024    K 4.3 08/07/2024     08/07/2024    CALCIUM 8.6 08/07/2024    PROTEINTOT 7.0 08/07/2024    ALBUMIN 3.9 08/07/2024    ALT 15 08/07/2024    AST 18 08/07/2024    ALKPHOS 82 08/07/2024    BILITOT 0.4 08/07/2024    GLOB 3.1 08/07/2024    AGRATIO 1.3 08/07/2024    BCR 17.4 08/07/2024    ANIONGAP 7.0 08/07/2024    EGFR 87.0 08/07/2024        Lab Results   Component Value Date    CHOL 113 08/07/2024    CHLPL 112 03/14/2024    TRIG 134 08/07/2024    HDL 39 (L) 08/07/2024    LDL 50 08/07/2024      Lab Results   Component Value Date    HGBA1C 6.90 (H) 08/07/2024        Lab Results   Component Value Date    WBC 7.78 08/07/2024    HGB 14.7 08/07/2024    HCT 43.9 08/07/2024    MCV 91.1 08/07/2024     08/07/2024            ECG 12 Lead    Date/Time: 8/13/2024 10:16 AM  Performed by: Mari Anderson APRN    Authorized by: Mari Anderson APRN  Comparison: compared with previous ECG from 7/26/2023  Similar to previous ECG  Rhythm: sinus rhythm  BPM: 67  Conduction: 1st degree AV block    Clinical impression: non-specific ECG            10/2021  nuclear stress test:     Interpretation Summary    Impressions are consistent with a low risk study.  Myocardial perfusion imaging indicates a normal myocardial perfusion study with no evidence of ischemia.  Left ventricular ejection fraction is normal.  There is no prior study available for comparison.  Findings consistent with a normal ECG stress test.         Assessment/Plan:     Problem List Items Addressed This Visit (all established and stable)         Cardiac and Vasculature    CAD (coronary artery disease) - Primary    Overview     3v CABG in '12  Non-STEMI April 2020 diagnostic cath 4/13/2020 showed LIMA to LAD atretic, SVG to diagonal patent with significant backflow from diagonal graft into the LAD and down the LAD, SVG to PDA patent but not providing flow to PL branch because of proximal and mid native vessel (R-PDA) disease.  Left main 60-70% diffuse disease compromising flow into ungrafted circumflex territory.  Subsequently referred for consideration of redo CABG versus complex PCI.    -Return to Cath Lab 4/14/2020 with 4.0 x 8 mm drug-eluting stent placement to the left main (for purpose of improving flow into ungrafted circumflex territory), as well as for overlapping Julianne drug-eluting stents from the ostium into mid-RCA (3.5 x 12, 3.5 x 15, 3.5 x 12, 3.5 x 8) along with 3.5 x 12 mm Julianne FRANKY at the takeoff of the right-PL branch.  80-90% stenosis at the ostium of the R-PDA not treated since vein graft to PDA open, but not providing any retrograde filling beyond this lesion into the PL system.  There was PTCA alone to area in the distal RCA between stented segments for reported inability to deliver a stent to the segment.  Balloon angioplasty was performed with a 3.5 mm balloon with reported residual stenosis of less than 10%.             Essential hypertension    Mixed hyperlipidemia         Recommendations/plans:    The patient is doing well from a cardiovascular standpoint.  He is not having  any angina.  His blood pressure is well-controlled.  His LDL is controlled at 50.  His A1c is 6.9.  He will continue his current medical therapy including aspirin, lisinopril, Lopressor, high intensity statin and as needed sublingual nitroglycerin, which he has not been requiring.  However, I did refill his nitroglycerin for him today as he reports it has .  He will follow-up with Dr. Polanco in 1 year, but call sooner with symptoms or concerns.    I spent 32 minutes caring for Zachariah on this date of service. This time includes time spent by me in the following activities: preparing for the visit, reviewing tests, performing a medically appropriate examination and/or evaluation, counseling and educating the patient/family/caregiver, and documenting information in the medical record

## 2024-08-15 ENCOUNTER — OFFICE VISIT (OUTPATIENT)
Dept: FAMILY MEDICINE CLINIC | Facility: CLINIC | Age: 84
End: 2024-08-15
Payer: MEDICARE

## 2024-08-15 VITALS
HEART RATE: 60 BPM | DIASTOLIC BLOOD PRESSURE: 70 MMHG | HEIGHT: 69 IN | TEMPERATURE: 96.7 F | WEIGHT: 218.13 LBS | RESPIRATION RATE: 20 BRPM | SYSTOLIC BLOOD PRESSURE: 120 MMHG | BODY MASS INDEX: 32.31 KG/M2 | OXYGEN SATURATION: 97 %

## 2024-08-15 DIAGNOSIS — Z93.3 COLOSTOMY IN PLACE: ICD-10-CM

## 2024-08-15 DIAGNOSIS — Z79.4 TYPE 2 DIABETES MELLITUS WITH HYPERGLYCEMIA, WITH LONG-TERM CURRENT USE OF INSULIN: Primary | ICD-10-CM

## 2024-08-15 DIAGNOSIS — E03.9 HYPOTHYROIDISM, UNSPECIFIED TYPE: ICD-10-CM

## 2024-08-15 DIAGNOSIS — K43.5 PARASTOMAL HERNIA WITHOUT OBSTRUCTION OR GANGRENE: ICD-10-CM

## 2024-08-15 DIAGNOSIS — E11.65 TYPE 2 DIABETES MELLITUS WITH HYPERGLYCEMIA, WITH LONG-TERM CURRENT USE OF INSULIN: Primary | ICD-10-CM

## 2024-08-15 PROCEDURE — 99214 OFFICE O/P EST MOD 30 MIN: CPT | Performed by: NURSE PRACTITIONER

## 2024-08-15 PROCEDURE — 1159F MED LIST DOCD IN RCRD: CPT | Performed by: NURSE PRACTITIONER

## 2024-08-15 PROCEDURE — 1160F RVW MEDS BY RX/DR IN RCRD: CPT | Performed by: NURSE PRACTITIONER

## 2024-08-15 PROCEDURE — 1126F AMNT PAIN NOTED NONE PRSNT: CPT | Performed by: NURSE PRACTITIONER

## 2024-08-15 PROCEDURE — 3078F DIAST BP <80 MM HG: CPT | Performed by: NURSE PRACTITIONER

## 2024-08-15 PROCEDURE — G2211 COMPLEX E/M VISIT ADD ON: HCPCS | Performed by: NURSE PRACTITIONER

## 2024-08-15 PROCEDURE — 3074F SYST BP LT 130 MM HG: CPT | Performed by: NURSE PRACTITIONER

## 2024-08-15 NOTE — PROGRESS NOTES
BORIS Pérez  [facility]   Family Medicine  [address]  [place] [address]  Phone: [phone]  Fax: [phone]         Chief Complaint:  Chief Complaint   Patient presents with    Follow-up     Following up on chronic conditions.        History:  Zachariah Acosta is a 83 y.o. male.  History of Present Illness  The patient presents for evaluation of multiple medical concerns.    Hypothyroidism: He is currently on Synthroid, which he takes daily in the morning. He is curious about the current status of his thyroid function.  TSH within normal limits.    Ulcerative colitis, colostomy: He has a colostomy and has developed a parastomal hernia, which he manages with a belt. He is concerned about potential worsening of the condition and is seeking advice on whether a general surgeon could provide a solution.  Patient reports that colostomy is working well.  He denies any blood or pain around stoma site.  Patient's surgery was performed in [facility].    Type 2 diabetes: Patient reports that blood sugars are well-controlled.  Patient's hemoglobin A1c on 8/7/2024 at 6.9 slightly increased from 6 months ago at 6.7.  Patient reports compliance with current regimen.       ROS:  Review of Systems   Constitutional:  Negative for fatigue, fever and unexpected weight change.   HENT:  Negative for congestion, ear pain, rhinorrhea, sinus pressure, sinus pain and voice change.    Eyes:  Negative for visual disturbance.   Respiratory:  Negative for shortness of breath and wheezing.    Cardiovascular:  Negative for chest pain and palpitations.   Gastrointestinal:  Negative for abdominal pain, nausea and vomiting.   Endocrine: Negative for polydipsia and polyuria.   Genitourinary:  Negative for dysuria and flank pain.   Musculoskeletal:  Negative for back pain, myalgias and neck pain.   Skin:  Negative for color change and rash.   Neurological:  Negative for dizziness,  tremors, speech difficulty, weakness, numbness and headaches.   Psychiatric/Behavioral:  Negative for behavioral problems, confusion, dysphoric mood, self-injury and sleep disturbance.         reports that he has never smoked. He has never been exposed to tobacco smoke. He has never used smokeless tobacco. He reports that he does not drink alcohol and does not use drugs.    Current Outpatient Medications   Medication Instructions    acetaminophen (TYLENOL) 500 mg, Oral, Every 6 Hours PRN    aspirin 81 mg, Oral, Daily    Budeson-Glycopyrrol-Formoterol (Breztri Aerosphere) 160-9-4.8 MCG/ACT aerosol inhaler 2 puffs, Inhalation, 2 Times Daily    ciclopirox (LOPROX) 0.77 % cream Apply 1 Application topically to the appropriate area as directed 2 (Two) Times a Day. To under arms after using Clindamycin Solution    clindamycin (CLEOCIN T) 1 % external solution 1 application  2 (Two) Times a Day. To under arms    Continuous Blood Gluc Sensor (Dexcom G7 Sensor) misc 1 each, Does not apply, Every 10 Days    Dupilumab 300 MG/2ML solution prefilled syringe Subcutaneous, Every 14 Days    ferrous sulfate 325 mg, Oral, Daily With Breakfast    finasteride (PROSCAR) 5 mg, Oral, Daily    fluticasone (FLONASE) 50 MCG/ACT nasal spray 1 spray, Nasal, Daily    gabapentin (NEURONTIN) 100 MG capsule     insulin glargine (LANTUS, SEMGLEE) 100 UNIT/ML injection 15 units in the morning and 40 units at bedtime.    levalbuterol (XOPENEX HFA) 45 MCG/ACT inhaler 2 puffs, Inhalation, Every 6 Hours PRN    levothyroxine (SYNTHROID) 50 mcg, Oral, Daily    lisinopril (PRINIVIL,ZESTRIL) 20 MG tablet TAKE 1 TABLET BY MOUTH IN THE MORNING AND 1/2 (ONE-HALF) AT BEDTIME    meclizine (ANTIVERT) 12.5 mg, Oral, 3 Times Daily PRN    melatonin 5 mg, Oral, Nightly PRN    metFORMIN ER (GLUCOPHAGE-XR) 500 mg, Oral, Daily With Breakfast    metoprolol tartrate (LOPRESSOR) 25 mg, Oral, 2 Times Daily    mupirocin (BACTROBAN) 2 % ointment 1 Application, Topical, Daily  "   naloxone (NARCAN) 4 MG/0.1ML nasal spray 1 spray, As Needed    nitroglycerin (NITROSTAT) 0.4 mg, Sublingual, Every 5 Minutes PRN, Take no more than 3 doses in 15 minutes.    Pediatric Multiple Vit-C-FA (CHILDRENS CHEWABLE MULTI VITS PO) 1 tablet, Oral, Daily    rosuvastatin (CRESTOR) 20 mg, Oral, Daily    tamsulosin (FLOMAX) 0.4 mg, Oral, Nightly    triamcinolone (KENALOG) 0.1 % cream 1 application , Topical, 2 Times Daily    Ustekinumab (STELARA) 90 mg, Subcutaneous, Every 28 Days    vitamin B-12 (CYANOCOBALAMIN) 1,000 mcg, Oral, Daily    Vitamin D, Cholecalciferol, 25 MCG (1000 UT) capsule 1 capsule, Oral, Daily       OBJECTIVE:  /70 (BP Location: Left arm, Patient Position: Sitting, Cuff Size: Adult)   Pulse 60   Temp 96.7 °F (35.9 °C) (Infrared)   Resp 20   Ht 175.3 cm (69.02\")   Wt 98.9 kg (218 lb 2 oz)   SpO2 97%   BMI 32.20 kg/m²    Physical Exam  Vitals and nursing note reviewed.   Constitutional:       Appearance: Normal appearance. He is well-developed.   HENT:      Head: Normocephalic and atraumatic.      Right Ear: Tympanic membrane, ear canal and external ear normal.      Left Ear: Tympanic membrane, ear canal and external ear normal.      Nose: Nose normal. No septal deviation, nasal tenderness or congestion.      Mouth/Throat:      Lips: Pink. No lesions.      Mouth: Mucous membranes are moist. No oral lesions.      Dentition: Normal dentition.      Pharynx: Oropharynx is clear. No pharyngeal swelling, oropharyngeal exudate or posterior oropharyngeal erythema.   Eyes:      General: Lids are normal. Vision grossly intact. No scleral icterus.        Right eye: No discharge.         Left eye: No discharge.      Extraocular Movements: Extraocular movements intact.      Conjunctiva/sclera: Conjunctivae normal.      Right eye: Right conjunctiva is not injected.      Left eye: Left conjunctiva is not injected.      Pupils: Pupils are equal, round, and reactive to light.   Neck:      Thyroid: " No thyroid mass.      Trachea: Trachea normal.   Cardiovascular:      Rate and Rhythm: Normal rate and regular rhythm.      Heart sounds: Normal heart sounds. No murmur heard.     No gallop.   Pulmonary:      Effort: Pulmonary effort is normal.      Breath sounds: Normal breath sounds and air entry. No wheezing, rhonchi or rales.   Abdominal:      Hernia: A hernia (Parastomal hernia noted) is present.   Musculoskeletal:         General: No tenderness or deformity. Normal range of motion.      Cervical back: Full passive range of motion without pain, normal range of motion and neck supple.      Thoracic back: Normal.      Right lower leg: No edema.      Left lower leg: No edema.   Skin:     General: Skin is warm and dry.      Coloration: Skin is not jaundiced.      Findings: No rash.   Neurological:      Mental Status: He is alert and oriented to person, place, and time.      Sensory: Sensation is intact.      Motor: Motor function is intact.      Coordination: Coordination is intact.      Gait: Gait is intact.      Deep Tendon Reflexes: Reflexes are normal and symmetric.   Psychiatric:         Mood and Affect: Mood and affect normal.         Behavior: Behavior normal.         Judgment: Judgment normal.       Physical Exam      BMI is >= 30 and <35. (Class 1 Obesity). The following options were offered after discussion;: nutrition counseling/recommendations    Procedures    Results  Laboratory Studies  Hemoglobin A1c is 6.9. Fasting glucose is 116. TSH is within normal limits.    Assessment/Plan:     Diagnoses and all orders for this visit:    1. Type 2 diabetes mellitus with hyperglycemia, with long-term current use of insulin (Primary)    2. Hypothyroidism, unspecified type    3. Colostomy in place    4. Parastomal hernia without obstruction or gangrene          An After Visit Summary was printed and given to the patient at discharge.  Return in about 3 months (around 11/15/2024).       Assessment & Plan  1.  Hypothyroidism.  His thyroid function has improved, with TSH levels decreasing from 5.4 to within the normal range. Continuation of Synthroid therapy is advised.    2. Diabetes Mellitus.  His hemoglobin A1c has increased from 6.7 to 6.9, potentially due to recent surgery and associated stress. Fasting glucose is 116, which is within the target range of less than 130. He is advised to continue his current diabetes management plan.    3. Parastomal hernia.  Patient denies any symptoms associated with parastomal hernia.  Due to his general surgeon being in [facility] he wishes to hold on further evaluation of this due to to be asymptomatic.  We will continue to monitor.      I spent 31 minutes caring for Zachariah on this date of service. This time includes time spent by me in the following activities: preparing for the visit, reviewing tests, performing a medically appropriate examination and/or evaluation, counseling and educating the patient/family/caregiver, documenting information in the medical record, independently interpreting results and communicating that information with the patient/family/caregiver, care coordination, and ordering medications     : I have been treating this patient over a continuum addressing both chronic and acute care concerns.     Amaya ESCALANTE 8/16/2024   Electronically signed.    Patient or patient representative verbalized consent for the use of Ambient Listening during the visit with  BORIS Pérez for chart documentation. 8/16/2024  19:23 CDT

## 2024-08-16 PROBLEM — Z93.3 COLOSTOMY IN PLACE: Status: ACTIVE | Noted: 2024-08-16

## 2024-08-16 PROBLEM — E03.9 HYPOTHYROIDISM: Status: ACTIVE | Noted: 2024-08-16

## 2024-08-16 PROBLEM — K43.5 PARASTOMAL HERNIA WITHOUT OBSTRUCTION OR GANGRENE: Status: ACTIVE | Noted: 2024-08-16

## 2024-08-17 DIAGNOSIS — N40.0 BENIGN PROSTATIC HYPERPLASIA WITHOUT LOWER URINARY TRACT SYMPTOMS: ICD-10-CM

## 2024-08-19 RX ORDER — FINASTERIDE 5 MG/1
5 TABLET, FILM COATED ORAL DAILY
Qty: 90 TABLET | Refills: 0 | Status: SHIPPED | OUTPATIENT
Start: 2024-08-19

## 2024-09-24 ENCOUNTER — PATIENT OUTREACH (OUTPATIENT)
Age: 84
End: 2024-09-24
Payer: MEDICARE

## 2024-09-30 ENCOUNTER — PATIENT OUTREACH (OUTPATIENT)
Dept: CASE MANAGEMENT | Facility: OTHER | Age: 84
End: 2024-09-30
Payer: MEDICARE

## 2024-09-30 NOTE — OUTREACH NOTE
AMBULATORY CASE MANAGEMENT NOTE    Names and Relationships of Patient/Support Persons: Contact: Zachairah Acosta; Relationship: Self -       Patient Outreach    Proactive outreach with [facility] patient that has an abnormal Hgb A1C.     Adult Patient Profile  Questions/Answers      Flowsheet Row Most Recent Value   Symptoms/Conditions Managed at Home diabetes, type 2   Barriers to Managing Health none   Diabetes Management Strategies medication therapy, insulin therapy, routine screenings, blood glucose testing   Last A1C Result 6.90          Danielle SANTAMARIA  Ambulatory Case Management    9/30/2024, 13:48 CDT

## 2024-10-08 ENCOUNTER — PATIENT OUTREACH (OUTPATIENT)
Age: 84
End: 2024-10-08
Payer: MEDICARE

## 2024-10-08 NOTE — OUTREACH NOTE
SW received and reviewed [facility] survey responses. No needs identified at this time. SW to discharge. Please re consult SW if additional needs arise.     Aura MORE -   Ambulatory Case Management    10/8/2024, 09:03 EDT

## 2024-10-22 NOTE — PROGRESS NOTES
Subjective    Mr. Acosta is 84 y.o. male    Chief Complaint: Kidney stone    History of Present Illness    84-year-old male established patient  follow-up for enlarged prostate and ED and left kidney stones.KUB done yesterday reviewed by me with the patient shows stable adjacent to left renal pelvis kidney stones, no ureteral stone visible.  He continues to deny flank pain or hematuria.  LUTS stable on combination therapy.  He is pleased with his Titan touch 3 piece inflatable penile implant placed 12/13/21.  He denies gross hematuria or flank pain.      I independently visualized and reviewed the patient's prior imaging studies today in clinic and discussed the imaging findings with the patient.       The following portions of the patient's history were reviewed and updated as appropriate: allergies, current medications, past family history, past medical history, past social history, past surgical history and problem list.    Review of Systems      Current Outpatient Medications:     acetaminophen (TYLENOL) 500 MG tablet, Take 1 tablet by mouth Every 6 (Six) Hours As Needed for Mild Pain., Disp: , Rfl:     aspirin 81 MG EC tablet, Take 1 tablet by mouth Daily., Disp: , Rfl:     Budeson-Glycopyrrol-Formoterol (Breztri Aerosphere) 160-9-4.8 MCG/ACT aerosol inhaler, Inhale 2 puffs 2 (Two) Times a Day., Disp: 3 each, Rfl: 4    ciclopirox (LOPROX) 0.77 % cream, Apply 1 Application topically to the appropriate area as directed 2 (Two) Times a Day. To under arms after using Clindamycin Solution, Disp: , Rfl:     clindamycin (CLEOCIN T) 1 % external solution, 1 application  2 (Two) Times a Day. To under arms, Disp: , Rfl:     Continuous Blood Gluc Sensor (Dexcom G7 Sensor) misc, 1 each Every 10 (Ten) Days., Disp: 9 each, Rfl: 3    Dupilumab 300 MG/2ML solution prefilled syringe, Inject  under the skin into the appropriate area as directed Every 14 (Fourteen) Days., Disp: , Rfl:     ferrous sulfate 325 (65 FE) MG  tablet, Take 1 tablet by mouth Daily With Breakfast., Disp: , Rfl:     finasteride (PROSCAR) 5 MG tablet, Take 1 tablet by mouth Daily., Disp: 90 tablet, Rfl: 3    fluticasone (FLONASE) 50 MCG/ACT nasal spray, 1 spray into the nostril(s) as directed by provider Daily., Disp: 16 g, Rfl: 5    gabapentin (NEURONTIN) 100 MG capsule, , Disp: , Rfl:     insulin glargine (LANTUS, SEMGLEE) 100 UNIT/ML injection, 15 units in the morning and 40 units at bedtime., Disp: 18 mL, Rfl: 2    levalbuterol (XOPENEX HFA) 45 MCG/ACT inhaler, Inhale 2 puffs Every 6 (Six) Hours As Needed for Wheezing., Disp: , Rfl:     levothyroxine (Synthroid) 50 MCG tablet, Take 1 tablet by mouth Daily., Disp: 90 tablet, Rfl: 4    lisinopril (PRINIVIL,ZESTRIL) 20 MG tablet, TAKE 1 TABLET BY MOUTH IN THE MORNING AND 1/2 (ONE-HALF) AT BEDTIME, Disp: 180 tablet, Rfl: 0    meclizine (ANTIVERT) 12.5 MG tablet, Take 1 tablet by mouth 3 (Three) Times a Day As Needed for Dizziness., Disp: 30 tablet, Rfl: 0    melatonin 5 MG tablet tablet, Take 1 tablet by mouth At Night As Needed., Disp: , Rfl:     metFORMIN ER (GLUCOPHAGE-XR) 500 MG 24 hr tablet, Take 1 tablet by mouth Daily With Breakfast., Disp: 90 tablet, Rfl: 4    metoprolol tartrate (LOPRESSOR) 25 MG tablet, Take 1 tablet by mouth 2 (Two) Times a Day., Disp: 180 tablet, Rfl: 4    mupirocin (BACTROBAN) 2 % ointment, Apply 1 Application topically to the appropriate area as directed Daily., Disp: , Rfl:     naloxone (NARCAN) 4 MG/0.1ML nasal spray, Administer 1 spray into the nostril(s) as directed by provider As Needed., Disp: , Rfl:     nitroglycerin (NITROSTAT) 0.4 MG SL tablet, Place 1 tablet under the tongue Every 5 (Five) Minutes As Needed for Chest Pain. Take no more than 3 doses in 15 minutes., Disp: 25 tablet, Rfl: 1    Pediatric Multiple Vit-C-FA (CHILDRENS CHEWABLE MULTI VITS PO), Take 1 tablet by mouth Daily., Disp: , Rfl:     rosuvastatin (CRESTOR) 40 MG tablet, Take 0.5 tablets by mouth  Daily., Disp: 90 tablet, Rfl: 4    tamsulosin (FLOMAX) 0.4 MG capsule 24 hr capsule, Take 1 capsule by mouth Every Night., Disp: 90 capsule, Rfl: 3    triamcinolone (KENALOG) 0.1 % cream, Apply 1 Application topically to the appropriate area as directed 2 (Two) Times a Day., Disp: , Rfl:     Ustekinumab (STELARA) 90 MG/ML solution prefilled syringe Injection, Inject 90 mg under the skin into the appropriate area as directed Every 28 (Twenty-Eight) Days., Disp: 1 mL, Rfl: 6    vitamin B-12 (CYANOCOBALAMIN) 1000 MCG tablet, Take 1 tablet by mouth Daily., Disp: , Rfl:     Vitamin D, Cholecalciferol, 25 MCG (1000 UT) capsule, Take 1 capsule by mouth Daily., Disp: , Rfl:     Past Medical History:   Diagnosis Date    Asthma     Colitis     Coronary artery disease     Diabetes mellitus     Diverticulitis     Elevated cholesterol     HL (hearing loss) 2012    Hyperlipidemia     Hypertension     Inflammatory bowel disease 2001    Myocardial infarct     EASTER 2020    Primary central sleep apnea 2009    Using Cpap    Sleep apnea     cpap at     Sleep apnea, obstructive     Stroke     Visual impairment 2016    Double vision       Past Surgical History:   Procedure Laterality Date    ADENOIDECTOMY  1947    APPENDECTOMY N/A 05/05/2023    Procedure: APPENDECTOMY LAPAROSCOPIC;  Surgeon: Katherine Carrazna MD;  Location: [facility] OR;  Service: General;  Laterality: N/A;    BACK SURGERY      CARDIAC CATHETERIZATION      stents x 6    CARDIAC SURGERY      CABG TRIPLE BYPASS 2012    CATARACT EXTRACTION      COLONOSCOPY      COLONOSCOPY N/A 06/04/2021    Procedure: COLONOSCOPY WITH ANESTHESIA;  Surgeon: Zachariah Menjivar DO;  Location: [facility] ENDOSCOPY;  Service: Gastroenterology;  Laterality: N/A;  pre hx ulcerative colitis  post ulcerative colitis  dr collins gusman    COLONOSCOPY N/A 11/29/2023    Procedure: COLONOSCOPY LOWER LIMITED;  Surgeon: Zachairah Menjivar DO;  Location: [facility] ENDOSCOPY;  Service: Gastroenterology;   "Laterality: N/A;  preop; hx of colitis  postop colitis - questionable colonic stricture at 30cm   pcp bryanna garrisontreet    COLOSTOMY      CORONARY ANGIOPLASTY WITH STENT PLACEMENT      CORONARY ARTERY BYPASS GRAFT  December 2012    CORONARY STENT PLACEMENT      HIP SURGERY Right     total hip    JOINT REPLACEMENT  2015    Right hip    PENILE PROSTHESIS IMPLANT N/A 12/13/2021    Procedure: 3-PIECE INFLATABLE PENILE PROSTHESIS PLACEMENT;  Surgeon: Jose Tellez MD;  Location: [facility] OR;  Service: Urology;  Laterality: N/A;    TONSILLECTOMY  1947       Social History     Socioeconomic History    Marital status:    Tobacco Use    Smoking status: Never     Passive exposure: Never    Smokeless tobacco: Never   Vaping Use    Vaping status: Never Used   Substance and Sexual Activity    Alcohol use: Never    Drug use: Never    Sexual activity: Not Currently     Partners: Female       Family History   Problem Relation Age of Onset    Colon cancer Brother     Colon polyps Neg Hx     Esophageal cancer Neg Hx        Objective    Temp 97.9 °F (36.6 °C)   Ht 175.3 cm (69\")   Wt 100 kg (220 lb 6.4 oz)   BMI 32.55 kg/m²     Physical Exam        Results for orders placed or performed in visit on 11/06/24   POC Urinalysis Dipstick, Multipro    Collection Time: 11/06/24 10:01 AM    Specimen: Urine   Result Value Ref Range    Color Yellow Yellow, Straw, Dark Yellow, Vicky    Clarity, UA Clear Clear    Glucose, UA Negative Negative mg/dL    Bilirubin Negative Negative    Ketones, UA Negative Negative    Specific Gravity  1.025 1.005 - 1.030    Blood, UA Negative Negative    pH, Urine 5.5 5.0 - 8.0    Protein, POC Negative Negative mg/dL    Urobilinogen, UA Normal Normal, 0.2 E.U./dL    Nitrite, UA Negative Negative    Leukocytes Negative Negative     Assessment and Plan    Diagnoses and all orders for this visit:    1. Kidney stone (Primary)  -     POC Urinalysis Dipstick, Multipro  -     XR abdomen kub; Future    2. " Benign prostatic hyperplasia without lower urinary tract symptoms  -     finasteride (PROSCAR) 5 MG tablet; Take 1 tablet by mouth Daily.  Dispense: 90 tablet; Refill: 3      Stable asymptomatic left kidney stones.  He will follow-up with me in 6 months with repeat pre-clinic PSA.  Continue finasteride and tamsulosin      This document has been signed by ANTHONY Tellez MD on November 6, 2024 13:05 CST

## 2024-11-05 ENCOUNTER — HOSPITAL ENCOUNTER (OUTPATIENT)
Dept: GENERAL RADIOLOGY | Facility: HOSPITAL | Age: 84
Discharge: HOME | End: 2024-11-05
Payer: MEDICARE

## 2024-11-05 ENCOUNTER — TELEPHONE (OUTPATIENT)
Dept: UROLOGY | Facility: CLINIC | Age: 84
End: 2024-11-05
Payer: MEDICARE

## 2024-11-05 ENCOUNTER — LAB (OUTPATIENT)
Dept: LAB | Facility: HOSPITAL | Age: 84
End: 2024-11-05
Payer: MEDICARE

## 2024-11-05 DIAGNOSIS — E11.65 TYPE 2 DIABETES MELLITUS WITH HYPERGLYCEMIA, WITH LONG-TERM CURRENT USE OF INSULIN: ICD-10-CM

## 2024-11-05 DIAGNOSIS — N20.0 KIDNEY STONE: ICD-10-CM

## 2024-11-05 DIAGNOSIS — Z79.4 TYPE 2 DIABETES MELLITUS WITH HYPERGLYCEMIA, WITH LONG-TERM CURRENT USE OF INSULIN: ICD-10-CM

## 2024-11-05 LAB
ALBUMIN SERPL-MCNC: 4.2 G/DL (ref 3.5–5)
ALBUMIN/GLOB SERPL: 1.2 G/DL (ref 1.1–2.5)
ALP SERPL-CCNC: 115 U/L (ref 24–120)
ALT SERPL W P-5'-P-CCNC: 19 U/L (ref 0–50)
ANION GAP SERPL CALCULATED.3IONS-SCNC: 9 MMOL/L (ref 4–13)
AST SERPL-CCNC: 26 U/L (ref 7–45)
AUTO MIXED CELLS #: 1.3 10*3/MM3 (ref 0.1–2.6)
AUTO MIXED CELLS %: 16.4 % (ref 0.1–24)
BILIRUB SERPL-MCNC: 0.2 MG/DL (ref 0.1–1)
BILIRUB UR QL STRIP: NEGATIVE
BUN SERPL-MCNC: 14 MG/DL (ref 5–21)
BUN/CREAT SERPL: 14
CALCIUM SPEC-SCNC: 8.7 MG/DL (ref 8.6–10.5)
CHLORIDE SERPL-SCNC: 100 MMOL/L (ref 98–110)
CHOLEST SERPL-MCNC: 116 MG/DL (ref 130–200)
CLARITY UR: CLEAR
CO2 SERPL-SCNC: 26 MMOL/L (ref 24–31)
COLOR UR: YELLOW
CREAT SERPL-MCNC: 1 MG/DL (ref 0.5–1.4)
EGFRCR SERPLBLD CKD-EPI 2021: 75.1 ML/MIN/1.73
ERYTHROCYTE [DISTWIDTH] IN BLOOD BY AUTOMATED COUNT: 13.2 % (ref 12.3–15.4)
GLOBULIN UR ELPH-MCNC: 3.4 GM/DL
GLUCOSE SERPL-MCNC: 138 MG/DL (ref 70–100)
GLUCOSE UR STRIP-MCNC: NEGATIVE MG/DL
HBA1C MFR BLD: 6.6 % (ref 4.8–5.9)
HCT VFR BLD AUTO: 42.9 % (ref 37.5–51)
HDLC SERPL-MCNC: 40 MG/DL
HGB BLD-MCNC: 15.1 G/DL (ref 13–17.7)
HGB UR QL STRIP.AUTO: NEGATIVE
KETONES UR QL STRIP: NEGATIVE
LDLC SERPL CALC-MCNC: 36 MG/DL (ref 0–99)
LDLC/HDLC SERPL: 0.58 {RATIO}
LEUKOCYTE ESTERASE UR QL STRIP.AUTO: NEGATIVE
LYMPHOCYTES # BLD AUTO: 1.6 10*3/MM3 (ref 0.7–3.1)
LYMPHOCYTES NFR BLD AUTO: 20.6 % (ref 19.6–45.3)
MCH RBC QN AUTO: 32.6 PG (ref 26.6–33)
MCHC RBC AUTO-ENTMCNC: 35.2 G/DL (ref 31.5–35.7)
MCV RBC AUTO: 92.7 FL (ref 79–97)
NEUTROPHILS NFR BLD AUTO: 5 10*3/MM3 (ref 1.7–7)
NEUTROPHILS NFR BLD AUTO: 63 % (ref 42.7–76)
NITRITE UR QL STRIP: NEGATIVE
PH UR STRIP.AUTO: 6 [PH] (ref 5–8)
PLATELET # BLD AUTO: 202 10*3/MM3 (ref 140–450)
PMV BLD AUTO: 9 FL (ref 6–12)
POTASSIUM SERPL-SCNC: 4 MMOL/L (ref 3.5–5.3)
PROT SERPL-MCNC: 7.6 G/DL (ref 6.3–8.7)
PROT UR QL STRIP: NEGATIVE
RBC # BLD AUTO: 4.63 10*6/MM3 (ref 4.14–5.8)
SODIUM SERPL-SCNC: 135 MMOL/L (ref 135–145)
SP GR UR STRIP: 1.02 (ref 1–1.03)
TRIGL SERPL-MCNC: 265 MG/DL (ref 0–149)
UROBILINOGEN UR QL STRIP: NORMAL
VLDLC SERPL-MCNC: 40 MG/DL (ref 5–40)
WBC NRBC COR # BLD AUTO: 7.9 10*3/MM3 (ref 3.4–10.8)

## 2024-11-05 PROCEDURE — 83036 HEMOGLOBIN GLYCOSYLATED A1C: CPT

## 2024-11-05 PROCEDURE — 85025 COMPLETE CBC W/AUTO DIFF WBC: CPT

## 2024-11-05 PROCEDURE — 80053 COMPREHEN METABOLIC PANEL: CPT

## 2024-11-05 PROCEDURE — 36415 COLL VENOUS BLD VENIPUNCTURE: CPT

## 2024-11-05 PROCEDURE — 74018 RADEX ABDOMEN 1 VIEW: CPT

## 2024-11-05 PROCEDURE — 84443 ASSAY THYROID STIM HORMONE: CPT

## 2024-11-05 PROCEDURE — 82043 UR ALBUMIN QUANTITATIVE: CPT

## 2024-11-05 PROCEDURE — 81003 URINALYSIS AUTO W/O SCOPE: CPT

## 2024-11-05 PROCEDURE — 80061 LIPID PANEL: CPT

## 2024-11-06 ENCOUNTER — OFFICE VISIT (OUTPATIENT)
Dept: UROLOGY | Facility: CLINIC | Age: 84
End: 2024-11-06
Payer: MEDICARE

## 2024-11-06 VITALS — TEMPERATURE: 97.9 F | HEIGHT: 69 IN | BODY MASS INDEX: 32.64 KG/M2 | WEIGHT: 220.4 LBS

## 2024-11-06 DIAGNOSIS — N20.0 KIDNEY STONE: Primary | ICD-10-CM

## 2024-11-06 DIAGNOSIS — N40.0 BENIGN PROSTATIC HYPERPLASIA WITHOUT LOWER URINARY TRACT SYMPTOMS: ICD-10-CM

## 2024-11-06 LAB
ALBUMIN UR-MCNC: <1.2 MG/DL
BILIRUB BLD-MCNC: NEGATIVE MG/DL
CLARITY, POC: CLEAR
COLOR UR: YELLOW
GLUCOSE UR STRIP-MCNC: NEGATIVE MG/DL
KETONES UR QL: NEGATIVE
LEUKOCYTE EST, POC: NEGATIVE
NITRITE UR-MCNC: NEGATIVE MG/ML
PH UR: 5.5 [PH] (ref 5–8)
PROT UR STRIP-MCNC: NEGATIVE MG/DL
RBC # UR STRIP: NEGATIVE /UL
SP GR UR: 1.02 (ref 1–1.03)
TSH SERPL DL<=0.05 MIU/L-ACNC: 3.51 UIU/ML (ref 0.27–4.2)
UROBILINOGEN UR QL: NORMAL

## 2024-11-06 RX ORDER — FINASTERIDE 5 MG/1
5 TABLET, FILM COATED ORAL DAILY
Qty: 90 TABLET | Refills: 3 | Status: SHIPPED | OUTPATIENT
Start: 2024-11-06

## 2024-11-12 ENCOUNTER — OFFICE VISIT (OUTPATIENT)
Dept: FAMILY MEDICINE CLINIC | Facility: CLINIC | Age: 84
End: 2024-11-12
Payer: MEDICARE

## 2024-11-12 VITALS
SYSTOLIC BLOOD PRESSURE: 130 MMHG | HEART RATE: 73 BPM | WEIGHT: 220 LBS | BODY MASS INDEX: 32.58 KG/M2 | RESPIRATION RATE: 19 BRPM | DIASTOLIC BLOOD PRESSURE: 75 MMHG | OXYGEN SATURATION: 97 % | HEIGHT: 69 IN | TEMPERATURE: 97.7 F

## 2024-11-12 DIAGNOSIS — Z79.4 TYPE 2 DIABETES MELLITUS WITH HYPERGLYCEMIA, WITH LONG-TERM CURRENT USE OF INSULIN: Primary | ICD-10-CM

## 2024-11-12 DIAGNOSIS — E78.2 ELEVATED TRIGLYCERIDES WITH HIGH CHOLESTEROL: ICD-10-CM

## 2024-11-12 DIAGNOSIS — I10 HYPERTENSION, UNSPECIFIED TYPE: ICD-10-CM

## 2024-11-12 DIAGNOSIS — E11.65 TYPE 2 DIABETES MELLITUS WITH HYPERGLYCEMIA, WITH LONG-TERM CURRENT USE OF INSULIN: Primary | ICD-10-CM

## 2024-11-12 PROCEDURE — 1126F AMNT PAIN NOTED NONE PRSNT: CPT | Performed by: NURSE PRACTITIONER

## 2024-11-12 PROCEDURE — 1160F RVW MEDS BY RX/DR IN RCRD: CPT | Performed by: NURSE PRACTITIONER

## 2024-11-12 PROCEDURE — 1159F MED LIST DOCD IN RCRD: CPT | Performed by: NURSE PRACTITIONER

## 2024-11-12 PROCEDURE — 3075F SYST BP GE 130 - 139MM HG: CPT | Performed by: NURSE PRACTITIONER

## 2024-11-12 PROCEDURE — 3078F DIAST BP <80 MM HG: CPT | Performed by: NURSE PRACTITIONER

## 2024-11-12 PROCEDURE — G2211 COMPLEX E/M VISIT ADD ON: HCPCS | Performed by: NURSE PRACTITIONER

## 2024-11-12 PROCEDURE — 99214 OFFICE O/P EST MOD 30 MIN: CPT | Performed by: NURSE PRACTITIONER

## 2024-11-12 NOTE — PROGRESS NOTES
BORIS Pérez  [facility]   Family Medicine  [address]  [place] [address]  Phone: [phone]  Fax: [phone]         Chief Complaint:  Chief Complaint   Patient presents with    3-month follow up     Patient is following up on he's Type 2 diabetes mellitus with hyperglycemia, with long-term current use of insulin.        History:  Zachariah Acosta is a 84 y.o. male.  History of Present Illness      Type 2 diabetes insulin-dependent: Patient's most recent hemoglobin A1c was 6.6 improved from previous check.  Patient reports compliance with diabetes regimen.  He reports taking Lantus 15 to 30 units subcu nightly.Elevated triglycerides with recent lipid panel.  Discussed causes of elevated triglycerides with patient.  He does report increased consumption of potatoes, French fries, and Posta.    Hypertension: Patient reports he is taking lisinopril 20 mg in the morning and sometimes will not take the 10 mg at bedtime.  He states that if his blood pressure is well-controlled he will not take the 10 mg nightly.  Patient is also prescribed Lopressor 25 mg twice daily.  He has been on this medication since CABG several years ago.          ROS:  Review of Systems   Constitutional:  Negative for fatigue, fever and unexpected weight change.   HENT:  Negative for congestion, ear pain, rhinorrhea, sinus pressure, sinus pain and voice change.    Eyes:  Negative for visual disturbance.   Respiratory:  Negative for shortness of breath and wheezing.    Cardiovascular:  Negative for chest pain and palpitations.   Gastrointestinal:  Negative for abdominal pain, nausea and vomiting.   Endocrine: Negative for polydipsia and polyuria.   Genitourinary:  Negative for dysuria and flank pain.   Musculoskeletal:  Negative for back pain, myalgias and neck pain.   Skin:  Negative for color change and rash.   Neurological:  Negative for dizziness, tremors, speech difficulty, weakness,  numbness and headaches.   Psychiatric/Behavioral:  Negative for behavioral problems, confusion, dysphoric mood, self-injury and sleep disturbance.         reports that he has never smoked. He has never been exposed to tobacco smoke. He has never used smokeless tobacco. He reports that he does not drink alcohol and does not use drugs.    Current Outpatient Medications   Medication Instructions    acetaminophen (TYLENOL) 500 mg, Oral, Every 6 Hours PRN    aspirin 81 mg, Daily    Budeson-Glycopyrrol-Formoterol (Breztri Aerosphere) 160-9-4.8 MCG/ACT aerosol inhaler 2 puffs, Inhalation, 2 Times Daily    ciclopirox (LOPROX) 0.77 % cream Apply 1 Application topically to the appropriate area as directed 2 (Two) Times a Day. To under arms after using Clindamycin Solution    clindamycin (CLEOCIN T) 1 % external solution 1 application  2 (Two) Times a Day. To under arms    Continuous Blood Gluc Sensor (Dexcom G7 Sensor) misc 1 each, Not Applicable, Every 10 Days    Dupilumab 300 MG/2ML solution prefilled syringe Every 14 Days    ferrous sulfate 325 mg, Daily With Breakfast    finasteride (PROSCAR) 5 mg, Oral, Daily    fluticasone (FLONASE) 50 MCG/ACT nasal spray 1 spray, Nasal, Daily    gabapentin (NEURONTIN) 100 MG capsule     insulin glargine (LANTUS, SEMGLEE) 100 UNIT/ML injection 15 units in the morning and 40 units at bedtime.    levalbuterol (XOPENEX HFA) 45 MCG/ACT inhaler 2 puffs, Every 6 Hours PRN    levothyroxine (SYNTHROID) 50 mcg, Oral, Daily    lisinopril (PRINIVIL,ZESTRIL) 20 MG tablet TAKE 1 TABLET BY MOUTH IN THE MORNING AND 1/2 (ONE-HALF) AT BEDTIME    meclizine (ANTIVERT) 12.5 mg, Oral, 3 Times Daily PRN    melatonin 5 mg, Nightly PRN    metFORMIN ER (GLUCOPHAGE-XR) 500 mg, Oral, Daily With Breakfast    metoprolol tartrate (LOPRESSOR) 25 mg, Oral, 2 Times Daily    mupirocin (BACTROBAN) 2 % ointment 1 Application, Daily    naloxone (NARCAN) 4 MG/0.1ML nasal spray 1 spray, As Needed    nitroglycerin (NITROSTAT)  "0.4 mg, Sublingual, Every 5 Minutes PRN, Take no more than 3 doses in 15 minutes.    Pediatric Multiple Vit-C-FA (CHILDRENS CHEWABLE MULTI VITS PO) 1 tablet, Daily    rosuvastatin (CRESTOR) 20 mg, Oral, Daily    tamsulosin (FLOMAX) 0.4 mg, Oral, Nightly    triamcinolone (KENALOG) 0.1 % cream 2 Times Daily    Ustekinumab (STELARA) 90 mg, Subcutaneous, Every 28 Days    vitamin B-12 (CYANOCOBALAMIN) 1,000 mcg, Daily    Vitamin D, Cholecalciferol, 25 MCG (1000 UT) capsule 1 capsule, Daily       OBJECTIVE:  /75 (BP Location: Left arm, Patient Position: Sitting, Cuff Size: Adult)   Pulse 73   Temp 97.7 °F (36.5 °C) (Infrared)   Resp 19   Ht 175.3 cm (69.02\")   Wt 99.8 kg (220 lb)   SpO2 97%   BMI 32.47 kg/m²    Physical Exam  Vitals and nursing note reviewed.   Constitutional:       Appearance: Normal appearance. He is well-developed.   HENT:      Head: Normocephalic and atraumatic.      Right Ear: Tympanic membrane, ear canal and external ear normal.      Left Ear: Tympanic membrane, ear canal and external ear normal.      Nose: Nose normal. No septal deviation, nasal tenderness or congestion.      Mouth/Throat:      Lips: Pink. No lesions.      Mouth: Mucous membranes are moist. No oral lesions.      Dentition: Normal dentition.      Pharynx: Oropharynx is clear. No pharyngeal swelling, oropharyngeal exudate or posterior oropharyngeal erythema.   Eyes:      General: Lids are normal. Vision grossly intact. No scleral icterus.        Right eye: No discharge.         Left eye: No discharge.      Extraocular Movements: Extraocular movements intact.      Conjunctiva/sclera: Conjunctivae normal.      Right eye: Right conjunctiva is not injected.      Left eye: Left conjunctiva is not injected.      Pupils: Pupils are equal, round, and reactive to light.   Neck:      Thyroid: No thyroid mass.      Trachea: Trachea normal.   Cardiovascular:      Rate and Rhythm: Normal rate and regular rhythm.      Heart sounds: " Normal heart sounds. No murmur heard.     No gallop.   Pulmonary:      Effort: Pulmonary effort is normal.      Breath sounds: Normal breath sounds and air entry. No wheezing, rhonchi or rales.   Abdominal:      Hernia: A hernia (Parastomal hernia noted) is present.   Musculoskeletal:         General: No tenderness or deformity. Normal range of motion.      Cervical back: Full passive range of motion without pain, normal range of motion and neck supple.      Thoracic back: Normal.      Right lower leg: No edema.      Left lower leg: No edema.   Skin:     General: Skin is warm and dry.      Coloration: Skin is not jaundiced.      Findings: No rash.   Neurological:      Mental Status: He is alert and oriented to person, place, and time.      Sensory: Sensation is intact.      Motor: Motor function is intact.      Coordination: Coordination is intact.      Gait: Gait is intact.      Deep Tendon Reflexes: Reflexes are normal and symmetric.   Psychiatric:         Mood and Affect: Mood and affect normal.         Behavior: Behavior normal.         Judgment: Judgment normal.       Physical Exam           Procedures    Results      Assessment/Plan:     Diagnoses and all orders for this visit:    1. Type 2 diabetes mellitus with hyperglycemia, with long-term current use of insulin (Primary)  -     Ambulatory Referral for Diabetic Eye Exam-Ophthalmology    2. Elevated triglycerides with high cholesterol    3. Hypertension, unspecified type          An After Visit Summary was printed and given to the patient at discharge.  Return in about 4 weeks (around 12/10/2024) for Medicare Wellness.       Assessment & Plan  Hypertension: Patient instructed to take lisinopril 20 mg at bedtime instead of in the morning.  Discussed with patient that the most likely time that he will have a heart attack or stroke will be around 2:00 AM.  We discussed the benefit of taking his lisinopril 20 mg at bedtime to help decrease the risk of this  occurring.  We also discussed that lisinopril 20 mg taken in the morning is wearing off by bedtime and thus blood pressure continues to rise during sleep.  Patient will take lisinopril 20 mg at bedtime and 10 mg in the morning if needed.    I spent 31 minutes caring for Zachariah on this date of service. This time includes time spent by me in the following activities: preparing for the visit, reviewing tests, performing a medically appropriate examination and/or evaluation, counseling and educating the patient/family/caregiver, documenting information in the medical record, independently interpreting results and communicating that information with the patient/family/caregiver, care coordination, ordering medications, and ordering test(s)     : I have been treating this patient over a continuum addressing both chronic and acute care concerns.     Amaya ESCALANTE 11/12/2024   Electronically signed.    Patient or patient representative verbalized consent for the use of Ambient Listening during the visit with  BORIS Pérez for chart documentation. 11/12/2024  16:39 CST

## 2024-12-17 ENCOUNTER — OFFICE VISIT (OUTPATIENT)
Dept: FAMILY MEDICINE CLINIC | Facility: CLINIC | Age: 84
End: 2024-12-17
Payer: MEDICARE

## 2024-12-17 VITALS
BODY MASS INDEX: 32.59 KG/M2 | WEIGHT: 220.06 LBS | HEIGHT: 69 IN | HEART RATE: 65 BPM | DIASTOLIC BLOOD PRESSURE: 71 MMHG | SYSTOLIC BLOOD PRESSURE: 121 MMHG | OXYGEN SATURATION: 97 % | RESPIRATION RATE: 19 BRPM | TEMPERATURE: 97.8 F

## 2024-12-17 DIAGNOSIS — Z00.00 MEDICARE ANNUAL WELLNESS VISIT, SUBSEQUENT: Primary | ICD-10-CM

## 2024-12-17 DIAGNOSIS — S61.210A LACERATION OF RIGHT INDEX FINGER, FOREIGN BODY PRESENCE UNSPECIFIED, NAIL DAMAGE STATUS UNSPECIFIED, INITIAL ENCOUNTER: ICD-10-CM

## 2024-12-17 DIAGNOSIS — L23.1 CONTACT DERMATITIS DUE TO ADHESIVES, UNSPECIFIED CONTACT DERMATITIS TYPE: ICD-10-CM

## 2024-12-17 PROCEDURE — 3078F DIAST BP <80 MM HG: CPT | Performed by: NURSE PRACTITIONER

## 2024-12-17 PROCEDURE — 90471 IMMUNIZATION ADMIN: CPT | Performed by: NURSE PRACTITIONER

## 2024-12-17 PROCEDURE — 90715 TDAP VACCINE 7 YRS/> IM: CPT | Performed by: NURSE PRACTITIONER

## 2024-12-17 PROCEDURE — 3074F SYST BP LT 130 MM HG: CPT | Performed by: NURSE PRACTITIONER

## 2024-12-17 PROCEDURE — 1170F FXNL STATUS ASSESSED: CPT | Performed by: NURSE PRACTITIONER

## 2024-12-17 PROCEDURE — 99213 OFFICE O/P EST LOW 20 MIN: CPT | Performed by: NURSE PRACTITIONER

## 2024-12-17 PROCEDURE — 1126F AMNT PAIN NOTED NONE PRSNT: CPT | Performed by: NURSE PRACTITIONER

## 2024-12-17 PROCEDURE — G0439 PPPS, SUBSEQ VISIT: HCPCS | Performed by: NURSE PRACTITIONER

## 2024-12-17 RX ORDER — TRIAMCINOLONE ACETONIDE 1 MG/G
1 CREAM TOPICAL 2 TIMES DAILY
Qty: 80 G | Refills: 0 | Status: SHIPPED | OUTPATIENT
Start: 2024-12-17

## 2024-12-17 NOTE — PROGRESS NOTES
The ABCs of the Annual Wellness Visit  Initial Medicare Wellness Visit    Subjective     Zachariah Acosta is a 84 y.o. male who presents for an Initial Medicare Wellness Visit.    The following portions of the patient's history were reviewed and   updated as appropriate: allergies, current medications, past family history, past medical history, past social history, past surgical history, and problem list.     Compared to one year ago, the patient feels his physical   health is better.    Compared to one year ago, the patient feels his mental   health is the same.    Recent Hospitalizations:  He was admitted within the past 365 days at [facility].       Current Medical Providers:  Patient Care Team:  Amaya Mix APRN as PCP - General (Family Medicine)  Yung Polanco MD as Cardiologist (Cardiology)  Monique Carlos MD as Referring Physician (Family Medicine)  Zachariah Menjivar DO as Consulting Physician (Gastroenterology)  Oren Brambila MD as Consulting Physician (Internal Medicine)  Jacob Mejias MD as Consulting Physician (Pulmonary Disease)    Outpatient Medications Prior to Visit   Medication Sig Dispense Refill    acetaminophen (TYLENOL) 500 MG tablet Take 1 tablet by mouth Every 6 (Six) Hours As Needed for Mild Pain.      aspirin 81 MG EC tablet Take 1 tablet by mouth Daily.      Budeson-Glycopyrrol-Formoterol (Breztri Aerosphere) 160-9-4.8 MCG/ACT aerosol inhaler Inhale 2 puffs 2 (Two) Times a Day. 3 each 4    ciclopirox (LOPROX) 0.77 % cream Apply 1 Application topically to the appropriate area as directed 2 (Two) Times a Day. To under arms after using Clindamycin Solution      clindamycin (CLEOCIN T) 1 % external solution 1 application  2 (Two) Times a Day. To under arms      Continuous Blood Gluc Sensor (Dexcom G7 Sensor) misc 1 each Every 10 (Ten) Days. 9 each 3    Dupilumab 300 MG/2ML solution prefilled syringe Inject  under the skin into the appropriate area  as directed Every 14 (Fourteen) Days.      ferrous sulfate 325 (65 FE) MG tablet Take 1 tablet by mouth Daily With Breakfast.      finasteride (PROSCAR) 5 MG tablet Take 1 tablet by mouth Daily. 90 tablet 3    fluticasone (FLONASE) 50 MCG/ACT nasal spray 1 spray into the nostril(s) as directed by provider Daily. 16 g 5    gabapentin (NEURONTIN) 100 MG capsule       insulin glargine (LANTUS, SEMGLEE) 100 UNIT/ML injection 15 units in the morning and 40 units at bedtime. 18 mL 2    levalbuterol (XOPENEX HFA) 45 MCG/ACT inhaler Inhale 2 puffs Every 6 (Six) Hours As Needed for Wheezing.      levothyroxine (Synthroid) 50 MCG tablet Take 1 tablet by mouth Daily. 90 tablet 4    lisinopril (PRINIVIL,ZESTRIL) 20 MG tablet TAKE 1 TABLET BY MOUTH IN THE MORNING AND 1/2 (ONE-HALF) AT BEDTIME 180 tablet 0    meclizine (ANTIVERT) 12.5 MG tablet Take 1 tablet by mouth 3 (Three) Times a Day As Needed for Dizziness. 30 tablet 0    melatonin 5 MG tablet tablet Take 1 tablet by mouth At Night As Needed.      metFORMIN ER (GLUCOPHAGE-XR) 500 MG 24 hr tablet Take 1 tablet by mouth Daily With Breakfast. 90 tablet 4    metoprolol tartrate (LOPRESSOR) 25 MG tablet Take 1 tablet by mouth 2 (Two) Times a Day. 180 tablet 4    mupirocin (BACTROBAN) 2 % ointment Apply 1 Application topically to the appropriate area as directed Daily.      Pediatric Multiple Vit-C-FA (CHILDRENS CHEWABLE MULTI VITS PO) Take 1 tablet by mouth Daily.      rosuvastatin (CRESTOR) 40 MG tablet Take 0.5 tablets by mouth Daily. 90 tablet 4    tamsulosin (FLOMAX) 0.4 MG capsule 24 hr capsule Take 1 capsule by mouth Every Night. 90 capsule 3    Ustekinumab (STELARA) 90 MG/ML solution prefilled syringe Injection Inject 90 mg under the skin into the appropriate area as directed Every 28 (Twenty-Eight) Days. 1 mL 6    vitamin B-12 (CYANOCOBALAMIN) 1000 MCG tablet Take 1 tablet by mouth Daily.      Vitamin D, Cholecalciferol, 25 MCG (1000 UT) capsule Take 1 capsule by mouth  Daily.      triamcinolone (KENALOG) 0.1 % cream Apply 1 Application topically to the appropriate area as directed 2 (Two) Times a Day.      naloxone (NARCAN) 4 MG/0.1ML nasal spray Administer 1 spray into the nostril(s) as directed by provider As Needed. (Patient not taking: Reported on 12/17/2024)      nitroglycerin (NITROSTAT) 0.4 MG SL tablet Place 1 tablet under the tongue Every 5 (Five) Minutes As Needed for Chest Pain. Take no more than 3 doses in 15 minutes. (Patient not taking: Reported on 12/17/2024) 25 tablet 1     No facility-administered medications prior to visit.       No opioid medication identified on active medication list. I have reviewed chart for other potential  high risk medication/s and harmful drug interactions in the elderly.        Aspirin is on active medication list. Aspirin use is indicated based on review of current medical condition/s. Pros and cons of this therapy have been discussed today. Benefits of this medication outweigh potential harm.  Patient has been encouraged to continue taking this medication.  .      Patient Active Problem List   Diagnosis    BREANN on CPAP    CAD (coronary artery disease)    Chronic kidney disease (CKD), stage III (moderate)    Disorder of lung    Essential hypertension    Mild intermittent asthma without complication    S/P CABG (coronary artery bypass graft)    Type 2 diabetes mellitus with hyperglycemia, with long-term current use of insulin    Ulcerative colitis    Obesity (BMI 30-39.9)    Non-seasonal allergic rhinitis    SOB (shortness of breath)    Pulmonary scarring    Pulmonary nodule seen on imaging study    Mixed hyperlipidemia    Non-smoker    Ulcerative colitis with complication    Intractable abdominal pain    Constipation    KE (acute kidney injury)    Hematuria    Left lower quadrant abdominal pain    Erectile dysfunction    Class 1 obesity due to excess calories with serious comorbidity and body mass index (BMI) of 34.0 to 34.9 in adult  "   Thyroid nodule    Double vision    Elevated PSA    Iron deficiency anemia due to chronic blood loss    Long-term use of immunosuppressant medication    Nontraumatic complete tear of left rotator cuff    Rosacea    Nontraumatic intracerebral hemorrhage    COPD (chronic obstructive pulmonary disease)    Acute appendicitis    Acute appendicitis, unspecified acute appendicitis type    Acute ulcerative colitis    Parastomal hernia without obstruction or gangrene    Colostomy in place    Hypothyroidism     Advance Care Planning   Advance Care Planning     Advance Directive is not on file.  ACP discussion was held with the patient during this visit. Patient has an advance directive (not in EMR), copy requested.       Objective    Vitals:    12/17/24 1303   BP: 121/71   BP Location: Left arm   Patient Position: Sitting   Cuff Size: Large Adult   Pulse: 65   Resp: 19   Temp: 97.8 °F (36.6 °C)   TempSrc: Infrared   SpO2: 97%   Weight: 99.8 kg (220 lb 1 oz)   Height: 175.3 cm (69.02\")   PainSc: 0-No pain     Estimated body mass index is 32.48 kg/m² as calculated from the following:    Height as of this encounter: 175.3 cm (69.02\").    Weight as of this encounter: 99.8 kg (220 lb 1 oz).           Does the patient have evidence of cognitive impairment?   No    Lab Results   Component Value Date    TRIG 265 (H) 11/05/2024    HDL 40 11/05/2024    LDL 36 11/05/2024    VLDL 40 11/05/2024    HGBA1C 6.6 (H) 11/05/2024        HEALTH RISK ASSESSMENT    Smoking Status:  Social History     Tobacco Use   Smoking Status Never    Passive exposure: Never   Smokeless Tobacco Never     Alcohol Consumption:  Social History     Substance and Sexual Activity   Alcohol Use Never     Fall Risk Screen:    STEADI Fall Risk Assessment was completed, and patient is at LOW risk for falls.Assessment completed on:12/17/2024    Depression Screen:       12/17/2024     1:08 PM   PHQ-2/PHQ-9 Depression Screening   Little interest or pleasure in doing things " Not at all   Feeling down, depressed, or hopeless Not at all   How difficult have these problems made it for you to do your work, take care of things at home, or get along with other people? Not difficult at all       Health Habits and Functional and Cognitive Screenin/17/2024     1:08 PM   Functional & Cognitive Status   Do you have difficulty preparing food and eating? No   Do you have difficulty bathing yourself, getting dressed or grooming yourself? No   Do you have difficulty using the toilet? No   Do you have difficulty moving around from place to place? No   Do you have trouble with steps or getting out of a bed or a chair? No   Current Diet Diabetic Diet   Dental Exam Up to date   Eye Exam Up to date   Exercise (times per week) 3 times per week   Current Exercises Include Light Weights   Do you need help using the phone?  No   Are you deaf or do you have serious difficulty hearing?  No   Do you need help to go to places out of walking distance? No   Do you need help shopping? No   Do you need help preparing meals?  No   Do you need help with housework?  No   Do you need help with laundry? No   Do you need help taking your medications? No   Do you need help managing money? No   Do you ever drive or ride in a car without wearing a seat belt? No   Have you felt unusual stress, anger or loneliness in the last month? No   Who do you live with? Spouse   If you need help, do you have trouble finding someone available to you? No   Have you been bothered in the last four weeks by sexual problems? No   Do you have difficulty concentrating, remembering or making decisions? No       Age-appropriate Screening Schedule:  Refer to the list below for future screening recommendations based on patient's age, sex and/or medical conditions. Orders for these recommended tests are listed in the plan section. The patient has been provided with a written plan.    Health Maintenance   Topic Date Due    ZOSTER VACCINE (  2) Never done    RSV Vaccine - Adults (1 - 1-dose 75+ series) Never done    COVID-19 Vaccine (2 - 2024-25 season) 12/19/2024 (Originally 9/1/2024)    HEMOGLOBIN A1C  05/05/2025    BMI FOLLOWUP  08/15/2025    LIPID PANEL  11/05/2025    DIABETIC EYE EXAM  11/20/2025    ANNUAL WELLNESS VISIT  12/17/2025    TDAP/TD VACCINES (4 - Td or Tdap) 12/17/2034    INFLUENZA VACCINE  Completed    Pneumococcal Vaccine 65+  Completed    URINE MICROALBUMIN  Discontinued          CMS Preventative Services Quick Reference  Risk Factors Identified During Encounter    Inactivity/Sedentary: Patient was advised to exercise at least 150 minutes a week per CDC recommendations.  Dental Screening Recommended    The above risks/problems have been discussed with the patient.  Pertinent information has been shared with the patient in the After Visit Summary.  An After Visit Summary and PPPS were made available to the patient.  Diagnoses and all orders for this visit:    1. Medicare annual wellness visit, subsequent (Primary)    2. Laceration of right index finger, foreign body presence unspecified, nail damage status unspecified, initial encounter  Comments:  finger laceration, index occured this morning. Supperficial.  Orders:  -     Tdap Vaccine => 8yo IM (BOOSTRIX/ADACEL)    3. Contact dermatitis due to adhesives, unspecified contact dermatitis type  -     triamcinolone (KENALOG) 0.1 % cream; Apply 1 Application topically to the appropriate area as directed 2 (Two) Times a Day.  Dispense: 80 g; Refill: 0      Follow Up:  Next Medicare Wellness visit to be scheduled in 1 year.        Additional E&M Note during same encounter follows:  Patient has multiple medical problems which are significant and separately identifiable that require additional work above and beyond the Medicare Wellness Visit.      Chief Complaint  Medicare Wellness-subsequent    Subjective        HPI  Zachariah Garcia Daev is also being seen today for laceration of right  finger.   History of Present Illness  The patient is an 84-year-old male who presents today for a Medicare annual wellness visit.    He reports a significant improvement in his overall health compared to the previous year, attributing this to the cessation of cold exposure. His mental health remains stable, with no reported memory issues. He has not experienced any falls within the past year, with the last incident occurring 4 years ago in [place]. He engages in physical activity using stretching bands while watching television. His last dental visit was a few years ago for a tooth extraction, and he does not regularly receive dental cleanings. [facility] and gets it every year. He has a cut on his right index finger.    He consulted with his urologist, Dr. Tellez, approximately 1.5 months ago due to difficulty urinating upon waking, but reports no urinary issues during the day. He is currently on finasteride and Flomax, the latter of which he takes sparingly due to perceived lack of necessity and potential side effects. He administers these medications separately, with finasteride taken at breakfast and Flomax, if needed, with his evening meal.    He applies triamcinolone cream to areas of skin irritation or redness, particularly around his stoma site, and finds it beneficial.    Supplemental Information  He has an advanced care directive in place. He underwent a diabetic eye exam following his last visit, which revealed no ocular complications related to diabetes.    MEDICATIONS  Current: finasteride, Flomax, triamcinolone cream    IMMUNIZATIONS  [facility].      Review of Systems   Constitutional:  Negative for activity change and fatigue.   HENT:  Negative for congestion, rhinorrhea, sinus pressure, sore throat and trouble swallowing.    Eyes:  Negative for visual disturbance.   Respiratory:  Negative  "for chest tightness and shortness of breath.    Cardiovascular:  Negative for chest pain and palpitations.   Gastrointestinal:  Negative for abdominal pain, constipation, diarrhea and nausea.   Genitourinary:  Negative for dysuria and frequency.   Musculoskeletal:  Negative for back pain and neck pain.   Skin:  Negative for rash and wound.        Laceration of right finger   Neurological:  Negative for weakness and confusion.   Psychiatric/Behavioral:  Negative for agitation, self-injury and sleep disturbance. The patient is not nervous/anxious.        Objective   Vital Signs:  /71 (BP Location: Left arm, Patient Position: Sitting, Cuff Size: Large Adult)   Pulse 65   Temp 97.8 °F (36.6 °C) (Infrared)   Resp 19   Ht 175.3 cm (69.02\")   Wt 99.8 kg (220 lb 1 oz)   SpO2 97%   BMI 32.48 kg/m²     Physical Exam  Vitals and nursing note reviewed.   Constitutional:       Appearance: Normal appearance. He is well-developed.   HENT:      Head: Normocephalic and atraumatic.      Right Ear: Tympanic membrane, ear canal and external ear normal.      Left Ear: Tympanic membrane, ear canal and external ear normal.      Nose: Nose normal. No septal deviation, nasal tenderness or congestion.      Mouth/Throat:      Lips: Pink. No lesions.      Mouth: Mucous membranes are moist. No oral lesions.      Dentition: Normal dentition.      Pharynx: Oropharynx is clear. No pharyngeal swelling, oropharyngeal exudate or posterior oropharyngeal erythema.   Eyes:      General: Lids are normal. Vision grossly intact. No scleral icterus.        Right eye: No discharge.         Left eye: No discharge.      Extraocular Movements: Extraocular movements intact.      Conjunctiva/sclera: Conjunctivae normal.      Right eye: Right conjunctiva is not injected.      Left eye: Left conjunctiva is not injected.      Pupils: Pupils are equal, round, and reactive to light.   Neck:      Thyroid: No thyroid mass.      Trachea: Trachea normal. "   Cardiovascular:      Rate and Rhythm: Normal rate and regular rhythm.      Heart sounds: Normal heart sounds. No murmur heard.     No gallop.   Pulmonary:      Effort: Pulmonary effort is normal.      Breath sounds: Normal breath sounds and air entry. No wheezing, rhonchi or rales.   Abdominal:      Hernia: A hernia (Parastomal hernia noted) is present.   Musculoskeletal:         General: No tenderness or deformity. Normal range of motion.      Cervical back: Full passive range of motion without pain, normal range of motion and neck supple.      Thoracic back: Normal.      Right lower leg: No edema.      Left lower leg: No edema.   Skin:     General: Skin is warm and dry.      Coloration: Skin is not jaundiced.      Findings: No rash.   Neurological:      Mental Status: He is alert and oriented to person, place, and time.      Sensory: Sensation is intact.      Motor: Motor function is intact.      Coordination: Coordination is intact.      Gait: Gait is intact.      Deep Tendon Reflexes: Reflexes are normal and symmetric.   Psychiatric:         Mood and Affect: Mood and affect normal.         Behavior: Behavior normal.         Judgment: Judgment normal.        Physical Exam        The following data was reviewed by: BORIS Pérez on 12/17/2024:  Common labs          5/7/2024    17:39 8/7/2024    08:31 8/7/2024    08:35 11/5/2024    13:03   Common Labs   Glucose  116   138    BUN  15   14    Creatinine  0.86   1.00    Sodium  139   135    Potassium  4.3   4.0    Chloride  105   100    Calcium  8.6   8.7    Albumin  3.9   4.2    Total Bilirubin  0.4   0.2    Alkaline Phosphatase  82   115    AST (SGOT)  18   26    ALT (SGPT)  15   19    WBC 9.62  7.78   7.90    Hemoglobin 15.7  14.7   15.1    Hematocrit 46.8  43.9   42.9    Platelets 280  228   202    Total Cholesterol  113   116    Triglycerides  134   265    HDL Cholesterol  39   40    LDL Cholesterol   50   36    Hemoglobin A1C  6.90   6.6     Microalbumin, Urine   2.6  <1.2      Data reviewed :    Results                  Assessment and Plan   Diagnoses and all orders for this visit:    1. Medicare annual wellness visit, subsequent (Primary)    2. Laceration of right index finger, foreign body presence unspecified, nail damage status unspecified, initial encounter  Comments:  finger laceration, index occured this morning. Supperficial.  Orders:  -     Tdap Vaccine => 6yo IM (BOOSTRIX/ADACEL)    3. Contact dermatitis due to adhesives, unspecified contact dermatitis type  -     triamcinolone (KENALOG) 0.1 % cream; Apply 1 Application topically to the appropriate area as directed 2 (Two) Times a Day.  Dispense: 80 g; Refill: 0      Assessment & Plan  1. Medicare annual wellness visit.  He reports feeling better overall compared to last year, with no recent falls or memory issues. He exercises using stretching bands and has been advised to increase physical activity to 30 minutes, five times a week. He is due for several vaccines, including RSV, shingles, and tetanus. [facility] He is advised to get a dental check-up due to the risk of gingivitis from elevated sugar levels. He is advised to get the RSV vaccine to prevent pneumonia, the shingles vaccine separately to avoid potential side effects, and the tetanus shot due to the resurgence of pertussis. He is advised to get the tetanus shot at the [facility] department unless he has a cut, in which case it can be administered here.    2. Urinary issues.  He reports difficulty urinating in the morning but not during the day. He is currently taking finasteride and occasionally Flomax. He is advised not to take both medications simultaneously; instead, he should take finasteride with breakfast and Flomax with his evening meal if needed.    3. Skin irritation.  He uses triamcinolone cream for occasional skin irritation and redness. A prescription for an 80 g  tube of triamcinolone cream will be sent to his pharmacy.    PROCEDURE  The patient had a tooth extraction a few years ago.       I spent 10 min on wellness exam and 25 minutes on care of chronic conditions for Zachariah on this date of service. This time includes time spent by me in the following activities:preparing for the visit, reviewing tests, obtaining and/or reviewing a separately obtained history, performing a medically appropriate examination and/or evaluation , counseling and educating the patient/family/caregiver, ordering medications, tests, or procedures, documenting information in the medical record, independently interpreting results and communicating that information with the patient/family/caregiver, and care coordination  Follow Up   No follow-ups on file.  Patient was given instructions and counseling regarding his condition or for health maintenance advice. Please see specific information pulled into the AVS if appropriate.     Patient or patient representative verbalized consent for the use of Ambient Listening during the visit with  BORIS Pérez for chart documentation. 12/17/2024  19:10 CST

## 2024-12-21 ENCOUNTER — HOSPITAL ENCOUNTER (EMERGENCY)
Facility: HOSPITAL | Age: 84
Discharge: HOME | End: 2024-12-21
Attending: EMERGENCY MEDICINE
Payer: MEDICARE

## 2024-12-21 ENCOUNTER — APPOINTMENT (OUTPATIENT)
Dept: GENERAL RADIOLOGY | Facility: HOSPITAL | Age: 84
End: 2024-12-21
Payer: MEDICARE

## 2024-12-21 VITALS
OXYGEN SATURATION: 94 % | SYSTOLIC BLOOD PRESSURE: 141 MMHG | BODY MASS INDEX: 32.58 KG/M2 | HEART RATE: 62 BPM | RESPIRATION RATE: 18 BRPM | HEIGHT: 69 IN | TEMPERATURE: 98.2 F | WEIGHT: 220 LBS | DIASTOLIC BLOOD PRESSURE: 80 MMHG

## 2024-12-21 DIAGNOSIS — R07.9 CHEST PAIN, UNSPECIFIED TYPE: Primary | ICD-10-CM

## 2024-12-21 DIAGNOSIS — R73.9 ELEVATED BLOOD SUGAR: ICD-10-CM

## 2024-12-21 DIAGNOSIS — R74.8 ALKALINE PHOSPHATASE ELEVATION: ICD-10-CM

## 2024-12-21 LAB
ALBUMIN SERPL-MCNC: 3.7 G/DL (ref 3.5–5.2)
ALBUMIN/GLOB SERPL: 1.3 G/DL
ALP SERPL-CCNC: 169 U/L (ref 39–117)
ALT SERPL W P-5'-P-CCNC: 13 U/L (ref 1–41)
ANION GAP SERPL CALCULATED.3IONS-SCNC: 10 MMOL/L (ref 5–15)
AST SERPL-CCNC: 19 U/L (ref 1–40)
BASOPHILS # BLD AUTO: 0.07 10*3/MM3 (ref 0–0.2)
BASOPHILS NFR BLD AUTO: 0.7 % (ref 0–1.5)
BILIRUB SERPL-MCNC: 0.2 MG/DL (ref 0–1.2)
BUN SERPL-MCNC: 18 MG/DL (ref 8–23)
BUN/CREAT SERPL: 21.2 (ref 7–25)
CALCIUM SPEC-SCNC: 8.7 MG/DL (ref 8.6–10.5)
CHLORIDE SERPL-SCNC: 103 MMOL/L (ref 98–107)
CO2 SERPL-SCNC: 24 MMOL/L (ref 22–29)
CREAT SERPL-MCNC: 0.85 MG/DL (ref 0.76–1.27)
DEPRECATED RDW RBC AUTO: 43.9 FL (ref 37–54)
EGFRCR SERPLBLD CKD-EPI 2021: 86.8 ML/MIN/1.73
EOSINOPHIL # BLD AUTO: 0.95 10*3/MM3 (ref 0–0.4)
EOSINOPHIL NFR BLD AUTO: 10 % (ref 0.3–6.2)
ERYTHROCYTE [DISTWIDTH] IN BLOOD BY AUTOMATED COUNT: 13.2 % (ref 12.3–15.4)
GEN 5 1HR TROPONIN T REFLEX: 12 NG/L
GLOBULIN UR ELPH-MCNC: 2.8 GM/DL
GLUCOSE SERPL-MCNC: 168 MG/DL (ref 65–99)
HCT VFR BLD AUTO: 40.2 % (ref 37.5–51)
HGB BLD-MCNC: 13.6 G/DL (ref 13–17.7)
HOLD SPECIMEN: NORMAL
HOLD SPECIMEN: NORMAL
IMM GRANULOCYTES # BLD AUTO: 0.03 10*3/MM3 (ref 0–0.05)
IMM GRANULOCYTES NFR BLD AUTO: 0.3 % (ref 0–0.5)
LYMPHOCYTES # BLD AUTO: 1.51 10*3/MM3 (ref 0.7–3.1)
LYMPHOCYTES NFR BLD AUTO: 15.9 % (ref 19.6–45.3)
MCH RBC QN AUTO: 30.4 PG (ref 26.6–33)
MCHC RBC AUTO-ENTMCNC: 33.8 G/DL (ref 31.5–35.7)
MCV RBC AUTO: 89.9 FL (ref 79–97)
MONOCYTES # BLD AUTO: 0.67 10*3/MM3 (ref 0.1–0.9)
MONOCYTES NFR BLD AUTO: 7.1 % (ref 5–12)
NEUTROPHILS NFR BLD AUTO: 6.24 10*3/MM3 (ref 1.7–7)
NEUTROPHILS NFR BLD AUTO: 66 % (ref 42.7–76)
NRBC BLD AUTO-RTO: 0 /100 WBC (ref 0–0.2)
PLATELET # BLD AUTO: 224 10*3/MM3 (ref 140–450)
PMV BLD AUTO: 9.4 FL (ref 6–12)
POTASSIUM SERPL-SCNC: 4.3 MMOL/L (ref 3.5–5.2)
PROT SERPL-MCNC: 6.5 G/DL (ref 6–8.5)
RBC # BLD AUTO: 4.47 10*6/MM3 (ref 4.14–5.8)
SODIUM SERPL-SCNC: 137 MMOL/L (ref 136–145)
TROPONIN T NUMERIC DELTA: -3 NG/L
TROPONIN T SERPL HS-MCNC: 15 NG/L
WBC NRBC COR # BLD AUTO: 9.47 10*3/MM3 (ref 3.4–10.8)
WHOLE BLOOD HOLD COAG: NORMAL
WHOLE BLOOD HOLD SPECIMEN: NORMAL

## 2024-12-21 PROCEDURE — 71045 X-RAY EXAM CHEST 1 VIEW: CPT

## 2024-12-21 PROCEDURE — 99284 EMERGENCY DEPT VISIT MOD MDM: CPT

## 2024-12-21 PROCEDURE — 84484 ASSAY OF TROPONIN QUANT: CPT | Performed by: EMERGENCY MEDICINE

## 2024-12-21 PROCEDURE — 80053 COMPREHEN METABOLIC PANEL: CPT | Performed by: EMERGENCY MEDICINE

## 2024-12-21 PROCEDURE — 93005 ELECTROCARDIOGRAM TRACING: CPT | Performed by: EMERGENCY MEDICINE

## 2024-12-21 PROCEDURE — 93005 ELECTROCARDIOGRAM TRACING: CPT

## 2024-12-21 PROCEDURE — 36415 COLL VENOUS BLD VENIPUNCTURE: CPT

## 2024-12-21 PROCEDURE — 85025 COMPLETE CBC W/AUTO DIFF WBC: CPT | Performed by: EMERGENCY MEDICINE

## 2024-12-21 RX ORDER — NITROGLYCERIN 0.4 MG/1
0.4 TABLET SUBLINGUAL
Status: DISCONTINUED | OUTPATIENT
Start: 2024-12-21 | End: 2024-12-21 | Stop reason: HOSPADM

## 2024-12-21 RX ORDER — SODIUM CHLORIDE 0.9 % (FLUSH) 0.9 %
10 SYRINGE (ML) INJECTION AS NEEDED
Status: DISCONTINUED | OUTPATIENT
Start: 2024-12-21 | End: 2024-12-21 | Stop reason: HOSPADM

## 2024-12-21 RX ORDER — ASPIRIN 81 MG/1
324 TABLET, CHEWABLE ORAL ONCE
Status: COMPLETED | OUTPATIENT
Start: 2024-12-21 | End: 2024-12-21

## 2024-12-21 RX ADMIN — NITROGLYCERIN 0.4 MG: 0.4 TABLET SUBLINGUAL at 18:46

## 2024-12-21 RX ADMIN — ASPIRIN 324 MG: 81 TABLET, CHEWABLE ORAL at 18:43

## 2024-12-22 NOTE — ED PROVIDER NOTES
Subjective   History of Present Illness  Patient is a 84-year-old gentleman who came to the ED with a chest discomfort is got history coronary disease multiple stents and CABG.    Chest Pain  Pain location:  Substernal area  Pain quality: aching and tightness    Pain radiates to:  Does not radiate  Pain severity:  Moderate  Onset quality:  Gradual  Timing:  Intermittent  Progression:  Waxing and waning  Context: not lifting, not movement, not at rest and not trauma    Relieved by: Motrin.  Worsened by:  Nothing  Associated symptoms: no abdominal pain, no altered mental status, no anorexia, no anxiety, no back pain, no claudication, no cough, no dysphagia, no fatigue, no fever, no headache, no lower extremity edema, no nausea, no numbness, no orthopnea, no palpitations, no PND, no syncope and no weakness    Risk factors: coronary artery disease, high cholesterol, hypertension, male sex and obesity    Risk factors: no aortic disease and no smoking        Review of Systems   Constitutional: Negative.  Negative for fatigue and fever.   HENT: Negative.  Negative for trouble swallowing.    Respiratory:  Negative for cough.    Cardiovascular:  Positive for chest pain. Negative for palpitations, orthopnea, claudication, syncope and PND.   Gastrointestinal: Negative.  Negative for abdominal distention, abdominal pain, anorexia and nausea.   Endocrine: Negative.    Genitourinary: Negative.    Musculoskeletal: Negative.  Negative for back pain and neck pain.   Skin:  Negative for color change and pallor.   Neurological: Negative.  Negative for syncope, weakness, light-headedness, numbness and headaches.   Hematological: Negative.  Does not bruise/bleed easily.   All other systems reviewed and are negative.      Past Medical History:   Diagnosis Date    Asthma     Colitis     Coronary artery disease     Diabetes mellitus     Diverticulitis     Elevated cholesterol     HL (hearing loss) 2012    Hyperlipidemia     Hypertension   "   Inflammatory bowel disease 2001    Myocardial infarct     EASTER 2020    Primary central sleep apnea 2009    Using Cpap    Sleep apnea     cpap at hs    Sleep apnea, obstructive     Stroke     Visual impairment 2016    Double vision       Allergies   Allergen Reactions    Albuterol Other (See Comments)     \"Makes my throat spasm and swell\"    Adhesive Tape Rash    Azithromycin Rash    Fentanyl Nausea And Vomiting       Past Surgical History:   Procedure Laterality Date    ADENOIDECTOMY  1947    APPENDECTOMY N/A 05/05/2023    Procedure: APPENDECTOMY LAPAROSCOPIC;  Surgeon: Katherine Carranza MD;  Location:  [facility] OR;  Service: General;  Laterality: N/A;    BACK SURGERY      CARDIAC CATHETERIZATION      stents x 6    CARDIAC SURGERY      CABG TRIPLE BYPASS 2012    CATARACT EXTRACTION      COLONOSCOPY      COLONOSCOPY N/A 06/04/2021    Procedure: COLONOSCOPY WITH ANESTHESIA;  Surgeon: Zachariah Menjivar DO;  Location:  [facility];  Service: Gastroenterology;  Laterality: N/A;  pre hx ulcerative colitis  post ulcerative colitis  dr collins gusman    COLONOSCOPY N/A 11/29/2023    Procedure: COLONOSCOPY LOWER LIMITED;  Surgeon: Zachariah Menjivar DO;  Location:  [facility];  Service: Gastroenterology;  Laterality: N/A;  preop; hx of colitis  postop colitis - questionable colonic stricture at 30cm   pcp bryanna salty    COLOSTOMY      CORONARY ANGIOPLASTY WITH STENT PLACEMENT      CORONARY ARTERY BYPASS GRAFT  December 2012    CORONARY STENT PLACEMENT      HIP SURGERY Right     total hip    JOINT REPLACEMENT  2015    Right hip    PENILE PROSTHESIS IMPLANT N/A 12/13/2021    Procedure: 3-PIECE INFLATABLE PENILE PROSTHESIS PLACEMENT;  Surgeon: Jose Tellez MD;  Location: [facility] OR;  Service: Urology;  Laterality: N/A;    TONSILLECTOMY  1947       Family History   Problem Relation Age of Onset    Colon cancer Brother     Colon polyps Neg Hx     Esophageal cancer Neg Hx        Social History "     Socioeconomic History    Marital status:    Tobacco Use    Smoking status: Never     Passive exposure: Never    Smokeless tobacco: Never   Vaping Use    Vaping status: Never Used   Substance and Sexual Activity    Alcohol use: Never    Drug use: Never    Sexual activity: Not Currently     Partners: Female           Objective   Physical Exam  Vitals and nursing note reviewed. Exam conducted with a chaperone present.   Constitutional:       General: He is not in acute distress.     Appearance: Normal appearance. He is well-developed. He is not toxic-appearing.   HENT:      Head: Normocephalic and atraumatic.      Nose: Nose normal.      Mouth/Throat:      Mouth: Mucous membranes are moist.      Pharynx: Uvula midline.   Eyes:      General: Lids are normal. Lids are everted, no foreign bodies appreciated.      Conjunctiva/sclera: Conjunctivae normal.      Pupils: Pupils are equal, round, and reactive to light.   Neck:      Vascular: Normal carotid pulses. No carotid bruit or JVD.      Trachea: Trachea and phonation normal. No tracheal deviation.   Cardiovascular:      Rate and Rhythm: Normal rate and regular rhythm.      Chest Wall: PMI is not displaced.      Pulses: Normal pulses.      Heart sounds: Normal heart sounds.      No systolic murmur is present.      No diastolic murmur is present.      No gallop.   Pulmonary:      Effort: Pulmonary effort is normal. No tachypnea, accessory muscle usage or respiratory distress.      Breath sounds: Normal breath sounds. No stridor. No decreased breath sounds, wheezing, rhonchi or rales.   Abdominal:      General: Bowel sounds are normal. There is no distension.      Palpations: Abdomen is soft.      Tenderness: There is no abdominal tenderness.      Comments: Colostomy present   Musculoskeletal:         General: No swelling. Normal range of motion.      Cervical back: Full passive range of motion without pain, normal range of motion and neck supple. No rigidity.       Right lower leg: No tenderness. No edema.      Left lower leg: No tenderness. No edema.      Comments: Lower extremity exam bilaterally is unremarkable.  There is no right or left calf tenderness .  There is no palpable venous cord.  No obvious difference in the size of the legs.  No pitting edema.  The dorsalis pedis and posterior tibial femoral and popliteal pulses are palpable and +2 bilaterally.  Homans sign is negative   Skin:     General: Skin is warm and dry.      Capillary Refill: Capillary refill takes less than 2 seconds.      Coloration: Skin is not jaundiced or pale.      Nails: There is no clubbing.   Neurological:      General: No focal deficit present.      Mental Status: He is alert and oriented to person, place, and time.      GCS: GCS eye subscore is 4. GCS verbal subscore is 5. GCS motor subscore is 6.      Cranial Nerves: No cranial nerve deficit.      Motor: Motor function is intact.      Gait: Gait normal.      Deep Tendon Reflexes: Reflexes are normal and symmetric. Reflexes normal.   Psychiatric:         Speech: Speech normal.         Behavior: Behavior normal.         Procedures           ED Course  ED Course as of 12/21/24 2100   Sat Dec 21, 2024   2056 This patient came in with chest discomfort he is got history coronary disease his lab workup and EKG are within normal limits no evidence of STEMI and cardiac markers are negative.  Blood sugar slightly elevated with elevated alkaline phosphatase [TS]   2056 Patient was given sublingual nitroglycerin in the ED.  On my asking what the sublingual has helped or not she is he states that he had taken some Motrin at home which helped he attributes his pain to poor posture and seems send Motrin helps him.  The possibly occult disease cannot be ruled out in this patient especially with a history of coronary artery disease.  My recommendation was that the patient can be admitted the hospital to get a stress test in the morning the patient does not  want to do that and wants to go home he is agreeable in getting an outpatient stress test.  The pros and cons of incomplete workup have been discussed with the patient patient understands that cardiac disease cannot be ruled out by the testing that we have done.  But does not want to stay in the hospital.  He will be discharged home with stress as an outpatient advised to return the ER for any worsening symptoms. [TS]      ED Course User Index  [TS] Homero Thompson MD                  HEART Score: 4   Shared Decision Making  I discussed the findings with the patient/patient representative who is in agreement with the treatment plan and the final disposition.  Risks and benefits of discharge and/or observation/admission were discussed: Yes                                      Medical Decision Making  Differential Diagnosis:  I considered chest wall pain, muscle strain, costochondritis, pleurisy, rib fracture, herpes zoster, cardiovascular etiology, myocardial infarction, intermediate coronary syndrome, unstable angina, angina, aortic dissection, pericarditis, pulmonary etiology, pulmonary embolism, pneumonia, pneumothorax, lung cancer, gastroesophageal reflux disease, esophagitis, esophageal spasm and gastrointestinal etiology as a possible cause of chest pain in this patient. This is a partial list of diagnoses considered.        Problems Addressed:  Alkaline phosphatase elevation: undiagnosed new problem with uncertain prognosis  Chest pain, unspecified type: acute illness or injury  Elevated blood sugar: chronic illness or injury    Amount and/or Complexity of Data Reviewed  Labs: ordered.     Details: Labs reviewed  Radiology: ordered.     Details: X-ray reviewed  ECG/medicine tests: ordered.     Details: Normal sinus rhythm    Risk  OTC drugs.  Prescription drug management.  Risk Details: Well score 0  Heart score 4  This patient presents with chest pain , patient arrived hemodynamically stable was placed on the  monitor and IV access obtained EKG was obtained and did not reveal any malignant/unstable dysrhythmias any acute ST elevation, no evidence of Brugada, or significantly prolonged QT .  Presentation not consistent with other acute, emergent cause of chest pain at this time.  Low suspicion for acute PE is low risk per Wells and no evidence of DVT such as calf swelling, tenderness, palpable tortuous lower extremity vein, or Homans' sign.  Low suspicion for pneumothorax as the patient is saturating well and has no radiographic evidence of a pneumothorax.  Low suspicion for dissection there is no widened mediastinum, hypotension, pulses deficit, and no tearing back/abdominal pain.  Low suspicion for tamponade as there is no JVD, muffled heart sounds, electrical alternans on EKG, and no hypotension.  Low suspicion for pericarditis as there is no diffuse ST elevation or OH depression and the patient is afebrile.  No evidence of GI bleed per patient's history.  Low suspicion for CHF exacerbation as there is no evidence of volume overload .  Low suspicion for pneumonia since the patient has no fever no productive cough no chest x-ray findings of pneumonia and no leukocytosis.         Final diagnoses:   Chest pain, unspecified type   Elevated blood sugar   Alkaline phosphatase elevation       ED Disposition  ED Disposition       ED Disposition   Discharge    Condition   Stable    Comment   --               No follow-up provider specified.       Medication List      No changes were made to your prescriptions during this visit.            Homero Thompson MD  12/21/24 7087       Homero Thompson MD  12/21/24 1944       Homero Thompson MD  12/21/24 2100

## 2024-12-23 LAB
QT INTERVAL: 432 MS
QT INTERVAL: 458 MS
QTC INTERVAL: 456 MS
QTC INTERVAL: 468 MS

## 2024-12-24 ENCOUNTER — HOSPITAL ENCOUNTER (OUTPATIENT)
Dept: CARDIOLOGY | Facility: HOSPITAL | Age: 84
Discharge: HOME | End: 2024-12-24
Admitting: EMERGENCY MEDICINE
Payer: MEDICARE

## 2024-12-24 VITALS
HEART RATE: 62 BPM | WEIGHT: 220 LBS | SYSTOLIC BLOOD PRESSURE: 142 MMHG | HEIGHT: 69 IN | BODY MASS INDEX: 32.58 KG/M2 | DIASTOLIC BLOOD PRESSURE: 90 MMHG

## 2024-12-24 DIAGNOSIS — R07.9 CHEST PAIN, UNSPECIFIED TYPE: ICD-10-CM

## 2024-12-24 LAB
BH CV STRESS BP STAGE 1: NORMAL
BH CV STRESS BP STAGE 2: NORMAL
BH CV STRESS BP STAGE 3: NORMAL
BH CV STRESS DOB - ATROPINE STAGE 3: 0.6
BH CV STRESS DOSE DOBUTAMINE STAGE 1: 10
BH CV STRESS DOSE DOBUTAMINE STAGE 2: 20
BH CV STRESS DOSE DOBUTAMINE STAGE 3: 30
BH CV STRESS DURATION MIN STAGE 1: 3
BH CV STRESS DURATION MIN STAGE 2: 3
BH CV STRESS DURATION MIN STAGE 3: 3
BH CV STRESS DURATION SEC STAGE 1: 0
BH CV STRESS DURATION SEC STAGE 2: 0
BH CV STRESS DURATION SEC STAGE 3: 10
BH CV STRESS HR STAGE 1: 59
BH CV STRESS HR STAGE 2: 67
BH CV STRESS HR STAGE 3: 125
BH CV STRESS PROTOCOL 1: NORMAL
BH CV STRESS STAGE 1: 1
BH CV STRESS STAGE 2: 2
BH CV STRESS STAGE 3: 3
MAXIMAL PREDICTED HEART RATE: 138 BPM
PERCENT MAX PREDICTED HR: 90.58 %
STRESS BASELINE BP: NORMAL MMHG
STRESS BASELINE HR: 62 BPM
STRESS PERCENT HR: 107 %
STRESS POST EXERCISE DUR MIN: 9 MIN
STRESS POST EXERCISE DUR SEC: 10 SEC
STRESS POST PEAK BP: NORMAL MMHG
STRESS POST PEAK HR: 125 BPM
STRESS TARGET HR: 117 BPM

## 2024-12-24 PROCEDURE — 25510000001 PERFLUTREN 6.52 MG/ML SUSPENSION: Performed by: INTERNAL MEDICINE

## 2024-12-24 PROCEDURE — 93350 STRESS TTE ONLY: CPT

## 2024-12-24 PROCEDURE — 93017 CV STRESS TEST TRACING ONLY: CPT

## 2024-12-24 PROCEDURE — 25010000002 DOBUTAMINE PER 250 MG: Performed by: INTERNAL MEDICINE

## 2024-12-24 PROCEDURE — 25010000002 ATROPINE SULFATE 0.1 MG/ML: Performed by: INTERNAL MEDICINE

## 2024-12-24 RX ORDER — DOBUTAMINE HYDROCHLORIDE 100 MG/100ML
10-50 INJECTION INTRAVENOUS
Status: DISCONTINUED | OUTPATIENT
Start: 2024-12-24 | End: 2024-12-25 | Stop reason: HOSPADM

## 2024-12-24 RX ADMIN — DOBUTAMINE HYDROCHLORIDE 10 MCG/KG/MIN: 100 INJECTION INTRAVENOUS at 09:36

## 2024-12-24 RX ADMIN — ATROPINE SULFATE 0.6 MG: 0.1 INJECTION INTRAVENOUS at 09:54

## 2024-12-24 RX ADMIN — PERFLUTREN 8.48 MG: 6.52 INJECTION, SUSPENSION INTRAVENOUS at 09:36

## 2025-01-28 ENCOUNTER — OFFICE VISIT (OUTPATIENT)
Dept: FAMILY MEDICINE CLINIC | Facility: CLINIC | Age: 85
End: 2025-01-28
Payer: MEDICARE

## 2025-01-28 VITALS
BODY MASS INDEX: 32.62 KG/M2 | TEMPERATURE: 98.5 F | RESPIRATION RATE: 20 BRPM | SYSTOLIC BLOOD PRESSURE: 130 MMHG | HEIGHT: 69 IN | DIASTOLIC BLOOD PRESSURE: 65 MMHG | WEIGHT: 220.25 LBS | HEART RATE: 69 BPM | OXYGEN SATURATION: 99 %

## 2025-01-28 DIAGNOSIS — E11.65 TYPE 2 DIABETES MELLITUS WITH HYPERGLYCEMIA, WITH LONG-TERM CURRENT USE OF INSULIN: ICD-10-CM

## 2025-01-28 DIAGNOSIS — M54.2 NECK PAIN: Primary | ICD-10-CM

## 2025-01-28 DIAGNOSIS — Z79.4 TYPE 2 DIABETES MELLITUS WITH HYPERGLYCEMIA, WITH LONG-TERM CURRENT USE OF INSULIN: ICD-10-CM

## 2025-01-28 DIAGNOSIS — M62.838 MUSCLE SPASM: ICD-10-CM

## 2025-01-28 DIAGNOSIS — R07.9 CHEST PAIN, UNSPECIFIED TYPE: ICD-10-CM

## 2025-01-28 PROCEDURE — 3075F SYST BP GE 130 - 139MM HG: CPT | Performed by: NURSE PRACTITIONER

## 2025-01-28 PROCEDURE — 3078F DIAST BP <80 MM HG: CPT | Performed by: NURSE PRACTITIONER

## 2025-01-28 PROCEDURE — 1126F AMNT PAIN NOTED NONE PRSNT: CPT | Performed by: NURSE PRACTITIONER

## 2025-01-28 PROCEDURE — 99214 OFFICE O/P EST MOD 30 MIN: CPT | Performed by: NURSE PRACTITIONER

## 2025-01-28 PROCEDURE — 93000 ELECTROCARDIOGRAM COMPLETE: CPT | Performed by: NURSE PRACTITIONER

## 2025-01-28 RX ORDER — TIZANIDINE 2 MG/1
2 TABLET ORAL NIGHTLY PRN
Qty: 30 TABLET | Refills: 1 | Status: SHIPPED | OUTPATIENT
Start: 2025-01-28

## 2025-01-28 NOTE — PROGRESS NOTES
BORIS Pérez  [facility]   Family Medicine  [address]  [place] [address]  Phone: [phone]  Fax: [phone]         Chief Complaint:  Chief Complaint   Patient presents with    Chest Pain     Patient stated that he has had chest pain on he's left side.        History:  Zachariah Acosta is a 84 y.o. male.  History of Present Illness  The patient is an 84-year-old male who presents today for evaluation of chest pain.    He reports experiencing a dull, aching chest pain that does not radiate to his back. The pain is described as uncomfortable but not severe. It typically worsens in the evening and is not associated with any other symptoms such as burping or indigestion. He has not identified any specific triggers for the pain, although it occasionally occurs upon waking in the morning. He has attempted to alleviate the pain through stretching exercises and by using a pillow for support while sitting in his recliner, but these measures have not been effective. Over the past 4 to 5 days, he has found relief from the pain by taking ibuprofen before bed, which has allowed him to sleep through the night without waking up in pain. He has not experienced any morning pain in the past few days. He also reports occasional neck stiffness and popping. He has been massaging his neck and has not yet tried using a muscle relaxer at bedtime. He has been making an effort to sit up straight on the couch over the past few nights. He has a history of triple bypass surgery and the placement of six stents. Prior to his emergency room visit, he had taken nitroglycerin every 5 minutes, but this did not provide any relief. He has not experienced any chest pain during physical activity, such as walking to the mailbox. He has been using bifocal glasses since his eye surgery in [place] and has to hold his head down to see through them while watching TV. He has a scheduled appointment  with his pulmonologist, Dr. Muñoz, in 06/2025 for a follow-up scan of his left lung. He has been taking Tylenol for arthritis, one tablet at 5:00 AM before getting up, which has been helpful. He was previously taking one tablet in the evening, but due to his current symptoms, he has added another dose at noon, bringing his total daily intake to three tablets. He has a scheduled shoulder replacement surgery, but this has been postponed due to colon issues. He has been seeing an ostomy nurse, Cherie Wallace, following his surgery, but has not returned since the wound healed approximately 4 months ago. He has used his rescue inhaler a few times in the past 2 to 3 weeks due to breathing difficulties, which he believes may be related to his chest pain or muscle issues. He is allergic to ALBUTEROL, which causes throat swelling, but has been using levalbuterol without any adverse effects. He has found some relief from Tylenol. He has a history of triple bypass surgery and the placement of six stents.    ALLERGIES  The patient is allergic to ALBUTEROL.    MEDICATIONS  Current: Tylenol, ibuprofen, levalbuterol    Cherie Wallace RN with ostomy.            ROS:  Review of Systems   Constitutional:  Negative for fatigue, fever and unexpected weight change.   HENT:  Negative for congestion, ear pain, rhinorrhea, sinus pressure, sinus pain and voice change.    Eyes:  Negative for visual disturbance.   Respiratory:  Negative for shortness of breath and wheezing.    Cardiovascular:  Positive for chest pain. Negative for palpitations.   Gastrointestinal:  Negative for abdominal pain, nausea and vomiting.   Genitourinary:  Negative for dysuria and flank pain.   Musculoskeletal:  Positive for arthralgias and neck pain. Negative for back pain and myalgias.   Skin:  Negative for color change and rash.   Neurological:  Negative for dizziness, weakness, numbness and headaches.   Psychiatric/Behavioral:  Negative for behavioral problems,  dysphoric mood, self-injury and sleep disturbance.         reports that he has never smoked. He has never been exposed to tobacco smoke. He has never used smokeless tobacco. He reports that he does not drink alcohol and does not use drugs.    Current Outpatient Medications   Medication Instructions    acetaminophen (TYLENOL) 500 mg, Oral, Every 6 Hours PRN    aspirin 81 mg, Daily    Budeson-Glycopyrrol-Formoterol (Breztri Aerosphere) 160-9-4.8 MCG/ACT aerosol inhaler 2 puffs, Inhalation, 2 Times Daily    ciclopirox (LOPROX) 0.77 % cream Apply 1 Application topically to the appropriate area as directed 2 (Two) Times a Day. To under arms after using Clindamycin Solution    clindamycin (CLEOCIN T) 1 % external solution 1 application  2 (Two) Times a Day. To under arms    Continuous Blood Gluc Sensor (Dexcom G7 Sensor) misc 1 each, Not Applicable, Every 10 Days    Dupilumab 300 MG/2ML solution prefilled syringe Every 14 Days    ferrous sulfate 325 mg, Daily With Breakfast    finasteride (PROSCAR) 5 mg, Oral, Daily    fluticasone (FLONASE) 50 MCG/ACT nasal spray 1 spray, Nasal, Daily    gabapentin (NEURONTIN) 100 MG capsule     insulin glargine (LANTUS, SEMGLEE) 100 UNIT/ML injection 15 units in the morning and 40 units at bedtime.    levalbuterol (XOPENEX HFA) 45 MCG/ACT inhaler 2 puffs, Every 6 Hours PRN    levothyroxine (SYNTHROID) 50 mcg, Oral, Daily    lisinopril (PRINIVIL,ZESTRIL) 20 MG tablet TAKE 1 TABLET BY MOUTH IN THE MORNING AND 1/2 (ONE-HALF) AT BEDTIME    meclizine (ANTIVERT) 12.5 mg, Oral, 3 Times Daily PRN    melatonin 5 mg, Nightly PRN    metFORMIN ER (GLUCOPHAGE-XR) 500 mg, Oral, Daily With Breakfast    metoprolol tartrate (LOPRESSOR) 25 mg, Oral, 2 Times Daily    mupirocin (BACTROBAN) 2 % ointment 1 Application, Daily    naloxone (NARCAN) 4 MG/0.1ML nasal spray 1 spray, As Needed    nitroglycerin (NITROSTAT) 0.4 mg, Sublingual, Every 5 Minutes PRN, Take no more than 3 doses in 15 minutes.    Pediatric  "Multiple Vit-C-FA (CHILDRENS CHEWABLE MULTI VITS PO) 1 tablet, Daily    rosuvastatin (CRESTOR) 20 mg, Oral, Daily    tamsulosin (FLOMAX) 0.4 mg, Oral, Nightly    tiZANidine (ZANAFLEX) 2 mg, Oral, Nightly PRN    triamcinolone (KENALOG) 0.1 % cream 1 Application, Topical, 2 Times Daily    Ustekinumab (STELARA) 90 mg, Subcutaneous, Every 28 Days    vitamin B-12 (CYANOCOBALAMIN) 1,000 mcg, Daily    Vitamin D, Cholecalciferol, 25 MCG (1000 UT) capsule 1 capsule, Daily       OBJECTIVE:  /65 (BP Location: Left arm, Patient Position: Sitting, Cuff Size: Adult)   Pulse 69   Temp 98.5 °F (36.9 °C) (Infrared)   Resp 20   Ht 175.3 cm (69.02\")   Wt 99.9 kg (220 lb 4 oz)   SpO2 99%   BMI 32.51 kg/m²    Physical Exam  Vitals and nursing note reviewed.   Constitutional:       Appearance: Normal appearance. He is well-developed.   HENT:      Head: Normocephalic and atraumatic.      Right Ear: Tympanic membrane, ear canal and external ear normal.      Left Ear: Tympanic membrane, ear canal and external ear normal.      Nose: Nose normal. No septal deviation, nasal tenderness or congestion.      Mouth/Throat:      Lips: Pink. No lesions.      Mouth: Mucous membranes are moist. No oral lesions.      Dentition: Normal dentition.      Pharynx: Oropharynx is clear. No pharyngeal swelling, oropharyngeal exudate or posterior oropharyngeal erythema.   Eyes:      General: Lids are normal. Vision grossly intact. No scleral icterus.        Right eye: No discharge.         Left eye: No discharge.      Extraocular Movements: Extraocular movements intact.      Conjunctiva/sclera: Conjunctivae normal.      Right eye: Right conjunctiva is not injected.      Left eye: Left conjunctiva is not injected.      Pupils: Pupils are equal, round, and reactive to light.   Neck:      Thyroid: No thyroid mass.      Trachea: Trachea normal.   Cardiovascular:      Rate and Rhythm: Normal rate and regular rhythm.      Heart sounds: Normal heart " sounds. No murmur heard.     No gallop.   Pulmonary:      Effort: Pulmonary effort is normal.      Breath sounds: Normal breath sounds and air entry. No wheezing, rhonchi or rales.   Abdominal:      Hernia: A hernia (Parastomal hernia noted) is present.   Musculoskeletal:         General: No tenderness or deformity. Normal range of motion.      Cervical back: Full passive range of motion without pain, normal range of motion and neck supple.      Thoracic back: Normal.      Right lower leg: No edema.      Left lower leg: No edema.   Skin:     General: Skin is warm and dry.      Coloration: Skin is not jaundiced.      Findings: No rash.   Neurological:      Mental Status: He is alert and oriented to person, place, and time.      Sensory: Sensation is intact.      Motor: Motor function is intact.      Coordination: Coordination is intact.      Gait: Gait is intact.      Deep Tendon Reflexes: Reflexes are normal and symmetric.   Psychiatric:         Mood and Affect: Mood and affect normal.         Behavior: Behavior normal.         Judgment: Judgment normal.       Physical Exam  Lungs were auscultated.    Vital Signs  Oxygen saturation and heart rate are normal.           ECG 12 Lead    Date/Time: 1/28/2025 3:15 PM  Performed by: Amaya Mix APRN    Authorized by: Amaya Mix APRN  Comparison: compared with previous ECG from 12/21/2024  Similar to previous ECG  Rhythm: sinus rhythm  Conduction: 1st degree AV block    Clinical impression: normal ECG          Results  Imaging  Stress test and stress echo results were normal.    Assessment/Plan:     Diagnoses and all orders for this visit:    1. Neck pain (Primary)  -     tiZANidine (ZANAFLEX) 2 MG tablet; Take 1 tablet by mouth At Night As Needed for Muscle Spasms.  Dispense: 30 tablet; Refill: 1    2. Chest pain, unspecified type    3. Muscle spasm    4. Type 2 diabetes mellitus with hyperglycemia, with long-term current use of insulin  -      CBC Auto Differential; Standing  -     Comprehensive Metabolic Panel; Standing  -     Hemoglobin A1c; Standing  -     Lipid Panel; Standing  -     MicroAlbumin, Urine, Random - Urine, Clean Catch; Standing  -     Urinalysis With Culture If Indicated -; Standing  -     TSH; Standing    Other orders  -     ECG 12 Lead          An After Visit Summary was printed and given to the patient at discharge.  No follow-ups on file.       Assessment & Plan  1. Chest discomfort.  The patient's cardiac evaluations, including EKG and stress test, have yielded normal results. The pain he experiences is more pronounced during the night and is alleviated by Tylenol or ibuprofen, suggesting a musculoskeletal origin. His oxygen saturation and heart rate are within normal limits today. He has been advised to take a muscle relaxant at bedtime to facilitate muscle relaxation and potentially realign any displaced discs. He has also been counseled on maintaining proper body mechanics while sitting in a recliner. He has been cautioned about the potential side effects of ibuprofen, including hypertension and gastrointestinal upset, and has been advised to take it with food and limit its use. He has been informed that he can safely consume up to 3000 mg of Tylenol per day. A prescription for a muscle relaxant will be provided. If his symptoms persist, imaging studies of his neck and chest may be considered.    Follow-up  The patient is scheduled for a follow-up visit on 03/18/2025.    PROCEDURE  The patient has a history of triple bypass surgery and the placement of six stents.    I spent 31 minutes caring for Zachariah on this date of service. This time includes time spent by me in the following activities: preparing for the visit, reviewing tests, performing a medically appropriate examination and/or evaluation, counseling and educating the patient/family/caregiver, documenting information in the medical record, independently interpreting  results and communicating that information with the patient/family/caregiver, care coordination, ordering medications, ordering test(s), ordering procedure(s), obtaining a separately obtained history, and reviewing a separately obtained history     : I have been treating this patient over a continuum addressing both chronic and acute care concerns.     Amaya ESCALANTE 1/28/2025   Electronically signed.    Patient or patient representative verbalized consent for the use of Ambient Listening during the visit with  BORIS Pérez for chart documentation. 1/28/2025  17:42 CST

## 2025-02-12 ENCOUNTER — APPOINTMENT (OUTPATIENT)
Dept: CT IMAGING | Facility: HOSPITAL | Age: 85
End: 2025-02-12
Payer: MEDICARE

## 2025-02-12 ENCOUNTER — HOSPITAL ENCOUNTER (EMERGENCY)
Facility: HOSPITAL | Age: 85
Discharge: HOME | End: 2025-02-13
Payer: MEDICARE

## 2025-02-12 DIAGNOSIS — N20.0 KIDNEY STONE: Primary | ICD-10-CM

## 2025-02-12 DIAGNOSIS — R35.0 URINE FREQUENCY: ICD-10-CM

## 2025-02-12 DIAGNOSIS — R93.89 ABNORMAL CT SCAN: ICD-10-CM

## 2025-02-12 LAB
ALBUMIN SERPL-MCNC: 3.9 G/DL (ref 3.5–5.2)
ALBUMIN/GLOB SERPL: 1.3 G/DL
ALP SERPL-CCNC: 306 U/L (ref 39–117)
ALT SERPL W P-5'-P-CCNC: 11 U/L (ref 1–41)
ANION GAP SERPL CALCULATED.3IONS-SCNC: 10 MMOL/L (ref 5–15)
AST SERPL-CCNC: 27 U/L (ref 1–40)
BACTERIA UR QL AUTO: ABNORMAL /HPF
BASOPHILS # BLD AUTO: 0.06 10*3/MM3 (ref 0–0.2)
BASOPHILS NFR BLD AUTO: 0.6 % (ref 0–1.5)
BILIRUB SERPL-MCNC: 0.3 MG/DL (ref 0–1.2)
BILIRUB UR QL STRIP: NEGATIVE
BUN SERPL-MCNC: 13 MG/DL (ref 8–23)
BUN/CREAT SERPL: 16.3 (ref 7–25)
CALCIUM SPEC-SCNC: 8.9 MG/DL (ref 8.6–10.5)
CHLORIDE SERPL-SCNC: 101 MMOL/L (ref 98–107)
CLARITY UR: CLEAR
CO2 SERPL-SCNC: 23 MMOL/L (ref 22–29)
COLOR UR: YELLOW
CREAT SERPL-MCNC: 0.8 MG/DL (ref 0.76–1.27)
D-LACTATE SERPL-SCNC: 1.6 MMOL/L (ref 0.5–2)
DEPRECATED RDW RBC AUTO: 44.5 FL (ref 37–54)
EGFRCR SERPLBLD CKD-EPI 2021: 88.4 ML/MIN/1.73
EOSINOPHIL # BLD AUTO: 0.95 10*3/MM3 (ref 0–0.4)
EOSINOPHIL NFR BLD AUTO: 9.2 % (ref 0.3–6.2)
ERYTHROCYTE [DISTWIDTH] IN BLOOD BY AUTOMATED COUNT: 13.6 % (ref 12.3–15.4)
GLOBULIN UR ELPH-MCNC: 3.1 GM/DL
GLUCOSE SERPL-MCNC: 132 MG/DL (ref 65–99)
GLUCOSE UR STRIP-MCNC: NEGATIVE MG/DL
HCT VFR BLD AUTO: 42.5 % (ref 37.5–51)
HGB BLD-MCNC: 14.2 G/DL (ref 13–17.7)
HGB UR QL STRIP.AUTO: ABNORMAL
HOLD SPECIMEN: NORMAL
HOLD SPECIMEN: NORMAL
HYALINE CASTS UR QL AUTO: ABNORMAL /LPF
IMM GRANULOCYTES # BLD AUTO: 0.06 10*3/MM3 (ref 0–0.05)
IMM GRANULOCYTES NFR BLD AUTO: 0.6 % (ref 0–0.5)
KETONES UR QL STRIP: ABNORMAL
LEUKOCYTE ESTERASE UR QL STRIP.AUTO: NEGATIVE
LIPASE SERPL-CCNC: 19 U/L (ref 13–60)
LYMPHOCYTES # BLD AUTO: 1.52 10*3/MM3 (ref 0.7–3.1)
LYMPHOCYTES NFR BLD AUTO: 14.7 % (ref 19.6–45.3)
MCH RBC QN AUTO: 29.8 PG (ref 26.6–33)
MCHC RBC AUTO-ENTMCNC: 33.4 G/DL (ref 31.5–35.7)
MCV RBC AUTO: 89.1 FL (ref 79–97)
MONOCYTES # BLD AUTO: 0.72 10*3/MM3 (ref 0.1–0.9)
MONOCYTES NFR BLD AUTO: 7 % (ref 5–12)
NEUTROPHILS NFR BLD AUTO: 67.9 % (ref 42.7–76)
NEUTROPHILS NFR BLD AUTO: 7.02 10*3/MM3 (ref 1.7–7)
NITRITE UR QL STRIP: NEGATIVE
NRBC BLD AUTO-RTO: 0 /100 WBC (ref 0–0.2)
PH UR STRIP.AUTO: 6.5 [PH] (ref 5–8)
PLATELET # BLD AUTO: 231 10*3/MM3 (ref 140–450)
PMV BLD AUTO: 9.8 FL (ref 6–12)
POTASSIUM SERPL-SCNC: 4.2 MMOL/L (ref 3.5–5.2)
PROT SERPL-MCNC: 7 G/DL (ref 6–8.5)
PROT UR QL STRIP: ABNORMAL
RBC # BLD AUTO: 4.77 10*6/MM3 (ref 4.14–5.8)
RBC # UR STRIP: ABNORMAL /HPF
REF LAB TEST METHOD: ABNORMAL
SODIUM SERPL-SCNC: 134 MMOL/L (ref 136–145)
SP GR UR STRIP: 1.02 (ref 1–1.03)
SQUAMOUS #/AREA URNS HPF: ABNORMAL /HPF
UROBILINOGEN UR QL STRIP: ABNORMAL
WBC # UR STRIP: ABNORMAL /HPF
WBC NRBC COR # BLD AUTO: 10.33 10*3/MM3 (ref 3.4–10.8)
WHOLE BLOOD HOLD COAG: NORMAL
WHOLE BLOOD HOLD SPECIMEN: NORMAL

## 2025-02-12 PROCEDURE — 99285 EMERGENCY DEPT VISIT HI MDM: CPT

## 2025-02-12 PROCEDURE — 25510000001 IOPAMIDOL 61 % SOLUTION: Performed by: NURSE PRACTITIONER

## 2025-02-12 PROCEDURE — 25010000002 ONDANSETRON PER 1 MG: Performed by: NURSE PRACTITIONER

## 2025-02-12 PROCEDURE — 81001 URINALYSIS AUTO W/SCOPE: CPT

## 2025-02-12 PROCEDURE — 83690 ASSAY OF LIPASE: CPT

## 2025-02-12 PROCEDURE — 25810000003 SODIUM CHLORIDE 0.9 % SOLUTION: Performed by: NURSE PRACTITIONER

## 2025-02-12 PROCEDURE — 80053 COMPREHEN METABOLIC PANEL: CPT

## 2025-02-12 PROCEDURE — 74177 CT ABD & PELVIS W/CONTRAST: CPT

## 2025-02-12 PROCEDURE — 83605 ASSAY OF LACTIC ACID: CPT

## 2025-02-12 PROCEDURE — 84153 ASSAY OF PSA TOTAL: CPT | Performed by: UROLOGY

## 2025-02-12 PROCEDURE — 96375 TX/PRO/DX INJ NEW DRUG ADDON: CPT

## 2025-02-12 PROCEDURE — 85025 COMPLETE CBC W/AUTO DIFF WBC: CPT

## 2025-02-12 PROCEDURE — 25010000002 MORPHINE PER 10 MG: Performed by: NURSE PRACTITIONER

## 2025-02-12 PROCEDURE — 96374 THER/PROPH/DIAG INJ IV PUSH: CPT

## 2025-02-12 PROCEDURE — 36415 COLL VENOUS BLD VENIPUNCTURE: CPT

## 2025-02-12 RX ORDER — IOPAMIDOL 612 MG/ML
100 INJECTION, SOLUTION INTRAVASCULAR
Status: COMPLETED | OUTPATIENT
Start: 2025-02-13 | End: 2025-02-12

## 2025-02-12 RX ORDER — SODIUM CHLORIDE 0.9 % (FLUSH) 0.9 %
10 SYRINGE (ML) INJECTION AS NEEDED
Status: DISCONTINUED | OUTPATIENT
Start: 2025-02-12 | End: 2025-02-13 | Stop reason: HOSPADM

## 2025-02-12 RX ORDER — ONDANSETRON 2 MG/ML
4 INJECTION INTRAMUSCULAR; INTRAVENOUS ONCE
Status: COMPLETED | OUTPATIENT
Start: 2025-02-12 | End: 2025-02-12

## 2025-02-12 RX ADMIN — IOPAMIDOL 100 ML: 612 INJECTION, SOLUTION INTRAVENOUS at 23:56

## 2025-02-12 RX ADMIN — MORPHINE SULFATE 4 MG: 4 INJECTION, SOLUTION INTRAMUSCULAR; INTRAVENOUS at 23:34

## 2025-02-12 RX ADMIN — ONDANSETRON 4 MG: 2 INJECTION INTRAMUSCULAR; INTRAVENOUS at 23:34

## 2025-02-12 RX ADMIN — SODIUM CHLORIDE 1000 ML: 9 INJECTION, SOLUTION INTRAVENOUS at 23:36

## 2025-02-13 ENCOUNTER — TELEPHONE (OUTPATIENT)
Dept: UROLOGY | Facility: CLINIC | Age: 85
End: 2025-02-13
Payer: MEDICARE

## 2025-02-13 ENCOUNTER — HOSPITAL ENCOUNTER (OUTPATIENT)
Dept: GENERAL RADIOLOGY | Facility: HOSPITAL | Age: 85
Discharge: HOME | End: 2025-02-13
Payer: MEDICARE

## 2025-02-13 ENCOUNTER — OFFICE VISIT (OUTPATIENT)
Dept: UROLOGY | Facility: CLINIC | Age: 85
End: 2025-02-13
Payer: MEDICARE

## 2025-02-13 VITALS — WEIGHT: 220.3 LBS | HEIGHT: 69 IN | TEMPERATURE: 97.5 F | BODY MASS INDEX: 32.63 KG/M2

## 2025-02-13 VITALS
TEMPERATURE: 97.9 F | WEIGHT: 220.24 LBS | RESPIRATION RATE: 16 BRPM | HEART RATE: 59 BPM | BODY MASS INDEX: 32.62 KG/M2 | DIASTOLIC BLOOD PRESSURE: 87 MMHG | SYSTOLIC BLOOD PRESSURE: 167 MMHG | HEIGHT: 69 IN | OXYGEN SATURATION: 96 %

## 2025-02-13 DIAGNOSIS — N20.0 KIDNEY STONE: Primary | ICD-10-CM

## 2025-02-13 DIAGNOSIS — N20.0 KIDNEY STONE: ICD-10-CM

## 2025-02-13 DIAGNOSIS — R35.0 URINE FREQUENCY: Primary | ICD-10-CM

## 2025-02-13 LAB
BILIRUB BLD-MCNC: NEGATIVE MG/DL
CLARITY, POC: CLEAR
COLOR UR: YELLOW
GLUCOSE UR STRIP-MCNC: NEGATIVE MG/DL
KETONES UR QL: NEGATIVE
LEUKOCYTE EST, POC: NEGATIVE
NITRITE UR-MCNC: NEGATIVE MG/ML
PH UR: 6.5 [PH] (ref 5–8)
PROT UR STRIP-MCNC: NEGATIVE MG/DL
RBC # UR STRIP: ABNORMAL /UL
SP GR UR: 1.02 (ref 1–1.03)
UROBILINOGEN UR QL: ABNORMAL

## 2025-02-13 PROCEDURE — 25010000002 LABETALOL 5 MG/ML SOLUTION: Performed by: NURSE PRACTITIONER

## 2025-02-13 PROCEDURE — 96375 TX/PRO/DX INJ NEW DRUG ADDON: CPT

## 2025-02-13 PROCEDURE — 25010000002 MORPHINE PER 10 MG: Performed by: NURSE PRACTITIONER

## 2025-02-13 PROCEDURE — 96376 TX/PRO/DX INJ SAME DRUG ADON: CPT

## 2025-02-13 PROCEDURE — 74018 RADEX ABDOMEN 1 VIEW: CPT

## 2025-02-13 RX ORDER — INSULIN GLARGINE-YFGN 100 [IU]/ML
INJECTION, SOLUTION SUBCUTANEOUS
COMMUNITY
Start: 2024-12-10

## 2025-02-13 RX ORDER — SODIUM CHLORIDE, SODIUM LACTATE, POTASSIUM CHLORIDE, CALCIUM CHLORIDE 600; 310; 30; 20 MG/100ML; MG/100ML; MG/100ML; MG/100ML
100 INJECTION, SOLUTION INTRAVENOUS CONTINUOUS
Status: CANCELLED | OUTPATIENT
Start: 2025-02-13 | End: 2025-02-14

## 2025-02-13 RX ORDER — HYDROCODONE BITARTRATE AND ACETAMINOPHEN 5; 325 MG/1; MG/1
1 TABLET ORAL EVERY 6 HOURS PRN
Qty: 15 TABLET | Refills: 0 | Status: SHIPPED | OUTPATIENT
Start: 2025-02-13 | End: 2025-02-20 | Stop reason: SDUPTHER

## 2025-02-13 RX ORDER — SODIUM CHLORIDE 9 MG/ML
100 INJECTION, SOLUTION INTRAVENOUS CONTINUOUS
OUTPATIENT
Start: 2025-02-13 | End: 2025-02-14

## 2025-02-13 RX ORDER — LABETALOL HYDROCHLORIDE 5 MG/ML
20 INJECTION, SOLUTION INTRAVENOUS ONCE
Status: COMPLETED | OUTPATIENT
Start: 2025-02-13 | End: 2025-02-13

## 2025-02-13 RX ADMIN — LABETALOL HYDROCHLORIDE 20 MG: 5 INJECTION, SOLUTION INTRAVENOUS at 00:21

## 2025-02-13 RX ADMIN — MORPHINE SULFATE 4 MG: 4 INJECTION, SOLUTION INTRAMUSCULAR; INTRAVENOUS at 01:00

## 2025-02-13 NOTE — DISCHARGE INSTRUCTIONS
Return to ER if symptoms worsen   Follow up with Dr. Tellez tomorrow  Return to the er if increased pain, inability to urinate, fever

## 2025-02-13 NOTE — H&P (VIEW-ONLY)
Subjective    Mr. Acosta is 84 y.o. male    Chief Complaint: left renal pelvic stone    History of Present Illness    84-year-old male established patient in for ER follow-up after patient developed new onset bilateral flank pain yesterday.  Was found to have 2 large left renal pelvic stones.  Unable to decipher hydronephrosis due to the parapelvic cysts.  Pain controlled with Norco.    Followed in our office for history of enlarged prostate and erectile dysfunction as well as the previous finding of left renal stones for which has been followed by Dr. Tellez.  History of 3 piece inflatable penile implant 12/2021.  Combination therapy for LUTS with tamsulosin and finasteride.       I independently visualized and reviewed the patient's prior imaging studies today in clinic and discussed the imaging findings with the patient.    The following portions of the patient's history were reviewed and updated as appropriate: allergies, current medications, past family history, past medical history, past social history, past surgical history and problem list.    Review of Systems   Constitutional:  Negative for chills and fever.   Gastrointestinal:  Negative for abdominal pain, anal bleeding, blood in stool, nausea and vomiting.   Genitourinary:  Positive for flank pain. Negative for dysuria and hematuria.         Current Outpatient Medications:     acetaminophen (TYLENOL) 500 MG tablet, Take 1 tablet by mouth Every 6 (Six) Hours As Needed for Mild Pain., Disp: , Rfl:     aspirin 81 MG EC tablet, Take 1 tablet by mouth Daily., Disp: , Rfl:     Budeson-Glycopyrrol-Formoterol (Breztri Aerosphere) 160-9-4.8 MCG/ACT aerosol inhaler, Inhale 2 puffs 2 (Two) Times a Day., Disp: 3 each, Rfl: 4    ciclopirox (LOPROX) 0.77 % cream, Apply 1 Application topically to the appropriate area as directed 2 (Two) Times a Day. To under arms after using Clindamycin Solution, Disp: , Rfl:     clindamycin (CLEOCIN T) 1 % external solution, 1  application  2 (Two) Times a Day. To under arms, Disp: , Rfl:     Continuous Blood Gluc Sensor (Dexcom G7 Sensor) misc, 1 each Every 10 (Ten) Days., Disp: 9 each, Rfl: 3    Dupilumab 300 MG/2ML solution prefilled syringe, Inject  under the skin into the appropriate area as directed Every 14 (Fourteen) Days., Disp: , Rfl:     ferrous sulfate 325 (65 FE) MG tablet, Take 1 tablet by mouth Daily With Breakfast., Disp: , Rfl:     finasteride (PROSCAR) 5 MG tablet, Take 1 tablet by mouth Daily., Disp: 90 tablet, Rfl: 3    fluticasone (FLONASE) 50 MCG/ACT nasal spray, 1 spray into the nostril(s) as directed by provider Daily., Disp: 16 g, Rfl: 5    HYDROcodone-acetaminophen (NORCO) 5-325 MG per tablet, Take 1 tablet by mouth Every 6 (Six) Hours As Needed for Moderate Pain., Disp: 15 tablet, Rfl: 0    insulin glargine (LANTUS, SEMGLEE) 100 UNIT/ML injection, 15 units in the morning and 40 units at bedtime., Disp: 18 mL, Rfl: 2    Insulin Glargine-yfgn 100 UNIT/ML solution pen-injector, Inject  under the skin into the appropriate area as directed., Disp: , Rfl:     levalbuterol (XOPENEX HFA) 45 MCG/ACT inhaler, Inhale 2 puffs Every 6 (Six) Hours As Needed for Wheezing., Disp: , Rfl:     levothyroxine (Synthroid) 50 MCG tablet, Take 1 tablet by mouth Daily., Disp: 90 tablet, Rfl: 4    lisinopril (PRINIVIL,ZESTRIL) 20 MG tablet, TAKE 1 TABLET BY MOUTH IN THE MORNING AND 1/2 (ONE-HALF) AT BEDTIME, Disp: 180 tablet, Rfl: 0    meclizine (ANTIVERT) 12.5 MG tablet, Take 1 tablet by mouth 3 (Three) Times a Day As Needed for Dizziness., Disp: 30 tablet, Rfl: 0    melatonin 5 MG tablet tablet, Take 1 tablet by mouth At Night As Needed., Disp: , Rfl:     metFORMIN ER (GLUCOPHAGE-XR) 500 MG 24 hr tablet, Take 1 tablet by mouth Daily With Breakfast., Disp: 90 tablet, Rfl: 4    metoprolol tartrate (LOPRESSOR) 25 MG tablet, Take 1 tablet by mouth 2 (Two) Times a Day., Disp: 180 tablet, Rfl: 4    mupirocin (BACTROBAN) 2 % ointment, Apply 1  Application topically to the appropriate area as directed Daily., Disp: , Rfl:     Pediatric Multiple Vit-C-FA (CHILDRENS CHEWABLE MULTI VITS PO), Take 1 tablet by mouth Daily., Disp: , Rfl:     rosuvastatin (CRESTOR) 40 MG tablet, Take 0.5 tablets by mouth Daily., Disp: 90 tablet, Rfl: 4    tamsulosin (FLOMAX) 0.4 MG capsule 24 hr capsule, Take 1 capsule by mouth Every Night., Disp: 90 capsule, Rfl: 3    tiZANidine (ZANAFLEX) 2 MG tablet, Take 1 tablet by mouth At Night As Needed for Muscle Spasms., Disp: 30 tablet, Rfl: 1    triamcinolone (KENALOG) 0.1 % cream, Apply 1 Application topically to the appropriate area as directed 2 (Two) Times a Day., Disp: 80 g, Rfl: 0    Ustekinumab (STELARA) 90 MG/ML solution prefilled syringe Injection, Inject 90 mg under the skin into the appropriate area as directed Every 28 (Twenty-Eight) Days., Disp: 1 mL, Rfl: 6    vitamin B-12 (CYANOCOBALAMIN) 1000 MCG tablet, Take 1 tablet by mouth Daily., Disp: , Rfl:     Vitamin D, Cholecalciferol, 25 MCG (1000 UT) capsule, Take 1 capsule by mouth Daily., Disp: , Rfl:     gabapentin (NEURONTIN) 100 MG capsule, , Disp: , Rfl:     naloxone (NARCAN) 4 MG/0.1ML nasal spray, Administer 1 spray into the nostril(s) as directed by provider As Needed. (Patient not taking: Reported on 2/13/2025), Disp: , Rfl:     nitroglycerin (NITROSTAT) 0.4 MG SL tablet, Place 1 tablet under the tongue Every 5 (Five) Minutes As Needed for Chest Pain. Take no more than 3 doses in 15 minutes. (Patient not taking: Reported on 2/13/2025), Disp: 25 tablet, Rfl: 1  No current facility-administered medications for this visit.    Past Medical History:   Diagnosis Date    Asthma     Colitis     Coronary artery disease     Diabetes mellitus     Diverticulitis     Elevated cholesterol     HL (hearing loss) 2012    Hyperlipidemia     Hypertension     Inflammatory bowel disease 2001    Myocardial infarct     EASTER 2020    Primary central sleep apnea 2009    Using Cpap     Sleep apnea     cpap at     Sleep apnea, obstructive     Stroke     Visual impairment 2016    Double vision       Past Surgical History:   Procedure Laterality Date    ADENOIDECTOMY  1947    APPENDECTOMY N/A 05/05/2023    Procedure: APPENDECTOMY LAPAROSCOPIC;  Surgeon: Katherine Carranza MD;  Location: [facility] OR;  Service: General;  Laterality: N/A;    BACK SURGERY      CARDIAC CATHETERIZATION      stents x 6    CARDIAC SURGERY      CABG TRIPLE BYPASS 2012    CATARACT EXTRACTION      COLONOSCOPY      COLONOSCOPY N/A 06/04/2021    Procedure: COLONOSCOPY WITH ANESTHESIA;  Surgeon: Zachariah Menjivar DO;  Location: [facility] ENDOSCOPY;  Service: Gastroenterology;  Laterality: N/A;  pre hx ulcerative colitis  post ulcerative colitis  dr collins gusman    COLONOSCOPY N/A 11/29/2023    Procedure: COLONOSCOPY LOWER LIMITED;  Surgeon: Zachariah Menjivar DO;  Location: [facility] ENDOSCOPY;  Service: Gastroenterology;  Laterality: N/A;  preop; hx of colitis  postop colitis - questionable colonic stricture at 30cm   pcp bryanna salty    COLOSTOMY      CORONARY ANGIOPLASTY WITH STENT PLACEMENT      CORONARY ARTERY BYPASS GRAFT  December 2012    CORONARY STENT PLACEMENT      HIP SURGERY Right     total hip    JOINT REPLACEMENT  2015    Right hip    PENILE PROSTHESIS IMPLANT N/A 12/13/2021    Procedure: 3-PIECE INFLATABLE PENILE PROSTHESIS PLACEMENT;  Surgeon: Jose Tellez MD;  Location: [facility] OR;  Service: Urology;  Laterality: N/A;    TONSILLECTOMY  1947       Social History     Socioeconomic History    Marital status:    Tobacco Use    Smoking status: Never     Passive exposure: Never    Smokeless tobacco: Never   Vaping Use    Vaping status: Never Used   Substance and Sexual Activity    Alcohol use: Never    Drug use: Never    Sexual activity: Not Currently     Partners: Female       Family History   Problem Relation Age of Onset    Colon cancer Brother     Colon polyps Neg Hx     Esophageal cancer Neg Hx   "      Objective    Temp 97.5 °F (36.4 °C)   Ht 175.3 cm (69.02\")   Wt 99.9 kg (220 lb 4.8 oz)   BMI 32.52 kg/m²     Physical Exam  Constitutional:       Appearance: Normal appearance.   Abdominal:      Tenderness: There is left CVA tenderness. There is no right CVA tenderness.   Skin:     General: Skin is warm and dry.   Neurological:      Mental Status: He is alert and oriented to person, place, and time.   Psychiatric:         Mood and Affect: Mood normal.         Behavior: Behavior normal.             Results for orders placed or performed in visit on 02/13/25   POC Urinalysis Dipstick, Multipro    Collection Time: 02/13/25  2:47 PM    Specimen: Urine   Result Value Ref Range    Color Yellow Yellow, Straw, Dark Yellow, Vicky    Clarity, UA Clear Clear    Glucose, UA Negative Negative mg/dL    Bilirubin Negative Negative    Ketones, UA Negative Negative    Specific Gravity  1.020 1.005 - 1.030    Blood, UA Small (A) Negative    pH, Urine 6.5 5.0 - 8.0    Protein, POC Negative Negative mg/dL    Urobilinogen, UA 0.2 E.U./dL Normal, 0.2 E.U./dL    Nitrite, UA Negative Negative    Leukocytes Negative Negative   IPSS Questionnaire (AUA-7):  Incomplete emptying  Over the past month, how often have you had a sensation of not emptying your bladder completely after you finished urinating?: About half the time (02/13/25 1448)  Frequency  Over the past month, how often have you had to urinate again less than two hours after you finishied urinating ?: About half the time (02/13/25 1448)  Intermittency  Over the past month, how often have you found you stopped and started again several time when you urinated ?: About half the time (02/13/25 1448)  Urgency  Over the last month, how often have you found it difficult  have you found it difficult to postpone urination ?: About half the time (02/13/25 1448)  Weak Stream  Over the past month, how often have you had a weak urinary stream ?: About half the time (02/13/25 " 1448)  Straining  Over the past month, how often have you had to push or strain to begin urination ?: About half the time (02/13/25 1448)  Nocturia  Over the past month, how many times did you most typically get up to urinate from the time you went to bed until the time you got up in the morning ?: 2 times (02/13/25 1448)  Quality of life due to urinary symptoms  If you were to spend the rest of your life with your urinary condition the way it is now, how would feel about that?: Mixed - about equally satisfied (02/13/25 1448)    Scores  Total IPSS Score: (!) 20 (02/13/25 1448)  Total Score = Symptomatic Level: Severely symptomatic: 20-35 (02/13/25 1448)     XR Abdomen KUB (02/13/2025 13:15) CT Abdomen Pelvis With Contrast (02/12/2025 23:55)     Assessment and Plan    Diagnoses and all orders for this visit:    1. Kidney stone (Primary)  -     POC Urinalysis Dipstick, Multipro  -     Case Request; Standing  -     ECG 12 Lead; Future  -     sodium chloride 0.9 % infusion  -     ceFAZolin (ANCEF) 2,000 mg in sodium chloride 0.9 % 100 mL IVPB  -     Case Request  -     Case Request; Standing  -     lactated ringers infusion  -     ceFAZolin (ANCEF) 2,000 mg in sodium chloride 0.9 % 100 mL IVPB    Other orders  -     Follow Anesthesia Guidelines / Protocol; Future  -     Follow Anesthesia Guidelines / Protocol; Standing  -     Verify / Perform Chlorhexidine Skin Prep; Standing  -     Provide NPO Instructions to Patient; Future  -     Chlorhexidine Skin Prep; Future  -     Place Sequential Compression Device; Standing  -     Maintain Sequential Compression Device; Standing  -     Follow Anesthesia Guidelines / Protocol; Future  -     Follow Anesthesia Guidelines / Protocol; Standing  -     Verify / Perform Chlorhexidine Skin Prep; Standing  -     Provide NPO Instructions to Patient; Future  -     Chlorhexidine Skin Prep; Future  -     Place Sequential Compression Device; Standing  -     Maintain Sequential Compression  Device; Standing      2 large left renal pelvic stones again noted on imaging KUB today    Previous microscopic urine evaluation completed yesterday in ER revealing no bacteria seen.  Therefore no current antibiotic warranted    At this time we will discuss case with Dr. Tellez to see which procedure is recommended to proceed.      Addend: Have spoke with Dr. Tellez who recommends proceeding with ureteroscopy laser lithotripsy with stent insertion.  Have spoke with patient who would like to proceed.  Was educated on the risks and benefits of the procedure as well as potential stent side effects.    Have the corrected case request in for ureteroscopy laser lithotripsy with stent insertion left side

## 2025-02-13 NOTE — TELEPHONE ENCOUNTER
Provider: DR GRAY    Caller: Zachariah Acosta    Relationship to Patient: Self    Reason for Call: PT WAS SEEN IN ER 2/12/25 FOR KIDNEY STONE PAIN.    PLEASE REVIEW NOTES/IMAGING AND CALL PT TO ADVISE ON SCHEDULING.    PT IS IN A LOT OF PAIN AND WOULD LIKE TO BE SEEN AS SOON AS POSSIBLE.    When was the patient last seen: 11/6/24

## 2025-02-13 NOTE — TELEPHONE ENCOUNTER
Placed a call and spoke with patient. Informed him Dr. Tellez was in [place] for the day, and if he wanted to see Dr. Tellez he had a 1000 am and a 1045 am. Patient preferred to see provider in [place] office; made patient an appt with Violet today with a KUB prior. All questions answered. Patient voiced complete understanding.

## 2025-02-13 NOTE — ED PROVIDER NOTES
Subjective   History of Present Illness  Patient is a 84-year-old male presents emergency department with bilateral flank pain that radiates around into his abdomen that started this morning.  He states he had some nausea this morning but that is resolved at this time.  He states has been taking Tylenol for pain however it is not helped.  He does have a history of kidney stones.  He has a history of asthma, colitis, CAD, diabetes, diverticulitis, hyperlipidemia, hypertension, inflammatory bowel disease, sleep apnea patient had a colostomy done about a year ago.  He states he has been doing well since then.  He denies any fever or chills.  No chest pain or shortness of breath.  No diarrhea.  No black or tarry stools.    History provided by:  Patient   used: No        Review of Systems   Constitutional: Negative.    HENT: Negative.     Eyes: Negative.    Respiratory: Negative.     Cardiovascular: Negative.    Gastrointestinal:         Patient is a 84-year-old male presents emergency department with bilateral flank pain that radiates around into his abdomen that started this morning.  He states he had some nausea this morning but that is resolved at this time.  He states has been taking Tylenol for pain however it is not helped.  He does have a history of kidney stones.  He has a history of asthma, colitis, CAD, diabetes, diverticulitis, hyperlipidemia, hypertension, inflammatory bowel disease, sleep apnea patient had a colostomy done about a year ago.  He states he has been doing well since then.  He denies any fever or chills.  No chest pain or shortness of breath.  No diarrhea.  No black or tarry stools.     Endocrine: Negative.    Genitourinary: Negative.    Musculoskeletal: Negative.    Skin: Negative.    Allergic/Immunologic: Negative.    Neurological: Negative.    Hematological: Negative.    Psychiatric/Behavioral: Negative.     All other systems reviewed and are negative.      Past Medical  "History:   Diagnosis Date    Asthma     Colitis     Coronary artery disease     Diabetes mellitus     Diverticulitis     Elevated cholesterol     HL (hearing loss) 2012    Hyperlipidemia     Hypertension     Inflammatory bowel disease 2001    Myocardial infarct     EASTER 2020    Primary central sleep apnea 2009    Using Cpap    Sleep apnea     cpap at hs    Sleep apnea, obstructive     Stroke     Visual impairment 2016    Double vision       Allergies   Allergen Reactions    Albuterol Other (See Comments)     \"Makes my throat spasm and swell\"    Adhesive Tape Rash    Azithromycin Rash    Fentanyl Nausea And Vomiting       Past Surgical History:   Procedure Laterality Date    ADENOIDECTOMY  1947    APPENDECTOMY N/A 05/05/2023    Procedure: APPENDECTOMY LAPAROSCOPIC;  Surgeon: Katherine Carranza MD;  Location: [facility] OR;  Service: General;  Laterality: N/A;    BACK SURGERY      CARDIAC CATHETERIZATION      stents x 6    CARDIAC SURGERY      CABG TRIPLE BYPASS 2012    CATARACT EXTRACTION      COLONOSCOPY      COLONOSCOPY N/A 06/04/2021    Procedure: COLONOSCOPY WITH ANESTHESIA;  Surgeon: Zachariah Menjivar DO;  Location: [facility] ENDOSCOPY;  Service: Gastroenterology;  Laterality: N/A;  pre hx ulcerative colitis  post ulcerative colitis  dr collins gusman    COLONOSCOPY N/A 11/29/2023    Procedure: COLONOSCOPY LOWER LIMITED;  Surgeon: Zachariah Menjivar DO;  Location: [facility] ENDOSCOPY;  Service: Gastroenterology;  Laterality: N/A;  preop; hx of colitis  postop colitis - questionable colonic stricture at 30cm   pcp bryanna salty    COLOSTOMY      CORONARY ANGIOPLASTY WITH STENT PLACEMENT      CORONARY ARTERY BYPASS GRAFT  December 2012    CORONARY STENT PLACEMENT      HIP SURGERY Right     total hip    JOINT REPLACEMENT  2015    Right hip    PENILE PROSTHESIS IMPLANT N/A 12/13/2021    Procedure: 3-PIECE INFLATABLE PENILE PROSTHESIS PLACEMENT;  Surgeon: Jose Tellez MD;  Location: [facility] OR;  Service: Urology;  " Laterality: N/A;    TONSILLECTOMY  1947       Family History   Problem Relation Age of Onset    Colon cancer Brother     Colon polyps Neg Hx     Esophageal cancer Neg Hx        Social History     Socioeconomic History    Marital status:    Tobacco Use    Smoking status: Never     Passive exposure: Never    Smokeless tobacco: Never   Vaping Use    Vaping status: Never Used   Substance and Sexual Activity    Alcohol use: Never    Drug use: Never    Sexual activity: Not Currently     Partners: Female       Prior to Admission medications    Medication Sig Start Date End Date Taking? Authorizing Provider   acetaminophen (TYLENOL) 500 MG tablet Take 1 tablet by mouth Every 6 (Six) Hours As Needed for Mild Pain. 4/12/23   El Adamson APRN   aspirin 81 MG EC tablet Take 1 tablet by mouth Daily.    ProviderCe MD   Budeson-Glycopyrrol-Formoterol (Breztri Aerosphere) 160-9-4.8 MCG/ACT aerosol inhaler Inhale 2 puffs 2 (Two) Times a Day. 9/7/23   Amaya Mix APRN   ciclopirox (LOPROX) 0.77 % cream Apply 1 Application topically to the appropriate area as directed 2 (Two) Times a Day. To under arms after using Clindamycin Solution 10/23/23   Ce Foy MD   clindamycin (CLEOCIN T) 1 % external solution 1 application  2 (Two) Times a Day. To under arms 10/23/23   Ce Foy MD   Continuous Blood Gluc Sensor (Dexcom G7 Sensor) misc 1 each Every 10 (Ten) Days. 8/23/23   Yesi Siegel APRN   Dupilumab 300 MG/2ML solution prefilled syringe Inject  under the skin into the appropriate area as directed Every 14 (Fourteen) Days.    ProviderCe MD   ferrous sulfate 325 (65 FE) MG tablet Take 1 tablet by mouth Daily With Breakfast.    Ce Foy MD   finasteride (PROSCAR) 5 MG tablet Take 1 tablet by mouth Daily. 11/6/24   Jose Tellez MD   fluticasone (FLONASE) 50 MCG/ACT nasal spray 1 spray into the nostril(s) as directed by provider Daily. 11/9/22    Jacob Mejias MD   gabapentin (NEURONTIN) 100 MG capsule  3/21/24   Ce Foy MD   insulin glargine (LANTUS, SEMGLEE) 100 UNIT/ML injection 15 units in the morning and 40 units at bedtime. 9/7/23   Amaya Mix APRN   levalbuterol (XOPENEX HFA) 45 MCG/ACT inhaler Inhale 2 puffs Every 6 (Six) Hours As Needed for Wheezing.    Ce Foy MD   levothyroxine (Synthroid) 50 MCG tablet Take 1 tablet by mouth Daily. 5/15/24   Amaya Mix APRN   lisinopril (PRINIVIL,ZESTRIL) 20 MG tablet TAKE 1 TABLET BY MOUTH IN THE MORNING AND 1/2 (ONE-HALF) AT BEDTIME 7/1/24   Amaya Mix APRN   meclizine (ANTIVERT) 12.5 MG tablet Take 1 tablet by mouth 3 (Three) Times a Day As Needed for Dizziness. 1/7/22   Monique Carlos MD   melatonin 5 MG tablet tablet Take 1 tablet by mouth At Night As Needed.    Ce Foy MD   metFORMIN ER (GLUCOPHAGE-XR) 500 MG 24 hr tablet Take 1 tablet by mouth Daily With Breakfast. 5/15/24   Amaya Mix APRN   metoprolol tartrate (LOPRESSOR) 25 MG tablet Take 1 tablet by mouth 2 (Two) Times a Day. 5/15/24   Amaya Mix APRN   mupirocin (BACTROBAN) 2 % ointment Apply 1 Application topically to the appropriate area as directed Daily. 6/7/24   Ce Foy MD   naloxone (NARCAN) 4 MG/0.1ML nasal spray Administer 1 spray into the nostril(s) as directed by provider As Needed.  Patient not taking: Reported on 11/12/2024    Ce Foy MD   nitroglycerin (NITROSTAT) 0.4 MG SL tablet Place 1 tablet under the tongue Every 5 (Five) Minutes As Needed for Chest Pain. Take no more than 3 doses in 15 minutes.  Patient not taking: Reported on 11/12/2024 8/13/24   Mari Anderson APRN   Pediatric Multiple Vit-C-FA (CHILDRENS CHEWABLE MULTI VITS PO) Take 1 tablet by mouth Daily.    Provider, MD Ce   rosuvastatin (CRESTOR) 40 MG tablet Take 0.5 tablets by mouth Daily. 5/15/24    "Amaya Mix APRN   tamsulosin (FLOMAX) 0.4 MG capsule 24 hr capsule Take 1 capsule by mouth Every Night. 6/5/24   Jose Tlelez MD   tiZANidine (ZANAFLEX) 2 MG tablet Take 1 tablet by mouth At Night As Needed for Muscle Spasms. 1/28/25   Amaya Mix APRN   triamcinolone (KENALOG) 0.1 % cream Apply 1 Application topically to the appropriate area as directed 2 (Two) Times a Day. 12/17/24   Amaya Mix APRN   Ustekinumab (STELARA) 90 MG/ML solution prefilled syringe Injection Inject 90 mg under the skin into the appropriate area as directed Every 28 (Twenty-Eight) Days. 7/15/21   Salo Nascimento APRN   vitamin B-12 (CYANOCOBALAMIN) 1000 MCG tablet Take 1 tablet by mouth Daily.    Provider, MD Ce   Vitamin D, Cholecalciferol, 25 MCG (1000 UT) capsule Take 1 capsule by mouth Daily.    Provider, MD Ce       /87 (BP Location: Right arm, Patient Position: Sitting)   Pulse 59   Temp 97.9 °F (36.6 °C) (Oral)   Resp 16   Ht 175.3 cm (69.02\")   Wt 99.9 kg (220 lb 3.8 oz)   SpO2 96%   BMI 32.51 kg/m²     Objective   Physical Exam  Vitals and nursing note reviewed.   Constitutional:       Appearance: He is well-developed.      Comments: Nontoxic-appearing.  No acute distress.   HENT:      Head: Normocephalic and atraumatic.   Eyes:      Conjunctiva/sclera: Conjunctivae normal.      Pupils: Pupils are equal, round, and reactive to light.   Cardiovascular:      Rate and Rhythm: Normal rate and regular rhythm.      Heart sounds: Normal heart sounds.   Pulmonary:      Effort: Pulmonary effort is normal.      Breath sounds: Normal breath sounds.   Abdominal:      General: Bowel sounds are normal.      Palpations: Abdomen is soft.      Comments: Patient has bilateral CVA tenderness percussion.  Mild bilateral lower quadrant abdominal pain.  No guarding or rebound.  Colostomy noted to the left lower quadrant abdomen.   Musculoskeletal:         " General: Normal range of motion.      Cervical back: Normal range of motion and neck supple.   Skin:     General: Skin is warm and dry.   Neurological:      Mental Status: He is alert and oriented to person, place, and time.      Deep Tendon Reflexes: Reflexes are normal and symmetric.   Psychiatric:         Behavior: Behavior normal.         Thought Content: Thought content normal.         Judgment: Judgment normal.         Procedures         Lab Results (last 24 hours)       Procedure Component Value Units Date/Time    CBC & Differential [[id number]]  (Abnormal) Collected: 02/12/25 2106    Specimen: Blood Updated: 02/12/25 2115    Narrative:      The following orders were created for panel order CBC & Differential.  Procedure                               Abnormality         Status                     ---------                               -----------         ------                     CBC Auto Differential[[id number]]        Abnormal            Final result                 Please view results for these tests on the individual orders.    Comprehensive Metabolic Panel [[id number]]  (Abnormal) Collected: 02/12/25 2106    Specimen: Blood Updated: 02/12/25 2132     Glucose 132 mg/dL      BUN 13 mg/dL      Creatinine 0.80 mg/dL      Sodium 134 mmol/L      Potassium 4.2 mmol/L      Comment: Slight hemolysis detected by analyzer. Result may be falsely elevated.        Chloride 101 mmol/L      CO2 23.0 mmol/L      Calcium 8.9 mg/dL      Total Protein 7.0 g/dL      Albumin 3.9 g/dL      ALT (SGPT) 11 U/L      AST (SGOT) 27 U/L      Alkaline Phosphatase 306 U/L      Total Bilirubin 0.3 mg/dL      Globulin 3.1 gm/dL      A/G Ratio 1.3 g/dL      BUN/Creatinine Ratio 16.3     Anion Gap 10.0 mmol/L      eGFR 88.4 mL/min/1.73     Narrative:      GFR Categories in Chronic Kidney Disease (CKD)      GFR Category          GFR (mL/min/1.73)    Interpretation  G1                     90 or greater         Normal or high (1)  G2                       60-89                Mild decrease (1)  G3a                   45-59                Mild to moderate decrease  G3b                   30-44                Moderate to severe decrease  G4                    15-29                Severe decrease  G5                    14 or less           Kidney failure          (1)In the absence of evidence of kidney disease, neither GFR category G1 or G2 fulfill the criteria for CKD.    eGFR calculation 2021 CKD-EPI creatinine equation, which does not include race as a factor    Lipase [[id number]]  (Normal) Collected: 02/12/25 2106    Specimen: Blood Updated: 02/12/25 2127     Lipase 19 U/L     Lactic Acid, Plasma [[id number]]  (Normal) Collected: 02/12/25 2106    Specimen: Blood Updated: 02/12/25 2130     Lactate 1.6 mmol/L     CBC Auto Differential [[id number]]  (Abnormal) Collected: 02/12/25 2106    Specimen: Blood Updated: 02/12/25 2115     WBC 10.33 10*3/mm3      RBC 4.77 10*6/mm3      Hemoglobin 14.2 g/dL      Hematocrit 42.5 %      MCV 89.1 fL      MCH 29.8 pg      MCHC 33.4 g/dL      RDW 13.6 %      RDW-SD 44.5 fl      MPV 9.8 fL      Platelets 231 10*3/mm3      Neutrophil % 67.9 %      Lymphocyte % 14.7 %      Monocyte % 7.0 %      Eosinophil % 9.2 %      Basophil % 0.6 %      Immature Grans % 0.6 %      Neutrophils, Absolute 7.02 10*3/mm3      Lymphocytes, Absolute 1.52 10*3/mm3      Monocytes, Absolute 0.72 10*3/mm3      Eosinophils, Absolute 0.95 10*3/mm3      Basophils, Absolute 0.06 10*3/mm3      Immature Grans, Absolute 0.06 10*3/mm3      nRBC 0.0 /100 WBC     Urinalysis With Microscopic If Indicated (No Culture) - Urine, Clean Catch [[id number]]  (Abnormal) Collected: 02/12/25 2318    Specimen: Urine, Clean Catch Updated: 02/12/25 2329     Color, UA Yellow     Appearance, UA Clear     pH, UA 6.5     Specific Gravity, UA 1.024     Glucose, UA Negative     Ketones, UA Trace     Bilirubin, UA Negative     Blood, UA Large (3+)     Protein, UA Trace     Leuk  Esterase, UA Negative     Nitrite, UA Negative     Urobilinogen, UA 0.2 E.U./dL    Urinalysis, Microscopic Only - Urine, Clean Catch [[id number]]  (Abnormal) Collected: 02/12/25 2318    Specimen: Urine, Clean Catch Updated: 02/12/25 2329     RBC, UA 21-50 /HPF      WBC, UA 0-2 /HPF      Bacteria, UA None Seen /HPF      Squamous Epithelial Cells, UA 3-6 /HPF      Hyaline Casts, UA 0-2 /LPF      Methodology Automated Microscopy            CT Abdomen Pelvis With Contrast   Final Result   1. Evidence of large calculi in the left renal pelvis adjacent above the   left UPJ. There may be mild left hydronephrosis which is obscured by the   peripelvic cyst. A peripelvic retroperitoneal fat infiltration may   represent an acute or subacute inflammatory process/pyeloureteritis.   2. Stable and unchanged bilateral parapelvic renal cysts.   3. A new and significant retroperitoneal para-aortic lymphadenopathy as   detailed above. This was not seen in the previous study in April 2024.   These represent metastatic lymph nodes.   4. Extensive bony metastatic disease involving the thoracic or   lumbosacral spine, the pelvis, the ribs and limited visualized proximal   femur bilaterally. This was not noted in the previous study.    5. A penile prosthesis in place.                                               This report was signed and finalized on 2/13/2025 8:19 AM by Dr. Arlette Yip MD.              ED Course  ED Course as of 02/13/25 1357   Thu Feb 13, 2025   0008 Pressure is 197/74.  Patient has not had his nighttime lisinopril.  Will give labetalol at this time. [CW]   0046 CMP shows a glucose 132 BUN 13 creatinine 0.8 urinalysis shows a large amount of blood no leukocytes lactate 1.6 lipase 19 CBC white count is 10.3 hemoglobin is 14.2 hematocrit is 42 CT of the abdomen pelvis with contrast renal parapelvic cyst are noted on the left in addition what may reflect a UPJ calculus on the left measures 14 x 9 mm.  An  obstructing UPJ calculus not excluded lobulated prominent periaortic retroperitoneal lymphadenopathy is noted.  Colostomy noted left abdomen with a Lobo's pouch in the pelvis.reviewed pt and pt care plan with Dr. Holbrook- also in agreement with care plan. Call placed to urology at this time. Pt states that pain is much better at present.  [CW]   0049 Spoke with Dr. Barlow urologist on-call.  Advised to have the patient follow-up with Dr. Tellez tomorrow.  Patient states he would like 1 more dose of pain medicine before he went home.  Will repeat his morphine.  Will send the patient home with Flomax and pain medication.  Reviewed the CAT scan report with the patient and advised of the extensive osseous metastatic disease and need for probably follow-up on this as well.  Patient is in agreement with the care plan and voices understanding of instructions.  Patient will be discharged home shortly in stable condition.  Advised to return emergency department if he had fever, increased pain, inability to urinate. [CW]      ED Course User Index  [CW] Amaya Stokes APRN        Medical Decision Making  Patient is a 84-year-old male presents emergency department with bilateral flank pain that radiates around into his abdomen that started this morning.  He states he had some nausea this morning but that is resolved at this time.  He states has been taking Tylenol for pain however it is not helped.  He does have a history of kidney stones.  He has a history of asthma, colitis, CAD, diabetes, diverticulitis, hyperlipidemia, hypertension, inflammatory bowel disease, sleep apnea patient had a colostomy done about a year ago.  He states he has been doing well since then.  He denies any fever or chills.  No chest pain or shortness of breath.  No diarrhea.  No black or tarry stools.  Course of treatment in the ED: Nontoxic-appearing.  No acute distress.  Vitals blood pressure 171/78, temp 97.5, heart rate 57,  respirations 18, O2 sat 97% on room air.  Lungs clear to auscultation.  CV normal sinus rhythm.  Abdomen is soft, nondistended.  Patient has tenderness bilateral CVA.  There are some tenderness in the bilateral lower quadrant abdomen.  No guarding or rebound.  Colostomy intact to the left lower quadrant abdomen.  Laboratory studies including lactic acid have been ordered.  CT of the abdomen pelvis with IV contrast has been ordered.  Pain medication has been ordered as well as IV fluid hydration.  Differential diagnosis to include but not limited to: kidney stone; pyelonephritis; uti; colitis; diverticulitis; and other  Labs Reviewed  COMPREHENSIVE METABOLIC PANEL - Abnormal; Notable for the following components:     Glucose                       132 (*)                Sodium                        134 (*)                Alkaline Phosphatase          306 (*)             All other components within normal limits         Narrative: GFR Categories in Chronic Kidney Disease (CKD)                                      GFR Category          GFR (mL/min/1.73)    Interpretation                  G1                     90 or greater         Normal or high (1)                  G2                      60-89                Mild decrease (1)                  G3a                   45-59                Mild to moderate decrease                  G3b                   30-44                Moderate to severe decrease                  G4                    15-29                Severe decrease                  G5                    14 or less           Kidney failure                                          (1)In the absence of evidence of kidney disease, neither GFR category G1 or G2 fulfill the criteria for CKD.                                    eGFR calculation 2021 CKD-EPI creatinine equation, which does not include race as a factor  URINALYSIS W/ MICROSCOPIC IF INDICATED (NO CULTURE) - Abnormal; Notable for the following  components:     Ketones, UA                   Trace (*)               Blood, UA                     Large (3+) (*)               Protein, UA                   Trace (*)            All other components within normal limits  CBC WITH AUTO DIFFERENTIAL - Abnormal; Notable for the following components:     Lymphocyte %                  14.7 (*)               Eosinophil %                  9.2 (*)                Immature Grans %              0.6 (*)                Neutrophils, Absolute         7.02 (*)               Eosinophils, Absolute         0.95 (*)               Immature Grans, Absolute      0.06 (*)            All other components within normal limits  URINALYSIS, MICROSCOPIC ONLY - Abnormal; Notable for the following components:     RBC, UA                       21-50 (*)               Squamous Epithelial Cells, UA   3-6 (*)             All other components within normal limits  LIPASE - Normal  LACTIC ACID, PLASMA - Normal  RAINBOW DRAW         Narrative: The following orders were created for panel order [place] Draw.                  Procedure                               Abnormality         Status                                     ---------                               -----------         ------                                     Green Top (Gel)[[id number]]                                  Final result                               Lavender Top[[id number]]                                     Final result                               Red Top[[id number]]                                          Final result                               Light Blue Top[[id number]]                                   Final result                                                 Please view results for these tests on the individual orders.  CBC AND DIFFERENTIAL         Narrative: The following orders were created for panel order CBC & Differential.                  Procedure                               Abnormality         Status                                      ---------                               -----------         ------                                     CBC Auto Differential[[id number]]        Abnormal            Final result                                                 Please view results for these tests on the individual orders.  GREEN TOP  LAVENDER TOP  RED TOP  LIGHT BLUE TOP  CT Abdomen Pelvis With Contrast    (Results Pending)  CMP shows a glucose 132 BUN 13 creatinine 0.8 urinalysis shows a large amount of blood no leukocytes lactate 1.6 lipase 19 CBC white count is 10.3 hemoglobin is 14.2 hematocrit is 42 CT of the abdomen pelvis with contrast renal parapelvic cyst are noted on the left in addition what may reflect a UPJ calculus on the left measures 14 x 9 mm.  An obstructing UPJ calculus not excluded lobulated prominent periaortic retroperitoneal lymphadenopathy is noted.  Colostomy noted left abdomen with a Lobo's pouch in the pelvis.reviewed pt and pt care plan with Dr. Holbrook- also in agreement with care plan. Call placed to urology at this time. Pt states that pain is much better at present.   Spoke with Dr. Barlow urologist on-call.  Advised to have the patient follow-up with Dr. Tellez tomorrow.  Patient states he would like 1 more dose of pain medicine before he went home.  Will repeat his morphine.  Will send the patient home with Flomax and pain medication.  Reviewed the CAT scan report with the patient and advised of the extensive osseous metastatic disease and need for probably follow-up on this as well.  Patient is in agreement with the care plan and voices understanding of instructions.  Patient will be discharged home shortly in stable condition.  Advised to return emergency department if he had fever, increased pain, inability to urinate.      Problems Addressed:  Abnormal CT scan: complicated acute illness or injury  Kidney stone: complicated acute illness or injury    Amount and/or Complexity of  Data Reviewed  Labs: ordered. Decision-making details documented in ED Course.  Radiology: ordered. Decision-making details documented in ED Course.    Risk  Prescription drug management.         Final diagnoses:   Kidney stone   Abnormal CT scan          Amaya Stokes, BORIS  02/13/25 0059       Amaya Stokes, APRN  02/13/25 1358

## 2025-02-13 NOTE — TELEPHONE ENCOUNTER
Called pt with  Surgery information  Surgery: 2/14 with an arrival of 0730    Pt will need to be NPO after midnight the night before surgery.   Pt will report to patient registration both days listed above.

## 2025-02-14 ENCOUNTER — ANESTHESIA EVENT (OUTPATIENT)
Dept: PERIOP | Facility: HOSPITAL | Age: 85
End: 2025-02-14
Payer: MEDICARE

## 2025-02-14 ENCOUNTER — APPOINTMENT (OUTPATIENT)
Dept: GENERAL RADIOLOGY | Facility: HOSPITAL | Age: 85
End: 2025-02-14
Payer: MEDICARE

## 2025-02-14 ENCOUNTER — HOSPITAL ENCOUNTER (OUTPATIENT)
Facility: HOSPITAL | Age: 85
Setting detail: HOSPITAL OUTPATIENT SURGERY
Discharge: HOME | End: 2025-02-14
Attending: UROLOGY | Admitting: UROLOGY
Payer: MEDICARE

## 2025-02-14 ENCOUNTER — ANESTHESIA (OUTPATIENT)
Dept: PERIOP | Facility: HOSPITAL | Age: 85
End: 2025-02-14
Payer: MEDICARE

## 2025-02-14 VITALS
RESPIRATION RATE: 16 BRPM | DIASTOLIC BLOOD PRESSURE: 75 MMHG | WEIGHT: 217 LBS | HEIGHT: 68 IN | TEMPERATURE: 98 F | SYSTOLIC BLOOD PRESSURE: 148 MMHG | HEART RATE: 69 BPM | OXYGEN SATURATION: 96 % | BODY MASS INDEX: 32.89 KG/M2

## 2025-02-14 DIAGNOSIS — N20.0 KIDNEY STONE: ICD-10-CM

## 2025-02-14 LAB
GLUCOSE BLDC GLUCOMTR-MCNC: 102 MG/DL (ref 70–130)
GLUCOSE BLDC GLUCOMTR-MCNC: 114 MG/DL (ref 70–130)
LAB AP CASE REPORT: NORMAL
LAB AP DIAGNOSIS COMMENT: NORMAL
Lab: NORMAL
PATH REPORT.FINAL DX SPEC: NORMAL
PATH REPORT.GROSS SPEC: NORMAL
PSA SERPL-MCNC: 362 NG/ML (ref 0–4)

## 2025-02-14 PROCEDURE — 88300 SURGICAL PATH GROSS: CPT | Performed by: UROLOGY

## 2025-02-14 PROCEDURE — 25010000002 PROPOFOL 10 MG/ML EMULSION: Performed by: NURSE ANESTHETIST, CERTIFIED REGISTERED

## 2025-02-14 PROCEDURE — 25010000002 ONDANSETRON PER 1 MG: Performed by: NURSE ANESTHETIST, CERTIFIED REGISTERED

## 2025-02-14 PROCEDURE — 82360 CALCULUS ASSAY QUANT: CPT | Performed by: UROLOGY

## 2025-02-14 PROCEDURE — 74420 UROGRAPHY RTRGR +-KUB: CPT | Performed by: UROLOGY

## 2025-02-14 PROCEDURE — C1769 GUIDE WIRE: HCPCS | Performed by: UROLOGY

## 2025-02-14 PROCEDURE — 25010000002 FENTANYL CITRATE (PF) 100 MCG/2ML SOLUTION: Performed by: NURSE ANESTHETIST, CERTIFIED REGISTERED

## 2025-02-14 PROCEDURE — C1758 CATHETER, URETERAL: HCPCS | Performed by: UROLOGY

## 2025-02-14 PROCEDURE — 74420 UROGRAPHY RTRGR +-KUB: CPT

## 2025-02-14 PROCEDURE — 25010000002 CEFAZOLIN PER 500 MG

## 2025-02-14 PROCEDURE — 25510000001 IOPAMIDOL 61 % SOLUTION: Performed by: UROLOGY

## 2025-02-14 PROCEDURE — 25010000002 KETOROLAC TROMETHAMINE PER 15 MG: Performed by: NURSE ANESTHETIST, CERTIFIED REGISTERED

## 2025-02-14 PROCEDURE — C2617 STENT, NON-COR, TEM W/O DEL: HCPCS | Performed by: UROLOGY

## 2025-02-14 PROCEDURE — 25010000002 LIDOCAINE PF 2% 2 % SOLUTION: Performed by: NURSE ANESTHETIST, CERTIFIED REGISTERED

## 2025-02-14 PROCEDURE — 25810000003 LACTATED RINGERS PER 1000 ML

## 2025-02-14 PROCEDURE — 52356 CYSTO/URETERO W/LITHOTRIPSY: CPT | Performed by: UROLOGY

## 2025-02-14 PROCEDURE — C1894 INTRO/SHEATH, NON-LASER: HCPCS | Performed by: UROLOGY

## 2025-02-14 PROCEDURE — 82948 REAGENT STRIP/BLOOD GLUCOSE: CPT

## 2025-02-14 DEVICE — URETERAL STENT WITH SIDE HOLES 6FX28CM
Type: IMPLANTABLE DEVICE | Site: URETER | Status: FUNCTIONAL
Brand: TRIA™ SOFT

## 2025-02-14 RX ORDER — SODIUM CHLORIDE, SODIUM LACTATE, POTASSIUM CHLORIDE, CALCIUM CHLORIDE 600; 310; 30; 20 MG/100ML; MG/100ML; MG/100ML; MG/100ML
100 INJECTION, SOLUTION INTRAVENOUS CONTINUOUS
Status: DISCONTINUED | OUTPATIENT
Start: 2025-02-14 | End: 2025-02-14 | Stop reason: HOSPADM

## 2025-02-14 RX ORDER — ONDANSETRON 2 MG/ML
4 INJECTION INTRAMUSCULAR; INTRAVENOUS ONCE AS NEEDED
Status: DISCONTINUED | OUTPATIENT
Start: 2025-02-14 | End: 2025-02-14 | Stop reason: HOSPADM

## 2025-02-14 RX ORDER — SODIUM CHLORIDE 0.9 % (FLUSH) 0.9 %
3 SYRINGE (ML) INJECTION EVERY 12 HOURS SCHEDULED
Status: DISCONTINUED | OUTPATIENT
Start: 2025-02-14 | End: 2025-02-14 | Stop reason: HOSPADM

## 2025-02-14 RX ORDER — ONDANSETRON 2 MG/ML
INJECTION INTRAMUSCULAR; INTRAVENOUS AS NEEDED
Status: DISCONTINUED | OUTPATIENT
Start: 2025-02-14 | End: 2025-02-14 | Stop reason: SURG

## 2025-02-14 RX ORDER — MAGNESIUM HYDROXIDE 1200 MG/15ML
LIQUID ORAL
Qty: 1 EACH | Refills: 0 | Status: SHIPPED | OUTPATIENT
Start: 2025-02-14

## 2025-02-14 RX ORDER — NALOXONE HCL 0.4 MG/ML
0.4 VIAL (ML) INJECTION AS NEEDED
Status: DISCONTINUED | OUTPATIENT
Start: 2025-02-14 | End: 2025-02-14 | Stop reason: HOSPADM

## 2025-02-14 RX ORDER — SODIUM CHLORIDE 0.9 % (FLUSH) 0.9 %
3-10 SYRINGE (ML) INJECTION AS NEEDED
Status: DISCONTINUED | OUTPATIENT
Start: 2025-02-14 | End: 2025-02-14 | Stop reason: HOSPADM

## 2025-02-14 RX ORDER — PHENAZOPYRIDINE HYDROCHLORIDE 100 MG/1
100 TABLET, FILM COATED ORAL 3 TIMES DAILY PRN
Qty: 20 TABLET | Refills: 1 | Status: SHIPPED | OUTPATIENT
Start: 2025-02-14

## 2025-02-14 RX ORDER — OXYBUTYNIN CHLORIDE 5 MG/1
5 TABLET ORAL EVERY 8 HOURS PRN
Qty: 30 TABLET | Refills: 1 | Status: SHIPPED | OUTPATIENT
Start: 2025-02-14

## 2025-02-14 RX ORDER — PROPOFOL 10 MG/ML
VIAL (ML) INTRAVENOUS AS NEEDED
Status: DISCONTINUED | OUTPATIENT
Start: 2025-02-14 | End: 2025-02-14 | Stop reason: SURG

## 2025-02-14 RX ORDER — DOCUSATE SODIUM 100 MG/1
100 CAPSULE, LIQUID FILLED ORAL 2 TIMES DAILY
Qty: 60 CAPSULE | Refills: 1 | Status: SHIPPED | OUTPATIENT
Start: 2025-02-14

## 2025-02-14 RX ORDER — HYDROCODONE BITARTRATE AND ACETAMINOPHEN 5; 325 MG/1; MG/1
1 TABLET ORAL EVERY 4 HOURS PRN
Status: DISCONTINUED | OUTPATIENT
Start: 2025-02-14 | End: 2025-02-14 | Stop reason: HOSPADM

## 2025-02-14 RX ORDER — KETOROLAC TROMETHAMINE 30 MG/ML
INJECTION, SOLUTION INTRAMUSCULAR; INTRAVENOUS AS NEEDED
Status: DISCONTINUED | OUTPATIENT
Start: 2025-02-14 | End: 2025-02-14 | Stop reason: SURG

## 2025-02-14 RX ORDER — LIDOCAINE HYDROCHLORIDE 20 MG/ML
INJECTION, SOLUTION EPIDURAL; INFILTRATION; INTRACAUDAL; PERINEURAL AS NEEDED
Status: DISCONTINUED | OUTPATIENT
Start: 2025-02-14 | End: 2025-02-14 | Stop reason: SURG

## 2025-02-14 RX ORDER — FLUMAZENIL 0.1 MG/ML
0.2 INJECTION INTRAVENOUS AS NEEDED
Status: DISCONTINUED | OUTPATIENT
Start: 2025-02-14 | End: 2025-02-14 | Stop reason: HOSPADM

## 2025-02-14 RX ORDER — IOPAMIDOL 612 MG/ML
INJECTION, SOLUTION INTRAVASCULAR AS NEEDED
Status: DISCONTINUED | OUTPATIENT
Start: 2025-02-14 | End: 2025-02-14 | Stop reason: HOSPADM

## 2025-02-14 RX ORDER — MAGNESIUM HYDROXIDE 1200 MG/15ML
LIQUID ORAL AS NEEDED
Status: DISCONTINUED | OUTPATIENT
Start: 2025-02-14 | End: 2025-02-14 | Stop reason: HOSPADM

## 2025-02-14 RX ORDER — HYDROCODONE BITARTRATE AND ACETAMINOPHEN 5; 325 MG/1; MG/1
1 TABLET ORAL ONCE AS NEEDED
Status: DISCONTINUED | OUTPATIENT
Start: 2025-02-14 | End: 2025-02-14 | Stop reason: HOSPADM

## 2025-02-14 RX ORDER — ASPIRIN 81 MG/1
81 TABLET, CHEWABLE ORAL ONCE
Status: COMPLETED | OUTPATIENT
Start: 2025-02-14 | End: 2025-02-14

## 2025-02-14 RX ORDER — ROCURONIUM BROMIDE 10 MG/ML
INJECTION, SOLUTION INTRAVENOUS AS NEEDED
Status: DISCONTINUED | OUTPATIENT
Start: 2025-02-14 | End: 2025-02-14 | Stop reason: SURG

## 2025-02-14 RX ORDER — HYDROCODONE BITARTRATE AND ACETAMINOPHEN 5; 325 MG/1; MG/1
1 TABLET ORAL EVERY 6 HOURS PRN
Qty: 15 TABLET | Refills: 0 | Status: SHIPPED | OUTPATIENT
Start: 2025-02-14

## 2025-02-14 RX ORDER — LABETALOL HYDROCHLORIDE 5 MG/ML
5 INJECTION, SOLUTION INTRAVENOUS
Status: DISCONTINUED | OUTPATIENT
Start: 2025-02-14 | End: 2025-02-14 | Stop reason: HOSPADM

## 2025-02-14 RX ORDER — FENTANYL CITRATE 50 UG/ML
INJECTION, SOLUTION INTRAMUSCULAR; INTRAVENOUS AS NEEDED
Status: DISCONTINUED | OUTPATIENT
Start: 2025-02-14 | End: 2025-02-14 | Stop reason: SURG

## 2025-02-14 RX ORDER — LEVOFLOXACIN 500 MG/1
TABLET, FILM COATED ORAL
Qty: 7 TABLET | Refills: 0 | Status: SHIPPED | OUTPATIENT
Start: 2025-02-14

## 2025-02-14 RX ORDER — SODIUM CHLORIDE 9 MG/ML
40 INJECTION, SOLUTION INTRAVENOUS AS NEEDED
Status: DISCONTINUED | OUTPATIENT
Start: 2025-02-14 | End: 2025-02-14 | Stop reason: HOSPADM

## 2025-02-14 RX ORDER — ONDANSETRON 4 MG/1
4 TABLET, ORALLY DISINTEGRATING ORAL EVERY 6 HOURS PRN
Qty: 6 TABLET | Refills: 1 | Status: SHIPPED | OUTPATIENT
Start: 2025-02-14 | End: 2025-02-20 | Stop reason: SDUPTHER

## 2025-02-14 RX ORDER — HYDROCODONE BITARTRATE AND ACETAMINOPHEN 10; 325 MG/1; MG/1
1 TABLET ORAL EVERY 4 HOURS PRN
Status: DISCONTINUED | OUTPATIENT
Start: 2025-02-14 | End: 2025-02-14 | Stop reason: HOSPADM

## 2025-02-14 RX ORDER — FENTANYL CITRATE 50 UG/ML
50 INJECTION, SOLUTION INTRAMUSCULAR; INTRAVENOUS
Status: DISCONTINUED | OUTPATIENT
Start: 2025-02-14 | End: 2025-02-14 | Stop reason: HOSPADM

## 2025-02-14 RX ADMIN — LIDOCAINE HYDROCHLORIDE 100 MG: 20 INJECTION, SOLUTION EPIDURAL; INFILTRATION; INTRACAUDAL; PERINEURAL at 11:12

## 2025-02-14 RX ADMIN — CEFAZOLIN 2000 MG: 2 INJECTION, POWDER, FOR SOLUTION INTRAMUSCULAR; INTRAVENOUS at 10:13

## 2025-02-14 RX ADMIN — ASPIRIN 81 MG: 81 TABLET, CHEWABLE ORAL at 09:17

## 2025-02-14 RX ADMIN — LIDOCAINE HYDROCHLORIDE 100 MG: 20 INJECTION, SOLUTION EPIDURAL; INFILTRATION; INTRACAUDAL; PERINEURAL at 10:05

## 2025-02-14 RX ADMIN — ONDANSETRON 4 MG: 2 INJECTION INTRAMUSCULAR; INTRAVENOUS at 10:57

## 2025-02-14 RX ADMIN — FENTANYL CITRATE 50 MCG: 50 INJECTION, SOLUTION INTRAMUSCULAR; INTRAVENOUS at 10:34

## 2025-02-14 RX ADMIN — SODIUM CHLORIDE, SODIUM LACTATE, POTASSIUM CHLORIDE, CALCIUM CHLORIDE 100 ML/HR: 20; 30; 600; 310 INJECTION, SOLUTION INTRAVENOUS at 08:59

## 2025-02-14 RX ADMIN — KETOROLAC TROMETHAMINE 15 MG: 30 INJECTION, SOLUTION INTRAMUSCULAR; INTRAVENOUS at 11:06

## 2025-02-14 RX ADMIN — ROCURONIUM 50 MG: 50 INJECTION, SOLUTION INTRAVENOUS at 10:06

## 2025-02-14 RX ADMIN — FENTANYL CITRATE 50 MCG: 50 INJECTION, SOLUTION INTRAMUSCULAR; INTRAVENOUS at 10:23

## 2025-02-14 RX ADMIN — PROPOFOL 120 MG: 10 INJECTION, EMULSION INTRAVENOUS at 10:05

## 2025-02-14 NOTE — INTERVAL H&P NOTE
H&P reviewed. The patient was examined and there are no changes to the H&P.  Patient would like to proceed today as scheduled for left  URS. Discussed risks including but not limited to infection, bleeding, injury to ureter/kidney, inability to remove any/all stone, need for ureteral stent, complications of anesthesia.  Discussed his markedly elevated PSA of 362 and will plan for prostate biopsy in office next week after he can have appropriate levaquin and fleet enema prep.

## 2025-02-14 NOTE — ANESTHESIA PREPROCEDURE EVALUATION
Anesthesia Evaluation     Patient summary reviewed   no history of anesthetic complications:   NPO Solid Status: > 6 hours  NPO Liquid Status: > 6 hours           Airway   Mallampati: II  TM distance: >3 FB  Neck ROM: full  No difficulty expected  Dental - normal exam     Pulmonary    (+) COPD, asthma,shortness of breath, sleep apnea on CPAP  (-) not a smoker  Cardiovascular   Exercise tolerance: poor (<4 METS)    (+) hypertension, past MI , CAD, CABG >6 Months, cardiac stents more than 12 months ago , hyperlipidemia  (-) dysrhythmias      Neuro/Psych  (+) CVA (2023)  (-) seizures  GI/Hepatic/Renal/Endo    (+) obesity, renal disease- CRI and stones, diabetes mellitus type 2 well controlled using insulin, thyroid problem   (-) liver disease    ROS Comment: UC    Musculoskeletal     Abdominal   (+) obese   Substance History      OB/GYN          Other                      Anesthesia Plan    ASA 3     general     (ASA)  intravenous induction     Anesthetic plan, risks, benefits, and alternatives have been provided, discussed and informed consent has been obtained with: patient.

## 2025-02-14 NOTE — OP NOTE
URETEROSCOPY LASER LITHOTRIPSY WITH STENT INSERTION  Procedure Note    Zachariah Acosta  Date of Procedure: 2/14/2025    Pre-op Diagnosis:   Kidney stone [N20.0]    Post-op Diagnosis:     Post-Op Diagnosis Codes:     * Kidney stone [N20.0]    Procedure/CPT® Codes:  MS CYSTO/URETERO W/LITHOTRIPSY &INDWELL STENT INSRT [27005]    Procedure(s):  LEFT URETEROSCOPY LASER LITHOTRIPSY, BASKET STONE EXTRACTION, WITH STENT INSERTION-    Surgeon(s):  Jose Tellez MD    Anesthesia: General    Staff:   Circulator: Julieth Lopez RN  Scrub Person: Lola Clark; Breanna Guadarrama    Indications for procedure:  84-year-old male with symptomatic 2 large left UPJ stones presenting for left ureteroscopy    Findings:   2 large left renal pelvis stones each measuring greater than 1 cm, completely dusted and fragmented with holmium laser.  Several stones removed.  Further visibility limited by large amount of stone debris though no significant calcifications noted on fluoroscopy at end of procedure    Procedure details:  The patient is identified in the preoperative holding room.  The rationale including the benefits, alternatives and risks of this procedure were already discussed and informed consent has been obtained.  The patient demonstrates good understanding of this procedure.  Also explained was that a stent is not a permanent structure but will need to be changed periodically or removed to prevent encrustation which could lead to kidney damage.    The patient was taken to the operating room where appropriate anesthesia is given.  Surgical prophylaxis with IV antibiotics were administered. Appropriate timeout protocol was observed.  The usual prep and drape with Betadine was done.    A 23 Danish cystoscope sheath with a 30° lens was inserted.  Urethra was normal.  Prostate showed lateral lobe hypertrophy with markedly elevated bladder neck and friable prostatic urethra consistent with his known history of enlarged  prostate.  The ureteral orifices were orthotopic bilaterally.  I could see no evidence for bladder tumor or stones.  I placed a 0.035 sensor guidewire into the left ureteral orifice up into the kidney under fluoroscopy and maintained this is a safety wire.  I then placed a second 0.035 guidewire using a dual-lumen ureteral catheter.  I placed a ureteral access sheath.  I then placed the Dumont digital flexible ureteroscope into the access sheath up into the kidney under fluoroscopy.  I then visualized 2 large stones in the left renal pelvis each measuring greater than 1 cm.  I used the holmium laser 200 µm fiber in order to dust and fragment the stones completely.  I then used a tipless nitinol stone basket to remove as many of the significant fragments as possible.  After several fragments were removed, only dust was visible.  Due to the large amount of debris, this limited further visualization endoscopically.  However, I could see no other significant calcifications present on fluoroscopy.  I then removed the ureteroscope and access sheath under direct vision.  I could see no evidence for ureteral stone or ureteral injury.  I then backloaded the safety wire into the cystoscope over which I placed a 6 Slovak by 28 cm Tria soft double-J ureteral stent in the standard fashion.  After curl was obtained the left kidney, the wire was completely moved and a curl was obtained the bladder.  The string was removed from the stent prior to placement.  Due to the friable prostatic urethra and markedly elevated bladder neck, I felt it was safest to place a postoperative urethral catheter to prevent retention and/or prostatic bleeding.  I then removed the cystoscope and placed an 18 Slovak three-way Haines catheter bladder with 10 cc of sterile water in the balloon.  Catheter was placed to gravity drainage.  Irrigation port was plugged.  The patient was awakened and taken to the cover him in stable condition.        Estimated  Blood Loss: <30 mL    Specimens:                Specimens       ID Source Type Tests Collected By Collected At Frozen?    A Ureter, Left Calculus TISSUE PATHOLOGY EXAM   Jose Tellez MD 2/14/25 1039     Description: left ureteral stone              Drains: Left 6 Georgian by 28 cm Tria soft double ureteral stent without string.  18 Georgian three-way Haines catheter to gravity.  Ureteral Drain/Stent Left ureter 6 Fr. (Active)   Site Assessment THU 02/14/25 1145       Continuous Bladder Irrigation Triple-lumen 18 Fr (Active)   Site Assessment Clean;Skin intact 02/14/25 1122   Collection Container Standard drainage bag 02/14/25 1122   Securement Method Securing device 02/14/25 1145       Complications: none    Jose Tellez MD     Date: 2/14/2025  Time: 11:58 CST

## 2025-02-14 NOTE — ANESTHESIA PROCEDURE NOTES
Airway  Urgency: elective    Date/Time: 2/14/2025 10:08 AM  Airway not difficult    General Information and Staff    Patient location during procedure: OR  CRNA/CAA: Enid Olivas CRNA    Indications and Patient Condition  Indications for airway management: airway protection    Preoxygenated: yes  Mask difficulty assessment: 2 - vent by mask + OA or adjuvant +/- NMBA    Final Airway Details  Final airway type: endotracheal airway      Successful airway: ETT  Cuffed: yes   Successful intubation technique: direct laryngoscopy and video laryngoscopy  Facilitating devices/methods: intubating stylet  Endotracheal tube insertion site: oral  Blade: Meeks  Blade size: 4  ETT size (mm): 7.5  Cormack-Lehane Classification: grade I - full view of glottis  Placement verified by: chest auscultation and capnometry   Cuff volume (mL): 5  Measured from: lips  ETT/EBT  to lips (cm): 22  Number of attempts at approach: 1  Assessment: lips, teeth, and gum same as pre-op and atraumatic intubation

## 2025-02-14 NOTE — PROGRESS NOTES
Subjective    Mr. Acosta is 84 y.o. male    Chief Complaint: Kidney stone    History of Present Illness    84-year-old male established patient follow-up for catheter removal after left ureteroscopy laser lithotripsy basket stone extraction Friday 2/14 for 2 very large left UPJ stones.  Haines catheter is left in place due to his very large friable prostate.  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  Previous LUTS had been stable on combination therapy.  He Titan touch 3 piece inflatable penile implant placed 12/13/21.  He went to the ER last night due to bladder spasms and catheter not draining well and was given a prescription for cefdinir in addition to his previous prescription for levofloxacin in anticipation of prostate biopsy this week.  I reviewed his CT scan done yesterday in the ER showing properly positioned left ureteral stent, excellent stone fragmentation with only layering dust and debris in left kidney.     I independently visualized and reviewed the patient's prior imaging studies today in clinic and discussed the imaging findings with the patient.    The following portions of the patient's history were reviewed and updated as appropriate: allergies, current medications, past family history, past medical history, past social history, past surgical history and problem list.    Review of Systems      Current Outpatient Medications:     acetaminophen (TYLENOL) 500 MG tablet, Take 1 tablet by mouth Every 6 (Six) Hours As Needed for Mild Pain., Disp: , Rfl:     aspirin 81 MG EC tablet, Take 1 tablet by mouth Daily., Disp: , Rfl:     Budeson-Glycopyrrol-Formoterol (Breztri Aerosphere) 160-9-4.8 MCG/ACT aerosol inhaler, Inhale 2 puffs 2 (Two) Times a Day. (Patient not taking: Reported on 2/14/2025), Disp: 3 each, Rfl: 4    ciclopirox (LOPROX) 0.77 % cream, Apply 1 Application topically to the appropriate area as  directed 2 (Two) Times a Day. To under arms after using Clindamycin Solution (Patient not taking: Reported on 2/14/2025), Disp: , Rfl:     clindamycin (CLEOCIN T) 1 % external solution, 1 application  2 (Two) Times a Day. To under arms (Patient not taking: Reported on 2/14/2025), Disp: , Rfl:     Continuous Blood Gluc Sensor (Dexcom G7 Sensor) misc, 1 each Every 10 (Ten) Days., Disp: 9 each, Rfl: 3    docusate sodium (Colace) 100 MG capsule, Take 1 capsule by mouth 2 (Two) Times a Day., Disp: 60 capsule, Rfl: 1    Dupilumab 300 MG/2ML solution prefilled syringe, Inject  under the skin into the appropriate area as directed Every 14 (Fourteen) Days., Disp: , Rfl:     ferrous sulfate 325 (65 FE) MG tablet, Take 1 tablet by mouth Daily With Breakfast., Disp: , Rfl:     finasteride (PROSCAR) 5 MG tablet, Take 1 tablet by mouth Daily., Disp: 90 tablet, Rfl: 3    fluticasone (FLONASE) 50 MCG/ACT nasal spray, 1 spray into the nostril(s) as directed by provider Daily., Disp: 16 g, Rfl: 5    gabapentin (NEURONTIN) 100 MG capsule, , Disp: , Rfl:     HYDROcodone-acetaminophen (NORCO) 5-325 MG per tablet, Take 1 tablet by mouth Every 6 (Six) Hours As Needed for Moderate Pain., Disp: 15 tablet, Rfl: 0    HYDROcodone-acetaminophen (NORCO) 5-325 MG per tablet, Take 1 tablet by mouth Every 6 (Six) Hours As Needed for Severe Pain (postop pain)., Disp: 15 tablet, Rfl: 0    insulin aspart (novoLOG FLEXPEN) 100 UNIT/ML solution pen-injector sc pen, Inject  under the skin into the appropriate area as directed As Needed. sliding, Disp: , Rfl:     insulin glargine (LANTUS, SEMGLEE) 100 UNIT/ML injection, 15 units in the morning and 40 units at bedtime., Disp: 18 mL, Rfl: 2    Insulin Glargine-yfgn 100 UNIT/ML solution pen-injector, Inject  under the skin into the appropriate area as directed. (Patient not taking: Reported on 2/14/2025), Disp: , Rfl:     levalbuterol (XOPENEX HFA) 45 MCG/ACT inhaler, Inhale 2 puffs Every 6 (Six) Hours As  Needed for Wheezing., Disp: , Rfl:     levoFLOXacin (LEVAQUIN) 500 MG tablet, 1 tab by mouth once daily, Disp: 7 tablet, Rfl: 0    levothyroxine (Synthroid) 50 MCG tablet, Take 1 tablet by mouth Daily., Disp: 90 tablet, Rfl: 4    lisinopril (PRINIVIL,ZESTRIL) 20 MG tablet, TAKE 1 TABLET BY MOUTH IN THE MORNING AND 1/2 (ONE-HALF) AT BEDTIME, Disp: 180 tablet, Rfl: 0    meclizine (ANTIVERT) 12.5 MG tablet, Take 1 tablet by mouth 3 (Three) Times a Day As Needed for Dizziness., Disp: 30 tablet, Rfl: 0    melatonin 3 MG tablet, Take 5 mg by mouth At Night As Needed., Disp: , Rfl:     metFORMIN ER (GLUCOPHAGE-XR) 500 MG 24 hr tablet, Take 1 tablet by mouth Daily With Breakfast., Disp: 90 tablet, Rfl: 4    metoprolol tartrate (LOPRESSOR) 25 MG tablet, Take 1 tablet by mouth 2 (Two) Times a Day., Disp: 180 tablet, Rfl: 4    mupirocin (BACTROBAN) 2 % ointment, Apply 1 Application topically to the appropriate area as directed Daily. (Patient not taking: Reported on 2/14/2025), Disp: , Rfl:     naloxone (NARCAN) 4 MG/0.1ML nasal spray, Administer 1 spray into the nostril(s) as directed by provider As Needed. (Patient not taking: Reported on 11/12/2024), Disp: , Rfl:     nitroglycerin (NITROSTAT) 0.4 MG SL tablet, Place 1 tablet under the tongue Every 5 (Five) Minutes As Needed for Chest Pain. Take no more than 3 doses in 15 minutes., Disp: 25 tablet, Rfl: 1    ondansetron ODT (ZOFRAN-ODT) 4 MG disintegrating tablet, Place 1 tablet on the tongue Every 6 (Six) Hours As Needed for Nausea., Disp: 6 tablet, Rfl: 1    oxybutynin (DITROPAN) 5 MG tablet, Take 1 tablet by mouth Every 8 (Eight) Hours As Needed (bladder spasms)., Disp: 30 tablet, Rfl: 1    Pediatric Multiple Vit-C-FA (CHILDRENS CHEWABLE MULTI VITS PO), Take 1 tablet by mouth Daily., Disp: , Rfl:     phenazopyridine (PYRIDIUM) 100 MG tablet, Take 1 tablet by mouth 3 (Three) Times a Day As Needed (urinary burning)., Disp: 20 tablet, Rfl: 1    rosuvastatin (CRESTOR) 40 MG  tablet, Take 0.5 tablets by mouth Daily., Disp: 90 tablet, Rfl: 4    sodium phosphate (FLEET) 7-19 GM/118ML ADULT enema, Use rectally the morning of prostate biopsy, Disp: 1 each, Rfl: 0    tamsulosin (FLOMAX) 0.4 MG capsule 24 hr capsule, Take 1 capsule by mouth Every Night., Disp: 90 capsule, Rfl: 3    tiZANidine (ZANAFLEX) 2 MG tablet, Take 1 tablet by mouth At Night As Needed for Muscle Spasms., Disp: 30 tablet, Rfl: 1    triamcinolone (KENALOG) 0.1 % cream, Apply 1 Application topically to the appropriate area as directed 2 (Two) Times a Day., Disp: 80 g, Rfl: 0    Ustekinumab (STELARA) 90 MG/ML solution prefilled syringe Injection, Inject 90 mg under the skin into the appropriate area as directed Every 28 (Twenty-Eight) Days., Disp: 1 mL, Rfl: 6    vitamin B-12 (CYANOCOBALAMIN) 1000 MCG tablet, Take 1 tablet by mouth Daily., Disp: , Rfl:     Vitamin D, Cholecalciferol, 25 MCG (1000 UT) capsule, Take 1 capsule by mouth Daily., Disp: , Rfl:   No current facility-administered medications for this visit.    Facility-Administered Medications Ordered in Other Visits:     atropine sulfate injection 0.5 mg, 0.5 mg, Intravenous, Once PRN, Aditi Chino MD    fentaNYL citrate (PF) (SUBLIMAZE) injection 50 mcg, 50 mcg, Intravenous, Q10 Min PRN, Aditi Chino MD    flumazenil (ROMAZICON) injection 0.2 mg, 0.2 mg, Intravenous, PRN, Aditi Chino MD    HYDROcodone-acetaminophen (NORCO)  MG per tablet 1 tablet, 1 tablet, Oral, Q4H PRN, Aditi Chino MD    HYDROcodone-acetaminophen (NORCO) 5-325 MG per tablet 1 tablet, 1 tablet, Oral, Q4H PRN, Aditi Chino MD    HYDROcodone-acetaminophen (NORCO) 5-325 MG per tablet 1 tablet, 1 tablet, Oral, Once PRN, Jose Tellez MD    labetalol (NORMODYNE,TRANDATE) injection 5 mg, 5 mg, Intravenous, Q5 Min PRN, Aditi Chino MD    lactated ringers infusion, 100 mL/hr, Intravenous, Continuous, Violet Clark, APRN,  Last Rate: 100 mL/hr at 02/14/25 1003, Restarted at 02/14/25 1107    lactated ringers infusion, 100 mL/hr, Intravenous, Continuous, Aditi Chino MD    naloxone (NARCAN) injection 0.4 mg, 0.4 mg, Intravenous, PRN, Aditi Chino MD    ondansetron (ZOFRAN) injection 4 mg, 4 mg, Intravenous, Once PRN, Aditi Chino MD    ondansetron (ZOFRAN) injection 4 mg, 4 mg, Intravenous, Once PRN, Jose Tellez MD    Past Medical History:   Diagnosis Date    Asthma     Colitis     Coronary artery disease     Diabetes mellitus     Diverticulitis     Elevated cholesterol     HL (hearing loss) 2012    Hyperlipidemia     Hypertension     Inflammatory bowel disease 2001    Myocardial infarct     EASTER 2020    Primary central sleep apnea 2009    Using Cpap    Sleep apnea     cpap at     Sleep apnea, obstructive     Stroke     Visual impairment 2016    Double vision       Past Surgical History:   Procedure Laterality Date    ADENOIDECTOMY  1947    APPENDECTOMY N/A 05/05/2023    Procedure: APPENDECTOMY LAPAROSCOPIC;  Surgeon: Katherine Carranza MD;  Location: [facility] OR;  Service: General;  Laterality: N/A;    BACK SURGERY      CARDIAC CATHETERIZATION      stents x 6    CARDIAC SURGERY      CABG TRIPLE BYPASS 2012    CATARACT EXTRACTION      COLONOSCOPY      COLONOSCOPY N/A 06/04/2021    Procedure: COLONOSCOPY WITH ANESTHESIA;  Surgeon: Zachariah Menjivar DO;  Location: [facility] ENDOSCOPY;  Service: Gastroenterology;  Laterality: N/A;  pre hx ulcerative colitis  post ulcerative colitis  dr collins gusman    COLONOSCOPY N/A 11/29/2023    Procedure: COLONOSCOPY LOWER LIMITED;  Surgeon: Zachariah eMnjivar DO;  Location: [facility] ENDOSCOPY;  Service: Gastroenterology;  Laterality: N/A;  preop; hx of colitis  postop colitis - questionable colonic stricture at 30cm   pcp bryanna garrisontreet    COLOSTOMY      CORONARY ANGIOPLASTY WITH STENT PLACEMENT      CORONARY ARTERY BYPASS GRAFT  December 2012    CORONARY STENT  PLACEMENT      HIP SURGERY Right     total hip    JOINT REPLACEMENT  2015    Right hip    PENILE PROSTHESIS IMPLANT N/A 12/13/2021    Procedure: 3-PIECE INFLATABLE PENILE PROSTHESIS PLACEMENT;  Surgeon: Jose Tellez MD;  Location: [facility] OR;  Service: Urology;  Laterality: N/A;    TONSILLECTOMY  1947       Social History     Socioeconomic History    Marital status:    Tobacco Use    Smoking status: Never     Passive exposure: Never    Smokeless tobacco: Never   Vaping Use    Vaping status: Never Used   Substance and Sexual Activity    Alcohol use: Never    Drug use: Never    Sexual activity: Not Currently     Partners: Female       Family History   Problem Relation Age of Onset    Colon cancer Brother     Colon polyps Neg Hx     Esophageal cancer Neg Hx        Objective    There were no vitals taken for this visit.    Physical Exam        Results for orders placed or performed during the hospital encounter of 02/14/25   POC Glucose Once    Collection Time: 02/14/25  8:46 AM    Specimen: Blood   Result Value Ref Range    Glucose 114 70 - 130 mg/dL   POC Glucose Once    Collection Time: 02/14/25 11:24 AM    Specimen: Blood   Result Value Ref Range    Glucose 102 70 - 130 mg/dL     Assessment and Plan    Diagnoses and all orders for this visit:    1. Kidney stone (Primary)    Haines catheter removed today.    We had a long discussion regarding the natural history of such a high elevated PSA consistent with likely prostate cancer for which I recommended scheduling transrectal ultrasound prostate biopsy in office.  He has diverting colostomy from previous abdominal surgery and so does not need Fleet enema prior to prostate biopsy.    I recommended keeping the left ureteral stent in place to allow for stone debris to washout.    We discussed risks of prostate biopsy including but not limited to infection, bleeding, need for additional procedures, possibility finding/not finding cancer, role of androgen  deprivation therapy for metastatic castrate sensitive prostate cancer, etc.  Patient voices understanding and provided informed consent to proceed with prostate biopsy in office this Thursday.    Will await urine culture results from ER yesterday.  Until then, I recommended he continue both the levofloxacin and cefdinir.    I spent approximately 30 minutes providing clinical care for this patient; including review of patient's chart and provider documentation, face to face time spent with patient in examination room (obtaining history, performing physical exam, discussing diagnosis and management options), placing orders, and completing patient documentation.       This document has been signed by ANTHONY Tellez MD on February 17, 2025 17:00 CST

## 2025-02-14 NOTE — ANESTHESIA POSTPROCEDURE EVALUATION
"Patient: Zachariah Acosta    Procedure Summary       Date: 02/14/25 Room / Location:  [facility] OR 01 /  [facility] OR    Anesthesia Start: 1003 Anesthesia Stop: 1126    Procedure: URETEROSCOPY LASER LITHOTRIPSY WITH STENT INSERTION-left (Left: Ureter) Diagnosis:       Kidney stone      (Kidney stone [N20.0])    Surgeons: Jose Tellez MD Provider: Enid Olivas CRNA    Anesthesia Type: general ASA Status: 3            Anesthesia Type: general    Vitals  Vitals Value Taken Time   /80 02/14/25 1210   Temp 98 °F (36.7 °C) 02/14/25 1200   Pulse 65 02/14/25 1211   Resp 14 02/14/25 1210   SpO2 96 % 02/14/25 1211   Vitals shown include unfiled device data.        Post Anesthesia Care and Evaluation    Patient location during evaluation: PACU  Patient participation: complete - patient participated  Level of consciousness: awake and alert  Pain management: adequate    Airway patency: patent  Anesthetic complications: No anesthetic complications    Cardiovascular status: acceptable  Respiratory status: acceptable  Hydration status: acceptable    Comments: Blood pressure 171/76, pulse 67, temperature 98 °F (36.7 °C), temperature source Temporal, resp. rate 16, height 173 cm (68.11\"), weight 98.4 kg (217 lb), SpO2 94%.    Pt discharged from PACU based on tiffanie score >8  No anesthesia care post op    "

## 2025-02-16 ENCOUNTER — HOSPITAL ENCOUNTER (EMERGENCY)
Facility: HOSPITAL | Age: 85
Discharge: HOME | End: 2025-02-17
Attending: EMERGENCY MEDICINE | Admitting: EMERGENCY MEDICINE
Payer: MEDICARE

## 2025-02-16 ENCOUNTER — APPOINTMENT (OUTPATIENT)
Dept: CT IMAGING | Facility: HOSPITAL | Age: 85
End: 2025-02-16
Payer: MEDICARE

## 2025-02-16 DIAGNOSIS — R31.9 HEMATURIA, UNSPECIFIED TYPE: ICD-10-CM

## 2025-02-16 DIAGNOSIS — N39.0 URINARY TRACT INFECTION WITHOUT HEMATURIA, SITE UNSPECIFIED: ICD-10-CM

## 2025-02-16 DIAGNOSIS — T83.9XXA PROBLEM WITH FOLEY CATHETER, INITIAL ENCOUNTER: Primary | ICD-10-CM

## 2025-02-16 LAB
ALBUMIN SERPL-MCNC: 3.4 G/DL (ref 3.5–5.2)
ALBUMIN/GLOB SERPL: 1 G/DL
ALP SERPL-CCNC: 250 U/L (ref 39–117)
ALT SERPL W P-5'-P-CCNC: 10 U/L (ref 1–41)
ANION GAP SERPL CALCULATED.3IONS-SCNC: 12 MMOL/L (ref 5–15)
AST SERPL-CCNC: 22 U/L (ref 1–40)
BACTERIA UR QL AUTO: ABNORMAL /HPF
BASOPHILS # BLD AUTO: 0.07 10*3/MM3 (ref 0–0.2)
BASOPHILS NFR BLD AUTO: 0.6 % (ref 0–1.5)
BILIRUB SERPL-MCNC: 0.4 MG/DL (ref 0–1.2)
BILIRUB UR QL STRIP: ABNORMAL
BUN SERPL-MCNC: 11 MG/DL (ref 8–23)
BUN/CREAT SERPL: 13.3 (ref 7–25)
CALCIUM SPEC-SCNC: 8.5 MG/DL (ref 8.6–10.5)
CHLORIDE SERPL-SCNC: 102 MMOL/L (ref 98–107)
CLARITY UR: ABNORMAL
CO2 SERPL-SCNC: 21 MMOL/L (ref 22–29)
COLOR UR: ABNORMAL
CREAT SERPL-MCNC: 0.83 MG/DL (ref 0.76–1.27)
DEPRECATED RDW RBC AUTO: 44.3 FL (ref 37–54)
EGFRCR SERPLBLD CKD-EPI 2021: 87.4 ML/MIN/1.73
EOSINOPHIL # BLD AUTO: 0.71 10*3/MM3 (ref 0–0.4)
EOSINOPHIL NFR BLD AUTO: 5.8 % (ref 0.3–6.2)
ERYTHROCYTE [DISTWIDTH] IN BLOOD BY AUTOMATED COUNT: 13.5 % (ref 12.3–15.4)
GLOBULIN UR ELPH-MCNC: 3.4 GM/DL
GLUCOSE SERPL-MCNC: 153 MG/DL (ref 65–99)
GLUCOSE UR STRIP-MCNC: NEGATIVE MG/DL
HCT VFR BLD AUTO: 40.7 % (ref 37.5–51)
HGB BLD-MCNC: 13.7 G/DL (ref 13–17.7)
HGB UR QL STRIP.AUTO: ABNORMAL
HYALINE CASTS UR QL AUTO: ABNORMAL /LPF
IMM GRANULOCYTES # BLD AUTO: 0.07 10*3/MM3 (ref 0–0.05)
IMM GRANULOCYTES NFR BLD AUTO: 0.6 % (ref 0–0.5)
KETONES UR QL STRIP: NEGATIVE
LEUKOCYTE ESTERASE UR QL STRIP.AUTO: ABNORMAL
LYMPHOCYTES # BLD AUTO: 1.01 10*3/MM3 (ref 0.7–3.1)
LYMPHOCYTES NFR BLD AUTO: 8.2 % (ref 19.6–45.3)
MCH RBC QN AUTO: 30.1 PG (ref 26.6–33)
MCHC RBC AUTO-ENTMCNC: 33.7 G/DL (ref 31.5–35.7)
MCV RBC AUTO: 89.5 FL (ref 79–97)
MONOCYTES # BLD AUTO: 0.89 10*3/MM3 (ref 0.1–0.9)
MONOCYTES NFR BLD AUTO: 7.2 % (ref 5–12)
NEUTROPHILS NFR BLD AUTO: 77.6 % (ref 42.7–76)
NEUTROPHILS NFR BLD AUTO: 9.54 10*3/MM3 (ref 1.7–7)
NITRITE UR QL STRIP: POSITIVE
NRBC BLD AUTO-RTO: 0 /100 WBC (ref 0–0.2)
PH UR STRIP.AUTO: <=5 [PH] (ref 5–8)
PLATELET # BLD AUTO: 214 10*3/MM3 (ref 140–450)
PMV BLD AUTO: 9.9 FL (ref 6–12)
POTASSIUM SERPL-SCNC: 3.8 MMOL/L (ref 3.5–5.2)
PROT SERPL-MCNC: 6.8 G/DL (ref 6–8.5)
PROT UR QL STRIP: ABNORMAL
RBC # BLD AUTO: 4.55 10*6/MM3 (ref 4.14–5.8)
RBC # UR STRIP: ABNORMAL /HPF
REF LAB TEST METHOD: ABNORMAL
SODIUM SERPL-SCNC: 135 MMOL/L (ref 136–145)
SP GR UR STRIP: 1.02 (ref 1–1.03)
SQUAMOUS #/AREA URNS HPF: ABNORMAL /HPF
UROBILINOGEN UR QL STRIP: ABNORMAL
WBC # UR STRIP: ABNORMAL /HPF
WBC NRBC COR # BLD AUTO: 12.29 10*3/MM3 (ref 3.4–10.8)

## 2025-02-16 PROCEDURE — 99284 EMERGENCY DEPT VISIT MOD MDM: CPT

## 2025-02-16 PROCEDURE — 36415 COLL VENOUS BLD VENIPUNCTURE: CPT

## 2025-02-16 PROCEDURE — 51702 INSERT TEMP BLADDER CATH: CPT

## 2025-02-16 PROCEDURE — 85025 COMPLETE CBC W/AUTO DIFF WBC: CPT | Performed by: EMERGENCY MEDICINE

## 2025-02-16 PROCEDURE — 81001 URINALYSIS AUTO W/SCOPE: CPT | Performed by: EMERGENCY MEDICINE

## 2025-02-16 PROCEDURE — 74176 CT ABD & PELVIS W/O CONTRAST: CPT

## 2025-02-16 PROCEDURE — 87086 URINE CULTURE/COLONY COUNT: CPT | Performed by: EMERGENCY MEDICINE

## 2025-02-16 PROCEDURE — 80053 COMPREHEN METABOLIC PANEL: CPT | Performed by: EMERGENCY MEDICINE

## 2025-02-17 ENCOUNTER — OFFICE VISIT (OUTPATIENT)
Dept: UROLOGY | Facility: CLINIC | Age: 85
End: 2025-02-17
Payer: MEDICARE

## 2025-02-17 VITALS — BODY MASS INDEX: 32.74 KG/M2 | HEIGHT: 68 IN | TEMPERATURE: 98.2 F | WEIGHT: 216 LBS

## 2025-02-17 VITALS
WEIGHT: 216.05 LBS | DIASTOLIC BLOOD PRESSURE: 88 MMHG | BODY MASS INDEX: 32.74 KG/M2 | HEART RATE: 98 BPM | TEMPERATURE: 98.5 F | HEIGHT: 68 IN | SYSTOLIC BLOOD PRESSURE: 158 MMHG | OXYGEN SATURATION: 98 % | RESPIRATION RATE: 17 BRPM

## 2025-02-17 DIAGNOSIS — R97.20 ELEVATED PSA: ICD-10-CM

## 2025-02-17 DIAGNOSIS — N20.0 KIDNEY STONE: Primary | ICD-10-CM

## 2025-02-17 PROCEDURE — 99214 OFFICE O/P EST MOD 30 MIN: CPT | Performed by: UROLOGY

## 2025-02-17 PROCEDURE — 1160F RVW MEDS BY RX/DR IN RCRD: CPT | Performed by: UROLOGY

## 2025-02-17 PROCEDURE — 1159F MED LIST DOCD IN RCRD: CPT | Performed by: UROLOGY

## 2025-02-17 RX ORDER — CEFDINIR 300 MG/1
300 CAPSULE ORAL ONCE
Status: COMPLETED | OUTPATIENT
Start: 2025-02-17 | End: 2025-02-17

## 2025-02-17 RX ORDER — CEFDINIR 300 MG/1
300 CAPSULE ORAL 2 TIMES DAILY
Qty: 20 CAPSULE | Refills: 0 | Status: SHIPPED | OUTPATIENT
Start: 2025-02-17

## 2025-02-17 RX ADMIN — CEFDINIR 300 MG: 300 CAPSULE ORAL at 00:28

## 2025-02-17 NOTE — DISCHARGE INSTRUCTIONS
You were seen in the emergency department for Haines catheter dysfunction.  He had a small blood clot in your catheter which caused to become clogged.  This resolved with bladder irrigation.  Labs revealed a urinary tract infection and you received antibiotics in the emergency department.  Will be prescribed antibiotics, please take as prescribed.  Because you have a kidney stone and a urinary tract infection, we considered admission but you requested trial of outpatient treat with oral antibiotics since you have follow-up later this morning with urology.  If you develop fever or chills, generalized weakness, or any additional concern please come to the emergency department immediately and do not wait to see your urologist.  Additionally, there was an incidental abnormal finding on your CT scan.  This showed some small abnormal lesions on your spine.  This was seen on a previous CT scan and you reported being aware.  Please follow-up with your urologist and primary care doctor to have these abnormal findings further evaluated as they could represent cancer.

## 2025-02-17 NOTE — PROGRESS NOTES
Pt. Presented to clinic this morning c/o of his catheter not draining. I hand irrigated with sterile water, small clots removed and catheter drained 800 cc of dark yellow urine. Haines attached to a bedside bag. Pt. To f/u with Dr. Tellez this afternoon as scheduled. Nahun Chino PA-C in office at the time of procedure. RENETTA Alfredo RN     Zachariah Acosta is a 84 y.o. male who is here today for catheter removal. Patient of Dr. Tellez. 10cc syringe used to deflate the balloon and the catheter was removed without difficulty.  The patient tolerated this well. Dr. Tellez was in the office at the time of procedure.     Patient was advised to drink clear fluids for the next couple hours and urinate. The patient was also advised he may experience some blood in the urine and burning with urination for the next couple days. If the patient is unable to urinate or develops fever, chills, N&V or suprapubic pain he will call to return for an appt at clinic or seek medical treatment at [facility] ER, PCP or Urgent Care after hours. Patient verbalized understanding and all questions were answered. RENETTA Alfredo RN     I have reviewed and agree with medical assistance documentation above

## 2025-02-17 NOTE — ED NOTES
Indwelling catheter irrigated per provider verbal order. Irrigated with 150ml ml sterile water. Haines flowing properly. 500ml eliminated. Catheter connected to new 2000ml standard drainage bag. Pt report feeling relief.

## 2025-02-17 NOTE — LETTER
February 17, 2025     Amaya Faustin Maria Del Rosario, APRN  [address] [facility] [address]  [facility] [address]    Patient: Zachariah Acosta   YOB: 1940   Date of Visit: 2/17/2025     Dear Amaya,    Thank you for referring Zachariah Acosta to me for evaluation. Below are the relevant portions of my assessment and plan of care.    If you have questions, please do not hesitate to call me. I look forward to following Zachariah along with you.         Sincerely,        Jose Tellez MD        CC: No Recipients      Progress Notes:  Subjective    Mr. Acosta is 84 y.o. male    Chief Complaint: Kidney stone    History of Present Illness    84-year-old male established patient follow-up for catheter removal after left ureteroscopy laser lithotripsy basket stone extraction Friday 2/14 for 2 very large left UPJ stones.  Haines catheter is left in place due to his very large friable prostate.  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  Previous LUTS had been stable on combination therapy.  He Titan touch 3 piece inflatable penile implant placed 12/13/21.  He went to the ER last night due to bladder spasms and catheter not draining well and was given a prescription for cefdinir in addition to his previous prescription for levofloxacin in anticipation of prostate biopsy this week.  I reviewed his CT scan done yesterday in the ER showing properly positioned left ureteral stent, excellent stone fragmentation with only layering dust and debris in left kidney.     I independently visualized and reviewed the patient's prior imaging studies today in clinic and discussed the imaging findings with the patient.    The following portions of the patient's history were reviewed and updated as appropriate: allergies, current medications, past family history, past medical history, past social history, past surgical history and  problem list.    Review of Systems      Current Outpatient Medications:   •  acetaminophen (TYLENOL) 500 MG tablet, Take 1 tablet by mouth Every 6 (Six) Hours As Needed for Mild Pain., Disp: , Rfl:   •  aspirin 81 MG EC tablet, Take 1 tablet by mouth Daily., Disp: , Rfl:   •  Budeson-Glycopyrrol-Formoterol (Breztri Aerosphere) 160-9-4.8 MCG/ACT aerosol inhaler, Inhale 2 puffs 2 (Two) Times a Day. (Patient not taking: Reported on 2/14/2025), Disp: 3 each, Rfl: 4  •  ciclopirox (LOPROX) 0.77 % cream, Apply 1 Application topically to the appropriate area as directed 2 (Two) Times a Day. To under arms after using Clindamycin Solution (Patient not taking: Reported on 2/14/2025), Disp: , Rfl:   •  clindamycin (CLEOCIN T) 1 % external solution, 1 application  2 (Two) Times a Day. To under arms (Patient not taking: Reported on 2/14/2025), Disp: , Rfl:   •  Continuous Blood Gluc Sensor (Dexcom G7 Sensor) misc, 1 each Every 10 (Ten) Days., Disp: 9 each, Rfl: 3  •  docusate sodium (Colace) 100 MG capsule, Take 1 capsule by mouth 2 (Two) Times a Day., Disp: 60 capsule, Rfl: 1  •  Dupilumab 300 MG/2ML solution prefilled syringe, Inject  under the skin into the appropriate area as directed Every 14 (Fourteen) Days., Disp: , Rfl:   •  ferrous sulfate 325 (65 FE) MG tablet, Take 1 tablet by mouth Daily With Breakfast., Disp: , Rfl:   •  finasteride (PROSCAR) 5 MG tablet, Take 1 tablet by mouth Daily., Disp: 90 tablet, Rfl: 3  •  fluticasone (FLONASE) 50 MCG/ACT nasal spray, 1 spray into the nostril(s) as directed by provider Daily., Disp: 16 g, Rfl: 5  •  gabapentin (NEURONTIN) 100 MG capsule, , Disp: , Rfl:   •  HYDROcodone-acetaminophen (NORCO) 5-325 MG per tablet, Take 1 tablet by mouth Every 6 (Six) Hours As Needed for Moderate Pain., Disp: 15 tablet, Rfl: 0  •  HYDROcodone-acetaminophen (NORCO) 5-325 MG per tablet, Take 1 tablet by mouth Every 6 (Six) Hours As Needed for Severe Pain (postop pain)., Disp: 15 tablet, Rfl: 0  •   insulin aspart (novoLOG FLEXPEN) 100 UNIT/ML solution pen-injector sc pen, Inject  under the skin into the appropriate area as directed As Needed. sliding, Disp: , Rfl:   •  insulin glargine (LANTUS, SEMGLEE) 100 UNIT/ML injection, 15 units in the morning and 40 units at bedtime., Disp: 18 mL, Rfl: 2  •  Insulin Glargine-yfgn 100 UNIT/ML solution pen-injector, Inject  under the skin into the appropriate area as directed. (Patient not taking: Reported on 2/14/2025), Disp: , Rfl:   •  levalbuterol (XOPENEX HFA) 45 MCG/ACT inhaler, Inhale 2 puffs Every 6 (Six) Hours As Needed for Wheezing., Disp: , Rfl:   •  levoFLOXacin (LEVAQUIN) 500 MG tablet, 1 tab by mouth once daily, Disp: 7 tablet, Rfl: 0  •  levothyroxine (Synthroid) 50 MCG tablet, Take 1 tablet by mouth Daily., Disp: 90 tablet, Rfl: 4  •  lisinopril (PRINIVIL,ZESTRIL) 20 MG tablet, TAKE 1 TABLET BY MOUTH IN THE MORNING AND 1/2 (ONE-HALF) AT BEDTIME, Disp: 180 tablet, Rfl: 0  •  meclizine (ANTIVERT) 12.5 MG tablet, Take 1 tablet by mouth 3 (Three) Times a Day As Needed for Dizziness., Disp: 30 tablet, Rfl: 0  •  melatonin 3 MG tablet, Take 5 mg by mouth At Night As Needed., Disp: , Rfl:   •  metFORMIN ER (GLUCOPHAGE-XR) 500 MG 24 hr tablet, Take 1 tablet by mouth Daily With Breakfast., Disp: 90 tablet, Rfl: 4  •  metoprolol tartrate (LOPRESSOR) 25 MG tablet, Take 1 tablet by mouth 2 (Two) Times a Day., Disp: 180 tablet, Rfl: 4  •  mupirocin (BACTROBAN) 2 % ointment, Apply 1 Application topically to the appropriate area as directed Daily. (Patient not taking: Reported on 2/14/2025), Disp: , Rfl:   •  naloxone (NARCAN) 4 MG/0.1ML nasal spray, Administer 1 spray into the nostril(s) as directed by provider As Needed. (Patient not taking: Reported on 11/12/2024), Disp: , Rfl:   •  nitroglycerin (NITROSTAT) 0.4 MG SL tablet, Place 1 tablet under the tongue Every 5 (Five) Minutes As Needed for Chest Pain. Take no more than 3 doses in 15 minutes., Disp: 25 tablet, Rfl:  1  •  ondansetron ODT (ZOFRAN-ODT) 4 MG disintegrating tablet, Place 1 tablet on the tongue Every 6 (Six) Hours As Needed for Nausea., Disp: 6 tablet, Rfl: 1  •  oxybutynin (DITROPAN) 5 MG tablet, Take 1 tablet by mouth Every 8 (Eight) Hours As Needed (bladder spasms)., Disp: 30 tablet, Rfl: 1  •  Pediatric Multiple Vit-C-FA (CHILDRENS CHEWABLE MULTI VITS PO), Take 1 tablet by mouth Daily., Disp: , Rfl:   •  phenazopyridine (PYRIDIUM) 100 MG tablet, Take 1 tablet by mouth 3 (Three) Times a Day As Needed (urinary burning)., Disp: 20 tablet, Rfl: 1  •  rosuvastatin (CRESTOR) 40 MG tablet, Take 0.5 tablets by mouth Daily., Disp: 90 tablet, Rfl: 4  •  sodium phosphate (FLEET) 7-19 GM/118ML ADULT enema, Use rectally the morning of prostate biopsy, Disp: 1 each, Rfl: 0  •  tamsulosin (FLOMAX) 0.4 MG capsule 24 hr capsule, Take 1 capsule by mouth Every Night., Disp: 90 capsule, Rfl: 3  •  tiZANidine (ZANAFLEX) 2 MG tablet, Take 1 tablet by mouth At Night As Needed for Muscle Spasms., Disp: 30 tablet, Rfl: 1  •  triamcinolone (KENALOG) 0.1 % cream, Apply 1 Application topically to the appropriate area as directed 2 (Two) Times a Day., Disp: 80 g, Rfl: 0  •  Ustekinumab (STELARA) 90 MG/ML solution prefilled syringe Injection, Inject 90 mg under the skin into the appropriate area as directed Every 28 (Twenty-Eight) Days., Disp: 1 mL, Rfl: 6  •  vitamin B-12 (CYANOCOBALAMIN) 1000 MCG tablet, Take 1 tablet by mouth Daily., Disp: , Rfl:   •  Vitamin D, Cholecalciferol, 25 MCG (1000 UT) capsule, Take 1 capsule by mouth Daily., Disp: , Rfl:   No current facility-administered medications for this visit.    Facility-Administered Medications Ordered in Other Visits:   •  atropine sulfate injection 0.5 mg, 0.5 mg, Intravenous, Once PRN, Aditi Chino MD  •  fentaNYL citrate (PF) (SUBLIMAZE) injection 50 mcg, 50 mcg, Intravenous, Q10 Min PRN, Aditi Chino MD  •  flumazenil (ROMAZICON) injection 0.2 mg, 0.2 mg,  Intravenous, PRN, Aditi Chino MD  •  HYDROcodone-acetaminophen (NORCO)  MG per tablet 1 tablet, 1 tablet, Oral, Q4H PRN, Aditi Chino MD  •  HYDROcodone-acetaminophen (NORCO) 5-325 MG per tablet 1 tablet, 1 tablet, Oral, Q4H PRN, Aditi Chino MD  •  HYDROcodone-acetaminophen (NORCO) 5-325 MG per tablet 1 tablet, 1 tablet, Oral, Once PRN, Jose Tellez MD  •  labetalol (NORMODYNE,TRANDATE) injection 5 mg, 5 mg, Intravenous, Q5 Min PRN, Aditi Chino MD  •  lactated ringers infusion, 100 mL/hr, Intravenous, Continuous, Violet Clark, BORIS, Last Rate: 100 mL/hr at 02/14/25 1003, Restarted at 02/14/25 1107  •  lactated ringers infusion, 100 mL/hr, Intravenous, Continuous, Aditi Chino MD  •  naloxone (NARCAN) injection 0.4 mg, 0.4 mg, Intravenous, PRN, Aditi Chino MD  •  ondansetron (ZOFRAN) injection 4 mg, 4 mg, Intravenous, Once PRN, Aditi Chino MD  •  ondansetron (ZOFRAN) injection 4 mg, 4 mg, Intravenous, Once PRN, Jose Tellez MD    Past Medical History:   Diagnosis Date   • Asthma    • Colitis    • Coronary artery disease    • Diabetes mellitus    • Diverticulitis    • Elevated cholesterol    • HL (hearing loss) 2012   • Hyperlipidemia    • Hypertension    • Inflammatory bowel disease 2001   • Myocardial infarct     EASTER 2020   • Primary central sleep apnea 2009    Using Cpap   • Sleep apnea     cpap at    • Sleep apnea, obstructive    • Stroke    • Visual impairment 2016    Double vision       Past Surgical History:   Procedure Laterality Date   • ADENOIDECTOMY  1947   • APPENDECTOMY N/A 05/05/2023    Procedure: APPENDECTOMY LAPAROSCOPIC;  Surgeon: Katherine Carranza MD;  Location[facility];  Service: General;  Laterality: N/A;   • BACK SURGERY     • CARDIAC CATHETERIZATION      stents x 6   • CARDIAC SURGERY      CABG TRIPLE BYPASS 2012   • CATARACT EXTRACTION     • COLONOSCOPY     • COLONOSCOPY N/A  06/04/2021    Procedure: COLONOSCOPY WITH ANESTHESIA;  Surgeon: Zachariah Menjivar DO;  Location:  [facility];  Service: Gastroenterology;  Laterality: N/A;  pre hx ulcerative colitis  post ulcerative colitis  dr collins gusman   • COLONOSCOPY N/A 11/29/2023    Procedure: COLONOSCOPY LOWER LIMITED;  Surgeon: Zachariah Menjivar DO;  Location:  [facility];  Service: Gastroenterology;  Laterality: N/A;  preop; hx of colitis  postop colitis - questionable colonic stricture at 30cm   [facility]   • COLOSTOMY     • CORONARY ANGIOPLASTY WITH STENT PLACEMENT     • CORONARY ARTERY BYPASS GRAFT  December 2012   • CORONARY STENT PLACEMENT     • HIP SURGERY Right     total hip   • JOINT REPLACEMENT  2015    Right hip   • PENILE PROSTHESIS IMPLANT N/A 12/13/2021    Procedure: 3-PIECE INFLATABLE PENILE PROSTHESIS PLACEMENT;  Surgeon: Jose Tellez MD;  Location: [facility] OR;  Service: Urology;  Laterality: N/A;   • TONSILLECTOMY  1947       Social History     Socioeconomic History   • Marital status:    Tobacco Use   • Smoking status: Never     Passive exposure: Never   • Smokeless tobacco: Never   Vaping Use   • Vaping status: Never Used   Substance and Sexual Activity   • Alcohol use: Never   • Drug use: Never   • Sexual activity: Not Currently     Partners: Female       Family History   Problem Relation Age of Onset   • Colon cancer Brother    • Colon polyps Neg Hx    • Esophageal cancer Neg Hx        Objective    There were no vitals taken for this visit.    Physical Exam        Results for orders placed or performed during the hospital encounter of 02/14/25   POC Glucose Once    Collection Time: 02/14/25  8:46 AM    Specimen: Blood   Result Value Ref Range    Glucose 114 70 - 130 mg/dL   POC Glucose Once    Collection Time: 02/14/25 11:24 AM    Specimen: Blood   Result Value Ref Range    Glucose 102 70 - 130 mg/dL     Assessment and Plan    Diagnoses and all orders for this visit:    1. Kidney  stone (Primary)    Haines catheter removed today.    We had a long discussion regarding the natural history of such a high elevated PSA consistent with likely prostate cancer for which I recommended scheduling transrectal ultrasound prostate biopsy in office.  He has diverting colostomy from previous abdominal surgery and so does not need Fleet enema prior to prostate biopsy.    I recommended keeping the left ureteral stent in place to allow for stone debris to washout.    We discussed risks of prostate biopsy including but not limited to infection, bleeding, need for additional procedures, possibility finding/not finding cancer, role of androgen deprivation therapy for metastatic castrate sensitive prostate cancer, etc.  Patient voices understanding and provided informed consent to proceed with prostate biopsy in office this Thursday.    Will await urine culture results from ER yesterday.  Until then, I recommended he continue both the levofloxacin and cefdinir.    I spent approximately 30 minutes providing clinical care for this patient; including review of patient's chart and provider documentation, face to face time spent with patient in examination room (obtaining history, performing physical exam, discussing diagnosis and management options), placing orders, and completing patient documentation.       This document has been signed by ANTHONY Tellez MD on February 17, 2025 17:00 CST                Pt. Presented to clinic this morning c/o of his catheter not draining. I hand irrigated with sterile water, small clots removed and catheter drained 800 cc of dark yellow urine. Haines attached to a bedside bag. Pt. To f/u with Dr. Tellez this afternoon as scheduled. Nahun Chino PA-C in office at the time of procedure. RENETTA Alfredo RN     Zachariah Acosta is a 84 y.o. male who is here today for catheter removal. Patient of Dr. Tellez. 10cc syringe used to deflate the balloon and the catheter was removed without  difficulty.  The patient tolerated this well. Dr. Tellez was in the office at the time of procedure.     Patient was advised to drink clear fluids for the next couple hours and urinate. The patient was also advised he may experience some blood in the urine and burning with urination for the next couple days. If the patient is unable to urinate or develops fever, chills, N&V or suprapubic pain he will call to return for an appt at clinic or seek medical treatment at [facility] ER, PCP or Urgent Care after hours. Patient verbalized understanding and all questions were answered. RENETTA Alfredo RN     I have reviewed and agree with medical assistance documentation above

## 2025-02-17 NOTE — ED PROVIDER NOTES
"Subjective   History of Present Illness  84-year-old male presents to the ED with complaint of Haines catheter not draining properly and suprapubic pressure.  He has a history of kidney stones, underwent left ureteroscopy and laser lithotripsy and left ureteral stent placement in the setting of 2 large UPJ stones 2/14/25.  Haines catheter was placed  during the procedure patient was discharged home with a Haines catheter.  Today, patient noted his catheter stopped draining.  He developed some suprapubic fullness.  Began to leak some urine from around the Haines insertion site.  Came to the ED for further evaluation.  No fever or chills, no flank pain, no chills.  Symptoms are moderate severity with no aggravating relieving factors.    History provided by:  Patient      Review of Systems   All other systems reviewed and are negative.      Past Medical History:   Diagnosis Date    Asthma     Colitis     Coronary artery disease     Diabetes mellitus     Diverticulitis     Elevated cholesterol     HL (hearing loss) 2012    Hyperlipidemia     Hypertension     Inflammatory bowel disease 2001    Myocardial infarct     EASTER 2020    Primary central sleep apnea 2009    Using Cpap    Sleep apnea     cpap at     Sleep apnea, obstructive     Stroke     Visual impairment 2016    Double vision       Allergies   Allergen Reactions    Albuterol Other (See Comments)     \"Makes my throat spasm and swell\"    Adhesive Tape Rash    Azithromycin Rash    Fentanyl Nausea And Vomiting       Past Surgical History:   Procedure Laterality Date    ADENOIDECTOMY  1947    APPENDECTOMY N/A 05/05/2023    Procedure: APPENDECTOMY LAPAROSCOPIC;  Surgeon: Katherine Carranza MD;  Location: [facility];  Service: General;  Laterality: N/A;    BACK SURGERY      CARDIAC CATHETERIZATION      stents x 6    CARDIAC SURGERY      CABG TRIPLE BYPASS 2012    CATARACT EXTRACTION      COLONOSCOPY      COLONOSCOPY N/A 06/04/2021    Procedure: COLONOSCOPY WITH " ANESTHESIA;  Surgeon: Zachariah Menjivar DO;  Location:  [facility];  Service: Gastroenterology;  Laterality: N/A;  pre hx ulcerative colitis  post ulcerative colitis  dr collins gusman    COLONOSCOPY N/A 11/29/2023    Procedure: COLONOSCOPY LOWER LIMITED;  Surgeon: Zachariah Menjivar DO;  Location:  [facility];  Service: Gastroenterology;  Laterality: N/A;  preop; hx of colitis  postop colitis - questionable colonic stricture at 30cm   pcp bryanna garrisontreet    COLOSTOMY      CORONARY ANGIOPLASTY WITH STENT PLACEMENT      CORONARY ARTERY BYPASS GRAFT  December 2012    CORONARY STENT PLACEMENT      HIP SURGERY Right     total hip    JOINT REPLACEMENT  2015    Right hip    PENILE PROSTHESIS IMPLANT N/A 12/13/2021    Procedure: 3-PIECE INFLATABLE PENILE PROSTHESIS PLACEMENT;  Surgeon: Jose Tellez MD;  Location: [facility];  Service: Urology;  Laterality: N/A;    TONSILLECTOMY  1947       Family History   Problem Relation Age of Onset    Colon cancer Brother     Colon polyps Neg Hx     Esophageal cancer Neg Hx        Social History     Socioeconomic History    Marital status:    Tobacco Use    Smoking status: Never     Passive exposure: Never    Smokeless tobacco: Never   Vaping Use    Vaping status: Never Used   Substance and Sexual Activity    Alcohol use: Never    Drug use: Never    Sexual activity: Not Currently     Partners: Female           Objective   Physical Exam  Vitals and nursing note reviewed.   Constitutional:       Appearance: Normal appearance. He is normal weight.   HENT:      Head: Normocephalic and atraumatic.   Cardiovascular:      Rate and Rhythm: Normal rate.      Pulses: Normal pulses.      Heart sounds: Normal heart sounds. No murmur heard.  Pulmonary:      Effort: Pulmonary effort is normal.      Breath sounds: Normal breath sounds. No wheezing, rhonchi or rales.   Abdominal:      General: Abdomen is flat.      Tenderness: There is abdominal tenderness.      Hernia: No  hernia is present.      Comments: Mild suprapubic tenderness   Skin:     General: Skin is warm and dry.      Capillary Refill: Capillary refill takes less than 2 seconds.   Neurological:      General: No focal deficit present.      Mental Status: He is alert and oriented to person, place, and time. Mental status is at baseline.         Procedures       Lab Results (last 24 hours)       Procedure Component Value Units Date/Time    Urinalysis With Culture If Indicated - Indwelling Urethral Catheter [[id number]]  (Abnormal) Collected: 02/16/25 2227    Specimen: Urine from Indwelling Urethral Catheter Updated: 02/16/25 2303     Color, UA Red     Appearance, UA Turbid     pH, UA <=5.0     Specific Gravity, UA 1.020     Glucose, UA Negative     Ketones, UA Negative     Bilirubin, UA Small (1+)     Blood, UA Large (3+)     Protein,  mg/dL (2+)     Leuk Esterase, UA Moderate (2+)     Nitrite, UA Positive     Urobilinogen, UA 0.2 E.U./dL    Narrative:      In absence of clinical symptoms, the presence of pyuria, bacteria, and/or nitrites on the urinalysis result does not correlate with infection.    Urinalysis, Microscopic Only - Indwelling Urethral Catheter [[id number]]  (Abnormal) Collected: 02/16/25 2227    Specimen: Urine from Indwelling Urethral Catheter Updated: 02/16/25 2303     RBC, UA Too Numerous to Count /HPF      WBC, UA 11-20 /HPF      Bacteria, UA Trace /HPF      Squamous Epithelial Cells, UA None Seen /HPF      Hyaline Casts, UA 0-2 /LPF      Methodology Automated Microscopy    Urine Culture - Urine, Indwelling Urethral Catheter [[id number]] Collected: 02/16/25 2227    Specimen: Urine from Indwelling Urethral Catheter Updated: 02/16/25 2303    CBC & Differential [[id number]]  (Abnormal) Collected: 02/16/25 2231    Specimen: Blood Updated: 02/16/25 2241    Narrative:      The following orders were created for panel order CBC & Differential.  Procedure                               Abnormality         Status                      ---------                               -----------         ------                     CBC Auto Differential[[id number]]        Abnormal            Final result                 Please view results for these tests on the individual orders.    Comprehensive Metabolic Panel [[id number]]  (Abnormal) Collected: 02/16/25 2231    Specimen: Blood Updated: 02/16/25 2259     Glucose 153 mg/dL      BUN 11 mg/dL      Creatinine 0.83 mg/dL      Sodium 135 mmol/L      Potassium 3.8 mmol/L      Comment: Slight hemolysis detected by analyzer. Result may be falsely elevated.        Chloride 102 mmol/L      CO2 21.0 mmol/L      Calcium 8.5 mg/dL      Total Protein 6.8 g/dL      Albumin 3.4 g/dL      ALT (SGPT) 10 U/L      AST (SGOT) 22 U/L      Alkaline Phosphatase 250 U/L      Total Bilirubin 0.4 mg/dL      Globulin 3.4 gm/dL      A/G Ratio 1.0 g/dL      BUN/Creatinine Ratio 13.3     Anion Gap 12.0 mmol/L      eGFR 87.4 mL/min/1.73     Narrative:      GFR Categories in Chronic Kidney Disease (CKD)      GFR Category          GFR (mL/min/1.73)    Interpretation  G1                     90 or greater         Normal or high (1)  G2                      60-89                Mild decrease (1)  G3a                   45-59                Mild to moderate decrease  G3b                   30-44                Moderate to severe decrease  G4                    15-29                Severe decrease  G5                    14 or less           Kidney failure          (1)In the absence of evidence of kidney disease, neither GFR category G1 or G2 fulfill the criteria for CKD.    eGFR calculation 2021 CKD-EPI creatinine equation, which does not include race as a factor    CBC Auto Differential [[id number]]  (Abnormal) Collected: 02/16/25 2231    Specimen: Blood Updated: 02/16/25 2241     WBC 12.29 10*3/mm3      RBC 4.55 10*6/mm3      Hemoglobin 13.7 g/dL      Hematocrit 40.7 %      MCV 89.5 fL      MCH 30.1 pg      MCHC 33.7 g/dL       RDW 13.5 %      RDW-SD 44.3 fl      MPV 9.9 fL      Platelets 214 10*3/mm3      Neutrophil % 77.6 %      Lymphocyte % 8.2 %      Monocyte % 7.2 %      Eosinophil % 5.8 %      Basophil % 0.6 %      Immature Grans % 0.6 %      Neutrophils, Absolute 9.54 10*3/mm3      Lymphocytes, Absolute 1.01 10*3/mm3      Monocytes, Absolute 0.89 10*3/mm3      Eosinophils, Absolute 0.71 10*3/mm3      Basophils, Absolute 0.07 10*3/mm3      Immature Grans, Absolute 0.07 10*3/mm3      nRBC 0.0 /100 WBC         No Radiology Exams Resulted Within Past 24 Hours     ED Course  ED Course as of 02/17/25 0241   Mon Feb 17, 2025   0032 84-year-old male with history of kidney stones, underwent recent left ureteroscopy and laser lithotripsy and left ureteral stent placement due to 2 large UPJ stones; procedure was 2/14/2025.  Haines catheter was placed during the procedure and patient was discharged with a catheter, has urology follow-up tomorrow.  He presented to the ED with Haines catheter dysfunction.    Haines catheter was clogged, it was irrigated at the bedside and had removal of some small clot.  Initial output grossly bloody, but very quickly began to run clear.  Urine revealed evidence of a urinary tract infection.  There was trace bacteria and 11-20 WBCs, nitrites are positive.    Ordered CT to ensure no stent migration or renal abnormality.  CT negative for acute abnormality.  Stent in place.   Initial plan was to treat as a complicated UTI and admit the patient for IV antibiotics.  However, patient was adamantly opposed to this plan.  He states he felt fine and wanted to be discharged home to follow-up with his urologist tomorrow.  No history of diabetes.  Will attempt to discharge patient home with oral antibiotics, givehis first dose in the ED.  Patient was given return precautions to include generalized weakness, nausea, flank pain, rigors.  He voiced understanding.  Plan to follow-up with urology tomorrow afternoon. [AW]   0049 Went  back to the patient's room just prior to patient being discharged, had evidence of hematuria in his catheter bag again.  Will repeat CBI and reassess.  [AW]   0239 Incidental finding of periaortic lymphadenopathy and multifocal axial sclerotic lesions which are concerning for metastatic disease.  These findings were seen on previous images and patient was made aware.  PSA was elevated.  He understands he could have a metastatic cancer, he has outpatient follow-up with urology arranged. [AW]      ED Course User Index  [AW] Hector Oliveira MD                                                       Medical Decision Making  Problems Addressed:  Hematuria, unspecified type: complicated acute illness or injury  Problem with Haines catheter, initial encounter: complicated acute illness or injury  Urinary tract infection without hematuria, site unspecified: complicated acute illness or injury    Amount and/or Complexity of Data Reviewed  Labs: ordered.  Radiology: ordered.    Risk  Prescription drug management.        Final diagnoses:   Problem with Haines catheter, initial encounter   Urinary tract infection without hematuria, site unspecified   Hematuria, unspecified type       ED Disposition  ED Disposition       ED Disposition   Discharge    Condition   Stable    Comment   --               Jose Tellez MD  [address]  [address] [facility] [address]  [facility] [id number] [phone]    In 1 day  Follow-up tomorrow scheduled         Medication List        New Prescriptions      cefdinir 300 MG capsule  Commonly known as: OMNICEF  Take 1 capsule by mouth 2 (Two) Times a Day.               Where to Get Your Medications        These medications were sent to [facility] - [facility] - [address] - [phone]  - [phone]   [address] [place] [address]      Phone: [phone]   cefdinir 300 MG capsule            Hector Oliveira MD  02/17/25 0243

## 2025-02-18 ENCOUNTER — OFFICE VISIT (OUTPATIENT)
Dept: GASTROENTEROLOGY | Age: 85
End: 2025-02-18
Payer: MEDICARE

## 2025-02-18 ENCOUNTER — PATIENT OUTREACH (OUTPATIENT)
Dept: CASE MANAGEMENT | Facility: OTHER | Age: 85
End: 2025-02-18
Payer: MEDICARE

## 2025-02-18 VITALS
HEART RATE: 72 BPM | HEIGHT: 69 IN | DIASTOLIC BLOOD PRESSURE: 76 MMHG | WEIGHT: 222.2 LBS | BODY MASS INDEX: 32.91 KG/M2 | OXYGEN SATURATION: 97 % | SYSTOLIC BLOOD PRESSURE: 124 MMHG

## 2025-02-18 DIAGNOSIS — K51.312 ULCERATIVE RECTOSIGMOIDITIS, WITH INTESTINAL OBSTRUCTION (HCC): Primary | ICD-10-CM

## 2025-02-18 LAB — BACTERIA SPEC AEROBE CULT: NO GROWTH

## 2025-02-18 PROCEDURE — 1036F TOBACCO NON-USER: CPT | Performed by: NURSE PRACTITIONER

## 2025-02-18 PROCEDURE — 1159F MED LIST DOCD IN RCRD: CPT | Performed by: NURSE PRACTITIONER

## 2025-02-18 PROCEDURE — 1123F ACP DISCUSS/DSCN MKR DOCD: CPT | Performed by: NURSE PRACTITIONER

## 2025-02-18 PROCEDURE — G8427 DOCREV CUR MEDS BY ELIG CLIN: HCPCS | Performed by: NURSE PRACTITIONER

## 2025-02-18 PROCEDURE — G8417 CALC BMI ABV UP PARAM F/U: HCPCS | Performed by: NURSE PRACTITIONER

## 2025-02-18 PROCEDURE — 99213 OFFICE O/P EST LOW 20 MIN: CPT | Performed by: NURSE PRACTITIONER

## 2025-02-18 ASSESSMENT — ENCOUNTER SYMPTOMS
ANAL BLEEDING: 0
TROUBLE SWALLOWING: 0
SHORTNESS OF BREATH: 0
BLOOD IN STOOL: 0
CHOKING: 0
VOMITING: 0
DIARRHEA: 0
RECTAL PAIN: 0
NAUSEA: 0
CONSTIPATION: 0
ABDOMINAL PAIN: 0
COUGH: 0
ABDOMINAL DISTENTION: 0

## 2025-02-18 NOTE — OUTREACH NOTE
"AMBULATORY CASE MANAGEMENT NOTE    Names and Relationships of Patient/Support Persons: Contact: Zachariah Acosta; Relationship: Self -     Patient Outreach    ACO patient seen at [facility] 2.16.25 for leaking Haines catheter.    Adult Patient Profile  Questions/Answers      Flowsheet Row Most Recent Value   Symptoms/Conditions Managed at Home genitourinary   Barriers to Managing Health none   Genitourinary Symptoms/Conditions other (see comments)  [suprapubic pressure]   Genitourinary Comment Informed patient that per chart notes the Omnicef has been called to [facility] pharmacy that he will take prior to biopsy in the office. Patient stated his \"urine is yellow from that pill\" but is not having pain or burning. Educated to increase fluids and he denied needs at this time.                Danielle SANTAMARIA  Ambulatory Case Management    2/18/2025, 14:24 CST  "

## 2025-02-18 NOTE — PROGRESS NOTES
Subjective:     Patient ID: Jesus Lynch is a 84 y.o. male  PCP: Isidra Ashton APRN - [place]  Referring Provider: No ref. provider found    HPI  Patient presents to the office today with the following complaints: 6 Month Follow-Up      Pt seen today for follow up.  Hx UC.  Treated with Stelara every 4 weeks.  Hx colon resection with ostomy.  He denies any blood in stool, abdominal pain, diarrhea.          Last Colonoscopy 2/8/2024 - [facility], sigmoid stricture   Family hx colon cancer - Brother     Pt is scheduled for prostate biopsy later this week    Assessment:     1. Ulcerative rectosigmoiditis, with intestinal obstruction (HCC)          Plan:   - Continue Stelara every 4 weeks   - Follow up in 6 months or sooner if needed  - Plan to schedule Colonoscopy at next OV  - Call with any questions or concerns       Orders  No orders of the defined types were placed in this encounter.    Medications  No orders of the defined types were placed in this encounter.        Patient History:     Past Medical History:   Diagnosis Date    Acute kidney injury (HCC)     Asthma     CAD (coronary artery disease)     Chronic kidney disease     Constipation     COPD (chronic obstructive pulmonary disease) (HCC)     Diabetes mellitus (HCC)     Hematuria     Hyperlipidemia     Hypertension     NSTEMI (non-ST elevated myocardial infarction) (HCC)     WAQAR (obstructive sleep apnea)     Type 2 diabetes mellitus (HCC)     with Hyperglycemia, w/ long tern current use of insulin    Ulcerative colitis (HCC)        Past Surgical History:   Procedure Laterality Date    BACK SURGERY      COLONOSCOPY  02/08/2024    [facility]    COLONOSCOPY  11/29/2023    Dr Simon: COLONOSCOPY LOWER LIMITED    CORONARY ANGIOPLASTY WITH STENT PLACEMENT      CORONARY ARTERY BYPASS GRAFT      FLEXIBLE SIGMOIDOSCOPY  02/07/2024    [facility]    HIP SURGERY      SHOULDER SURGERY         Family History   Problem Relation Age of Onset    Colon Cancer

## 2025-02-20 ENCOUNTER — PROCEDURE VISIT (OUTPATIENT)
Dept: UROLOGY | Facility: CLINIC | Age: 85
End: 2025-02-20
Payer: MEDICARE

## 2025-02-20 DIAGNOSIS — R97.20 ELEVATED PSA: Primary | ICD-10-CM

## 2025-02-20 DIAGNOSIS — N20.0 KIDNEY STONE: ICD-10-CM

## 2025-02-20 PROCEDURE — 88342 IMHCHEM/IMCYTCHM 1ST ANTB: CPT | Performed by: UROLOGY

## 2025-02-20 PROCEDURE — G0416 PROSTATE BIOPSY, ANY MTHD: HCPCS | Performed by: UROLOGY

## 2025-02-20 PROCEDURE — 88341 IMHCHEM/IMCYTCHM EA ADD ANTB: CPT | Performed by: UROLOGY

## 2025-02-20 RX ORDER — HYDROCODONE BITARTRATE AND ACETAMINOPHEN 5; 325 MG/1; MG/1
1 TABLET ORAL EVERY 6 HOURS PRN
Qty: 20 TABLET | Refills: 0 | Status: SHIPPED | OUTPATIENT
Start: 2025-02-20

## 2025-02-20 RX ORDER — ONDANSETRON 4 MG/1
4 TABLET, ORALLY DISINTEGRATING ORAL EVERY 6 HOURS PRN
Qty: 20 TABLET | Refills: 1 | Status: SHIPPED | OUTPATIENT
Start: 2025-02-20

## 2025-02-20 NOTE — PROGRESS NOTES
CC: I am here for my prostate biopsy    TRUS PROSTATE WITH BIOPSY PROCEDURE NOTE  Indications:  Elevated PSA of 362    Pre-operative prep:  fleets enema and oral antibiotic      Procedure:    After proper identification of patient and procedure, patient was placed in the left lateral decubitus position.  2% lidocaine jelly was instilled per rectum  for topical anesthesia.  The ultrasound probe was gently inserted per rectum. Prostate was scanned from the base of the bladder to the apex.  10 cc of 1% lidocaine plain was then used to perform a prostate nerve block injecting the junction of the seminal vesicle and bladder laterally.      Prostate length: 40.3 mm    Prostate width: 34 mm    Prostate height: 28.8 mm    Prostate volume: 20.6 cc        Abnormal findings:  Hypoechoic lesions - diffusely heterogenously prostate bilaterally     Median lobe:  no    A total of 12 biopsies were taken from the base, apex, and mid portion of the gland on both the right and the left sides.     Patient tolerated the procedure well    Complications: none    Estimated blood loss: minimal    Mr. Acosta was given instructions for follow up.  He will notify the office if he has excessive hematuria, hematochezia, fevers, perineal, or abdominal pain.    Follow up:   He will follow up in 1 week  for pathology results    Diagnoses and all orders for this visit:    1. Elevated PSA (Primary)  -     Tissue Pathology Exam    2. Kidney stone  -     HYDROcodone-acetaminophen (NORCO) 5-325 MG per tablet; Take 1 tablet by mouth Every 6 (Six) Hours As Needed for Moderate Pain.  Dispense: 20 tablet; Refill: 0  -     ondansetron ODT (ZOFRAN-ODT) 4 MG disintegrating tablet; Place 1 tablet on the tongue Every 6 (Six) Hours As Needed for Nausea.  Dispense: 20 tablet; Refill: 1        Patient requested refill of hydrocodone to help with his stent pain.    He will follow-up with me next week with pre-clinic KUB to discuss pathology results and timing  for stent removal status post left ureteroscopy for very large stone burden.

## 2025-02-21 NOTE — PROGRESS NOTES
Subjective    Mr. Acosta is 84 y.o. male    Chief Complaint: Elevated PSA/Kidney stone    History of Present Illness  84-year-old male follow-up to discuss results of prostate biopsy done last week for PSA level of 362.  Biopsy shows Saleem 7 disease in all cores on the left and also on the right base.  KUB x-ray done today reviewed by me of the patient shows properly positioned left ureteral stent with no stone visible after left ureteroscopy laser lithotripsy basket stone extraction on 2/14/2025 for 2 large left UPJ stones.  CT scan and showed widespread bony metastasis and retroperitoneal periaortic lymphadenopathy    I independently visualized and reviewed the patient's prior imaging studies today in clinic and discussed the imaging findings with the patient.      The following portions of the patient's history were reviewed and updated as appropriate: allergies, current medications, past family history, past medical history, past social history, past surgical history and problem list.    Review of Systems      Current Outpatient Medications:     acetaminophen (TYLENOL) 500 MG tablet, Take 1 tablet by mouth Every 6 (Six) Hours As Needed for Mild Pain., Disp: , Rfl:     aspirin 81 MG EC tablet, Take 1 tablet by mouth Daily., Disp: , Rfl:     Budeson-Glycopyrrol-Formoterol (Breztri Aerosphere) 160-9-4.8 MCG/ACT aerosol inhaler, Inhale 2 puffs 2 (Two) Times a Day., Disp: 3 each, Rfl: 4    cefdinir (OMNICEF) 300 MG capsule, Take 1 capsule by mouth 2 (Two) Times a Day., Disp: 20 capsule, Rfl: 0    Continuous Blood Gluc Sensor (Dexcom G7 Sensor) misc, 1 each Every 10 (Ten) Days., Disp: 9 each, Rfl: 3    docusate sodium (Colace) 100 MG capsule, Take 1 capsule by mouth 2 (Two) Times a Day., Disp: 60 capsule, Rfl: 1    Dupilumab 300 MG/2ML solution prefilled syringe, Inject  under the skin into the appropriate area as directed Every 14 (Fourteen) Days., Disp: , Rfl:     ferrous sulfate 325 (65 FE) MG tablet, Take 1  tablet by mouth Daily With Breakfast., Disp: , Rfl:     finasteride (PROSCAR) 5 MG tablet, Take 1 tablet by mouth Daily., Disp: 90 tablet, Rfl: 3    fluticasone (FLONASE) 50 MCG/ACT nasal spray, 1 spray into the nostril(s) as directed by provider Daily., Disp: 16 g, Rfl: 5    gabapentin (NEURONTIN) 100 MG capsule, , Disp: , Rfl:     HYDROcodone-acetaminophen (NORCO) 5-325 MG per tablet, Take 1 tablet by mouth Every 6 (Six) Hours As Needed for Severe Pain (postop pain)., Disp: 15 tablet, Rfl: 0    HYDROcodone-acetaminophen (NORCO) 5-325 MG per tablet, Take 1 tablet by mouth Every 6 (Six) Hours As Needed for Moderate Pain., Disp: 20 tablet, Rfl: 0    insulin aspart (novoLOG FLEXPEN) 100 UNIT/ML solution pen-injector sc pen, Inject  under the skin into the appropriate area as directed As Needed. sliding, Disp: , Rfl:     insulin glargine (LANTUS, SEMGLEE) 100 UNIT/ML injection, 15 units in the morning and 40 units at bedtime., Disp: 18 mL, Rfl: 2    Insulin Glargine-yfgn 100 UNIT/ML solution pen-injector, Inject  under the skin into the appropriate area as directed., Disp: , Rfl:     levalbuterol (XOPENEX HFA) 45 MCG/ACT inhaler, Inhale 2 puffs Every 6 (Six) Hours As Needed for Wheezing., Disp: , Rfl:     levoFLOXacin (LEVAQUIN) 500 MG tablet, 1 tab by mouth once daily, Disp: 7 tablet, Rfl: 0    levothyroxine (Synthroid) 50 MCG tablet, Take 1 tablet by mouth Daily., Disp: 90 tablet, Rfl: 4    lisinopril (PRINIVIL,ZESTRIL) 20 MG tablet, TAKE 1 TABLET BY MOUTH IN THE MORNING AND 1/2 (ONE-HALF) AT BEDTIME, Disp: 180 tablet, Rfl: 0    meclizine (ANTIVERT) 12.5 MG tablet, Take 1 tablet by mouth 3 (Three) Times a Day As Needed for Dizziness., Disp: 30 tablet, Rfl: 0    melatonin 3 MG tablet, Take 5 mg by mouth At Night As Needed., Disp: , Rfl:     metFORMIN ER (GLUCOPHAGE-XR) 500 MG 24 hr tablet, Take 1 tablet by mouth Daily With Breakfast., Disp: 90 tablet, Rfl: 4    metoprolol tartrate (LOPRESSOR) 25 MG tablet, Take 1  tablet by mouth 2 (Two) Times a Day., Disp: 180 tablet, Rfl: 4    mupirocin (BACTROBAN) 2 % ointment, Apply 1 Application topically to the appropriate area as directed Daily., Disp: , Rfl:     Pediatric Multiple Vit-C-FA (CHILDRENS CHEWABLE MULTI VITS PO), Take 1 tablet by mouth Daily., Disp: , Rfl:     rosuvastatin (CRESTOR) 40 MG tablet, Take 0.5 tablets by mouth Daily., Disp: 90 tablet, Rfl: 4    tamsulosin (FLOMAX) 0.4 MG capsule 24 hr capsule, Take 1 capsule by mouth Every Night., Disp: 90 capsule, Rfl: 3    tiZANidine (ZANAFLEX) 2 MG tablet, Take 1 tablet by mouth At Night As Needed for Muscle Spasms., Disp: 30 tablet, Rfl: 1    triamcinolone (KENALOG) 0.1 % cream, Apply 1 Application topically to the appropriate area as directed 2 (Two) Times a Day., Disp: 80 g, Rfl: 0    Ustekinumab (STELARA) 90 MG/ML solution prefilled syringe Injection, Inject 90 mg under the skin into the appropriate area as directed Every 28 (Twenty-Eight) Days., Disp: 1 mL, Rfl: 6    vitamin B-12 (CYANOCOBALAMIN) 1000 MCG tablet, Take 1 tablet by mouth Daily., Disp: , Rfl:     Vitamin D, Cholecalciferol, 25 MCG (1000 UT) capsule, Take 1 capsule by mouth Daily., Disp: , Rfl:     ciclopirox (LOPROX) 0.77 % cream, Apply 1 Application topically to the appropriate area as directed 2 (Two) Times a Day. To under arms after using Clindamycin Solution (Patient not taking: Reported on 2/26/2025), Disp: , Rfl:     clindamycin (CLEOCIN T) 1 % external solution, 1 application  2 (Two) Times a Day. To under arms (Patient not taking: Reported on 2/26/2025), Disp: , Rfl:     enzalutamide (XTANDI) 40 MG chemo capsule, Take 4 capsules by mouth Daily., Disp: 120 capsule, Rfl: 11    naloxone (NARCAN) 4 MG/0.1ML nasal spray, Administer 1 spray into the nostril(s) as directed by provider As Needed. (Patient not taking: Reported on 2/26/2025), Disp: , Rfl:     nitroglycerin (NITROSTAT) 0.4 MG SL tablet, Place 1 tablet under the tongue Every 5 (Five) Minutes  As Needed for Chest Pain. Take no more than 3 doses in 15 minutes. (Patient not taking: Reported on 2/26/2025), Disp: 25 tablet, Rfl: 1    ondansetron ODT (ZOFRAN-ODT) 4 MG disintegrating tablet, Place 1 tablet on the tongue Every 6 (Six) Hours As Needed for Nausea. (Patient not taking: Reported on 2/26/2025), Disp: 20 tablet, Rfl: 1    oxybutynin (DITROPAN) 5 MG tablet, Take 1 tablet by mouth Every 8 (Eight) Hours As Needed (bladder spasms). (Patient not taking: Reported on 2/26/2025), Disp: 30 tablet, Rfl: 1    phenazopyridine (PYRIDIUM) 100 MG tablet, Take 1 tablet by mouth 3 (Three) Times a Day As Needed (urinary burning). (Patient not taking: Reported on 2/26/2025), Disp: 20 tablet, Rfl: 1    sodium phosphate (FLEET) 7-19 GM/118ML ADULT enema, Use rectally the morning of prostate biopsy (Patient not taking: Reported on 2/26/2025), Disp: 1 each, Rfl: 0    Past Medical History:   Diagnosis Date    Asthma     Colitis     Coronary artery disease     Diabetes mellitus     Diverticulitis     Elevated cholesterol     HL (hearing loss) 2012    Hyperlipidemia     Hypertension     Inflammatory bowel disease 2001    Myocardial infarct     EASTER 2020    Primary central sleep apnea 2009    Using Cpap    Sleep apnea     cpap at     Sleep apnea, obstructive     Stroke     Visual impairment 2016    Double vision       Past Surgical History:   Procedure Laterality Date    ADENOIDECTOMY  1947    APPENDECTOMY N/A 05/05/2023    Procedure: APPENDECTOMY LAPAROSCOPIC;  Surgeon: Katherine Carranza MD;  Location: [facility] OR;  Service: General;  Laterality: N/A;    BACK SURGERY      CARDIAC CATHETERIZATION      stents x 6    CARDIAC SURGERY      CABG TRIPLE BYPASS 2012    CATARACT EXTRACTION      COLONOSCOPY      COLONOSCOPY N/A 06/04/2021    Procedure: COLONOSCOPY WITH ANESTHESIA;  Surgeon: Zachariah Menjivar DO;  Location: [facility] ENDOSCOPY;  Service: Gastroenterology;  Laterality: N/A;  pre hx ulcerative colitis  post ulcerative  "colitis  dr collins gusman    COLONOSCOPY N/A 11/29/2023    Procedure: COLONOSCOPY LOWER LIMITED;  Surgeon: Zachariah Menjivar DO;  Location:  [facility] ENDOSCOPY;  Service: Gastroenterology;  Laterality: N/A;  preop; hx of colitis  postop colitis - questionable colonic stricture at 30cm   pcp bryanna salty    COLOSTOMY      CORONARY ANGIOPLASTY WITH STENT PLACEMENT      CORONARY ARTERY BYPASS GRAFT  December 2012    CORONARY STENT PLACEMENT      HIP SURGERY Right     total hip    JOINT REPLACEMENT  2015    Right hip    PENILE PROSTHESIS IMPLANT N/A 12/13/2021    Procedure: 3-PIECE INFLATABLE PENILE PROSTHESIS PLACEMENT;  Surgeon: Jose Tellez MD;  Location:  [facility] OR;  Service: Urology;  Laterality: N/A;    TONSILLECTOMY  1947    URETEROSCOPY LASER LITHOTRIPSY WITH STENT INSERTION Left 2/14/2025    Procedure: URETEROSCOPY LASER LITHOTRIPSY WITH STENT INSERTION-left;  Surgeon: Jose Tellez MD;  Location:  [facility] OR;  Service: Urology;  Laterality: Left;       Social History     Socioeconomic History    Marital status:    Tobacco Use    Smoking status: Never     Passive exposure: Never    Smokeless tobacco: Never   Vaping Use    Vaping status: Never Used   Substance and Sexual Activity    Alcohol use: Never    Drug use: Never    Sexual activity: Not Currently     Partners: Female       Family History   Problem Relation Age of Onset    Colon cancer Brother     Colon polyps Neg Hx     Esophageal cancer Neg Hx        Objective    Temp 97.7 °F (36.5 °C)   Ht 175.3 cm (69\")   Wt 99.8 kg (220 lb)   BMI 32.49 kg/m²     Physical Exam    CC: I am here for the doctor to look at my bladder and get this stent out.     Cystoscopy with stent removal    Indications: Status post ureteroscopy    Prep: Hibiclens solution    Instrumentation:Flexible cystoscope and Alligator grasping forceps    Procedure: After lidocaine jelly is instilled into the urethra for 10 minutes, the well-lubricated cystoscope was " introduced through the urethra into the bladder.  The stent is seen protruding from the left side.  The alligator forceps or used to grasp the stent and pull it out the urethra.  The patient tolerated the procedure well.    Diagnoses and all orders for this visit:    1. Elevated PSA (Primary)    2. Kidney stone  -     POC Urinalysis Dipstick, Multipro    3. Malignant neoplasm of prostate  -     enzalutamide (XTANDI) 40 MG chemo capsule; Take 4 capsules by mouth Daily.  Dispense: 120 capsule; Refill: 11  -     PSA DIAGNOSTIC; Future        Jose Tellez MD  2/26/2025  12:56 CST       Results for orders placed or performed in visit on 02/26/25   POC Urinalysis Dipstick, Multipro    Collection Time: 02/26/25 10:14 AM    Specimen: Urine   Result Value Ref Range    Color Yellow Yellow, Straw, Dark Yellow, Vicky    Clarity, UA Clear Clear    Glucose,  mg/dL (A) Negative mg/dL    Bilirubin Negative Negative    Ketones, UA Negative Negative    Specific Gravity  1.025 1.005 - 1.030    Blood, UA Moderate (A) Negative    pH, Urine 5.5 5.0 - 8.0    Protein,  mg/dL (A) Negative mg/dL    Urobilinogen, UA Normal Normal, 0.2 E.U./dL    Nitrite, UA Positive (A) Negative    Leukocytes Small (1+) (A) Negative     Assessment and Plan    Diagnoses and all orders for this visit:    1. Elevated PSA (Primary)    2. Kidney stone  -     POC Urinalysis Dipstick, Multipro    3. Malignant neoplasm of prostate  -     enzalutamide (XTANDI) 40 MG chemo capsule; Take 4 capsules by mouth Daily.  Dispense: 120 capsule; Refill: 11  -     PSA DIAGNOSTIC; Future      Cystoscopy was performed today for left ureteral stent removal.    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  He was given a 2-week starter pack of Xtandi today.  We discussed risks and benefits and possible side effects of these medications.  He will  follow-up with me in 3 months with pre-clinic PSA    This discussion was significant and separately identifiable from the cystoscopy stent removal today.    I spent approximately 30 minutes providing clinical care for this patient; including review of patient's chart and provider documentation, face to face time spent with patient in examination room (obtaining history, performing physical exam, discussing diagnosis and management options), placing orders, and completing patient documentation.       This document has been signed by ANTHONY Tellez MD on February 26, 2025 12:58 CST

## 2025-02-25 ENCOUNTER — TELEPHONE (OUTPATIENT)
Dept: UROLOGY | Facility: CLINIC | Age: 85
End: 2025-02-25
Payer: MEDICARE

## 2025-02-26 ENCOUNTER — OFFICE VISIT (OUTPATIENT)
Dept: UROLOGY | Facility: CLINIC | Age: 85
End: 2025-02-26
Payer: MEDICARE

## 2025-02-26 ENCOUNTER — HOSPITAL ENCOUNTER (OUTPATIENT)
Dept: GENERAL RADIOLOGY | Facility: HOSPITAL | Age: 85
Discharge: HOME | End: 2025-02-26
Admitting: UROLOGY
Payer: MEDICARE

## 2025-02-26 VITALS — BODY MASS INDEX: 32.58 KG/M2 | TEMPERATURE: 97.7 F | HEIGHT: 69 IN | WEIGHT: 220 LBS

## 2025-02-26 DIAGNOSIS — N20.0 KIDNEY STONE: ICD-10-CM

## 2025-02-26 DIAGNOSIS — C61 MALIGNANT NEOPLASM OF PROSTATE: ICD-10-CM

## 2025-02-26 DIAGNOSIS — R97.20 ELEVATED PSA: Primary | ICD-10-CM

## 2025-02-26 LAB
BILIRUB BLD-MCNC: NEGATIVE MG/DL
CLARITY, POC: CLEAR
COLOR UR: YELLOW
GLUCOSE UR STRIP-MCNC: ABNORMAL MG/DL
KETONES UR QL: NEGATIVE
LEUKOCYTE EST, POC: ABNORMAL
NITRITE UR-MCNC: POSITIVE MG/ML
PH UR: 5.5 [PH] (ref 5–8)
PROT UR STRIP-MCNC: ABNORMAL MG/DL
RBC # UR STRIP: ABNORMAL /UL
SP GR UR: 1.02 (ref 1–1.03)
UROBILINOGEN UR QL: NORMAL

## 2025-02-26 PROCEDURE — 74018 RADEX ABDOMEN 1 VIEW: CPT

## 2025-02-26 NOTE — LETTER
February 26, 2025     BORIS Pérez  [address] [facility] [address]  [facility] with [facility] [id number]    Patient: Zachariah Acosta   YOB: 1940   Date of Visit: 2/26/2025     Dear BORIS Sandoval:    Thank you for referring Zachariah Acosta to me for evaluation. Below are the relevant portions of my assessment and plan of care.    If you have questions, please do not hesitate to call me. I look forward to following Zachariah along with you.         Sincerely,        Jose Tellez MD        CC: No Recipients      Progress Notes:  Subjective    Mr. Acosta is 84 y.o. male    Chief Complaint: Elevated PSA/Kidney stone    History of Present Illness  84-year-old male follow-up to discuss results of prostate biopsy done last week for PSA level of 362.  Biopsy shows Saleem 7 disease in all cores on the left and also on the right base.  KUB x-ray done today reviewed by me of the patient shows properly positioned left ureteral stent with no stone visible after left ureteroscopy laser lithotripsy basket stone extraction on 2/14/2025 for 2 large left UPJ stones.  CT scan and showed widespread bony metastasis and retroperitoneal periaortic lymphadenopathy    I independently visualized and reviewed the patient's prior imaging studies today in clinic and discussed the imaging findings with the patient.      The following portions of the patient's history were reviewed and updated as appropriate: allergies, current medications, past family history, past medical history, past social history, past surgical history and problem list.    Review of Systems      Current Outpatient Medications:   •  acetaminophen (TYLENOL) 500 MG tablet, Take 1 tablet by mouth Every 6 (Six) Hours As Needed for Mild Pain., Disp: , Rfl:   •  aspirin 81 MG EC tablet, Take 1 tablet by mouth Daily., Disp: , Rfl:   •  Budeson-Glycopyrrol-Formoterol (Breztri Aerosphere) 160-9-4.8 MCG/ACT aerosol inhaler, Inhale 2 puffs 2  (Two) Times a Day., Disp: 3 each, Rfl: 4  •  cefdinir (OMNICEF) 300 MG capsule, Take 1 capsule by mouth 2 (Two) Times a Day., Disp: 20 capsule, Rfl: 0  •  Continuous Blood Gluc Sensor (Dexcom G7 Sensor) misc, 1 each Every 10 (Ten) Days., Disp: 9 each, Rfl: 3  •  docusate sodium (Colace) 100 MG capsule, Take 1 capsule by mouth 2 (Two) Times a Day., Disp: 60 capsule, Rfl: 1  •  Dupilumab 300 MG/2ML solution prefilled syringe, Inject  under the skin into the appropriate area as directed Every 14 (Fourteen) Days., Disp: , Rfl:   •  ferrous sulfate 325 (65 FE) MG tablet, Take 1 tablet by mouth Daily With Breakfast., Disp: , Rfl:   •  finasteride (PROSCAR) 5 MG tablet, Take 1 tablet by mouth Daily., Disp: 90 tablet, Rfl: 3  •  fluticasone (FLONASE) 50 MCG/ACT nasal spray, 1 spray into the nostril(s) as directed by provider Daily., Disp: 16 g, Rfl: 5  •  gabapentin (NEURONTIN) 100 MG capsule, , Disp: , Rfl:   •  HYDROcodone-acetaminophen (NORCO) 5-325 MG per tablet, Take 1 tablet by mouth Every 6 (Six) Hours As Needed for Severe Pain (postop pain)., Disp: 15 tablet, Rfl: 0  •  HYDROcodone-acetaminophen (NORCO) 5-325 MG per tablet, Take 1 tablet by mouth Every 6 (Six) Hours As Needed for Moderate Pain., Disp: 20 tablet, Rfl: 0  •  insulin aspart (novoLOG FLEXPEN) 100 UNIT/ML solution pen-injector sc pen, Inject  under the skin into the appropriate area as directed As Needed. sliding, Disp: , Rfl:   •  insulin glargine (LANTUS, SEMGLEE) 100 UNIT/ML injection, 15 units in the morning and 40 units at bedtime., Disp: 18 mL, Rfl: 2  •  Insulin Glargine-yfgn 100 UNIT/ML solution pen-injector, Inject  under the skin into the appropriate area as directed., Disp: , Rfl:   •  levalbuterol (XOPENEX HFA) 45 MCG/ACT inhaler, Inhale 2 puffs Every 6 (Six) Hours As Needed for Wheezing., Disp: , Rfl:   •  levoFLOXacin (LEVAQUIN) 500 MG tablet, 1 tab by mouth once daily, Disp: 7 tablet, Rfl: 0  •  levothyroxine (Synthroid) 50 MCG tablet,  Take 1 tablet by mouth Daily., Disp: 90 tablet, Rfl: 4  •  lisinopril (PRINIVIL,ZESTRIL) 20 MG tablet, TAKE 1 TABLET BY MOUTH IN THE MORNING AND 1/2 (ONE-HALF) AT BEDTIME, Disp: 180 tablet, Rfl: 0  •  meclizine (ANTIVERT) 12.5 MG tablet, Take 1 tablet by mouth 3 (Three) Times a Day As Needed for Dizziness., Disp: 30 tablet, Rfl: 0  •  melatonin 3 MG tablet, Take 5 mg by mouth At Night As Needed., Disp: , Rfl:   •  metFORMIN ER (GLUCOPHAGE-XR) 500 MG 24 hr tablet, Take 1 tablet by mouth Daily With Breakfast., Disp: 90 tablet, Rfl: 4  •  metoprolol tartrate (LOPRESSOR) 25 MG tablet, Take 1 tablet by mouth 2 (Two) Times a Day., Disp: 180 tablet, Rfl: 4  •  mupirocin (BACTROBAN) 2 % ointment, Apply 1 Application topically to the appropriate area as directed Daily., Disp: , Rfl:   •  Pediatric Multiple Vit-C-FA (CHILDRENS CHEWABLE MULTI VITS PO), Take 1 tablet by mouth Daily., Disp: , Rfl:   •  rosuvastatin (CRESTOR) 40 MG tablet, Take 0.5 tablets by mouth Daily., Disp: 90 tablet, Rfl: 4  •  tamsulosin (FLOMAX) 0.4 MG capsule 24 hr capsule, Take 1 capsule by mouth Every Night., Disp: 90 capsule, Rfl: 3  •  tiZANidine (ZANAFLEX) 2 MG tablet, Take 1 tablet by mouth At Night As Needed for Muscle Spasms., Disp: 30 tablet, Rfl: 1  •  triamcinolone (KENALOG) 0.1 % cream, Apply 1 Application topically to the appropriate area as directed 2 (Two) Times a Day., Disp: 80 g, Rfl: 0  •  Ustekinumab (STELARA) 90 MG/ML solution prefilled syringe Injection, Inject 90 mg under the skin into the appropriate area as directed Every 28 (Twenty-Eight) Days., Disp: 1 mL, Rfl: 6  •  vitamin B-12 (CYANOCOBALAMIN) 1000 MCG tablet, Take 1 tablet by mouth Daily., Disp: , Rfl:   •  Vitamin D, Cholecalciferol, 25 MCG (1000 UT) capsule, Take 1 capsule by mouth Daily., Disp: , Rfl:   •  ciclopirox (LOPROX) 0.77 % cream, Apply 1 Application topically to the appropriate area as directed 2 (Two) Times a Day. To under arms after using Clindamycin Solution  (Patient not taking: Reported on 2/26/2025), Disp: , Rfl:   •  clindamycin (CLEOCIN T) 1 % external solution, 1 application  2 (Two) Times a Day. To under arms (Patient not taking: Reported on 2/26/2025), Disp: , Rfl:   •  enzalutamide (XTANDI) 40 MG chemo capsule, Take 4 capsules by mouth Daily., Disp: 120 capsule, Rfl: 11  •  naloxone (NARCAN) 4 MG/0.1ML nasal spray, Administer 1 spray into the nostril(s) as directed by provider As Needed. (Patient not taking: Reported on 2/26/2025), Disp: , Rfl:   •  nitroglycerin (NITROSTAT) 0.4 MG SL tablet, Place 1 tablet under the tongue Every 5 (Five) Minutes As Needed for Chest Pain. Take no more than 3 doses in 15 minutes. (Patient not taking: Reported on 2/26/2025), Disp: 25 tablet, Rfl: 1  •  ondansetron ODT (ZOFRAN-ODT) 4 MG disintegrating tablet, Place 1 tablet on the tongue Every 6 (Six) Hours As Needed for Nausea. (Patient not taking: Reported on 2/26/2025), Disp: 20 tablet, Rfl: 1  •  oxybutynin (DITROPAN) 5 MG tablet, Take 1 tablet by mouth Every 8 (Eight) Hours As Needed (bladder spasms). (Patient not taking: Reported on 2/26/2025), Disp: 30 tablet, Rfl: 1  •  phenazopyridine (PYRIDIUM) 100 MG tablet, Take 1 tablet by mouth 3 (Three) Times a Day As Needed (urinary burning). (Patient not taking: Reported on 2/26/2025), Disp: 20 tablet, Rfl: 1  •  sodium phosphate (FLEET) 7-19 GM/118ML ADULT enema, Use rectally the morning of prostate biopsy (Patient not taking: Reported on 2/26/2025), Disp: 1 each, Rfl: 0    Past Medical History:   Diagnosis Date   • Asthma    • Colitis    • Coronary artery disease    • Diabetes mellitus    • Diverticulitis    • Elevated cholesterol    • HL (hearing loss) 2012   • Hyperlipidemia    • Hypertension    • Inflammatory bowel disease 2001   • Myocardial infarct     EASTER 2020   • Primary central sleep apnea 2009    Using Cpap   • Sleep apnea     cpap at hs   • Sleep apnea, obstructive    • Stroke    • 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       Past Surgical History:   Procedure Laterality Date   • ADENOIDECTOMY  1947   • APPENDECTOMY N/A 05/05/2023    Procedure: APPENDECTOMY LAPAROSCOPIC;  Surgeon: Katherine Carranza MD;  Location: [facility] OR;  Service: General;  Laterality: N/A;   • BACK SURGERY     • CARDIAC CATHETERIZATION      stents x 6   • CARDIAC SURGERY      CABG TRIPLE BYPASS 2012   • CATARACT EXTRACTION     • COLONOSCOPY     • COLONOSCOPY N/A 06/04/2021    Procedure: COLONOSCOPY WITH ANESTHESIA;  Surgeon: Zachariah Menjivar DO;  Location:  [facility];  Service: Gastroenterology;  Laterality: N/A;  pre hx ulcerative colitis  post ulcerative colitis  dr collins gusman   • COLONOSCOPY N/A 11/29/2023    Procedure: COLONOSCOPY LOWER LIMITED;  Surgeon: Zachariah Menjivar DO;  Location: [facility];  Service: Gastroenterology;  Laterality: N/A;  preop; hx of colitis  postop colitis - questionable colonic stricture at 30cm   [facility]   • COLOSTOMY     • CORONARY ANGIOPLASTY WITH STENT PLACEMENT     • CORONARY ARTERY BYPASS GRAFT  December 2012   • CORONARY STENT PLACEMENT     • HIP SURGERY Right     total hip   • JOINT REPLACEMENT  2015    Right hip   • PENILE PROSTHESIS IMPLANT N/A 12/13/2021    Procedure: 3-PIECE INFLATABLE PENILE PROSTHESIS PLACEMENT;  Surgeon: Jose Tellez MD;  Location:  [facility] OR;  Service: Urology;  Laterality: N/A;   • TONSILLECTOMY  1947   • URETEROSCOPY LASER LITHOTRIPSY WITH STENT INSERTION Left 2/14/2025    Procedure: URETEROSCOPY LASER LITHOTRIPSY WITH STENT INSERTION-left;  Surgeon: Jose Tellez MD;  Location:  [facility] OR;  Service: Urology;  Laterality: Left;       Social History     Socioeconomic History   • Marital status:    Tobacco Use   • Smoking status: Never     Passive exposure: Never   • Smokeless tobacco: Never   Vaping Use   • Vaping status: Never Used   Substance and Sexual Activity   • Alcohol use: Never   • Drug use: Never   • Sexual activity: Not Currently  "    Partners: Female       Family History   Problem Relation Age of Onset   • Colon cancer Brother    • Colon polyps Neg Hx    • Esophageal cancer Neg Hx        Objective    Temp 97.7 °F (36.5 °C)   Ht 175.3 cm (69\")   Wt 99.8 kg (220 lb)   BMI 32.49 kg/m²     Physical Exam    CC: I am here for the doctor to look at my bladder and get this stent out.     Cystoscopy with stent removal    Indications: Status post ureteroscopy    Prep: Hibiclens solution    Instrumentation:Flexible cystoscope and Alligator grasping forceps    Procedure: After lidocaine jelly is instilled into the urethra for 10 minutes, the well-lubricated cystoscope was introduced through the urethra into the bladder.  The stent is seen protruding from the left side.  The alligator forceps or used to grasp the stent and pull it out the urethra.  The patient tolerated the procedure well.    Diagnoses and all orders for this visit:    1. Elevated PSA (Primary)    2. Kidney stone  -     POC Urinalysis Dipstick, Multipro    3. Malignant neoplasm of prostate  -     enzalutamide (XTANDI) 40 MG chemo capsule; Take 4 capsules by mouth Daily.  Dispense: 120 capsule; Refill: 11  -     PSA DIAGNOSTIC; Future        Jose Tellez MD  2/26/2025  12:56 CST       Results for orders placed or performed in visit on 02/26/25   POC Urinalysis Dipstick, Multipro    Collection Time: 02/26/25 10:14 AM    Specimen: Urine   Result Value Ref Range    Color Yellow Yellow, Straw, Dark Yellow, Vicky    Clarity, UA Clear Clear    Glucose,  mg/dL (A) Negative mg/dL    Bilirubin Negative Negative    Ketones, UA Negative Negative    Specific Gravity  1.025 1.005 - 1.030    Blood, UA Moderate (A) Negative    pH, Urine 5.5 5.0 - 8.0    Protein,  mg/dL (A) Negative mg/dL    Urobilinogen, UA Normal Normal, 0.2 E.U./dL    Nitrite, UA Positive (A) Negative    Leukocytes Small (1+) (A) Negative     Assessment and Plan    Diagnoses and all orders for this visit:    1. " Elevated PSA (Primary)    2. Kidney stone  -     POC Urinalysis Dipstick, Multipro    3. Malignant neoplasm of prostate  -     enzalutamide (XTANDI) 40 MG chemo capsule; Take 4 capsules by mouth Daily.  Dispense: 120 capsule; Refill: 11  -     PSA DIAGNOSTIC; Future      Cystoscopy was performed today for left ureteral stent removal.    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  He was given a 2-week starter pack of Xtandi today.  We discussed risks and benefits and possible side effects of these medications.  He will follow-up with me in 3 months with pre-clinic PSA    This discussion was significant and separately identifiable from the cystoscopy stent removal today.    I spent approximately 30 minutes providing clinical care for this patient; including review of patient's chart and provider documentation, face to face time spent with patient in examination room (obtaining history, performing physical exam, discussing diagnosis and management options), placing orders, and completing patient documentation.       This document has been signed by ANTHONY Tellez MD on February 26, 2025 12:58 CST

## 2025-02-27 PROBLEM — C61 PROSTATE CANCER: Status: ACTIVE | Noted: 2025-02-27

## 2025-03-03 ENCOUNTER — OFFICE VISIT (OUTPATIENT)
Dept: NEUROLOGY | Age: 85
End: 2025-03-03
Payer: MEDICARE

## 2025-03-03 VITALS
WEIGHT: 213 LBS | OXYGEN SATURATION: 98 % | HEIGHT: 69 IN | SYSTOLIC BLOOD PRESSURE: 149 MMHG | HEART RATE: 62 BPM | DIASTOLIC BLOOD PRESSURE: 73 MMHG | BODY MASS INDEX: 31.55 KG/M2

## 2025-03-03 DIAGNOSIS — R40.0 SOMNOLENCE, DAYTIME: ICD-10-CM

## 2025-03-03 DIAGNOSIS — Z99.89 CPAP (CONTINUOUS POSITIVE AIRWAY PRESSURE) DEPENDENCE: ICD-10-CM

## 2025-03-03 DIAGNOSIS — G47.33 OBSTRUCTIVE SLEEP APNEA: Primary | ICD-10-CM

## 2025-03-03 PROCEDURE — 1123F ACP DISCUSS/DSCN MKR DOCD: CPT | Performed by: PHYSICIAN ASSISTANT

## 2025-03-03 PROCEDURE — 99212 OFFICE O/P EST SF 10 MIN: CPT | Performed by: PHYSICIAN ASSISTANT

## 2025-03-03 PROCEDURE — 1036F TOBACCO NON-USER: CPT | Performed by: PHYSICIAN ASSISTANT

## 2025-03-03 PROCEDURE — 1159F MED LIST DOCD IN RCRD: CPT | Performed by: PHYSICIAN ASSISTANT

## 2025-03-03 PROCEDURE — G8417 CALC BMI ABV UP PARAM F/U: HCPCS | Performed by: PHYSICIAN ASSISTANT

## 2025-03-03 PROCEDURE — G8427 DOCREV CUR MEDS BY ELIG CLIN: HCPCS | Performed by: PHYSICIAN ASSISTANT

## 2025-03-03 NOTE — PATIENT INSTRUCTIONS
long-term follow-up should be established. Annual visits are reasonable, with more frequent visits in between if new issues arise. The purpose of long-term follow-up is to assess usage and monitor for recurrent WAQAR, new side effects, air leakage, and fluctuations in body weight.    WHERE TO GET MORE INFORMATION -- Your healthcare provider is the best source of information for questions and concerns related to your medical problem.    Organizations  American Sleep Apnea Association  Provides information about sleep apnea to the public, publishes a newsletter, and serves as an advocate for people with the disorder.  [address] [place] [address]  [address]   [place] [address]   [email or web address]   [email or web address]   Tel: [phone]   Fax: [phone]   National [employer] Foundation  National nonprofit organization that works to improve public health and safety by promoting public understanding of sleep and sleep disorders. Supports sleep-related education, research, and advocacy; produces and distributes educational materials to the public and healthcare professionals; and offers postdoctoral fellowships and grants for sleep researchers.  [address] [place] [address]  [address]   [place] [address]   [email or web address]   [email or web address]   Tel: [phone]   Fax: [phone]    Important information:  Medicare/private insurance CPAP/BiPAP/APAP requirements:  Medicare/private insurance has specific requirements for PAP compliance that must be met during the first 90 days of use to continue coverage for CPAP/BiPAP/APAP  from day 91 and beyond. The policy requires that patients use a PAP device 4 hours per 24 hour period, at least 70% of the time over a 30 day period. This data must be downloaded as a report direct from the PAP devices. This is called a compliance download.  Your PAP supplier will assist you in this matter.     Note:  Where applicable, we will utilize PAP device efficiency

## 2025-03-03 NOTE — PROGRESS NOTES
REVIEW OF SYSTEMS    Constitutional: []Fever []Sweats []Chills [] Recent Injury [x] Denies all unless marked  HEENT:[]Headache  [] Head Injury [] Hearing Loss  [] Sore Throat  [] Ear Ache [x] Denies all unless marked  Spine:  [] Neck pain  [] Back pain  [] Sciaticia  [x] Denies all unless marked  Cardiovascular:[]Heart Disease []Palpitations [] Chest Pain   [x] Denies all unless marked  Pulmonary: []Shortness of Breath []Cough   [x] Denies all unless marked  Psychiatric/Behavioral:[] Depression [] Anxiety [x] Denies all unless marked  Gastrointestinal: []Nausea  []Vomiting  []Abdominal Pain  []Constipation  []Diarrhea  [x] Denies all unless marked  Genitourinary:   [] Frequency  [] Urgency  [] Dysuria [] Incontinence  [x] Denies all unless marked  Extremities: []Pain  []Swelling  [x] Denies all unless marked  Musculoskeletal: [] Myalgias  [] Joint Pain  [] Arthritis [] Muscle Cramps [] Muscle Twitches  [x] Denies all unless marked  Sleep: []Insomnia[]Snoring []Restless Legs  [x]Sleep Apnea  []Daytime Sleepiness  [x] Denies all unless marked  Skin:[] Rash [] Color Change [x] Denies all unless marked   Neurological:[]Visual Disturbance [] Memory Loss []Loss of Balance []Slurred Speech []Weakness []Seizures  [] Dizziness [x] Denies all unless marked     
information in the medical record and care coordination.

## 2025-03-10 ENCOUNTER — TELEPHONE (OUTPATIENT)
Dept: UROLOGY | Facility: CLINIC | Age: 85
End: 2025-03-10
Payer: MEDICARE

## 2025-03-10 ENCOUNTER — INFUSION (OUTPATIENT)
Dept: ONCOLOGY | Facility: HOSPITAL | Age: 85
End: 2025-03-10
Payer: MEDICARE

## 2025-03-10 VITALS
BODY MASS INDEX: 31.7 KG/M2 | HEART RATE: 65 BPM | DIASTOLIC BLOOD PRESSURE: 68 MMHG | TEMPERATURE: 97.6 F | SYSTOLIC BLOOD PRESSURE: 166 MMHG | OXYGEN SATURATION: 99 % | HEIGHT: 69 IN | RESPIRATION RATE: 18 BRPM | WEIGHT: 214 LBS

## 2025-03-10 DIAGNOSIS — C61 PROSTATE CANCER: Primary | ICD-10-CM

## 2025-03-10 PROCEDURE — 25010000002 LEUPROLIDE 7.5 MG KIT: Performed by: UROLOGY

## 2025-03-10 PROCEDURE — 96402 CHEMO HORMON ANTINEOPL SQ/IM: CPT

## 2025-03-10 RX ADMIN — LEUPROLIDE ACETATE 7.5 MG: 7.5 INJECTION, SUSPENSION, EXTENDED RELEASE SUBCUTANEOUS at 13:07

## 2025-03-10 NOTE — TELEPHONE ENCOUNTER
Called pt back and let him know that the info was sent and I havent had a response yet gave him the number of [phone] to call and check the status of financial assistance on his Xtandi

## 2025-03-10 NOTE — TELEPHONE ENCOUNTER
Provider: DR GRAY    Caller: Zachariah Acosta    Relationship to Patient: Self    Pharmacy:  PHARM    Phone Number:     Reason for Call: PT CALLED TO CHECK ON STATUS OF APPLICATION FOR ASSISTANCE ON MEDICATION XTANDI.    ROD WITH  PHARM FAXED APPLICATION TODAY.    PLEASE REVIEW AND CALL PT TO CONFIRM THIS HAS COMPLETE

## 2025-03-10 NOTE — TELEPHONE ENCOUNTER
Hub staff attempted to follow warm transfer process and was unsuccessful     Caller: Zachariah Acosta    Relationship to patient: Self    Best call back number:   Telephone Information:   Mobile [phone]      Patient is needing: PT CALLED BACK STATING THAT HE CALLED THE PHONE NUMBER PROVIDED BY [email or web address] AND WAS TOLD BY THE COMPANY THAT THEY HAD NOT RECEIVED ANY PAPERWORK FOR THE XTANDI AND SUGGESTED THAT THE INFORMATION BE FAXED TO THEM AGAIN OR THE OFFICE COULD REACH OUT TO THE PHONE NUMBER PROVIDED TO THE PT ([phone]) AND THEY WOULD BE ABLE TO WALK THROUGH THE APPLICATION PROCESS   PT STATED THAT HE DOES NOT NEED A CALL BACK UNLESS ADDITIONAL INFO IS NEEDED. THANK YOU.

## 2025-03-17 LAB
LAB AP CASE REPORT: NORMAL
LAB AP DIAGNOSIS COMMENT: NORMAL
Lab: NORMAL
PATH REPORT.FINAL DX SPEC: NORMAL
PATH REPORT.GROSS SPEC: NORMAL

## 2025-03-18 ENCOUNTER — OFFICE VISIT (OUTPATIENT)
Dept: FAMILY MEDICINE CLINIC | Facility: CLINIC | Age: 85
End: 2025-03-18
Payer: MEDICARE

## 2025-03-18 VITALS
HEIGHT: 69 IN | OXYGEN SATURATION: 97 % | RESPIRATION RATE: 20 BRPM | SYSTOLIC BLOOD PRESSURE: 130 MMHG | DIASTOLIC BLOOD PRESSURE: 60 MMHG | TEMPERATURE: 97.7 F | BODY MASS INDEX: 31.73 KG/M2 | HEART RATE: 65 BPM | WEIGHT: 214.25 LBS

## 2025-03-18 DIAGNOSIS — C61 PROSTATE CANCER: ICD-10-CM

## 2025-03-18 DIAGNOSIS — K51.919 ACUTE ULCERATIVE COLITIS WITH COMPLICATION: ICD-10-CM

## 2025-03-18 DIAGNOSIS — R07.9 CHEST PAIN, UNSPECIFIED TYPE: Primary | ICD-10-CM

## 2025-03-18 DIAGNOSIS — N20.0 NEPHROLITHIASIS: ICD-10-CM

## 2025-03-18 DIAGNOSIS — M54.2 NECK PAIN: ICD-10-CM

## 2025-03-18 NOTE — PROGRESS NOTES
BORIS Pérez  [facility]   Family Medicine  [address]  [place] [address]  Phone: [phone]  Fax: [phone]         Chief Complaint:  Chief Complaint   Patient presents with    Neck Pain     3-month follow up        History:  Zachariah Acosta is a 84 y.o. male.  History of Present Illness  The patient is an 84-year-old male who presents today for a follow-up visit. He is accompanied by his wife.    Chest pain: He reports experiencing chest pain, which he believes originated from his neck and left shoulder blade. He continues to experience stiffness in his neck and chest but perceives an improvement in his condition with the use of tizanidine. He has one remaining refill of tizanidine and does not believe a dosage increase would be beneficial. He does not experience morning pain or stiffness.    Nephrolithiasis: Since his last visit, he has undergone surgery for kidney stones, performed by Dr. Tellez, which involved laser lithotripsy. Post-surgery, a catheter was inserted due to recurrent urinary retention, necessitating emergency room visits for stent placement. This procedure initially provided relief, but the issue recurred after a day. The stent was subsequently removed by Dr. Tellez during a follow-up visit, and his urinary function has since improved. He reports no current hematuria but notes that he previously observed bright red blood on the toilet paper after urination. He has discontinued oxybutynin and Pyridium and has not taken Zofran recently. He previously used Zofran to manage nausea associated with hydrocodone use.    Prostate cancer: He has been diagnosed with prostate cancer, confirmed by biopsy. His PSA level was reported to be over 300, prompting further investigation. He is currently receiving Eligard injections every three months to manage his testosterone levels, with the first injection administered a few weeks ago and the next  scheduled for 2024. He is also in the process of enrolling in a patient assistance program for Xtandi. He was provided with a 14-day starter kit but has not yet started the oral medication. He plans to consult with Dr. Tellez regarding this. He was advised by Dr. Tellez to have cancerous spots on his spine evaluated during a scan for back pain related to his kidney condition. He does not believe the cancer will resolve spontaneously, but rather slows the progression of testosterone.    He manages his arthritis pain with Tylenol, without which he experiences pain.    He is on finasteride 5 mg, as recommended by Dr. Tellez, and will continue this medication indefinitely.    He continues to take Stelara every four weeks for ulcerative colitis, with the next dose due next week. He reports no presence of blood in his stool.    Supplemental Information  He is currently taking lisinopril 20 mg, levothyroxine, and Synthroid. He is not on any antibiotics, including Levaquin and CIF, which were prescribed post-procedure. He is also not using Bactroban ointment.    FAMILY HISTORY  His brother-in-law  of colon cancer at age 58.    MEDICATIONS  Current: Eligard, Tylenol, lisinopril, levothyroxine, finasteride, Stelara, tizanidine.  Discontinued: oxybutynin, Pyridium, Zofran, hydrocodone, Narcan, Levaquin, Bactroban.    IMMUNIZATIONS  He received a tetanus shot on 2023 following a laceration on his right index finger.           ROS:  Review of Systems   Constitutional:  Negative for chills, diaphoresis, fatigue and fever.   HENT:  Negative for congestion and sore throat.    Respiratory:  Negative for cough.    Cardiovascular:  Negative for chest pain.   Gastrointestinal:  Negative for abdominal pain and nausea.   Musculoskeletal:  Negative for myalgias and neck pain.   Skin:  Negative for rash.   Neurological:  Negative for weakness and numbness.        reports that he has never smoked. He has never been exposed to  tobacco smoke. He has never used smokeless tobacco. He reports that he does not drink alcohol and does not use drugs.    Current Outpatient Medications   Medication Instructions    acetaminophen (TYLENOL) 500 mg, Oral, Every 6 Hours PRN    aspirin 81 mg, Daily    Budeson-Glycopyrrol-Formoterol (Breztri Aerosphere) 160-9-4.8 MCG/ACT aerosol inhaler 2 puffs, Inhalation, 2 Times Daily    Continuous Blood Gluc Sensor (Dexcom G7 Sensor) misc 1 each, Not Applicable, Every 10 Days    docusate sodium (COLACE) 100 mg, Oral, 2 Times Daily    Dupilumab 300 MG/2ML solution prefilled syringe Every 14 Days    enzalutamide (XTANDI) 160 mg, Oral, Daily    ferrous sulfate 325 mg, Daily With Breakfast    finasteride (PROSCAR) 5 mg, Oral, Daily    fluticasone (FLONASE) 50 MCG/ACT nasal spray 1 spray, Nasal, Daily    gabapentin (NEURONTIN) 100 MG capsule     insulin aspart (novoLOG FLEXPEN) 100 UNIT/ML solution pen-injector sc pen As Needed    insulin glargine (LANTUS, SEMGLEE) 100 UNIT/ML injection 15 units in the morning and 40 units at bedtime.    Insulin Glargine-yfgn 100 UNIT/ML solution pen-injector Inject  under the skin into the appropriate area as directed.    levalbuterol (XOPENEX HFA) 45 MCG/ACT inhaler 2 puffs, Every 6 Hours PRN    levothyroxine (SYNTHROID) 50 mcg, Oral, Daily    lisinopril (PRINIVIL,ZESTRIL) 20 MG tablet TAKE 1 TABLET BY MOUTH IN THE MORNING AND 1/2 (ONE-HALF) AT BEDTIME    meclizine (ANTIVERT) 12.5 mg, Oral, 3 Times Daily PRN    melatonin 5 mg, Nightly PRN    metFORMIN ER (GLUCOPHAGE-XR) 500 mg, Oral, Daily With Breakfast    metoprolol tartrate (LOPRESSOR) 25 mg, Oral, 2 Times Daily    nitroglycerin (NITROSTAT) 0.4 mg, Sublingual, Every 5 Minutes PRN, Take no more than 3 doses in 15 minutes.    Pediatric Multiple Vit-C-FA (CHILDRENS CHEWABLE MULTI VITS PO) 1 tablet, Daily    rosuvastatin (CRESTOR) 20 mg, Oral, Daily    tamsulosin (FLOMAX) 0.4 mg, Oral, Nightly    tiZANidine (ZANAFLEX) 4 MG tablet Take 1/2  "- 1 tablet at bedtime.    triamcinolone (KENALOG) 0.1 % cream 1 Application, Topical, 2 Times Daily    Ustekinumab (STELARA) 90 mg, Subcutaneous, Every 28 Days    vitamin B-12 (CYANOCOBALAMIN) 1,000 mcg, Daily    Vitamin D, Cholecalciferol, 25 MCG (1000 UT) capsule 1 capsule, Daily       OBJECTIVE:  /60 (BP Location: Left arm, Patient Position: Sitting, Cuff Size: Adult)   Pulse 65   Temp 97.7 °F (36.5 °C) (Infrared)   Resp 20   Ht 175.3 cm (69.02\")   Wt 97.2 kg (214 lb 4 oz)   SpO2 97%   BMI 31.62 kg/m²    Physical Exam  Physical Exam  No bruit detected in the neck.  Lungs were auscultated.         Procedures    Results  Laboratory Studies  PSA level was over 300.    Assessment/Plan:     Diagnoses and all orders for this visit:    1. Chest pain, unspecified type (Primary)    2. Neck pain  -     tiZANidine (ZANAFLEX) 4 MG tablet; Take 1/2 - 1 tablet at bedtime.  Dispense: 30 tablet; Refill: 11    3. Nephrolithiasis    4. Acute ulcerative colitis with complication    5. Prostate cancer          An After Visit Summary was printed and given to the patient at discharge.  Return in about 3 months (around 6/18/2025).       Assessment & Plan  1. Cervicalgia.  He reports improvement in neck pain and stiffness with the use of Tizanidine. However, he still experiences stiffness. The dosage of Tizanidine will be increased to 4 mg, with instructions to take half to one tablet at bedtime. He has declined the option of shoulder injections.    2. Prostate cancer.  He is currently undergoing hormone therapy with Eligard injections, with the next injection scheduled for April 7. He is also trying to get Xtandi, a hormone blocker, and is awaiting patient assistance program. He has been informed that testosterone therapy can slow the progression of prostate cancer. He will continue with the current hormone therapy regimen. A PSA test has been ordered to be conducted during his next visit with Dr. Tellez.    3. " Nephrolithiasis.  He recently underwent surgery to break up kidney stones, which required catheterization and stent placement. The stent was removed, and he reports no current issues with urination or blood in the urine. He will continue to monitor for any recurrence of symptoms.    4. Medication management.  He is currently taking Tylenol for arthritis pain. He is not taking oxybutynin or Pyridium for bladder spasms or Zofran for nausea. He will continue taking finasteride 5 mg as advised by Dr. Leal and Stelara every 4 weeks for ulcerative colitis. The pain medication and Narcan will be discontinued as he is not using them.    Follow-up  The patient is scheduled for a follow-up visit in 3 months.    PROCEDURE  The patient has undergone surgery for kidney stones, which involved laser treatment. A catheter was inserted post-surgery, followed by stent placement, which was later removed.    I spent 32 minutes caring for Zachariah on this date of service. This time includes time spent by me in the following activities: preparing for the visit, reviewing tests, performing a medically appropriate examination and/or evaluation, counseling and educating the patient/family/caregiver, documenting information in the medical record, independently interpreting results and communicating that information with the patient/family/caregiver, care coordination, ordering medications, ordering test(s), obtaining a separately obtained history, and reviewing a separately obtained history     : I have been treating this patient over a continuum addressing both chronic and acute care concerns.     Amaya ESCALANTE 3/21/2025   Electronically signed.    Patient or patient representative verbalized consent for the use of Ambient Listening during the visit with  BORIS Pérez for chart documentation. 3/21/2025  23:11 CDT

## 2025-03-28 DIAGNOSIS — C61 MALIGNANT NEOPLASM OF PROSTATE: ICD-10-CM

## 2025-04-07 ENCOUNTER — INFUSION (OUTPATIENT)
Dept: ONCOLOGY | Facility: HOSPITAL | Age: 85
End: 2025-04-07
Payer: MEDICARE

## 2025-04-07 VITALS
TEMPERATURE: 97.6 F | SYSTOLIC BLOOD PRESSURE: 141 MMHG | HEIGHT: 69 IN | WEIGHT: 214 LBS | OXYGEN SATURATION: 96 % | BODY MASS INDEX: 31.7 KG/M2 | DIASTOLIC BLOOD PRESSURE: 70 MMHG | RESPIRATION RATE: 18 BRPM | HEART RATE: 61 BPM

## 2025-04-07 DIAGNOSIS — C61 PROSTATE CANCER: Primary | ICD-10-CM

## 2025-04-07 PROCEDURE — 25010000002 LEUPROLIDE 22.5 MG KIT: Performed by: UROLOGY

## 2025-04-07 PROCEDURE — 96402 CHEMO HORMON ANTINEOPL SQ/IM: CPT

## 2025-04-07 RX ADMIN — LEUPROLIDE ACETATE 22.5 MG: 22.5 INJECTION, SUSPENSION, EXTENDED RELEASE SUBCUTANEOUS at 13:40

## 2025-04-29 NOTE — PROGRESS NOTES
Subjective    Mr. Acosta is 84 y.o. male    Chief Complaint: Elevated PSA    History of Present Illness    84-year-old male follow-up regarding metastatic castrate sensitive prostate cancer.  PSA has fallen down to 7.6 from 362 on Xtandi and Eligard.  He previously had left ureteroscopy laser lithotripsy basket stone extraction on 2/14/2025 for 2 large left UPJ stones.  Previous CT scan and showed widespread bony metastasis and retroperitoneal periaortic lymphadenopathy         Lab Results   Component Value Date    PSA 7.640 (H) 05/21/2025    .000 (H) 02/12/2025    PSA 5.55 (H) 05/23/2018       The following portions of the patient's history were reviewed and updated as appropriate: allergies, current medications, past family history, past medical history, past social history, past surgical history and problem list.    Review of Systems      Current Outpatient Medications:     acetaminophen (TYLENOL) 500 MG tablet, Take 1 tablet by mouth Every 6 (Six) Hours As Needed for Mild Pain., Disp: , Rfl:     aspirin 81 MG EC tablet, Take 1 tablet by mouth Daily., Disp: , Rfl:     Budeson-Glycopyrrol-Formoterol (Breztri Aerosphere) 160-9-4.8 MCG/ACT aerosol inhaler, Inhale 2 puffs 2 (Two) Times a Day., Disp: 3 each, Rfl: 4    Continuous Blood Gluc Sensor (Dexcom G7 Sensor) misc, 1 each Every 10 (Ten) Days., Disp: 9 each, Rfl: 3    docusate sodium (Colace) 100 MG capsule, Take 1 capsule by mouth 2 (Two) Times a Day., Disp: 60 capsule, Rfl: 1    Dupilumab 300 MG/2ML solution prefilled syringe, Inject  under the skin into the appropriate area as directed Every 14 (Fourteen) Days., Disp: , Rfl:     enzalutamide (XTANDI) 40 MG chemo capsule, Take 4 capsules by mouth Daily., Disp: 120 capsule, Rfl: 11    ferrous sulfate 325 (65 FE) MG tablet, Take 1 tablet by mouth Daily With Breakfast., Disp: , Rfl:     finasteride (PROSCAR) 5 MG tablet, Take 1 tablet by mouth Daily., Disp: 90 tablet, Rfl: 3    fluticasone (FLONASE) 50  MCG/ACT nasal spray, 1 spray into the nostril(s) as directed by provider Daily., Disp: 16 g, Rfl: 5    gabapentin (NEURONTIN) 100 MG capsule, , Disp: , Rfl:     insulin aspart (novoLOG FLEXPEN) 100 UNIT/ML solution pen-injector sc pen, Inject  under the skin into the appropriate area as directed As Needed. sliding, Disp: , Rfl:     insulin glargine (LANTUS, SEMGLEE) 100 UNIT/ML injection, 15 units in the morning and 40 units at bedtime., Disp: 18 mL, Rfl: 2    Insulin Glargine-yfgn 100 UNIT/ML solution pen-injector, Inject  under the skin into the appropriate area as directed., Disp: , Rfl:     levalbuterol (XOPENEX HFA) 45 MCG/ACT inhaler, Inhale 2 puffs Every 6 (Six) Hours As Needed for Wheezing., Disp: , Rfl:     levothyroxine (Synthroid) 75 MCG tablet, Take 1 tablet by mouth Daily., Disp: 90 tablet, Rfl: 4    lisinopril (PRINIVIL,ZESTRIL) 20 MG tablet, TAKE 1 TABLET BY MOUTH IN THE MORNING AND 1/2 (ONE-HALF) AT BEDTIME, Disp: 180 tablet, Rfl: 0    meclizine (ANTIVERT) 12.5 MG tablet, Take 1 tablet by mouth 3 (Three) Times a Day As Needed for Dizziness., Disp: 30 tablet, Rfl: 0    melatonin 3 MG tablet, Take 5 mg by mouth At Night As Needed., Disp: , Rfl:     metFORMIN ER (GLUCOPHAGE-XR) 500 MG 24 hr tablet, Take 1 tablet by mouth Daily With Breakfast., Disp: 90 tablet, Rfl: 4    metoprolol tartrate (LOPRESSOR) 25 MG tablet, Take 1 tablet by mouth 2 (Two) Times a Day., Disp: 180 tablet, Rfl: 4    nitroglycerin (NITROSTAT) 0.4 MG SL tablet, Place 1 tablet under the tongue Every 5 (Five) Minutes As Needed for Chest Pain. Take no more than 3 doses in 15 minutes., Disp: 25 tablet, Rfl: 1    Pediatric Multiple Vit-C-FA (CHILDRENS CHEWABLE MULTI VITS PO), Take 1 tablet by mouth Daily., Disp: , Rfl:     rosuvastatin (CRESTOR) 40 MG tablet, Take 0.5 tablets by mouth Daily., Disp: 90 tablet, Rfl: 4    tamsulosin (FLOMAX) 0.4 MG capsule 24 hr capsule, Take 1 capsule by mouth Every Night., Disp: 90 capsule, Rfl: 3     tiZANidine (ZANAFLEX) 4 MG tablet, Take 1/2 - 1 tablet at bedtime., Disp: 30 tablet, Rfl: 11    triamcinolone (KENALOG) 0.1 % cream, Apply 1 Application topically to the appropriate area as directed 2 (Two) Times a Day., Disp: 80 g, Rfl: 0    Ustekinumab (STELARA) 90 MG/ML solution prefilled syringe Injection, Inject 90 mg under the skin into the appropriate area as directed Every 28 (Twenty-Eight) Days., Disp: 1 mL, Rfl: 6    vitamin B-12 (CYANOCOBALAMIN) 1000 MCG tablet, Take 1 tablet by mouth Daily., Disp: , Rfl:     Vitamin D, Cholecalciferol, 25 MCG (1000 UT) capsule, Take 1 capsule by mouth Daily., Disp: , Rfl:     Past Medical History:   Diagnosis Date    Asthma     Colitis     Coronary artery disease     Diabetes mellitus     Diverticulitis     Elevated cholesterol     HL (hearing loss) 2012    Hyperlipidemia     Hypertension     Inflammatory bowel disease 2001    Myocardial infarct     EASTER 2020    Primary central sleep apnea 2009    Using Cpap    Sleep apnea     cpap at     Sleep apnea, obstructive     Stroke     Visual impairment 2016    Double vision       Past Surgical History:   Procedure Laterality Date    ADENOIDECTOMY  1947    APPENDECTOMY N/A 05/05/2023    Procedure: APPENDECTOMY LAPAROSCOPIC;  Surgeon: Katherine Carranza MD;  Location: [facility] OR;  Service: General;  Laterality: N/A;    BACK SURGERY      CARDIAC CATHETERIZATION      stents x 6    CARDIAC SURGERY      CABG TRIPLE BYPASS 2012    CATARACT EXTRACTION      COLONOSCOPY      COLONOSCOPY N/A 06/04/2021    Procedure: COLONOSCOPY WITH ANESTHESIA;  Surgeon: Zachariah Menjivar DO;  Location: [facility] ENDOSCOPY;  Service: Gastroenterology;  Laterality: N/A;  pre hx ulcerative colitis  post ulcerative colitis  dr collins gusman    COLONOSCOPY N/A 11/29/2023    Procedure: COLONOSCOPY LOWER LIMITED;  Surgeon: Zachariah Menjivar DO;  Location: [facility] ENDOSCOPY;  Service: Gastroenterology;  Laterality: N/A;  preop; hx of colitis  postop colitis -  "questionable colonic stricture at 30cm   pcp bryanna garrisontreet    COLOSTOMY      CORONARY ANGIOPLASTY WITH STENT PLACEMENT      CORONARY ARTERY BYPASS GRAFT  December 2012    CORONARY STENT PLACEMENT      HIP SURGERY Right     total hip    JOINT REPLACEMENT  2015    Right hip    PENILE PROSTHESIS IMPLANT N/A 12/13/2021    Procedure: 3-PIECE INFLATABLE PENILE PROSTHESIS PLACEMENT;  Surgeon: Jose Tellez MD;  Location:  [facility] OR;  Service: Urology;  Laterality: N/A;    TONSILLECTOMY  1947    URETEROSCOPY LASER LITHOTRIPSY WITH STENT INSERTION Left 2/14/2025    Procedure: URETEROSCOPY LASER LITHOTRIPSY WITH STENT INSERTION-left;  Surgeon: Jose Tellez MD;  Location:  [facility] OR;  Service: Urology;  Laterality: Left;       Social History     Socioeconomic History    Marital status:    Tobacco Use    Smoking status: Never     Passive exposure: Never    Smokeless tobacco: Never   Vaping Use    Vaping status: Never Used   Substance and Sexual Activity    Alcohol use: Never    Drug use: Never    Sexual activity: Not Currently     Partners: Female       Family History   Problem Relation Age of Onset    Colon cancer Brother     Colon polyps Neg Hx     Esophageal cancer Neg Hx        Objective    Temp 97 °F (36.1 °C)   Ht 175.3 cm (69\")   Wt 95.3 kg (210 lb)   BMI 31.01 kg/m²     Physical Exam        Results for orders placed or performed in visit on 05/28/25   POC Urinalysis Dipstick, Multipro    Collection Time: 05/28/25 10:05 AM    Specimen: Urine   Result Value Ref Range    Color Yellow Yellow, Straw, Dark Yellow, Vicky    Clarity, UA Clear Clear    Glucose, UA Negative Negative mg/dL    Bilirubin Negative Negative    Ketones, UA Negative Negative    Specific Gravity  1.020 1.005 - 1.030    Blood, UA Negative Negative    pH, Urine 5.5 5.0 - 8.0    Protein, POC Negative Negative mg/dL    Urobilinogen, UA Normal Normal, 0.2 E.U./dL    Nitrite, UA Negative Negative    Leukocytes Negative Negative "     Assessment and Plan    Diagnoses and all orders for this visit:    1. Malignant neoplasm of prostate (Primary)  -     PSA DIAGNOSTIC; Future    2. Elevated PSA  -     POC Urinalysis Dipstick, Multipro    3. BPH with obstruction/lower urinary tract symptoms  -     tamsulosin (FLOMAX) 0.4 MG capsule 24 hr capsule; Take 1 capsule by mouth Every Night.  Dispense: 90 capsule; Refill: 3    4. Benign prostatic hyperplasia without lower urinary tract symptoms  -     finasteride (PROSCAR) 5 MG tablet; Take 1 tablet by mouth Daily.  Dispense: 90 tablet; Refill: 3    5. Kidney stone  -     US Renal Bilateral; Future      Good PSA control.  Continue Eligard and Xtandi.  He will follow-up with me in 6 months with pre-clinic PSA and pre-clinic renal ultrasound.  Continue finasteride and tamsulosin      This document has been signed by ANTHONY Tellez MD on May 28, 2025 13:00 CDT

## 2025-05-21 ENCOUNTER — LAB (OUTPATIENT)
Dept: LAB | Facility: HOSPITAL | Age: 85
End: 2025-05-21
Payer: MEDICARE

## 2025-05-21 DIAGNOSIS — E11.65 TYPE 2 DIABETES MELLITUS WITH HYPERGLYCEMIA, WITH LONG-TERM CURRENT USE OF INSULIN: ICD-10-CM

## 2025-05-21 DIAGNOSIS — R74.8 ELEVATED ALKALINE PHOSPHATASE LEVEL: ICD-10-CM

## 2025-05-21 DIAGNOSIS — Z79.4 TYPE 2 DIABETES MELLITUS WITH HYPERGLYCEMIA, WITH LONG-TERM CURRENT USE OF INSULIN: ICD-10-CM

## 2025-05-21 DIAGNOSIS — C61 MALIGNANT NEOPLASM OF PROSTATE: ICD-10-CM

## 2025-05-21 DIAGNOSIS — E03.9 HYPOTHYROIDISM, UNSPECIFIED TYPE: ICD-10-CM

## 2025-05-21 LAB
ALBUMIN SERPL-MCNC: 4.1 G/DL (ref 3.5–5.2)
ALBUMIN UR-MCNC: <1.2 MG/DL
ALBUMIN/GLOB SERPL: 1.4 G/DL
ALP SERPL-CCNC: 402 U/L (ref 39–117)
ALT SERPL W P-5'-P-CCNC: 9 U/L (ref 1–41)
ANION GAP SERPL CALCULATED.3IONS-SCNC: 10 MMOL/L (ref 5–15)
AST SERPL-CCNC: 17 U/L (ref 1–40)
BACTERIA UR QL AUTO: ABNORMAL /HPF
BASOPHILS # BLD AUTO: 0.07 10*3/MM3 (ref 0–0.2)
BASOPHILS NFR BLD AUTO: 0.9 % (ref 0–1.5)
BILIRUB SERPL-MCNC: 0.4 MG/DL (ref 0–1.2)
BILIRUB UR QL STRIP: NEGATIVE
BUN SERPL-MCNC: 15 MG/DL (ref 8–23)
BUN/CREAT SERPL: 19.2 (ref 7–25)
CALCIUM SPEC-SCNC: 9.1 MG/DL (ref 8.6–10.5)
CHLORIDE SERPL-SCNC: 100 MMOL/L (ref 98–107)
CHOLEST SERPL-MCNC: 119 MG/DL (ref 0–200)
CLARITY UR: CLEAR
CO2 SERPL-SCNC: 27 MMOL/L (ref 22–29)
COLOR UR: YELLOW
CREAT SERPL-MCNC: 0.78 MG/DL (ref 0.76–1.27)
DEPRECATED RDW RBC AUTO: 47.2 FL (ref 37–54)
EGFRCR SERPLBLD CKD-EPI 2021: 89 ML/MIN/1.73
EOSINOPHIL # BLD AUTO: 0.67 10*3/MM3 (ref 0–0.4)
EOSINOPHIL NFR BLD AUTO: 9 % (ref 0.3–6.2)
ERYTHROCYTE [DISTWIDTH] IN BLOOD BY AUTOMATED COUNT: 14.6 % (ref 12.3–15.4)
GGT SERPL-CCNC: 24 U/L (ref 8–61)
GLOBULIN UR ELPH-MCNC: 3 GM/DL
GLUCOSE SERPL-MCNC: 140 MG/DL (ref 65–99)
GLUCOSE UR STRIP-MCNC: NEGATIVE MG/DL
HBA1C MFR BLD: 6.4 % (ref 4.8–5.6)
HCT VFR BLD AUTO: 44.4 % (ref 37.5–51)
HDLC SERPL-MCNC: 40 MG/DL (ref 40–60)
HGB BLD-MCNC: 14.6 G/DL (ref 13–17.7)
HGB UR QL STRIP.AUTO: ABNORMAL
HYALINE CASTS UR QL AUTO: ABNORMAL /LPF
IMM GRANULOCYTES # BLD AUTO: 0.03 10*3/MM3 (ref 0–0.05)
IMM GRANULOCYTES NFR BLD AUTO: 0.4 % (ref 0–0.5)
KETONES UR QL STRIP: NEGATIVE
LDLC SERPL CALC-MCNC: 58 MG/DL (ref 0–100)
LDLC/HDLC SERPL: 1.42 {RATIO}
LEUKOCYTE ESTERASE UR QL STRIP.AUTO: NEGATIVE
LYMPHOCYTES # BLD AUTO: 1.44 10*3/MM3 (ref 0.7–3.1)
LYMPHOCYTES NFR BLD AUTO: 19.4 % (ref 19.6–45.3)
MCH RBC QN AUTO: 29.1 PG (ref 26.6–33)
MCHC RBC AUTO-ENTMCNC: 32.9 G/DL (ref 31.5–35.7)
MCV RBC AUTO: 88.6 FL (ref 79–97)
MONOCYTES # BLD AUTO: 0.57 10*3/MM3 (ref 0.1–0.9)
MONOCYTES NFR BLD AUTO: 7.7 % (ref 5–12)
NEUTROPHILS NFR BLD AUTO: 4.64 10*3/MM3 (ref 1.7–7)
NEUTROPHILS NFR BLD AUTO: 62.6 % (ref 42.7–76)
NITRITE UR QL STRIP: NEGATIVE
NRBC BLD AUTO-RTO: 0 /100 WBC (ref 0–0.2)
PH UR STRIP.AUTO: 5.5 [PH] (ref 5–8)
PLATELET # BLD AUTO: 258 10*3/MM3 (ref 140–450)
PMV BLD AUTO: 9.6 FL (ref 6–12)
POTASSIUM SERPL-SCNC: 4.5 MMOL/L (ref 3.5–5.2)
PROT SERPL-MCNC: 7.1 G/DL (ref 6–8.5)
PROT UR QL STRIP: NEGATIVE
PSA SERPL-MCNC: 7.64 NG/ML (ref 0–4)
RBC # BLD AUTO: 5.01 10*6/MM3 (ref 4.14–5.8)
RBC # UR STRIP: ABNORMAL /HPF
REF LAB TEST METHOD: ABNORMAL
SODIUM SERPL-SCNC: 137 MMOL/L (ref 136–145)
SP GR UR STRIP: 1.02 (ref 1–1.03)
SQUAMOUS #/AREA URNS HPF: ABNORMAL /HPF
TRIGL SERPL-MCNC: 112 MG/DL (ref 0–150)
TSH SERPL DL<=0.05 MIU/L-ACNC: 6.89 UIU/ML (ref 0.27–4.2)
UROBILINOGEN UR QL STRIP: ABNORMAL
VLDLC SERPL-MCNC: 21 MG/DL (ref 5–40)
WBC # UR STRIP: ABNORMAL /HPF
WBC NRBC COR # BLD AUTO: 7.42 10*3/MM3 (ref 3.4–10.8)

## 2025-05-21 PROCEDURE — 82043 UR ALBUMIN QUANTITATIVE: CPT

## 2025-05-21 PROCEDURE — 80053 COMPREHEN METABOLIC PANEL: CPT

## 2025-05-21 PROCEDURE — 82977 ASSAY OF GGT: CPT

## 2025-05-21 PROCEDURE — 84443 ASSAY THYROID STIM HORMONE: CPT

## 2025-05-21 PROCEDURE — 85025 COMPLETE CBC W/AUTO DIFF WBC: CPT

## 2025-05-21 PROCEDURE — 80061 LIPID PANEL: CPT

## 2025-05-21 PROCEDURE — 36415 COLL VENOUS BLD VENIPUNCTURE: CPT

## 2025-05-21 PROCEDURE — 84153 ASSAY OF PSA TOTAL: CPT

## 2025-05-21 PROCEDURE — 83036 HEMOGLOBIN GLYCOSYLATED A1C: CPT

## 2025-05-21 PROCEDURE — 81001 URINALYSIS AUTO W/SCOPE: CPT

## 2025-05-21 RX ORDER — LEVOTHYROXINE SODIUM 75 UG/1
75 TABLET ORAL DAILY
Qty: 90 TABLET | Refills: 4 | Status: SHIPPED | OUTPATIENT
Start: 2025-05-21

## 2025-05-28 ENCOUNTER — OFFICE VISIT (OUTPATIENT)
Dept: UROLOGY | Facility: CLINIC | Age: 85
End: 2025-05-28
Payer: MEDICARE

## 2025-05-28 VITALS — WEIGHT: 210 LBS | HEIGHT: 69 IN | TEMPERATURE: 97 F | BODY MASS INDEX: 31.1 KG/M2

## 2025-05-28 DIAGNOSIS — N40.1 BPH WITH OBSTRUCTION/LOWER URINARY TRACT SYMPTOMS: ICD-10-CM

## 2025-05-28 DIAGNOSIS — R97.20 ELEVATED PSA: ICD-10-CM

## 2025-05-28 DIAGNOSIS — N20.0 KIDNEY STONE: ICD-10-CM

## 2025-05-28 DIAGNOSIS — N40.0 BENIGN PROSTATIC HYPERPLASIA WITHOUT LOWER URINARY TRACT SYMPTOMS: ICD-10-CM

## 2025-05-28 DIAGNOSIS — C61 MALIGNANT NEOPLASM OF PROSTATE: Primary | ICD-10-CM

## 2025-05-28 DIAGNOSIS — N13.8 BPH WITH OBSTRUCTION/LOWER URINARY TRACT SYMPTOMS: ICD-10-CM

## 2025-05-28 LAB
BILIRUB BLD-MCNC: NEGATIVE MG/DL
CLARITY, POC: CLEAR
COLOR UR: YELLOW
GLUCOSE UR STRIP-MCNC: NEGATIVE MG/DL
KETONES UR QL: NEGATIVE
LEUKOCYTE EST, POC: NEGATIVE
NITRITE UR-MCNC: NEGATIVE MG/ML
PH UR: 5.5 [PH] (ref 5–8)
PROT UR STRIP-MCNC: NEGATIVE MG/DL
RBC # UR STRIP: NEGATIVE /UL
SP GR UR: 1.02 (ref 1–1.03)
UROBILINOGEN UR QL: NORMAL

## 2025-05-28 RX ORDER — TAMSULOSIN HYDROCHLORIDE 0.4 MG/1
1 CAPSULE ORAL NIGHTLY
Qty: 90 CAPSULE | Refills: 3 | Status: SHIPPED | OUTPATIENT
Start: 2025-05-28

## 2025-05-28 RX ORDER — FINASTERIDE 5 MG/1
5 TABLET, FILM COATED ORAL DAILY
Qty: 90 TABLET | Refills: 3 | Status: SHIPPED | OUTPATIENT
Start: 2025-05-28

## 2025-05-28 NOTE — LETTER
May 28, 2025     Amaya Faustin Maria Del Rosario, APRN  [address] [facility] [address]  [facility] [address]    Patient: Zachariah Acosta   YOB: 1940   Date of Visit: 5/28/2025     Dear Amaya,    Thank you for referring Zachariah Acosta to me for evaluation. Below are the relevant portions of my assessment and plan of care.    If you have questions, please do not hesitate to call me. I look forward to following Zachariah along with you.         Sincerely,        Jose Tellez MD        CC: No Recipients      Progress Notes:  Subjective    Mr. Acosta is 84 y.o. male    Chief Complaint: Elevated PSA    History of Present Illness    84-year-old male follow-up regarding metastatic castrate sensitive prostate cancer.  PSA has fallen down to 7.6 from 362 on Xtandi and Eligard.  He previously had left ureteroscopy laser lithotripsy basket stone extraction on 2/14/2025 for 2 large left UPJ stones.  Previous CT scan and showed widespread bony metastasis and retroperitoneal periaortic lymphadenopathy         Lab Results   Component Value Date    PSA 7.640 (H) 05/21/2025    .000 (H) 02/12/2025    PSA 5.55 (H) 05/23/2018       The following portions of the patient's history were reviewed and updated as appropriate: allergies, current medications, past family history, past medical history, past social history, past surgical history and problem list.    Review of Systems      Current Outpatient Medications:   •  acetaminophen (TYLENOL) 500 MG tablet, Take 1 tablet by mouth Every 6 (Six) Hours As Needed for Mild Pain., Disp: , Rfl:   •  aspirin 81 MG EC tablet, Take 1 tablet by mouth Daily., Disp: , Rfl:   •  Budeson-Glycopyrrol-Formoterol (Breztri Aerosphere) 160-9-4.8 MCG/ACT aerosol inhaler, Inhale 2 puffs 2 (Two) Times a Day., Disp: 3 each, Rfl: 4  •  Continuous Blood Gluc Sensor (Dexcom G7 Sensor) misc, 1 each Every 10 (Ten) Days., Disp: 9 each, Rfl: 3  •  docusate sodium (Colace) 100 MG  capsule, Take 1 capsule by mouth 2 (Two) Times a Day., Disp: 60 capsule, Rfl: 1  •  Dupilumab 300 MG/2ML solution prefilled syringe, Inject  under the skin into the appropriate area as directed Every 14 (Fourteen) Days., Disp: , Rfl:   •  enzalutamide (XTANDI) 40 MG chemo capsule, Take 4 capsules by mouth Daily., Disp: 120 capsule, Rfl: 11  •  ferrous sulfate 325 (65 FE) MG tablet, Take 1 tablet by mouth Daily With Breakfast., Disp: , Rfl:   •  finasteride (PROSCAR) 5 MG tablet, Take 1 tablet by mouth Daily., Disp: 90 tablet, Rfl: 3  •  fluticasone (FLONASE) 50 MCG/ACT nasal spray, 1 spray into the nostril(s) as directed by provider Daily., Disp: 16 g, Rfl: 5  •  gabapentin (NEURONTIN) 100 MG capsule, , Disp: , Rfl:   •  insulin aspart (novoLOG FLEXPEN) 100 UNIT/ML solution pen-injector sc pen, Inject  under the skin into the appropriate area as directed As Needed. sliding, Disp: , Rfl:   •  insulin glargine (LANTUS, SEMGLEE) 100 UNIT/ML injection, 15 units in the morning and 40 units at bedtime., Disp: 18 mL, Rfl: 2  •  Insulin Glargine-yfgn 100 UNIT/ML solution pen-injector, Inject  under the skin into the appropriate area as directed., Disp: , Rfl:   •  levalbuterol (XOPENEX HFA) 45 MCG/ACT inhaler, Inhale 2 puffs Every 6 (Six) Hours As Needed for Wheezing., Disp: , Rfl:   •  levothyroxine (Synthroid) 75 MCG tablet, Take 1 tablet by mouth Daily., Disp: 90 tablet, Rfl: 4  •  lisinopril (PRINIVIL,ZESTRIL) 20 MG tablet, TAKE 1 TABLET BY MOUTH IN THE MORNING AND 1/2 (ONE-HALF) AT BEDTIME, Disp: 180 tablet, Rfl: 0  •  meclizine (ANTIVERT) 12.5 MG tablet, Take 1 tablet by mouth 3 (Three) Times a Day As Needed for Dizziness., Disp: 30 tablet, Rfl: 0  •  melatonin 3 MG tablet, Take 5 mg by mouth At Night As Needed., Disp: , Rfl:   •  metFORMIN ER (GLUCOPHAGE-XR) 500 MG 24 hr tablet, Take 1 tablet by mouth Daily With Breakfast., Disp: 90 tablet, Rfl: 4  •  metoprolol tartrate (LOPRESSOR) 25 MG tablet, Take 1 tablet by  mouth 2 (Two) Times a Day., Disp: 180 tablet, Rfl: 4  •  nitroglycerin (NITROSTAT) 0.4 MG SL tablet, Place 1 tablet under the tongue Every 5 (Five) Minutes As Needed for Chest Pain. Take no more than 3 doses in 15 minutes., Disp: 25 tablet, Rfl: 1  •  Pediatric Multiple Vit-C-FA (CHILDRENS CHEWABLE MULTI VITS PO), Take 1 tablet by mouth Daily., Disp: , Rfl:   •  rosuvastatin (CRESTOR) 40 MG tablet, Take 0.5 tablets by mouth Daily., Disp: 90 tablet, Rfl: 4  •  tamsulosin (FLOMAX) 0.4 MG capsule 24 hr capsule, Take 1 capsule by mouth Every Night., Disp: 90 capsule, Rfl: 3  •  tiZANidine (ZANAFLEX) 4 MG tablet, Take 1/2 - 1 tablet at bedtime., Disp: 30 tablet, Rfl: 11  •  triamcinolone (KENALOG) 0.1 % cream, Apply 1 Application topically to the appropriate area as directed 2 (Two) Times a Day., Disp: 80 g, Rfl: 0  •  Ustekinumab (STELARA) 90 MG/ML solution prefilled syringe Injection, Inject 90 mg under the skin into the appropriate area as directed Every 28 (Twenty-Eight) Days., Disp: 1 mL, Rfl: 6  •  vitamin B-12 (CYANOCOBALAMIN) 1000 MCG tablet, Take 1 tablet by mouth Daily., Disp: , Rfl:   •  Vitamin D, Cholecalciferol, 25 MCG (1000 UT) capsule, Take 1 capsule by mouth Daily., Disp: , Rfl:     Past Medical History:   Diagnosis Date   • Asthma    • Colitis    • Coronary artery disease    • Diabetes mellitus    • Diverticulitis    • Elevated cholesterol    • HL (hearing loss) 2012   • Hyperlipidemia    • Hypertension    • Inflammatory bowel disease 2001   • Myocardial infarct     EASTER 2020   • Primary central sleep apnea 2009    Using Cpap   • Sleep apnea     cpap at    • Sleep apnea, obstructive    • Stroke    • Visual impairment 2016    Double vision       Past Surgical History:   Procedure Laterality Date   • ADENOIDECTOMY  1947   • APPENDECTOMY N/A 05/05/2023    Procedure: APPENDECTOMY LAPAROSCOPIC;  Surgeon: Katherine Carranza MD;  Location: [facility];  Service: General;  Laterality: N/A;   • BACK SURGERY    "  • CARDIAC CATHETERIZATION      stents x 6   • CARDIAC SURGERY      CABG TRIPLE BYPASS 2012   • CATARACT EXTRACTION     • COLONOSCOPY     • COLONOSCOPY N/A 06/04/2021    Procedure: COLONOSCOPY WITH ANESTHESIA;  Surgeon: Zachariah Menjivar DO;  Location:  [facility];  Service: Gastroenterology;  Laterality: N/A;  pre hx ulcerative colitis  post ulcerative colitis  dr collins gusman   • COLONOSCOPY N/A 11/29/2023    Procedure: COLONOSCOPY LOWER LIMITED;  Surgeon: Zachariah Menjivar DO;  Location:  [facility];  Service: Gastroenterology;  Laterality: N/A;  preop; hx of colitis  postop colitis - questionable colonic stricture at 30cm   pcp bryanna pond   • COLOSTOMY     • CORONARY ANGIOPLASTY WITH STENT PLACEMENT     • CORONARY ARTERY BYPASS GRAFT  December 2012   • CORONARY STENT PLACEMENT     • HIP SURGERY Right     total hip   • JOINT REPLACEMENT  2015    Right hip   • PENILE PROSTHESIS IMPLANT N/A 12/13/2021    Procedure: 3-PIECE INFLATABLE PENILE PROSTHESIS PLACEMENT;  Surgeon: Jose Tellez MD;  Location:  [facility];  Service: Urology;  Laterality: N/A;   • TONSILLECTOMY  1947   • URETEROSCOPY LASER LITHOTRIPSY WITH STENT INSERTION Left 2/14/2025    Procedure: URETEROSCOPY LASER LITHOTRIPSY WITH STENT INSERTION-left;  Surgeon: Jose Tellez MD;  Location:  [facility];  Service: Urology;  Laterality: Left;       Social History     Socioeconomic History   • Marital status:    Tobacco Use   • Smoking status: Never     Passive exposure: Never   • Smokeless tobacco: Never   Vaping Use   • Vaping status: Never Used   Substance and Sexual Activity   • Alcohol use: Never   • Drug use: Never   • Sexual activity: Not Currently     Partners: Female       Family History   Problem Relation Age of Onset   • Colon cancer Brother    • Colon polyps Neg Hx    • Esophageal cancer Neg Hx        Objective    Temp 97 °F (36.1 °C)   Ht 175.3 cm (69\")   Wt 95.3 kg (210 lb)   BMI 31.01 kg/m²     Physical " Exam        Results for orders placed or performed in visit on 05/28/25   POC Urinalysis Dipstick, Multipro    Collection Time: 05/28/25 10:05 AM    Specimen: Urine   Result Value Ref Range    Color Yellow Yellow, Straw, Dark Yellow, Vicky    Clarity, UA Clear Clear    Glucose, UA Negative Negative mg/dL    Bilirubin Negative Negative    Ketones, UA Negative Negative    Specific Gravity  1.020 1.005 - 1.030    Blood, UA Negative Negative    pH, Urine 5.5 5.0 - 8.0    Protein, POC Negative Negative mg/dL    Urobilinogen, UA Normal Normal, 0.2 E.U./dL    Nitrite, UA Negative Negative    Leukocytes Negative Negative     Assessment and Plan    Diagnoses and all orders for this visit:    1. Malignant neoplasm of prostate (Primary)  -     PSA DIAGNOSTIC; Future    2. Elevated PSA  -     POC Urinalysis Dipstick, Multipro    3. BPH with obstruction/lower urinary tract symptoms  -     tamsulosin (FLOMAX) 0.4 MG capsule 24 hr capsule; Take 1 capsule by mouth Every Night.  Dispense: 90 capsule; Refill: 3    4. Benign prostatic hyperplasia without lower urinary tract symptoms  -     finasteride (PROSCAR) 5 MG tablet; Take 1 tablet by mouth Daily.  Dispense: 90 tablet; Refill: 3    5. Kidney stone  -     US Renal Bilateral; Future      Good PSA control.  Continue Eligard and Xtandi.  He will follow-up with me in 6 months with pre-clinic PSA and pre-clinic renal ultrasound.  Continue finasteride and tamsulosin      This document has been signed by ANTHONY Tellez MD on May 28, 2025 13:00 CDT

## 2025-05-31 ENCOUNTER — APPOINTMENT (OUTPATIENT)
Dept: GENERAL RADIOLOGY | Facility: HOSPITAL | Age: 85
End: 2025-05-31
Payer: MEDICARE

## 2025-05-31 PROCEDURE — 73564 X-RAY EXAM KNEE 4 OR MORE: CPT

## 2025-05-31 PROCEDURE — 73630 X-RAY EXAM OF FOOT: CPT

## 2025-06-04 ENCOUNTER — OFFICE VISIT (OUTPATIENT)
Age: 85
End: 2025-06-04
Payer: MEDICARE

## 2025-06-04 VITALS — HEIGHT: 68 IN | WEIGHT: 203 LBS | BODY MASS INDEX: 30.77 KG/M2

## 2025-06-04 DIAGNOSIS — S99.921A INJURY OF TOE ON RIGHT FOOT, INITIAL ENCOUNTER: ICD-10-CM

## 2025-06-04 DIAGNOSIS — M17.11 PRIMARY OSTEOARTHRITIS OF RIGHT KNEE: Primary | ICD-10-CM

## 2025-06-04 RX ORDER — LIDOCAINE HYDROCHLORIDE 10 MG/ML
2 INJECTION, SOLUTION INFILTRATION; PERINEURAL ONCE
Status: COMPLETED | OUTPATIENT
Start: 2025-06-04 | End: 2025-06-04

## 2025-06-04 RX ORDER — TRIAMCINOLONE ACETONIDE 40 MG/ML
40 INJECTION, SUSPENSION INTRA-ARTICULAR; INTRAMUSCULAR ONCE
Status: COMPLETED | OUTPATIENT
Start: 2025-06-04 | End: 2025-06-04

## 2025-06-04 RX ADMIN — LIDOCAINE HYDROCHLORIDE 2 ML: 10 INJECTION, SOLUTION INFILTRATION; PERINEURAL at 10:18

## 2025-06-04 RX ADMIN — TRIAMCINOLONE ACETONIDE 40 MG: 40 INJECTION, SUSPENSION INTRA-ARTICULAR; INTRAMUSCULAR at 10:18

## 2025-06-04 NOTE — PROGRESS NOTES
[facility]  Surya Anne MD, PhD  Andrea Anne PA-C    CHIEF COMPLAINT  Initial Evaluation of the Right Knee (Patient presents today for right knee pain since Friday, patient fell. X-rays performed at [facility] on 05/31/2025. Patient complains of medial knee pain and swelling. )       HISTORY OF PRESENT ILLNESS    History of Present Illness  The patient is an 84-year-old male who presents as a new patient for evaluation of right knee pain and toe discoloration. He is accompanied by his wife.    He reports experiencing mild pain in his right knee, which began approximately 2 months ago following an incident where he had to kneel while working in the yard. He describes a sensation of pulling in the knee, which has persisted since the incident. The pain was exacerbated on 05/30/2025 when he attempted to move some stairs, resulting in a significant increase in discomfort and sensitivity in the knee. This morning, he experienced aching and applied an ice pack for relief. He also took ibuprofen before bed and again this morning. He has not received any injections for his knee pain. He typically manages his arthritis with Tylenol, taken 3 times daily, as recommended by his primary care physician. He reports that Aleve induces nausea and that severe knee pain can also cause him to feel nauseous. He occasionally uses a wheelchair for mobility.    He has a history of diabetes and takes insulin daily. He has an implanted hip, which was done by Dr. Zarco when he lived in [place].       HISTORY    Current Outpatient Medications   Medication Instructions    acetaminophen (TYLENOL) 500 mg, Oral, Every 6 Hours PRN    aspirin 81 mg, Daily    Continuous Blood Gluc Sensor (Dexcom G7 Sensor) misc 1 each, Not Applicable, Every 10 Days    docusate sodium (COLACE) 100 mg, Oral, 2 Times Daily    Dupilumab 300 MG/2ML solution prefilled syringe Every 14 Days    enzalutamide (XTANDI) 160 mg,  Oral, Daily    ferrous sulfate 325 mg, Daily With Breakfast    finasteride (PROSCAR) 5 mg, Oral, Daily    fluticasone (FLONASE) 50 MCG/ACT nasal spray 1 spray, Nasal, Daily    insulin aspart (novoLOG FLEXPEN) 100 UNIT/ML solution pen-injector sc pen As Needed    insulin glargine (LANTUS, SEMGLEE) 100 UNIT/ML injection 15 units in the morning and 40 units at bedtime.    Insulin Glargine-yfgn 100 UNIT/ML solution pen-injector Inject  under the skin into the appropriate area as directed.    levalbuterol (XOPENEX HFA) 45 MCG/ACT inhaler 2 puffs, Every 6 Hours PRN    levothyroxine (SYNTHROID) 75 mcg, Oral, Daily    lisinopril (PRINIVIL,ZESTRIL) 20 MG tablet TAKE 1 TABLET BY MOUTH IN THE MORNING AND 1/2 (ONE-HALF) AT BEDTIME    meclizine (ANTIVERT) 12.5 mg, Oral, 3 Times Daily PRN    melatonin 5 mg, Nightly PRN    metFORMIN ER (GLUCOPHAGE-XR) 500 mg, Oral, Daily With Breakfast    metoprolol tartrate (LOPRESSOR) 25 mg, Oral, 2 Times Daily    nitroglycerin (NITROSTAT) 0.4 mg, Sublingual, Every 5 Minutes PRN, Take no more than 3 doses in 15 minutes.    Pediatric Multiple Vit-C-FA (CHILDRENS CHEWABLE MULTI VITS PO) 1 tablet, Daily    rosuvastatin (CRESTOR) 20 mg, Oral, Daily    tamsulosin (FLOMAX) 0.4 mg, Oral, Nightly    tiZANidine (ZANAFLEX) 4 MG tablet Take 1/2 - 1 tablet at bedtime.    triamcinolone (KENALOG) 0.1 % cream 1 Application, Topical, 2 Times Daily    Ustekinumab (STELARA) 90 mg, Subcutaneous, Every 28 Days    vitamin B-12 (CYANOCOBALAMIN) 1,000 mcg, Daily    Vitamin D, Cholecalciferol, 25 MCG (1000 UT) capsule 1 capsule, Daily         reports that he has never smoked. He has never been exposed to tobacco smoke. He has never used smokeless tobacco. He reports that he does not drink alcohol and does not use drugs.    Past Medical History:   Diagnosis Date    Asthma     Colitis     Coronary artery disease     Diabetes mellitus     Diverticulitis     Elevated cholesterol     HL (hearing loss) 2012    Hyperlipidemia      Hypertension     Inflammatory bowel disease 2001    Myocardial infarct     EASTER 2020    Primary central sleep apnea 2009    Using Cpap    Prostate CA     Sleep apnea     cpap at hs    Sleep apnea, obstructive     Stroke     Visual impairment 2016    Double vision        Past Surgical History:   Procedure Laterality Date    ADENOIDECTOMY  1947    APPENDECTOMY N/A 05/05/2023    Procedure: APPENDECTOMY LAPAROSCOPIC;  Surgeon: Katherine Carranza MD;  Location:  [facility];  Service: General;  Laterality: N/A;    BACK SURGERY      CARDIAC CATHETERIZATION      stents x 6    CARDIAC SURGERY      CABG TRIPLE BYPASS 2012    CATARACT EXTRACTION      COLONOSCOPY      COLONOSCOPY N/A 06/04/2021    Procedure: COLONOSCOPY WITH ANESTHESIA;  Surgeon: Zachariah Menjivar DO;  Location: [facility];  Service: Gastroenterology;  Laterality: N/A;  pre hx ulcerative colitis  post ulcerative colitis  dr collins gusman    COLONOSCOPY N/A 11/29/2023    Procedure: COLONOSCOPY LOWER LIMITED;  Surgeon: Zachariah Menjivar DO;  Location: [facility];  Service: Gastroenterology;  Laterality: N/A;  preop; hx of colitis  postop colitis - questionable colonic stricture at 30cm   pcp bryanna salty    COLOSTOMY      CORONARY ANGIOPLASTY WITH STENT PLACEMENT      CORONARY ARTERY BYPASS GRAFT  December 2012    CORONARY STENT PLACEMENT      HIP SURGERY Right     total hip    JOINT REPLACEMENT  2015    Right hip    PENILE PROSTHESIS IMPLANT N/A 12/13/2021    Procedure: 3-PIECE INFLATABLE PENILE PROSTHESIS PLACEMENT;  Surgeon: Jose Tellez MD;  Location:  [facility];  Service: Urology;  Laterality: N/A;    TONSILLECTOMY  1947    URETEROSCOPY LASER LITHOTRIPSY WITH STENT INSERTION Left 2/14/2025    Procedure: URETEROSCOPY LASER LITHOTRIPSY WITH STENT INSERTION-left;  Surgeon: Jose Tellez MD;  Location:  [facility];  Service: Urology;  Laterality: Left;        PHYSICAL EXAM  Constitutional: The patient is in no apparent distress and  generally well-appearing. The patient hears me clearly and answers questions appropriately.   Musculoskeletal:  Physical Exam  Musculoskeletal:  Right knee:   Crepitus present.  Tenderness at the medial lateral joint line.  Normal range of motion.  Ligamentously stable.  No signs of infection or DVT.      IMAGING    XR Foot 3+ View Right  Result Date: 5/31/2025  Narrative: EXAMINATION:  XR FOOT 3+ VW RIGHT-  5/31/2025 7:49 AM  HISTORY: Right foot pain. Great toe pain. W19.XXXA-Unspecified fall, initial encounter.  COMPARISON: No comparison study.  TECHNIQUE: 3 views were obtained. Gleamer AI fracture analysis was utilized.  FINDINGS: There is mild hallux valgus deformity. There is bunion formation along the distal medial first metatarsal. No definite acute fracture is seen. There is narrowing of the first MTP joint and multiple interphalangeal joints. There is calcaneal spurring and enthesopathy.       Impression: 1. No definite acute fracture is seen. 2. Mild hallux valgus deformity. Bunion formation along the distal medial first metatarsal. Degenerative changes. 3. There is some soft tissue swelling medially of the distal foot.   This report was signed and finalized on 5/31/2025 9:00 AM by Dr. Miguel Anderson MD.      XR Knee 4+ View Right  Result Date: 5/31/2025  Narrative: EXAMINATION:  XR KNEE 4+ VW RIGHT-  5/31/2025 7:48 AM  HISTORY: Right knee pain. W19.XXXA-Unspecified fall, initial encounter.  COMPARISON: No comparison study.  TECHNIQUE: 4 views were obtained. Gleamer AI fracture analysis was utilized.  FINDINGS: There is severe narrowing of the medial and patellofemoral joint spaces. There is spurring. There is chondrocalcinosis of the lateral meniscus. No definite acute fracture is seen. There is small to moderate joint effusion.       Impression: 1. Severe degenerative osteoarthritis. 2. No definite acute fracture. Small to moderate joint effusion.   This report was signed and finalized on 5/31/2025 8:58  AM by Dr. Miguel Anderson MD.          X-rays above personally reviewed and interpreted.   On my review of his right foot x-rays it appears that there is some irregularity of his sesamoid which may be degenerative but may also represent an acute fracture.    ASSESSMENT & PLAN  Diagnoses and all orders for this visit:    1. Primary osteoarthritis of right knee (Primary)  -     lidocaine (XYLOCAINE) 1 % injection 2 mL  -     triamcinolone acetonide (KENALOG-40) injection 40 mg  -     Miscellaneous DME    Patient has severe radiographic arthritis on his x-rays.  However he has done quite well functionally until more recently.  At this point with the continued pain despite use of NSAIDs we decided to perform steroid injection of the knee today.  He also endorsed some instability of the knee I want him to try a hinged knee brace to see if that helps provide some additional support.    There may be a fracture in either the sesamoid or around the MTP joint of his great toe.  The formal x-ray read did not feel that there was a fracture but by my review it is tough to tell if this irregularity is due to the more chronic degenerative changes or is due to acute injury.  Nonetheless his pain seems to be improving and I recommended continued protection of the joint with stiff soled shoes.  If he has any continued pain I would not hesitate to get follow-up x-rays of this area.    Right knee injection today  Hinged knee brace  Follow up: 3 to 4 weeks    Right knee injection was discussed with the patient in detail, including indication, risks, benefits, and alternatives.  Risks include but are not limited to: incomplete symptom resolution, injection site pain, local irritation, bleeding, infection, allergic reaction, elevated blood pressure and blood sugar.  Verbal consent was given for the procedure.  Injection site was identified by physical examination and cleaned with Chloraprep and alcohol swabs. Prior to needle insertion,  "ethyl chloride spray was used for surface anesthesia.  A 22-gauge, 1.5\" needle was directed to the joint from a(n) lateral and inferior approach. Injectate was passed into the joint space without difficulty. The needle was removed and a simple bandage was applied. The procedure was tolerated well without difficulty.    Injection mixture:  1% plain lidocaine: 2 mL  40 mg/mL triamcinolone acetonide: 1 mL    Patient or patient representative verbalized consent for the use of Ambient Listening during the visit with  Surya Anne MD for chart documentation. 6/10/2025  16:54 CDT      This document has been signed by Surya Anne MD on June 4, 2025 09:58 CDT    "

## 2025-06-06 ENCOUNTER — HOSPITAL ENCOUNTER (OUTPATIENT)
Dept: CT IMAGING | Facility: HOSPITAL | Age: 85
Discharge: HOME | End: 2025-06-06
Payer: MEDICARE

## 2025-06-06 DIAGNOSIS — R91.1 PULMONARY NODULE SEEN ON IMAGING STUDY: ICD-10-CM

## 2025-06-06 PROCEDURE — 71250 CT THORAX DX C-: CPT

## 2025-06-18 ENCOUNTER — HOSPITAL ENCOUNTER (OUTPATIENT)
Dept: GENERAL RADIOLOGY | Facility: HOSPITAL | Age: 85
Discharge: HOME | End: 2025-06-18
Admitting: NURSE PRACTITIONER
Payer: MEDICARE

## 2025-06-18 ENCOUNTER — OFFICE VISIT (OUTPATIENT)
Dept: FAMILY MEDICINE CLINIC | Facility: CLINIC | Age: 85
End: 2025-06-18
Payer: MEDICARE

## 2025-06-18 VITALS
HEART RATE: 71 BPM | RESPIRATION RATE: 19 BRPM | HEIGHT: 68 IN | BODY MASS INDEX: 31.52 KG/M2 | SYSTOLIC BLOOD PRESSURE: 124 MMHG | OXYGEN SATURATION: 98 % | WEIGHT: 208 LBS | DIASTOLIC BLOOD PRESSURE: 64 MMHG | TEMPERATURE: 97.5 F

## 2025-06-18 DIAGNOSIS — R07.81 RIB PAIN ON LEFT SIDE: ICD-10-CM

## 2025-06-18 DIAGNOSIS — E11.65 TYPE 2 DIABETES MELLITUS WITH HYPERGLYCEMIA, WITH LONG-TERM CURRENT USE OF INSULIN: ICD-10-CM

## 2025-06-18 DIAGNOSIS — G89.29 CHRONIC LEFT-SIDED LOW BACK PAIN WITHOUT SCIATICA: ICD-10-CM
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
## 2025-06-18 DIAGNOSIS — M54.50 CHRONIC LEFT-SIDED LOW BACK PAIN WITHOUT SCIATICA: ICD-10-CM

## 2025-06-18 DIAGNOSIS — R10.12 LEFT UPPER QUADRANT ABDOMINAL PAIN: ICD-10-CM

## 2025-06-18 DIAGNOSIS — C61 PROSTATE CANCER METASTATIC TO MULTIPLE SITES: ICD-10-CM

## 2025-06-18 DIAGNOSIS — R07.81 RIB PAIN: ICD-10-CM

## 2025-06-18 DIAGNOSIS — Z93.3 COLOSTOMY IN PLACE: ICD-10-CM

## 2025-06-18 DIAGNOSIS — C61 PROSTATE CANCER METASTATIC TO MULTIPLE SITES: Primary | ICD-10-CM

## 2025-06-18 DIAGNOSIS — Z79.4 TYPE 2 DIABETES MELLITUS WITH HYPERGLYCEMIA, WITH LONG-TERM CURRENT USE OF INSULIN: ICD-10-CM

## 2025-06-18 DIAGNOSIS — K51.919 ACUTE ULCERATIVE COLITIS WITH COMPLICATION: ICD-10-CM

## 2025-06-18 LAB
BILIRUB BLD-MCNC: NEGATIVE MG/DL
CLARITY, POC: ABNORMAL
COLOR UR: YELLOW
GLUCOSE UR STRIP-MCNC: NEGATIVE MG/DL
KETONES UR QL: NEGATIVE
LEUKOCYTE EST, POC: NEGATIVE
NITRITE UR-MCNC: NEGATIVE MG/ML
PH UR: 6 [PH] (ref 5–8)
PROT UR STRIP-MCNC: NEGATIVE MG/DL
RBC # UR STRIP: NEGATIVE /UL
SP GR UR: 1.02 (ref 1–1.03)
UROBILINOGEN UR QL: NORMAL

## 2025-06-18 PROCEDURE — 82043 UR ALBUMIN QUANTITATIVE: CPT | Performed by: NURSE PRACTITIONER

## 2025-06-18 PROCEDURE — 71101 X-RAY EXAM UNILAT RIBS/CHEST: CPT

## 2025-06-18 PROCEDURE — 72072 X-RAY EXAM THORAC SPINE 3VWS: CPT

## 2025-06-18 PROCEDURE — 72100 X-RAY EXAM L-S SPINE 2/3 VWS: CPT

## 2025-06-18 PROCEDURE — 74018 RADEX ABDOMEN 1 VIEW: CPT

## 2025-06-18 NOTE — PROGRESS NOTES
BORIS Pérez  [facility]   Family Medicine  [address]  [place] [address]  Phone: [phone]  Fax: [phone]         Chief Complaint:  Chief Complaint   Patient presents with    Back Pain     Lower back pain        History:  Zachariah Acosta is a 84 y.o. male.  History of Present Illness  The patient presents for evaluation of lower back pain. He is accompanied by his wife.    He reports experiencing lower back pain, which he describes as similar to the discomfort he felt prior to his kidney stone removal. The onset of this pain was sudden, occurring upon awakening yesterday morning. He rates his back pain as a 4 on a scale of 0 to 10. He also reports pain in the left side of his ribs, which he rates as a 4 on a scale of 0 to 10. He does not experience any radiation of the pain down his leg or into his hip. He also does not report any numbness or tingling in his perineal area. The pain does not worsen with movement and remains constant. He has not identified any specific position that alleviates the pain. He manages the pain with Tylenol, which he finds helpful, and takes approximately 4 tablets daily. He does not take ibuprofen due to concerns about its effects on his stomach. He has a wheelchair at home and uses a cane for mobility. He has a history of kidney stones, which were removed two months ago. He is uncertain if the current pain is related to a kidney infection. He has not yet provided a urine sample for analysis. He has a colostomy and needs to empty the bag 2 to 3 times a day.    He is currently under the care of Dr. Tellez, a urologist, for prostate cancer. He is scheduled to see Dr. Tellez in a few months for an ultrasound of his kidney to check for any recurrent stones. A few months ago, he was told that he did not have any kidney stones. He is on hormone therapy, taking Xtandi 4 times daily and receiving an Eligard injection every 3 months. He  initially received monthly injections before transitioning to the current schedule. He was informed of cancerous spots on his spine approximately 6 months ago, indicating potential metastasis. He reports no urinary difficulties and is currently taking Flomax and finasteride for his prostate. He has not noticed any worsening of symptoms since starting the injections.    He is going to see his pulmonary doctor tomorrow and is supposed to have a pulmonary function test. He had a lung scan about 1 week ago and there was a little change. He saw Dr. Surya Anne for his knee pain and received a cortisone injection, which sometimes helps and sometimes does not. He fell on the right side and has a brace on that knee. He has to see Dr. Anne again on 07/02/2025. It feels better when he wears that brace.      mages reviewed in bone window show extensive, scattered, foci of bony  sclerosis/osteoblastic lesion involving the visualized  thoracolumbosacral spine, pelvis, limited visualized proximal femur and  the ribs bilaterally. This is similar to the previous study. No acute  pathological fractures noted. Right hip arthroplasty is partially  visualized.     IMPRESSION:  1. The interval placement of right ureteral stent. There is  decompression of the left collecting system. Possibility of any residual  hydronephrosis not evaluated or excluded due to peripelvic renal cysts  projecting over the collecting system bilaterally.  2. The interval fragmentation of the calculus in the renal pelvis with  layering fragmented in the left renal pelvis and in the left lower pole  collecting system. There is a probable interval lithotripsy?.  3. Retroperitoneal fat infiltration around the left renal pelvis and the  left ureter may be postprocedural or acute or subacute  Nephro-pyeloureteritis.  4. Persistent retroperitoneal para-aortic lymphadenopathy.  5. Extensive osteoblastic bony metastatic disease.  6. Other nonacute findings similar to  the previous study.     The above study was initially reviewed and reported by [employer]. I do not  find any critical discrepancies.     Left back pain:            ROS:  Review of Systems     reports that he has never smoked. He has never been exposed to tobacco smoke. He has never used smokeless tobacco. He reports that he does not drink alcohol and does not use drugs.    Current Outpatient Medications   Medication Instructions    acetaminophen (TYLENOL) 500 mg, Oral, Every 6 Hours PRN    aspirin 81 mg, Daily    Continuous Blood Gluc Sensor (Dexcom G7 Sensor) misc 1 each, Not Applicable, Every 10 Days    docusate sodium (COLACE) 100 mg, Oral, 2 Times Daily    Dupilumab 300 MG/2ML solution prefilled syringe Every 14 Days    enzalutamide (XTANDI) 160 mg, Oral, Daily    ferrous sulfate 325 mg, Daily With Breakfast    finasteride (PROSCAR) 5 mg, Oral, Daily    fluticasone (FLONASE) 50 MCG/ACT nasal spray 1 spray, Nasal, Daily    insulin aspart (novoLOG FLEXPEN) 100 UNIT/ML solution pen-injector sc pen As Needed    insulin glargine (LANTUS, SEMGLEE) 100 UNIT/ML injection 15 units in the morning and 40 units at bedtime.    Insulin Glargine-yfgn 100 UNIT/ML solution pen-injector Inject  under the skin into the appropriate area as directed.    levalbuterol (XOPENEX HFA) 45 MCG/ACT inhaler 2 puffs, Every 6 Hours PRN    levothyroxine (SYNTHROID) 75 mcg, Oral, Daily    lisinopril (PRINIVIL,ZESTRIL) 20 MG tablet TAKE 1 TABLET BY MOUTH IN THE MORNING AND 1/2 (ONE-HALF) AT BEDTIME    meclizine (ANTIVERT) 12.5 mg, Oral, 3 Times Daily PRN    melatonin 5 mg, Nightly PRN    metFORMIN ER (GLUCOPHAGE-XR) 500 mg, Oral, Daily With Breakfast    metoprolol tartrate (LOPRESSOR) 25 mg, Oral, 2 Times Daily    nitroglycerin (NITROSTAT) 0.4 mg, Sublingual, Every 5 Minutes PRN, Take no more than 3 doses in 15 minutes.    Pediatric Multiple Vit-C-FA (CHILDRENS CHEWABLE MULTI VITS PO) 1 tablet, Daily    rosuvastatin (CRESTOR) 20 mg, Oral, Daily     "tamsulosin (FLOMAX) 0.4 mg, Oral, Nightly    tiZANidine (ZANAFLEX) 4 MG tablet Take 1/2 - 1 tablet at bedtime.    triamcinolone (KENALOG) 0.1 % cream 1 Application, Topical, 2 Times Daily    Ustekinumab (STELARA) 90 mg, Subcutaneous, Every 28 Days    vitamin B-12 (CYANOCOBALAMIN) 1,000 mcg, Daily    Vitamin D, Cholecalciferol, 25 MCG (1000 UT) capsule 1 capsule, Daily       OBJECTIVE:  /64 (BP Location: Left arm, Patient Position: Sitting, Cuff Size: Adult)   Pulse 71   Temp 97.5 °F (36.4 °C) (Infrared)   Resp 19   Ht 172.7 cm (67.99\")   Wt 94.3 kg (208 lb)   SpO2 98%   BMI 31.63 kg/m²    Physical Exam  Physical Exam  Respiratory: Clear to auscultation, no wheezing, rales or rhonchi  Gastrointestinal: Soft, no tenderness, no distention, no masses         Procedures    Results  Labs   - Urine test: No abnormalities    Imaging   - CT scan of thoracic and lumbar spine, pelvis, and ribs: 02/2025, Scattered lesions on thoracic and lumbar spine, pelvis, and ribs. Para-aortic lymph node enlargement  XR Abdomen KUB (06/18/2025 14:55)   XR Spine Lumbar 2 or 3 View (06/18/2025 14:55)   XR Spine Lumbar 2 or 3 View (06/18/2025 14:55)   XR Ribs Left With PA Chest (06/18/2025 14:55)   Assessment/Plan:     Diagnoses and all orders for this visit:    1. Chronic left-sided low back pain without sciatica (Primary)  -     XR Spine Lumbar 2 or 3 View; Future  -     POC Urinalysis Dipstick, Multipro    2. 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 Left upper quadrant abdominal pain  -     XR Abdomen KUB; Future  -     POC Urinalysis Dipstick, Multipro    4. Rib pain  -     XR Spine Thoracic 3 View; Future    5. Rib pain on left side  -     XR Ribs Left With PA Chest; Future    6. Fall, subsequent encounter  -     XR Ribs Left With PA Chest; Future    7. Type 2 diabetes mellitus with hyperglycemia, with long-term current use of " insulin  -     MicroAlbumin, Urine, Random - Urine, Clean Catch; Future  -     MicroAlbumin, Urine, Random - Urine, Clean Catch          An After Visit Summary was printed and given to the patient at discharge.  Return in about 4 weeks (around 7/16/2025).       Assessment & Plan  1. Lower back pain.  - The patient's lower back pain could be attributed to musculoskeletal issues, kidney stones, or cancer-related complications.  - The presence of metastasis to the bone is confirmed, with lesions identified in the thoracolumbar spine, pelvis, proximal femur, and bilateral ribs. The CT scan from 02/2025 also revealed significant stool accumulation despite the presence of a colostomy, which could potentially contribute to the back pain and urinary tract infections.  - Urine test showed no abnormalities. An abdominal x-ray will be ordered to rule out the presence of a stone. Additionally, x-rays of the lumbar and thoracic spine will be obtained.  - He is advised to continue with Tylenol for pain management. Depending on the x-ray results and symptom severity, consultation with Dr. Tellez may be necessary. If the cause of the pain is determined to be musculoskeletal, a referral to Pain Management for injections may be considered.    2. Prostate cancer.  - The patient is currently on hormone therapy for prostate cancer, taking Xtandi 2 pills daily and receiving an Eligard injection every 3 months.  - The cancer has metastasized to the bone, affecting the thoracolumbar spine, pelvis, proximal femur, and bilateral ribs.  - Radiation therapy might be considered if he experiences pain in these areas.  - He will continue with his current hormone therapy regimen.    6/20/2025 at 1400 Dr. Tellez was consulted in regards to the patient's care.  Patient does have prostate cancer with metastasis to multiple sites.  Patient's area of pain is congruent with metastatic sites.  Dr. Tellez reports that patient's PSA is below 300 and currently  at 7.  He is on appropriate medical therapy.  If and when his PSA becomes out of control the plan will be for him to be referred to medical oncology.  However due to patient's pain along with his ostomy due to ulcerative colitis we discussed referral to pain management.  I am concerned about patient's ability to take oral pain medicine due to his ostomy and ulcerative colitis.  Patient may benefit from transdermal patch for pain control instead of oral pain medication.  I will contact patient to discuss plan of care moving forward.    6/20/2025 at 1411 patient contacted to discuss plan of care.  Plan of care will be to refer to pain management.  Discussed with patient that oral pain medicine could cause worsening issues with his ulcerative colitis and ostomy.  Hopefully he will be a candidate for some type of patch.  He does note that the pain has improved since he left our office this past week.  I will send in lidocaine patches to be applied to the area of discomfort.  We discussed that he can only apply 1 patch at a time he must leave on for 12 hours and take off for 12 hours.    I spent 46 minutes caring for Zachariah on this date of service. This time includes time spent by me in the following activities: preparing for the visit, reviewing tests, performing a medically appropriate examination and/or evaluation, counseling and educating the patient/family/caregiver, documenting information in the medical record, independently interpreting results and communicating that information with the patient/family/caregiver, care coordination, ordering medications, ordering test(s), ordering procedure(s), obtaining a separately obtained history, and reviewing a separately obtained history     : I have been treating this patient over a continuum addressing both chronic and acute care concerns.     Amaya ESCALANTE 6/20/2025   Electronically signed.    Patient or patient representative verbalized consent for the  use of Ambient Listening during the visit with  BORIS Pérez for chart documentation. 6/20/2025  14:14 CDT

## 2025-06-19 ENCOUNTER — OFFICE VISIT (OUTPATIENT)
Dept: PULMONOLOGY | Facility: CLINIC | Age: 85
End: 2025-06-19
Payer: MEDICARE

## 2025-06-19 VITALS
HEIGHT: 68 IN | SYSTOLIC BLOOD PRESSURE: 122 MMHG | HEART RATE: 65 BPM | DIASTOLIC BLOOD PRESSURE: 70 MMHG | BODY MASS INDEX: 31.67 KG/M2 | WEIGHT: 209 LBS | OXYGEN SATURATION: 97 %

## 2025-06-19 DIAGNOSIS — J44.9 CHRONIC OBSTRUCTIVE PULMONARY DISEASE, UNSPECIFIED COPD TYPE: Primary | ICD-10-CM

## 2025-06-19 DIAGNOSIS — J45.20 MILD INTERMITTENT ASTHMA WITHOUT COMPLICATION: ICD-10-CM

## 2025-06-19 DIAGNOSIS — J45.20 MILD INTERMITTENT ASTHMA WITHOUT COMPLICATION: Primary | ICD-10-CM

## 2025-06-19 DIAGNOSIS — Z78.9 NON-SMOKER: ICD-10-CM

## 2025-06-19 DIAGNOSIS — J98.4 PULMONARY SCARRING: ICD-10-CM

## 2025-06-19 DIAGNOSIS — J30.89 NON-SEASONAL ALLERGIC RHINITIS, UNSPECIFIED TRIGGER: ICD-10-CM

## 2025-06-19 DIAGNOSIS — G47.33 OSA ON CPAP: ICD-10-CM

## 2025-06-19 DIAGNOSIS — R91.1 PULMONARY NODULE SEEN ON IMAGING STUDY: ICD-10-CM

## 2025-06-19 DIAGNOSIS — E66.9 OBESITY (BMI 30-39.9): ICD-10-CM

## 2025-06-19 LAB — ALBUMIN UR-MCNC: <1.2 MG/DL

## 2025-06-19 NOTE — PROCEDURES
Spirometry with Diffusion Capacity & Lung Volumes    Performed by: Jeremiah Solis CMA  Authorized by: Jacob Mejias MD     Pre Drug % Predicted    FVC: 93%   FEV1: 89%   FEF 25-75%: 78%   FEV1/FVC: 71%   T%   RV: 127%   DLCO: 53%   D/VAsb: 158%    Interpretation   Overall comments:   The above test results are acceptable and reproducible by test criteria  From analysis of the above test results the patient showed evidence of mild obstructive airway dysfunction which is confirmed by low FEV1 and decreased FEV1 FVC ratio.  There is also low FEF 25 to 75% supporting obstructive diagnosis.  There was air trapping noted from residual volume which again supports obstructive dysfunction the diffusion capacity corrected for alveolar volume is above normal limits.  No bronchodilator challenge was done.      In comparison the prior test done 2 years ago there is no significant changes noted.  Clinical correlation was indicated.    Jacob Mejias MD  Pulmonologist/Intensivist  2025 12:06 CDT

## 2025-06-19 NOTE — PROGRESS NOTES
[facility] OUTPATIENT PROGRESS NOTE    Patient: Zachariah Acosta  : 1940  Age: 84 y.o.  Date of Service: 2025    REASON FOR CLINIC VISIT:  Chief Complaint   Patient presents with    Mild intermittent asthma without complication       Subjective:    History of Present Illness:  Zachariah Acosta is a 84 y.o. male who presents to the office today to be seen for    Diagnosis Plan   1. Chronic obstructive pulmonary disease, unspecified COPD type        2. Mild intermittent asthma without complication  Spirometry with Diffusion Capacity & Lung Volumes      3. Pulmonary nodule seen on imaging study        4. BREANN on CPAP        5. Non-smoker        6. Pulmonary scarring        7. Obesity (BMI 30-39.9)        8. Non-seasonal allergic rhinitis, unspecified trigger        .  Other problems per record.    History:    Patient is a very pleasant elderly  gentleman was seen in the pulmonary clinic for a follow-up visit.    He is a non-smoker but has chronic obstructive pulmonary disease and mild asthma overlap.  He was prescribed some long-term inhaler but currently using only Xopenex rescue inhalers as needed but does not need it very often.  He is also on Dupixent monthly injections for his skin condition but this is helping his lung well.  He did PFT done today which shows similar findings compared to the prior PFT done 2 years ago.  He had mild obstructive airway dysfunction with no bronchodilator challenge done and he also has increased residual volumes resting some air trapping.  He had normal total lung capacity and diffusion capacity corrected for alveolar volume was mildly decreased.    He has nasal allergy and he is using Vicks nasal spray and loratadine.  He has obstructive sleep apnea and is compliant with the CPAP and uses CPAP every night but does not wear any oxygen with the CPAP.  He lives at home with his wife.  He has significant arthritis and shoulder and  knee surgery and is not very active and mobile and stopped driving.  He also has some eye problems and seeing ophthalmologist.  The patient also has history of prostate cancer and his recent imaging studies shows multiple osteoblastic lesions and small pleural effusions and atelectasis and no other acute changes..    Patient is otherwise stable and is following up with a primary care provider BORIS Goel and he had lots of testing done yesterday for his osteoblastic bone lesions noted in the last imaging studies.  Patient was told that he may need to go back to see the oncologist because he had a history of prostate cancer and probably has metastasis.  His chest CT showed some right paratracheal lymph node which could be from the lung involvement from the prostate cancer but he did not want any further workup at this moment.  He is up-to-date on his vaccinations and follows up with the primary care provider as well.    PFT done today:  Not done today    PFT Values          2023    10:30 2025    10:30   Pre Drug PFT Results   FVC 94 93   FEV1 90 89   FEF 25-75% 80 78   FEV1/FVC 71 71   Other Tests PFT Results   TLC 94 95    127   DLCO 82 53   D/VAsb 86 158     Results for orders placed in visit on 25    Spirometry with Diffusion Capacity & Lung Volumes    Narrative  Spirometry with Diffusion Capacity & Lung Volumes    Performed by: Jeremiah Solis CMA  Authorized by: Jacob Mejias MD  Pre Drug % Predicted  FVC: 93%  FEV1: 89%  FEF 25-75%: 78%  FEV1/FVC: 71%  T%  RV: 127%  DLCO: 53%  D/VAsb: 158%    Interpretation  Overall comments:  The above test results are acceptable and reproducible by test criteria  From analysis of the above test results the patient showed evidence of mild obstructive airway dysfunction which is confirmed by low FEV1 and decreased FEV1 FVC ratio.  There is also low FEF 25 to 75% supporting obstructive diagnosis.  There was air trapping noted from  residual volume which again supports obstructive dysfunction the diffusion capacity corrected for alveolar volume is above normal limits.  No bronchodilator challenge was done.    In comparison the prior test done 2 years ago there is no significant changes noted.  Clinical correlation was indicated.    Jacob Mejias MD  Pulmonologist/Intensivist  2025 12:06 CDT      Results for orders placed in visit on 23    Pulmonary Function Test    Narrative  Pulmonary Function Test    Performed by: Cherie Walsh, RRT  Authorized by: Jacob Mejias MD  Pre Drug % Predicted  FVC: 94%  FEV1: 90%  FEF 25-75%: 80%  FEV1/FVC: 71%  T%  RV: 113%  DLCO: 82%  D/VAsb: 86%    Interpretation  Overall comments:  Above test results are acceptable and repeatable by ATS criteria  From analysis of the above test results the patient showed evidence of mild to moderate obstructive airway dysfunction.  Diffusion capacity corrected for alveolar volume is mildly decreased.  Obstructive dysfunction is confirmed by decrease in FEV1 and FEF 25 to 75%.  There was also increases pleural volume suggesting air trapping supporting obstructive dysfunction.  No bronchodilator challenge was done.    In comparison with the prior test results patient showed evidence of mild obstructive airway dysfunction which was not seen in the prior test done a few years ago.  Clinical correlation is indicated.    Jacob Mejias MD  Pulmonologist/Intensivist  2023 14:17 CST      Results for orders placed in visit on 21    Pulmonary Function Test    Narrative  [facility] - Pulmonary Function Test    [address]  [id number]  [phone]    Patient : Zachariah Acosta  MRN : [id number]  CSN : [id number]  Pulmonologist : Gilberto Bennett MD  Date : 4/15/2021    ______________________________________________________________________    Interpretation :  1.  Spirometry is within normal limits.  2.  Lung  volumes are within normal limits.  3.  There is a mild diffusion impairment even when corrected for alveolar volume.      Gilberto Bennett MD         Bronchodilator therapy: Xopenox inhaler but not using that so often. Dupixent injection every 2 weeks    Smoking Status:   Social History     Tobacco Use   Smoking Status Never    Passive exposure: Never   Smokeless Tobacco Never     Pulm Rehab: no  Sleep: yes on CPAP at night     Support System: lives with their spouse    Code Status:   There are no questions and answers to display.        Review of Systems:  A complete review of systems is performed and all other systems were reviewed and negative as note above in the HPI.  Review of Systems   Constitutional:  Positive for fatigue and unexpected weight loss.   HENT:  Positive for congestion, postnasal drip and sinus pressure.    Eyes: Negative.    Respiratory:  Positive for cough, chest tightness and shortness of breath.    Cardiovascular: Negative.    Gastrointestinal: Negative.    Endocrine: Negative.    Genitourinary: Negative.    Musculoskeletal:  Positive for arthralgias and joint swelling.   Skin: Negative.    Allergic/Immunologic: Negative.    Neurological: Negative.    Hematological: Negative.    Psychiatric/Behavioral: Negative.         CAT/ACT Score:  Not done today    Medications:  Outpatient Encounter Medications as of 6/19/2025   Medication Sig Dispense Refill    acetaminophen (TYLENOL) 500 MG tablet Take 1 tablet by mouth Every 6 (Six) Hours As Needed for Mild Pain.      aspirin 81 MG EC tablet Take 1 tablet by mouth Daily.      Continuous Blood Gluc Sensor (Dexcom G7 Sensor) misc 1 each Every 10 (Ten) Days. 9 each 3    docusate sodium (Colace) 100 MG capsule Take 1 capsule by mouth 2 (Two) Times a Day. 60 capsule 1    Dupilumab 300 MG/2ML solution prefilled syringe Inject  under the skin into the appropriate area as directed Every 14 (Fourteen) Days.      enzalutamide (XTANDI) 40 MG chemo capsule  Take 4 capsules by mouth Daily. 120 capsule 11    ferrous sulfate 325 (65 FE) MG tablet Take 1 tablet by mouth Daily With Breakfast.      finasteride (PROSCAR) 5 MG tablet Take 1 tablet by mouth Daily. 90 tablet 3    fluticasone (FLONASE) 50 MCG/ACT nasal spray 1 spray into the nostril(s) as directed by provider Daily. 16 g 5    insulin aspart (novoLOG FLEXPEN) 100 UNIT/ML solution pen-injector sc pen Inject  under the skin into the appropriate area as directed As Needed. sliding      insulin glargine (LANTUS, SEMGLEE) 100 UNIT/ML injection 15 units in the morning and 40 units at bedtime. 18 mL 2    Insulin Glargine-yfgn 100 UNIT/ML solution pen-injector Inject  under the skin into the appropriate area as directed.      levalbuterol (XOPENEX HFA) 45 MCG/ACT inhaler Inhale 2 puffs Every 6 (Six) Hours As Needed for Wheezing.      levothyroxine (Synthroid) 75 MCG tablet Take 1 tablet by mouth Daily. 90 tablet 4    lisinopril (PRINIVIL,ZESTRIL) 20 MG tablet TAKE 1 TABLET BY MOUTH IN THE MORNING AND 1/2 (ONE-HALF) AT BEDTIME 180 tablet 0    meclizine (ANTIVERT) 12.5 MG tablet Take 1 tablet by mouth 3 (Three) Times a Day As Needed for Dizziness. 30 tablet 0    melatonin 3 MG tablet Take 5 mg by mouth At Night As Needed.      metFORMIN ER (GLUCOPHAGE-XR) 500 MG 24 hr tablet Take 1 tablet by mouth Daily With Breakfast. 90 tablet 4    metoprolol tartrate (LOPRESSOR) 25 MG tablet Take 1 tablet by mouth 2 (Two) Times a Day. 180 tablet 4    Pediatric Multiple Vit-C-FA (CHILDRENS CHEWABLE MULTI VITS PO) Take 1 tablet by mouth Daily.      rosuvastatin (CRESTOR) 40 MG tablet Take 0.5 tablets by mouth Daily. 90 tablet 4    tamsulosin (FLOMAX) 0.4 MG capsule 24 hr capsule Take 1 capsule by mouth Every Night. 90 capsule 3    tiZANidine (ZANAFLEX) 4 MG tablet Take 1/2 - 1 tablet at bedtime. 30 tablet 11    triamcinolone (KENALOG) 0.1 % cream Apply 1 Application topically to the appropriate area as directed 2 (Two) Times a Day. 80 g 0  "   Ustekinumab (STELARA) 90 MG/ML solution prefilled syringe Injection Inject 90 mg under the skin into the appropriate area as directed Every 28 (Twenty-Eight) Days. 1 mL 6    vitamin B-12 (CYANOCOBALAMIN) 1000 MCG tablet Take 1 tablet by mouth Daily.      Vitamin D, Cholecalciferol, 25 MCG (1000 UT) capsule Take 1 capsule by mouth Daily.      nitroglycerin (NITROSTAT) 0.4 MG SL tablet Place 1 tablet under the tongue Every 5 (Five) Minutes As Needed for Chest Pain. Take no more than 3 doses in 15 minutes. (Patient not taking: Reported on 6/19/2025) 25 tablet 1    [DISCONTINUED] Budeson-Glycopyrrol-Formoterol (Breztri Aerosphere) 160-9-4.8 MCG/ACT aerosol inhaler Inhale 2 puffs 2 (Two) Times a Day. 3 each 4    [DISCONTINUED] finasteride (PROSCAR) 5 MG tablet Take 1 tablet by mouth Daily. 90 tablet 3    [DISCONTINUED] gabapentin (NEURONTIN) 100 MG capsule Take 1 capsule by mouth 3 (Three) Times a Day.      [DISCONTINUED] levothyroxine (Synthroid) 50 MCG tablet Take 1 tablet by mouth Daily. 90 tablet 4    [DISCONTINUED] tamsulosin (FLOMAX) 0.4 MG capsule 24 hr capsule Take 1 capsule by mouth Every Night. 90 capsule 3     No facility-administered encounter medications on file as of 6/19/2025.       Allergies:  Allergies   Allergen Reactions    Albuterol Other (See Comments)     \"Makes my throat spasm and swell\"    Adhesive Tape Rash    Azithromycin Rash    Fentanyl Nausea And Vomiting       Immunizations:  Immunization History   Administered Date(s) Administered    COVID-19 (Reactivity) 04/12/2021    Flu Vaccine Quad PF >36MO 11/13/2017    Fluzone  >6mos 09/04/2018    Fluzone (or Fluarix & Flulaval for VFC) >6mos 10/25/2019, 09/17/2020, 10/21/2021    Fluzone High-Dose 65+YRS 11/01/2020, 10/04/2024    Fluzone High-Dose 65+yrs 10/24/2022, 10/16/2023    H1N1 All Forms 01/05/2010    Influenza TIV (IM) 11/16/2016, 10/26/2018    Influenza, Unspecified 10/09/1996, 10/08/1997, 10/30/2006, 09/27/2007, 10/20/2008, 10/20/2009, " "11/01/2010, 11/01/2011, 11/01/2012, 10/12/2015, 11/01/2015, 11/16/2016, 11/13/2017, 09/04/2018, 10/26/2018, 10/25/2019, 09/17/2020, 10/21/2021, 10/01/2022    Pneumococcal Conjugate 13-Valent (PCV13) 02/01/2013, 11/11/2013, 04/01/2019    Pneumococcal Polysaccharide (PPSV23) 11/11/2013, 08/10/2020    Pneumococcal, Unspecified 10/30/2006    Tdap 10/30/2006, 07/01/2011, 12/17/2024       Objective:    Vitals:  /70   Pulse 65   Ht 172.7 cm (67.99\")   Wt 94.8 kg (209 lb)   SpO2 97% Comment: RA  BMI 31.79 kg/m²     Physical Exam:  General: Patient is a 84 y.o. pleasant elderly  male. Looks stated age. Appears to be in no acute distress.  Eyes: EOMI. PERRLA. Vision intact. No scleral icterus.  Ear, Nose, Mouth and Throat: Hearing is grossly intact. No Leukoplakia, pharyngitis, stomatitis or thrush. Swollen nasal mucosa with post nasal drop.  Neck: Range of motion of neck normal. No thyromegaly or masses. Mallampati Class 3  Respiratory: Clear to auscultation bilaterally. No use of accessory muscles. Decreased breath sounds.  Cardiovascular: Normal heart sounds. Regularly regular rhythm without murmur.  Gastrointestinal: Non tender, non distended, soft. Bowel sounds positive in all four quadrants. No organomegaly.  Skin: No obvious rashes, lesions, ulcers or large amount of bruising. No edema.   Neurological: No new motor deficits. Cranial nerves appear intact.  Psychiatric: Patient is alert and oriented to person, place and time.    Chest Imaging:    Study Result    Narrative & Impression   EXAM: CT CHEST WO CONTRAST DIAGNOSTIC-      HISTORY: ILD; R91.1-Solitary pulmonary nodule       DOSE LENGTH PRODUCT: 402.81 mGy.cm mGy cm. Automated exposure control  was also utilized to decrease patient radiation dose.     COMPARISON: 11/21/2023     TECHNIQUE: Serial helical tomographic images of the chest were acquired  without intravenous contrast. Multiplanar reformatted images were  provided for review.   "   FINDINGS:     Airways/Lungs/Pleura: No new or enlarging pulm nodule. There is a new  trace left pleural effusion/mild thickening. Stable mild peripheral  scarring. No consolidation. No pneumothorax.     Lower Neck: Stable 4.3 cm left thyroid nodule, previously characterized  ultrasound.     Mediastinum/Hilum: Newly enlarged right lower paratracheal lymph node  measuring up to 1.8 cm in the short axis (series 2 image 47) as compared  to 2020. Granulomatous calcifications.     Heart/Vasculature: No pericardial effusion. Advanced coronary artery  calcifications and/or stents. Aorta is mild atherosclerosis.     Chest wall/Soft Tissues: Unremarkable.     Upper Abdomen: Left parapelvic cyst.     Osseous Structures: There is mixed blastic metastatic disease throughout  the axial and visualized proximal appendicular skeleton.     IMPRESSION:     1. No new or enlarging pulmonary nodule.  2. Enlarged right lower paratracheal lymph node, suspicious for  metastatic disease given history of malignancy.  3. Diffuse osteoblastic metastatic disease. No visible pathological  fracture.  4. New trace left effusion/pleural thickening.     This report was signed and finalized on 6/6/2025 12:21 PM by     Assessment:  1. Chronic obstructive pulmonary disease, unspecified COPD type    2. Mild intermittent asthma without complication    3. Pulmonary nodule seen on imaging study    4. BREANN on CPAP    5. Non-smoker    6. Pulmonary scarring    7. Obesity (BMI 30-39.9)    8. Non-seasonal allergic rhinitis, unspecified trigger        Plan/Recommendations:    I reviewed the CT scan of the chest and explained results with the patient he had no new pulmonary nodules but there is a large right lower paratracheal lymph node noted which could be from metastatic disease with history of prostate malignancy.  He also has osteoblastic bone lesions and may need to go back to oncologist for further evaluation according to primary care provider.  No further  CT is needed from pulmonary issues.  He will be using his Xopenex rescue inhaler as needed and he is not on any long-term inhaler.  He had a pulmonary function test done before the clinic visit today and I explained the PFT results with the patient which showed mild obstructive airway dysfunction and B chronic residual volume with mild decrease diffusion capacity  Patient will continue allergy medications for his nose as needed he is not using fluticasone nasal spray and loratadine daily.  No prescription refill was needed.  He will continue CPAP in the current settings for his obstructive sleep apnea and is tolerating it well.  He will continue follow-up with the primary care provider and may need to follow-up with oncology for his osteoblastic bone lesions noted in the recent imaging studies.  There is also a right paratracheal lymph node noted but no further workup is planned at this moment for this lymph node which could be metastatic.  He will be returning to pulmonary clinic for follow-up visit in 6 months time or earlier if needed.    Follow up:  6 Months    Time Spent:  30 minutes    I appreciate the opportunity of participating in this patient's care. I would like to thank the PCP for the referral.  Please feel free to contact me with any other questions.    Jacob Mejias MD   Pulmonologist/Intensivist     Electronically signed by: Jacob Mejias MD, 6/19/2025 12:09 CDT

## 2025-06-30 ENCOUNTER — INFUSION (OUTPATIENT)
Dept: ONCOLOGY | Facility: HOSPITAL | Age: 85
End: 2025-06-30
Payer: MEDICARE

## 2025-06-30 VITALS
BODY MASS INDEX: 31.43 KG/M2 | SYSTOLIC BLOOD PRESSURE: 135 MMHG | HEART RATE: 74 BPM | DIASTOLIC BLOOD PRESSURE: 65 MMHG | OXYGEN SATURATION: 97 % | RESPIRATION RATE: 18 BRPM | TEMPERATURE: 96.8 F | HEIGHT: 68 IN | WEIGHT: 207.4 LBS

## 2025-06-30 DIAGNOSIS — C61 PROSTATE CANCER: Primary | ICD-10-CM

## 2025-06-30 PROCEDURE — 96402 CHEMO HORMON ANTINEOPL SQ/IM: CPT

## 2025-06-30 PROCEDURE — 25010000002 LEUPROLIDE 22.5 MG KIT: Performed by: UROLOGY

## 2025-06-30 RX ADMIN — LEUPROLIDE ACETATE 22.5 MG: 22.5 INJECTION, SUSPENSION, EXTENDED RELEASE SUBCUTANEOUS at 13:04

## 2025-07-02 ENCOUNTER — OFFICE VISIT (OUTPATIENT)
Age: 85
End: 2025-07-02
Payer: MEDICARE

## 2025-07-02 VITALS — BODY MASS INDEX: 31.37 KG/M2 | WEIGHT: 207 LBS | HEIGHT: 68 IN

## 2025-07-02 DIAGNOSIS — M17.11 PRIMARY OSTEOARTHRITIS OF RIGHT KNEE: Primary | ICD-10-CM

## 2025-07-02 DIAGNOSIS — C79.51 PROSTATE CANCER METASTATIC TO BONE: ICD-10-CM

## 2025-07-02 DIAGNOSIS — C61 PROSTATE CANCER METASTATIC TO BONE: ICD-10-CM

## 2025-07-02 NOTE — PROGRESS NOTES
[facility]  Surya Anne MD, PhD  Andrea Anne PA-C    CHIEF COMPLAINT  Follow-up of the Right Knee (Patient presents today for right knee pain. Right knee injection given on 6/4/25. Patient states knee is feeling better and injection helped.)       HISTORY OF PRESENT ILLNESS    History of Present Illness  The patient is an 84-year-old male here to follow up on his knee.    He reports significant improvement in his knee condition following the injection, with noticeable effects within a day. He finds the brace beneficial for additional stability.     He mentions a recent visit to a pain clinic where he was prescribed medication for potential future pain management. His primary care provider has recommended further consultation due to the spread of his cancer, which may necessitate more comprehensive pain management. He has been in touch with his urologist and oncologist through his primary care provider in the past few weeks.       HISTORY    Current Outpatient Medications   Medication Instructions    acetaminophen (TYLENOL) 500 mg, Oral, Every 6 Hours PRN    aspirin 81 mg, Daily    Continuous Blood Gluc Sensor (Dexcom G7 Sensor) misc 1 each, Not Applicable, Every 10 Days    docusate sodium (COLACE) 100 mg, Oral, 2 Times Daily    Dupilumab 300 MG/2ML solution prefilled syringe Every 14 Days    enzalutamide (XTANDI) 160 mg, Oral, Daily    ferrous sulfate 325 mg, Daily With Breakfast    finasteride (PROSCAR) 5 mg, Oral, Daily    fluticasone (FLONASE) 50 MCG/ACT nasal spray 1 spray, Nasal, Daily    insulin aspart (novoLOG FLEXPEN) 100 UNIT/ML solution pen-injector sc pen As Needed    insulin glargine (LANTUS, SEMGLEE) 100 UNIT/ML injection 15 units in the morning and 40 units at bedtime.    Insulin Glargine-yfgn 100 UNIT/ML solution pen-injector Inject  under the skin into the appropriate area as directed.    levalbuterol (XOPENEX HFA) 45 MCG/ACT inhaler 2 puffs, Every 6  Hours PRN    levothyroxine (SYNTHROID) 75 mcg, Oral, Daily    lisinopril (PRINIVIL,ZESTRIL) 20 MG tablet TAKE 1 TABLET BY MOUTH IN THE MORNING AND 1/2 (ONE-HALF) AT BEDTIME    meclizine (ANTIVERT) 12.5 mg, Oral, 3 Times Daily PRN    melatonin 5 mg, Nightly PRN    metFORMIN ER (GLUCOPHAGE-XR) 500 mg, Oral, Daily With Breakfast    metoprolol tartrate (LOPRESSOR) 25 mg, Oral, 2 Times Daily    nitroglycerin (NITROSTAT) 0.4 mg, Sublingual, Every 5 Minutes PRN, Take no more than 3 doses in 15 minutes.    Pediatric Multiple Vit-C-FA (CHILDRENS CHEWABLE MULTI VITS PO) 1 tablet, Daily    rosuvastatin (CRESTOR) 20 mg, Oral, Daily    tamsulosin (FLOMAX) 0.4 mg, Oral, Nightly    tiZANidine (ZANAFLEX) 4 MG tablet Take 1/2 - 1 tablet at bedtime.    triamcinolone (KENALOG) 0.1 % cream 1 Application, Topical, 2 Times Daily    Ustekinumab (STELARA) 90 mg, Subcutaneous, Every 28 Days    vitamin B-12 (CYANOCOBALAMIN) 1,000 mcg, Daily    Vitamin D, Cholecalciferol, 25 MCG (1000 UT) capsule 1 capsule, Daily         reports that he has never smoked. He has never been exposed to tobacco smoke. He has never used smokeless tobacco. He reports that he does not drink alcohol and does not use drugs.    Past Medical History:   Diagnosis Date    Arthritis 2015    Asthma     Benign prostatic hyperplasia 2025    Colitis     Coronary artery disease     Diabetes mellitus     Diverticulitis     Diverticulosis 2010    Elevated cholesterol     Elevated PSA     HL (hearing loss) 2012    Hyperlipidemia     Hypertension     Inflammatory bowel disease 2001    Myocardial infarct     EASTER 2020    Obesity 2010    Primary central sleep apnea 2009    Using Cpap    Prostate CA     Sleep apnea     cpap at     Sleep apnea, obstructive     Stroke     Visual impairment 2016    Double vision        Past Surgical History:   Procedure Laterality Date    ADENOIDECTOMY  1947    APPENDECTOMY N/A 05/05/2023    Procedure: APPENDECTOMY LAPAROSCOPIC;  Surgeon: Dillon  Katherine TREVINO MD;  Location: [facility] OR;  Service: General;  Laterality: N/A;    BACK SURGERY      CARDIAC CATHETERIZATION      stents x 6    CARDIAC SURGERY      CABG TRIPLE BYPASS 2012    CAROTID STENT      CATARACT EXTRACTION      COLON SURGERY  2024    COLONOSCOPY      COLONOSCOPY N/A 06/04/2021    Procedure: COLONOSCOPY WITH ANESTHESIA;  Surgeon: Zachariah Menjivar DO;  Location: [facility] ENDOSCOPY;  Service: Gastroenterology;  Laterality: N/A;  pre hx ulcerative colitis  post ulcerative colitis  dr collins gusman    COLONOSCOPY N/A 11/29/2023    Procedure: COLONOSCOPY LOWER LIMITED;  Surgeon: Zachariah Menjivar DO;  Location: [facility] ENDOSCOPY;  Service: Gastroenterology;  Laterality: N/A;  preop; hx of colitis  postop colitis - questionable colonic stricture at 30cm   pcp bryanna garrisontreet    COLOSTOMY      CORONARY ANGIOPLASTY WITH STENT PLACEMENT      CORONARY ARTERY BYPASS GRAFT  December 2012    CORONARY STENT PLACEMENT      HIP SURGERY Right     total hip    JOINT REPLACEMENT  2015    Right hip    KIDNEY STONE SURGERY      PENILE PROSTHESIS IMPLANT N/A 12/13/2021    Procedure: 3-PIECE INFLATABLE PENILE PROSTHESIS PLACEMENT;  Surgeon: Jose Tellez MD;  Location: [facility] OR;  Service: Urology;  Laterality: N/A;    TONSILLECTOMY  1947    URETEROSCOPY LASER LITHOTRIPSY WITH STENT INSERTION Left 02/14/2025    Procedure: URETEROSCOPY LASER LITHOTRIPSY WITH STENT INSERTION-left;  Surgeon: Jose Tellez MD;  Location: [facility] OR;  Service: Urology;  Laterality: Left;        PHYSICAL EXAM  Constitutional: The patient is in no apparent distress and generally well-appearing. The patient hears me clearly and answers questions appropriately.   Musculoskeletal:  Physical Exam  Knee:  No effusion, nontender medial lateral joint line, full range of motion with extension and flexion.  Wearing the hinged knee brace.      IMAGING    Spirometry with Diffusion Capacity & Lung Volumes  Result Date: 6/19/2025  Narrative:  Jacob Mejias MD     2025 12:08 PM Spirometry with Diffusion Capacity & Lung Volumes Performed by: Jeremiah Solis CMA Authorized by: Jacob Mejias MD   Pre Drug % Predicted  FVC: 93%  FEV1: 89%  FEF 25-75%: 78%  FEV1/FVC: 71%  T%  RV: 127%  DLCO: 53%  D/VAsb: 158% Interpretation Overall comments: The above test results are acceptable and reproducible by test criteria From analysis of the above test results the patient showed evidence of mild obstructive airway dysfunction which is confirmed by low FEV1 and decreased FEV1 FVC ratio.  There is also low FEF 25 to 75% supporting obstructive diagnosis.  There was air trapping noted from residual volume which again supports obstructive dysfunction the diffusion capacity corrected for alveolar volume is above normal limits.  No bronchodilator challenge was done.  In comparison the prior test done 2 years ago there is no significant changes noted.  Clinical correlation was indicated. Jacob Mejias MD Pulmonologist/Intensivist 2025 12:06 CDT    XR Spine Thoracic 3 View  Result Date: 2025  Narrative: XR SPINE THORACIC 3 VW-  HISTORY: thoracic back pain/ rib pain; G48-Ggzfwpudv neoplasm of prostate; R07.81-Pleurodynia  COMPARISON: None  FINDINGS: Frontal, lateral and swimmers lateral radiographs of the thoracic spine  [place] right thoracic curvature. Flattened kyphosis. No acute fracture identified. Vertebral body heights are well-preserved. Mild multilevel loss of intervertebral disc height. 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 Facet arthropathy. Previous coronary bypass.      Impression: 1. 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 2. No visualized acute fracture. Vertebral body heights are preserved.   This report was signed and finalized on 2025 4:28 PM by Dr Tato Smith.      XR Spine Lumbar 2 or 3 View  Result Date: 2025  Narrative: EXAM: XR SPINE LUMBAR 2  OR 3 VW-   HISTORY: low back pain; M54.50-Low back pain, unspecified; G89.29-Other chronic pain   COMPARISON: None available.  TECHNIQUE: Frontal and lateral views were obtained. 3.0 images.   FINDINGS:  There is levoscoliosis and spondylosis with disc space narrowing and spurring of the endplates at the L2-S1 levels. No acute fracture or spondylolisthesis is seen. There is multilevel facet arthropathy. There is moderate stool in the colon. Patient is status post right total hip arthroplasty partially imaged. Innumerable sclerotic lesions are noted throughout the spine and visualized pelvis consistent with diffuse blastic osseous metastasis. No pathologic fracture is seen.       Impression:  1. Mild levoscoliosis with multilevel spondylosis and facet arthropathy without fracture or spondylolisthesis. 2. Diffuse blastic osseous metastasis.  This report was signed and finalized on 6/18/2025 4:28 PM by Rodri Mckenzie.      XR Abdomen KUB  Result Date: 6/18/2025  Narrative: XR ABDOMEN KUB-  HISTORY: ? nephrolithiasis; R10.12-Left upper quadrant pain  COMPARISON: CT scan dated 2/16/2025  FINDINGS:  There is a nonobstructive bowel gas pattern. No gross intraperitoneal free air is present. No definite urolithiasis on today's exam. Multifocal osseous metastatic disease in the spine and pelvis. Partially imaged right hip arthroplasty. Advanced lumbar spine osteoarthritis.      Impression:  1.  Bowel gas pattern is unremarkable. 2.  No definite urolithiasis on today's exam. 3.  Advanced osseous metastatic disease.    This report was signed and finalized on 6/18/2025 4:26 PM by Dr Tato Smith.      XR Ribs Left With PA Chest  Result Date: 6/18/2025  Narrative: XR RIBS LEFT W PA CHEST-  HISTORY: left sided rib pain s/p fall.; D36-Silbacxwk neoplasm of prostate; R07.81-Pleurodynia; W19.XXXD-Unspecified fall, subsequent encounter  COMPARISON: None available.  FINDINGS: 4 views of the ribs are obtained.  There is no evidence  of displaced rib fracture or definite pneumothorax. No destructive osseous lesions are seen. The visualized lungs are clear. Innumerable areas of sclerosis are noted consistent with diffuse blastic metastasis. The patient has a history of prostate cancer.      Impression: 1. No evidence of displaced rib fracture or pneumothorax. 2. Innumerable areas of sclerosis consistent with blastic metastasis.   This report was signed and finalized on 6/18/2025 4:11 PM by Rodri Mckenzie.      CT Chest Without Contrast Diagnostic  Result Date: 6/6/2025  Narrative: EXAM: CT CHEST WO CONTRAST DIAGNOSTIC-  HISTORY: ILD; R91.1-Solitary pulmonary nodule   DOSE LENGTH PRODUCT: 402.81 mGy.cm mGy cm. Automated exposure control was also utilized to decrease patient radiation dose.  COMPARISON: 11/21/2023  TECHNIQUE: Serial helical tomographic images of the chest were acquired without intravenous contrast. Multiplanar reformatted images were provided for review.  FINDINGS:  Airways/Lungs/Pleura: No new or enlarging pulm nodule. There is a new trace left pleural effusion/mild thickening. Stable mild peripheral scarring. No consolidation. No pneumothorax.  Lower Neck: Stable 4.3 cm left thyroid nodule, previously characterized ultrasound.  Mediastinum/Hilum: Newly enlarged right lower paratracheal lymph node measuring up to 1.8 cm in the short axis (series 2 image 47) as compared to 2020. Granulomatous calcifications.  Heart/Vasculature: No pericardial effusion. Advanced coronary artery calcifications and/or stents. Aorta is mild atherosclerosis.  Chest wall/Soft Tissues: Unremarkable.  Upper Abdomen: Left parapelvic cyst.  Osseous Structures: There is mixed blastic metastatic disease throughout the axial and visualized proximal appendicular skeleton.      Impression:  1. No new or enlarging pulmonary nodule. 2. Enlarged right lower paratracheal lymph node, suspicious for metastatic disease given history of malignancy. 3. Diffuse  osteoblastic metastatic disease. No visible pathological fracture. 4. New trace left effusion/pleural thickening.    This report was signed and finalized on 6/6/2025 12:21 PM by Mahesh Johnson.         Results         ASSESSMENT & PLAN  Diagnoses and all orders for this visit:    1. Primary osteoarthritis of right knee (Primary)    2. Prostate cancer metastatic to bone    Patient had a steroid injection of his right knee last visit. Today his knee is doing fine. We discussed that he could have these injections about every three months if he needs it. He can continue using the knee brace for additional stability.     Patient also recently saw his PCP for back pain.  He has known metastatic prostate cancer and follow-up imaging again shows diffuse osteoblastic metastatic disease.  He has been referred to pain management as well, although he's not having much back pain currently.       Continue knee brace, analgesics PRN  Follow up: PRN        Patient or patient representative verbalized consent for the use of Ambient Listening during the visit with  Surya Anne MD for chart documentation. 7/2/2025  08:15 CDT      This document has been signed by Surya Anne MD on July 2, 2025 08:14 CDT

## 2025-07-15 DIAGNOSIS — Z79.4 TYPE 2 DIABETES MELLITUS WITH HYPERGLYCEMIA, WITH LONG-TERM CURRENT USE OF INSULIN: Primary | ICD-10-CM

## 2025-07-15 DIAGNOSIS — E11.65 TYPE 2 DIABETES MELLITUS WITH HYPERGLYCEMIA, WITH LONG-TERM CURRENT USE OF INSULIN: Primary | ICD-10-CM

## 2025-07-15 RX ORDER — METFORMIN HYDROCHLORIDE 500 MG/1
500 TABLET, EXTENDED RELEASE ORAL
Qty: 90 TABLET | Refills: 4 | Status: SHIPPED | OUTPATIENT
Start: 2025-07-15

## 2025-07-16 ENCOUNTER — OFFICE VISIT (OUTPATIENT)
Dept: FAMILY MEDICINE CLINIC | Facility: CLINIC | Age: 85
End: 2025-07-16
Payer: MEDICARE

## 2025-07-16 VITALS
TEMPERATURE: 97.1 F | RESPIRATION RATE: 20 BRPM | OXYGEN SATURATION: 93 % | SYSTOLIC BLOOD PRESSURE: 123 MMHG | HEART RATE: 70 BPM | DIASTOLIC BLOOD PRESSURE: 73 MMHG | WEIGHT: 210 LBS | BODY MASS INDEX: 31.83 KG/M2 | HEIGHT: 68 IN

## 2025-07-16 DIAGNOSIS — R07.81 RIB PAIN ON LEFT SIDE: ICD-10-CM

## 2025-07-16 DIAGNOSIS — Z79.4 TYPE 2 DIABETES MELLITUS WITH HYPERGLYCEMIA, WITH LONG-TERM CURRENT USE OF INSULIN: ICD-10-CM

## 2025-07-16 DIAGNOSIS — G89.29 CHRONIC LEFT-SIDED LOW BACK PAIN WITHOUT SCIATICA: Primary | ICD-10-CM

## 2025-07-16 DIAGNOSIS — R07.81 RIB PAIN: ICD-10-CM

## 2025-07-16 DIAGNOSIS — R10.12 LEFT UPPER QUADRANT ABDOMINAL PAIN: ICD-10-CM

## 2025-07-16 DIAGNOSIS — C61 PROSTATE CANCER METASTATIC TO MULTIPLE SITES: ICD-10-CM
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
## 2025-07-16 DIAGNOSIS — M54.50 CHRONIC LEFT-SIDED LOW BACK PAIN WITHOUT SCIATICA: Primary | ICD-10-CM

## 2025-07-16 DIAGNOSIS — E11.65 TYPE 2 DIABETES MELLITUS WITH HYPERGLYCEMIA, WITH LONG-TERM CURRENT USE OF INSULIN: ICD-10-CM

## 2025-07-16 RX ORDER — LIDOCAINE 50 MG/G
1 PATCH TOPICAL EVERY 24 HOURS
Qty: 30 PATCH | Refills: 11 | Status: SHIPPED | OUTPATIENT
Start: 2025-07-16

## 2025-07-16 RX ORDER — ROSUVASTATIN CALCIUM 40 MG/1
20 TABLET, COATED ORAL DAILY
Qty: 90 TABLET | Refills: 4 | Status: SHIPPED | OUTPATIENT
Start: 2025-07-16

## 2025-07-16 RX ORDER — LIDOCAINE 50 MG/G
1 PATCH TOPICAL EVERY 24 HOURS
Qty: 30 PATCH | Refills: 11 | Status: SHIPPED | OUTPATIENT
Start: 2025-07-16 | End: 2025-07-16

## 2025-07-16 NOTE — PROGRESS NOTES
BORIS Pérez  [facility]   Family Medicine  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 male.  History of Present Illness  The patient is an 84-year-old male who presents today for a follow-up visit. He is accompanied by his wife.    1. Lower back pain.: He was previously evaluated for back pain on 06/18/2025, which has since resolved. He has not consulted with Dr. Tellez since his last visit here. He experiences discomfort when standing for extended periods but reports no chest pain. The pain is localized to the area across his back where a bra would sit.    2. Prostate cancer.: He is currently undergoing hormone therapy to manage his testosterone levels and prevent them from fueling his cancer. His treatment regimen includes taking Xtandi four times daily and receiving Eligard injections every three months. He is enrolled in a patient assistance program due to the high cost of his medication.    Social History:  Marital Status:   Living Condition: Lives in [place]    - The patient's lower back pain could be attributed to musculoskeletal issues, kidney stones, or cancer-related complications.  - The presence of metastasis to the bone is confirmed, with lesions identified in the thoracolumbar spine, pelvis, proximal femur, and bilateral ribs. The CT scan from 02/2025 also revealed significant stool accumulation despite the presence of a colostomy, which could potentially contribute to the back pain and urinary tract infections.  - Urine test showed no abnormalities. An abdominal x-ray will be ordered to rule out the presence of a stone. Additionally, x-rays of the lumbar and thoracic spine will be obtained.  - He is advised to continue with Tylenol for  pain management. Depending on the x-ray results and symptom severity, consultation with Dr. Tellez may be necessary. If the cause of the pain is determined to be musculoskeletal, a referral to Pain Management for injections may be considered.          ROS:  Review of Systems   Constitutional:  Negative for fatigue.   Respiratory:  Negative for shortness of breath.    Cardiovascular:  Negative for chest pain and palpitations.   Gastrointestinal:  Negative for nausea.   Musculoskeletal:  Positive for arthralgias, back pain and myalgias.   Neurological:  Negative for dizziness and headaches.   Psychiatric/Behavioral:  Negative for confusion.         reports that he has never smoked. He has never been exposed to tobacco smoke. He has never used smokeless tobacco. He reports that he does not drink alcohol and does not use drugs.    Current Outpatient Medications   Medication Instructions    acetaminophen (TYLENOL) 500 mg, Oral, Every 6 Hours PRN    aspirin 81 mg, Daily    Continuous Blood Gluc Sensor (Dexcom G7 Sensor) misc 1 each, Not Applicable, Every 10 Days    docusate sodium (COLACE) 100 mg, Oral, 2 Times Daily    Dupilumab 300 MG/2ML solution prefilled syringe Every 14 Days    enzalutamide (XTANDI) 160 mg, Oral, Daily    ferrous sulfate 325 mg, Daily With Breakfast    finasteride (PROSCAR) 5 mg, Oral, Daily    fluticasone (FLONASE) 50 MCG/ACT nasal spray 1 spray, Nasal, Daily    insulin aspart (novoLOG FLEXPEN) 100 UNIT/ML solution pen-injector sc pen As Needed    insulin glargine (LANTUS, SEMGLEE) 100 UNIT/ML injection 15 units in the morning and 40 units at bedtime.    Insulin Glargine-yfgn 100 UNIT/ML solution pen-injector Inject  under the skin into the appropriate area as directed.    levalbuterol (XOPENEX HFA) 45 MCG/ACT inhaler 2 puffs, Every 6 Hours PRN    levothyroxine (SYNTHROID) 75 mcg, Oral, Daily    lidocaine (LIDODERM) 5 % 1 patch, Transdermal, Every 24 Hours, Remove & Discard patch within 12 hours or  "as directed by MD    lisinopril (PRINIVIL,ZESTRIL) 20 MG tablet TAKE 1 TABLET BY MOUTH IN THE MORNING AND 1/2 (ONE-HALF) AT BEDTIME    meclizine (ANTIVERT) 12.5 mg, Oral, 3 Times Daily PRN    melatonin 5 mg, Nightly PRN    metFORMIN ER (GLUCOPHAGE-XR) 500 mg, Oral, Daily With Breakfast    metoprolol tartrate (LOPRESSOR) 25 mg, Oral, 2 Times Daily    nitroglycerin (NITROSTAT) 0.4 mg, Sublingual, Every 5 Minutes PRN, Take no more than 3 doses in 15 minutes.    Pediatric Multiple Vit-C-FA (CHILDRENS CHEWABLE MULTI VITS PO) 1 tablet, Daily    rosuvastatin (CRESTOR) 20 mg, Oral, Daily    tamsulosin (FLOMAX) 0.4 mg, Oral, Nightly    tiZANidine (ZANAFLEX) 4 MG tablet Take 1/2 - 1 tablet at bedtime.    triamcinolone (KENALOG) 0.1 % cream 1 Application, Topical, 2 Times Daily    Ustekinumab (STELARA) 90 mg, Subcutaneous, Every 28 Days    vitamin B-12 (CYANOCOBALAMIN) 1,000 mcg, Daily    Vitamin D, Cholecalciferol, 25 MCG (1000 UT) capsule 1 capsule, Daily       OBJECTIVE:  /73 (BP Location: Left arm, Patient Position: Sitting, Cuff Size: Adult)   Pulse 70   Temp 97.1 °F (36.2 °C) (Infrared)   Resp 20   Ht 172.7 cm (67.99\")   Wt 95.3 kg (210 lb)   SpO2 93%   BMI 31.94 kg/m²    Physical Exam  Vitals and nursing note reviewed.   Constitutional:       Appearance: Normal appearance. He is well-developed.   HENT:      Head: Normocephalic and atraumatic.      Right Ear: Tympanic membrane, ear canal and external ear normal.      Left Ear: Tympanic membrane, ear canal and external ear normal.      Nose: Nose normal. No septal deviation, nasal tenderness or congestion.      Mouth/Throat:      Lips: Pink. No lesions.      Mouth: Mucous membranes are moist. No oral lesions.      Dentition: Normal dentition.      Pharynx: Oropharynx is clear. No pharyngeal swelling, oropharyngeal exudate or posterior oropharyngeal erythema.   Eyes:      General: Lids are normal. Vision grossly intact. No scleral icterus.        Right eye: No " discharge.         Left eye: No discharge.      Extraocular Movements: Extraocular movements intact.      Conjunctiva/sclera: Conjunctivae normal.      Right eye: Right conjunctiva is not injected.      Left eye: Left conjunctiva is not injected.      Pupils: Pupils are equal, round, and reactive to light.   Neck:      Thyroid: No thyroid mass.      Trachea: Trachea normal.   Cardiovascular:      Rate and Rhythm: Normal rate and regular rhythm.      Heart sounds: Normal heart sounds. No murmur heard.     No gallop.   Pulmonary:      Effort: Pulmonary effort is normal.      Breath sounds: Normal breath sounds and air entry. No wheezing, rhonchi or rales.   Abdominal:      Hernia: A hernia (Parastomal hernia noted) is present.   Musculoskeletal:         General: No tenderness or deformity. Normal range of motion.      Cervical back: Full passive range of motion without pain, normal range of motion and neck supple.      Thoracic back: Normal.      Right lower leg: No edema.      Left lower leg: No edema.   Skin:     General: Skin is warm and dry.      Coloration: Skin is not jaundiced.      Findings: No rash.   Neurological:      Mental Status: He is alert and oriented to person, place, and time.      Sensory: Sensation is intact.      Motor: Motor function is intact.      Coordination: Coordination is intact.      Gait: Gait is intact.      Deep Tendon Reflexes: Reflexes are normal and symmetric.   Psychiatric:         Mood and Affect: Mood and affect normal.         Behavior: Behavior normal.         Judgment: Judgment normal.       Physical Exam  Musculoskeletal: Tenderness in the thoracic spine region.         Procedures    Results  Labs   - PSA: 7 ng/mL (previously 300 ng/mL)    Assessment/Plan:     Diagnoses and all orders for this visit:    1. Chronic left-sided low back pain without sciatica (Primary)  -     Discontinue: lidocaine (LIDODERM) 5 %; Place 1 patch on the skin as directed by provider Daily. Remove &  Discard patch within 12 hours or as directed by MD  Dispense: 30 patch; Refill: 11  -     Discontinue: lidocaine (LIDODERM) 5 %; Place 1 patch on the skin as directed by provider Daily. Remove & Discard patch within 12 hours or as directed by MD  Dispense: 30 patch; Refill: 11  -     lidocaine (LIDODERM) 5 %; Place 1 patch on the skin as directed by provider Daily. Remove & Discard patch within 12 hours or as directed by MD  Dispense: 30 patch; Refill: 11    2. 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 Remove & Discard patch within 12 hours or as directed by MD  Dispense: 30 patch; Refill: 11  -     Discontinue: lidocaine (LIDODERM) 5 %; Place 1 patch on the skin as directed by provider Daily. Remove & Discard patch within 12 hours or as directed by MD  Dispense: 30 patch; Refill: 11  -     lidocaine (LIDODERM) 5 %; Place 1 patch on the skin as directed by provider Daily. Remove & Discard patch within 12 hours or as directed by MD  Dispense: 30 patch; Refill: 11    3. Left upper quadrant abdominal pain  -     Discontinue: lidocaine (LIDODERM) 5 %; Place 1 patch on the skin as directed by provider Daily. Remove & Discard patch within 12 hours or as directed by MD  Dispense: 30 patch; Refill: 11  -     Discontinue: lidocaine (LIDODERM) 5 %; Place 1 patch on the skin as directed by provider Daily. Remove & Discard patch within 12 hours or as directed by MD  Dispense: 30 patch; Refill: 11  -     lidocaine (LIDODERM) 5 %; Place 1 patch on the skin as directed by provider Daily. Remove & Discard patch within 12 hours or as directed by MD  Dispense: 30 patch; Refill: 11    4. Rib pain  -     Discontinue: lidocaine (LIDODERM) 5 %; Place 1 patch on the skin as directed by provider Daily. Remove & Discard patch within 12 hours or as directed by MD  Dispense: 30 patch; Refill: 11  -     Discontinue: lidocaine (LIDODERM) 5  %; Place 1 patch on the skin as directed by provider Daily. Remove & Discard patch within 12 hours or as directed by MD  Dispense: 30 patch; Refill: 11  -     lidocaine (LIDODERM) 5 %; Place 1 patch on the skin as directed by provider Daily. Remove & Discard patch within 12 hours or as directed by MD  Dispense: 30 patch; Refill: 11    5. Rib pain on left side  -     Discontinue: lidocaine (LIDODERM) 5 %; Place 1 patch on the skin as directed by provider Daily. Remove & Discard patch within 12 hours or as directed by MD  Dispense: 30 patch; Refill: 11  -     Discontinue: lidocaine (LIDODERM) 5 %; Place 1 patch on the skin as directed by provider Daily. Remove & Discard patch within 12 hours or as directed by MD  Dispense: 30 patch; Refill: 11  -     lidocaine (LIDODERM) 5 %; Place 1 patch on the skin as directed by provider Daily. Remove & Discard patch within 12 hours or as directed by MD  Dispense: 30 patch; Refill: 11    6. Fall, subsequent encounter    7. Type 2 diabetes mellitus with hyperglycemia, with long-term current use of insulin    Other orders  -     rosuvastatin (CRESTOR) 40 MG tablet; Take 0.5 tablets by mouth Daily.  Dispense: 90 tablet; Refill: 4          An After Visit Summary was printed and given to the patient at discharge.  Return in about 3 months (around 10/16/2025).       Assessment & Plan  1. Back pain.  - Discussed previous prescription for lidocaine patches, which he did not receive; may not have been covered by insurance.  - X-ray of the thoracic spine and neck ordered; prior authorization for lidocaine patches will be attempted, with an alternative to use [employer] card if not approved.    2. Prostate cancer.  - Currently on hormone therapy to reduce testosterone levels.  - Takes Xtandi 4 pills a day and receives Eligard injections every 3 months.  - PSA levels have decreased from 300 to 7, indicating effective therapy.  - Continues to be monitored for any changes or progression.    I spent  23 minutes caring for Zachariah on this date of service. This time includes time spent by me in the following activities: preparing for the visit, reviewing tests, performing a medically appropriate examination and/or evaluation, counseling and educating the patient/family/caregiver, documenting information in the medical record, independently interpreting results and communicating that information with the patient/family/caregiver, care coordination, ordering medications, and ordering test(s)     : I have been treating this patient over a continuum addressing both chronic and acute care concerns.     Amaya ESCALANTE 7/16/2025   Electronically signed.    Patient or patient representative verbalized consent for the use of Ambient Listening during the visit with  BORIS Pérez for chart documentation. 7/18/2025  20:04 CDT

## 2025-08-06 DIAGNOSIS — I10 ESSENTIAL HYPERTENSION: ICD-10-CM

## 2025-08-06 RX ORDER — METOPROLOL TARTRATE 25 MG/1
25 TABLET, FILM COATED ORAL 2 TIMES DAILY
Qty: 180 TABLET | Refills: 4 | Status: SHIPPED | OUTPATIENT
Start: 2025-08-06

## 2025-08-08 DIAGNOSIS — I10 ESSENTIAL HYPERTENSION: Chronic | ICD-10-CM

## 2025-08-14 DIAGNOSIS — L23.1 CONTACT DERMATITIS DUE TO ADHESIVES, UNSPECIFIED CONTACT DERMATITIS TYPE: ICD-10-CM

## 2025-08-14 RX ORDER — TRIAMCINOLONE ACETONIDE 1 MG/G
1 CREAM TOPICAL 2 TIMES DAILY
Qty: 80 G | Refills: 0 | Status: SHIPPED | OUTPATIENT
Start: 2025-08-14

## 2025-08-18 ENCOUNTER — OFFICE VISIT (OUTPATIENT)
Dept: GASTROENTEROLOGY | Age: 85
End: 2025-08-18
Payer: MEDICARE

## 2025-08-18 VITALS
HEART RATE: 75 BPM | SYSTOLIC BLOOD PRESSURE: 136 MMHG | OXYGEN SATURATION: 99 % | DIASTOLIC BLOOD PRESSURE: 82 MMHG | WEIGHT: 212 LBS | HEIGHT: 69 IN | BODY MASS INDEX: 31.4 KG/M2

## 2025-08-18 DIAGNOSIS — K51.312 ULCERATIVE RECTOSIGMOIDITIS, WITH INTESTINAL OBSTRUCTION (HCC): Primary | ICD-10-CM

## 2025-08-18 PROCEDURE — 1123F ACP DISCUSS/DSCN MKR DOCD: CPT | Performed by: NURSE PRACTITIONER

## 2025-08-18 PROCEDURE — G8427 DOCREV CUR MEDS BY ELIG CLIN: HCPCS | Performed by: NURSE PRACTITIONER

## 2025-08-18 PROCEDURE — 1036F TOBACCO NON-USER: CPT | Performed by: NURSE PRACTITIONER

## 2025-08-18 PROCEDURE — 99213 OFFICE O/P EST LOW 20 MIN: CPT | Performed by: NURSE PRACTITIONER

## 2025-08-18 PROCEDURE — 1159F MED LIST DOCD IN RCRD: CPT | Performed by: NURSE PRACTITIONER

## 2025-08-18 PROCEDURE — G8417 CALC BMI ABV UP PARAM F/U: HCPCS | Performed by: NURSE PRACTITIONER

## 2025-08-18 RX ORDER — ENZALUTAMIDE 40 MG/1
CAPSULE ORAL
COMMUNITY

## 2025-08-18 ASSESSMENT — ENCOUNTER SYMPTOMS
ABDOMINAL PAIN: 0
ANAL BLEEDING: 0
TROUBLE SWALLOWING: 0
VOMITING: 0
DIARRHEA: 0
NAUSEA: 0
ABDOMINAL DISTENTION: 0
SHORTNESS OF BREATH: 0
COUGH: 0
BLOOD IN STOOL: 0
CONSTIPATION: 0
CHOKING: 0
RECTAL PAIN: 0

## (undated) DEVICE — ANTIBACTERIAL UNDYED BRAIDED (POLYGLACTIN 910), SYNTHETIC ABSORBABLE SUTURE: Brand: COATED VICRYL

## (undated) DEVICE — Device: Brand: DEFENDO AIR/WATER/SUCTION AND BIOPSY VALVE

## (undated) DEVICE — MASK,OXYGEN,MED CONC,ADLT,7' TUB, UC: Brand: PENDING

## (undated) DEVICE — ENDOPATH XCEL WITH OPTIVIEW TECHNOLOGY UNIVERSAL TROCAR STABILITY SLEEVES: Brand: ENDOPATH XCEL OPTIVIEW

## (undated) DEVICE — COVER,MAYO STAND,STERILE: Brand: MEDLINE

## (undated) DEVICE — PAD MAJOR: Brand: MEDLINE INDUSTRIES, INC.

## (undated) DEVICE — PK TURNOVER RM ADV

## (undated) DEVICE — 4-PORT MANIFOLD: Brand: NEPTUNE 2

## (undated) DEVICE — PK CYSTO 30

## (undated) DEVICE — PREP RAZOR: Brand: DEROYAL

## (undated) DEVICE — SUT VIC 0 SUTUPAK TIES 18IN J906G

## (undated) DEVICE — YANKAUER,BULB TIP WITH VENT: Brand: ARGYLE

## (undated) DEVICE — SYR LL TP 10ML STRL

## (undated) DEVICE — TLC MALE UROLOGY RETRACTOR SYSTEM WITH FRAME (BOXED): Brand: TLC SELF-RETAINING RETRACTOR SYSTEM

## (undated) DEVICE — ENDOPATH ETS-FLEX45 ARTICULATING ENDOSCOPIC LINEAR CUTTER, NO RELOAD: Brand: ENDOPATH

## (undated) DEVICE — FIBR LASR MOSES 200 DFL 2J 80HZ

## (undated) DEVICE — NITINOL STONE RETRIEVAL BASKET: Brand: ZERO TIP

## (undated) DEVICE — TOTAL TRAY, 16FR 10ML SIL FOLEY, URN: Brand: MEDLINE

## (undated) DEVICE — SYR LUERLOK 50ML

## (undated) DEVICE — SUT PDS 2/0 CT2 27IN VIL PDP333H

## (undated) DEVICE — GW PTFE FIX/CORE FLXTIP .038 3X150CM

## (undated) DEVICE — ENDOPATH XCEL WITH OPTIVIEW TECHNOLOGY DILATING TIP TROCARS WITH STABILITY SLEEVES: Brand: ENDOPATH XCEL OPTIVIEW

## (undated) DEVICE — SYR LUERLOK 30CC

## (undated) DEVICE — SENSR O2 OXIMAX FNGR A/ 18IN NONSTR

## (undated) DEVICE — PENCL ES MEGADINE EZ/CLEAN BUTN W/HOLSTR 10FT

## (undated) DEVICE — TRAP FLD MINIVAC MEGADYNE 100ML

## (undated) DEVICE — PAD LAP CHOLE: Brand: MEDLINE INDUSTRIES, INC.

## (undated) DEVICE — DOVER HYDROGEL COATED LATEX FOLEY CATHETER, 5 ML, 3-WAY 18 FR/CH (6.0 MM): Brand: DOVER

## (undated) DEVICE — PROXIMATE RH ROTATING HEAD SKIN STAPLERS (35 WIDE) CONTAINS 35 STAINLESS STEEL STAPLES: Brand: PROXIMATE

## (undated) DEVICE — GLV SURG BIOGEL M LTX PF 7 1/2

## (undated) DEVICE — 2, DISPOSABLE SUCTION/IRRIGATOR WITHOUT DISPOSABLE TIP: Brand: STRYKEFLOW

## (undated) DEVICE — GLV SURG DERMASSURE GRN LF PF 8.0

## (undated) DEVICE — ENDOSCOPIC SEAL URO 1 SIZE FITS ALL: Brand: ENDOSCOPIC SEAL

## (undated) DEVICE — DRSNG TELFA PAD NONADH STR 1S 3X8IN

## (undated) DEVICE — DRAINBAG,ANTI-REFLUX TOWER,L/F,2000ML,LL: Brand: MEDLINE

## (undated) DEVICE — GLV SURG BIOGEL LTX PF 6 1/2

## (undated) DEVICE — BAPTIST TURNOVER KIT: Brand: MEDLINE INDUSTRIES, INC.

## (undated) DEVICE — BANDAGE,GAUZE,BULKEE II,4.5"X4.1YD,STRL: Brand: MEDLINE

## (undated) DEVICE — APPL CHLORAPREP HI/LITE 26ML ORNG

## (undated) DEVICE — ENDOPATH PNEUMONEEDLE INSUFFLATION NEEDLES WITH LUER LOCK CONNECTORS 120MM: Brand: ENDOPATH

## (undated) DEVICE — ENDOPATH XCEL DILATING TIP TROCARS WITH STABILITY SLEEVES: Brand: ENDOPATH XCEL

## (undated) DEVICE — PLUG,CATHETER,DRAINAGE PROTECTOR,TUBE: Brand: MEDLINE

## (undated) DEVICE — DUAL LUMEN URETERAL CATHETER

## (undated) DEVICE — SHEATH URETRL ACC NAVIGATOR 11/13F 36CM 5PK

## (undated) DEVICE — FRCP BX RADJAW4 NDL 2.8 240 STD OG

## (undated) DEVICE — THE CHANNEL CLEANING BRUSH IS A NYLON FLEXI BRUSH ATTACHED TO A FLEXIBLE PLASTIC SHEATH DESIGNED TO SAFELY REMOVE DEBRIS FROM FLEXIBLE ENDOSCOPES.

## (undated) DEVICE — GW SENSR DUALFLEX NITNL STR .038IN 3X150CM

## (undated) DEVICE — VAGINAL PREP TRAY: Brand: MEDLINE INDUSTRIES, INC.

## (undated) DEVICE — SUT MNCRYL 3/0 PS2 18IN MCP497G

## (undated) DEVICE — TBG SMPL FLTR LINE NASL 02/C02 A/ BX/100

## (undated) DEVICE — MONOPOLAR METZENBAUM SCISSOR, MINI BLADE TIP, DISPOSABLE: Brand: MONOPOLAR METZENBAUM SCISSOR, MINI BLADE TIP, DISPOSABLE